# Patient Record
Sex: FEMALE | Race: WHITE | NOT HISPANIC OR LATINO | Employment: UNEMPLOYED | URBAN - METROPOLITAN AREA
[De-identification: names, ages, dates, MRNs, and addresses within clinical notes are randomized per-mention and may not be internally consistent; named-entity substitution may affect disease eponyms.]

---

## 2017-02-04 LAB
LEFT EYE DIABETIC RETINOPATHY: NORMAL
RIGHT EYE DIABETIC RETINOPATHY: NORMAL
SEVERITY (EYE EXAM): NORMAL

## 2017-03-15 ENCOUNTER — ALLSCRIPTS OFFICE VISIT (OUTPATIENT)
Dept: OTHER | Facility: OTHER | Age: 57
End: 2017-03-15

## 2017-03-15 DIAGNOSIS — R00.2 PALPITATIONS: ICD-10-CM

## 2017-03-20 ENCOUNTER — HOSPITAL ENCOUNTER (OUTPATIENT)
Dept: NON INVASIVE DIAGNOSTICS | Facility: HOSPITAL | Age: 57
Discharge: HOME | End: 2017-03-20
Payer: COMMERCIAL

## 2017-03-20 ENCOUNTER — LAB (OUTPATIENT)
Dept: LAB | Facility: HOSPITAL | Age: 57
End: 2017-03-20
Payer: COMMERCIAL

## 2017-03-20 ENCOUNTER — TRANSCRIBE ORDERS (OUTPATIENT)
Dept: ADMINISTRATIVE | Facility: HOSPITAL | Age: 57
End: 2017-03-20

## 2017-03-20 DIAGNOSIS — N18.9 UNCONTROLLED TYPE 2 DIABETES MELLITUS WITH CHRONIC KIDNEY DISEASE, UNSPECIFIED CKD STAGE, UNSPECIFIED LONG TERM INSULIN USE STATUS: ICD-10-CM

## 2017-03-20 DIAGNOSIS — E11.22 UNCONTROLLED TYPE 2 DIABETES MELLITUS WITH CHRONIC KIDNEY DISEASE, UNSPECIFIED CKD STAGE, UNSPECIFIED LONG TERM INSULIN USE STATUS: ICD-10-CM

## 2017-03-20 DIAGNOSIS — R00.2 PALPITATIONS: ICD-10-CM

## 2017-03-20 DIAGNOSIS — I10 ESSENTIAL HYPERTENSION, MALIGNANT: Primary | ICD-10-CM

## 2017-03-20 DIAGNOSIS — E83.52 HYPERCALCEMIA: ICD-10-CM

## 2017-03-20 DIAGNOSIS — E11.65 UNCONTROLLED TYPE 2 DIABETES MELLITUS WITH CHRONIC KIDNEY DISEASE, UNSPECIFIED CKD STAGE, UNSPECIFIED LONG TERM INSULIN USE STATUS: ICD-10-CM

## 2017-03-20 DIAGNOSIS — E66.9 OBESITY, UNSPECIFIED: ICD-10-CM

## 2017-03-20 DIAGNOSIS — I10 ESSENTIAL HYPERTENSION, MALIGNANT: ICD-10-CM

## 2017-03-20 LAB
25(OH)D3 SERPL-MCNC: 9.2 NG/ML (ref 30–100)
ALBUMIN SERPL BCP-MCNC: 3.7 G/DL (ref 3.5–5)
ALP SERPL-CCNC: 94 U/L (ref 46–116)
ALT SERPL W P-5'-P-CCNC: 44 U/L (ref 12–78)
ANION GAP SERPL CALCULATED.3IONS-SCNC: 10 MMOL/L (ref 4–13)
AST SERPL W P-5'-P-CCNC: 26 U/L (ref 5–45)
BILIRUB SERPL-MCNC: 1.02 MG/DL (ref 0.2–1)
BUN SERPL-MCNC: 19 MG/DL (ref 5–25)
CALCIUM ALBUM COR SERPL-MCNC: 11.2 MG/DL (ref 8.3–10.1)
CALCIUM SERPL-MCNC: 11 MG/DL (ref 8.3–10.1)
CHLORIDE SERPL-SCNC: 99 MMOL/L (ref 100–108)
CHOLEST SERPL-MCNC: 197 MG/DL (ref 50–200)
CO2 SERPL-SCNC: 27 MMOL/L (ref 21–32)
CREAT SERPL-MCNC: 0.86 MG/DL (ref 0.6–1.3)
EST. AVERAGE GLUCOSE BLD GHB EST-MCNC: 214 MG/DL
GFR SERPL CREATININE-BSD FRML MDRD: >60 ML/MIN/1.73SQ M
GLUCOSE P FAST SERPL-MCNC: 196 MG/DL (ref 65–99)
HBA1C MFR BLD: 9.1 % (ref 4.2–6.3)
HDLC SERPL-MCNC: 45 MG/DL (ref 40–60)
LDLC SERPL CALC-MCNC: 107 MG/DL (ref 0–100)
POTASSIUM SERPL-SCNC: 4.2 MMOL/L (ref 3.5–5.3)
PROT SERPL-MCNC: 8.1 G/DL (ref 6.4–8.2)
SODIUM SERPL-SCNC: 136 MMOL/L (ref 136–145)
TRIGL SERPL-MCNC: 227 MG/DL
TSH SERPL DL<=0.05 MIU/L-ACNC: 1.64 UIU/ML (ref 0.36–3.74)

## 2017-03-20 PROCEDURE — 80061 LIPID PANEL: CPT

## 2017-03-20 PROCEDURE — 82306 VITAMIN D 25 HYDROXY: CPT

## 2017-03-20 PROCEDURE — 83036 HEMOGLOBIN GLYCOSYLATED A1C: CPT

## 2017-03-20 PROCEDURE — 36415 COLL VENOUS BLD VENIPUNCTURE: CPT

## 2017-03-20 PROCEDURE — 84443 ASSAY THYROID STIM HORMONE: CPT

## 2017-03-20 PROCEDURE — 93225 XTRNL ECG REC<48 HRS REC: CPT

## 2017-03-20 PROCEDURE — 93226 XTRNL ECG REC<48 HR SCAN A/R: CPT

## 2017-03-20 PROCEDURE — 80053 COMPREHEN METABOLIC PANEL: CPT

## 2017-03-21 ENCOUNTER — GENERIC CONVERSION - ENCOUNTER (OUTPATIENT)
Dept: OTHER | Facility: OTHER | Age: 57
End: 2017-03-21

## 2017-03-22 ENCOUNTER — ALLSCRIPTS OFFICE VISIT (OUTPATIENT)
Dept: OTHER | Facility: OTHER | Age: 57
End: 2017-03-22

## 2017-03-24 ENCOUNTER — GENERIC CONVERSION - ENCOUNTER (OUTPATIENT)
Dept: OTHER | Facility: OTHER | Age: 57
End: 2017-03-24

## 2017-04-08 ENCOUNTER — ALLSCRIPTS OFFICE VISIT (OUTPATIENT)
Dept: OTHER | Facility: OTHER | Age: 57
End: 2017-04-08

## 2017-04-08 ENCOUNTER — LAB REQUISITION (OUTPATIENT)
Dept: LAB | Facility: HOSPITAL | Age: 57
End: 2017-04-08
Payer: COMMERCIAL

## 2017-04-08 DIAGNOSIS — R41.0 DISORIENTATION: ICD-10-CM

## 2017-04-08 LAB
ALBUMIN SERPL BCP-MCNC: 3.8 G/DL (ref 3.5–5)
ALP SERPL-CCNC: 96 U/L (ref 46–116)
ALT SERPL W P-5'-P-CCNC: 50 U/L (ref 12–78)
ANION GAP SERPL CALCULATED.3IONS-SCNC: 10 MMOL/L (ref 4–13)
AST SERPL W P-5'-P-CCNC: 35 U/L (ref 5–45)
BILIRUB SERPL-MCNC: 0.9 MG/DL (ref 0.2–1)
BILIRUB UR QL STRIP: NEGATIVE
BUN SERPL-MCNC: 17 MG/DL (ref 5–25)
CALCIUM ALBUM COR SERPL-MCNC: 11.2 MG/DL (ref 8.3–10.1)
CALCIUM SERPL-MCNC: 11 MG/DL (ref 8.3–10.1)
CHLORIDE SERPL-SCNC: 104 MMOL/L (ref 100–108)
CLARITY UR: NORMAL
CO2 SERPL-SCNC: 23 MMOL/L (ref 21–32)
COLOR UR: YELLOW
CREAT SERPL-MCNC: 0.77 MG/DL (ref 0.6–1.3)
GFR SERPL CREATININE-BSD FRML MDRD: >60 ML/MIN/1.73SQ M
GLUCOSE (HISTORICAL): NORMAL
GLUCOSE P FAST SERPL-MCNC: 124 MG/DL (ref 65–99)
HGB UR QL STRIP.AUTO: NEGATIVE
KETONES UR STRIP-MCNC: NEGATIVE MG/DL
LEUKOCYTE ESTERASE UR QL STRIP: NEGATIVE
NITRITE UR QL STRIP: NEGATIVE
PH UR STRIP.AUTO: 5 [PH]
POTASSIUM SERPL-SCNC: 4.6 MMOL/L (ref 3.5–5.3)
PROT SERPL-MCNC: 7.8 G/DL (ref 6.4–8.2)
PROT UR STRIP-MCNC: NEGATIVE MG/DL
SODIUM SERPL-SCNC: 137 MMOL/L (ref 136–145)
SP GR UR STRIP.AUTO: 1.01
UROBILINOGEN UR QL STRIP.AUTO: 0.2

## 2017-04-08 PROCEDURE — 87086 URINE CULTURE/COLONY COUNT: CPT | Performed by: FAMILY MEDICINE

## 2017-04-08 PROCEDURE — 80053 COMPREHEN METABOLIC PANEL: CPT | Performed by: FAMILY MEDICINE

## 2017-04-09 LAB — BACTERIA UR CULT: NORMAL

## 2017-04-10 ENCOUNTER — TRANSCRIBE ORDERS (OUTPATIENT)
Dept: ADMINISTRATIVE | Facility: HOSPITAL | Age: 57
End: 2017-04-10

## 2017-04-10 DIAGNOSIS — F01.50 VASCULAR DEMENTIA WITH DELIRIUM (HCC): ICD-10-CM

## 2017-04-10 DIAGNOSIS — R29.6 FALLS FREQUENTLY: Primary | ICD-10-CM

## 2017-04-10 DIAGNOSIS — F05 VASCULAR DEMENTIA WITH DELIRIUM (HCC): ICD-10-CM

## 2017-04-11 ENCOUNTER — HOSPITAL ENCOUNTER (OUTPATIENT)
Dept: CT IMAGING | Facility: HOSPITAL | Age: 57
Discharge: HOME | End: 2017-04-11
Payer: COMMERCIAL

## 2017-04-11 DIAGNOSIS — R29.6 REPEATED FALLS: ICD-10-CM

## 2017-04-11 DIAGNOSIS — R41.0 DISORIENTATION: ICD-10-CM

## 2017-04-11 PROCEDURE — 70470 CT HEAD/BRAIN W/O & W/DYE: CPT

## 2017-04-11 RX ADMIN — IOHEXOL 100 ML: 350 INJECTION, SOLUTION INTRAVENOUS at 19:43

## 2017-04-12 ENCOUNTER — GENERIC CONVERSION - ENCOUNTER (OUTPATIENT)
Dept: OTHER | Facility: OTHER | Age: 57
End: 2017-04-12

## 2017-04-20 ENCOUNTER — GENERIC CONVERSION - ENCOUNTER (OUTPATIENT)
Dept: OTHER | Facility: OTHER | Age: 57
End: 2017-04-20

## 2017-04-27 ENCOUNTER — ANESTHESIA EVENT (INPATIENT)
Dept: GASTROENTEROLOGY | Facility: HOSPITAL | Age: 57
DRG: 394 | End: 2017-04-27
Payer: COMMERCIAL

## 2017-04-27 ENCOUNTER — APPOINTMENT (EMERGENCY)
Dept: CT IMAGING | Facility: HOSPITAL | Age: 57
DRG: 394 | End: 2017-04-27
Payer: COMMERCIAL

## 2017-04-27 ENCOUNTER — HOSPITAL ENCOUNTER (INPATIENT)
Facility: HOSPITAL | Age: 57
LOS: 6 days | Discharge: HOME | DRG: 394 | End: 2017-05-03
Attending: EMERGENCY MEDICINE | Admitting: INTERNAL MEDICINE
Payer: COMMERCIAL

## 2017-04-27 ENCOUNTER — ANESTHESIA EVENT (EMERGENCY)
Dept: PERIOP | Facility: HOSPITAL | Age: 57
DRG: 394 | End: 2017-04-27
Payer: COMMERCIAL

## 2017-04-27 ENCOUNTER — ANESTHESIA (EMERGENCY)
Dept: PERIOP | Facility: HOSPITAL | Age: 57
DRG: 394 | End: 2017-04-27
Payer: COMMERCIAL

## 2017-04-27 DIAGNOSIS — K51.00 PANCOLITIS (HCC): Primary | ICD-10-CM

## 2017-04-27 DIAGNOSIS — K92.2 GI BLEEDING: ICD-10-CM

## 2017-04-27 DIAGNOSIS — E66.01 MORBID OBESITY DUE TO EXCESS CALORIES (HCC): ICD-10-CM

## 2017-04-27 DIAGNOSIS — R10.9 ABDOMINAL PAIN: ICD-10-CM

## 2017-04-27 DIAGNOSIS — K43.9 VENTRAL HERNIA: ICD-10-CM

## 2017-04-27 DIAGNOSIS — R11.2 NAUSEA & VOMITING: ICD-10-CM

## 2017-04-27 DIAGNOSIS — I10 ESSENTIAL HYPERTENSION: ICD-10-CM

## 2017-04-27 DIAGNOSIS — R74.8 ELEVATED LIPASE: ICD-10-CM

## 2017-04-27 DIAGNOSIS — D72.829 LEUKOCYTOSIS: ICD-10-CM

## 2017-04-27 DIAGNOSIS — E87.2 LACTIC ACIDOSIS: ICD-10-CM

## 2017-04-27 DIAGNOSIS — E11.9 TYPE 2 DIABETES MELLITUS WITHOUT COMPLICATION, WITHOUT LONG-TERM CURRENT USE OF INSULIN (HCC): ICD-10-CM

## 2017-04-27 LAB
ABO GROUP BLD: NORMAL
ALBUMIN SERPL BCP-MCNC: 3.8 G/DL (ref 3.5–5)
ALBUMIN SERPL BCP-MCNC: 3.9 G/DL (ref 3.5–5)
ALP SERPL-CCNC: 103 U/L (ref 46–116)
ALP SERPL-CCNC: 97 U/L (ref 46–116)
ALT SERPL W P-5'-P-CCNC: 42 U/L (ref 12–78)
ALT SERPL W P-5'-P-CCNC: 44 U/L (ref 12–78)
ANION GAP SERPL CALCULATED.3IONS-SCNC: 10 MMOL/L (ref 4–13)
ANION GAP SERPL CALCULATED.3IONS-SCNC: 8 MMOL/L (ref 4–13)
APTT PPP: 29 SECONDS (ref 24–36)
AST SERPL W P-5'-P-CCNC: 27 U/L (ref 5–45)
AST SERPL W P-5'-P-CCNC: 28 U/L (ref 5–45)
BACTERIA UR QL AUTO: ABNORMAL /HPF
BASOPHILS # BLD AUTO: 0.04 THOUSANDS/ΜL (ref 0–0.1)
BASOPHILS # BLD MANUAL: 0 THOUSAND/UL (ref 0–0.1)
BASOPHILS NFR BLD AUTO: 0 % (ref 0–1)
BASOPHILS NFR MAR MANUAL: 0 % (ref 0–1)
BILIRUB SERPL-MCNC: 0.83 MG/DL (ref 0.2–1)
BILIRUB SERPL-MCNC: 0.93 MG/DL (ref 0.2–1)
BILIRUB UR QL STRIP: NEGATIVE
BLD GP AB SCN SERPL QL: NEGATIVE
BUN SERPL-MCNC: 22 MG/DL (ref 5–25)
BUN SERPL-MCNC: 23 MG/DL (ref 5–25)
CALCIUM SERPL-MCNC: 10.8 MG/DL (ref 8.3–10.1)
CALCIUM SERPL-MCNC: 11.2 MG/DL (ref 8.3–10.1)
CHLORIDE SERPL-SCNC: 101 MMOL/L (ref 100–108)
CHLORIDE SERPL-SCNC: 103 MMOL/L (ref 100–108)
CLARITY UR: CLEAR
CLARITY, POC: CLEAR
CO2 SERPL-SCNC: 26 MMOL/L (ref 21–32)
CO2 SERPL-SCNC: 29 MMOL/L (ref 21–32)
COLOR UR: ABNORMAL
COLOR, POC: NORMAL
CREAT SERPL-MCNC: 1.03 MG/DL (ref 0.6–1.3)
CREAT SERPL-MCNC: 1.06 MG/DL (ref 0.6–1.3)
EOSINOPHIL # BLD AUTO: 0.23 THOUSAND/ΜL (ref 0–0.61)
EOSINOPHIL # BLD MANUAL: 0 THOUSAND/UL (ref 0–0.4)
EOSINOPHIL NFR BLD AUTO: 2 % (ref 0–6)
EOSINOPHIL NFR BLD MANUAL: 0 % (ref 0–6)
ERYTHROCYTE [DISTWIDTH] IN BLOOD BY AUTOMATED COUNT: 13.9 % (ref 11.6–15.1)
ERYTHROCYTE [DISTWIDTH] IN BLOOD BY AUTOMATED COUNT: 13.9 % (ref 11.6–15.1)
GFR SERPL CREATININE-BSD FRML MDRD: 53.8 ML/MIN/1.73SQ M
GFR SERPL CREATININE-BSD FRML MDRD: 55.6 ML/MIN/1.73SQ M
GLUCOSE SERPL-MCNC: 227 MG/DL (ref 65–140)
GLUCOSE SERPL-MCNC: 240 MG/DL (ref 65–140)
GLUCOSE UR STRIP-MCNC: ABNORMAL MG/DL
HCT VFR BLD AUTO: 42.6 % (ref 34.8–46.1)
HCT VFR BLD AUTO: 43.7 % (ref 34.8–46.1)
HCT VFR BLD AUTO: 44.9 % (ref 34.8–46.1)
HGB BLD-MCNC: 14.3 G/DL (ref 11.5–15.4)
HGB BLD-MCNC: 14.6 G/DL (ref 11.5–15.4)
HGB BLD-MCNC: 15.4 G/DL (ref 11.5–15.4)
HGB UR QL STRIP.AUTO: NEGATIVE
INR PPP: 1.01 (ref 0.86–1.16)
INR PPP: 1.01 (ref 0.86–1.16)
KETONES UR STRIP-MCNC: ABNORMAL MG/DL
LACTATE SERPL-SCNC: 1.9 MMOL/L (ref 0.5–2)
LACTATE SERPL-SCNC: 1.9 MMOL/L (ref 0.5–2)
LACTATE SERPL-SCNC: 2.2 MMOL/L (ref 0.5–2)
LEUKOCYTE ESTERASE UR QL STRIP: ABNORMAL
LIPASE SERPL-CCNC: 670 U/L (ref 73–393)
LYMPHOCYTES # BLD AUTO: 0.13 THOUSAND/UL (ref 0.6–4.47)
LYMPHOCYTES # BLD AUTO: 1 % (ref 14–44)
LYMPHOCYTES # BLD AUTO: 1.81 THOUSANDS/ΜL (ref 0.6–4.47)
LYMPHOCYTES NFR BLD AUTO: 12 % (ref 14–44)
MCH RBC QN AUTO: 29.4 PG (ref 26.8–34.3)
MCH RBC QN AUTO: 30.3 PG (ref 26.8–34.3)
MCHC RBC AUTO-ENTMCNC: 33.4 G/DL (ref 31.4–37.4)
MCHC RBC AUTO-ENTMCNC: 34.3 G/DL (ref 31.4–37.4)
MCV RBC AUTO: 88 FL (ref 82–98)
MCV RBC AUTO: 88 FL (ref 82–98)
MONOCYTES # BLD AUTO: 0.38 THOUSAND/UL (ref 0–1.22)
MONOCYTES # BLD AUTO: 1.01 THOUSAND/ΜL (ref 0.17–1.22)
MONOCYTES NFR BLD AUTO: 7 % (ref 4–12)
MONOCYTES NFR BLD: 3 % (ref 4–12)
MUCOUS THREADS UR QL AUTO: ABNORMAL
NEUTROPHILS # BLD AUTO: 11.52 THOUSANDS/ΜL (ref 1.85–7.62)
NEUTROPHILS # BLD MANUAL: 12.19 THOUSAND/UL (ref 1.85–7.62)
NEUTS BAND NFR BLD MANUAL: 12 % (ref 0–8)
NEUTS SEG NFR BLD AUTO: 79 % (ref 43–75)
NEUTS SEG NFR BLD AUTO: 84 % (ref 43–75)
NITRITE UR QL STRIP: NEGATIVE
NON-SQ EPI CELLS URNS QL MICRO: ABNORMAL /HPF
NRBC BLD AUTO-RTO: 0 /100 WBCS
NRBC BLD AUTO-RTO: 0 /100 WBCS
PH UR STRIP.AUTO: 5 [PH] (ref 4.5–8)
PLATELET # BLD AUTO: 243 THOUSANDS/UL (ref 149–390)
PLATELET # BLD AUTO: 311 THOUSANDS/UL (ref 149–390)
PLATELET BLD QL SMEAR: ADEQUATE
PMV BLD AUTO: 10.1 FL (ref 8.9–12.7)
PMV BLD AUTO: 9.8 FL (ref 8.9–12.7)
POTASSIUM SERPL-SCNC: 3.8 MMOL/L (ref 3.5–5.3)
POTASSIUM SERPL-SCNC: 4.2 MMOL/L (ref 3.5–5.3)
PROT SERPL-MCNC: 7.8 G/DL (ref 6.4–8.2)
PROT SERPL-MCNC: 8 G/DL (ref 6.4–8.2)
PROT UR STRIP-MCNC: ABNORMAL MG/DL
PROTHROMBIN TIME: 13.3 SECONDS (ref 12–14.3)
PROTHROMBIN TIME: 13.3 SECONDS (ref 12–14.3)
RBC # BLD AUTO: 4.96 MILLION/UL (ref 3.81–5.12)
RBC # BLD AUTO: 5.09 MILLION/UL (ref 3.81–5.12)
RBC #/AREA URNS AUTO: ABNORMAL /HPF
RBC MORPH BLD: NORMAL
RH BLD: POSITIVE
SODIUM SERPL-SCNC: 138 MMOL/L (ref 136–145)
SODIUM SERPL-SCNC: 139 MMOL/L (ref 136–145)
SP GR UR STRIP.AUTO: <=1.005 (ref 1–1.03)
SPECIMEN EXPIRATION DATE: NORMAL
TOTAL CELLS COUNTED SPEC: 100
UROBILINOGEN UR QL STRIP.AUTO: 0.2 E.U./DL
WBC # BLD AUTO: 12.7 THOUSAND/UL (ref 4.31–10.16)
WBC # BLD AUTO: 14.61 THOUSAND/UL (ref 4.31–10.16)
WBC #/AREA URNS AUTO: ABNORMAL /HPF

## 2017-04-27 PROCEDURE — C9113 INJ PANTOPRAZOLE SODIUM, VIA: HCPCS | Performed by: PHYSICIAN ASSISTANT

## 2017-04-27 PROCEDURE — 80053 COMPREHEN METABOLIC PANEL: CPT | Performed by: EMERGENCY MEDICINE

## 2017-04-27 PROCEDURE — 96376 TX/PRO/DX INJ SAME DRUG ADON: CPT

## 2017-04-27 PROCEDURE — 81003 URINALYSIS AUTO W/O SCOPE: CPT

## 2017-04-27 PROCEDURE — 96361 HYDRATE IV INFUSION ADD-ON: CPT

## 2017-04-27 PROCEDURE — 85610 PROTHROMBIN TIME: CPT | Performed by: SURGERY

## 2017-04-27 PROCEDURE — 96374 THER/PROPH/DIAG INJ IV PUSH: CPT

## 2017-04-27 PROCEDURE — 85027 COMPLETE CBC AUTOMATED: CPT | Performed by: SURGERY

## 2017-04-27 PROCEDURE — 83605 ASSAY OF LACTIC ACID: CPT | Performed by: EMERGENCY MEDICINE

## 2017-04-27 PROCEDURE — 99285 EMERGENCY DEPT VISIT HI MDM: CPT

## 2017-04-27 PROCEDURE — 80053 COMPREHEN METABOLIC PANEL: CPT | Performed by: SURGERY

## 2017-04-27 PROCEDURE — 96375 TX/PRO/DX INJ NEW DRUG ADDON: CPT

## 2017-04-27 PROCEDURE — 86900 BLOOD TYPING SEROLOGIC ABO: CPT | Performed by: EMERGENCY MEDICINE

## 2017-04-27 PROCEDURE — 85018 HEMOGLOBIN: CPT | Performed by: PHYSICIAN ASSISTANT

## 2017-04-27 PROCEDURE — 82272 OCCULT BLD FECES 1-3 TESTS: CPT

## 2017-04-27 PROCEDURE — 81002 URINALYSIS NONAUTO W/O SCOPE: CPT | Performed by: EMERGENCY MEDICINE

## 2017-04-27 PROCEDURE — 85014 HEMATOCRIT: CPT | Performed by: PHYSICIAN ASSISTANT

## 2017-04-27 PROCEDURE — 85007 BL SMEAR W/DIFF WBC COUNT: CPT | Performed by: SURGERY

## 2017-04-27 PROCEDURE — 81001 URINALYSIS AUTO W/SCOPE: CPT

## 2017-04-27 PROCEDURE — 83605 ASSAY OF LACTIC ACID: CPT | Performed by: SURGERY

## 2017-04-27 PROCEDURE — 87086 URINE CULTURE/COLONY COUNT: CPT | Performed by: EMERGENCY MEDICINE

## 2017-04-27 PROCEDURE — 86901 BLOOD TYPING SEROLOGIC RH(D): CPT | Performed by: EMERGENCY MEDICINE

## 2017-04-27 PROCEDURE — 86850 RBC ANTIBODY SCREEN: CPT | Performed by: EMERGENCY MEDICINE

## 2017-04-27 PROCEDURE — 85025 COMPLETE CBC W/AUTO DIFF WBC: CPT | Performed by: EMERGENCY MEDICINE

## 2017-04-27 PROCEDURE — 85730 THROMBOPLASTIN TIME PARTIAL: CPT | Performed by: SURGERY

## 2017-04-27 PROCEDURE — 83690 ASSAY OF LIPASE: CPT | Performed by: EMERGENCY MEDICINE

## 2017-04-27 PROCEDURE — 74177 CT ABD & PELVIS W/CONTRAST: CPT

## 2017-04-27 PROCEDURE — 36415 COLL VENOUS BLD VENIPUNCTURE: CPT | Performed by: EMERGENCY MEDICINE

## 2017-04-27 PROCEDURE — 87040 BLOOD CULTURE FOR BACTERIA: CPT | Performed by: SURGERY

## 2017-04-27 RX ORDER — HYDRALAZINE HYDROCHLORIDE 20 MG/ML
10 INJECTION INTRAMUSCULAR; INTRAVENOUS EVERY 6 HOURS PRN
Status: DISCONTINUED | OUTPATIENT
Start: 2017-04-27 | End: 2017-05-03 | Stop reason: HOSPADM

## 2017-04-27 RX ORDER — MORPHINE SULFATE 4 MG/ML
4 INJECTION, SOLUTION INTRAMUSCULAR; INTRAVENOUS ONCE
Status: COMPLETED | OUTPATIENT
Start: 2017-04-27 | End: 2017-04-27

## 2017-04-27 RX ORDER — PANTOPRAZOLE SODIUM 40 MG/1
40 INJECTION, POWDER, FOR SOLUTION INTRAVENOUS
Status: DISCONTINUED | OUTPATIENT
Start: 2017-04-27 | End: 2017-04-27

## 2017-04-27 RX ORDER — ONDANSETRON 2 MG/ML
4 INJECTION INTRAMUSCULAR; INTRAVENOUS ONCE
Status: COMPLETED | OUTPATIENT
Start: 2017-04-27 | End: 2017-04-27

## 2017-04-27 RX ORDER — ONDANSETRON 2 MG/ML
4 INJECTION INTRAMUSCULAR; INTRAVENOUS EVERY 6 HOURS PRN
Status: DISCONTINUED | OUTPATIENT
Start: 2017-04-27 | End: 2017-05-03 | Stop reason: HOSPADM

## 2017-04-27 RX ORDER — PANTOPRAZOLE SODIUM 40 MG/1
40 INJECTION, POWDER, FOR SOLUTION INTRAVENOUS EVERY 12 HOURS SCHEDULED
Status: DISCONTINUED | OUTPATIENT
Start: 2017-04-27 | End: 2017-05-01

## 2017-04-27 RX ADMIN — HYDROMORPHONE HYDROCHLORIDE 1 MG: 1 INJECTION, SOLUTION INTRAMUSCULAR; INTRAVENOUS; SUBCUTANEOUS at 10:44

## 2017-04-27 RX ADMIN — ONDANSETRON 4 MG: 2 INJECTION INTRAMUSCULAR; INTRAVENOUS at 03:44

## 2017-04-27 RX ADMIN — PIPERACILLIN SODIUM AND TAZOBACTAM SODIUM 3.38 G: 36; 4.5 INJECTION, POWDER, FOR SOLUTION INTRAVENOUS at 09:16

## 2017-04-27 RX ADMIN — IOHEXOL 100 ML: 350 INJECTION, SOLUTION INTRAVENOUS at 04:53

## 2017-04-27 RX ADMIN — METRONIDAZOLE 500 MG: 500 INJECTION, SOLUTION INTRAVENOUS at 09:52

## 2017-04-27 RX ADMIN — SODIUM CHLORIDE 1000 ML: 0.9 INJECTION, SOLUTION INTRAVENOUS at 05:18

## 2017-04-27 RX ADMIN — PIPERACILLIN SODIUM AND TAZOBACTAM SODIUM 3.38 G: 36; 4.5 INJECTION, POWDER, FOR SOLUTION INTRAVENOUS at 17:34

## 2017-04-27 RX ADMIN — ONDANSETRON 4 MG: 2 INJECTION INTRAMUSCULAR; INTRAVENOUS at 19:10

## 2017-04-27 RX ADMIN — MORPHINE SULFATE 4 MG: 4 INJECTION, SOLUTION INTRAMUSCULAR; INTRAVENOUS at 03:47

## 2017-04-27 RX ADMIN — SODIUM CHLORIDE 1000 ML: 0.9 INJECTION, SOLUTION INTRAVENOUS at 03:43

## 2017-04-27 RX ADMIN — BISACODYL 10 MG: 5 TABLET, COATED ORAL at 17:25

## 2017-04-27 RX ADMIN — PANTOPRAZOLE SODIUM 40 MG: 40 INJECTION, POWDER, FOR SOLUTION INTRAVENOUS at 09:11

## 2017-04-27 RX ADMIN — METRONIDAZOLE 500 MG: 500 INJECTION, SOLUTION INTRAVENOUS at 19:02

## 2017-04-27 RX ADMIN — MORPHINE SULFATE 4 MG: 4 INJECTION, SOLUTION INTRAMUSCULAR; INTRAVENOUS at 04:17

## 2017-04-27 RX ADMIN — HYDROMORPHONE HYDROCHLORIDE 1 MG: 1 INJECTION, SOLUTION INTRAMUSCULAR; INTRAVENOUS; SUBCUTANEOUS at 19:10

## 2017-04-27 RX ADMIN — POLYETHYLENE GLYCOL 3350, SODIUM SULFATE ANHYDROUS, SODIUM BICARBONATE, SODIUM CHLORIDE, POTASSIUM CHLORIDE 4000 ML: 236; 22.74; 6.74; 5.86; 2.97 POWDER, FOR SOLUTION ORAL at 17:37

## 2017-04-27 RX ADMIN — PANTOPRAZOLE SODIUM 40 MG: 40 INJECTION, POWDER, FOR SOLUTION INTRAVENOUS at 21:27

## 2017-04-28 ENCOUNTER — GENERIC CONVERSION - ENCOUNTER (OUTPATIENT)
Dept: OTHER | Facility: OTHER | Age: 57
End: 2017-04-28

## 2017-04-28 ENCOUNTER — ANESTHESIA (INPATIENT)
Dept: GASTROENTEROLOGY | Facility: HOSPITAL | Age: 57
DRG: 394 | End: 2017-04-28
Payer: COMMERCIAL

## 2017-04-28 LAB
ALBUMIN SERPL BCP-MCNC: 3.2 G/DL (ref 3.5–5)
ALP SERPL-CCNC: 77 U/L (ref 46–116)
ALT SERPL W P-5'-P-CCNC: 33 U/L (ref 12–78)
ANION GAP SERPL CALCULATED.3IONS-SCNC: 8 MMOL/L (ref 4–13)
AST SERPL W P-5'-P-CCNC: 18 U/L (ref 5–45)
BACTERIA UR CULT: NORMAL
BASOPHILS # BLD AUTO: 0.01 THOUSANDS/ΜL (ref 0–0.1)
BASOPHILS NFR BLD AUTO: 0 % (ref 0–1)
BILIRUB SERPL-MCNC: 1.12 MG/DL (ref 0.2–1)
BUN SERPL-MCNC: 13 MG/DL (ref 5–25)
CALCIUM SERPL-MCNC: 10.1 MG/DL (ref 8.3–10.1)
CHLORIDE SERPL-SCNC: 105 MMOL/L (ref 100–108)
CO2 SERPL-SCNC: 28 MMOL/L (ref 21–32)
CREAT SERPL-MCNC: 0.76 MG/DL (ref 0.6–1.3)
EOSINOPHIL # BLD AUTO: 0.07 THOUSAND/ΜL (ref 0–0.61)
EOSINOPHIL NFR BLD AUTO: 1 % (ref 0–6)
ERYTHROCYTE [DISTWIDTH] IN BLOOD BY AUTOMATED COUNT: 14.2 % (ref 11.6–15.1)
GFR SERPL CREATININE-BSD FRML MDRD: >60 ML/MIN/1.73SQ M
GLUCOSE SERPL-MCNC: 166 MG/DL (ref 65–140)
GLUCOSE SERPL-MCNC: 200 MG/DL (ref 65–140)
GLUCOSE SERPL-MCNC: 205 MG/DL (ref 65–140)
GLUCOSE SERPL-MCNC: 207 MG/DL (ref 65–140)
GLUCOSE SERPL-MCNC: 210 MG/DL (ref 65–140)
HCT VFR BLD AUTO: 39.5 % (ref 34.8–46.1)
HCT VFR BLD AUTO: 40.9 % (ref 34.8–46.1)
HCT VFR BLD AUTO: 41 % (ref 34.8–46.1)
HGB BLD-MCNC: 12.8 G/DL (ref 11.5–15.4)
HGB BLD-MCNC: 13.1 G/DL (ref 11.5–15.4)
HGB BLD-MCNC: 13.2 G/DL (ref 11.5–15.4)
LIPASE SERPL-CCNC: 82 U/L (ref 73–393)
LYMPHOCYTES # BLD AUTO: 1.05 THOUSANDS/ΜL (ref 0.6–4.47)
LYMPHOCYTES NFR BLD AUTO: 8 % (ref 14–44)
MAGNESIUM SERPL-MCNC: 1.9 MG/DL (ref 1.6–2.6)
MCH RBC QN AUTO: 28.2 PG (ref 26.8–34.3)
MCHC RBC AUTO-ENTMCNC: 32 G/DL (ref 31.4–37.4)
MCV RBC AUTO: 88 FL (ref 82–98)
MONOCYTES # BLD AUTO: 1 THOUSAND/ΜL (ref 0.17–1.22)
MONOCYTES NFR BLD AUTO: 8 % (ref 4–12)
NEUTROPHILS # BLD AUTO: 11.16 THOUSANDS/ΜL (ref 1.85–7.62)
NEUTS SEG NFR BLD AUTO: 83 % (ref 43–75)
PLATELET # BLD AUTO: 239 THOUSANDS/UL (ref 149–390)
PMV BLD AUTO: 9.8 FL (ref 8.9–12.7)
POTASSIUM SERPL-SCNC: 3.9 MMOL/L (ref 3.5–5.3)
PROT SERPL-MCNC: 6.9 G/DL (ref 6.4–8.2)
RBC # BLD AUTO: 4.64 MILLION/UL (ref 3.81–5.12)
SODIUM SERPL-SCNC: 141 MMOL/L (ref 136–145)
WBC # BLD AUTO: 13.29 THOUSAND/UL (ref 4.31–10.16)

## 2017-04-28 PROCEDURE — 88305 TISSUE EXAM BY PATHOLOGIST: CPT | Performed by: INTERNAL MEDICINE

## 2017-04-28 PROCEDURE — 0DBN8ZX EXCISION OF SIGMOID COLON, VIA NATURAL OR ARTIFICIAL OPENING ENDOSCOPIC, DIAGNOSTIC: ICD-10-PCS | Performed by: INTERNAL MEDICINE

## 2017-04-28 PROCEDURE — 85018 HEMOGLOBIN: CPT | Performed by: INTERNAL MEDICINE

## 2017-04-28 PROCEDURE — C9113 INJ PANTOPRAZOLE SODIUM, VIA: HCPCS | Performed by: PHYSICIAN ASSISTANT

## 2017-04-28 PROCEDURE — 83690 ASSAY OF LIPASE: CPT | Performed by: PHYSICIAN ASSISTANT

## 2017-04-28 PROCEDURE — 0DBM8ZX EXCISION OF DESCENDING COLON, VIA NATURAL OR ARTIFICIAL OPENING ENDOSCOPIC, DIAGNOSTIC: ICD-10-PCS | Performed by: INTERNAL MEDICINE

## 2017-04-28 PROCEDURE — 80053 COMPREHEN METABOLIC PANEL: CPT | Performed by: PHYSICIAN ASSISTANT

## 2017-04-28 PROCEDURE — 83735 ASSAY OF MAGNESIUM: CPT | Performed by: PHYSICIAN ASSISTANT

## 2017-04-28 PROCEDURE — 82948 REAGENT STRIP/BLOOD GLUCOSE: CPT

## 2017-04-28 PROCEDURE — 85014 HEMATOCRIT: CPT | Performed by: INTERNAL MEDICINE

## 2017-04-28 PROCEDURE — 85018 HEMOGLOBIN: CPT | Performed by: PHYSICIAN ASSISTANT

## 2017-04-28 PROCEDURE — 85014 HEMATOCRIT: CPT | Performed by: PHYSICIAN ASSISTANT

## 2017-04-28 PROCEDURE — 85025 COMPLETE CBC W/AUTO DIFF WBC: CPT | Performed by: PHYSICIAN ASSISTANT

## 2017-04-28 RX ORDER — LISINOPRIL 10 MG/1
10 TABLET ORAL DAILY
Status: DISCONTINUED | OUTPATIENT
Start: 2017-04-28 | End: 2017-05-03 | Stop reason: HOSPADM

## 2017-04-28 RX ORDER — SODIUM CHLORIDE 9 MG/ML
INJECTION, SOLUTION INTRAVENOUS CONTINUOUS PRN
Status: DISCONTINUED | OUTPATIENT
Start: 2017-04-28 | End: 2017-04-28 | Stop reason: SURG

## 2017-04-28 RX ORDER — ONDANSETRON 2 MG/ML
4 INJECTION INTRAMUSCULAR; INTRAVENOUS ONCE
Status: COMPLETED | OUTPATIENT
Start: 2017-04-28 | End: 2017-04-28

## 2017-04-28 RX ORDER — PROPOFOL 10 MG/ML
INJECTION, EMULSION INTRAVENOUS AS NEEDED
Status: DISCONTINUED | OUTPATIENT
Start: 2017-04-28 | End: 2017-04-28 | Stop reason: SURG

## 2017-04-28 RX ORDER — LIDOCAINE HYDROCHLORIDE 10 MG/ML
INJECTION, SOLUTION INFILTRATION; PERINEURAL AS NEEDED
Status: DISCONTINUED | OUTPATIENT
Start: 2017-04-28 | End: 2017-04-28 | Stop reason: SURG

## 2017-04-28 RX ADMIN — PROPOFOL 30 MG: 10 INJECTION, EMULSION INTRAVENOUS at 16:09

## 2017-04-28 RX ADMIN — PROPOFOL 30 MG: 10 INJECTION, EMULSION INTRAVENOUS at 15:59

## 2017-04-28 RX ADMIN — ONDANSETRON 4 MG: 2 INJECTION INTRAMUSCULAR; INTRAVENOUS at 16:34

## 2017-04-28 RX ADMIN — PANTOPRAZOLE SODIUM 40 MG: 40 INJECTION, POWDER, FOR SOLUTION INTRAVENOUS at 09:59

## 2017-04-28 RX ADMIN — METRONIDAZOLE 500 MG: 500 INJECTION, SOLUTION INTRAVENOUS at 00:40

## 2017-04-28 RX ADMIN — HYDROMORPHONE HYDROCHLORIDE 1 MG: 1 INJECTION, SOLUTION INTRAMUSCULAR; INTRAVENOUS; SUBCUTANEOUS at 00:58

## 2017-04-28 RX ADMIN — PROPOFOL 110 MG: 10 INJECTION, EMULSION INTRAVENOUS at 15:56

## 2017-04-28 RX ADMIN — PROPOFOL 30 MG: 10 INJECTION, EMULSION INTRAVENOUS at 16:03

## 2017-04-28 RX ADMIN — PROPOFOL 30 MG: 10 INJECTION, EMULSION INTRAVENOUS at 16:12

## 2017-04-28 RX ADMIN — PROPOFOL 30 MG: 10 INJECTION, EMULSION INTRAVENOUS at 16:15

## 2017-04-28 RX ADMIN — PROPOFOL 30 MG: 10 INJECTION, EMULSION INTRAVENOUS at 16:06

## 2017-04-28 RX ADMIN — LIDOCAINE HYDROCHLORIDE 100 MG: 10 INJECTION, SOLUTION INFILTRATION; PERINEURAL at 15:56

## 2017-04-28 RX ADMIN — PIPERACILLIN SODIUM AND TAZOBACTAM SODIUM 3.38 G: 36; 4.5 INJECTION, POWDER, FOR SOLUTION INTRAVENOUS at 00:00

## 2017-04-28 RX ADMIN — LISINOPRIL 10 MG: 10 TABLET ORAL at 12:00

## 2017-04-28 RX ADMIN — PROPOFOL 30 MG: 10 INJECTION, EMULSION INTRAVENOUS at 16:01

## 2017-04-28 RX ADMIN — ONDANSETRON 4 MG: 2 INJECTION INTRAMUSCULAR; INTRAVENOUS at 00:58

## 2017-04-28 RX ADMIN — METRONIDAZOLE 500 MG: 500 INJECTION, SOLUTION INTRAVENOUS at 09:59

## 2017-04-28 RX ADMIN — HYDROMORPHONE HYDROCHLORIDE 1 MG: 1 INJECTION, SOLUTION INTRAMUSCULAR; INTRAVENOUS; SUBCUTANEOUS at 19:47

## 2017-04-28 RX ADMIN — PIPERACILLIN SODIUM AND TAZOBACTAM SODIUM 3.38 G: 36; 4.5 INJECTION, POWDER, FOR SOLUTION INTRAVENOUS at 18:41

## 2017-04-28 RX ADMIN — PIPERACILLIN SODIUM AND TAZOBACTAM SODIUM 3.38 G: 36; 4.5 INJECTION, POWDER, FOR SOLUTION INTRAVENOUS at 12:00

## 2017-04-28 RX ADMIN — HYDROMORPHONE HYDROCHLORIDE 1 MG: 1 INJECTION, SOLUTION INTRAMUSCULAR; INTRAVENOUS; SUBCUTANEOUS at 13:09

## 2017-04-28 RX ADMIN — PANTOPRAZOLE SODIUM 40 MG: 40 INJECTION, POWDER, FOR SOLUTION INTRAVENOUS at 21:04

## 2017-04-28 RX ADMIN — PIPERACILLIN SODIUM AND TAZOBACTAM SODIUM 3.38 G: 36; 4.5 INJECTION, POWDER, FOR SOLUTION INTRAVENOUS at 06:30

## 2017-04-28 RX ADMIN — SODIUM CHLORIDE: 0.9 INJECTION, SOLUTION INTRAVENOUS at 16:18

## 2017-04-28 RX ADMIN — SODIUM CHLORIDE: 0.9 INJECTION, SOLUTION INTRAVENOUS at 15:56

## 2017-04-28 RX ADMIN — METRONIDAZOLE 500 MG: 500 INJECTION, SOLUTION INTRAVENOUS at 17:59

## 2017-04-29 LAB
GLUCOSE SERPL-MCNC: 189 MG/DL (ref 65–140)
GLUCOSE SERPL-MCNC: 210 MG/DL (ref 65–140)
GLUCOSE SERPL-MCNC: 229 MG/DL (ref 65–140)

## 2017-04-29 PROCEDURE — 82948 REAGENT STRIP/BLOOD GLUCOSE: CPT

## 2017-04-29 PROCEDURE — C9113 INJ PANTOPRAZOLE SODIUM, VIA: HCPCS | Performed by: PHYSICIAN ASSISTANT

## 2017-04-29 RX ORDER — INSULIN GLARGINE 100 [IU]/ML
10 INJECTION, SOLUTION SUBCUTANEOUS
Status: DISCONTINUED | OUTPATIENT
Start: 2017-04-29 | End: 2017-05-03 | Stop reason: HOSPADM

## 2017-04-29 RX ADMIN — PANTOPRAZOLE SODIUM 40 MG: 40 INJECTION, POWDER, FOR SOLUTION INTRAVENOUS at 08:46

## 2017-04-29 RX ADMIN — ONDANSETRON 4 MG: 2 INJECTION INTRAMUSCULAR; INTRAVENOUS at 16:51

## 2017-04-29 RX ADMIN — LISINOPRIL 10 MG: 10 TABLET ORAL at 08:45

## 2017-04-29 RX ADMIN — METRONIDAZOLE 500 MG: 500 INJECTION, SOLUTION INTRAVENOUS at 08:46

## 2017-04-29 RX ADMIN — PIPERACILLIN SODIUM AND TAZOBACTAM SODIUM 3.38 G: 36; 4.5 INJECTION, POWDER, FOR SOLUTION INTRAVENOUS at 05:21

## 2017-04-29 RX ADMIN — PANTOPRAZOLE SODIUM 40 MG: 40 INJECTION, POWDER, FOR SOLUTION INTRAVENOUS at 21:25

## 2017-04-29 RX ADMIN — METRONIDAZOLE 500 MG: 500 INJECTION, SOLUTION INTRAVENOUS at 16:49

## 2017-04-29 RX ADMIN — PIPERACILLIN SODIUM AND TAZOBACTAM SODIUM 3.38 G: 36; 4.5 INJECTION, POWDER, FOR SOLUTION INTRAVENOUS at 23:55

## 2017-04-29 RX ADMIN — METRONIDAZOLE 500 MG: 500 INJECTION, SOLUTION INTRAVENOUS at 01:16

## 2017-04-29 RX ADMIN — HYDROMORPHONE HYDROCHLORIDE 1 MG: 1 INJECTION, SOLUTION INTRAMUSCULAR; INTRAVENOUS; SUBCUTANEOUS at 12:43

## 2017-04-29 RX ADMIN — PIPERACILLIN SODIUM AND TAZOBACTAM SODIUM 3.38 G: 36; 4.5 INJECTION, POWDER, FOR SOLUTION INTRAVENOUS at 10:59

## 2017-04-29 RX ADMIN — PIPERACILLIN SODIUM AND TAZOBACTAM SODIUM 3.38 G: 36; 4.5 INJECTION, POWDER, FOR SOLUTION INTRAVENOUS at 00:26

## 2017-04-29 RX ADMIN — PIPERACILLIN SODIUM AND TAZOBACTAM SODIUM 3.38 G: 36; 4.5 INJECTION, POWDER, FOR SOLUTION INTRAVENOUS at 17:34

## 2017-04-30 LAB
ALBUMIN SERPL BCP-MCNC: 2.6 G/DL (ref 3.5–5)
ALP SERPL-CCNC: 70 U/L (ref 46–116)
ALT SERPL W P-5'-P-CCNC: 21 U/L (ref 12–78)
ANION GAP SERPL CALCULATED.3IONS-SCNC: 7 MMOL/L (ref 4–13)
AST SERPL W P-5'-P-CCNC: 10 U/L (ref 5–45)
BILIRUB SERPL-MCNC: 0.69 MG/DL (ref 0.2–1)
BUN SERPL-MCNC: 7 MG/DL (ref 5–25)
CALCIUM SERPL-MCNC: 9.5 MG/DL (ref 8.3–10.1)
CHLORIDE SERPL-SCNC: 107 MMOL/L (ref 100–108)
CO2 SERPL-SCNC: 26 MMOL/L (ref 21–32)
CREAT SERPL-MCNC: 0.73 MG/DL (ref 0.6–1.3)
GFR SERPL CREATININE-BSD FRML MDRD: >60 ML/MIN/1.73SQ M
GLUCOSE SERPL-MCNC: 151 MG/DL (ref 65–140)
GLUCOSE SERPL-MCNC: 160 MG/DL (ref 65–140)
GLUCOSE SERPL-MCNC: 166 MG/DL (ref 65–140)
GLUCOSE SERPL-MCNC: 189 MG/DL (ref 65–140)
POTASSIUM SERPL-SCNC: 3.6 MMOL/L (ref 3.5–5.3)
PROT SERPL-MCNC: 6 G/DL (ref 6.4–8.2)
SODIUM SERPL-SCNC: 140 MMOL/L (ref 136–145)

## 2017-04-30 PROCEDURE — 82948 REAGENT STRIP/BLOOD GLUCOSE: CPT

## 2017-04-30 PROCEDURE — C9113 INJ PANTOPRAZOLE SODIUM, VIA: HCPCS | Performed by: PHYSICIAN ASSISTANT

## 2017-04-30 PROCEDURE — 80053 COMPREHEN METABOLIC PANEL: CPT | Performed by: INTERNAL MEDICINE

## 2017-04-30 RX ADMIN — PIPERACILLIN SODIUM AND TAZOBACTAM SODIUM 3.38 G: 36; 4.5 INJECTION, POWDER, FOR SOLUTION INTRAVENOUS at 05:22

## 2017-04-30 RX ADMIN — PIPERACILLIN SODIUM AND TAZOBACTAM SODIUM 3.38 G: 36; 4.5 INJECTION, POWDER, FOR SOLUTION INTRAVENOUS at 11:10

## 2017-04-30 RX ADMIN — PIPERACILLIN SODIUM AND TAZOBACTAM SODIUM 3.38 G: 36; 4.5 INJECTION, POWDER, FOR SOLUTION INTRAVENOUS at 23:18

## 2017-04-30 RX ADMIN — METRONIDAZOLE 500 MG: 500 INJECTION, SOLUTION INTRAVENOUS at 16:50

## 2017-04-30 RX ADMIN — METRONIDAZOLE 500 MG: 500 INJECTION, SOLUTION INTRAVENOUS at 08:26

## 2017-04-30 RX ADMIN — PIPERACILLIN SODIUM AND TAZOBACTAM SODIUM 3.38 G: 36; 4.5 INJECTION, POWDER, FOR SOLUTION INTRAVENOUS at 17:28

## 2017-04-30 RX ADMIN — METRONIDAZOLE 500 MG: 500 INJECTION, SOLUTION INTRAVENOUS at 01:46

## 2017-04-30 RX ADMIN — LISINOPRIL 10 MG: 10 TABLET ORAL at 08:25

## 2017-04-30 RX ADMIN — PANTOPRAZOLE SODIUM 40 MG: 40 INJECTION, POWDER, FOR SOLUTION INTRAVENOUS at 08:25

## 2017-04-30 RX ADMIN — PANTOPRAZOLE SODIUM 40 MG: 40 INJECTION, POWDER, FOR SOLUTION INTRAVENOUS at 21:30

## 2017-05-01 PROCEDURE — A9270 NON-COVERED ITEM OR SERVICE: HCPCS | Performed by: INTERNAL MEDICINE

## 2017-05-01 PROCEDURE — C9113 INJ PANTOPRAZOLE SODIUM, VIA: HCPCS | Performed by: PHYSICIAN ASSISTANT

## 2017-05-01 RX ORDER — PANTOPRAZOLE SODIUM 40 MG/1
40 TABLET, DELAYED RELEASE ORAL
Status: DISCONTINUED | OUTPATIENT
Start: 2017-05-01 | End: 2017-05-03 | Stop reason: HOSPADM

## 2017-05-01 RX ADMIN — METRONIDAZOLE 500 MG: 500 INJECTION, SOLUTION INTRAVENOUS at 00:16

## 2017-05-01 RX ADMIN — PIPERACILLIN SODIUM AND TAZOBACTAM SODIUM 3.38 G: 36; 4.5 INJECTION, POWDER, FOR SOLUTION INTRAVENOUS at 17:14

## 2017-05-01 RX ADMIN — PANTOPRAZOLE SODIUM 40 MG: 40 TABLET, DELAYED RELEASE ORAL at 16:20

## 2017-05-01 RX ADMIN — PIPERACILLIN SODIUM AND TAZOBACTAM SODIUM 3.38 G: 36; 4.5 INJECTION, POWDER, FOR SOLUTION INTRAVENOUS at 23:10

## 2017-05-01 RX ADMIN — PANTOPRAZOLE SODIUM 40 MG: 40 INJECTION, POWDER, FOR SOLUTION INTRAVENOUS at 08:46

## 2017-05-01 RX ADMIN — PIPERACILLIN SODIUM AND TAZOBACTAM SODIUM 3.38 G: 36; 4.5 INJECTION, POWDER, FOR SOLUTION INTRAVENOUS at 05:26

## 2017-05-01 RX ADMIN — METRONIDAZOLE 500 MG: 500 INJECTION, SOLUTION INTRAVENOUS at 08:46

## 2017-05-01 RX ADMIN — METRONIDAZOLE 500 MG: 500 INJECTION, SOLUTION INTRAVENOUS at 16:21

## 2017-05-01 RX ADMIN — PIPERACILLIN SODIUM AND TAZOBACTAM SODIUM 3.38 G: 36; 4.5 INJECTION, POWDER, FOR SOLUTION INTRAVENOUS at 11:56

## 2017-05-01 RX ADMIN — LISINOPRIL 10 MG: 10 TABLET ORAL at 08:46

## 2017-05-02 LAB
ANION GAP SERPL CALCULATED.3IONS-SCNC: 8 MMOL/L (ref 4–13)
BACTERIA BLD CULT: NORMAL
BACTERIA BLD CULT: NORMAL
BUN SERPL-MCNC: 7 MG/DL (ref 5–25)
CALCIUM SERPL-MCNC: 9.8 MG/DL (ref 8.3–10.1)
CHLORIDE SERPL-SCNC: 106 MMOL/L (ref 100–108)
CO2 SERPL-SCNC: 26 MMOL/L (ref 21–32)
CREAT SERPL-MCNC: 0.81 MG/DL (ref 0.6–1.3)
ERYTHROCYTE [DISTWIDTH] IN BLOOD BY AUTOMATED COUNT: 13.7 % (ref 11.6–15.1)
GFR SERPL CREATININE-BSD FRML MDRD: >60 ML/MIN/1.73SQ M
GLUCOSE SERPL-MCNC: 143 MG/DL (ref 65–140)
GLUCOSE SERPL-MCNC: 143 MG/DL (ref 65–140)
HCT VFR BLD AUTO: 36.2 % (ref 34.8–46.1)
HGB BLD-MCNC: 12.1 G/DL (ref 11.5–15.4)
MCH RBC QN AUTO: 29.4 PG (ref 26.8–34.3)
MCHC RBC AUTO-ENTMCNC: 33.4 G/DL (ref 31.4–37.4)
MCV RBC AUTO: 88 FL (ref 82–98)
PLATELET # BLD AUTO: 233 THOUSANDS/UL (ref 149–390)
PMV BLD AUTO: 9.6 FL (ref 8.9–12.7)
POTASSIUM SERPL-SCNC: 3.6 MMOL/L (ref 3.5–5.3)
RBC # BLD AUTO: 4.11 MILLION/UL (ref 3.81–5.12)
SODIUM SERPL-SCNC: 140 MMOL/L (ref 136–145)
WBC # BLD AUTO: 9.13 THOUSAND/UL (ref 4.31–10.16)

## 2017-05-02 PROCEDURE — A9270 NON-COVERED ITEM OR SERVICE: HCPCS | Performed by: INTERNAL MEDICINE

## 2017-05-02 PROCEDURE — 80048 BASIC METABOLIC PNL TOTAL CA: CPT | Performed by: INTERNAL MEDICINE

## 2017-05-02 PROCEDURE — 85027 COMPLETE CBC AUTOMATED: CPT | Performed by: INTERNAL MEDICINE

## 2017-05-02 PROCEDURE — 82948 REAGENT STRIP/BLOOD GLUCOSE: CPT

## 2017-05-02 RX ADMIN — PIPERACILLIN SODIUM AND TAZOBACTAM SODIUM 3.38 G: 36; 4.5 INJECTION, POWDER, FOR SOLUTION INTRAVENOUS at 22:30

## 2017-05-02 RX ADMIN — LISINOPRIL 10 MG: 10 TABLET ORAL at 09:05

## 2017-05-02 RX ADMIN — METRONIDAZOLE 500 MG: 500 INJECTION, SOLUTION INTRAVENOUS at 09:06

## 2017-05-02 RX ADMIN — PANTOPRAZOLE SODIUM 40 MG: 40 TABLET, DELAYED RELEASE ORAL at 09:05

## 2017-05-02 RX ADMIN — PANTOPRAZOLE SODIUM 40 MG: 40 TABLET, DELAYED RELEASE ORAL at 15:59

## 2017-05-02 RX ADMIN — ONDANSETRON 4 MG: 2 INJECTION INTRAMUSCULAR; INTRAVENOUS at 17:50

## 2017-05-02 RX ADMIN — ENOXAPARIN SODIUM 40 MG: 40 INJECTION SUBCUTANEOUS at 15:59

## 2017-05-02 RX ADMIN — PIPERACILLIN SODIUM AND TAZOBACTAM SODIUM 3.38 G: 36; 4.5 INJECTION, POWDER, FOR SOLUTION INTRAVENOUS at 11:42

## 2017-05-02 RX ADMIN — PIPERACILLIN SODIUM AND TAZOBACTAM SODIUM 3.38 G: 36; 4.5 INJECTION, POWDER, FOR SOLUTION INTRAVENOUS at 06:08

## 2017-05-02 RX ADMIN — INSULIN GLARGINE 10 UNITS: 100 INJECTION, SOLUTION SUBCUTANEOUS at 22:28

## 2017-05-02 RX ADMIN — PIPERACILLIN SODIUM AND TAZOBACTAM SODIUM 3.38 G: 36; 4.5 INJECTION, POWDER, FOR SOLUTION INTRAVENOUS at 17:44

## 2017-05-02 RX ADMIN — METRONIDAZOLE 500 MG: 500 INJECTION, SOLUTION INTRAVENOUS at 00:22

## 2017-05-02 RX ADMIN — METRONIDAZOLE 500 MG: 500 INJECTION, SOLUTION INTRAVENOUS at 16:00

## 2017-05-03 ENCOUNTER — GENERIC CONVERSION - ENCOUNTER (OUTPATIENT)
Dept: OTHER | Facility: OTHER | Age: 57
End: 2017-05-03

## 2017-05-03 VITALS
HEIGHT: 67 IN | WEIGHT: 293 LBS | TEMPERATURE: 98.7 F | OXYGEN SATURATION: 97 % | RESPIRATION RATE: 16 BRPM | DIASTOLIC BLOOD PRESSURE: 82 MMHG | HEART RATE: 70 BPM | BODY MASS INDEX: 45.99 KG/M2 | SYSTOLIC BLOOD PRESSURE: 141 MMHG

## 2017-05-03 PROBLEM — R63.4 UNINTENTIONAL WEIGHT LOSS: Status: ACTIVE | Noted: 2017-05-03

## 2017-05-03 PROBLEM — E87.20 LACTIC ACIDOSIS: Status: ACTIVE | Noted: 2017-05-03

## 2017-05-03 PROBLEM — E87.2 LACTIC ACIDOSIS: Status: RESOLVED | Noted: 2017-05-03 | Resolved: 2017-05-03

## 2017-05-03 PROBLEM — E87.2 LACTIC ACIDOSIS: Status: ACTIVE | Noted: 2017-05-03

## 2017-05-03 PROBLEM — D72.829 LEUKOCYTOSIS: Status: RESOLVED | Noted: 2017-05-03 | Resolved: 2017-05-03

## 2017-05-03 PROBLEM — E66.01 MORBID OBESITY DUE TO EXCESS CALORIES (HCC): Status: ACTIVE | Noted: 2017-05-03

## 2017-05-03 PROBLEM — D72.829 LEUKOCYTOSIS: Status: ACTIVE | Noted: 2017-05-03

## 2017-05-03 PROBLEM — E87.20 LACTIC ACIDOSIS: Status: RESOLVED | Noted: 2017-05-03 | Resolved: 2017-05-03

## 2017-05-03 PROBLEM — E83.52 HYPERCALCEMIA: Status: ACTIVE | Noted: 2017-05-03

## 2017-05-03 PROBLEM — K55.9 ISCHEMIC COLITIS (HCC): Status: ACTIVE | Noted: 2017-05-03

## 2017-05-03 PROBLEM — E21.3 HYPERPARATHYROIDISM (HCC): Status: ACTIVE | Noted: 2017-05-03

## 2017-05-03 PROCEDURE — A9270 NON-COVERED ITEM OR SERVICE: HCPCS | Performed by: INTERNAL MEDICINE

## 2017-05-03 RX ORDER — PANTOPRAZOLE SODIUM 40 MG/1
40 TABLET, DELAYED RELEASE ORAL
Qty: 60 TABLET | Refills: 0 | Status: SHIPPED | OUTPATIENT
Start: 2017-05-03 | End: 2018-03-15 | Stop reason: ALTCHOICE

## 2017-05-03 RX ORDER — ONDANSETRON 4 MG/1
4 TABLET, ORALLY DISINTEGRATING ORAL EVERY 8 HOURS PRN
Qty: 20 TABLET | Refills: 0 | Status: SHIPPED | OUTPATIENT
Start: 2017-05-03 | End: 2018-03-15 | Stop reason: ALTCHOICE

## 2017-05-03 RX ORDER — LISINOPRIL 10 MG/1
10 TABLET ORAL DAILY
Qty: 30 TABLET | Refills: 0 | Status: SHIPPED | OUTPATIENT
Start: 2017-05-03 | End: 2018-07-18 | Stop reason: SDUPTHER

## 2017-05-03 RX ORDER — METRONIDAZOLE 500 MG/1
500 TABLET ORAL 3 TIMES DAILY
Qty: 2 TABLET | Refills: 0 | Status: SHIPPED | OUTPATIENT
Start: 2017-05-03 | End: 2017-05-04

## 2017-05-03 RX ORDER — LEVOFLOXACIN 500 MG/1
500 TABLET, FILM COATED ORAL DAILY
Qty: 1 TABLET | Refills: 0 | Status: SHIPPED | OUTPATIENT
Start: 2017-05-03 | End: 2017-05-04

## 2017-05-03 RX ADMIN — METRONIDAZOLE 500 MG: 500 INJECTION, SOLUTION INTRAVENOUS at 01:08

## 2017-05-03 RX ADMIN — PIPERACILLIN SODIUM AND TAZOBACTAM SODIUM 3.38 G: 36; 4.5 INJECTION, POWDER, FOR SOLUTION INTRAVENOUS at 05:12

## 2017-05-03 RX ADMIN — LISINOPRIL 10 MG: 10 TABLET ORAL at 07:55

## 2017-05-03 RX ADMIN — PANTOPRAZOLE SODIUM 40 MG: 40 TABLET, DELAYED RELEASE ORAL at 06:13

## 2017-05-03 RX ADMIN — METRONIDAZOLE 500 MG: 500 INJECTION, SOLUTION INTRAVENOUS at 07:57

## 2017-05-08 ENCOUNTER — GENERIC CONVERSION - ENCOUNTER (OUTPATIENT)
Dept: OTHER | Facility: OTHER | Age: 57
End: 2017-05-08

## 2017-05-17 ENCOUNTER — ALLSCRIPTS OFFICE VISIT (OUTPATIENT)
Dept: OTHER | Facility: OTHER | Age: 57
End: 2017-05-17

## 2017-05-22 ENCOUNTER — ALLSCRIPTS OFFICE VISIT (OUTPATIENT)
Dept: OTHER | Facility: OTHER | Age: 57
End: 2017-05-22

## 2017-05-31 ENCOUNTER — GENERIC CONVERSION - ENCOUNTER (OUTPATIENT)
Dept: OTHER | Facility: OTHER | Age: 57
End: 2017-05-31

## 2017-06-26 ENCOUNTER — GENERIC CONVERSION - ENCOUNTER (OUTPATIENT)
Dept: OTHER | Facility: OTHER | Age: 57
End: 2017-06-26

## 2017-07-22 DIAGNOSIS — E11.29 TYPE 2 DIABETES MELLITUS WITH OTHER DIABETIC KIDNEY COMPLICATION (HCC): ICD-10-CM

## 2017-07-22 DIAGNOSIS — I10 ESSENTIAL (PRIMARY) HYPERTENSION: ICD-10-CM

## 2017-07-22 DIAGNOSIS — E83.52 HYPERCALCEMIA: ICD-10-CM

## 2017-07-22 DIAGNOSIS — E11.65 TYPE 2 DIABETES MELLITUS WITH HYPERGLYCEMIA (HCC): ICD-10-CM

## 2017-07-22 DIAGNOSIS — E55.9 VITAMIN D DEFICIENCY: ICD-10-CM

## 2017-07-22 DIAGNOSIS — E21.3 HYPERPARATHYROIDISM (HCC): ICD-10-CM

## 2017-08-24 ENCOUNTER — TRANSCRIBE ORDERS (OUTPATIENT)
Dept: ADMINISTRATIVE | Facility: HOSPITAL | Age: 57
End: 2017-08-24

## 2017-08-24 ENCOUNTER — APPOINTMENT (OUTPATIENT)
Dept: LAB | Facility: MEDICAL CENTER | Age: 57
End: 2017-08-24
Payer: COMMERCIAL

## 2017-08-24 DIAGNOSIS — E55.9 VITAMIN D DEFICIENCY: ICD-10-CM

## 2017-08-24 DIAGNOSIS — I10 ESSENTIAL (PRIMARY) HYPERTENSION: ICD-10-CM

## 2017-08-24 DIAGNOSIS — E83.52 HYPERCALCEMIA: ICD-10-CM

## 2017-08-24 DIAGNOSIS — E11.65 TYPE 2 DIABETES MELLITUS WITH HYPERGLYCEMIA (HCC): ICD-10-CM

## 2017-08-24 DIAGNOSIS — E11.29 TYPE 2 DIABETES MELLITUS WITH OTHER DIABETIC KIDNEY COMPLICATION (HCC): ICD-10-CM

## 2017-08-24 DIAGNOSIS — E21.3 HYPERPARATHYROIDISM (HCC): ICD-10-CM

## 2017-08-24 LAB
25(OH)D3 SERPL-MCNC: 29.1 NG/ML (ref 30–100)
ALBUMIN SERPL BCP-MCNC: 3.4 G/DL (ref 3.5–5)
ALP SERPL-CCNC: 101 U/L (ref 46–116)
ALT SERPL W P-5'-P-CCNC: 17 U/L (ref 12–78)
ANION GAP SERPL CALCULATED.3IONS-SCNC: 8 MMOL/L (ref 4–13)
AST SERPL W P-5'-P-CCNC: 10 U/L (ref 5–45)
BILIRUB SERPL-MCNC: 1.04 MG/DL (ref 0.2–1)
BUN SERPL-MCNC: 16 MG/DL (ref 5–25)
CALCIUM ALBUM COR SERPL-MCNC: 11.3 MG/DL (ref 8.3–10.1)
CALCIUM SERPL-MCNC: 10.8 MG/DL (ref 8.3–10.1)
CHLORIDE SERPL-SCNC: 107 MMOL/L (ref 100–108)
CHOLEST SERPL-MCNC: 168 MG/DL (ref 50–200)
CO2 SERPL-SCNC: 24 MMOL/L (ref 21–32)
CREAT SERPL-MCNC: 0.66 MG/DL (ref 0.6–1.3)
EST. AVERAGE GLUCOSE BLD GHB EST-MCNC: 189 MG/DL
GFR SERPL CREATININE-BSD FRML MDRD: 100 ML/MIN/1.73SQ M
GLUCOSE P FAST SERPL-MCNC: 149 MG/DL (ref 65–99)
HBA1C MFR BLD: 8.2 % (ref 4.2–6.3)
HDLC SERPL-MCNC: 38 MG/DL (ref 40–60)
LDLC SERPL CALC-MCNC: 110 MG/DL (ref 0–100)
POTASSIUM SERPL-SCNC: 4.1 MMOL/L (ref 3.5–5.3)
PROT SERPL-MCNC: 7.6 G/DL (ref 6.4–8.2)
SODIUM SERPL-SCNC: 139 MMOL/L (ref 136–145)
TRIGL SERPL-MCNC: 101 MG/DL
TSH SERPL DL<=0.05 MIU/L-ACNC: 2.03 UIU/ML (ref 0.36–3.74)

## 2017-08-24 PROCEDURE — 36415 COLL VENOUS BLD VENIPUNCTURE: CPT

## 2017-08-24 PROCEDURE — 80061 LIPID PANEL: CPT

## 2017-08-24 PROCEDURE — 83036 HEMOGLOBIN GLYCOSYLATED A1C: CPT

## 2017-08-24 PROCEDURE — 80053 COMPREHEN METABOLIC PANEL: CPT

## 2017-08-24 PROCEDURE — 84443 ASSAY THYROID STIM HORMONE: CPT

## 2017-08-24 PROCEDURE — 82306 VITAMIN D 25 HYDROXY: CPT

## 2017-08-28 ENCOUNTER — ALLSCRIPTS OFFICE VISIT (OUTPATIENT)
Dept: OTHER | Facility: OTHER | Age: 57
End: 2017-08-28

## 2017-09-12 ENCOUNTER — GENERIC CONVERSION - ENCOUNTER (OUTPATIENT)
Dept: OTHER | Facility: OTHER | Age: 57
End: 2017-09-12

## 2017-10-04 ENCOUNTER — GENERIC CONVERSION - ENCOUNTER (OUTPATIENT)
Dept: OTHER | Facility: OTHER | Age: 57
End: 2017-10-04

## 2017-10-04 DIAGNOSIS — Z01.84 ENCOUNTER FOR ANTIBODY RESPONSE EXAMINATION: ICD-10-CM

## 2017-10-04 DIAGNOSIS — Z12.31 ENCOUNTER FOR SCREENING MAMMOGRAM FOR MALIGNANT NEOPLASM OF BREAST: ICD-10-CM

## 2017-10-09 ENCOUNTER — GENERIC CONVERSION - ENCOUNTER (OUTPATIENT)
Dept: OTHER | Facility: OTHER | Age: 57
End: 2017-10-09

## 2017-10-31 ENCOUNTER — HOSPITAL ENCOUNTER (OUTPATIENT)
Dept: MAMMOGRAPHY | Facility: MEDICAL CENTER | Age: 57
Discharge: HOME | End: 2017-10-31
Payer: COMMERCIAL

## 2017-10-31 DIAGNOSIS — Z12.31 ENCOUNTER FOR SCREENING MAMMOGRAM FOR MALIGNANT NEOPLASM OF BREAST: ICD-10-CM

## 2017-10-31 PROCEDURE — 77063 BREAST TOMOSYNTHESIS BI: CPT

## 2017-10-31 PROCEDURE — G0202 SCR MAMMO BI INCL CAD: HCPCS

## 2017-11-01 ENCOUNTER — GENERIC CONVERSION - ENCOUNTER (OUTPATIENT)
Dept: OTHER | Facility: OTHER | Age: 57
End: 2017-11-01

## 2017-12-26 DIAGNOSIS — E11.65 TYPE 2 DIABETES MELLITUS WITH HYPERGLYCEMIA (HCC): ICD-10-CM

## 2017-12-26 DIAGNOSIS — E11.29 TYPE 2 DIABETES MELLITUS WITH OTHER DIABETIC KIDNEY COMPLICATION (HCC): ICD-10-CM

## 2017-12-26 DIAGNOSIS — I10 ESSENTIAL (PRIMARY) HYPERTENSION: ICD-10-CM

## 2018-01-03 ENCOUNTER — TRANSCRIBE ORDERS (OUTPATIENT)
Dept: ADMINISTRATIVE | Facility: HOSPITAL | Age: 58
End: 2018-01-03

## 2018-01-03 ENCOUNTER — ALLSCRIPTS OFFICE VISIT (OUTPATIENT)
Dept: OTHER | Facility: OTHER | Age: 58
End: 2018-01-03

## 2018-01-03 ENCOUNTER — APPOINTMENT (OUTPATIENT)
Dept: LAB | Facility: MEDICAL CENTER | Age: 58
End: 2018-01-03
Payer: COMMERCIAL

## 2018-01-03 ENCOUNTER — GENERIC CONVERSION - ENCOUNTER (OUTPATIENT)
Dept: OTHER | Facility: OTHER | Age: 58
End: 2018-01-03

## 2018-01-03 DIAGNOSIS — I10 ESSENTIAL (PRIMARY) HYPERTENSION: ICD-10-CM

## 2018-01-03 DIAGNOSIS — E11.29 TYPE 2 DIABETES MELLITUS WITH OTHER DIABETIC KIDNEY COMPLICATION (HCC): ICD-10-CM

## 2018-01-03 DIAGNOSIS — E11.65 TYPE 2 DIABETES MELLITUS WITH HYPERGLYCEMIA (HCC): ICD-10-CM

## 2018-01-03 LAB
ALBUMIN SERPL BCP-MCNC: 3.5 G/DL (ref 3.5–5)
ALP SERPL-CCNC: 126 U/L (ref 46–116)
ALT SERPL W P-5'-P-CCNC: 21 U/L (ref 12–78)
ANION GAP SERPL CALCULATED.3IONS-SCNC: 7 MMOL/L (ref 4–13)
AST SERPL W P-5'-P-CCNC: 11 U/L (ref 5–45)
BILIRUB SERPL-MCNC: 0.53 MG/DL (ref 0.2–1)
BUN SERPL-MCNC: 14 MG/DL (ref 5–25)
CALCIUM SERPL-MCNC: 10 MG/DL (ref 8.3–10.1)
CHLORIDE SERPL-SCNC: 105 MMOL/L (ref 100–108)
CHOLEST SERPL-MCNC: 174 MG/DL (ref 50–200)
CO2 SERPL-SCNC: 26 MMOL/L (ref 21–32)
CREAT SERPL-MCNC: 0.67 MG/DL (ref 0.6–1.3)
EST. AVERAGE GLUCOSE BLD GHB EST-MCNC: 194 MG/DL
GFR SERPL CREATININE-BSD FRML MDRD: 99 ML/MIN/1.73SQ M
GLUCOSE P FAST SERPL-MCNC: 215 MG/DL (ref 65–99)
HBA1C MFR BLD: 8.4 % (ref 4.2–6.3)
HDLC SERPL-MCNC: 39 MG/DL (ref 40–60)
LDLC SERPL CALC-MCNC: 103 MG/DL (ref 0–100)
POTASSIUM SERPL-SCNC: 4.3 MMOL/L (ref 3.5–5.3)
PROT SERPL-MCNC: 7.6 G/DL (ref 6.4–8.2)
SODIUM SERPL-SCNC: 138 MMOL/L (ref 136–145)
TRIGL SERPL-MCNC: 160 MG/DL
TSH SERPL DL<=0.05 MIU/L-ACNC: 2.17 UIU/ML (ref 0.36–3.74)

## 2018-01-03 PROCEDURE — 80053 COMPREHEN METABOLIC PANEL: CPT

## 2018-01-03 PROCEDURE — 84443 ASSAY THYROID STIM HORMONE: CPT

## 2018-01-03 PROCEDURE — 36415 COLL VENOUS BLD VENIPUNCTURE: CPT

## 2018-01-03 PROCEDURE — 83036 HEMOGLOBIN GLYCOSYLATED A1C: CPT

## 2018-01-03 PROCEDURE — 80061 LIPID PANEL: CPT

## 2018-01-05 NOTE — PROGRESS NOTES
Assessment   1  Benign essential hypertension (401 1) (I10)   2  Uncontrolled type 2 diabetes with renal manifestation (250 42) (E11 29,E11 65)    Plan   Benign essential hypertension    · Lisinopril 10 MG Oral Tablet; Take 1 tablet daily  Benign essential hypertension, Hypercalcemia, Hyperparathyroidism, Uncontrolled type    2 diabetes with renal manifestation    · (1) COMPREHENSIVE METABOLIC PANEL; Status:Active; Requested for:04May2018;    · (1) HEMOGLOBIN A1C; Status:Active; Requested for:04May2018;    · (1) LIPID PANEL FASTING W DIRECT LDL REFLEX; Status:Active; Requested    for:04May2018;    · (1) PTH N-TERMINAL (INTACT); Status:Active; Requested for:04May2018;    · (1) TSH; Status:Active; Requested for:04May2018; Uncontrolled type 2 diabetes with renal manifestation    · Glimepiride 2 MG Oral Tablet; TAKE 1 TABLET DAILY AS DIRECTED    Discussion/Summary      Type 2 diabetes not well controlled  Since starting on the glimepiride 4 months ago her hemoglobin A1c has actually gone up from 8 2 8 4  We will continue her on current dosage of glimepiride and restart her on metformin 500 mg twice daily  pressure is well controlled on lisinopril  Continue current dosage   stable  Total cholesterol 174 with an LDL of 103  Continue with current treatment  GI symptoms  Patient has follow-up with GI and a colonoscopy scheduled within the next month  here in 4 months with labs prior to next upon        Chief Complaint   Pt presents for 4 month check up and to discuss blood work from 1/3/2018  Pt needs refill on Lisinopril  Pt is UTD with Mammogram, Colonoscopy and foot exam  Pt has eye exam appointment scheduled for 3/2/2018    Patient is here today for follow up of chronic conditions described in HPI  History of Present Illness   Of chronic medical problems which include hypertension, type 2 diabetes  Patient takes lisinopril for her blood pressure  She has been taking glimepiride for blood sugar   Last year during hospitalization she was taken off all of her diabetic medications including insulin metformin Invokana  She has been compliant with her glimepiride  She still has some gastric issues since her hospitalization  She cannot eat many dairy products red meat and raw vegetables  Her diet primarily consists of turkey chicken fish and cooked vegetables  Review of Systems        Constitutional: No fever, no chills, feels well, no tiredness, no recent weight gain or weight loss  Eyes: No complaints of eye pain, no red eyes, no eyesight problems, no discharge, no dry eyes, no itching of eyes  ENT: no complaints of earache, no loss of hearing, no nose bleeds, no nasal discharge, no sore throat, no hoarseness  Cardiovascular: No complaints of slow heart rate, no fast heart rate, no chest pain, no palpitations, no leg claudication, no lower extremity edema  Respiratory: No complaints of shortness of breath, no wheezing, no cough, no SOB on exertion, no orthopnea, no PND  Gastrointestinal: abdominal pain-- and-- nausea  Genitourinary: No complaints of dysuria, no incontinence, no pelvic pain, no dysmenorrhea, no vaginal discharge or bleeding  Musculoskeletal: No complaints of arthralgias, no myalgias, no joint swelling or stiffness, no limb pain or swelling  Integumentary: No complaints of skin rash or lesions, no itching, no skin wounds, no breast pain or lump  Neurological: No complaints of headache, no confusion, no convulsions, no numbness, no dizziness or fainting, no tingling, no limb weakness, no difficulty walking  Psychiatric: Not suicidal, no sleep disturbance, no anxiety or depression, no change in personality, no emotional problems  Endocrine: No complaints of proptosis, no hot flashes, no muscle weakness, no deepening of the voice, no feelings of weakness        Hematologic/Lymphatic: No complaints of swollen glands, no swollen glands in the neck, does not bleed easily, does not bruise easily  Active Problems   1  Benign essential hypertension (401 1) (I10)   2  Depression with anxiety (300 4) (F41 8)   3  Exogenous obesity (278 00) (E66 9)   4  Frequent falls (V15 88) (R29 6)   5  Hypercalcemia (275 42) (E83 52)   6  Hyperparathyroidism (252 00) (E21 3)   7  Immunity status testing (V72 61) (Z01 84)   8  Ischemic colitis (557 9) (K55 9)   9  Need for MMR vaccine (V06 4) (Z23)   10  Screening for depression (V79 0) (Z13 89)   11  Uncontrolled type 2 diabetes with renal manifestation (250 42) (E11 29,E11 65)   12  Vitamin D deficiency (268 9) (E55 9)    Past Medical History   1  History of Abscess of abdominal wall (682 2) (L02 211)   2  History of Acute conjunctivitis (372 00) (H10 30)   3  History of Acute sinusitis (461 9) (J01 90)   4  History of Encounter for gynecological examination (V72 31) (Z01 419)   5  History of Encounter for PPD test (V74 1) (Z11 1)   6  History of Encounter for routine gynecological examination (V72 31) (Z01 419)   7  History of Encounter for screening mammogram for malignant neoplasm of breast     (V76 12) (Z12 31)   8  History of Encounter for screening mammogram for malignant neoplasm of breast     (V76 12) (Z12 31)   9  History of External hemorrhoids (455 3) (K64 4)   10  History of Fatigue, unspecified type (780 79) (R53 83)   11  History of Generalized abdominal pain (789 07) (R10 84)   12  History of abnormal weight loss (V13 89) (Z87 898)   13  History of acute bronchitis (V12 69) (Z87 09)   14  History of confusion (V11 8) (Z86 59)   15  History of diabetes mellitus (V12 29) (Z86 39)   16  History of influenza vaccination (V49 89) (Z92 29)   17  History of insomnia (V13 89) (Z87 898)   18  History of palpitations (V12 59) (Z87 898)   19  History of screening mammography (V15 89) (Z92 89)   20  Denied: History of substance abuse   21  History of Incarcerated incisional hernia (552 21) (K43 0)   22   History of Lipid screening (V77 91) (Z13 220)   23  History of Need for immunization against influenza (V04 81) (Z23)   24  History of Need for influenza vaccination (V04 81) (Z23)   25  History of Need for Tdap vaccination (V06 1) (Z23)   26  History of Need for vaccination to prevent tuberculosis (V03 2) (Z23)   27  History of Other fatigue (780 79) (R53 83)   28  History of Screening examination for pulmonary tuberculosis (V74 1) (Z11 1)   29  History of Tuberculosis screening (V74 1) (Z11 1)   30  History of Ventral hernia (553 20) (K43 9)   31  History of Visit for Mantoux test (V74 1) (Z11 1)   32  History of Visit for screening mammogram (V76 12) (Z12 31)   33  History of Visit For:   29  History of Visit For:     The active problems and past medical history were reviewed and updated today  Surgical History   1  History of  Section Classical   2  History of Complete Colonoscopy   3  History of Hysterectomy   4  History of Incision And Drainage Of Skin Abscess    Family History   Mother    1  Family history of glaucoma (V19 11) (Z83 511)   2  Denied: Family history of substance abuse   3  Denied: Family history of Mental health problem  Father    4  Family history of diabetes mellitus (V18 0) (Z83 3)   5  Family history of hypertension (V17 49) (Z82 49)   6  Family history of pancreatic cancer (V16 0) (Z80 0)   7  Denied: Family history of substance abuse   8  Denied: Family history of Mental health problem  Maternal Grandmother    9  Family history of malignant neoplasm of breast (V16 3) (Z80 3)  Maternal Aunt    10  Family history of malignant neoplasm of breast (V16 3) (Z80 3)  Family History    11  Family history of Coronary Artery Disease (V17 49)   12  Family history of Diabetes Mellitus (V18 0)   13  Family history of Hypertension (V17 49)    Social History    · Denied: History of Alcohol Use (History)   · Never A Smoker    Current Meds    1  Lisinopril 10 MG Oral Tablet; Take 1 tablet daily;      Therapy: 63WDE6949 to (Evaluate:30Jan2018)  Requested for: 00Jku9859; Last     Rx:26Ocu6282 Ordered   2  MetFORMIN HCl - 500 MG Oral Tablet; TAKE 1 TABLET TWICE DAILY WITH FOOD; Therapy: 32NHQ2943 to (Evaluate:81Qvg3170) Recorded    Allergies   1  Ambien TABS   2  Demerol SOLN   3  Percocet TABS   4  Basaglar KwikPen SOPN   5  Latex Exam Gloves MISC    Vitals   Vital Signs    Recorded: 52MUY9053 06:41PM   Temperature 98 F, Tympanic   Heart Rate 81   Pulse Quality Normal   Respiration Quality Normal   Respiration 18   Systolic 931, LUE, Sitting   Diastolic 90, LUE, Sitting   BP CUFF SIZE Large   Height 5 ft 6 in   Weight 318 lb 5 oz   BMI Calculated 51 38   BSA Calculated 2 44   O2 Saturation 99, RA   LMP postmenopause     Physical Exam        Constitutional      General appearance: No acute distress, well appearing and well nourished  Pulmonary      Respiratory effort: No increased work of breathing or signs of respiratory distress  Auscultation of lungs: Clear to auscultation  Cardiovascular      Auscultation of heart: Normal rate and rhythm, normal S1 and S2, without murmurs  Examination of extremities for edema and/or varicosities: Normal        Abdomen      Abdomen: Non-tender, no masses  Psychiatric      Orientation to person, place, and time: Normal        Mood and affect: Normal           Health Management   History of Encounter for screening mammogram for malignant neoplasm of breast   Digital Bilateral Screening Mammogram With CAD; every 1 year; Last 28Apr2015; Next Due:    90YXC7512; Overdue    Future Appointments      Date/Time Provider Specialty Site   10/10/2018 06:15 PM AUGUSTUS Tavares   Obstetrics/Gynecology OB GYN CARE [facility]   07/11/2018 06:30 PM Bryan Garrido DO Family Medicine [address]    Electronically signed by : Ramón Mason DO; Jan 4 2018 12:20PM EST                       (Author)

## 2018-01-10 NOTE — MISCELLANEOUS
Assessment    1  Ischemic colitis (557 9) (K55 9)   2  Hyperparathyroidism (252 00) (E21 3)   3  Uncontrolled type 2 diabetes with renal manifestation (250 42) (E11 29,E11 65)   4  Abnormal weight loss (783 21) (R63 4)   5  Benign essential hypertension (401 1) (I10)    Discussion/Summary  Discussion Summary:   Long discussion an extensive review of recent hospitalization  Primary diagnosis at this time is ischemic colitis  Patient will continue on a modified bland diet and will follow-up with gastroenterology for repeat colonoscopy and EGD in approximately 6 weeks  Unexplained weight loss  Unclear as to the etiology of this  CAT scan and endoscopy were normal as was chest x-ray in terms of any sign of occult malignancy or other explanation for weight loss  Blood work also revealed unrevealing in that regard  We'll continue to monitor and work As an outpatient  Type 2 diabetes previously uncontrolled with hemoglobin A1c in March greater than 9 however since her hospitalization she has been off of medication completely and her home blood sugars have generally been less than 120  We'll continue to monitor this and her next routine blood work will be due in July  She will alert me if her sugars start to rise and a decision can be made at that time as to what medication would be most beneficial to her  Hypertension well-controlled on current regimen we'll continue  Hyperparathyroidism with hypercalcemia  This is to be followed up with endocrinology  Patient has a follow-up appointment scheduled within the next month  Chief Complaint  Chief Complaint Free Text Note Form: Patient presents for hospital f/u; seen at [facility] from 4/27/17 - 5/3/17 for Pancolitis  History of Present Illness  TCM Communication [facility]: The patient is being contacted for follow-up after hospitalization and SOPHIE visit scheduled for 05/17/2017 with Dr Sravanthi Velez  Hospital records were reviewed   She was hospitalized at [place] [address] [address]  The date of admission: 04/27/2017, date of discharge: 05/03/2017  Diagnosis: 1  Pancolitis  She was discharged to home  Medications were not reviewed today  She scheduled a follow up appointment  Follow-up appointments with other specialists: 1  Dr Kandace Cordova 2  Dr Mariano Briones  The patient is currently asymptomatic  Referrals Needed:  None  Communication performed and completed by Francisca Chavarria   HPI: Patient presents to the office in follow-up from a recent hospitalization  She had a complicated hospital course which involved diagnoses including ischemic colitis unintentional weight loss lower GI bleed cholelithiasis diabetes lactic acidosis hyperparathyroidism  She was admitted primarily because of her symptoms of abdominal pain nausea vomiting and diarrhea  During her hospitalization she had colonoscopy CAT scans of the abdomen chest x-ray  She was treated for lactic acidosis which resolved and electrolytes were normalized  She was taken off of her diabetic medications and her blood sugars remain normal with no diabetic medication whatsoever  She was seen by gastroenterology  At this time since her discharge she has been doing fairly well  She is eating though a very limited bland diet with only minimal symptoms  Her blood sugars continue to be excellent without any diabetic medications  Review of Systems  Complete-Female:   Constitutional: feeling poorly, feeling tired and recent 100 pounds over one year lb weight loss  Eyes: No complaints of eye pain, no red eyes, no eyesight problems, no discharge, no dry eyes, no itching of eyes  ENT: no complaints of earache, no loss of hearing, no nose bleeds, no nasal discharge, no sore throat, no hoarseness  Cardiovascular: No complaints of slow heart rate, no fast heart rate, no chest pain, no palpitations, no leg claudication, no lower extremity edema     Respiratory: No complaints of shortness of breath, no wheezing, no cough, no SOB on exertion, no orthopnea, no PND  Gastrointestinal: abdominal pain and diarrhea, but as noted in HPI  Genitourinary: No complaints of dysuria, no incontinence, no pelvic pain, no dysmenorrhea, no vaginal discharge or bleeding  Musculoskeletal: No complaints of arthralgias, no myalgias, no joint swelling or stiffness, no limb pain or swelling  Integumentary: No complaints of skin rash or lesions, no itching, no skin wounds, no breast pain or lump  Neurological: No complaints of headache, no confusion, no convulsions, no numbness, no dizziness or fainting, no tingling, no limb weakness, no difficulty walking  Psychiatric: Not suicidal, no sleep disturbance, no anxiety or depression, no change in personality, no emotional problems  Active Problems    1  Benign essential hypertension (401 1) (I10)   2  Confusion (298 9) (R41 0)   3  Depression with anxiety (300 4) (F41 8)   4  Exogenous obesity (278 00) (E66 9)   5  Frequent falls (V15 88) (R29 6)   6  Hypercalcemia (275 42) (E83 52)   7  Hyperparathyroidism (252 00) (E21 3)   8  Ischemic colitis (557 9) (K55 9)   9  Palpitations (785 1) (R00 2)   10  Sleeplessness (780 52) (G47 00)   11  Uncontrolled type 2 diabetes with renal manifestation (250 42) (E11 29,E11 65)   12  Vitamin D deficiency (268 9) (E55 9)    Past Medical History    1  History of Abscess of abdominal wall (682 2) (L02 211)   2  History of Acute conjunctivitis (372 00) (H10 30)   3  History of Acute sinusitis (461 9) (J01 90)   4  History of Encounter for PPD test (V74 1) (Z11 1)   5  History of Encounter for routine gynecological examination (V72 31) (Z01 419)   6  History of Encounter for screening mammogram for malignant neoplasm of breast   (V76 12) (Z12 31)   7  History of Encounter for screening mammogram for malignant neoplasm of breast   (V76 12) (Z12 31)   8  History of External hemorrhoids (455 3) (K64 4)   9  History of Fatigue, unspecified type (780 79) (R53 83)   10   History of Generalized abdominal pain (789 07) (R10 84)   11  History of acute bronchitis (V12 69) (Z87 09)   12  History of diabetes mellitus (V12 29) (Z86 39)   13  History of Incarcerated incisional hernia (552 21) (K43 0)   14  History of Lipid screening (V77 91) (Z13 220)   15  History of Need for immunization against influenza (V04 81) (Z23)   16  History of Need for influenza vaccination (V04 81) (Z23)   17  History of Need for Tdap vaccination (V06 1) (Z23)   18  History of Need for vaccination to prevent tuberculosis (V03 2) (Z23)   19  History of Other fatigue (780 79) (R53 83)   20  History of Screening examination for pulmonary tuberculosis (V74 1) (Z11 1)   21  History of Ventral hernia (553 20) (K43 9)   22  History of Visit for Mantoux test (V74 1) (Z11 1)   23  History of Visit for screening mammogram (V76 12) (Z12 31)   24  History of Visit For:   25  History of Visit For:    Surgical History    1  History of  Section Classical   2  History of Complete Colonoscopy   3  History of Hysterectomy   4  History of Incision And Drainage Of Skin Abscess  Surgical History Reviewed: The surgical history was reviewed and updated today  Family History  Mother    1  Family history of glaucoma (V19 11) (Z83 511)  Father    2  Family history of diabetes mellitus (V18 0) (Z83 3)   3  Family history of hypertension (V17 49) (Z82 49)   4  Family history of pancreatic cancer (V16 0) (Z80 0)  Maternal Grandmother    5  Family history of malignant neoplasm of breast (V16 3) (Z80 3)  Maternal Aunt    6  Family history of malignant neoplasm of breast (V16 3) (Z80 3)  Family History    7  Family history of Coronary Artery Disease (V17 49)   8  Family history of Diabetes Mellitus (V18 0)   9  Family history of Hypertension (V17 49)  Family History Reviewed: The family history was reviewed and updated today         Social History    · Denied: History of Alcohol Use (History)   · Never A Smoker  Social History Reviewed: The social history was reviewed and updated today  Current Meds   1  Basaglar KwikPen 100 UNIT/ML Subcutaneous Solution Pen-injector; Inject 10 units SC   QHS and increase by 1 unit daily until -120; Therapy: 07CVF8046 to (Last Rx:23Mar2017) Ordered   2  David Contour Next Monitor w/Device Kit; To test blood sugar once daily; Therapy: 04ZHW1678 to (Last Rx:24Mar2017)  Requested for: 24Mar2017 Ordered   3  David Contour Next Test In [employer]; TEST ONCE DAILY; Therapy: 68OBO5801 to (Evaluate:18Jan2018)  Requested for: 17WPT0676; Last   Rx:24Mar2017 Ordered   4  BD Pen Needle Izzy U/F 32G X 4 MM Miscellaneous; To use once daily as directed; Therapy: 96CDS0869 to (Evaluate:00Kmn6943)  Requested for: 48IGK6366; Last   Rx:22Mar2017 Ordered   5  Jardiance 10 MG Oral Tablet; Take 1 tablet daily; Therapy: 58RJR8097 to (Evaluate:16May2017); Last Rx:17Nov2016 Ordered   6  Lisinopril-Hydrochlorothiazide 20-25 MG Oral Tablet; TAKE ONE TABLET BY MOUTH   ONCE DAILY; Therapy: 24MMY6339 to (Evaluate:67Jyr9427)  Requested for: 36MZG3226; Last   Rx:19Nqk2561 Ordered   7  MetFORMIN HCl - 1000 MG Oral Tablet; TAKE 1 TABLET TWICE DAILY; Therapy: 95DNZ7400 to (Evaluate:16May2017)  Requested for: 04QYF1134; Last   Rx:17Nov2016 Ordered   8  Vitamin D (Ergocalciferol) 48365 UNIT Oral Capsule; TAKE 1 CAPSULE Weekly; Therapy: 19HIR4445 to (Last Blinda Chiahua)  Requested for: 22Mar2017 Ordered  Medication List Reviewed: The medication list was reviewed and updated today  Allergies    1  Ambien TABS   2  Demerol SOLN   3  Percocet TABS   4  Latex Exam Gloves MISC    Vitals  Signs   Recorded: 88ZVI0657 06:37PM   Temperature: 99 F  Heart Rate: 68  Systolic: 663  Diastolic: 90  BP Cuff Size: Large  Height: 5 ft 6 5 in  Weight: 310 lb   BMI Calculated: 49 29  BSA Calculated: 2 42  O2 Saturation: 96, RA    Physical Exam    Constitutional   General appearance: No acute distress, well appearing and well nourished  Pulmonary   Respiratory effort: No increased work of breathing or signs of respiratory distress  Auscultation of lungs: Clear to auscultation  Cardiovascular   Auscultation of heart: Normal rate and rhythm, normal S1 and S2, without murmurs  Examination of extremities for edema and/or varicosities: Normal     Abdomen   Abdomen: Non-tender, no masses  Lymphatic   Palpation of lymph nodes in neck: No lymphadenopathy  Musculoskeletal   Gait and station: Normal     Neurologic   Cranial nerves: Cranial nerves 2-12 intact  Psychiatric   Orientation to person, place, and time: Normal     Mood and affect: Normal          Health Management  History of Encounter for screening mammogram for malignant neoplasm of breast   Digital Bilateral Screening Mammogram With CAD; every 1 year; Last 28Apr2015; Next Due:  42MJH1845;  Overdue    Future Appointments    Date/Time Provider Specialty Site   05/22/2017 04:00 PM Deacon Estrada [facility] Gastroenterology [facility]   08/23/2017 06:30 PM Lissy Baltazar DO Family Medicine [address]   Electronically signed by : Ally Chacon DO; May 18 2017  9:59AM EST                       (Author)

## 2018-01-10 NOTE — PROGRESS NOTES
History of Present Illness  Care Coordination Encounter Information:   Type of Encounter: Telephonic    Spoke to Patient   I spoke with pt  in regards to her SOPHIE appt  ,GI follow-up post s/p ischemic colitis  Patient feels well, continues to follow modified bland diet, lost 4 more lbs since she has been home  Blood sugars ranging from 109-119, feels her balance is still off since weight loss  Suggested for her to see PT reported she works form 7am-6pm so she could not "fit it in" has increased her exercise by enjoying yard work  Experienced abdominal pain after drinking 8oz  coffee first time since hospitalization lasting a few hours, no other pain since episode, experiencing frequent loud peristalsis during the day  Is aware of needing to make appointments for endoscopy/ colonoscopy in July  Will follow up as necessary  Care Coordination [facility] Nurse [facility]:   The reason for call is to discuss outreach for follow up/needed services  Active Problems    1  Abnormal weight loss (783 21) (R63 4)   2  Benign essential hypertension (401 1) (I10)   3  Confusion (298 9) (R41 0)   4  Depression with anxiety (300 4) (F41 8)   5  Exogenous obesity (278 00) (E66 9)   6  Frequent falls (V15 88) (R29 6)   7  Hypercalcemia (275 42) (E83 52)   8  Hyperparathyroidism (252 00) (E21 3)   9  Ischemic colitis (557 9) (K55 9)   10  Palpitations (785 1) (R00 2)   11  Sleeplessness (780 52) (G47 00)   12  Uncontrolled type 2 diabetes with renal manifestation (250 42) (E11 29,E11 65)   13  Vitamin D deficiency (268 9) (E55 9)    Past Medical History    1  History of Abscess of abdominal wall (682 2) (L02 211)   2  History of Acute conjunctivitis (372 00) (H10 30)   3  History of Acute sinusitis (461 9) (J01 90)   4  History of Encounter for PPD test (V74 1) (Z11 1)   5  History of Encounter for routine gynecological examination (V72 31) (Z01 419)   6   History of Encounter for screening mammogram for malignant neoplasm of breast (V76 12) (Z12 31)   7  History of Encounter for screening mammogram for malignant neoplasm of breast   (V76 12) (Z12 31)   8  History of External hemorrhoids (455 3) (K64 4)   9  History of Fatigue, unspecified type (780 79) (R53 83)   10  History of Generalized abdominal pain (789 07) (R10 84)   11  History of acute bronchitis (V12 69) (Z87 09)   12  History of diabetes mellitus (V12 29) (Z86 39)   13  History of Incarcerated incisional hernia (552 21) (K43 0)   14  History of Lipid screening (V77 91) (Z13 220)   15  History of Need for immunization against influenza (V04 81) (Z23)   16  History of Need for influenza vaccination (V04 81) (Z23)   17  History of Need for Tdap vaccination (V06 1) (Z23)   18  History of Need for vaccination to prevent tuberculosis (V03 2) (Z23)   19  History of Other fatigue (780 79) (R53 83)   20  History of Screening examination for pulmonary tuberculosis (V74 1) (Z11 1)   21  History of Ventral hernia (553 20) (K43 9)   22  History of Visit for Mantoux test (V74 1) (Z11 1)   23  History of Visit for screening mammogram (V76 12) (Z12 31)   24  History of Visit For:   25  History of Visit For:    Surgical History    1  History of  Section Classical   2  History of Complete Colonoscopy   3  History of Hysterectomy   4  History of Incision And Drainage Of Skin Abscess    Family History  Mother    1  Family history of glaucoma (V19 11) (Z83 511)  Father    2  Family history of diabetes mellitus (V18 0) (Z83 3)   3  Family history of hypertension (V17 49) (Z82 49)   4  Family history of pancreatic cancer (V16 0) (Z80 0)  Maternal Grandmother    5  Family history of malignant neoplasm of breast (V16 3) (Z80 3)  Maternal Aunt    6  Family history of malignant neoplasm of breast (V16 3) (Z80 3)  Family History    7  Family history of Coronary Artery Disease (V17 49)   8  Family history of Diabetes Mellitus (V18 0)   9   Family history of Hypertension (V17 49)    Social History    · Denied: History of Alcohol Use (History)   · Never A Smoker    Current Meds    1  Lisinopril-Hydrochlorothiazide 20-25 MG Oral Tablet; TAKE ONE TABLET BY MOUTH   ONCE DAILY; Therapy: 61WJT0994 to (Evaluate:46Fft3935)  Requested for: 13OMX1140; Last   Rx:13Feb2017 Ordered    2  David Contour Next Monitor w/Device Kit; To test blood sugar once daily; Therapy: 86EOA9522 to (Last Rx:24Mar2017)  Requested for: 24Mar2017 Ordered   3  Advid Contour Next Test In [employer]; TEST TWICE DAILY; Therapy: 43ZSG2677 to (Evaluate:04Maw7521)  Requested for: 81FPM7508; Last   Rx:21May2017 Ordered   4  BD Pen Needle Izzy U/F 32G X 4 MM Miscellaneous; To use once daily as directed; Therapy: 47NPT6982 to (Evaluate:15Hsm2317)  Requested for: 08SSV5001; Last   Rx:22Mar2017 Ordered    5  Vitamin D (Ergocalciferol) 72065 UNIT Oral Capsule; TAKE 1 CAPSULE Weekly; Therapy: 09SAV2981 to (Last Rx:22Mar2017)  Requested for: 40ZXA6827 Ordered    Allergies    1  Ambien TABS   2  Demerol SOLN   3  Percocet TABS   4  Latex Exam Gloves [facility]    Health Management   Digital Bilateral Screening Mammogram With CAD; every 1 year; Last 28Apr2015; Next Due:  54TEX7880; Overdue    End of Encounter Meds    1  Lisinopril-Hydrochlorothiazide 20-25 MG Oral Tablet; TAKE ONE TABLET BY MOUTH   ONCE DAILY; Therapy: 48JRW5823 to (Evaluate:38Ylm6577)  Requested for: 36CSD1878; Last   Rx:86Klj9229 Ordered    2  David Contour Next Monitor w/Device Kit; To test blood sugar once daily; Therapy: 38GOV2527 to (Last Rx:24Mar2017)  Requested for: 24Mar2017 Ordered   3  David Contour Next Test In [employer]; TEST TWICE DAILY; Therapy: 28MOK6660 to (Evaluate:83Aif5249)  Requested for: 69CXC2115; Last   Rx:21May2017 Ordered   4  BD Pen Needle Izzy U/F 32G X 4 MM Miscellaneous; To use once daily as directed; Therapy: 08YOE9602 to (Evaluate:40Xtc2947)  Requested for: 16DDE6863; Last   Rx:22Mar2017 Ordered    5   Vitamin D (Ergocalciferol) 40187 UNIT Oral Capsule; TAKE 1 CAPSULE Weekly;    Therapy: 85QGR0209 to (Last Jodell Face)  Requested for: 22Mar2017 Ordered    Future Appointments    Date/Time Provider Specialty Site   08/28/2017 07:00 PM Alli Craig [address]   Electronically signed by : Kelly Rodriguez RN; May 31 2017  1:07PM EST                       (Author)

## 2018-01-10 NOTE — PROGRESS NOTES
Chief Complaint  Patient was seen in the office today to evaluate wound site of an abscess on the left lower abdominal area  Patient was seen last night in the ER [facility]) for an I&D of the abscess  The patient's old dressing was removed  Dressing contained serosanguineous drainage  Area around the site was slightly erythematous and warm to the touch, but it unchanged from the previous night according to patient  New gauze were applied to the area and covered with non adherent pads for any extra drainage  Patient was advised to change this dressing at home  No signs of infection  Patient was instructed to continue taking Bactrim and Keflex as prescribed to her in the ER and f/u for a packing change on Thursday evening  She was also told to call if she was having any problems until that time  Active Problems    1  Abscess of abdominal wall (682 2) (L02 211)   2  Benign essential hypertension (401 1) (I10)   3  DMII (diabetes mellitus, type 2) (250 00) (E11 9)   4  Exogenous obesity (278 00) (E66 9)   5  Fatigue, unspecified type (780 79) (R53 83)   6  Vitamin D deficiency (268 9) (E55 9)    Current Meds   1  Lisinopril-Hydrochlorothiazide 20-25 MG Oral Tablet; TAKE 1 TABLET ONCE DAILY; Therapy: 20JVE7084 to (Evaluate:17Jan2017)  Requested for: 82Hty7431; Last   Rx:31Yga3855 Ordered   2  MetFORMIN HCl - 500 MG Oral Tablet; TAKE 3 TABLETS DAILY; Therapy: 22ZEB8939 to (Evaluate:17Jan2017)  Requested for: 23Eth9369; Last   Rx:34Okl0343 Ordered   3  TraMADol HCl - 50 MG Oral Tablet; TAKE 1 TABLET EVERY 6 TO 8 HOURS AS NEEDED   FOR PAIN;   Therapy: 62AHE1757 to (Last Rx:31Cme3086) Ordered    Allergies    1  Ambien TABS   2   Demerol SOLN   3  Latex Exam Gloves Miscellaneous    Future Appointments    Date/Time Provider Specialty Site   09/29/2016 06:30 PM Sylwia Kang   11/28/2016 07:15 PM Nguyen Segal C/Aly Wheeler Electronically signed by : Rosetta Cancino, ; Sep 27 2016  7:23PM EST                       (Author)    Electronically signed by :  Dali Carter DO; Sep 29 2016 10:46AM EST                       (Author)

## 2018-01-11 NOTE — RESULT NOTES
Verified Results  [facility] CAD 22AKX6061 05:28PM Herrera Arauz Order Number: [id number]    - Patient Instructions: To schedule this appointment, please contact Central Scheduling at [phone]  Do not wear any perfume, powder, lotion or deodorant on breast or underarm area  Please bring your doctors order, referral (if needed) and insurance information with you on the day of the test  Failure to bring this information may result in this test being rescheduled  Arrive 15 minutes prior to your appointment time to register  On the day of your test, please bring any prior mammogram or breast studies with you that were not performed at a [facility]  Failure to bring prior exams may result in your test needing to be rescheduled  Test Name Result Flag Reference   MAMMO SCREENING BILATERAL W 3D & CAD (Report)     Patient History:   Family history of breast cancer at age 50 in maternal    grandmother, breast cancer at age 67 in maternal aunt, breast    cancer at age 71 in maternal aunt, breast cancer at age 61 in    maternal aunt, breast cancer at age 79 in maternal aunt, breast    cancer at age 46 in maternal cousin, breast cancer at age 64 in    sister, breast cancer at age 44 in sister  Reason for exam: screening, asymptomatic  Mammo Screening Bilateral W DBT and CAD: October 31, 2017 - Check   In #: [de-identified]   2D/3D Procedure   3D views: Bilateral MLO view(s) were taken  2D views: Bilateral CC view(s) were taken  Technologist: GENIA Murillo (GENIA)(M)   Prior study comparison: September 7, 2016, mammo screening    bilateral W CAD performed at [place] [address]  April 28, 2015, mammo screening bilateral W CAD,    performed at [facility]  March 3, 2009, mammo    screening bilateral W DBT and CAD, performed at [facility]  The breast tissue is almost entirely fat       No dominant soft tissue mass, architectural distortion or    suspicious calcifications are noted in either breast  The skin    and nipple structures are within normal limits  Scattered benign   appearing calcifications are noted  No significant changes when compared with prior studies  ACR BI-RADSï¾® Assessments: BiRad:1 - Negative     Recommendation:   Routine screening mammogram of both breasts in 1 year  A    reminder letter will be scheduled  The patient is scheduled in a reminder system for screening    mammography  8-10% of cancers will be missed on mammography  Management of a    palpable abnormality must be based on clinical grounds  Patients    will be notified of their results via letter from our facility  Accredited by [employer] of [employer] and FDA  Transcription Location:  M[address] 98: [id number]     Risk Value(s):   Tyrer-Cuzick 10 Year: 5 600%, Tyrer-Cuzick Lifetime: 17 100%,    Myriad Table: 5 6%, JEANINE 5 Year: 4 6%, NCI Lifetime: 26 7%, MRS    : Based on personal and/or family history,    consideration of hereditary risk assessment may be warranted

## 2018-01-12 VITALS
DIASTOLIC BLOOD PRESSURE: 76 MMHG | SYSTOLIC BLOOD PRESSURE: 110 MMHG | OXYGEN SATURATION: 98 % | HEART RATE: 91 BPM | TEMPERATURE: 98 F

## 2018-01-12 VITALS
HEART RATE: 93 BPM | SYSTOLIC BLOOD PRESSURE: 112 MMHG | TEMPERATURE: 97.3 F | BODY MASS INDEX: 45.99 KG/M2 | WEIGHT: 293 LBS | OXYGEN SATURATION: 98 % | DIASTOLIC BLOOD PRESSURE: 74 MMHG | RESPIRATION RATE: 19 BRPM | HEIGHT: 67 IN

## 2018-01-12 NOTE — MISCELLANEOUS
Message   Recorded as Task   Date: 05/03/2017 11:52 AM, Created By: System   Task Name: Verify External Doc   Assigned To: Kenia Lomeli   Regarding Patient: Nayana Whitaker, Status: Active   Comment:    System - 03 May 2017 11:52 AM     To: Wiliam Saba    Recipient DirectID: [email or web address]    From:     Sender DirectID: [email or web address]  Donna Velasquez - 60 May 2017 5:27 PM     TASK EDITED  Discharge instructions state to follow with Dr Jonah Estrada - as needed  No surgical indications for hernia repairs at this time  Will await patient contact  Active Problems    1  Benign essential hypertension (401 1) (I10)   2  Confusion (298 9) (R41 0)   3  Depression with anxiety (300 4) (F41 8)   4  Exogenous obesity (278 00) (E66 9)   5  Frequent falls (V15 88) (R29 6)   6  Hypercalcemia (275 42) (E83 52)   7  Hyperparathyroidism (252 00) (E21 3)   8  Palpitations (785 1) (R00 2)   9  Sleeplessness (780 52) (G47 00)   10  Uncontrolled type 2 diabetes with renal manifestation (250 42) (E11 29,E11 65)   11  Vitamin D deficiency (268 9) (E55 9)    Current Meds   1  Basaglar KwikPen 100 UNIT/ML Subcutaneous Solution Pen-injector; Inject 10 units SC   QHS and increase by 1 unit daily until -120; Therapy: 37VEQ5241 to (Last Rx:23Mar2017) Ordered   2  David Contour Next Monitor w/Device Kit; To test blood sugar once daily; Therapy: 11UZH7332 to (Last Rx:24Mar2017)  Requested for: 24Mar2017 Ordered   3  David Contour Next Test In [employer]; TEST ONCE DAILY; Therapy: 20EKC3806 to (Evaluate:18Jan2018)  Requested for: 82UYT4107; Last   Rx:24Mar2017 Ordered   4  BD Pen Needle Izzy U/F 32G X 4 MM Miscellaneous; To use once daily as directed; Therapy: 65CGS3591 to (Evaluate:48Xaq4161)  Requested for: 28HHR8915; Last   Rx:22Mar2017 Ordered   5  Jardiance 10 MG Oral Tablet; Take 1 tablet daily; Therapy: 17UWF5877 to (Evaluate:96Wao7050); Last Rx:13Mpq0024 Ordered   6  Lisinopril-Hydrochlorothiazide 20-25 MG Oral Tablet; TAKE ONE TABLET BY MOUTH   ONCE DAILY; Therapy: 92FHL3938 to (Evaluate:35Koy9588)  Requested for: 22VBS1958; Last   Rx:40Fef8845 Ordered   7  MetFORMIN HCl - 1000 MG Oral Tablet; TAKE 1 TABLET TWICE DAILY; Therapy: 62RKN6987 to (Evaluate:84Jre1329)  Requested for: 67FMG6478; Last   Rx:17Nov2016 Ordered   8  Vitamin D (Ergocalciferol) 76890 UNIT Oral Capsule; TAKE 1 CAPSULE Weekly; Therapy: 59OMJ3174 to (Last Rx:22Mar2017)  Requested for: 91STG0508 Ordered    Allergies    1  Ambien TABS   2  Demerol SOLN   3  Percocet TABS   4   Latex Exam Gloves MISC    Signatures   Electronically signed by : Neville Gaspar, ; May  3 2017  5:27PM EST                       (Author)

## 2018-01-13 VITALS
SYSTOLIC BLOOD PRESSURE: 128 MMHG | HEART RATE: 68 BPM | DIASTOLIC BLOOD PRESSURE: 90 MMHG | WEIGHT: 293 LBS | BODY MASS INDEX: 45.99 KG/M2 | HEIGHT: 67 IN | TEMPERATURE: 99 F | OXYGEN SATURATION: 96 %

## 2018-01-13 VITALS
WEIGHT: 293 LBS | DIASTOLIC BLOOD PRESSURE: 68 MMHG | RESPIRATION RATE: 20 BRPM | OXYGEN SATURATION: 97 % | TEMPERATURE: 97.2 F | HEIGHT: 67 IN | BODY MASS INDEX: 45.99 KG/M2 | SYSTOLIC BLOOD PRESSURE: 108 MMHG | HEART RATE: 95 BPM

## 2018-01-13 VITALS
OXYGEN SATURATION: 98 % | TEMPERATURE: 98.2 F | BODY MASS INDEX: 45.99 KG/M2 | WEIGHT: 293 LBS | HEART RATE: 77 BPM | HEIGHT: 67 IN | DIASTOLIC BLOOD PRESSURE: 70 MMHG | SYSTOLIC BLOOD PRESSURE: 130 MMHG

## 2018-01-13 NOTE — RESULT NOTES
Verified Results  (1) PTH N-TERMINAL (INTACT) 83YVY8410 04:54PM Kaylah Timmons Order Number: [id number]     Test Name Result Flag Reference   PARATHYROID HORMONE INTACT 93 4 pg/mL H 14 0-72 0       Plan  Hypercalcemia, Hyperparathyroidism    · *1 - [facility] Physician Referral  Consult  Status: Active  Requested for:  72HUV6716  Care Summary provided  : Yes

## 2018-01-14 NOTE — RESULT NOTES
Verified Results  * MAMMO SCREENING BILATERAL W CAD 34Ixm6907 05:30PM Christiano Robert Order Number: [id number]    - Patient Instructions: To schedule this appointment, please contact Central Scheduling at [phone]  Do not wear any perfume, powder, lotion or deodorant on breast or underarm area  Please bring your doctors order, referral (if needed) and insurance information with you on the day of the test  Failure to bring this information may result in this test being rescheduled  Arrive 15 minutes prior to your appointment time to register  On the day of your test, please bring any prior mammogram or breast studies with you that were not performed at a [facility]  Failure to bring prior exams may result in your test needing to be rescheduled   Order Number: [id number]    - Patient Instructions: To schedule this appointment, please contact Central Scheduling at [phone]  Do not wear any perfume, powder, lotion or deodorant on breast or underarm area  Please bring your doctors order, referral (if needed) and insurance information with you on the day of the test  Failure to bring this information may result in this test being rescheduled  Arrive 15 minutes prior to your appointment time to register  On the day of your test, please bring any prior mammogram or breast studies with you that were not performed at a [facility]  Failure to bring prior exams may result in your test needing to be rescheduled       Test Name Result Flag Reference   MAMMO SCREENING BILATERAL W CAD (Report)     Patient History:   Family history of breast cancer in maternal grandmother at age    50, breast cancer in maternal aunt at age 67, breast cancer in    maternal aunt at age 71, breast cancer in maternal aunt at age    61, breast cancer in maternal aunt at age 79, breast cancer in    maternal cousin at age 46, breast cancer in sister at age 44, and   breast cancer in sister at age 56      Reason for exam: screening (asymptomatic)  Mammo Screening Bilateral W CAD: September 7, 2016 - Check In #:    [de-identified]   Bilateral XCCL view(s) were taken  CC and MLO view(s) were taken   of the right breast      Technologist: GENIA Craig (R)(M)   Prior study comparison: April 28, 2015, mammo screening bilateral   W CAD, performed at [facility]  December 4, 2012,    mammo screening bilateral W CAD, performed at [facility]  November 12, 2010, mammo screening bilateral W CAD,    performed at [facility]  March 3, 2009, mammo    screening bilateral W DBT and CAD, performed at [facility]  There are scattered fibroglandular densities  The parenchymal pattern appears stable  No dominant soft tissue    mass or suspicious calcifications are noted  Scattered benign    appearing calcifications are seen  The skin and nipple contours    are within normal limits  No mammographic evidence of malignancy  No    significant changes when compared with prior studies  ASSESSMENT: BiRad:2 - Benign     Recommendation:   Routine screening mammogram in 1 year  A reminder letter will be   scheduled  Analyzed by CAD     8-10% of cancers will be missed on mammography  Management of a    palpable abnormality must be based on clinical grounds  Patients   will be notified of their results via letter from our facility  Accredited by [employer] and FDA  Transcription Location: GENIA [address]au[address] 98: [id number]     Risk Value(s):   Tyrer-Cuzick 10 Year: 5 429%, Tyrer-Cuzick Lifetime: 17 449%,    Myriad Table: 5 6%, JEANINE 5 Year: 4 4%, NCI Lifetime: 27 2%, MRS    : Based on personal and/or family history,    consideration of hereditary risk assessment may be warranted     Signed by:   Gavino Montano MD   9/14/16

## 2018-01-14 NOTE — RESULT NOTES
Verified Results  HOLTER MONITOR - 24 HOUR 23KEP0026 [address] Order Number: [id number]    - Patient Instructions: To schedule this appointment, please contact Central Scheduling at [phone]  Test Name Result Flag Reference   HOLTER MONITOR - 24 HOUR (Report)     PT NAME: Jia Patrick   : 1960 AGE: 55 y o  GENDER: female   MRN: [id number]  PROCEDURE: Holter monitor - 24 hour         INDICATIONS: Palpitations       FINDINGS:      Holter monitoring revealed predominant sinus rhythm with an average heart rate of 90 BPM, a minimum heart rate of 66 BPM, and a maximum heart rate of 124 BPM      There were no ventricular ectopic beats, or any evidence of ventricular tachycardia  There were about 47 supraventricular ectopic beats, all occurring as single PAC's with no evidence of supraventricular tachycardia, atrial fibrillation, or atrial flutter  There was no evidence of significant bradyarrhythmia or advanced heart block  The longest R-R interval was 1 1 seconds       The patient's symptom diary reported 3 incidences of shirt L side jumping which correlated with sinus rhythm between 78 to 105 BPM

## 2018-01-15 VITALS
SYSTOLIC BLOOD PRESSURE: 142 MMHG | HEIGHT: 67 IN | HEART RATE: 80 BPM | DIASTOLIC BLOOD PRESSURE: 86 MMHG | WEIGHT: 293 LBS | OXYGEN SATURATION: 98 % | RESPIRATION RATE: 18 BRPM | TEMPERATURE: 97.6 F | BODY MASS INDEX: 45.99 KG/M2

## 2018-01-15 NOTE — RESULT NOTES
Verified Results  (1) COMPREHENSIVE METABOLIC PANEL 77GNW4633 90:88OI [facility]     Test Name Result Flag Reference   SODIUM 136 mmol/L  136-145   POTASSIUM 4 2 mmol/L  3 5-5 3   CHLORIDE 99 mmol/L L 100-108   CARBON DIOXIDE 27 mmol/L  21-32   ANION GAP (CALC) 10 mmol/L  4-13   BLOOD UREA NITROGEN 19 mg/dL  5-25   CREATININE 0 86 mg/dL  0 60-1 30   Standardized to IDMS reference method   CALCIUM 11 0 mg/dL H 8 3-10 1   BILI, TOTAL 1 02 mg/dL H 0 20-1 00   ALK PHOSPHATAS 94 U/L     ALT (SGPT) 44 U/L  12-78   AST(SGOT) 26 U/L  5-45   ALBUMIN 3 7 g/dL  3 5-5 0   TOTAL PROTEIN 8 1 g/dL  6 4-8 2   eGFR Non-African American      >60 0 ml/min/1 73sq m   [facility] Energy Disease Education Program recommendations are as follows:  GFR calculation is accurate only with a steady state creatinine  Chronic Kidney disease less than 60 ml/min/1 73 sq  meters  Kidney failure less than 15 ml/min/1 73 sq  meters  CORRECTED CALCIUM 11 2 mg/dL H 8 3-10 1   GLUCOSE FASTING 196 mg/dL H 65-99     (1) LIPID PANEL FASTING W DIRECT LDL REFLEX 25BGM2557 09:29AM [facility]     Test Name Result Flag Reference   CHOLESTEROL 197 mg/dL     LDL CHOLESTEROL CALCULATED 107 mg/dL H 0-100   Triglyceride:         Normal              <150 mg/dl       Borderline High    150-199 mg/dl       High               200-499 mg/dl       Very High          >499 mg/dl  Cholesterol:         Desirable        <200 mg/dl      Borderline High  200-239 mg/dl      High             >239 mg/dl  HDL Cholesterol:        High    >59 mg/dL      Low     <41 mg/dL  LDL Cholesterol:        Optimal          <100 mg/dl        Near Optimal     100-129 mg/dl        Above Optimal          Borderline High   130-159 mg/dl          High              160-189 mg/dl          Very High        >189 mg/dl  LDL CALCULATED:    This screening LDL is a calculated result    It does not have the accuracy of the Direct Measured LDL in the monitoring of patients with hyperlipidemia and/or statin therapy  Direct Measure LDL (EHB151) must be ordered separately in these patients  TRIGLYCERIDES 227 mg/dL H <=150   Specimen collection should occur prior to N-Acetylcysteine or Metamizole administration due to the potential for falsely depressed results  HDL,DIRECT 45 mg/dL  40-60   Specimen collection should occur prior to Metamizole administration due to the potential for falsely depressed results  (1) TSH WITH FT4 REFLEX 92RRL4985 09:29AM [facility]     Test Name Result Flag Reference   TSH 1 641 uIU/mL  0 358-3 740   Patients undergoing fluorescein dye angiography may retain small amounts of fluorescein in the body for 48-72 hours post procedure  Samples containing fluorescein can produce falsely depressed TSH values  If the patient had this procedure,a specimen should be resubmitted post fluorescein clearance  The recommended reference ranges for TSH during pregnancy are as follows:  First trimester 0 1 to 2 5 uIU/mL  Second trimester  0 2 to 3 0 uIU/mL  Third trimester 0 3 to 3 0 uIU/m     (1) VITAMIN D 25-HYDROXY 50LCC7488 09:29AM [facility]     Test Name Result Flag Reference   VIT D 25-HYDROX 9 2 ng/mL L 30 0-100 0   This assay is a certified procedure of the CDC Vitamin D Standardization Certification Program (VDSCP)     Deficiency <20ng/ml   Insufficiency 20-30ng/ml   Sufficient  ng/ml     *Patients undergoing fluorescein dye angiography may retain small amounts of fluorescein in the body for 48-72 hours post procedure  Samples containing fluorescein can produce falsely elevated Vitamin D values  If the patient had this procedure, a specimen should be resubmitted post fluorescein clearance  (1) HEMOGLOBIN A1C 59YCC3581 09:29AM [facility]     Test Name Result Flag Reference   HEMOGLOBIN A1C 9 1 % H 4 2-6 3   EST  AVG   GLUCOSE 214 mg/dl

## 2018-01-17 NOTE — PROGRESS NOTES
History of Present Illness  Care Coordination Encounter Information:   Type of Encounter: Telephonic   Contact: Initial Contact    Spoke to Patient   Spoke with patient in regards to recent discharge (5/3/17)from [facility] in [place] with Dx: Pancolitis  Patient expressed she is feeling better, no longer experiencing abdominal pain, watching diet carefully, understands and is following discharge diet instructions as ordered  Patient feels her previous symptoms of confusion, falls and uncontrolled blood sugars was caused from insulin Basaglar which was stopped in hospital along with Jardiance, Metformin, and vitamin D Patient taking blood sugars twice a day ranging from 109-118  Patient no longer experiencing symptoms  Patient voiced concern she is still experiencing some falls in her home,CC recommended to notify PCP for possible Physical Therapy home evaluation to prevent continued falls  Patient agreed, next appointment with PCP 5/17/17  GI   5/22/17 Will reach out again for follow-up  Active Problems    1  Benign essential hypertension (401 1) (I10)   2  Confusion (298 9) (R41 0)   3  Depression with anxiety (300 4) (F41 8)   4  Exogenous obesity (278 00) (E66 9)   5  Frequent falls (V15 88) (R29 6)   6  Hypercalcemia (275 42) (E83 52)   7  Hyperparathyroidism (252 00) (E21 3)   8  Palpitations (785 1) (R00 2)   9  Sleeplessness (780 52) (G47 00)   10  Uncontrolled type 2 diabetes with renal manifestation (250 42) (E11 29,E11 65)   11  Vitamin D deficiency (268 9) (E55 9)    Past Medical History    1  History of Abscess of abdominal wall (682 2) (L02 211)   2  History of Acute conjunctivitis (372 00) (H10 30)   3  History of Acute sinusitis (461 9) (J01 90)   4  History of Encounter for PPD test (V74 1) (Z11 1)   5  History of Encounter for routine gynecological examination (V72 31) (Z01 419)   6  History of Encounter for screening mammogram for malignant neoplasm of breast   (V76 12) (Z12 31)   7   History of Encounter for screening mammogram for malignant neoplasm of breast   (V76 12) (Z12 31)   8  History of External hemorrhoids (455 3) (K64 4)   9  History of Fatigue, unspecified type (780 79) (R53 83)   10  History of Generalized abdominal pain (789 07) (R10 84)   11  History of acute bronchitis (V12 69) (Z87 09)   12  History of diabetes mellitus (V12 29) (Z86 39)   13  History of Incarcerated incisional hernia (552 21) (K43 0)   14  History of Lipid screening (V77 91) (Z13 220)   15  History of Need for immunization against influenza (V04 81) (Z23)   16  History of Need for influenza vaccination (V04 81) (Z23)   17  History of Need for Tdap vaccination (V06 1) (Z23)   18  History of Need for vaccination to prevent tuberculosis (V03 2) (Z23)   19  History of Other fatigue (780 79) (R53 83)   20  History of Screening examination for pulmonary tuberculosis (V74 1) (Z11 1)   21  History of Ventral hernia (553 20) (K43 9)   22  History of Visit for Mantoux test (V74 1) (Z11 1)   23  History of Visit for screening mammogram (V76 12) (Z12 31)   24  History of Visit For:   25  History of Visit For:    Surgical History    1  History of  Section Classical   2  History of Complete Colonoscopy   3  History of Hysterectomy   4  History of Incision And Drainage Of Skin Abscess    Family History  Mother    1  Family history of glaucoma (V19 11) (Z83 511)  Father    2  Family history of diabetes mellitus (V18 0) (Z83 3)   3  Family history of hypertension (V17 49) (Z82 49)   4  Family history of pancreatic cancer (V16 0) (Z80 0)  Maternal Grandmother    5  Family history of malignant neoplasm of breast (V16 3) (Z80 3)  Maternal Aunt    6  Family history of malignant neoplasm of breast (V16 3) (Z80 3)  Family History    7  Family history of Coronary Artery Disease (V17 49)   8  Family history of Diabetes Mellitus (V18 0)   9   Family history of Hypertension (V17 49)    Social History    · Denied: History of Alcohol Use (History)   · Never A Smoker    Current Meds    1  Lisinopril-Hydrochlorothiazide 20-25 MG Oral Tablet; TAKE ONE TABLET BY MOUTH   ONCE DAILY; Therapy: 35JGH9035 to (Evaluate:12Aug2017)  Requested for: 11ADC7519; Last   Rx:05Zxh1528 Ordered    2  Jardiance 10 MG Oral Tablet; Take 1 tablet daily; Therapy: 69VEV4586 to (Evaluate:16May2017); Last Rx:17Nov2016 Ordered   3  MetFORMIN HCl - 1000 MG Oral Tablet; TAKE 1 TABLET TWICE DAILY; Therapy: 68LKR4345 to (Evaluate:16May2017)  Requested for: 97QCR2385; Last   Rx:17Nov2016 Ordered    4  Basaglar KwikPen 100 UNIT/ML Subcutaneous Solution Pen-injector; Inject 10 units SC   QHS and increase by 1 unit daily until -120; Therapy: 23RFA4472 to (Last Rx:23Mar2017) Ordered   5  David Contour Next Monitor w/Device Kit; To test blood sugar once daily; Therapy: 79HXD9065 to (Last Rx:24Mar2017)  Requested for: 24Mar2017 Ordered   6  David Contour Next Test In [employer]; TEST ONCE DAILY; Therapy: 42YVD5245 to (Evaluate:18Jan2018)  Requested for: 63KNZ0570; Last   Rx:24Mar2017 Ordered   7  BD Pen Needle Izzy U/F 32G X 4 MM Miscellaneous; To use once daily as directed; Therapy: 83BQK2776 to (Evaluate:20Nsm9435)  Requested for: 22Mar2017; Last   Rx:22Mar2017 Ordered    8  Vitamin D (Ergocalciferol) 20781 UNIT Oral Capsule; TAKE 1 CAPSULE Weekly; Therapy: 77WTF1923 to (Last Rx:22Mar2017)  Requested for: 51FOZ2213 Ordered    Allergies    1  Ambien TABS   2  Demerol SOLN   3  Percocet TABS   4  Latex Exam Gloves [facility] [place]   Digital Bilateral Screening Mammogram With CAD; every 1 year; Last 28Apr2015; Next Due:  80IDQ8900; Overdue    End of Encounter Meds    1  Lisinopril-Hydrochlorothiazide 20-25 MG Oral Tablet; TAKE ONE TABLET BY MOUTH   ONCE DAILY; Therapy: 94KMI5652 to (Evaluate:12Aug2017)  Requested for: 52LWY4051; Last   Rx:85Wlx1944 Ordered    2  Jardiance 10 MG Oral Tablet; Take 1 tablet daily;    Therapy: 25UXB7240 to (Evaluate:22Jze5225); Last Rx:17Nov2016 Ordered   3  MetFORMIN HCl - 1000 MG Oral Tablet; TAKE 1 TABLET TWICE DAILY; Therapy: 37QSV7871 to (Evaluate:25Wwy6194)  Requested for: 69DIU9726; Last   Rx:17Nov2016 Ordered    4  Basaglar KwikPen 100 UNIT/ML Subcutaneous Solution Pen-injector; Inject 10 units SC   QHS and increase by 1 unit daily until -120; Therapy: 62EIR1349 to (Last Rx:23Mar2017) Ordered   5  David Contour Next Monitor w/Device Kit; To test blood sugar once daily; Therapy: 25ZVG4430 to (Last Rx:24Mar2017)  Requested for: 24Mar2017 Ordered   6  David Contour Next Test In [employer]; TEST ONCE DAILY; Therapy: 81JGK4845 to (Evaluate:18Jan2018)  Requested for: 83TEU9832; Last   Rx:24Mar2017 Ordered   7  BD Pen Needle Izzy U/F 32G X 4 MM Miscellaneous; To use once daily as directed; Therapy: 82CBG6539 to (Evaluate:69Lvy7368)  Requested for: 22Mar2017; Last   Rx:22Mar2017 Ordered    8  Vitamin D (Ergocalciferol) 45521 UNIT Oral Capsule; TAKE 1 CAPSULE Weekly;    Therapy: 45MDY9476 to (Last Devoria Brunner)  Requested for: 22Mar2017 Ordered    Future Appointments    Date/Time Provider Specialty Site   05/22/2017 04:00 PM Anderson Jeans, [facility]  [address]   05/17/2017 06:45 PM Mishel Holliday [address]   Electronically signed by : Faby Avalos RN; May  8 2017  4:26PM EST                       (Author)

## 2018-01-18 NOTE — MISCELLANEOUS
Message  GI Reminder Recall Brittany Carranza:   Date: 06/26/2017   Dear James Ridley:     Review of our records shows you are due for the following: Colonoscopy  Our records indicate that you are due at this time to have a follow-up examination for a colonoscopy  As you now, these tests are done to prevent colon cancer, a very common disease in the [place] and responsible for the thousands of patient deaths each year  We at [facility] are concerned for your health, and would very much appreciate you getting in touch with us at your earliest convenience, Again, this examination is vital to your proper health maintenance and for the prevention of cancer  We have attempted to reach you and have been unsuccessful  Please contact our office to schedule your procedure  Thank You       Please call the following office to schedule your appointment:   [address], [address], [place], [address] [facility] [phone]  We look forward to hearing from you!      Sincerely,         Signatures   Electronically signed by : Elan Red, ; Jun 26 2017 10:49AM EST                       (Author)

## 2018-01-18 NOTE — RESULT NOTES
Verified Results  CT HEAD W WO CONTRAST [id number] 07:01PM Lillie Bench Order Number: [id number]    - Patient Instructions: To schedule this appointment, please contact Central Scheduling at [phone]  Test Name Result Flag Reference   CT HEAD W WO CONTRAST (Report)     CT BRAIN - WITH AND WITHOUT CONTRAST     INDICATION: confused frequent falls pt very clautrophobic didn't want a mri     COMPARISON: None  TECHNIQUE: CT examination of the brain was performed both prior to and after the administration of intravenous contrast  In addition to axial images, coronal reformatted images were created and submitted for interpretation  Radiation dose length product (DLP) for this visit: 2358 mGy-cm   This examination, like all CT scans performed in the [facility], was performed utilizing techniques to minimize radiation dose exposure, including the use of iterative    reconstruction and automated exposure control  IV Contrast: 100 mL of iohexol (OMNIPAQUE)        IMAGE QUALITY: Diagnostic  FINDINGS:      PARENCHYMA: No mass, mass effect or midline shift  No parenchymal abnormality  No hemorrhage  No signs of acute infarction  No abnormal enhancement   Unremarkable vasculature  VENTRICLES AND EXTRA-AXIAL SPACES: Normal for patient's age  VISUALIZED ORBITS AND PARANASAL SINUSES: Unremarkable  CALVARIUM: Normal        IMPRESSION:     Normal computed tomography of the brain with contrast        Workstation performed: [id number]     Signed by:    Iglesia Yuen MD   4/12/17

## 2018-01-22 VITALS
OXYGEN SATURATION: 98 % | TEMPERATURE: 97.9 F | RESPIRATION RATE: 16 BRPM | HEIGHT: 67 IN | SYSTOLIC BLOOD PRESSURE: 124 MMHG | WEIGHT: 293 LBS | BODY MASS INDEX: 45.99 KG/M2 | DIASTOLIC BLOOD PRESSURE: 84 MMHG | HEART RATE: 78 BPM

## 2018-01-22 VITALS
SYSTOLIC BLOOD PRESSURE: 112 MMHG | WEIGHT: 293 LBS | HEIGHT: 67 IN | DIASTOLIC BLOOD PRESSURE: 70 MMHG | BODY MASS INDEX: 45.99 KG/M2

## 2018-01-23 VITALS
WEIGHT: 293 LBS | OXYGEN SATURATION: 99 % | RESPIRATION RATE: 18 BRPM | HEART RATE: 81 BPM | DIASTOLIC BLOOD PRESSURE: 90 MMHG | BODY MASS INDEX: 47.09 KG/M2 | TEMPERATURE: 98 F | HEIGHT: 66 IN | SYSTOLIC BLOOD PRESSURE: 130 MMHG

## 2018-03-15 ENCOUNTER — OFFICE VISIT (OUTPATIENT)
Dept: FAMILY MEDICINE CLINIC | Facility: CLINIC | Age: 58
End: 2018-03-15
Payer: COMMERCIAL

## 2018-03-15 VITALS
DIASTOLIC BLOOD PRESSURE: 104 MMHG | WEIGHT: 293 LBS | SYSTOLIC BLOOD PRESSURE: 170 MMHG | HEART RATE: 103 BPM | HEIGHT: 62 IN | BODY MASS INDEX: 53.92 KG/M2 | RESPIRATION RATE: 17 BRPM | OXYGEN SATURATION: 97 % | TEMPERATURE: 100.2 F

## 2018-03-15 DIAGNOSIS — F40.240 CLAUSTROPHOBIA: ICD-10-CM

## 2018-03-15 DIAGNOSIS — IMO0002 UNCONTROLLED TYPE 2 DIABETES MELLITUS WITH MICROALBUMINURIA, WITHOUT LONG-TERM CURRENT USE OF INSULIN: ICD-10-CM

## 2018-03-15 DIAGNOSIS — I10 BENIGN ESSENTIAL HYPERTENSION: ICD-10-CM

## 2018-03-15 DIAGNOSIS — R51.9 CHRONIC NONINTRACTABLE HEADACHE, UNSPECIFIED HEADACHE TYPE: ICD-10-CM

## 2018-03-15 DIAGNOSIS — R68.89 DIFFICULTY WRITING: ICD-10-CM

## 2018-03-15 DIAGNOSIS — R29.6 FREQUENT FALLS: ICD-10-CM

## 2018-03-15 DIAGNOSIS — J11.1 INFLUENZA: Primary | ICD-10-CM

## 2018-03-15 DIAGNOSIS — G89.29 CHRONIC NONINTRACTABLE HEADACHE, UNSPECIFIED HEADACHE TYPE: ICD-10-CM

## 2018-03-15 PROCEDURE — 99214 OFFICE O/P EST MOD 30 MIN: CPT | Performed by: PHYSICIAN ASSISTANT

## 2018-03-15 PROCEDURE — 96372 THER/PROPH/DIAG INJ SC/IM: CPT | Performed by: PHYSICIAN ASSISTANT

## 2018-03-15 RX ORDER — SUMATRIPTAN 100 MG/1
TABLET, FILM COATED ORAL
Qty: 9 TABLET | Refills: 0 | Status: SHIPPED | OUTPATIENT
Start: 2018-03-15 | End: 2018-07-26 | Stop reason: ALTCHOICE

## 2018-03-15 RX ORDER — ALPRAZOLAM 0.5 MG/1
TABLET ORAL
Qty: 2 TABLET | Refills: 0 | Status: SHIPPED | OUTPATIENT
Start: 2018-03-15 | End: 2018-07-26 | Stop reason: ALTCHOICE

## 2018-03-15 RX ORDER — OSELTAMIVIR PHOSPHATE 75 MG/1
75 CAPSULE ORAL 2 TIMES DAILY
Qty: 10 CAPSULE | Refills: 0 | Status: SHIPPED | OUTPATIENT
Start: 2018-03-15 | End: 2018-03-20

## 2018-03-15 RX ORDER — GLIMEPIRIDE 2 MG/1
1 TABLET ORAL DAILY
COMMUNITY
Start: 2018-01-03 | End: 2018-07-26 | Stop reason: SDDI

## 2018-03-15 RX ORDER — KETOROLAC TROMETHAMINE 30 MG/ML
60 INJECTION, SOLUTION INTRAMUSCULAR; INTRAVENOUS ONCE
Status: COMPLETED | OUTPATIENT
Start: 2018-03-15 | End: 2018-03-15

## 2018-03-15 RX ADMIN — KETOROLAC TROMETHAMINE 60 MG: 30 INJECTION, SOLUTION INTRAMUSCULAR; INTRAVENOUS at 19:02

## 2018-03-15 NOTE — PROGRESS NOTES
Assessment/Plan:         Diagnoses and all orders for this visit:    Influenza  -     oseltamivir (TAMIFLU) 75 mg capsule; Take 1 capsule (75 mg total) by mouth 2 (two) times a day for 5 days    Chronic nonintractable headache, unspecified headache type  -     ketorolac (TORADOL) 60 mg/2 mL IM injection 60 mg; Inject 2 mL (60 mg total) into the shoulder, thigh, or buttocks once   -     SUMAtriptan (IMITREX) 100 mg tablet; Take 1 tablet at onset of headache, repeat 2 hours later if needed  Do not exceed 2 tablets in 24 hrs  -     MRI brain w wo contrast; Future    Frequent falls  -     MRI brain w wo contrast; Future    Difficulty writing  -     MRI brain w wo contrast; Future    Uncontrolled type 2 diabetes mellitus with microalbuminuria, without long-term current use of insulin (HCC)  -     metFORMIN (GLUCOPHAGE) 500 mg tablet; Take 1 tablet (500 mg total) by mouth 2 (two) times a day with meals    Benign essential hypertension    Claustrophobia  -     ALPRAZolam (XANAX) 0 5 mg tablet; Take 1 tablet one hour before procedure and can take 1 tablet immediately before procedure if needed  Discussed pt's condition with her in detail  Her fever and upper respiratory symptoms suggest Influenza so I will start her on Tamiflu 75 mg BID x 5 days  Needs to hydrate, rest, quarantine for up to 5 days, wear mask if must go out, and discussed OTC cold meds as well as fever care  She is having headaches that resemble migraines so I will give her a Toradol 60 mg IM injection in the office today and also prescribe her a trial of Sumatriptan to take PRN  Her neuro exam is benign but she is having concerning issues involving frequent falls, difficulty writing at times, along with the headaches  I will send her for MRI of the brain and gave her Rx for Xanax to take before the procedure as she has claustrophobia  She also needs to restart Metformin as her last A1C was 8 4  She is to F/U should symptoms persist/worsen     Chief Complaint   Patient presents with    Headache     past 3 months    Cough       Subjective:      Patient ID: Jorge Lara is a 65 y o  female  Pt presents with a 3 day history of fever, body aches, fatigue, cough with intermittent productivity, nasal congestion, runny nose  Denies ST , N/V/D  She is also having a HA which she has been getting frequently over the past 3 months and also reports she has been falling for no reason at all  She reports she is afraid to go up and down steps  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, she has a hard time keeping her words straight and writing within the lines of a page  The headaches will last for a few days and then will resolve and she may go days without one but they are very intense when she gets them  She has no previous diagnosis of migraine and did not have headaches prior to the past 3 months  She has trouble keeping her balance  She denies any changes in her vision  She is due to go to the eye doctor next week  She also reports that she has not been taking Metformin 500 mg BID as was previously prescribed for her Type 2 DM   The following portions of the patient's history were reviewed and updated as appropriate: allergies, current medications, past family history, past medical history, past social history, past surgical history and problem list     Review of Systems   Constitutional: Positive for fatigue and fever  HENT: Positive for congestion, postnasal drip and rhinorrhea  Negative for sore throat  Eyes: Negative for visual disturbance  Respiratory: Positive for cough  Cardiovascular: Negative  Gastrointestinal: Negative  Genitourinary: Negative  Musculoskeletal: Positive for myalgias  Neurological: Positive for dizziness and headaches          Frequent falls         Objective:      BP (!) 170/104   Pulse 103   Temp 100 2 °F (37 9 °C) (Tympanic)   Resp 17   Ht 5' 2" (1 575 m)   Wt (!) 145 kg (320 lb 1 6 oz)   SpO2 97%   BMI 58 55 kg/m²          Physical Exam   Constitutional: She is oriented to person, place, and time  She appears well-developed and well-nourished  No distress  Pt appears ill   HENT:   Right Ear: Hearing, tympanic membrane, external ear and ear canal normal    Left Ear: Hearing, tympanic membrane, external ear and ear canal normal    Nose: Mucosal edema (B/L boggy erythematous turbinates) present  Mouth/Throat: Mucous membranes are normal  Posterior oropharyngeal erythema (PND) present  No oropharyngeal exudate  Eyes: EOM are normal  Pupils are equal, round, and reactive to light  Mild photophobia   Neck: Neck supple  Cardiovascular: Normal rate, regular rhythm and normal heart sounds  Pulmonary/Chest: Effort normal and breath sounds normal    Lymphadenopathy:     She has no cervical adenopathy  Neurological: She is alert and oriented to person, place, and time  No cranial nerve deficit  Psychiatric: She has a normal mood and affect  Vitals reviewed

## 2018-03-15 NOTE — LETTER
March 15, 2018     Patient: Abdi Martinez   YOB: 1960   Date of Visit: 3/15/2018       To Whom it May Concern:    Abdi Martinez is under my professional care  She was seen in my office on 3/15/2018  She may return to work on 3/19/2018  If you have any questions or concerns, please don't hesitate to call           Sincerely,          Rex Najjar, PA-C        CC: No Recipients

## 2018-03-19 ENCOUNTER — TELEPHONE (OUTPATIENT)
Dept: FAMILY MEDICINE CLINIC | Facility: CLINIC | Age: 58
End: 2018-03-19

## 2018-03-20 NOTE — TELEPHONE ENCOUNTER
Okay that is up to the patient  If her issues persist, then I would at the very least recommend a neurology consult

## 2018-03-23 LAB
LEFT EYE DIABETIC RETINOPATHY: NORMAL
RIGHT EYE DIABETIC RETINOPATHY: NORMAL

## 2018-03-23 NOTE — TELEPHONE ENCOUNTER
Okay thanks for letting me know  At this point it is up to the patient as far as how she wants to proceed going forward regarding evaluation of her symptoms should they persist as she has declined my recommendations

## 2018-05-04 DIAGNOSIS — E11.29 TYPE 2 DIABETES MELLITUS WITH OTHER DIABETIC KIDNEY COMPLICATION (CODE): ICD-10-CM

## 2018-05-04 DIAGNOSIS — I10 ESSENTIAL (PRIMARY) HYPERTENSION: ICD-10-CM

## 2018-05-04 DIAGNOSIS — E11.65 TYPE 2 DIABETES MELLITUS WITH HYPERGLYCEMIA (HCC): ICD-10-CM

## 2018-05-04 DIAGNOSIS — E83.52 HYPERCALCEMIA: ICD-10-CM

## 2018-05-04 DIAGNOSIS — E21.3 HYPERPARATHYROIDISM (HCC): ICD-10-CM

## 2018-07-18 ENCOUNTER — OFFICE VISIT (OUTPATIENT)
Dept: FAMILY MEDICINE CLINIC | Facility: CLINIC | Age: 58
End: 2018-07-18
Payer: COMMERCIAL

## 2018-07-18 VITALS
HEIGHT: 66 IN | TEMPERATURE: 97.5 F | SYSTOLIC BLOOD PRESSURE: 160 MMHG | OXYGEN SATURATION: 98 % | BODY MASS INDEX: 47.09 KG/M2 | HEART RATE: 89 BPM | WEIGHT: 293 LBS | DIASTOLIC BLOOD PRESSURE: 90 MMHG | RESPIRATION RATE: 17 BRPM

## 2018-07-18 DIAGNOSIS — I10 BENIGN ESSENTIAL HYPERTENSION: Primary | ICD-10-CM

## 2018-07-18 DIAGNOSIS — IMO0002 UNCONTROLLED TYPE 2 DIABETES MELLITUS WITH MICROALBUMINURIA, WITHOUT LONG-TERM CURRENT USE OF INSULIN: ICD-10-CM

## 2018-07-18 DIAGNOSIS — E66.01 MORBID OBESITY (HCC): ICD-10-CM

## 2018-07-18 DIAGNOSIS — E21.3 HYPERPARATHYROIDISM (HCC): ICD-10-CM

## 2018-07-18 PROCEDURE — 1036F TOBACCO NON-USER: CPT | Performed by: PHYSICIAN ASSISTANT

## 2018-07-18 PROCEDURE — 99214 OFFICE O/P EST MOD 30 MIN: CPT | Performed by: PHYSICIAN ASSISTANT

## 2018-07-18 RX ORDER — LISINOPRIL 10 MG/1
10 TABLET ORAL DAILY
Qty: 90 TABLET | Refills: 1 | Status: SHIPPED | OUTPATIENT
Start: 2018-07-18 | End: 2018-08-06 | Stop reason: SDUPTHER

## 2018-07-26 NOTE — PROGRESS NOTES
Assessment/Plan:         Diagnoses and all orders for this visit:    Benign essential hypertension  -     lisinopril (ZESTRIL) 10 mg tablet; Take 1 tablet (10 mg total) by mouth daily for 30 days  -     Comprehensive metabolic panel; Future  -     Lipid panel; Future  -     Hemoglobin A1C; Future    Uncontrolled type 2 diabetes mellitus with microalbuminuria, without long-term current use of insulin (HCC)  -     Comprehensive metabolic panel; Future  -     Lipid panel; Future  -     Hemoglobin A1C; Future    Morbid obesity (HCC)  -     Comprehensive metabolic panel; Future  -     Lipid panel; Future  -     Hemoglobin A1C; Future    Hyperparathyroidism ([address] )    Other orders  -     Diabetic foot exam      Discussed pt's chronic conditions and assessed her present condition today  There are significant compliance issues in that she abruptly just stopped taking all of her medications with the exception of her Lisinopril and she is reluctant to go for FBW due to cost but I explained to her that the only way we can properly assess her diabetes is for her to go for this  I gave her Rx to obtain the FBW and strongly recommended she do so  Otherwise, it is going to be difficult to properly care for and manage the pt  She has been trying to improve her diet and lose weight but I told her that there is a good chance that her BS and A1C are going to be uncontrolled as she was on two medications previously, both Metformin and Glimepiride, to control her sugars  Her BP is uncontrolled today at 160/90 on the Lisinopril  She needs to start monitoring her BP regularly so we can determine if her elevated BP is consistent enough to adjust her Lisinopril dose  She is also unwilling to go for any screening tests at this time as well due to cost  She refuses IRIS retinopathy screening today   She needs to RTO at least every 6 months but in the interim, I stressed compliance and to again go for the FBW and if her sugars are uncontrolled, giving strong consideration to restarting the medication  Chief Complaint   Patient presents with    Follow-up     6m check up with no recent labs(states insurance charges to much money)  Needs refill on Lisinopril       Subjective:      Patient ID: Gil Hilario is a 65 y o  female  Pt presents for F/U of her chronic medical conditions  She is feeling well overall and without complaints today  She reports since her last visit, that she has stopped taking all of her medications with the exception of Lisinopril because of cost and also the fact that she has improved her diet and is trying to lose weight  She also does not want to go for FBW because she cannot afford it and that "she feels fine " She also refuses any preventive screenings at this time  She declines IRIS exam today  She needs refill of the Lisinopril today  The following portions of the patient's history were reviewed and updated as appropriate:   She  has a past medical history of Diabetes mellitus ([facility] ); External hemorrhoids; Hernia, ventral; Hypertension; Incarcerated incisional hernia; and Insomnia  She   Patient Active Problem List    Diagnosis Date Noted    Morbid obesity ([facility] ) 2017    Ischemic colitis ([facility] ) 2017    Unintentional weight loss 2017    Hypercalcemia 2017    Hyperparathyroidism ([facility] ) 2017    Pancolitis ([facility] ) 2017    GI bleed 2017    Hernia, ventral     Diabetes mellitus ([facility] )     Depression with anxiety 2016    Uncontrolled type 2 diabetes with renal manifestation ([place] [address] ) 2016    Vitamin D deficiency 2015    Benign essential hypertension 2012     She  has a past surgical history that includes  section; Hysterectomy; Abscess drainage; and Colonoscopy (N/A, 2017)    Her family history includes Breast cancer in her maternal aunt and maternal grandmother; Coronary artery disease in her family; Diabetes in her family and father; Glaucoma in her mother; Hypertension in her family and father; Pancreatic cancer in her father  She  reports that she has never smoked  She has never used smokeless tobacco  She reports that she does not drink alcohol or use drugs  Current Outpatient Prescriptions   Medication Sig Dispense Refill    lisinopril (ZESTRIL) 10 mg tablet Take 1 tablet (10 mg total) by mouth daily for 30 days 90 tablet 1    Cinnamon 500 MG TABS Take 1,000 mg by mouth 2 (two) times a day  No current facility-administered medications for this visit  Current Outpatient Prescriptions on File Prior to Visit   Medication Sig    [DISCONTINUED] ALPRAZolam (XANAX) 0 5 mg tablet Take 1 tablet one hour before procedure and can take 1 tablet immediately before procedure if needed   [DISCONTINUED] glimepiride (AMARYL) 2 mg tablet Take 1 tablet by mouth daily    Cinnamon 500 MG TABS Take 1,000 mg by mouth 2 (two) times a day   [DISCONTINUED] metFORMIN (GLUCOPHAGE) 500 mg tablet Take 1 tablet (500 mg total) by mouth 2 (two) times a day with meals    [DISCONTINUED] SUMAtriptan (IMITREX) 100 mg tablet Take 1 tablet at onset of headache, repeat 2 hours later if needed  Do not exceed 2 tablets in 24 hrs  No current facility-administered medications on file prior to visit  She is allergic to Jan Schein tartrate]; ativan [lorazepam]; demerol [meperidine]; insulin; insulin glargine; latex; oxycodone-acetaminophen; and zolpidem       Review of Systems   Constitutional: Negative  Respiratory: Negative  Cardiovascular: Negative  Gastrointestinal: Negative  Genitourinary: Negative  Objective:      /90   Pulse 89   Temp 97 5 °F (36 4 °C) (Tympanic)   Resp 17   Ht 5' 6" (1 676 m)   Wt (!) 142 kg (314 lb 1 oz)   SpO2 98%   Breastfeeding? No   BMI 50 69 kg/m²          Physical Exam   Constitutional: She is oriented to person, place, and time  She appears well-developed and well-nourished  No distress  HENT:   Mouth/Throat: Oropharynx is clear and moist    Neck: Neck supple  No thyromegaly present  Cardiovascular: Normal rate, regular rhythm and normal heart sounds  Pulses are no weak pulses  Pulses:       Dorsalis pedis pulses are 2+ on the right side, and 2+ on the left side  Posterior tibial pulses are 2+ on the right side, and 2+ on the left side  No carotid bruits    Pulmonary/Chest: Effort normal and breath sounds normal    Musculoskeletal: She exhibits no edema  Feet:   Right Foot:   Skin Integrity: Negative for ulcer, skin breakdown, erythema, warmth, callus or dry skin  Left Foot:   Skin Integrity: Negative for ulcer, skin breakdown, erythema, warmth, callus or dry skin  Lymphadenopathy:     She has no cervical adenopathy  Neurological: She is alert and oriented to person, place, and time  Psychiatric: She has a normal mood and affect  Vitals reviewed  Patient's shoes and socks removed  Right Foot/Ankle   Right Foot Inspection  Skin Exam: skin normal and skin intact no dry skin, no warmth, no callus, no erythema, no maceration, no abnormal color, no pre-ulcer, no ulcer and no callus                          Toe Exam: ROM and strength within normal limits  Sensory   Vibration: intact  Proprioception: intact   Monofilament testing: intact  Vascular  Capillary refills: < 3 seconds  The right DP pulse is 2+  The right PT pulse is 2+  Left Foot/Ankle  Left Foot Inspection  Skin Exam: skin normal and skin intactno dry skin, no warmth, no erythema, no maceration, normal color, no pre-ulcer, no ulcer and no callus                         Toe Exam: ROM and strength within normal limits                   Sensory   Vibration: intact  Proprioception: intact  Monofilament: intact  Vascular  Capillary refills: < 3 seconds  The left DP pulse is 2+  The left PT pulse is 2+  Assign Risk Category:  No deformity present; No loss of protective sensation;  No weak pulses       Risk: 0

## 2018-08-06 DIAGNOSIS — I10 BENIGN ESSENTIAL HYPERTENSION: ICD-10-CM

## 2018-08-07 PROCEDURE — 4010F ACE/ARB THERAPY RXD/TAKEN: CPT | Performed by: PHYSICIAN ASSISTANT

## 2018-08-07 RX ORDER — LISINOPRIL 10 MG/1
10 TABLET ORAL DAILY
Qty: 90 TABLET | Refills: 1 | Status: SHIPPED | OUTPATIENT
Start: 2018-08-07 | End: 2019-01-09 | Stop reason: SDUPTHER

## 2018-12-05 ENCOUNTER — IMMUNIZATION (OUTPATIENT)
Dept: FAMILY MEDICINE CLINIC | Facility: CLINIC | Age: 58
End: 2018-12-05
Payer: COMMERCIAL

## 2018-12-05 DIAGNOSIS — Z23 NEED FOR PROPHYLACTIC VACCINATION AND INOCULATION AGAINST INFLUENZA: Primary | ICD-10-CM

## 2018-12-05 PROCEDURE — 90471 IMMUNIZATION ADMIN: CPT | Performed by: FAMILY MEDICINE

## 2018-12-05 PROCEDURE — 90686 IIV4 VACC NO PRSV 0.5 ML IM: CPT | Performed by: FAMILY MEDICINE

## 2019-01-09 ENCOUNTER — OFFICE VISIT (OUTPATIENT)
Dept: FAMILY MEDICINE CLINIC | Facility: CLINIC | Age: 59
End: 2019-01-09
Payer: COMMERCIAL

## 2019-01-09 VITALS
HEIGHT: 66 IN | OXYGEN SATURATION: 96 % | WEIGHT: 293 LBS | TEMPERATURE: 97.5 F | HEART RATE: 78 BPM | RESPIRATION RATE: 16 BRPM | DIASTOLIC BLOOD PRESSURE: 98 MMHG | BODY MASS INDEX: 47.09 KG/M2 | SYSTOLIC BLOOD PRESSURE: 154 MMHG

## 2019-01-09 DIAGNOSIS — E66.01 MORBID OBESITY (HCC): ICD-10-CM

## 2019-01-09 DIAGNOSIS — I10 BENIGN ESSENTIAL HYPERTENSION: ICD-10-CM

## 2019-01-09 DIAGNOSIS — IMO0002 UNCONTROLLED TYPE 2 DIABETES WITH RENAL MANIFESTATION: Primary | ICD-10-CM

## 2019-01-09 DIAGNOSIS — E11.9 TYPE 2 DIABETES MELLITUS WITHOUT COMPLICATION, WITHOUT LONG-TERM CURRENT USE OF INSULIN (HCC): Chronic | ICD-10-CM

## 2019-01-09 LAB — SL AMB POCT HEMOGLOBIN AIC: 11.9 (ref ?–6.5)

## 2019-01-09 PROCEDURE — 3008F BODY MASS INDEX DOCD: CPT | Performed by: FAMILY MEDICINE

## 2019-01-09 PROCEDURE — 99214 OFFICE O/P EST MOD 30 MIN: CPT | Performed by: FAMILY MEDICINE

## 2019-01-09 PROCEDURE — 4010F ACE/ARB THERAPY RXD/TAKEN: CPT | Performed by: FAMILY MEDICINE

## 2019-01-09 PROCEDURE — 83036 HEMOGLOBIN GLYCOSYLATED A1C: CPT | Performed by: FAMILY MEDICINE

## 2019-01-09 PROCEDURE — 3046F HEMOGLOBIN A1C LEVEL >9.0%: CPT | Performed by: FAMILY MEDICINE

## 2019-01-09 PROCEDURE — 1036F TOBACCO NON-USER: CPT | Performed by: FAMILY MEDICINE

## 2019-01-09 RX ORDER — LISINOPRIL 10 MG/1
10 TABLET ORAL DAILY
Qty: 90 TABLET | Refills: 1 | Status: SHIPPED | OUTPATIENT
Start: 2019-01-09 | End: 2019-04-10 | Stop reason: SDUPTHER

## 2019-01-10 NOTE — PROGRESS NOTES
[facility]      NAME: Radha Lindsey  AGE: 63 y o  SEX: female  : 1960   MRN: [id number]    DATE: 2019  TIME: 8:13 PM    Assessment and Plan     Problem List Items Addressed This Visit        Endocrine    Diabetes mellitus ([facility] ) (Chronic)     Lab Results   Component Value Date    HGBA1C 11 9 (A) 2019       No results for input(s): POCGLU in the last 72 hours  Blood Sugar Average: Last 72 hrs:   diabetic control now extremely poor at 11 9  I explained to the patient that at this level the normal course of action would be to treat with insulin  She adamantly refuses insulin  I explained the importance of treating her diabetes and at the very least we will start her on metformin and titrate her to 2000 mg per day  I also explained that I did not believe that this would be sufficient to get her to her goal that it would also require strict attention to diet and regular activity / exercise  I also feel is likely that she will need more medication  Relevant Medications    metFORMIN (GLUCOPHAGE) 500 mg tablet       Cardiovascular and Mediastinum    Benign essential hypertension       Blood pressure elevated tonight though patient did not take her medication today or for the last several days  I stressed the importance of controlling her blood pressure  She agrees to work on her compliance and restart her lisinopril on a regular basis  Relevant Medications    lisinopril (ZESTRIL) 10 mg tablet       Other    Morbid obesity ([facility] )      Other Visit Diagnoses     Uncontrolled type 2 diabetes with renal manifestation ([place] [address] )    -  Primary    Relevant Medications    metFORMIN (GLUCOPHAGE) 500 mg tablet    Other Relevant Orders    POCT hemoglobin A1c (Completed)        [facility]: Body mass index is 49 87 kg/m²  Discussed the patient's BMI with her  The BMI is above average  BMI counseling and education was provided to the patient  Nutrition recommendations include reducing portion sizes, decreasing overall calorie intake, moderation in carbohydrate intake and increasing intake of lean protein  Exercise recommendations include exercising 3-5 times per week  Patient has numerous medical concerns but the most pressing 1 is her seemingly indifference to complying with her medical regimen  I explained in some detail the risks she is taking by not properly treating her diabetes and hypertension  I explained the risk of stroke heart attack and end-organ damage  Return to office in: Three months    Chief Complaint     Chief Complaint   Patient presents with    Follow-up     6 month  No BW done        History of Present Illness      Patient presents in follow-up from her chronic medical problems which include hypertension and type 2 diabetes  Unfortunately she has been non compliant with all of her treatment  She takes her lisinopril only intermittently  She was prescribed metformin and glimepiride approximately 1 year ago for a hemoglobin A1c of 8 4  She took it only for a brief period of time before stopping both medications  She was then again prescribed metformin in September and she never took it  She states that she is too busy working to remember to take her medications and she has 2 involved with her 's ongoing chronic medical problems to worry about herself  She has not gotten her blood work done as requested due to the fact that she has a deductible that she cannot afford  The following portions of the patient's history were reviewed and updated as appropriate: allergies, current medications, past family history, past medical history, past social history, past surgical history and problem list     Review of Systems   Review of Systems   Constitutional: Negative  Respiratory: Negative  Cardiovascular: Negative  Gastrointestinal: Negative  Genitourinary: Negative  Musculoskeletal: Negative  Psychiatric/Behavioral: Negative  Active Problem List     Patient Active Problem List   Diagnosis    Hernia, ventral    Diabetes mellitus ([facility] )    Pancolitis ([facility] )    GI bleed    Morbid obesity ([facility] )    Ischemic colitis ([facility] )    Unintentional weight loss    Hypercalcemia    Hyperparathyroidism ([place] [address] )    Vitamin D deficiency    Depression with anxiety    Benign essential hypertension       Objective   /98 (BP Location: Left arm, Patient Position: Sitting, Cuff Size: Large)   Pulse 78   Temp 97 5 °F (36 4 °C) (Tympanic)   Resp 16   Ht 5' 6" (1 676 m)   Wt (!) 140 kg (309 lb)   SpO2 96%   BMI 49 87 kg/m²     Physical Exam   Constitutional: She is oriented to person, place, and time  She appears well-developed and well-nourished  No distress  HENT:   Head: Normocephalic and atraumatic  Eyes: Pupils are equal, round, and reactive to light  Conjunctivae are normal  Right eye exhibits no discharge  Neck: Normal range of motion  No thyromegaly present  Cardiovascular: Normal rate and regular rhythm  Pulmonary/Chest: Effort normal and breath sounds normal  No respiratory distress  Lymphadenopathy:     She has no cervical adenopathy  Neurological: She is alert and oriented to person, place, and time  Skin: Skin is warm and dry  She is not diaphoretic  Psychiatric: She has a normal mood and affect  Her behavior is normal  Judgment and thought content normal    Nursing note and vitals reviewed          Current Medications     Current Outpatient Prescriptions:     lisinopril (ZESTRIL) 10 mg tablet, Take 1 tablet (10 mg total) by mouth daily for 30 days, Disp: 90 tablet, Rfl: 1    Cinnamon 500 MG TABS, Take 1,000 mg by mouth 2 (two) times a day , Disp: , Rfl:     metFORMIN (GLUCOPHAGE) 500 mg tablet, Take 2 tablets (1,000 mg total) by mouth 2 (two) times a day with meals, Disp: 360 tablet, Rfl: 1    Health Maintenance     Health Maintenance   Topic Date Due    DM Eye Exam 09/01/1971    Pneumococcal PPSV23 Medium Risk Adult (1 of 1 - PPSV23) 09/01/1980    URINE MICROALBUMIN  11/03/2017    HEMOGLOBIN A1C  07/03/2018    MAMMOGRAM  10/31/2018    Hepatitis C Screening  01/18/2019 (Originally 1960)    Depression Screening PHQ  03/15/2019    Diabetic Foot Exam  07/26/2019    DTaP,Tdap,and Td Vaccines (2 - Td) 12/14/2025    CRC Screening: Colonoscopy  04/28/2027    INFLUENZA VACCINE  Completed     Immunization History   Administered Date(s) Administered    Influenza Quadrivalent, 6-35 Months IM 10/01/2015, 11/17/2016, 08/28/2017    Influenza, injectable, quadrivalent, preservative free 0 5 mL 12/05/2018    MMR 10/09/2017    Tdap 12/14/2015    Tuberculin Skin Test-PPD Intradermal 12/07/2012, 12/05/2014, 12/14/2015, 03/22/2017, 08/28/2017       Celestino Sultana, DO

## 2019-01-10 NOTE — ASSESSMENT & PLAN NOTE
Blood pressure elevated tonight though patient did not take her medication today or for the last several days  I stressed the importance of controlling her blood pressure  She agrees to work on her compliance and restart her lisinopril on a regular basis

## 2019-01-10 NOTE — PATIENT INSTRUCTIONS
Obesity   AMBULATORY CARE:   Obesity  is when your body mass index (BMI) is greater than 30  Your healthcare provider will use your height and weight to measure your BMI  The risks of obesity include  many health problems, such as injuries or physical disability  You may need tests to check for the following:  · Diabetes     · High blood pressure or high cholesterol     · Heart disease     · Gallbladder or liver disease     · Cancer of the colon, breast, prostate, liver, or kidney     · Sleep apnea     · Arthritis or gout  Seek care immediately if:   · You have a severe headache, confusion, or difficulty speaking  · You have weakness on one side of your body  · You have chest pain, sweating, or shortness of breath  Contact your healthcare provider if:   · You have symptoms of gallbladder or liver disease, such as pain in your upper abdomen  · You have knee or hip pain and discomfort while walking  · You have symptoms of diabetes, such as intense hunger and thirst, and frequent urination  · You have symptoms of sleep apnea, such as snoring or daytime sleepiness  · You have questions or concerns about your condition or care  Treatment for obesity  focuses on helping you lose weight to improve your health  Even a small decrease in BMI can reduce the risk for many health problems  Your healthcare provider will help you set a weight-loss goal   · Lifestyle changes  are the first step in treating obesity  These include making healthy food choices and getting regular physical activity  Your healthcare provider may suggest a weight-loss program that involves coaching, education, and therapy  · Medicine  may help you lose weight when it is used with a healthy diet and physical activity  · Surgery  can help you lose weight if you are very obese and have other health problems  There are several types of weight-loss surgery  Ask your healthcare provider for more information    Be successful losing weight:   · Set small, realistic goals  An example of a small goal is to walk for 20 minutes 5 days a week  Anther goal is to lose 5% of your body weight  · Tell friends, family members, and coworkers about your goals  and ask for their support  Ask a friend to lose weight with you, or join a weight-loss support group  · Identify foods or triggers that may cause you to overeat , and find ways to avoid them  Remove tempting high-calorie foods from your home and workplace  Place a bowl of fresh fruit on your kitchen counter  If stress causes you to eat, then find other ways to cope with stress  · Keep a diary to track what you eat and drink  Also write down how many minutes of physical activity you do each day  Weigh yourself once a week and record it in your diary  Eating changes: You will need to eat 500 to 1,000 fewer calories each day than you currently eat to lose 1 to 2 pounds a week  The following changes will help you cut calories:  · Eat smaller portions  Use small plates, no larger than 9 inches in diameter  Fill your plate half full of fruits and vegetables  Measure your food using measuring cups until you know what a serving size looks like  · Eat 3 meals and 1 or 2 snacks each day  Plan your meals in advance  [place] and eat at home most of the time  Eat slowly  · Eat fruits and vegetables at every meal   They are low in calories and high in fiber, which makes you feel full  Do not add butter, margarine, or cream sauce to vegetables  Use herbs to season steamed vegetables  · Eat less fat and fewer fried foods  Eat more baked or grilled chicken and fish  These protein sources are lower in calories and fat than red meat  Limit fast food  Dress your salads with olive oil and vinegar instead of bottled dressing  · Limit the amount of sugar you eat  Do not drink sugary beverages  Limit alcohol  Activity changes:  Physical activity is good for your body in many ways   It helps you burn calories and build strong muscles  It decreases stress and depression, and improves your mood  It can also help you sleep better  Talk to your healthcare provider before you begin an exercise program   · Exercise for at least 30 minutes 5 days a week  Start slowly  Set aside time each day for physical activity that you enjoy and that is convenient for you  It is best to do both weight training and an activity that increases your heart rate, such as walking, bicycling, or swimming  · Find ways to be more active  Do yard work and housecleaning  Walk up the stairs instead of using elevators  Spend your leisure time going to events that require walking, such as outdoor festivals or fairs  This extra physical activity can help you lose weight and keep it off  Follow up with your healthcare provider as directed: You may need to meet with a dietitian  Write down your questions so you remember to ask them during your visits  © 2017 [employer] Information is for End User's use only and may not be sold, redistributed or otherwise used for commercial purposes  All illustrations and images included in CareNotes® are the copyrighted property of A D A M [employer] Inc  or [employer]  The above information is an  only  It is not intended as medical advice for individual conditions or treatments  Talk to your doctor, nurse or pharmacist before following any medical regimen to see if it is safe and effective for you

## 2019-01-10 NOTE — ASSESSMENT & PLAN NOTE
Lab Results   Component Value Date    HGBA1C 11 9 (A) 01/09/2019       No results for input(s): POCGLU in the last 72 hours  Blood Sugar Average: Last 72 hrs:   diabetic control now extremely poor at 11 9  I explained to the patient that at this level the normal course of action would be to treat with insulin  She adamantly refuses insulin  I explained the importance of treating her diabetes and at the very least we will start her on metformin and titrate her to 2000 mg per day  I also explained that I did not believe that this would be sufficient to get her to her goal that it would also require strict attention to diet and regular activity / exercise  I also feel is likely that she will need more medication

## 2019-04-10 ENCOUNTER — OFFICE VISIT (OUTPATIENT)
Dept: FAMILY MEDICINE CLINIC | Facility: CLINIC | Age: 59
End: 2019-04-10
Payer: COMMERCIAL

## 2019-04-10 VITALS
HEART RATE: 73 BPM | WEIGHT: 293 LBS | OXYGEN SATURATION: 98 % | RESPIRATION RATE: 17 BRPM | BODY MASS INDEX: 47.09 KG/M2 | TEMPERATURE: 97.8 F | HEIGHT: 66 IN | DIASTOLIC BLOOD PRESSURE: 98 MMHG | SYSTOLIC BLOOD PRESSURE: 144 MMHG

## 2019-04-10 DIAGNOSIS — I10 BENIGN ESSENTIAL HYPERTENSION: ICD-10-CM

## 2019-04-10 DIAGNOSIS — E78.2 MIXED HYPERLIPIDEMIA: ICD-10-CM

## 2019-04-10 DIAGNOSIS — Z12.39 SCREENING FOR BREAST CANCER: Primary | ICD-10-CM

## 2019-04-10 DIAGNOSIS — E11.9 TYPE 2 DIABETES MELLITUS WITHOUT COMPLICATION, WITHOUT LONG-TERM CURRENT USE OF INSULIN (HCC): ICD-10-CM

## 2019-04-10 DIAGNOSIS — E66.01 MORBID OBESITY (HCC): ICD-10-CM

## 2019-04-10 PROCEDURE — 3008F BODY MASS INDEX DOCD: CPT | Performed by: FAMILY MEDICINE

## 2019-04-10 PROCEDURE — 99214 OFFICE O/P EST MOD 30 MIN: CPT | Performed by: FAMILY MEDICINE

## 2019-04-10 PROCEDURE — 4010F ACE/ARB THERAPY RXD/TAKEN: CPT | Performed by: FAMILY MEDICINE

## 2019-04-10 PROCEDURE — 1036F TOBACCO NON-USER: CPT | Performed by: FAMILY MEDICINE

## 2019-04-10 RX ORDER — LISINOPRIL 10 MG/1
10 TABLET ORAL DAILY
Qty: 90 TABLET | Refills: 1 | Status: SHIPPED | OUTPATIENT
Start: 2019-04-10 | End: 2019-09-06 | Stop reason: SDUPTHER

## 2019-07-17 ENCOUNTER — TELEPHONE (OUTPATIENT)
Dept: FAMILY MEDICINE CLINIC | Facility: CLINIC | Age: 59
End: 2019-07-17

## 2019-07-17 DIAGNOSIS — Z11.59 ENCOUNTER FOR HEPATITIS C SCREENING TEST FOR LOW RISK PATIENT: Primary | ICD-10-CM

## 2019-09-06 ENCOUNTER — TELEPHONE (OUTPATIENT)
Dept: FAMILY MEDICINE CLINIC | Facility: CLINIC | Age: 59
End: 2019-09-06

## 2019-09-06 ENCOUNTER — APPOINTMENT (OUTPATIENT)
Dept: LAB | Facility: CLINIC | Age: 59
End: 2019-09-06
Payer: COMMERCIAL

## 2019-09-06 ENCOUNTER — OFFICE VISIT (OUTPATIENT)
Dept: FAMILY MEDICINE CLINIC | Facility: CLINIC | Age: 59
End: 2019-09-06
Payer: COMMERCIAL

## 2019-09-06 VITALS
BODY MASS INDEX: 47.02 KG/M2 | DIASTOLIC BLOOD PRESSURE: 84 MMHG | SYSTOLIC BLOOD PRESSURE: 126 MMHG | HEART RATE: 90 BPM | OXYGEN SATURATION: 98 % | WEIGHT: 292.6 LBS | HEIGHT: 66 IN | TEMPERATURE: 97.8 F

## 2019-09-06 DIAGNOSIS — Z11.59 ENCOUNTER FOR HEPATITIS C SCREENING TEST FOR LOW RISK PATIENT: ICD-10-CM

## 2019-09-06 DIAGNOSIS — I10 BENIGN ESSENTIAL HYPERTENSION: ICD-10-CM

## 2019-09-06 DIAGNOSIS — E78.2 MIXED HYPERLIPIDEMIA: ICD-10-CM

## 2019-09-06 DIAGNOSIS — R59.9 ENLARGED LYMPH NODE: ICD-10-CM

## 2019-09-06 DIAGNOSIS — E11.9 TYPE 2 DIABETES MELLITUS WITHOUT COMPLICATION, WITHOUT LONG-TERM CURRENT USE OF INSULIN (HCC): ICD-10-CM

## 2019-09-06 DIAGNOSIS — E11.65 UNCONTROLLED TYPE 2 DIABETES MELLITUS WITH HYPERGLYCEMIA (HCC): Primary | ICD-10-CM

## 2019-09-06 DIAGNOSIS — E11.65 TYPE 2 DIABETES MELLITUS WITH HYPERGLYCEMIA, UNSPECIFIED WHETHER LONG TERM INSULIN USE (HCC): ICD-10-CM

## 2019-09-06 LAB
ALBUMIN SERPL BCP-MCNC: 4 G/DL (ref 3.5–5)
ALP SERPL-CCNC: 117 U/L (ref 46–116)
ALT SERPL W P-5'-P-CCNC: 23 U/L (ref 12–78)
ANION GAP SERPL CALCULATED.3IONS-SCNC: 8 MMOL/L (ref 4–13)
AST SERPL W P-5'-P-CCNC: 13 U/L (ref 5–45)
BILIRUB SERPL-MCNC: 1.33 MG/DL (ref 0.2–1)
BUN SERPL-MCNC: 12 MG/DL (ref 5–25)
CALCIUM ALBUM COR SERPL-MCNC: 11.3 MG/DL (ref 8.3–10.1)
CALCIUM SERPL-MCNC: 11.3 MG/DL (ref 8.3–10.1)
CHLORIDE SERPL-SCNC: 103 MMOL/L (ref 100–108)
CHOLEST SERPL-MCNC: 228 MG/DL (ref 50–200)
CO2 SERPL-SCNC: 25 MMOL/L (ref 21–32)
CREAT SERPL-MCNC: 0.73 MG/DL (ref 0.6–1.3)
CREAT UR-MCNC: 105 MG/DL
EST. AVERAGE GLUCOSE BLD GHB EST-MCNC: 295 MG/DL
GFR SERPL CREATININE-BSD FRML MDRD: 91 ML/MIN/1.73SQ M
GLUCOSE P FAST SERPL-MCNC: 310 MG/DL (ref 65–99)
HBA1C MFR BLD: 11.9 % (ref 4.2–6.3)
HCV AB SER QL: NORMAL
HDLC SERPL-MCNC: 48 MG/DL (ref 40–60)
LDLC SERPL CALC-MCNC: 141 MG/DL (ref 0–100)
MICROALBUMIN UR-MCNC: 732 MG/L (ref 0–20)
MICROALBUMIN/CREAT 24H UR: 697 MG/G CREATININE (ref 0–30)
POTASSIUM SERPL-SCNC: 4.3 MMOL/L (ref 3.5–5.3)
PROT SERPL-MCNC: 7.9 G/DL (ref 6.4–8.2)
SL AMB POCT HEMOGLOBIN AIC: 13.1 (ref ?–6.5)
SODIUM SERPL-SCNC: 136 MMOL/L (ref 136–145)
TRIGL SERPL-MCNC: 194 MG/DL
TSH SERPL DL<=0.05 MIU/L-ACNC: 1.19 UIU/ML (ref 0.36–3.74)

## 2019-09-06 PROCEDURE — 36415 COLL VENOUS BLD VENIPUNCTURE: CPT

## 2019-09-06 PROCEDURE — 80053 COMPREHEN METABOLIC PANEL: CPT

## 2019-09-06 PROCEDURE — 3074F SYST BP LT 130 MM HG: CPT | Performed by: NURSE PRACTITIONER

## 2019-09-06 PROCEDURE — 84443 ASSAY THYROID STIM HORMONE: CPT

## 2019-09-06 PROCEDURE — 80061 LIPID PANEL: CPT

## 2019-09-06 PROCEDURE — 86803 HEPATITIS C AB TEST: CPT

## 2019-09-06 PROCEDURE — 83036 HEMOGLOBIN GLYCOSYLATED A1C: CPT | Performed by: NURSE PRACTITIONER

## 2019-09-06 PROCEDURE — 99214 OFFICE O/P EST MOD 30 MIN: CPT | Performed by: NURSE PRACTITIONER

## 2019-09-06 PROCEDURE — 82043 UR ALBUMIN QUANTITATIVE: CPT | Performed by: NURSE PRACTITIONER

## 2019-09-06 PROCEDURE — 83036 HEMOGLOBIN GLYCOSYLATED A1C: CPT

## 2019-09-06 PROCEDURE — 3079F DIAST BP 80-89 MM HG: CPT | Performed by: NURSE PRACTITIONER

## 2019-09-06 PROCEDURE — 82570 ASSAY OF URINE CREATININE: CPT | Performed by: NURSE PRACTITIONER

## 2019-09-06 RX ORDER — LISINOPRIL 10 MG/1
10 TABLET ORAL DAILY
Qty: 90 TABLET | Refills: 1 | Status: ON HOLD | OUTPATIENT
Start: 2019-09-06 | End: 2020-03-15

## 2019-09-06 NOTE — PROGRESS NOTES
[facility]    ASSESSMENT AND PLAN     1  Uncontrolled type 2 diabetes mellitus with hyperglycemia [facility])  Patient presents today for follow-up  Last visit 04/10/19  Hemoglobin A1c noted 11 9 at that time  Patient admits to not taking any diabetic medications for several months  Adamantly refusing to take any, and or even discuss medication options  States she felt like crap while on them previously  In office hemoglobin A1c obtained today: 13 1  Had a long conversation with patient today regarding the significant complications she is at risk for secondary to her uncontrolled diabetes, especially at the extent of hers at this point  Reviewed in detail the pathology of her disease  Patient is an LPN, and continually kept stating I know about diabetes"  She was extremely resistant to any Education that was attempted to be relayed to her today  She does state that she will get the remainder of her lab work done today, immediately after the visit  But also stated that she "don't want any bad news"  Stated she knows her calcium will be off the charts"  Offered, and encouraged, referral to both endocrinology and diabetic nutritional Education  Patient adamantly refused both  Unsure of how to help this patient at this point, with her refusal of any treatment options/suggestions attempted today  Will contact patient once lab work is resulted and attempt to discuss treatment again      - Microalbumin / creatinine urine ratio    NOTE:  Up-to-date with vision exam - last exam 03/23/2018 at [facility]   They stated negative for retinopathy when MA called for records  Awaiting the records at this time to verify  2  Type 2 diabetes mellitus with hyperglycemia, unspecified whether long term insulin use (HCC)    - POCT hemoglobin A1c    3  Benign essential hypertension  BP appears fairly well controlled on lisinopril 10 mg daily  Will continue same for both BP/kidney protection      - lisinopril (ZESTRIL) 10 mg tablet; Take 1 tablet (10 mg total) by mouth daily for 90 days  Dispense: 90 tablet; Refill: 1    4  Enlarged lymph node  Mildly enlarged cervical node palpated  Patient believes that it has been there for several months  Recommend ultrasound at this time to further investigate  Patient states she is agreeable, but unsure if she will follow through, as she appears to be very noncompliant with healthcare treatments  - US head neck soft tissue; Future    5  BMI 45 0-49 9, adult [facility])  Reviewed her BMI today  Attempted to review diet and exercise  Recommend patient follow up with weight management  Patient declines at this time            SUBJECTIVE       Patient ID: Raúl Soto is a 63 y o  female  Chief Complaint   Patient presents with    Follow-up      4 month follow up chronic conditions, diabetes       HISTORY OF PRESENT ILLNESS    Patient presents today for a follow-up to her diabetes and high blood pressure  States she has not been taking any of her diabetic medications  States that they [facility] me feel like crap  She has not taken them for several months  She is taking her lisinopril daily  Denies any chest pain/pressure, headaches  She denies any healthcare concerns today  States she feels okay  She is very difficult to obtain a history from  The following portions of the patient's history were reviewed and updated as appropriate: allergies, current medications, past family history, past medical history, past social history, past surgical history and problem list     REVIEW OF SYSTEMS  Review of Systems   Constitutional: Negative  HENT: Negative  Respiratory: Negative  Cardiovascular: Negative  Neurological: Negative  Psychiatric/Behavioral: Negative          OBJECTIVE      VITAL SIGNS  /84 (BP Location: Left arm, Patient Position: Sitting, Cuff Size: Adult)   Pulse 90   Temp 97 8 °F (36 6 °C)   Ht 5' 6" (1 676 m)   Wt 133 kg (292 lb 9 6 oz)   SpO2 98%   BMI 47 23 kg/m²     CURRENT MEDICATIONS    Current Outpatient Medications:     lisinopril (ZESTRIL) 10 mg tablet, Take 1 tablet (10 mg total) by mouth daily for 90 days, Disp: 90 tablet, Rfl: 1    Cinnamon 500 MG TABS, Take 1,000 mg by mouth 2 (two) times a day , Disp: , Rfl:       PHYSICAL EXAMINATION   Physical Exam   Constitutional: She is oriented to person, place, and time  Vital signs are normal  She appears well-developed and well-nourished  HENT:   Head: Normocephalic  Right Ear: Hearing, tympanic membrane, external ear and ear canal normal    Left Ear: Hearing, tympanic membrane, external ear and ear canal normal    Nose: Nose normal    Neck: Carotid bruit is not present  Cardiovascular: Normal rate and regular rhythm  Pulses are no weak pulses  Negative for lower extremity edema   Pulmonary/Chest: Effort normal and breath sounds normal  No respiratory distress  Musculoskeletal: Normal range of motion  Feet:    Feet:   Right Foot:   Protective Sensation: 10 sites tested  9 sites sensed  Skin Integrity: Negative for ulcer, skin breakdown, erythema, warmth, callus or dry skin  Left Foot:   Protective Sensation: 10 sites tested  8 sites sensed  Skin Integrity: Negative for ulcer, skin breakdown, erythema, warmth, callus or dry skin  Lymphadenopathy:        Head (right side): No submental and no submandibular adenopathy present  Head (left side): No submental and no submandibular adenopathy present  She has cervical adenopathy  Right cervical: Posterior cervical adenopathy present  Neurological: She is alert and oriented to person, place, and time  Skin: Skin is warm, dry and intact  Psychiatric: She has a normal mood and affect  Judgment and thought content normal    Argumentative when attempting to discuss pathology of her disease process   Nursing note and vitals reviewed  Patient's shoes and socks removed  Right Foot/Ankle Right Foot Inspection  Skin Exam: skin normal and skin intact no dry skin, no warmth, no callus, no erythema, no maceration, no abnormal color, no pre-ulcer, no ulcer and no callus                                  Left Foot/Ankle  Left Foot Inspection  Skin Exam: skin normal and skin intactno dry skin, no warmth, no erythema, no maceration, normal color, no pre-ulcer, no ulcer and no callus                                             Assign Risk Category:  No deformity present; No loss of protective sensation;  No weak pulses       Risk: 0

## 2019-09-06 NOTE — TELEPHONE ENCOUNTER
Spoke to Zbigniew Duran at [facility], last eye exam was 3/23/18, and did not show diabetic retinopathy  Requested copy of note to be sent to Dr Omari Rodriguez

## 2019-09-09 ENCOUNTER — TELEPHONE (OUTPATIENT)
Dept: FAMILY MEDICINE CLINIC | Facility: CLINIC | Age: 59
End: 2019-09-09

## 2019-09-09 DIAGNOSIS — E11.65 TYPE 2 DIABETES MELLITUS WITH HYPERGLYCEMIA, WITHOUT LONG-TERM CURRENT USE OF INSULIN (HCC): Primary | Chronic | ICD-10-CM

## 2019-09-09 DIAGNOSIS — E78.2 MIXED HYPERLIPIDEMIA: ICD-10-CM

## 2019-09-09 RX ORDER — METFORMIN HYDROCHLORIDE 500 MG/1
1000 TABLET, EXTENDED RELEASE ORAL 2 TIMES DAILY WITH MEALS
Qty: 360 TABLET | Refills: 0 | Status: SHIPPED | OUTPATIENT
Start: 2019-09-09 | End: 2020-05-12 | Stop reason: SDUPTHER

## 2019-09-09 RX ORDER — ATORVASTATIN CALCIUM 40 MG/1
40 TABLET, FILM COATED ORAL
Qty: 90 TABLET | Refills: 0 | Status: SHIPPED | OUTPATIENT
Start: 2019-09-09 | End: 2019-09-27 | Stop reason: ALTCHOICE

## 2019-09-09 NOTE — TELEPHONE ENCOUNTER
Spoke with patient via phone regarding her recent lab work results  Patient tearful and now verbalizing that she wants to fix this  Will start Glucophage XR 1000 BID and atorvastatin 40 in the evening  She was advised to check a fasting blood sugar and then again with lunch and dinner  She is to keep a log these readings and her dietary intake  Additionally recommend referral to diabetic nutritionist   Patient states she will check with her insurance and see if it is covered  If it is covered she will follow through  She states however that she will not go if it is not covered by her insurance  I would like to see her back in 1 month for recheck  Patient states she is agreeable to this plan as stated above

## 2019-09-27 ENCOUNTER — OFFICE VISIT (OUTPATIENT)
Dept: FAMILY MEDICINE CLINIC | Facility: CLINIC | Age: 59
End: 2019-09-27
Payer: COMMERCIAL

## 2019-09-27 VITALS
HEART RATE: 104 BPM | SYSTOLIC BLOOD PRESSURE: 138 MMHG | WEIGHT: 290.2 LBS | DIASTOLIC BLOOD PRESSURE: 96 MMHG | OXYGEN SATURATION: 95 % | BODY MASS INDEX: 46.64 KG/M2 | HEIGHT: 66 IN | TEMPERATURE: 97.9 F

## 2019-09-27 DIAGNOSIS — E78.2 MIXED HYPERLIPIDEMIA: ICD-10-CM

## 2019-09-27 DIAGNOSIS — E11.65 TYPE 2 DIABETES MELLITUS WITH HYPERGLYCEMIA, WITHOUT LONG-TERM CURRENT USE OF INSULIN (HCC): Primary | Chronic | ICD-10-CM

## 2019-09-27 DIAGNOSIS — Z23 NEED FOR INFLUENZA VACCINATION: ICD-10-CM

## 2019-09-27 DIAGNOSIS — E66.01 MORBID OBESITY (HCC): ICD-10-CM

## 2019-09-27 PROCEDURE — 90471 IMMUNIZATION ADMIN: CPT | Performed by: NURSE PRACTITIONER

## 2019-09-27 PROCEDURE — 99214 OFFICE O/P EST MOD 30 MIN: CPT | Performed by: NURSE PRACTITIONER

## 2019-09-27 PROCEDURE — 90682 RIV4 VACC RECOMBINANT DNA IM: CPT | Performed by: NURSE PRACTITIONER

## 2019-09-27 PROCEDURE — 3008F BODY MASS INDEX DOCD: CPT | Performed by: NURSE PRACTITIONER

## 2019-09-27 RX ORDER — ROSUVASTATIN CALCIUM 10 MG/1
10 TABLET, COATED ORAL DAILY
Qty: 90 TABLET | Refills: 0 | Status: SHIPPED | OUTPATIENT
Start: 2019-09-27 | End: 2019-11-24 | Stop reason: ALTCHOICE

## 2019-09-27 NOTE — PROGRESS NOTES
[facility]    ASSESSMENT AND PLAN     1  Type 2 diabetes mellitus with hyperglycemia, without long-term current use of insulin (HCC)  Presents for 4 week recheck  States she has been compliant with all of her medication over the last month  But  Has been inconsistent with checking her blood sugars  She did check it 4 days ago and her fasting was 138  Notes she is urinating less frequently, but her volumes have increased  Next Hemoglobin A1c and urine on/around 12/6  Likely need to add second agent for tighter control  Diet and exercise reviewed today  Will discuss diabetic nutrition/education consult with dietician at next visit  Also due for vision and foot exam  Pt very resistant to treatment and I need to approach plan in small steps  2  Morbid obesity ([address] )  Diet and exercise reviewed  Information on diabetic dietary choices given today    3  Need for influenza vaccination  Administered today    - influenza vaccine, [id number], quadrivalent, recombinant, PF, 0 5 mL, for patients 18 yr+ (FLUBLOK)    4  Mixed hyperlipidemia  States she feels  Intermittently dizzy and is attributing it to atorvastatin  Discussed checking her blood sugars more routinely to ensure that the dizziness is not related to either hyper or hypoglycemia     Patient resistant to checking her blood sugar more frequently than every few days  Will DC atorvastatin and start on Crestor 10 mg  Last lipid panel 09/06/2019  Total cholesterol:  228, triglycerides 194  Recheck in 6 months  Instructed to return to office for evaluation if her dizzy symptoms persist despite medication changes  ER precautions reviewed      - rosuvastatin (CRESTOR) 10 MG tablet; Take 1 tablet (10 mg total) by mouth daily  Dispense: 90 tablet; Refill: 0            SUBJECTIVE       Patient ID: Eve Richardson is a 63 y o  female      Chief Complaint   Patient presents with    Follow-up     diabetes       HISTORY OF PRESENT ILLNESS    Presents today for one month check after starting Metformin and Atorvastatin  Has not been checking blood sugars regularly  Last checked 4 days ago  FBS was 138  States she feels dizzy at times  Random with no triggers  She feels it is the Atrovastatin  The following portions of the patient's history were reviewed and updated as appropriate: allergies, current medications, past family history, past medical history, past social history, past surgical history and problem list     REVIEW OF SYSTEMS  Review of Systems   Constitutional: Negative  Respiratory: Negative  Cardiovascular: Negative  Gastrointestinal: Negative  Genitourinary: Positive for frequency  Negative for difficulty urinating and urgency  Neurological: Positive for dizziness  Negative for weakness and headaches  Psychiatric/Behavioral: Negative  OBJECTIVE      VITAL SIGNS  /96 (BP Location: Right arm, Patient Position: Sitting, Cuff Size: Large)   Pulse 104   Temp 97 9 °F (36 6 °C)   Ht 5' 6" (1 676 m)   Wt 132 kg (290 lb 3 2 oz)   SpO2 95%   BMI 46 84 kg/m²     CURRENT MEDICATIONS    Current Outpatient Medications:     lisinopril (ZESTRIL) 10 mg tablet, Take 1 tablet (10 mg total) by mouth daily for 90 days, Disp: 90 tablet, Rfl: 1    metFORMIN (GLUCOPHAGE-XR) 500 mg 24 hr tablet, Take 2 tablets (1,000 mg total) by mouth 2 (two) times a day with meals, Disp: 360 tablet, Rfl: 0    Cinnamon 500 MG TABS, Take 1,000 mg by mouth 2 (two) times a day , Disp: , Rfl:     rosuvastatin (CRESTOR) 10 MG tablet, Take 1 tablet (10 mg total) by mouth daily, Disp: 90 tablet, Rfl: 0      PHYSICAL EXAMINATION   Physical Exam   Constitutional: She is oriented to person, place, and time  Vital signs are normal  She appears well-developed and well-nourished  HENT:   Head: Normocephalic  Eyes: Pupils are equal, round, and reactive to light  EOM are normal    Cardiovascular: Normal rate and regular rhythm     Pulmonary/Chest: Effort normal and breath sounds normal  No respiratory distress  Abdominal: Soft  Musculoskeletal: Normal range of motion  Neurological: She is alert and oriented to person, place, and time  She has normal strength  No cranial nerve deficit or sensory deficit  Skin: Skin is warm, dry and intact  Psychiatric: She has a normal mood and affect  Nursing note and vitals reviewed

## 2019-09-28 ENCOUNTER — OFFICE VISIT (OUTPATIENT)
Dept: FAMILY MEDICINE CLINIC | Facility: CLINIC | Age: 59
End: 2019-09-28
Payer: COMMERCIAL

## 2019-09-28 DIAGNOSIS — Z11.1 SCREENING FOR TUBERCULOSIS: Primary | ICD-10-CM

## 2019-09-28 PROCEDURE — 86580 TB INTRADERMAL TEST: CPT | Performed by: FAMILY MEDICINE

## 2019-10-01 ENCOUNTER — CLINICAL SUPPORT (OUTPATIENT)
Dept: FAMILY MEDICINE CLINIC | Facility: CLINIC | Age: 59
End: 2019-10-01

## 2019-10-01 DIAGNOSIS — Z11.1 ENCOUNTER FOR PPD SKIN TEST READING: Primary | ICD-10-CM

## 2019-10-01 LAB
INDURATION: 0 MM
TB SKIN TEST: NEGATIVE

## 2019-10-08 ENCOUNTER — TRANSCRIBE ORDERS (OUTPATIENT)
Dept: ADMINISTRATIVE | Facility: HOSPITAL | Age: 59
End: 2019-10-08

## 2019-10-08 DIAGNOSIS — Z12.31 VISIT FOR SCREENING MAMMOGRAM: Primary | ICD-10-CM

## 2019-11-07 ENCOUNTER — TELEPHONE (OUTPATIENT)
Dept: FAMILY MEDICINE CLINIC | Facility: CLINIC | Age: 59
End: 2019-11-07

## 2019-11-19 ENCOUNTER — OFFICE VISIT (OUTPATIENT)
Dept: FAMILY MEDICINE CLINIC | Facility: CLINIC | Age: 59
End: 2019-11-19

## 2019-11-19 VITALS
BODY MASS INDEX: 46.38 KG/M2 | HEART RATE: 85 BPM | WEIGHT: 288.6 LBS | SYSTOLIC BLOOD PRESSURE: 170 MMHG | HEIGHT: 66 IN | OXYGEN SATURATION: 98 % | TEMPERATURE: 97.4 F | DIASTOLIC BLOOD PRESSURE: 90 MMHG

## 2019-11-19 DIAGNOSIS — I10 BENIGN ESSENTIAL HYPERTENSION: ICD-10-CM

## 2019-11-19 DIAGNOSIS — E11.65 UNCONTROLLED TYPE 2 DIABETES MELLITUS WITH HYPERGLYCEMIA (HCC): Primary | ICD-10-CM

## 2019-11-19 DIAGNOSIS — E78.5 HYPERLIPIDEMIA, UNSPECIFIED HYPERLIPIDEMIA TYPE: ICD-10-CM

## 2019-11-19 PROCEDURE — 99213 OFFICE O/P EST LOW 20 MIN: CPT | Performed by: NURSE PRACTITIONER

## 2019-11-24 RX ORDER — SIMVASTATIN 20 MG
20 TABLET ORAL
Qty: 30 TABLET | Refills: 0 | Status: SHIPPED | OUTPATIENT
Start: 2019-11-24 | End: 2020-03-09 | Stop reason: HOSPADM

## 2019-11-24 RX ORDER — HYDROCHLOROTHIAZIDE 25 MG/1
25 TABLET ORAL DAILY
Qty: 30 TABLET | Refills: 0 | Status: SHIPPED | OUTPATIENT
Start: 2019-11-24 | End: 2020-03-09 | Stop reason: ALTCHOICE

## 2019-11-25 NOTE — PROGRESS NOTES
[facility]    ASSESSMENT AND PLAN     1  Uncontrolled type 2 diabetes mellitus with hyperglycemia ([address] )  Presents today for a follow-up  States she has been compliant on her metformin 1000 mg twice daily  Last hemoglobin A1c 9/6: 11 9  Recheck hemoglobin A1c in the office in 2 weeks  Note patient has lost her insurance and is now a self pay  She is to schedule a nurse visit for this testing  Will likely need to add another medication      2  Benign essential hypertension  BP not well controlled today on lisinopril 10 mg daily  170/90 on rooming  162/86 repeat  Recommend adding hydrochlorothiazide to current regime  Patient agreeable to take as long as it is cheap  Encourage home blood pressure monitoring  Bring those readings with her and her machine to her visit in 2 weeks when she will be getting her hemoglobin A1c retested  Will likely need to increase lisinopril at that time as well  - hydrochlorothiazide (HYDRODIURIL) 25 mg tablet; Take 1 tablet (25 mg total) by mouth daily  Dispense: 30 tablet; Refill: 0    3  Hyperlipidemia, unspecified hyperlipidemia type  Patient states she cannot afford the Crestor 10 mg tablets  States it was over 100 dollars  States was intolerant to atorvastatin as she said it made her feel dizzy  Will trial simvastatin low-dose 20 mg  Last lipid panel in September:  Total cholesterol 228, trigs 194  HDL 48,   Reassess in 4 months  - simvastatin (ZOCOR) 20 mg tablet; Take 1 tablet (20 mg total) by mouth daily at bedtime  Dispense: 30 tablet; Refill: 0            SUBJECTIVE       Patient ID: Tone Castro is a 63 y o  female  Chief Complaint   Patient presents with    Follow-up     Pt is here for a 2 month follow up to diabetes  Pt has no new complains at this moment  HISTORY OF PRESENT ILLNESS    Patient presents today for a follow-up to discuss her chronic conditions    States she has been compliant with her metformin 1000 mg twice daily   She says she has been checking her blood sugar at home periodically  She checks it may be every other day  Notes that it runs anywhere from   Sometimes it is up to 160 if she checks it in the evening  Has not been checking her blood pressure at home  States she is not taking her Crestor secondary to cost   She went to renew it and states it was over 100 dollars  She has lost her insurance and is now a self pay        The following portions of the patient's history were reviewed and updated as appropriate: allergies, current medications, past family history, past medical history, past social history, past surgical history and problem list     REVIEW OF SYSTEMS  Review of Systems   Constitutional: Negative  HENT: Negative  Respiratory: Negative  Cardiovascular: Negative  Gastrointestinal: Negative  Genitourinary: Negative  Neurological: Negative  Psychiatric/Behavioral: Negative  OBJECTIVE      VITAL SIGNS  /90 (BP Location: Left arm, Patient Position: Sitting, Cuff Size: Adult)   Pulse 85   Temp (!) 97 4 °F (36 3 °C) (Tympanic)   Ht 5' 6" (1 676 m)   Wt 131 kg (288 lb 9 6 oz)   LMP  (LMP Unknown)   SpO2 98%   Breastfeeding?  No   BMI 46 58 kg/m²     CURRENT MEDICATIONS    Current Outpatient Medications:     lisinopril (ZESTRIL) 10 mg tablet, Take 1 tablet (10 mg total) by mouth daily for 90 days, Disp: 90 tablet, Rfl: 1    metFORMIN (GLUCOPHAGE-XR) 500 mg 24 hr tablet, Take 2 tablets (1,000 mg total) by mouth 2 (two) times a day with meals, Disp: 360 tablet, Rfl: 0    Cinnamon 500 MG TABS, Take 1,000 mg by mouth 2 (two) times a day , Disp: , Rfl:     hydrochlorothiazide (HYDRODIURIL) 25 mg tablet, Take 1 tablet (25 mg total) by mouth daily, Disp: 30 tablet, Rfl: 0    simvastatin (ZOCOR) 20 mg tablet, Take 1 tablet (20 mg total) by mouth daily at bedtime, Disp: 30 tablet, Rfl: 0      PHYSICAL EXAMINATION   Physical Exam   Constitutional: She is oriented to person, place, and time  Vital signs are normal  She appears well-developed and well-nourished  HENT:   Right Ear: Tympanic membrane and ear canal normal    Left Ear: Tympanic membrane and ear canal normal    Neck: Carotid bruit is not present  Cardiovascular: Normal rate and regular rhythm  Pulmonary/Chest: Effort normal and breath sounds normal  No respiratory distress  Lymphadenopathy:        Head (right side): No submandibular and no tonsillar adenopathy present  Head (left side): No submandibular and no tonsillar adenopathy present  She has no cervical adenopathy  Neurological: She is alert and oriented to person, place, and time  Skin: Skin is warm, dry and intact  Psychiatric: She has a normal mood and affect  Thought content normal    Nursing note and vitals reviewed

## 2019-12-17 ENCOUNTER — OFFICE VISIT (OUTPATIENT)
Dept: FAMILY MEDICINE CLINIC | Facility: CLINIC | Age: 59
End: 2019-12-17

## 2019-12-17 ENCOUNTER — TELEPHONE (OUTPATIENT)
Dept: FAMILY MEDICINE CLINIC | Facility: CLINIC | Age: 59
End: 2019-12-17

## 2019-12-17 VITALS
HEIGHT: 66 IN | OXYGEN SATURATION: 99 % | RESPIRATION RATE: 17 BRPM | WEIGHT: 288.4 LBS | DIASTOLIC BLOOD PRESSURE: 100 MMHG | HEART RATE: 80 BPM | TEMPERATURE: 97.2 F | SYSTOLIC BLOOD PRESSURE: 160 MMHG | BODY MASS INDEX: 46.35 KG/M2

## 2019-12-17 DIAGNOSIS — Z59.89 DOES NOT HAVE HEALTH INSURANCE: ICD-10-CM
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
## 2019-12-17 DIAGNOSIS — I10 BENIGN ESSENTIAL HYPERTENSION: ICD-10-CM

## 2019-12-17 PROCEDURE — 99214 OFFICE O/P EST MOD 30 MIN: CPT | Performed by: PHYSICIAN ASSISTANT

## 2019-12-17 SDOH — ECONOMIC STABILITY - INCOME SECURITY: OTHER PROBLEMS RELATED TO HOUSING AND ECONOMIC CIRCUMSTANCES: Z59.89

## 2019-12-17 NOTE — LETTER
December 19, 2019     Patient: Hector Montague   YOB: 1960   Date of Visit: 12/17/2019       To Whom it May Concern:    Hector Montague is under my professional care  She was seen in my office on 12/17/2019  She may return to work on 12/17  If you have any questions or concerns, please don't hesitate to call           Sincerely,          Tye Epps PA-C        CC: No Recipients

## 2019-12-19 NOTE — PROGRESS NOTES
Assessment/Plan:    1  Fall, initial encounter  Normal exam of knee  Patient appears to be doing well  She is cleared to go back to work  Return to care if symptoms return  Follow-up as needed  2  Benign essential hypertension  Poorly controlled but patient is not compliant with medications  Continue lisinopril and hydrochlorothiazide  Limit sodium, alcohol, and caffeine  Encouraged healthy well-balanced diet  Follow for recheck in 1 month  Sooner if needed  3  Does not have health insurance  Discussed with patient that to evaluate her syncopal episodes, we need to do further testing and labs  Patient declines at this time as she has no health insurance  States she cannot afford it  I will refer her to try to help set her up with insurance  I gave her strict ER precautions to return to the ER  Call office once we are able to order labs and imaging  Will refer patient to complex Care Management  Patient Active Problem List   Diagnosis    Hernia, ventral    Diabetes mellitus [facility] )    Pancolitis ([facility] )    GI bleed    Morbid obesity ([facility] )    Ischemic colitis ([facility] )    Unintentional weight loss    Hypercalcemia    Hyperparathyroidism ([facility] )    Vitamin D deficiency    Depression with anxiety    Benign essential hypertension     Subjective:      Patient ID: Kirsten Rothman is a 63 y o  female  Patient is here complaining of a fall at work on 12/11/19  States she was walking and slipped on ice stuck to her shoe  She landed on her right knee  She states it was painful the first few days, but now denies pain  Denies radiation  Denies prior knee injuries or issues  States it was bruised, but has faded  Denies redness  States it was swollen for a couple days, now resolved  Denies numbness, tingling, or weakness  She is able to continue her normal activities without any issues  She has been working everyday since  She also mentions that she sometimes falls for no particular reason   States she will be walking and drop to the ground  She believes she loses consciousness for seconds or minutes at a time  She is always alone when this occurs  The last time this occurred was 1-2 months ago  States it has happened at least 5 times this past year  States it occurs once a month approximately  No preceding symptoms  States she wakes up and doesn't know what happened  She denies presyncope or feeling lightheaded  Denies dizziness  States she feels off balance when she closes her eyes  States she will fall over if she doesn't brace herself  Denies visual changes  Denies chest pain, palpitations, and shortness of breath  Denies nausea, vomiting, and diarrhea  Denies abdominal pain  Denies tremors, jerking, or shaking  Denies LE edema  Denies rashes  The following portions of the patient's history were reviewed and updated as appropriate: allergies, current medications, past family history, past medical history, past social history, past surgical history and problem list     Review of Systems   Constitutional: Negative for chills, fatigue and fever  HENT: Negative for congestion, ear pain, postnasal drip, rhinorrhea, sinus pressure, sinus pain, sore throat and tinnitus  Eyes: Negative for visual disturbance  Respiratory: Negative for cough, chest tightness, shortness of breath and wheezing  Cardiovascular: Negative for chest pain, palpitations and leg swelling  Gastrointestinal: Negative for abdominal pain, constipation, diarrhea, nausea and vomiting  Musculoskeletal: Positive for arthralgias and myalgias  Skin: Negative for rash  Neurological: Positive for syncope  Negative for dizziness, tremors, seizures, facial asymmetry, speech difficulty, weakness, light-headedness, numbness and headaches  Hematological: Negative for adenopathy  Does not bruise/bleed easily           Objective:      /100 (BP Location: Left arm, Patient Position: Sitting, Cuff Size: Large)   Pulse 80   Temp (!) 97 2 °F (36 2 °C) (Tympanic)   Resp 17   Ht 5' 6" (1 676 m)   Wt 131 kg (288 lb 6 4 oz)   LMP  (LMP Unknown)   SpO2 99%   BMI 46 55 kg/m²          Physical Exam   Constitutional: She is oriented to person, place, and time  She appears well-developed and well-nourished  HENT:   Head: Normocephalic and atraumatic  Right Ear: Hearing, tympanic membrane, external ear and ear canal normal    Left Ear: Hearing, tympanic membrane, external ear and ear canal normal    Nose: Nose normal    Mouth/Throat: Uvula is midline, oropharynx is clear and moist and mucous membranes are normal    Eyes: Pupils are equal, round, and reactive to light  Conjunctivae and EOM are normal    Neck: Normal range of motion and full passive range of motion without pain  Neck supple  Cardiovascular: Normal rate, regular rhythm, S1 normal, S2 normal, normal heart sounds and intact distal pulses  Pulmonary/Chest: Effort normal and breath sounds normal    Abdominal: Soft  Normal appearance and bowel sounds are normal  She exhibits no mass  There is no hepatosplenomegaly  There is no tenderness  Musculoskeletal: Normal range of motion  Right knee: She exhibits normal range of motion, no swelling, no effusion, no ecchymosis, no deformity, no laceration, no erythema, normal alignment, no LCL laxity, normal patellar mobility, no bony tenderness, normal meniscus and no MCL laxity  No tenderness found  No medial joint line, no lateral joint line, no MCL, no LCL and no patellar tendon tenderness noted  Left knee: Normal    Crepitus of bilateral knees  Lymphadenopathy:     She has no cervical adenopathy  Neurological: She is alert and oriented to person, place, and time  She has normal strength  Skin: Skin is warm, dry and intact  No rash noted  Psychiatric: She has a normal mood and affect  Her behavior is normal  Judgment and thought content normal    Nursing note and vitals reviewed        Current Outpatient Medications on File Prior to Visit   Medication Sig Dispense Refill    Cinnamon 500 MG TABS Take 1,000 mg by mouth 2 (two) times a day   metFORMIN (GLUCOPHAGE-XR) 500 mg 24 hr tablet Take 2 tablets (1,000 mg total) by mouth 2 (two) times a day with meals 360 tablet 0    hydrochlorothiazide (HYDRODIURIL) 25 mg tablet Take 1 tablet (25 mg total) by mouth daily (Patient not taking: Reported on 12/17/2019) 30 tablet 0    lisinopril (ZESTRIL) 10 mg tablet Take 1 tablet (10 mg total) by mouth daily for 90 days 90 tablet 1    simvastatin (ZOCOR) 20 mg tablet Take 1 tablet (20 mg total) by mouth daily at bedtime (Patient not taking: Reported on 12/17/2019) 30 tablet 0     No current facility-administered medications on file prior to visit

## 2019-12-27 ENCOUNTER — PATIENT OUTREACH (OUTPATIENT)
Dept: FAMILY MEDICINE CLINIC | Facility: CLINIC | Age: 59
End: 2019-12-27

## 2019-12-27 NOTE — PROGRESS NOTES
Outpatient Care Management Note:    Voice mail message left, with my contact information, introducing myself and requesting a call back  Voice mail message left, with my contact information, introducing myself and requesting a call back  Care manager attempted to call Joanne's cell phone  She did not answer and no voice mail was available  Incoming call received from Ronda  She states that she is having ongoing balance issues and sweating at night  I noted that Abel had recommended a work up related to syncope, but Ronda declined due to no insurance  She works full time but can not afford the health insurance  CM started asking questions related to her insurance, and she said she would need to call me back, because she received another phone call  Ronda called me back  She states her  lost his job in October, and they have no insurance  Ronda, who likes to be called Shivani Hercules, works for [employer]  She checked in to health insurance through them and it was over $200/week  She states she can not afford this  CM will contact the financial counselors at [employer] and see if they can [address] [place] [address],[address] with insurance  Shivani Hercules works full time and would only be available to meet with a counselor on a Friday afternoon  We discussed her night sweats  Shivaniaudie Hercules states that Abel told her it could be related to her bed  Shivani Hercules has already been through menopause  I asked her to check her blood sugar when this occurs to be sure it is not related to her diabetes  Makenzie's A1C=11 9  I explained that this is an average daily sugar of 295  Shivani Hercules would like to attend diabetic education but is unsure she can afford the classes  I will contact them regarding cost   I gave Shivani Hercules my contact information  She will call with any questions or concerns  CM called [employer] financial counselors-    I left a voice mail message for Eliseo, with my contact information, requesting a call back  CM called [facility] diabetic education center and left a voice mail message, with my contact information requesting a call back regarding cost for classes if uninsured  Apolonia Monaco, diabetic education, called back  A one hour  One on one class is $178  The 10 hour class is broken up into 4 two hour classes at $192/2 hour class and then there is a cooking demonstration    For self pay clients-there is a 50% discount less the copay deposit of $30

## 2020-01-03 ENCOUNTER — PATIENT OUTREACH (OUTPATIENT)
Dept: FAMILY MEDICINE CLINIC | Facility: CLINIC | Age: 60
End: 2020-01-03

## 2020-01-03 NOTE — PROGRESS NOTES
Outpatient Care Management Note:    Care manager called and spoke with Amena Velazquez financial counselor  She states that open enrollment is closed for the marketplace  It runs November through December  Freddy Baeza did not know the exact dates  She states that the marketplace is for patients who do not have insurance through an employer  If she would go through the marketplace, she would be charge a penalty  Maria recommends she take her employer insurance  If she needs assistance with hospital bills she can apply for hardship through the hospital billing department  She can also be seen by [facility] which is [employer]   This would help with provider costs but not with lab or hospital bills  YESENIA called Makenzie and updated her on above  She states she can not afford her company's insurance  It is $279 per week  She is interested in finding out more information about the [address] at [facility]  Today, her  is having surgery  She can not complete the initial assessment  She agreed to [facility] calling back in one week to complete it  Bruno Palencia took my contact information and will call with any questions  YESENIA spoke with Celestia Schwab and Dr Charley Vazquez regarding possible transfer to [facility]  Dr Gelacio Ordaz noted that if transferring to [facility] gets Bruno Palencia the care she needs, that is what is important

## 2020-01-09 ENCOUNTER — HOSPITAL ENCOUNTER (EMERGENCY)
Facility: HOSPITAL | Age: 60
Discharge: HOME | End: 2020-01-10
Attending: EMERGENCY MEDICINE | Admitting: EMERGENCY MEDICINE
Payer: COMMERCIAL

## 2020-01-09 ENCOUNTER — TELEPHONE (OUTPATIENT)
Dept: FAMILY MEDICINE CLINIC | Facility: CLINIC | Age: 60
End: 2020-01-09

## 2020-01-09 ENCOUNTER — APPOINTMENT (EMERGENCY)
Dept: CT IMAGING | Facility: HOSPITAL | Age: 60
End: 2020-01-09

## 2020-01-09 ENCOUNTER — OFFICE VISIT (OUTPATIENT)
Dept: FAMILY MEDICINE CLINIC | Facility: CLINIC | Age: 60
End: 2020-01-09

## 2020-01-09 VITALS
BODY MASS INDEX: 46.61 KG/M2 | SYSTOLIC BLOOD PRESSURE: 131 MMHG | HEART RATE: 72 BPM | DIASTOLIC BLOOD PRESSURE: 60 MMHG | TEMPERATURE: 98.3 F | WEIGHT: 290 LBS | RESPIRATION RATE: 20 BRPM | OXYGEN SATURATION: 98 % | HEIGHT: 66 IN

## 2020-01-09 VITALS
WEIGHT: 289.8 LBS | DIASTOLIC BLOOD PRESSURE: 96 MMHG | OXYGEN SATURATION: 98 % | SYSTOLIC BLOOD PRESSURE: 170 MMHG | TEMPERATURE: 98.2 F | BODY MASS INDEX: 46.57 KG/M2 | HEART RATE: 80 BPM | HEIGHT: 66 IN

## 2020-01-09 DIAGNOSIS — L03.311 ABDOMINAL WALL CELLULITIS: ICD-10-CM

## 2020-01-09 DIAGNOSIS — T14.8XXA WOUND INFECTION: Primary | ICD-10-CM

## 2020-01-09 DIAGNOSIS — L08.9 WOUND INFECTION: Primary | ICD-10-CM

## 2020-01-09 DIAGNOSIS — S31.109A OPEN ABDOMINAL WALL WOUND: Primary | ICD-10-CM

## 2020-01-09 DIAGNOSIS — E11.65 UNCONTROLLED TYPE 2 DIABETES MELLITUS WITH HYPERGLYCEMIA (HCC): ICD-10-CM

## 2020-01-09 DIAGNOSIS — I10 BENIGN ESSENTIAL HYPERTENSION: ICD-10-CM

## 2020-01-09 LAB
ALBUMIN SERPL BCP-MCNC: 3.3 G/DL (ref 3.5–5)
ALP SERPL-CCNC: 120 U/L (ref 46–116)
ALT SERPL W P-5'-P-CCNC: 22 U/L (ref 12–78)
ANION GAP SERPL CALCULATED.3IONS-SCNC: 6 MMOL/L (ref 4–13)
APTT PPP: 30 SECONDS (ref 23–37)
AST SERPL W P-5'-P-CCNC: 8 U/L (ref 5–45)
BASOPHILS # BLD AUTO: 0.03 THOUSANDS/ΜL (ref 0–0.1)
BASOPHILS NFR BLD AUTO: 0 % (ref 0–1)
BILIRUB SERPL-MCNC: 0.59 MG/DL (ref 0.2–1)
BUN SERPL-MCNC: 12 MG/DL (ref 5–25)
CALCIUM SERPL-MCNC: 10.5 MG/DL (ref 8.3–10.1)
CHLORIDE SERPL-SCNC: 102 MMOL/L (ref 100–108)
CO2 SERPL-SCNC: 29 MMOL/L (ref 21–32)
CREAT SERPL-MCNC: 0.72 MG/DL (ref 0.6–1.3)
EOSINOPHIL # BLD AUTO: 0.25 THOUSAND/ΜL (ref 0–0.61)
EOSINOPHIL NFR BLD AUTO: 3 % (ref 0–6)
ERYTHROCYTE [DISTWIDTH] IN BLOOD BY AUTOMATED COUNT: 12.4 % (ref 11.6–15.1)
GFR SERPL CREATININE-BSD FRML MDRD: 93 ML/MIN/1.73SQ M
GLUCOSE SERPL-MCNC: 267 MG/DL (ref 65–140)
HCT VFR BLD AUTO: 40.3 % (ref 34.8–46.1)
HGB BLD-MCNC: 13.2 G/DL (ref 11.5–15.4)
IMM GRANULOCYTES # BLD AUTO: 0.03 THOUSAND/UL (ref 0–0.2)
IMM GRANULOCYTES NFR BLD AUTO: 0 % (ref 0–2)
INR PPP: 0.96 (ref 0.84–1.19)
LACTATE SERPL-SCNC: 1.5 MMOL/L (ref 0.5–2)
LYMPHOCYTES # BLD AUTO: 2.79 THOUSANDS/ΜL (ref 0.6–4.47)
LYMPHOCYTES NFR BLD AUTO: 28 % (ref 14–44)
MCH RBC QN AUTO: 29.1 PG (ref 26.8–34.3)
MCHC RBC AUTO-ENTMCNC: 32.8 G/DL (ref 31.4–37.4)
MCV RBC AUTO: 89 FL (ref 82–98)
MONOCYTES # BLD AUTO: 0.66 THOUSAND/ΜL (ref 0.17–1.22)
MONOCYTES NFR BLD AUTO: 7 % (ref 4–12)
NEUTROPHILS # BLD AUTO: 6.16 THOUSANDS/ΜL (ref 1.85–7.62)
NEUTS SEG NFR BLD AUTO: 62 % (ref 43–75)
NRBC BLD AUTO-RTO: 0 /100 WBCS
PLATELET # BLD AUTO: 285 THOUSANDS/UL (ref 149–390)
PMV BLD AUTO: 9.3 FL (ref 8.9–12.7)
POTASSIUM SERPL-SCNC: 4.1 MMOL/L (ref 3.5–5.3)
PROT SERPL-MCNC: 7.6 G/DL (ref 6.4–8.2)
PROTHROMBIN TIME: 12.9 SECONDS (ref 11.6–14.5)
RBC # BLD AUTO: 4.53 MILLION/UL (ref 3.81–5.12)
SODIUM SERPL-SCNC: 137 MMOL/L (ref 136–145)
WBC # BLD AUTO: 9.92 THOUSAND/UL (ref 4.31–10.16)

## 2020-01-09 PROCEDURE — 80053 COMPREHEN METABOLIC PANEL: CPT | Performed by: EMERGENCY MEDICINE

## 2020-01-09 PROCEDURE — 36415 COLL VENOUS BLD VENIPUNCTURE: CPT | Performed by: EMERGENCY MEDICINE

## 2020-01-09 PROCEDURE — 99284 EMERGENCY DEPT VISIT MOD MDM: CPT | Performed by: EMERGENCY MEDICINE

## 2020-01-09 PROCEDURE — 99283 EMERGENCY DEPT VISIT LOW MDM: CPT

## 2020-01-09 PROCEDURE — 99213 OFFICE O/P EST LOW 20 MIN: CPT | Performed by: NURSE PRACTITIONER

## 2020-01-09 PROCEDURE — 85610 PROTHROMBIN TIME: CPT | Performed by: EMERGENCY MEDICINE

## 2020-01-09 PROCEDURE — 85025 COMPLETE CBC W/AUTO DIFF WBC: CPT | Performed by: EMERGENCY MEDICINE

## 2020-01-09 PROCEDURE — 96365 THER/PROPH/DIAG IV INF INIT: CPT

## 2020-01-09 PROCEDURE — 87040 BLOOD CULTURE FOR BACTERIA: CPT | Performed by: EMERGENCY MEDICINE

## 2020-01-09 PROCEDURE — 96361 HYDRATE IV INFUSION ADD-ON: CPT

## 2020-01-09 PROCEDURE — 74177 CT ABD & PELVIS W/CONTRAST: CPT

## 2020-01-09 PROCEDURE — 85730 THROMBOPLASTIN TIME PARTIAL: CPT | Performed by: EMERGENCY MEDICINE

## 2020-01-09 PROCEDURE — 83605 ASSAY OF LACTIC ACID: CPT | Performed by: EMERGENCY MEDICINE

## 2020-01-09 RX ORDER — CEPHALEXIN 500 MG/1
500 CAPSULE ORAL 3 TIMES DAILY
Qty: 30 CAPSULE | Refills: 0 | Status: SHIPPED | OUTPATIENT
Start: 2020-01-09 | End: 2020-01-19

## 2020-01-09 RX ORDER — SULFAMETHOXAZOLE AND TRIMETHOPRIM 800; 160 MG/1; MG/1
1 TABLET ORAL 2 TIMES DAILY
Qty: 20 TABLET | Refills: 0 | Status: SHIPPED | OUTPATIENT
Start: 2020-01-09 | End: 2020-01-19

## 2020-01-09 RX ADMIN — IOHEXOL 100 ML: 350 INJECTION, SOLUTION INTRAVENOUS at 22:38

## 2020-01-09 RX ADMIN — SODIUM CHLORIDE 1000 ML: 0.9 INJECTION, SOLUTION INTRAVENOUS at 21:48

## 2020-01-09 RX ADMIN — PIPERACILLIN SODIUM AND TAZOBACTAM SODIUM 3.38 G: 36; 4.5 INJECTION, POWDER, FOR SOLUTION INTRAVENOUS at 22:23

## 2020-01-09 NOTE — TELEPHONE ENCOUNTER
Pt had called inquiring about a medical issue right lower quadrant of her abdomen  Stated medical issue not healing properly  Asked pt to return call to give us more detail on medical concern GABY young

## 2020-01-10 ENCOUNTER — PATIENT OUTREACH (OUTPATIENT)
Dept: FAMILY MEDICINE CLINIC | Facility: CLINIC | Age: 60
End: 2020-01-10

## 2020-01-10 NOTE — PROGRESS NOTES
Outpatient Care Management Note:    Voice mail message left for Maggie Pierce, with my contact information, requesting a call back  CM attempted to call Joanne's cell phone  She did not answer and no voice mail is available  CM called Makenzie on [phone]  She states that she did not  her antibiotics yet, because she started work at 6 am  I reinforced that she needs to  her antibiotics and start them as soon as possible  She is finished work at [employer] and will get her medicines  We discussed that the emergency room had recommended staying in the hospital, and Maggie Pierce denied knowledge of this  CM explained that she has a cellulitis infection that needs close monitoring  derian Pierce verbalized understanding  We discussed future care needs  Maggie Pierce is concerned about the financial costs of ongoing treatment  She agreed to going to [facility]  CM explained that they will have her meet with a financial counselor and help her with her insurance needs  CM called [facility]  They are booking new patients out 3 weeks  They recommended Joanne call [phone] to schedule  She can also call the financial counselors at [phone] to start this process  CM called Maggie Pierce back and updated her on above  She agreed to call [id number] and the financial counselor  She will call [facility] and schedule an appointment for Monday, so her wound can be closely monitored until she can see [facility]   Alyas was emotional on the phone  She states that she can not afford her medical costs and she can not afford insurance  I encouraged her to contact above as they will assist with her needs  I reinforced that right now she has an ongoing medical issue that needs to be addressed  I understand that she is the sole provider for her family, but she must address her medical needs or she won't be able to help everyone else going forward    I reinforced that if her wound increases in size, develops increased redness, drainage, swelling, or she develops a fever, she should return immediately to emergency room  Rinda Dakin has my contact information  She will call if she has any issues scheduling her appointments  She agreed to [facility] contacting her next week to follow up  Voice mail message received from Kathleen Andrade  She states that she scheduled [facility] for 2/7 at 1pm, she is waiting to hear back from financial aid, she is scheduled with Abel on 1/13 at 7:15pm, and she scheduled her mammogram for 2/4

## 2020-01-10 NOTE — ED PROVIDER NOTES
History  Chief Complaint   Patient presents with    Wound Check     pt states she has a decubitus ulcer on her abd  Area is actively draining serosanguinous, purulent fluid  Pt sent to ER by her PCP  62 yo female morbidly obese with poorly controlled DM (last Hgb a1c 11 9) who presents today with large/infected wound right lower abdomen/pannus  Pt states she called wound care to schedule an appointment today but was told she had to go through financial aid prior to them scheduling secondary to her lack of insurance  She then went to [facility] who sent her here for eval and IV abx  Wound  present x 2 weeks, pt feels it is looking better at this point despite no abx  Pt has 3cm x 3cm circumferential area of thick layer necrotic tissue with approximately 1/2 inch hard, indurated tissue periwound  Red/inflamed surrounding wound  Copious purulence drainage noted  Pt is adamantly refusing inpatient stay  Agreeable to IV abx here, labs and CT a/p  History provided by:  Patient   used: No    Wound Check    Treated in ED: began over 2 weeks ago - not seen until today at [facility] who sent her to ER  There has been no treatment since the wound repair  Her temperature was unmeasured prior to arrival  There has been colored discharge from the wound  The redness has improved  The swelling has not changed  The pain has not changed  Prior to Admission Medications   Prescriptions Last Dose Informant Patient Reported? Taking? Cinnamon 500 MG TABS Not Taking at Unknown time Self Yes No   Sig: Take 1,000 mg by mouth 2 (two) times a day     hydrochlorothiazide (HYDRODIURIL) 25 mg tablet Not Taking at Unknown time  No No   Sig: Take 1 tablet (25 mg total) by mouth daily   Patient not taking: Reported on 12/17/2019   lisinopril (ZESTRIL) 10 mg tablet   No Yes   Sig: Take 1 tablet (10 mg total) by mouth daily for 90 days   metFORMIN (GLUCOPHAGE-XR) 500 mg 24 hr tablet   No Yes   Sig: Take 2 tablets (1,000 mg total) by mouth 2 (two) times a day with meals   simvastatin (ZOCOR) 20 mg tablet Not Taking at Unknown time  No No   Sig: Take 1 tablet (20 mg total) by mouth daily at bedtime   Patient not taking: Reported on 2019      Facility-Administered Medications: None       Past Medical History:   Diagnosis Date    Diabetes mellitus ([facility] [address] )     External hemorrhoids     last assessed 16, resolved 16    Hernia, ventral     last assessed 12/14/15, resolved 16    Hypertension     Incarcerated incisional hernia     last assessed 12/21/15, resolved 16    Insomnia     last assessed 16, resolved 10/9/17       Past Surgical History:   Procedure Laterality Date    ABCESS DRAINAGE       SECTION      x3    COLONOSCOPY N/A 2017    Procedure: COLONOSCOPY with biopsy;  Surgeon: Catina Gannon DO;  Location: [facility]; Service: Complete    HYSTERECTOMY         Family History   Problem Relation Age of Onset   Helena Seller Glaucoma Mother     Diabetes Father     Hypertension Father     Pancreatic cancer Father     Breast cancer Maternal Grandmother     Breast cancer Maternal Aunt     Coronary artery disease Family     Diabetes Family     Hypertension Family      I have reviewed and agree with the history as documented  Social History     Tobacco Use    Smoking status: Never Smoker    Smokeless tobacco: Never Used   Substance Use Topics    Alcohol use: No     Comment: Social    Drug use: No        Review of Systems   Constitutional: Negative for appetite change, chills, fatigue and fever  HENT: Negative for sore throat  Eyes: Negative for visual disturbance  Respiratory: Negative for shortness of breath  Cardiovascular: Negative for chest pain  Gastrointestinal: Negative for abdominal pain, diarrhea, nausea and vomiting  Genitourinary: Negative for dysuria, frequency, vaginal bleeding and vaginal discharge     Musculoskeletal: Negative for back pain, neck pain and neck stiffness  Skin: Positive for wound (right lower abd/pannus )  Negative for pallor and rash  Allergic/Immunologic: Negative for immunocompromised state  Neurological: Negative for light-headedness and headaches  Psychiatric/Behavioral: Negative for confusion  All other systems reviewed and are negative  Physical Exam  Physical Exam   Constitutional: She is oriented to person, place, and time  She appears well-developed and well-nourished  No distress  Morbidly obese   HENT:   Head: Normocephalic and atraumatic  Mouth/Throat: Oropharynx is clear and moist    Eyes: EOM are normal    Neck: Neck supple  Cardiovascular: Normal rate and regular rhythm  No murmur heard  Pulmonary/Chest: Effort normal and breath sounds normal  No respiratory distress  Abdominal: Soft  Bowel sounds are normal    RLQ abd/pannus - 3cm x 3cm circumferential area of thick layer necrotic tissue with approximately 1/2 inch hard, indurated tissue periwound  Red/inflamed surrounding wound  Copious purulence drainage noted  Musculoskeletal: Normal range of motion  Neurological: She is alert and oriented to person, place, and time  Skin: Skin is warm  No rash noted  No pallor  Psychiatric: She has a normal mood and affect  Her behavior is normal    Nursing note and vitals reviewed        Vital Signs  ED Triage Vitals [01/09/20 2043]   Temperature Pulse Respirations Blood Pressure SpO2   98 3 °F (36 8 °C) 77 20 (!) 234/106 97 %      Temp Source Heart Rate Source Patient Position - Orthostatic VS BP Location FiO2 (%)   Temporal Monitor Sitting Right arm --      Pain Score       No Pain           Vitals:    01/09/20 2043 01/09/20 2226   BP: (!) 234/106 131/60   Pulse: 77 72   Patient Position - Orthostatic VS: Sitting Lying         Visual Acuity      ED Medications  Medications   sodium chloride 0 9 % bolus 1,000 mL (0 mL Intravenous Stopped 1/9/20 2300)   piperacillin-tazobactam (ZOSYN) 3 375 g in sodium chloride 0 9 % 50 mL IVPB (0 g Intravenous Stopped 1/9/20 2323)   iohexol (OMNIPAQUE) 350 MG/ML injection (MULTI-DOSE) 100 mL (100 mL Intravenous Given 1/9/20 2238)       Diagnostic Studies  Results Reviewed     Procedure Component Value Units Date/Time    Comprehensive metabolic panel [[id number]]  (Abnormal) Collected:  01/09/20 2145    Lab Status:  Final result Specimen:  Blood from Arm, Right Updated:  01/09/20 2222     Sodium 137 mmol/L      Potassium 4 1 mmol/L      Chloride 102 mmol/L      CO2 29 mmol/L      ANION GAP 6 mmol/L      BUN 12 mg/dL      Creatinine 0 72 mg/dL      Glucose 267 mg/dL      Calcium 10 5 mg/dL      AST 8 U/L      ALT 22 U/L      Alkaline Phosphatase 120 U/L      Total Protein 7 6 g/dL      Albumin 3 3 g/dL      Total Bilirubin 0 59 mg/dL      eGFR 93 ml/min/1 73sq m     Narrative:       [place] guidelines for Chronic Kidney Disease (CKD):     Stage 1 with normal or high GFR (GFR > 90 mL/min/1 73 square meters)    Stage 2 Mild CKD (GFR = 60-89 mL/min/1 73 square meters)    Stage 3A Moderate CKD (GFR = 45-59 mL/min/1 73 square meters)    Stage 3B Moderate CKD (GFR = 30-44 mL/min/1 73 square meters)    Stage 4 Severe CKD (GFR = 15-29 mL/min/1 73 square meters)    Stage 5 End Stage CKD (GFR <15 mL/min/1 73 square meters)  Note: GFR calculation is accurate only with a steady state creatinine    Blood culture #1 [[id number]] Collected:  01/09/20 2211    Lab Status: In process Specimen:  Blood from Arm, Left Updated:  01/09/20 2219    Lactic acid, plasma [[id number]]  (Normal) Collected:  01/09/20 2145    Lab Status:  Final result Specimen:  Blood from Arm, Right Updated:  01/09/20 2215     LACTIC ACID 1 5 mmol/L     Narrative:       Result may be elevated if tourniquet was used during collection      Erika Sifuentes [[id number]]  (Normal) Collected:  01/09/20 2145    Lab Status:  Final result Specimen:  Blood from Arm, Right Updated:  01/09/20 2208 Protime 12 9 seconds      INR 0 96    APTT [[id number]]  (Normal) Collected:  01/09/20 2145    Lab Status:  Final result Specimen:  Blood from Arm, Right Updated:  01/09/20 2208     PTT 30 seconds     CBC and differential [[id number]] Collected:  01/09/20 2145    Lab Status:  Final result Specimen:  Blood from Arm, Right Updated:  01/09/20 2157     WBC 9 92 Thousand/uL      RBC 4 53 Million/uL      Hemoglobin 13 2 g/dL      Hematocrit 40 3 %      MCV 89 fL      MCH 29 1 pg      MCHC 32 8 g/dL      RDW 12 4 %      MPV 9 3 fL      Platelets 359 Thousands/uL      nRBC 0 /100 WBCs      Neutrophils Relative 62 %      Immat GRANS % 0 %      Lymphocytes Relative 28 %      Monocytes Relative 7 %      Eosinophils Relative 3 %      Basophils Relative 0 %      Neutrophils Absolute 6 16 Thousands/µL      Immature Grans Absolute 0 03 Thousand/uL      Lymphocytes Absolute 2 79 Thousands/µL      Monocytes Absolute 0 66 Thousand/µL      Eosinophils Absolute 0 25 Thousand/µL      Basophils Absolute 0 03 Thousands/µL     Blood culture #2 [[id number]] Collected:  01/09/20 2145    Lab Status: In process Specimen:  Blood from Arm, Right Updated:  01/09/20 2155                 CT abdomen pelvis with contrast   Final Result by Jatin Chavarria MD (01/09 2333)      Right lower quadrant anterior abdominal wall skin thickening compatible with cellulitis  No abscess visualized  Major Mata 2 bowel containing hernias without evidence for incarceration or obstruction  Other nonacute findings as above  Workstation performed: [id number]                    Procedures  Procedures         ED Course  ED Course as of Danis 10 0130   Sky Marcus Jan 09, 2020 2132 Pt seen and examined  64 yo female morbidly obese with poorly controlled DM (last Hgb a1c 11 9) who presents today with large/infected wound right lower abdomen/pannus    Pt states she called wound care to schedule an appointment today but was told she had to go through financial aid prior to them scheduling secondary to her lack of insurance  She then went to [facility] who sent her here for eval and IV abx  Wound  present x 2 weeks, pt feels it is looking better at this point despite no abx  Pt has 3cm x 3cm circumferential area of thick layer necrotic tissue with approximately 1/2 inch hard, indurated tissue periwound  Red/inflamed surrounding wound  Copious purulence drainage noted  Pt is adamantly refusing inpatient stay  Agreeable to IV abx here, labs and CT a/p        2223 Labs resulted,   Lactate 1 5, WBC 9 9       2239 Pt taken for CT a/p       2351 CT a/p -  Right lower quadrant anterior abdominal wall skin thickening compatible with cellulitis  No abscess visualized  2 bowel containing hernias without evidence for incarceration or obstruction  Although I'd love to keep pt for IV abx she refuses  Will start on bactrim and keflex and have her f/u with PCP as outpt  Pt aware of ability to return at any time if symptoms worsen or cellulitis goes beyond marked area  MDM      Disposition  Final diagnoses:   Open abdominal wall wound   Abdominal wall cellulitis     Time reflects when diagnosis was documented in both MDM as applicable and the Disposition within this note     Time User Action Codes Description Comment    1/9/2020 11:55 PM Sonia Cao 227 Open abdominal wall wound     1/9/2020 11:55 PM Bree Segura, [address] [place], [place] [address] [[id number]] Abdominal wall cellulitis       ED Disposition     ED Disposition Condition Date/Time Comment    Discharge Stable Thu Jan 9, 2020 11:55 PM Jamie Ruelas discharge to home/self care              Follow-up Information     Follow up With Specialties Details Why Contact Slade Brown DO Family Medicine Schedule an appointment as soon as possible for a visit   [address] [facility] [address] [facility]  [phone]            Discharge Medication List as of 1/9/2020 11:56 PM      START taking these medications    Details   cephalexin (KEFLEX) 500 mg capsule Take 1 capsule (500 mg total) by mouth 3 (three) times a day for 10 days, Starting Thu 1/9/2020, Until Sun 1/19/2020, Print      sulfamethoxazole-trimethoprim (BACTRIM DS) 800-160 mg per tablet Take 1 tablet by mouth 2 (two) times a day for 10 days smx-tmp DS (BACTRIM) 800-160 mg tabs (1tab q12 D10), Starting Thu 1/9/2020, Until Sun 1/19/2020, Print         CONTINUE these medications which have NOT CHANGED    Details   lisinopril (ZESTRIL) 10 mg tablet Take 1 tablet (10 mg total) by mouth daily for 90 days, Starting Fri 9/6/2019, Until Thu 1/9/2020, Normal      metFORMIN (GLUCOPHAGE-XR) 500 mg 24 hr tablet Take 2 tablets (1,000 mg total) by mouth 2 (two) times a day with meals, Starting Mon 9/9/2019, Normal      Cinnamon 500 MG TABS Take 1,000 mg by mouth 2 (two) times a day , Historical Med      hydrochlorothiazide (HYDRODIURIL) 25 mg tablet Take 1 tablet (25 mg total) by mouth daily, Starting Sun 11/24/2019, Normal      simvastatin (ZOCOR) 20 mg tablet Take 1 tablet (20 mg total) by mouth daily at bedtime, Starting Sun 11/24/2019, Normal           No discharge procedures on file      ED Provider  Electronically Signed by           Rafael Gilliland DO  01/10/20 0138

## 2020-01-10 NOTE — PROGRESS NOTES
[facility]    ASSESSMENT AND PLAN     1  Wound infection  Presents today with large/infected wound right lower abdomen/pannus  States she called wound care to schedule an appointment today, but was told she had to go through financial aid prior to them scheduling secondary to her lack of insurance  Wound apparently present x2 weeks, perhaps longer  Assessment today reveals thick layer necrotic tissue  Roughly half dollar in size  Approximately 1/2 inch hard, indurated tissue periwound  Red/inflamed  Copious purulence drainage with some ari red blood 3-5 o'clock  Significant risks reviewed in depth  In light of her comorbidities (last hemoglobin A1c 11 9) strongly encourage she be evaluated at the ER  Will require debridement and possible IV antibiotic treatment  States she will go upon leaving this visit  - Ambulatory Referral to Emergency Medicine; Future    2  Uncontrolled type 2 diabetes mellitus with hyperglycemia (HCC)  Risk of poor healing  09/2019:  Hemoglobin A1c 11 9  Noncompliant with medications    3  Benign essential hypertension  170/96  Noncompliant with medication  States stopped taking simvastatin and hydrochlorothiazide in December  States still taking lisinopril 10  SUBJECTIVE       Patient ID: Giovanni Mayes is a 63 y o  female  Chief Complaint   Patient presents with    Recurrent Skin Infections     Right lower abdomen       HISTORY OF PRESENT ILLNESS    Presents today with large/infected wound right lower abdomen/pannus  Patient states she called wound care to schedule an appointment today, but was told she had to go through financial aid prior to them scheduling secondary to her lack of insurance  Believes the wound started roughly 2 weeks ago  She does not know what started it  Denies any injury or trauma to her abdominal area  States she has been trying to care for at home  She has been applying  wet to dry dressings    Applying a Tegaderm over top  Changing them daily  States it drains large amounts of pus when she removes the bandage  States it has foul odor  Denies fever  The following portions of the patient's history were reviewed and updated as appropriate: allergies, current medications, past family history, past medical history, past social history, past surgical history and problem list     REVIEW OF SYSTEMS  Review of Systems   Skin: Positive for wound (As described in HPI)  OBJECTIVE      VITAL SIGNS  /96 (BP Location: Left arm, Patient Position: Sitting, Cuff Size: Large)   Pulse 80   Temp 98 2 °F (36 8 °C)   Ht 5' 6" (1 676 m)   Wt 131 kg (289 lb 12 8 oz)   LMP  (LMP Unknown)   SpO2 98%   BMI 46 77 kg/m²     CURRENT MEDICATIONS    Current Outpatient Medications:     lisinopril (ZESTRIL) 10 mg tablet, Take 1 tablet (10 mg total) by mouth daily for 90 days, Disp: 90 tablet, Rfl: 1    metFORMIN (GLUCOPHAGE-XR) 500 mg 24 hr tablet, Take 2 tablets (1,000 mg total) by mouth 2 (two) times a day with meals, Disp: 360 tablet, Rfl: 0    Cinnamon 500 MG TABS, Take 1,000 mg by mouth 2 (two) times a day , Disp: , Rfl:     hydrochlorothiazide (HYDRODIURIL) 25 mg tablet, Take 1 tablet (25 mg total) by mouth daily (Patient not taking: Reported on 12/17/2019), Disp: 30 tablet, Rfl: 0    simvastatin (ZOCOR) 20 mg tablet, Take 1 tablet (20 mg total) by mouth daily at bedtime (Patient not taking: Reported on 12/17/2019), Disp: 30 tablet, Rfl: 0      PHYSICAL EXAMINATION   Physical Exam   Constitutional: She is oriented to person, place, and time  Vital signs are normal    Abdominal:       Neurological: She is alert and oriented to person, place, and time  Psychiatric: She has a normal mood and affect  Thought content normal    Nursing note and vitals reviewed

## 2020-01-13 ENCOUNTER — OFFICE VISIT (OUTPATIENT)
Dept: FAMILY MEDICINE CLINIC | Facility: CLINIC | Age: 60
End: 2020-01-13

## 2020-01-13 VITALS
DIASTOLIC BLOOD PRESSURE: 88 MMHG | HEART RATE: 93 BPM | BODY MASS INDEX: 47.42 KG/M2 | SYSTOLIC BLOOD PRESSURE: 128 MMHG | OXYGEN SATURATION: 98 % | TEMPERATURE: 98.8 F | HEIGHT: 65 IN | WEIGHT: 284.6 LBS | RESPIRATION RATE: 20 BRPM

## 2020-01-13 DIAGNOSIS — L03.311 ABDOMINAL WALL CELLULITIS: Primary | ICD-10-CM

## 2020-01-13 PROCEDURE — 99213 OFFICE O/P EST LOW 20 MIN: CPT | Performed by: PHYSICIAN ASSISTANT

## 2020-01-13 NOTE — PROGRESS NOTES
Assessment/Plan:    1  Abdominal wall cellulitis  Appears to be improving compared to ED note  Continue changing bandage regularly throughout the day  Finish course of Keflex and Bactrim  Strongly encourage probiotic  Take with food  Tylenol or ibuprofen as needed with food  Warm compresses throughout the day  Follow-up in 1 week  Sooner if needed  Patient Active Problem List   Diagnosis    Hernia, ventral    [facility] mellitus ([facility] [address]    Pancolitis ([facility] [address] [facility]    GI bleed    Morbid obesity ([facility] [address] [facility]    Ischemic colitis ([facility] [address] [facility]    Unintentional weight loss    Hypercalcemia    Hyperparathyroidism ([facility] [address] [facility]    Vitamin D deficiency    Depression with anxiety    Benign essential hypertension     Subjective:      Patient ID: Negro Stein is a 63 y o  female  Patient is here to follow-up on ER visit for cellulitis  She states she was seen on 01/09  She has an infected wound on her right lower abdomen  She is unsure when it 1st started  She states it is sore and tender to the touch  She has no pain unless it is being touched  She states she has severe pain when the bandage is being changed  Admits to drainage  Admits to bleeding  She states it is improving  She has been changing the bandage twice a day  She states there is dead tissue, but she removed it  States she is still getting fevers  States it was 101 2F this morning  On Friday it was 103 5F  Denies nausea or vomiting  Denies spreading  She states this has happened before in the past   The ER prescribed Keflex and Bactrim  States she has been compliant with both  States they are upsetting her stomach  She has been taking them with food  The following portions of the patient's history were reviewed and updated as appropriate: allergies, current medications, past family history, past medical history, past social history, past surgical history and problem list     Review of Systems   Constitutional: Positive for fatigue and fever  Negative for chills  HENT: Negative for congestion, ear pain, postnasal drip, rhinorrhea and sore throat  Respiratory: Negative for cough and shortness of breath  Cardiovascular: Negative for chest pain, palpitations and leg swelling  Gastrointestinal: Negative for abdominal pain, diarrhea, nausea and vomiting  Musculoskeletal: Negative for myalgias  Skin: Positive for rash and wound  Negative for color change and pallor  Neurological: Negative for dizziness, light-headedness and headaches  Hematological: Negative for adenopathy  Objective:      /88 (BP Location: Left arm, Patient Position: Sitting)   Pulse 93   Temp 98 8 °F (37 1 °C) (Tympanic)   Resp 20   Ht 5' 5" (1 651 m)   Wt 129 kg (284 lb 9 6 oz)   LMP  (LMP Unknown)   SpO2 98%   BMI 47 36 kg/m²          Physical Exam   Constitutional: She is oriented to person, place, and time  She appears well-developed and well-nourished  HENT:   Head: Normocephalic and atraumatic  Eyes: Pupils are equal, round, and reactive to light  Conjunctivae are normal    Neck: Normal range of motion  Neck supple  Cardiovascular: Normal rate, regular rhythm, S1 normal, S2 normal, normal heart sounds and intact distal pulses  Pulmonary/Chest: Effort normal and breath sounds normal    Abdominal: Soft  Normal appearance and bowel sounds are normal  She exhibits no mass  There is no hepatosplenomegaly  There is no tenderness  Morbidly obese abdomen   Musculoskeletal: Normal range of motion  Lymphadenopathy:     She has no cervical adenopathy  Neurological: She is alert and oriented to person, place, and time  Skin: Skin is warm, dry and intact  No rash noted  3 x 3 cm area of erythema and induration right lower abdomen open - with visible subcutaneous tissue  Surrounding erythema  Tender to palpation  Minimal bleeding  No discharge  Psychiatric: She has a normal mood and affect   Her behavior is normal  Judgment and thought content normal    Nursing note and vitals reviewed  Current Outpatient Medications on File Prior to Visit   Medication Sig Dispense Refill    cephalexin (KEFLEX) 500 mg capsule Take 1 capsule (500 mg total) by mouth 3 (three) times a day for 10 days 30 capsule 0    lisinopril (ZESTRIL) 10 mg tablet Take 1 tablet (10 mg total) by mouth daily for 90 days 90 tablet 1    metFORMIN (GLUCOPHAGE-XR) 500 mg 24 hr tablet Take 2 tablets (1,000 mg total) by mouth 2 (two) times a day with meals 360 tablet 0    sulfamethoxazole-trimethoprim (BACTRIM DS) 800-160 mg per tablet Take 1 tablet by mouth 2 (two) times a day for 10 days smx-tmp DS (BACTRIM) 800-160 mg tabs (1tab q12 D10) 20 tablet 0    hydrochlorothiazide (HYDRODIURIL) 25 mg tablet Take 1 tablet (25 mg total) by mouth daily (Patient not taking: Reported on 12/17/2019) 30 tablet 0    simvastatin (ZOCOR) 20 mg tablet Take 1 tablet (20 mg total) by mouth daily at bedtime (Patient not taking: Reported on 12/17/2019) 30 tablet 0     No current facility-administered medications on file prior to visit

## 2020-01-15 ENCOUNTER — TELEPHONE (OUTPATIENT)
Dept: FAMILY MEDICINE CLINIC | Facility: CLINIC | Age: 60
End: 2020-01-15

## 2020-01-15 LAB
BACTERIA BLD CULT: NORMAL
BACTERIA BLD CULT: NORMAL

## 2020-01-22 ENCOUNTER — TELEPHONE (OUTPATIENT)
Dept: FAMILY MEDICINE CLINIC | Facility: CLINIC | Age: 60
End: 2020-01-22

## 2020-01-22 ENCOUNTER — PATIENT OUTREACH (OUTPATIENT)
Dept: FAMILY MEDICINE CLINIC | Facility: CLINIC | Age: 60
End: 2020-01-22

## 2020-01-22 NOTE — PROGRESS NOTES
Outpatient Care Management Note:    Care manager attempted to call Kathleen Acosta  She did not answer and no voice mail was available  CM will call back

## 2020-01-22 NOTE — TELEPHONE ENCOUNTER
Left detailed message on machine for pt to return call and update us on cellulitis asked pt please call and ask for Abdi Treadwell  Pt arrived in office for f/u her cellulitis resolved pt opted to cancel her appt w/ provider and understands to f/u if needed

## 2020-01-24 ENCOUNTER — PATIENT OUTREACH (OUTPATIENT)
Dept: FAMILY MEDICINE CLINIC | Facility: CLINIC | Age: 60
End: 2020-01-24

## 2020-01-24 NOTE — PROGRESS NOTES
Outpatient Care Management Note:    Care manager called Freda Barraza and completed initial assessment  She states that her abdominal cellulitis completely healed  She knows to monitor the area for signs of redness, swelling, drainage, or fever symptoms  Freda Barraza is scheduled to see [facility] on 2/7  She has no insurance and her medical bills are increasing  She states she did receive paperwork informing her that it would cost her $118/per week for health insurance through her company  She states she can not afford this cost   She will be meeting with a financial counselor at [facility]  She had to gather financial and personal documents prior to scheduling with financial counselor  Freda Barraza is waiting to hear when she will be meeting with the counselor  Freda Barraza continues to complain of night sweats  She checked her blood sugars during 2 recent episodes and her sugars were 103 and 117  I encouraged her to discuss her symptoms with the [facility] physician  She also admits to many frequent falls  She has not injured herself during these falls  I recommended she talk with the provider about her balance concerns also  Freda Barraza has my contact information  She will call with any questions or concerns  Late note: YESENIA gave an updated report to René Srivastava, care manager at [facility]  She will be following patient since Freda Barraza is transferring care

## 2020-01-26 DIAGNOSIS — E11.9 TYPE 2 DIABETES MELLITUS WITHOUT COMPLICATION, WITHOUT LONG-TERM CURRENT USE OF INSULIN (HCC): Chronic | ICD-10-CM

## 2020-01-27 DIAGNOSIS — E11.9 TYPE 2 DIABETES MELLITUS WITHOUT COMPLICATION, WITHOUT LONG-TERM CURRENT USE OF INSULIN (HCC): Chronic | ICD-10-CM

## 2020-02-07 ENCOUNTER — OFFICE VISIT (OUTPATIENT)
Dept: FAMILY MEDICINE CLINIC | Facility: CLINIC | Age: 60
End: 2020-02-07

## 2020-02-07 DIAGNOSIS — I10 BENIGN ESSENTIAL HYPERTENSION: ICD-10-CM

## 2020-02-07 DIAGNOSIS — R29.6 FALLS FREQUENTLY: ICD-10-CM

## 2020-02-07 DIAGNOSIS — E11.65 TYPE 2 DIABETES MELLITUS WITH HYPERGLYCEMIA, WITHOUT LONG-TERM CURRENT USE OF INSULIN (HCC): Primary | Chronic | ICD-10-CM

## 2020-02-07 DIAGNOSIS — Z76.89 ENCOUNTER TO ESTABLISH CARE: ICD-10-CM

## 2020-02-07 LAB — SL AMB POCT HEMOGLOBIN AIC: 9.7 (ref ?–6.5)

## 2020-02-07 PROCEDURE — 83036 HEMOGLOBIN GLYCOSYLATED A1C: CPT | Performed by: NURSE PRACTITIONER

## 2020-02-07 PROCEDURE — 93000 ELECTROCARDIOGRAM COMPLETE: CPT | Performed by: NURSE PRACTITIONER

## 2020-02-07 PROCEDURE — 1036F TOBACCO NON-USER: CPT | Performed by: NURSE PRACTITIONER

## 2020-02-07 PROCEDURE — 3078F DIAST BP <80 MM HG: CPT | Performed by: NURSE PRACTITIONER

## 2020-02-07 PROCEDURE — 3077F SYST BP >= 140 MM HG: CPT | Performed by: NURSE PRACTITIONER

## 2020-02-07 PROCEDURE — 3046F HEMOGLOBIN A1C LEVEL >9.0%: CPT | Performed by: NURSE PRACTITIONER

## 2020-02-07 PROCEDURE — 99204 OFFICE O/P NEW MOD 45 MIN: CPT | Performed by: NURSE PRACTITIONER

## 2020-02-07 NOTE — PROGRESS NOTES
Assessment/Plan:    Diabetes mellitus ([facility] )    Lab Results   Component Value Date    HGBA1C 11 9 (H) 09/06/2019   A1C in office today 9 1    Patient refuses additional medications at this time  Reinforced diabetic diet  Discussed the adverse effects of uncontrolled blood glucose levels  Patient has her own meter and checks her BG weekly, advised to check daily   Refused foot exam today   Refused blood work and labs (microalbumin) 2/2 to no insurance     Benign essential hypertension  -BP in office 160/100---recheck 140/78   -currently taking 10mg of lisinopril daily  -continue to monitor--encouraged decreased sodium intake, heart healthy diet, and weight loss      Morbid obesity ([facility] )  -patient reports that she has lost over 100 pounds with diet changes  -Encouraged diet and lifestyle changes: decrease processed foods (cakes, cookies, chips, soda), decrease total carbohydrate intake, decrease fried/fatty foods, increase fruits and vegetables, increase lean proteins (chicken, turkey), increase healthy fats (avocado, fish, nuts), drink plenty of water (at least four 16 oz bottles per day)  -encouraged to exercise 3-5 times per week      Falls frequently  -patient reports that she falls at least 2 x/month   -she describes a "blacking out" and then waking up on the floor  -neuro exam benign   -ECG normal sinus rhythm in office  -recommend echocardiogram to assess for valve insufficiency   -will continue to monitor with close follow up        Return in about 4 weeks (around 3/6/2020) for FU htn, diabetes, falls  Patient Instructions     Fall Prevention   WHAT YOU NEED TO KNOW:   What is fall prevention? Fall prevention includes ways to make your home and other areas safer  It also includes ways you can move more carefully to prevent a fall  What increases my risk for falls?    · Lack of vitamin D    · Not getting enough sleep each night    · Trouble walking or keeping your balance, or foot problems    · Health conditions that cause changes in your blood pressure, vision, or muscle strength and coordination    · Medicines that make you dizzy, weak, or sleepy    · Problems seeing clearly    · Shoes that have high heels or are not supportive    · Tripping hazards, such as items left on the floor, no handrails on the stairs, or broken steps  How can I help protect myself from falls? · Stand or sit up slowly  This may help you keep your balance and prevent falls  If you need to get up during the night, sit up first  Be sure you are fully awake before you stand  Turn on the light before you start walking  Go slowly in case you are still sleepy  Make sure you will not trip over any pets sleeping in the bedroom  · Use assistive devices as directed  Your healthcare provider may suggest that you use a cane or walker to help you keep your balance  You may need to have grab bars put in your bathroom near the toilet or in the shower  · Wear shoes that fit well and have soles that   Wear shoes both inside and outside  Use slippers with good   Do not wear shoes with high heels  · Wear a personal alarm  This is a device that allows you to call 911 if you fall and need help  Ask your healthcare provider for more information  · Stay active  Exercise can help strengthen your muscles and improve your balance  Your healthcare provider may recommend water aerobics or walking  He or she may also recommend physical therapy to improve your coordination  Never start an exercise program without talking to your healthcare provider first      · Manage medical conditions  Keep all appointments with your healthcare providers  Visit your eye doctor as directed  How can I make my home safer? · Add items to prevent falls in the bathroom  Put nonslip strips on your bath or shower floor to prevent you from slipping  Use a bath mat if you do not have carpet in the bathroom   This will prevent you from falling when you step out of the bath or shower  Use a shower seat so you do not need to stand while you shower  Sit on the toilet or a chair in your bathroom to dry yourself and put on clothing  This will prevent you from losing your balance from drying or dressing yourself while you are standing  · Keep paths clear  Remove books, shoes, and other objects from walkways and stairs  Place cords for telephones and lamps out of the way so that you do not need to walk over them  Tape them down if you cannot move them  Remove small rugs  If you cannot remove a rug, secure it with double-sided tape  This will prevent you from tripping  · Install bright lights in your home  Use night lights to help light paths to the bathroom or kitchen  Always turn on the light before you start walking  · Keep items you use often on shelves within reach  Do not use a step stool to help you reach an item  · Paint or place reflective tape on the edges of your stairs  This will help you see the stairs better  Call 911 or have someone else call if:   · You have fallen and are unconscious  · You have fallen and cannot move part of your body  Contact your healthcare provider if:   · You have fallen and have pain or a headache  · You have questions or concerns about your condition or care  CARE AGREEMENT:   You have the right to help plan your care  Learn about your health condition and how it may be treated  Discuss treatment options with your caregivers to decide what care you want to receive  You always have the right to refuse treatment  The above information is an  only  It is not intended as medical advice for individual conditions or treatments  Talk to your doctor, nurse or pharmacist before following any medical regimen to see if it is safe and effective for you  © 2017 [employer] Information is for End User's use only and may not be sold, redistributed or otherwise used for commercial purposes   All illustrations and images included in CareNotes® are the copyrighted property of [employer]  or [employer]  Diabetes and Your Skin   WHAT YOU NEED TO KNOW:   Diabetes can affect every part of your body, including your skin  Diabetes that is not well controlled can damage blood vessels and nerves  Damage to blood vessels can make it hard for blood to flow to tissues and organs  A lack of blood flow to your skin can cause ulcers that are difficult to heal  Skin ulcers are also called sores  People with diabetes can also have more bacterial skin infections than other people  Most skin conditions can be prevented with good blood sugar control  Skin sores can be hard to heal, or become life or limb-threatening, if not treated early  DISCHARGE INSTRUCTIONS:   Contact your healthcare provider if:   · You get a severe burn or cut  · You have a sore that is painful, warm to the touch, or has redness around it  · Your sore does not get better or seems to get worse  · You have a fever  · Your blood sugar levels continue to be higher than they should be  Prevent skin sores:   · Keep your blood sugars within target range  Your healthcare provider will tell you what your blood sugar levels should be  High blood sugar levels increase your risk for skin infections and poor wound healing  · Keep your skin clean  Do not take hot baths or showers  They can cause your skin to get dry  Do not take a bubble bath if you have dry skin  Use moisturizing soaps and lotion after baths or showers  Do not put lotion between your toes  · Keep your skin from becoming too dry,  especially in cold, dry weather  When you scratch dry, itchy skin, you can cause your skin to be open to infection  Bathe less during cold weather and use lotion to moisturize  Use a humidifier to keep air in your home from being dry  · Keep areas where skin touches skin dry    Use talcum powder in areas such as armpits and groin  You may also need it under your breasts, and between your toes  Moisture in these areas can cause a fungal infection  · Treat cuts immediately  Clean minor cuts with soap and water  Cover them with sterile gauze  © 2017 [employer] Information is for End User's use only and may not be sold, redistributed or otherwise used for commercial purposes  All illustrations and images included in CareNotes® are the copyrighted property of [employer]  or [employer]  The above information is an  only  It is not intended as medical advice for individual conditions or treatments  Talk to your doctor, nurse or pharmacist before following any medical regimen to see if it is safe and effective for you  Heart Healthy Diet   WHAT YOU NEED TO KNOW:   A heart healthy diet is an eating plan low in total fat, unhealthy fats, and sodium (salt)  A heart healthy diet helps decrease your risk for heart disease and stroke  Limit the amount of fat you eat to 25% to 35% of your total daily calories  Limit sodium to less than 2,300 mg each day  DISCHARGE INSTRUCTIONS:   Healthy fats:  Healthy fats can help improve cholesterol levels  The risk for heart disease is decreased when cholesterol levels are normal  Choose healthy fats, such as the following:  · Unsaturated fat  is found in foods such as soybean, canola, olive, corn, and safflower oils  It is also found in soft tub margarine that is made with liquid vegetable oil  · Omega-3 fat  is found in certain fish, such as salmon, tuna, and trout, and in walnuts and flaxseed  Unhealthy fats:  Unhealthy fats can cause unhealthy cholesterol levels in your blood and increase your risk of heart disease  Limit unhealthy fats, such as the following:  · Cholesterol  is found in animal foods, such as eggs and lobster, and in dairy products made from whole milk  Limit cholesterol to less than 300 milligrams (mg) each day   You may need to limit cholesterol to 200 mg each day if you have heart disease  · Saturated fat  is found in meats, such as payne and hamburger  It is also found in chicken or turkey skin, whole milk, and butter  Limit saturated fat to less than 7% of your total daily calories  Limit saturated fat to less than 6% if you have heart disease or are at increased risk for it  · Trans fat  is found in packaged foods, such as potato chips and cookies  It is also in hard margarine, some fried foods, and shortening  Avoid trans fats as much as possible    Heart healthy foods and drinks to include:  Ask your dietitian or healthcare provider how many servings to have from each of the following food groups:  · Grains:      ¨ Whole-wheat breads, cereals, and pastas, and brown rice    ¨ Low-fat, low-sodium crackers and chips    · Vegetables:      ¨ Broccoli, green beans, green peas, and spinach    ¨ Collards, kale, and lima beans    ¨ Carrots, sweet potatoes, tomatoes, and peppers    ¨ Canned vegetables with no salt added    · Fruits:      ¨ Bananas, peaches, pears, and pineapple    ¨ Grapes, raisins, and dates    ¨ Oranges, tangerines, grapefruit, orange juice, and grapefruit juice    ¨ Apricots, mangoes, melons, and papaya    ¨ Raspberries and strawberries    ¨ Canned fruit with no added sugar    · Low-fat dairy products:      ¨ Nonfat (skim) milk, 1% milk, and low-fat almond, cashew, or soy milks fortified with calcium    ¨ Low-fat cheese, regular or frozen yogurt, and cottage cheese    · Meats and proteins , such as lean cuts of beef and pork (loin, leg, round), skinless chicken and turkey, legumes, soy products, egg whites, and nuts  Foods and drinks to limit or avoid:  Ask your dietitian or healthcare provider about these and other foods that are high in unhealthy fat, sodium, and sugar:  · Snack or packaged foods , such as frozen dinners, cookies, macaroni and cheese, and cereals with more than 300 mg of sodium per serving    · Canned or dry mixes  for cakes, soups, sauces, or gravies    · Vegetables with added sodium , such as instant potatoes, vegetables with added sauces, or regular canned vegetables    · Other foods high in sodium , such as ketchup, barbecue sauce, salad dressing, pickles, olives, soy sauce, and miso    · High-fat dairy foods  such as whole or 2% milk, cream cheese, or sour cream, and cheeses     · High-fat protein foods  such as high-fat cuts of beef (T-bone steaks, ribs), chicken or turkey with skin, and organ meats, such as liver    · Cured or smoked meats , such as hot dogs, payne, and sausage    · Unhealthy fats and oils , such as butter, stick margarine, shortening, and cooking oils such as coconut or palm oil    · Food and drinks high in sugar , such as soft drinks (soda), sports drinks, sweetened tea, candy, cake, cookies, pies, and doughnuts  Other diet guidelines to follow:   · Eat more foods containing omega-3 fats  Eat fish high in omega-3 fats at least 2 times a week  · Limit alcohol  Too much alcohol can damage your heart and raise your blood pressure  Women should limit alcohol to 1 drink a day  Men should limit alcohol to 2 drinks a day  A drink of alcohol is 12 ounces of beer, 5 ounces of wine, or 1½ ounces of liquor  · Choose low-sodium foods  High-sodium foods can lead to high blood pressure  Add little or no salt to food you prepare  Use herbs and spices in place of salt  · Eat more fiber  to help lower cholesterol levels  Eat at least 5 servings of fruits and vegetables each day  Eat 3 ounces of whole-grain foods each day  Legumes (beans) are also a good source of fiber  Lifestyle guidelines:   · Do not smoke  Nicotine and other chemicals in cigarettes and cigars can cause lung and heart damage  Ask your healthcare provider for information if you currently smoke and need help to quit  E-cigarettes or smokeless tobacco still contain nicotine   Talk to your healthcare provider before you use these products  · Exercise regularly  to help you maintain a healthy weight and improve your blood pressure and cholesterol levels  Ask your healthcare provider about the best exercise plan for you  Do not start an exercise program without asking your healthcare provider  Follow up with your healthcare provider as directed:  Write down your questions so you remember to ask them during your visits  © 2017 [employer] Information is for End User's use only and may not be sold, redistributed or otherwise used for commercial purposes  All illustrations and images included in CareNotes® are the copyrighted property of [employer]  or [employer]  The above information is an  only  It is not intended as medical advice for individual conditions or treatments  Talk to your doctor, nurse or pharmacist before following any medical regimen to see if it is safe and effective for you  Diagnoses and all orders for this visit:    Type 2 diabetes mellitus with hyperglycemia, without long-term current use of insulin (HCC)  -     POCT hemoglobin A1c    Benign essential hypertension    Encounter to establish care    Falls frequently  -     POCT ECG          Subjective:     Caesar Rosales is a 63 y o  female who  has a past medical history of Diabetes mellitus ([facility] ), External hemorrhoids, Hernia, ventral, Hypertension, Incarcerated incisional hernia, and Insomnia  She also has no past medical history of Substance abuse ([facility] )  who presented to the office today to establish care  Patient reports that she does not have insurance which limits her ability to maintain care and medications  Her A1C in the office today is 9 1  She refuses to change her regimen of medication  She is currently taking metformin XR  She refuses to take insulin   She reports that she has lost over 100 pounds with diet changes and she is confident that with her continued diet changes that her A1C will continue to go down  She notes that she has many food intolerances and cannot eat any raw foods  She also refuses to take any cholesterol medication  She previously had abdominal wall cellulitis to the right  lower quadrant which is now healing  She does report that she falls often, about 2 x per month  She notes that she can be walking and then she suddenly is on the ground wondering how she got there  She denies any dizziness, headaches, shortness of breath, nausea, or diaphoresis before or after the episodes  She denies any incontinence or post-ictal feeling  She denies any previous head trauma  She denies feeling her legs give out or having leg or knee pains  She does not check her blood glucose levels when this happens  The following portions of the patient's history were reviewed and updated as appropriate: allergies, current medications, past family history, past medical history, past social history, past surgical history and problem list     Current Outpatient Medications on File Prior to Visit   Medication Sig Dispense Refill    lisinopril (ZESTRIL) 10 mg tablet Take 1 tablet (10 mg total) by mouth daily for 90 days 90 tablet 1    metFORMIN (GLUCOPHAGE-XR) 500 mg 24 hr tablet Take 2 tablets (1,000 mg total) by mouth 2 (two) times a day with meals 360 tablet 0    hydrochlorothiazide (HYDRODIURIL) 25 mg tablet Take 1 tablet (25 mg total) by mouth daily (Patient not taking: Reported on 12/17/2019) 30 tablet 0    metFORMIN (GLUCOPHAGE) 500 mg tablet TAKE 2 TABLETS BY MOUTH TWICE DAILY WITH MEALS 120 tablet 0    simvastatin (ZOCOR) 20 mg tablet Take 1 tablet (20 mg total) by mouth daily at bedtime (Patient not taking: Reported on 12/17/2019) 30 tablet 0     No current facility-administered medications on file prior to visit  Review of Systems   Constitutional: Negative for activity change, appetite change, chills, fatigue, fever and unexpected weight change     HENT: Negative for hearing loss, nosebleeds, sinus pain, sneezing, sore throat and trouble swallowing  Eyes: Negative for photophobia and visual disturbance  Respiratory: Negative for cough, chest tightness, shortness of breath and wheezing  Cardiovascular: Negative for chest pain, palpitations and leg swelling  Gastrointestinal: Negative for abdominal distention, abdominal pain, blood in stool, constipation, diarrhea, nausea and vomiting  Endocrine: Negative for polydipsia, polyphagia and polyuria  Genitourinary: Negative for decreased urine volume, difficulty urinating, dysuria, flank pain, genital sores, hematuria, urgency and vaginal bleeding  Musculoskeletal: Negative for back pain and gait problem  Skin: Positive for wound  Negative for pallor and rash  Neurological: Negative for dizziness, seizures, syncope, weakness, numbness and headaches  Hematological: Negative for adenopathy  Does not bruise/bleed easily  Psychiatric/Behavioral: Negative for confusion, hallucinations, self-injury, sleep disturbance and suicidal ideas  The patient is not nervous/anxious  BMI Counseling: Body mass index is 46 16 kg/m²  The BMI is above normal  Nutrition recommendations include decreasing portion sizes, decreasing fast food intake and consuming healthier snacks  Objective:    /78 (BP Location: Right arm, Patient Position: Standing, Cuff Size: Extra-Large)   Pulse 82   Temp (!) 97 °F (36 1 °C) (Temporal)   Resp 18   Ht 5' 6" (1 676 m)   Wt 130 kg (286 lb)   LMP  (LMP Unknown)   SpO2 97%   BMI 46 16 kg/m²     Physical Exam   Constitutional: She is oriented to person, place, and time  She appears well-developed and well-nourished  No distress  HENT:   Head: Normocephalic and atraumatic  Right Ear: External ear normal    Left Ear: External ear normal    Nose: Nose normal    Mouth/Throat: Oropharynx is clear and moist  No oropharyngeal exudate     Eyes: Pupils are equal, round, and reactive to light  EOM are normal  Right eye exhibits no discharge  Left eye exhibits no discharge  Neck: Normal range of motion  Neck supple  Cardiovascular: Normal rate, regular rhythm, normal heart sounds and intact distal pulses  No murmur heard  Pulmonary/Chest: Effort normal and breath sounds normal  No respiratory distress  She has no wheezes  Abdominal: Soft  Bowel sounds are normal  She exhibits no distension  There is no tenderness  There is no rebound and no guarding  Musculoskeletal: Normal range of motion  She exhibits no edema or deformity  Lymphadenopathy:     She has no cervical adenopathy  Neurological: She is alert and oriented to person, place, and time  She displays normal reflexes  No cranial nerve deficit or sensory deficit  She exhibits normal muscle tone  Coordination normal    Skin: Skin is warm and dry  Capillary refill takes less than 2 seconds  No rash noted  She is not diaphoretic  Psychiatric: She has a normal mood and affect  Her behavior is normal    Nursing note and vitals reviewed        DEANA Rene  02/09/20  7:55 AM

## 2020-02-07 NOTE — PATIENT INSTRUCTIONS
Fall Prevention   WHAT YOU NEED TO KNOW:   What is fall prevention? Fall prevention includes ways to make your home and other areas safer  It also includes ways you can move more carefully to prevent a fall  What increases my risk for falls? · Lack of vitamin D    · Not getting enough sleep each night    · Trouble walking or keeping your balance, or foot problems    · Health conditions that cause changes in your blood pressure, vision, or muscle strength and coordination    · Medicines that make you dizzy, weak, or sleepy    · Problems seeing clearly    · Shoes that have high heels or are not supportive    · Tripping hazards, such as items left on the floor, no handrails on the stairs, or broken steps  How can I help protect myself from falls? · Stand or sit up slowly  This may help you keep your balance and prevent falls  If you need to get up during the night, sit up first  Be sure you are fully awake before you stand  Turn on the light before you start walking  Go slowly in case you are still sleepy  Make sure you will not trip over any pets sleeping in the bedroom  · Use assistive devices as directed  Your healthcare provider may suggest that you use a cane or walker to help you keep your balance  You may need to have grab bars put in your bathroom near the toilet or in the shower  · Wear shoes that fit well and have soles that   Wear shoes both inside and outside  Use slippers with good   Do not wear shoes with high heels  · Wear a personal alarm  This is a device that allows you to call 911 if you fall and need help  Ask your healthcare provider for more information  · Stay active  Exercise can help strengthen your muscles and improve your balance  Your healthcare provider may recommend water aerobics or walking  He or she may also recommend physical therapy to improve your coordination   Never start an exercise program without talking to your healthcare provider first  · Manage medical conditions  Keep all appointments with your healthcare providers  Visit your eye doctor as directed  How can I make my home safer? · Add items to prevent falls in the bathroom  Put nonslip strips on your bath or shower floor to prevent you from slipping  Use a bath mat if you do not have carpet in the bathroom  This will prevent you from falling when you step out of the bath or shower  Use a shower seat so you do not need to stand while you shower  Sit on the toilet or a chair in your bathroom to dry yourself and put on clothing  This will prevent you from losing your balance from drying or dressing yourself while you are standing  · Keep paths clear  Remove books, shoes, and other objects from walkways and stairs  Place cords for telephones and lamps out of the way so that you do not need to walk over them  Tape them down if you cannot move them  Remove small rugs  If you cannot remove a rug, secure it with double-sided tape  This will prevent you from tripping  · Install bright lights in your home  Use night lights to help light paths to the bathroom or kitchen  Always turn on the light before you start walking  · Keep items you use often on shelves within reach  Do not use a step stool to help you reach an item  · Paint or place reflective tape on the edges of your stairs  This will help you see the stairs better  Call 911 or have someone else call if:   · You have fallen and are unconscious  · You have fallen and cannot move part of your body  Contact your healthcare provider if:   · You have fallen and have pain or a headache  · You have questions or concerns about your condition or care  CARE AGREEMENT:   You have the right to help plan your care  Learn about your health condition and how it may be treated  Discuss treatment options with your caregivers to decide what care you want to receive  You always have the right to refuse treatment   The above information is an  only  It is not intended as medical advice for individual conditions or treatments  Talk to your doctor, nurse or pharmacist before following any medical regimen to see if it is safe and effective for you  © 2017 [employer] Information is for End User's use only and may not be sold, redistributed or otherwise used for commercial purposes  All illustrations and images included in CareNotes® are the copyrighted property of [employer]  or [employer]  Diabetes and Your Skin   WHAT YOU NEED TO KNOW:   Diabetes can affect every part of your body, including your skin  Diabetes that is not well controlled can damage blood vessels and nerves  Damage to blood vessels can make it hard for blood to flow to tissues and organs  A lack of blood flow to your skin can cause ulcers that are difficult to heal  Skin ulcers are also called sores  People with diabetes can also have more bacterial skin infections than other people  Most skin conditions can be prevented with good blood sugar control  Skin sores can be hard to heal, or become life or limb-threatening, if not treated early  DISCHARGE INSTRUCTIONS:   Contact your healthcare provider if:   · You get a severe burn or cut  · You have a sore that is painful, warm to the touch, or has redness around it  · Your sore does not get better or seems to get worse  · You have a fever  · Your blood sugar levels continue to be higher than they should be  Prevent skin sores:   · Keep your blood sugars within target range  Your healthcare provider will tell you what your blood sugar levels should be  High blood sugar levels increase your risk for skin infections and poor wound healing  · Keep your skin clean  Do not take hot baths or showers  They can cause your skin to get dry  Do not take a bubble bath if you have dry skin  Use moisturizing soaps and lotion after baths or showers   Do not put lotion between your toes  · Keep your skin from becoming too dry,  especially in cold, dry weather  When you scratch dry, itchy skin, you can cause your skin to be open to infection  Bathe less during cold weather and use lotion to moisturize  Use a humidifier to keep air in your home from being dry  · Keep areas where skin touches skin dry  Use talcum powder in areas such as armpits and groin  You may also need it under your breasts, and between your toes  Moisture in these areas can cause a fungal infection  · Treat cuts immediately  Clean minor cuts with soap and water  Cover them with sterile gauze  © 2017 [employer]  Information is for End User's use only and may not be sold, redistributed or otherwise used for commercial purposes  All illustrations and images included in CareNotes® are the copyrighted property of [employer]  or [employer] Crystal  The above information is an  only  It is not intended as medical advice for individual conditions or treatments  Talk to your doctor, nurse or pharmacist before following any medical regimen to see if it is safe and effective for you  Heart Healthy Diet   WHAT YOU NEED TO KNOW:   A heart healthy diet is an eating plan low in total fat, unhealthy fats, and sodium (salt)  A heart healthy diet helps decrease your risk for heart disease and stroke  Limit the amount of fat you eat to 25% to 35% of your total daily calories  Limit sodium to less than 2,300 mg each day  DISCHARGE INSTRUCTIONS:   Healthy fats:  Healthy fats can help improve cholesterol levels  The risk for heart disease is decreased when cholesterol levels are normal  Choose healthy fats, such as the following:  · Unsaturated fat  is found in foods such as soybean, canola, olive, corn, and safflower oils  It is also found in soft tub margarine that is made with liquid vegetable oil       · Omega-3 fat  is found in certain fish, such as salmon, tuna, and trout, and in walnuts and flaxseed  Unhealthy fats:  Unhealthy fats can cause unhealthy cholesterol levels in your blood and increase your risk of heart disease  Limit unhealthy fats, such as the following:  · Cholesterol  is found in animal foods, such as eggs and lobster, and in dairy products made from whole milk  Limit cholesterol to less than 300 milligrams (mg) each day  You may need to limit cholesterol to 200 mg each day if you have heart disease  · Saturated fat  is found in meats, such as payne and hamburger  It is also found in chicken or turkey skin, whole milk, and butter  Limit saturated fat to less than 7% of your total daily calories  Limit saturated fat to less than 6% if you have heart disease or are at increased risk for it  · Trans fat  is found in packaged foods, such as potato chips and cookies  It is also in hard margarine, some fried foods, and shortening  Avoid trans fats as much as possible    Heart healthy foods and drinks to include:  Ask your dietitian or healthcare provider how many servings to have from each of the following food groups:  · Grains:      ¨ Whole-wheat breads, cereals, and pastas, and brown rice    ¨ Low-fat, low-sodium crackers and chips    · Vegetables:      ¨ Broccoli, green beans, green peas, and spinach    ¨ Collards, kale, and lima beans    ¨ Carrots, sweet potatoes, tomatoes, and peppers    ¨ Canned vegetables with no salt added    · Fruits:      ¨ Bananas, peaches, pears, and pineapple    ¨ Grapes, raisins, and dates    ¨ Oranges, tangerines, grapefruit, orange juice, and grapefruit juice    ¨ Apricots, mangoes, melons, and papaya    ¨ Raspberries and strawberries    ¨ Canned fruit with no added sugar    · Low-fat dairy products:      ¨ Nonfat (skim) milk, 1% milk, and low-fat almond, cashew, or soy milks fortified with calcium    ¨ Low-fat cheese, regular or frozen yogurt, and cottage cheese    · Meats and proteins , such as lean cuts of beef and pork (loin, leg, round), skinless chicken and turkey, legumes, soy products, egg whites, and nuts  Foods and drinks to limit or avoid:  Ask your dietitian or healthcare provider about these and other foods that are high in unhealthy fat, sodium, and sugar:  · Snack or packaged foods , such as frozen dinners, cookies, macaroni and cheese, and cereals with more than 300 mg of sodium per serving    · Canned or dry mixes  for cakes, soups, sauces, or gravies    · Vegetables with added sodium , such as instant potatoes, vegetables with added sauces, or regular canned vegetables    · Other foods high in sodium , such as ketchup, barbecue sauce, salad dressing, pickles, olives, soy sauce, and miso    · High-fat dairy foods  such as whole or 2% milk, cream cheese, or sour cream, and cheeses     · High-fat protein foods  such as high-fat cuts of beef (T-bone steaks, ribs), chicken or turkey with skin, and organ meats, such as liver    · Cured or smoked meats , such as hot dogs, payne, and sausage    · Unhealthy fats and oils , such as butter, stick margarine, shortening, and cooking oils such as coconut or palm oil    · Food and drinks high in sugar , such as soft drinks (soda), sports drinks, sweetened tea, candy, cake, cookies, pies, and doughnuts  Other diet guidelines to follow:   · Eat more foods containing omega-3 fats  Eat fish high in omega-3 fats at least 2 times a week  · Limit alcohol  Too much alcohol can damage your heart and raise your blood pressure  Women should limit alcohol to 1 drink a day  Men should limit alcohol to 2 drinks a day  A drink of alcohol is 12 ounces of beer, 5 ounces of wine, or 1½ ounces of liquor  · Choose low-sodium foods  High-sodium foods can lead to high blood pressure  Add little or no salt to food you prepare  Use herbs and spices in place of salt  · Eat more fiber  to help lower cholesterol levels  Eat at least 5 servings of fruits and vegetables each day   Eat 3 ounces of whole-grain foods each day  Legumes (beans) are also a good source of fiber  Lifestyle guidelines:   · Do not smoke  Nicotine and other chemicals in cigarettes and cigars can cause lung and heart damage  Ask your healthcare provider for information if you currently smoke and need help to quit  E-cigarettes or smokeless tobacco still contain nicotine  Talk to your healthcare provider before you use these products  · Exercise regularly  to help you maintain a healthy weight and improve your blood pressure and cholesterol levels  Ask your healthcare provider about the best exercise plan for you  Do not start an exercise program without asking your healthcare provider  Follow up with your healthcare provider as directed:  Write down your questions so you remember to ask them during your visits  © 2017 [facility] [facility] Information is for End User's use only and may not be sold, redistributed or otherwise used for commercial purposes  All illustrations and images included in CareNotes® are the copyrighted property of [employer]  or [employer]  The above information is an  only  It is not intended as medical advice for individual conditions or treatments  Talk to your doctor, nurse or pharmacist before following any medical regimen to see if it is safe and effective for you

## 2020-02-09 VITALS
HEIGHT: 66 IN | RESPIRATION RATE: 18 BRPM | SYSTOLIC BLOOD PRESSURE: 140 MMHG | OXYGEN SATURATION: 97 % | DIASTOLIC BLOOD PRESSURE: 78 MMHG | HEART RATE: 82 BPM | BODY MASS INDEX: 45.96 KG/M2 | TEMPERATURE: 97 F | WEIGHT: 286 LBS

## 2020-02-09 PROBLEM — R29.6 FALLS FREQUENTLY: Status: ACTIVE | Noted: 2020-02-09

## 2020-02-10 ENCOUNTER — PATIENT OUTREACH (OUTPATIENT)
Dept: FAMILY MEDICINE CLINIC | Facility: CLINIC | Age: 60
End: 2020-02-10

## 2020-02-10 NOTE — PROGRESS NOTES
Called patient for the first time but received voicemail  I left a message to return my call  Will continue further outreach  Chart reviewed

## 2020-02-13 ENCOUNTER — PATIENT OUTREACH (OUTPATIENT)
Dept: FAMILY MEDICINE CLINIC | Facility: CLINIC | Age: 60
End: 2020-02-13

## 2020-02-13 NOTE — PROGRESS NOTES
I returned a call from patient leaving a message on my voicemail last night  Spoke to her for the first time as OP RN CM  Patient reports she has been doing well and blood sugars are stable; last night's reading was 121 and this morning was 105  Patient states she does not stick her fingers to check her blood sugars but instead sticks her arms  She notes that she uses her hands a lot at work and does not want them to get infected  She states her goal is to reduce her A1C even more than it has; she notes it was over 11 and wants it at 6 or 7  She states she has changed her diet but does not watch her portion size and she is not concerned about that  She notes she lost a lot of weight over the past from 427 to 280  I congratulated her on this and encouraged her to continue as she will see an improvement in both her sugar readings/A1C as well as her blood pressure readings  Patient admits to not exercising as she does not like it  She notes she "gets enough" when she is with her grandchildren  Patient reports a history of profuse sweating during the night  She notes she had a hyster before age 37  She also gives a history of frequent falls and she believes it is not related to low blood sugars (patient knows the symptoms of hypoglycemia and will take glucose tablets when she feels symptomatic--she states she becomes "disoriented and shakes")  She was ordered an echo but cannot schedule until she obtains insurance (she is currently working with the financial counselors)  Patient went into great detail about her father's mental history and his extensive cardiac history and she believes her syncope may be related to her heart  Patient was aware of her mammogram appointment for tomorrow as well as her PCP follow up next month  Patient agreed for further outreach  She has my contact information if she needs assistance

## 2020-02-14 ENCOUNTER — HOSPITAL ENCOUNTER (OUTPATIENT)
Dept: MAMMOGRAPHY | Facility: MEDICAL CENTER | Age: 60
Discharge: HOME | End: 2020-02-14

## 2020-02-14 VITALS — BODY MASS INDEX: 44.73 KG/M2 | WEIGHT: 285 LBS | HEIGHT: 67 IN

## 2020-02-14 DIAGNOSIS — Z12.31 VISIT FOR SCREENING MAMMOGRAM: ICD-10-CM

## 2020-02-14 PROCEDURE — 77067 SCR MAMMO BI INCL CAD: CPT

## 2020-02-14 PROCEDURE — 77063 BREAST TOMOSYNTHESIS BI: CPT

## 2020-03-02 ENCOUNTER — PATIENT OUTREACH (OUTPATIENT)
Dept: FAMILY MEDICINE CLINIC | Facility: CLINIC | Age: 60
End: 2020-03-02

## 2020-03-02 DIAGNOSIS — Z78.9 NEED FOR FOLLOW-UP BY SOCIAL WORKER: Primary | ICD-10-CM

## 2020-03-02 NOTE — PROGRESS NOTES
Called patient to follow up  She states she received a bill from [employer] for $400 which she cannot afford  She notes she will be taking papers to the financial counselors Friday  She states she wants to cancel her next appointment because she cannot afford to continue receiving bills  I explained that the financial counselors should be able to assist her and we need to continue following up on her complex medical issues  I will contact the financial counselors regarding this  Patient states her blood sugars have been doing good; the morning readings are 120-125 but at night they tend to be higher  She notes she is watching her diet  Patient reports she fell again last Monday and hit her head and knee  She did not seek medical attention  She states the fall did not cause any bleeding and she denied any symptoms afterwards  She notes she has been using a walker which I instructed her to continue using to avoid additional falls  She notes she feels weak and her handwriting has changed  She states she has not seen neurology and cannot because of the cost   Will continue to follow

## 2020-03-05 ENCOUNTER — PATIENT OUTREACH (OUTPATIENT)
Dept: FAMILY MEDICINE CLINIC | Facility: CLINIC | Age: 60
End: 2020-03-05

## 2020-03-06 ENCOUNTER — PATIENT OUTREACH (OUTPATIENT)
Dept: FAMILY MEDICINE CLINIC | Facility: CLINIC | Age: 60
End: 2020-03-06

## 2020-03-06 NOTE — PROGRESS NOTES
OP CM called to pt to follow up on consult for financial difficulties  Pt states her and her hsb are overincome for medicaid  Pt has an appt today at 1pm to apply for rony care  Pt states her medication is affordable  Pt cannot afford insurance through the marketplace  Pt denies any other psychosocial issues at this time  OP CM will close case at this time but will remain available for any future psychosocial concerns

## 2020-03-09 ENCOUNTER — APPOINTMENT (EMERGENCY)
Dept: CT IMAGING | Facility: HOSPITAL | Age: 60
DRG: 059 | End: 2020-03-09

## 2020-03-09 ENCOUNTER — HOSPITAL ENCOUNTER (INPATIENT)
Facility: HOSPITAL | Age: 60
LOS: 4 days | Discharge: HOME HEALTH | DRG: 059 | End: 2020-03-15
Attending: EMERGENCY MEDICINE | Admitting: INTERNAL MEDICINE
Payer: COMMERCIAL

## 2020-03-09 DIAGNOSIS — R53.1 ACUTE RIGHT-SIDED WEAKNESS: ICD-10-CM

## 2020-03-09 DIAGNOSIS — E11.65 TYPE 2 DIABETES MELLITUS WITH HYPERGLYCEMIA, WITHOUT LONG-TERM CURRENT USE OF INSULIN (HCC): Chronic | ICD-10-CM

## 2020-03-09 DIAGNOSIS — I10 HTN (HYPERTENSION): ICD-10-CM

## 2020-03-09 DIAGNOSIS — R20.0 RIGHT SIDED NUMBNESS: Primary | ICD-10-CM
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
## 2020-03-09 DIAGNOSIS — I10 BENIGN ESSENTIAL HYPERTENSION: ICD-10-CM

## 2020-03-09 PROBLEM — R20.2 NUMBNESS AND TINGLING OF RIGHT ARM AND LEG: Status: ACTIVE | Noted: 2020-03-09

## 2020-03-09 PROBLEM — R26.2 AMBULATORY DYSFUNCTION: Chronic | Status: ACTIVE | Noted: 2020-03-09

## 2020-03-09 PROBLEM — R55 RECURRENT SYNCOPE: Chronic | Status: ACTIVE | Noted: 2020-03-09

## 2020-03-09 PROBLEM — E66.01 MORBID OBESITY (HCC): Chronic | Status: ACTIVE | Noted: 2017-05-03

## 2020-03-09 LAB
ANION GAP SERPL CALCULATED.3IONS-SCNC: 10 MMOL/L (ref 4–13)
APTT PPP: 31 SECONDS (ref 23–37)
ATRIAL RATE: 84 BPM
BASOPHILS # BLD AUTO: 0.04 THOUSANDS/ΜL (ref 0–0.1)
BASOPHILS NFR BLD AUTO: 0 % (ref 0–1)
BUN SERPL-MCNC: 10 MG/DL (ref 5–25)
CALCIUM SERPL-MCNC: 10.9 MG/DL (ref 8.3–10.1)
CHLORIDE SERPL-SCNC: 103 MMOL/L (ref 100–108)
CO2 SERPL-SCNC: 26 MMOL/L (ref 21–32)
CREAT SERPL-MCNC: 0.78 MG/DL (ref 0.6–1.3)
EOSINOPHIL # BLD AUTO: 0.18 THOUSAND/ΜL (ref 0–0.61)
EOSINOPHIL NFR BLD AUTO: 2 % (ref 0–6)
ERYTHROCYTE [DISTWIDTH] IN BLOOD BY AUTOMATED COUNT: 13 % (ref 11.6–15.1)
GFR SERPL CREATININE-BSD FRML MDRD: 84 ML/MIN/1.73SQ M
GLUCOSE SERPL-MCNC: 145 MG/DL (ref 65–140)
GLUCOSE SERPL-MCNC: 157 MG/DL (ref 65–140)
GLUCOSE SERPL-MCNC: 213 MG/DL (ref 65–140)
HCT VFR BLD AUTO: 40.9 % (ref 34.8–46.1)
HGB BLD-MCNC: 13.5 G/DL (ref 11.5–15.4)
IMM GRANULOCYTES # BLD AUTO: 0.03 THOUSAND/UL (ref 0–0.2)
IMM GRANULOCYTES NFR BLD AUTO: 0 % (ref 0–2)
INR PPP: 0.98 (ref 0.84–1.19)
LYMPHOCYTES # BLD AUTO: 2.11 THOUSANDS/ΜL (ref 0.6–4.47)
LYMPHOCYTES NFR BLD AUTO: 22 % (ref 14–44)
MCH RBC QN AUTO: 29.2 PG (ref 26.8–34.3)
MCHC RBC AUTO-ENTMCNC: 33 G/DL (ref 31.4–37.4)
MCV RBC AUTO: 88 FL (ref 82–98)
MONOCYTES # BLD AUTO: 0.64 THOUSAND/ΜL (ref 0.17–1.22)
MONOCYTES NFR BLD AUTO: 7 % (ref 4–12)
NEUTROPHILS # BLD AUTO: 6.58 THOUSANDS/ΜL (ref 1.85–7.62)
NEUTS SEG NFR BLD AUTO: 69 % (ref 43–75)
NRBC BLD AUTO-RTO: 0 /100 WBCS
P AXIS: 58 DEGREES
PLATELET # BLD AUTO: 272 THOUSANDS/UL (ref 149–390)
PMV BLD AUTO: 9.1 FL (ref 8.9–12.7)
POTASSIUM SERPL-SCNC: 4 MMOL/L (ref 3.5–5.3)
PR INTERVAL: 152 MS
PROTHROMBIN TIME: 13.1 SECONDS (ref 11.6–14.5)
QRS AXIS: 25 DEGREES
QRSD INTERVAL: 74 MS
QT INTERVAL: 358 MS
QTC INTERVAL: 423 MS
RBC # BLD AUTO: 4.63 MILLION/UL (ref 3.81–5.12)
SODIUM SERPL-SCNC: 139 MMOL/L (ref 136–145)
T WAVE AXIS: 29 DEGREES
VENTRICULAR RATE: 84 BPM
WBC # BLD AUTO: 9.58 THOUSAND/UL (ref 4.31–10.16)

## 2020-03-09 PROCEDURE — 99285 EMERGENCY DEPT VISIT HI MDM: CPT | Performed by: EMERGENCY MEDICINE

## 2020-03-09 PROCEDURE — 85610 PROTHROMBIN TIME: CPT | Performed by: EMERGENCY MEDICINE

## 2020-03-09 PROCEDURE — 36415 COLL VENOUS BLD VENIPUNCTURE: CPT | Performed by: EMERGENCY MEDICINE

## 2020-03-09 PROCEDURE — 80048 BASIC METABOLIC PNL TOTAL CA: CPT | Performed by: EMERGENCY MEDICINE

## 2020-03-09 PROCEDURE — 99285 EMERGENCY DEPT VISIT HI MDM: CPT

## 2020-03-09 PROCEDURE — 93005 ELECTROCARDIOGRAM TRACING: CPT

## 2020-03-09 PROCEDURE — 70450 CT HEAD/BRAIN W/O DYE: CPT

## 2020-03-09 PROCEDURE — 99219 PR INITIAL OBSERVATION CARE/DAY 50 MINUTES: CPT | Performed by: FAMILY MEDICINE

## 2020-03-09 PROCEDURE — 85025 COMPLETE CBC W/AUTO DIFF WBC: CPT | Performed by: EMERGENCY MEDICINE

## 2020-03-09 PROCEDURE — 85730 THROMBOPLASTIN TIME PARTIAL: CPT | Performed by: EMERGENCY MEDICINE

## 2020-03-09 PROCEDURE — 82948 REAGENT STRIP/BLOOD GLUCOSE: CPT

## 2020-03-09 PROCEDURE — 93010 ELECTROCARDIOGRAM REPORT: CPT | Performed by: INTERNAL MEDICINE

## 2020-03-09 RX ORDER — ACETAMINOPHEN 325 MG/1
650 TABLET ORAL EVERY 6 HOURS PRN
Status: DISCONTINUED | OUTPATIENT
Start: 2020-03-09 | End: 2020-03-15 | Stop reason: HOSPADM

## 2020-03-09 RX ORDER — ATORVASTATIN CALCIUM 40 MG/1
40 TABLET, FILM COATED ORAL EVERY EVENING
Status: DISCONTINUED | OUTPATIENT
Start: 2020-03-09 | End: 2020-03-11

## 2020-03-09 RX ORDER — MAGNESIUM HYDROXIDE/ALUMINUM HYDROXICE/SIMETHICONE 120; 1200; 1200 MG/30ML; MG/30ML; MG/30ML
30 SUSPENSION ORAL EVERY 4 HOURS PRN
Status: DISCONTINUED | OUTPATIENT
Start: 2020-03-09 | End: 2020-03-15 | Stop reason: HOSPADM

## 2020-03-09 RX ORDER — METFORMIN HYDROCHLORIDE 500 MG/1
1000 TABLET, EXTENDED RELEASE ORAL 2 TIMES DAILY WITH MEALS
Status: DISCONTINUED | OUTPATIENT
Start: 2020-03-09 | End: 2020-03-10

## 2020-03-09 RX ORDER — ALPRAZOLAM 0.5 MG/1
0.5 TABLET ORAL ONCE
Status: DISCONTINUED | OUTPATIENT
Start: 2020-03-09 | End: 2020-03-10

## 2020-03-09 RX ORDER — ASPIRIN 81 MG/1
324 TABLET, CHEWABLE ORAL ONCE
Status: COMPLETED | OUTPATIENT
Start: 2020-03-09 | End: 2020-03-09

## 2020-03-09 RX ORDER — LISINOPRIL 5 MG/1
10 TABLET ORAL DAILY
Status: DISCONTINUED | OUTPATIENT
Start: 2020-03-09 | End: 2020-03-13

## 2020-03-09 RX ORDER — ASPIRIN 81 MG/1
81 TABLET, CHEWABLE ORAL DAILY
Status: DISCONTINUED | OUTPATIENT
Start: 2020-03-09 | End: 2020-03-11

## 2020-03-09 RX ORDER — HYDRALAZINE HYDROCHLORIDE 20 MG/ML
10 INJECTION INTRAMUSCULAR; INTRAVENOUS EVERY 6 HOURS PRN
Status: DISCONTINUED | OUTPATIENT
Start: 2020-03-09 | End: 2020-03-15 | Stop reason: HOSPADM

## 2020-03-09 RX ORDER — ONDANSETRON 2 MG/ML
4 INJECTION INTRAMUSCULAR; INTRAVENOUS EVERY 4 HOURS PRN
Status: DISCONTINUED | OUTPATIENT
Start: 2020-03-09 | End: 2020-03-15 | Stop reason: HOSPADM

## 2020-03-09 RX ORDER — DOCUSATE SODIUM 100 MG/1
100 CAPSULE, LIQUID FILLED ORAL 2 TIMES DAILY PRN
Status: DISCONTINUED | OUTPATIENT
Start: 2020-03-09 | End: 2020-03-15 | Stop reason: HOSPADM

## 2020-03-09 RX ADMIN — LISINOPRIL 10 MG: 5 TABLET ORAL at 15:23

## 2020-03-09 RX ADMIN — ACETAMINOPHEN 650 MG: 325 TABLET ORAL at 23:40

## 2020-03-09 RX ADMIN — ENOXAPARIN SODIUM 40 MG: 40 INJECTION SUBCUTANEOUS at 15:22

## 2020-03-09 RX ADMIN — ASPIRIN 81 MG 81 MG: 81 TABLET ORAL at 15:23

## 2020-03-09 RX ADMIN — ASPIRIN 81 MG 324 MG: 81 TABLET ORAL at 12:01

## 2020-03-09 RX ADMIN — METFORMIN HYDROCHLORIDE 1000 MG: 500 TABLET, EXTENDED RELEASE ORAL at 17:45

## 2020-03-09 RX ADMIN — ACETAMINOPHEN 650 MG: 325 TABLET ORAL at 15:22

## 2020-03-09 RX ADMIN — ATORVASTATIN CALCIUM 40 MG: 40 TABLET, FILM COATED ORAL at 17:45

## 2020-03-09 NOTE — H&P
H&P Exam - Kimberly Cerda 63 y o  female MRN: [id number]    Unit/Bed#: ED 22 Encounter: [id number]      Assessment/Plan       * Numbness and tingling of right arm and leg  Assessment & Plan  Patient presents to ER for right-sided numbness; she also reports a history of recurrent syncopal episodes  Syncopal episodes have reportedly occurred for years, and over past few months have increased in frequency to every few weeks  Patient denies any presyncopal symptoms or provoking factors  She reports RUE numbness ("pins and needles") and difficulty coordinating RUE movement for approximately 2 weeks  She attributed this to pulling herself up from the ground after a syncopal episode  She reports RLE and right trunk numbness started 3 days ago when she was rising from a seated to standing position  She has had difficulty walking since due to decreased sensation, and reports recurrent spasms in right thigh, flexing hip involuntarily  Patient denies any facial numbness, facial droop, change in speech/vision, nausea/vomiting, vertigo, or headaches  Basic neurologic exam shows sensory deficit to light touch throughout right upper/lower extremities  Exam of strength and DTRs equivocal   Unclear how much weakness is true vs lack of effort  Patient also holding right foot in plantar flexion during attempts to check lower extremity reflexes (not flexed prior to exam)  Initial imaging reassuring  ER provider did not administer tPA due to non-disabling symptoms  Will admit for stroke pathway, including clinical monitoring and telemetry  Obtain MRI brain and TTE  Consult Neurology, PT, and OT  Recurrent syncope  Assessment & Plan  Patient reports recurrent syncopal episodes occurring for years  Episodes reportedly increasing in frequency to every few weeks  Patient denies any prior evaluation or treatment  Will evaluate in conjunction with patient's report of right-sided numbness        Ambulatory dysfunction  Assessment & Plan  Patient reports difficulty walking over past few months due to left knee injury sustained during syncopal episode  No reported prior treatment  Patient wears a knee brace as needed for support  Will consult PT/OT for evaluation, treatment, and recommendations regarding post-acute care  Morbid obesity with BMI of 45 0-49 9, adult [facility])  Assessment & Plan  BMI 45 2  Consult dietitian  Encourage lifestyle modifications  Type 2 diabetes mellitus with hyperglycemia, without long-term current use of insulin (HCC)  Assessment & Plan  Lab Results   Component Value Date    HGBA1C 9 7 (A) 02/07/2020       No results for input(s): POCGLU in the last 72 hours  Blood Sugar Average: Last 72 hrs:     Diabetes uncontrolled as evidenced by elevated A1c  Continue outpatient dose of metformin  Start insulin protocol with blood glucose AC and HS  History of Present Illness   HPI:  Claire Soto is a 63 y o  female who presents  for right-sided numbness; she also reports a history of recurrent syncopal episodes  Syncopal episodes have reportedly occurred for years, and over past few months have increased in frequency to every few weeks  Patient denies any presyncopal symptoms or provoking factors  She reports RUE numbness ("pins and needles") and difficulty coordinating RUE movement for approximately 2 weeks  She attributed this to pulling herself up from the ground after a syncopal episode  She reports RLE and right trunk numbness started 3 days ago when she was rising from a seated to standing position  She has had difficulty walking since due to decreased sensation, and reports recurrent spasms in right thigh, flexing hip involuntarily  Patient denies any facial numbness, facial droop, change in speech/vision, nausea/vomiting, vertigo, or headaches  PCP: DEANA Russ    Review of Systems   Constitutional: Negative for chills, diaphoresis and fever  Respiratory: Negative for cough and shortness of breath  Cardiovascular: Negative for chest pain, palpitations and leg swelling  Gastrointestinal: Negative for nausea and vomiting  Neurological: Positive for syncope, weakness and numbness  Negative for dizziness, tremors, seizures, facial asymmetry, speech difficulty, light-headedness and headaches  All other systems reviewed and are negative  Historical Information   Past Medical History:   Diagnosis Date    Diabetes mellitus ([place] [address] )     External hemorrhoids     last assessed 16, resolved 16    Hernia, ventral     last assessed 12/14/15, resolved 16    Hypertension     Incarcerated incisional hernia     last assessed 12/21/15, resolved 16    Insomnia     last assessed 16, resolved 10/9/17    Morbid obesity with BMI of 45 0-49 9, adult ([place] [address] ) 5/3/2017    Type 2 diabetes mellitus with hyperglycemia, without long-term current use of insulin ([place] [address] )      Past Surgical History:   Procedure Laterality Date    ABCESS DRAINAGE       SECTION      x3    COLONOSCOPY N/A 2017    Procedure: COLONOSCOPY with biopsy;  Surgeon: Nayla Garner DO;  Location: [facility];   Service: Complete    HYSTERECTOMY       Social History   Social History     Substance and Sexual Activity   Alcohol Use No    Comment: Social     Social History     Substance and Sexual Activity   Drug Use No     Social History     Tobacco Use   Smoking Status Never Smoker   Smokeless Tobacco Never Used     E-Cigarette/Vaping      E-Cigarette/Vaping Substances     Family History:   Family History   Problem Relation Age of Onset    Glaucoma Mother     Diabetes Father     Hypertension Father     Pancreatic cancer Father     Breast cancer Maternal Grandmother     Breast cancer Maternal Aunt     Coronary artery disease Family     Diabetes Family     Hypertension Family     Cervical cancer Sister 61    Breast cancer Sister 50    Breast cancer Sister 45       Meds/Allergies   all medications and allergies reviewed  Allergies   Allergen Reactions    Ambien [Zolpidem Tartrate]     Ativan [Lorazepam] Other (See Comments)     Hallucination    Demerol [Meperidine]     Insulin     Insulin Glargine      Annotation - [id number]: memory loss    Latex     Oxycodone-Acetaminophen Vomiting    Zolpidem Hallucinations and Irritability       Objective   Vitals: Blood pressure 162/98, pulse 89, temperature 97 8 °F (36 6 °C), temperature source Oral, resp  rate 18, weight 130 kg (285 lb 11 5 oz), SpO2 96 %, not currently breastfeeding  No intake or output data in the 24 hours ending 03/09/20 1234    Invasive Devices     Peripheral Intravenous Line            Peripheral IV 03/09/20 Left Antecubital less than 1 day                Physical Exam   Constitutional: She is oriented to person, place, and time  She appears well-developed  No distress  Morbidly obese female lying in hospital bed  Appears older than chronological age  No acute distress  HENT:   Head: Normocephalic and atraumatic  Right Ear: External ear normal    Left Ear: External ear normal    Nose: Nose normal    Eyes: Pupils are equal, round, and reactive to light  Conjunctivae and EOM are normal  Right eye exhibits no discharge  Left eye exhibits no discharge  No scleral icterus  Neck: No JVD present  No tracheal deviation present  No thyromegaly present  Cardiovascular: Normal rate, regular rhythm, normal heart sounds and intact distal pulses  Exam reveals no gallop and no friction rub  No murmur heard  Pulmonary/Chest: Effort normal and breath sounds normal  No stridor  No respiratory distress  She has no wheezes  She has no rales  She exhibits no tenderness  Abdominal: Soft  Bowel sounds are normal  She exhibits no distension and no mass  There is no tenderness  There is no rebound and no guarding  Musculoskeletal: She exhibits no edema, tenderness or deformity  Lymphadenopathy:     She has no cervical adenopathy  Neurological: She is alert and oriented to person, place, and time  She displays no atrophy and no tremor  A sensory deficit is present  No cranial nerve deficit  She exhibits normal muscle tone  She displays no seizure activity  GCS eye subscore is 4  GCS verbal subscore is 5  GCS motor subscore is 6  She displays no Babinski's sign on the right side  She displays no Babinski's sign on the left side  Reflex Scores:       Tricep reflexes are 1+ on the right side and 1+ on the left side  Bicep reflexes are 1+ on the right side  Brachioradialis reflexes are 1+ on the right side and 2+ on the left side  Patellar reflexes are 1+ on the right side and 1+ on the left side  Decreased sensation to light touch throughout RUE/RLE  Patient displays lack of effort on all muscle strength testing in RUE/RLE; questionable subtle deficits  Patient frequently flexing right hip and moaning in pain at same time, though no palpable/visible spasm appreciated  Unable to check left biceps DTR due to IV placement  Unable to check for ankle clonus due to patient plantar flexing right foot during exam    Skin: Skin is warm and dry  Capillary refill takes less than 2 seconds  No rash noted  She is not diaphoretic  No erythema  No pallor  Psychiatric: She has a normal mood and affect  Her behavior is normal  Judgment and thought content normal    Nursing note and vitals reviewed  Lab Results: I have personally reviewed pertinent reports  Imaging: I have personally reviewed pertinent reports  and I have personally reviewed pertinent films in PACS  EKG, Pathology, and Other Studies: I have personally reviewed pertinent reports  Code Status: Level 3 - DNAR and DNI  Advance Directive and Living Will:      Power of :    POLST:      Counseling / Coordination of Care  Total floor / unit time spent today 45 minutes    Greater than 50% of total time was spent with the patient and / or family counseling and / or coordination of care  A description of the counseling / coordination of care:  Greater than 25 minutes spent with this patient discussing diagnosis, prognosis, and plan of care

## 2020-03-09 NOTE — ED PROVIDER NOTES
History  Chief Complaint   Patient presents with    Fall     Pt brought via EMS  Pt fell around 5 am  Denies LOC  Denies hitting head  Pt reports falling 2 weeks ago, increased right sided numbness / weakness since previous fall (2 weeks ago)      61-year-old female presents for evaluation of right-sided numbness and weakness  Patient states she suffered a fall 2 weeks ago and has had the symptoms getting progressively worse since  It extends from the right arm and the right side of the body into the right foot  These, symptoms are getting progressively worse without modifying factors  She states it caused her to fall again today  She denies striking her head, loss of consciousness, traumatic injuries  Patient denies other focal neuro sore weakness, neck pain or neck stiffness, cough, UR symptoms, traumatic injuries      History provided by:  Patient  Fall   Associated symptoms: no abdominal pain, no chest pain, no headaches, no nausea, no neck pain, no seizures and no vomiting        Prior to Admission Medications   Prescriptions Last Dose Informant Patient Reported?  Taking?   lisinopril (ZESTRIL) 10 mg tablet   No Yes   Sig: Take 1 tablet (10 mg total) by mouth daily for 90 days   metFORMIN (GLUCOPHAGE-XR) 500 mg 24 hr tablet 3/9/2020 at Unknown time  No Yes   Sig: Take 2 tablets (1,000 mg total) by mouth 2 (two) times a day with meals      Facility-Administered Medications: None       Past Medical History:   Diagnosis Date    Diabetes mellitus ([facility] [address] )     External hemorrhoids     last assessed 16, resolved 16    Hernia, ventral     last assessed 12/14/15, resolved 16    Hypertension     Incarcerated incisional hernia     last assessed 12/21/15, resolved 16    Insomnia     last assessed 16, resolved 10/9/17       Past Surgical History:   Procedure Laterality Date    ABCESS DRAINAGE       SECTION      x3    COLONOSCOPY N/A 2017    Procedure: COLONOSCOPY with biopsy;  Surgeon: Min Bill DO;  Location: [facility]; Service: Complete    HYSTERECTOMY         Family History   Problem Relation Age of Onset    Glaucoma Mother     Diabetes Father     Hypertension Father     Pancreatic cancer Father     Breast cancer Maternal Grandmother     Breast cancer Maternal Aunt     Coronary artery disease Family     Diabetes Family     Hypertension Family     Cervical cancer Sister 61    Breast cancer Sister 50    Breast cancer Sister 45     I have reviewed and agree with the history as documented  E-Cigarette/Vaping     E-Cigarette/Vaping Substances     Social History     Tobacco Use    Smoking status: Never Smoker    Smokeless tobacco: Never Used   Substance Use Topics    Alcohol use: No     Comment: Social    Drug use: No       Review of Systems   Constitutional: Negative for activity change, appetite change, fatigue and fever  HENT: Negative for congestion, dental problem, ear pain, rhinorrhea and sore throat  Eyes: Negative for pain and redness  Respiratory: Negative for chest tightness, shortness of breath and wheezing  Cardiovascular: Negative for chest pain and palpitations  Gastrointestinal: Negative for abdominal pain, blood in stool, constipation, diarrhea, nausea and vomiting  Endocrine: Negative for cold intolerance and heat intolerance  Genitourinary: Positive for flank pain  Negative for dysuria, frequency and hematuria  Musculoskeletal: Negative for arthralgias, myalgias, neck pain and neck stiffness  Skin: Negative for color change, pallor and rash  Neurological: Positive for weakness and numbness (Tingling)  Negative for dizziness, tremors, seizures, syncope, facial asymmetry, speech difficulty, light-headedness and headaches  Hematological: Does not bruise/bleed easily  Psychiatric/Behavioral: Negative for agitation, hallucinations and suicidal ideas         Physical Exam  Physical Exam   Constitutional: She appears well-developed and well-nourished  HENT:   Normocephalic/atraumatic  There is no facial bone tenderness palpation  No facial bone tenderness  No armenta sign, no raccoon eyes, no hemotympanum  Nose is atraumatic  No evidence of epistaxis  Eyes:   Patient has painless full range of motion in both her eyes  Normal visual fields  No hyphema noted  Neck: No tracheal deviation present  Patient is nontender palpation over her cervical, thoracic, lumbar spines  There is no step-offs, no deformities  Cardiovascular:   No JVD noted  Heart sounds are normal  Regular rate rate and rhythm  Symmetric strong distal pulses  Pulmonary/Chest:   Chest wall is stable and nontender palpation  There is no crepitus noted  Patient has symmetric chest wall expansion  No flail segment noted clear and equal breath sounds bilateral    Abdominal:   No external signs of trauma noted on the abdomen/pelvis  Patient is nontender palpation of the abdomen  There is no rebound, guarding, CVA tenderness  Abdomen is nondistended  Pelvis stable, nttp  Musculoskeletal:   Right upper extremity has full range of motion without pain  There is no tenderness palpation noted  Patient has no external signs of trauma  Patient is neurovascularly intact in this extremity  Left upper extremity has full range of motion without pain  There is no tenderness palpation noted  Patient has no external signs of trauma  Patient is neurovascularly intact in this extremity  Right lower extremity has full range of motion without pain  There is no tenderness palpation noted  Patient has no external signs of trauma  Patient is neurovascularly intact in this extremity  Left Lower  extremity has full range of motion without pain  There is no tenderness palpation noted  Patient has no external signs of trauma  Patient is neurovascularly intact in this extremity  Neurological:   Alert and oriented ×3  Normal mental status exam  Normal cranial nerves II through XII  Normal sensation and strength throughout  Normal cerebellar exam  GCS 15  Skin:   No external signs of trauma  Psychiatric: She has a normal mood and affect  Her behavior is normal  Judgment normal    Nursing note and vitals reviewed        Vital Signs  ED Triage Vitals [03/09/20 0821]   Temperature Pulse Respirations Blood Pressure SpO2   97 8 °F (36 6 °C) 91 18 (!) 180/86 94 %      Temp Source Heart Rate Source Patient Position - Orthostatic VS BP Location FiO2 (%)   Oral Monitor Lying Left arm --      Pain Score       No Pain           Vitals:    03/09/20 0821 03/09/20 1203   BP: (!) 180/86 162/98   Pulse: 91 89   Patient Position - Orthostatic VS: Lying Lying         Visual Acuity  Visual Acuity      Most Recent Value   L Pupil Size (mm)  2   R Pupil Size (mm)  2          ED Medications  Medications   aspirin chewable tablet 324 mg (324 mg Oral Given 3/9/20 1201)       Diagnostic Studies  Results Reviewed     Procedure Component Value Units Date/Time    Protime-INR [[id number]]  (Normal) Collected:  03/09/20 0907    Lab Status:  Final result Specimen:  Blood from Arm, Left Updated:  03/09/20 1002     Protime 13 1 seconds      INR 0 98    APTT [[id number]]  (Normal) Collected:  03/09/20 0907    Lab Status:  Final result Specimen:  Blood from Arm, Left Updated:  03/09/20 1002     PTT 31 seconds     Basic metabolic panel [[id number]]  (Abnormal) Collected:  03/09/20 0907    Lab Status:  Final result Specimen:  Blood from Arm, Left Updated:  03/09/20 1001     Sodium 139 mmol/L      Potassium 4 0 mmol/L      Chloride 103 mmol/L      CO2 26 mmol/L      ANION GAP 10 mmol/L      BUN 10 mg/dL      Creatinine 0 78 mg/dL      Glucose 213 mg/dL      Calcium 10 9 mg/dL      eGFR 84 ml/min/1 73sq m     Narrative:       [place] guidelines for Chronic Kidney Disease (CKD):     Stage 1 with normal or high GFR (GFR > 90 mL/min/1 73 square meters)    Stage 2 Mild CKD (GFR = 60-89 mL/min/1 73 square meters)    Stage 3A Moderate CKD (GFR = 45-59 mL/min/1 73 square meters)    Stage 3B Moderate CKD (GFR = 30-44 mL/min/1 73 square meters)    Stage 4 Severe CKD (GFR = 15-29 mL/min/1 73 square meters)    Stage 5 End Stage CKD (GFR <15 mL/min/1 73 square meters)  Note: GFR calculation is accurate only with a steady state creatinine    CBC and differential [[id number]] Collected:  03/09/20 0907    Lab Status:  Final result Specimen:  Blood from Arm, Left Updated:  03/09/20 0914     WBC 9 58 Thousand/uL      RBC 4 63 Million/uL      Hemoglobin 13 5 g/dL      Hematocrit 40 9 %      MCV 88 fL      MCH 29 2 pg      MCHC 33 0 g/dL      RDW 13 0 %      MPV 9 1 fL      Platelets 081 Thousands/uL      nRBC 0 /100 WBCs      Neutrophils Relative 69 %      Immat GRANS % 0 %      Lymphocytes Relative 22 %      Monocytes Relative 7 %      Eosinophils Relative 2 %      Basophils Relative 0 %      Neutrophils Absolute 6 58 Thousands/µL      Immature Grans Absolute 0 03 Thousand/uL      Lymphocytes Absolute 2 11 Thousands/µL      Monocytes Absolute 0 64 Thousand/µL      Eosinophils Absolute 0 18 Thousand/µL      Basophils Absolute 0 04 Thousands/µL     POCT urinalysis dipstick [[id number]]     Lab Status:  No result Specimen:  Urine                  CT head without contrast   Final Result by Juanita Mabry DO (03/09 5243)      No acute intracranial abnormality  Microangiopathic changes  Workstation performed: [id number]                    Procedures  Procedures         ED Course                               MDM  Number of Diagnoses or Management Options  Diagnosis management comments: Fall with reported right-sided weakness and numbness with a benign neuro exam NIH stroke scale of 0-will do cardiac workup, CT head  If workup is negative will do ambulatory challenge  Patient is able ambulate will discharged home with outpatient follow-up          Disposition  Final diagnoses:   Right sided numbness   Acute right-sided weakness   Fall, initial encounter   HTN (hypertension)     Time reflects when diagnosis was documented in both MDM as applicable and the Disposition within this note     Time User Action Codes Description Comment    3/9/2020 12:27 PM Gearldean Link Add [R20 0] Right sided numbness     3/9/2020 12:27 PM Kwaku Marshall Add [R53 1] Acute right-sided weakness     3/9/2020 12:27 PM Gearldean Link Add [S38  XXXA] Fall, initial encounter     3/9/2020 12:28 PM Kwaku Marshall Add [I10] HTN (hypertension)       ED Disposition     ED Disposition Condition Date/Time Comment    Admit Stable Mon Mar 9, 2020 12:27 PM Case was discussed with Dr Robyn Sheridan and the patient's admission status was agreed to be Admission Status: observation status to the service of Dr Robyn Sheridan   Follow-up Information    None         Patient's Medications   Discharge Prescriptions    No medications on file     No discharge procedures on file      PDMP Review     None          ED Provider  Electronically Signed by           Madelin Westbrook MD  03/09/20 9959

## 2020-03-09 NOTE — ASSESSMENT & PLAN NOTE
Patient presents to ER for right-sided numbness; she also reports a history of recurrent syncopal episodes  Syncopal episodes have reportedly occurred for years, and over past few months have increased in frequency to every few weeks  Patient denies any presyncopal symptoms or provoking factors  She reports RUE numbness ("pins and needles") and difficulty coordinating RUE movement for approximately 2 weeks  She attributed this to pulling herself up from the ground after a syncopal episode  She reports RLE and right trunk numbness started 3 days ago when she was rising from a seated to standing position  She has had difficulty walking since due to decreased sensation, and reports recurrent spasms in right thigh, flexing hip involuntarily  Patient denies any facial numbness, facial droop, change in speech/vision, nausea/vomiting, vertigo, or headaches      -CT head negative  -neurology consultation appreciated  -MRI brain and C-spine  -echocardiogram  -frequent neuro checks  -continue aspirin, statin

## 2020-03-09 NOTE — ASSESSMENT & PLAN NOTE
Lab Results   Component Value Date    HGBA1C 9 7 (A) 02/07/2020        -hold metformin  -monitor Accu-Cheks, sliding scale for coverage

## 2020-03-09 NOTE — ASSESSMENT & PLAN NOTE
Patient reports recurrent syncopal episodes occurring for years  Episodes reportedly increasing in frequency to every few weeks  Patient denies any prior evaluation or treatment  Will evaluate in conjunction with patient's report of right-sided numbness

## 2020-03-09 NOTE — PLAN OF CARE
Problem: Potential for Falls  Goal: Patient will remain free of falls  Description  INTERVENTIONS:  - Assess patient frequently for physical needs  -  Identify cognitive and physical deficits and behaviors that affect risk of falls    -  [place] fall precautions as indicated by assessment   - Educate patient/family on patient safety including physical limitations  - Instruct patient to call for assistance with activity based on assessment  - Modify environment to reduce risk of injury  - Consider OT/PT consult to assist with strengthening/mobility  3/9/2020 1348 by Teresa Fields RN  Outcome: Progressing  3/9/2020 1337 by Teresa Fields RN  Outcome: Progressing     Problem: Prexisting or High Potential for Compromised Skin Integrity  Goal: Skin integrity is maintained or improved  Description  INTERVENTIONS:  - Identify patients at risk for skin breakdown  - Assess and monitor skin integrity  - Assess and monitor nutrition and hydration status  - Monitor labs   - Assess for incontinence   - Turn and reposition patient  - Assist with mobility/ambulation  - Relieve pressure over bony prominences  - Avoid friction and shearing  - Provide appropriate hygiene as needed including keeping skin clean and dry  - Evaluate need for skin moisturizer/barrier cream  - Collaborate with interdisciplinary team   - Patient/family teaching  - Consider wound care consult   3/9/2020 1348 by Teresa Fields RN  Outcome: Progressing  3/9/2020 1337 by Teresa Fields RN  Outcome: Progressing     Problem: DISCHARGE PLANNING - CARE MANAGEMENT  Goal: Discharge to post-acute care or home with appropriate resources  Description  INTERVENTIONS:  - Conduct assessment to determine patient/family and health care team treatment goals, and need for post-acute services based on payer coverage, community resources, and patient preferences, and barriers to discharge  - Address psychosocial, clinical, and financial barriers to discharge as identified in assessment in conjunction with the patient/family and health care team  - Arrange appropriate level of post-acute services according to patients   needs and preference and payer coverage in collaboration with the physician and health care team  - Communicate with and update the patient/family, physician, and health care team regarding progress on the discharge plan  - Arrange appropriate transportation to post-acute venues  3/9/2020 1348 by Karl Enciso RN  Outcome: Progressing  3/9/2020 1337 by Karl Enciso RN  Outcome: Progressing     Problem: PAIN - ADULT  Goal: Verbalizes/displays adequate comfort level or baseline comfort level  Description  Interventions:  - Encourage patient to monitor pain and request assistance  - Assess pain using appropriate pain scale  - Administer analgesics based on type and severity of pain and evaluate response  - Implement non-pharmacological measures as appropriate and evaluate response  - Consider cultural and social influences on pain and pain management  - Notify physician/advanced practitioner if interventions unsuccessful or patient reports new pain  3/9/2020 1348 by Karl Enciso RN  Outcome: Progressing  3/9/2020 1337 by Karl Enciso RN  Outcome: Progressing     Problem: SAFETY ADULT  Goal: Patient will remain free of falls  Description  INTERVENTIONS:  - Assess patient frequently for physical needs  -  Identify cognitive and physical deficits and behaviors that affect risk of falls    -  [place] fall precautions as indicated by assessment   - Educate patient/family on patient safety including physical limitations  - Instruct patient to call for assistance with activity based on assessment  - Modify environment to reduce risk of injury  - Consider OT/PT consult to assist with strengthening/mobility  3/9/2020 1348 by Karl Enciso RN  Outcome: Progressing  3/9/2020 1337 by Karl Enciso RN  Outcome: Progressing  Goal: Maintain or return to baseline ADL function  Description  INTERVENTIONS:  -  Assess patient's ability to carry out ADLs; assess patient's baseline for ADL function and identify physical deficits which impact ability to perform ADLs (bathing, care of mouth/teeth, toileting, grooming, dressing, etc )  - Assess/evaluate cause of self-care deficits   - Assess range of motion  - Assess patient's mobility; develop plan if impaired  - Assess patient's need for assistive devices and provide as appropriate  - Encourage maximum independence but intervene and supervise when necessary  - Involve family in performance of ADLs  - Assess for home care needs following discharge   - Consider OT consult to assist with ADL evaluation and planning for discharge  - Provide patient education as appropriate  3/9/2020 1348 by Amirah Oliveira RN  Outcome: Progressing  3/9/2020 1337 by Amirah Oliveira RN  Outcome: Progressing  Goal: Maintain or return mobility status to optimal level  Description  INTERVENTIONS:  - Assess patient's baseline mobility status (ambulation, transfers, stairs, etc )    - Identify cognitive and physical deficits and behaviors that affect mobility  - Identify mobility aids required to assist with transfers and/or ambulation (gait belt, sit-to-stand, lift, walker, cane, etc )  - [place] fall precautions as indicated by assessment  - Record patient progress and toleration of activity level on Mobility SBAR; progress patient to next Phase/Stage  - Instruct patient to call for assistance with activity based on assessment  - Consider rehabilitation consult to assist with strengthening/weightbearing, etc   3/9/2020 1348 by Amirah Oliveira RN  Outcome: Progressing  3/9/2020 1337 by Amirah Oliveira RN  Outcome: Progressing     Problem: DISCHARGE PLANNING  Goal: Discharge to home or other facility with appropriate resources  Description  INTERVENTIONS:  - Identify barriers to discharge w/patient and caregiver  - Arrange for needed discharge resources and transportation as appropriate  - Identify discharge learning needs (meds, wound care, etc )  - Arrange for interpretive services to assist at discharge as needed  - Refer to Case Management Department for coordinating discharge planning if the patient needs post-hospital services based on physician/advanced practitioner order or complex needs related to functional status, cognitive ability, or social support system  3/9/2020 1348 by Lorena Petit RN  Outcome: Progressing  3/9/2020 1337 by Lorena Petit RN  Outcome: Progressing     Problem: Knowledge Deficit  Goal: Patient/family/caregiver demonstrates understanding of disease process, treatment plan, medications, and discharge instructions  Description  Complete learning assessment and assess knowledge base    Interventions:  - Provide teaching at level of understanding  - Provide teaching via preferred learning methods  3/9/2020 1348 by Lorena Petit RN  Outcome: Progressing  3/9/2020 1337 by Lorena Petit RN  Outcome: Progressing     Problem: NEUROSENSORY - ADULT  Goal: Achieves stable or improved neurological status  Description  INTERVENTIONS  - Monitor and report changes in neurological status  - Monitor vital signs such as temperature, blood pressure, glucose, and any other labs ordered   - Initiate measures to prevent increased intracranial pressure  - Monitor for seizure activity and implement precautions if appropriate      3/9/2020 1348 by Lorena Petit RN  Outcome: Progressing  3/9/2020 1337 by Lorena Petit RN  Outcome: Progressing  Goal: Remains free of injury related to seizures activity  Description  INTERVENTIONS  - Maintain airway, patient safety  and administer oxygen as ordered  - Monitor patient for seizure activity, document and report duration and description of seizure to physician/advanced practitioner  - If seizure occurs,  ensure patient safety during seizure  - Reorient patient post seizure  - Seizure pads on all 4 side rails  - Instruct patient/family to notify RN of any seizure activity including if an aura is experienced  - Instruct patient/family to call for assistance with activity based on nursing assessment  - Administer anti-seizure medications if ordered    3/9/2020 1348 by Tan Alejo RN  Outcome: Progressing  3/9/2020 1337 by Tan Alejo RN  Outcome: Progressing  Goal: Achieves maximal functionality and self care  Description  INTERVENTIONS  - Monitor swallowing and airway patency with patient fatigue and changes in neurological status  - Encourage and assist patient to increase activity and self care     - Encourage visually impaired, hearing impaired and aphasic patients to use assistive/communication devices  3/9/2020 1348 by Tan Alejo RN  Outcome: Progressing  3/9/2020 1337 by Tan Alejo RN  Outcome: Progressing     Problem: CARDIOVASCULAR - ADULT  Goal: Maintains optimal cardiac output and hemodynamic stability  Description  INTERVENTIONS:  - Monitor I/O, vital signs and rhythm  - Monitor for S/S and trends of decreased cardiac output  - Administer and titrate ordered vasoactive medications to optimize hemodynamic stability  - Assess quality of pulses, skin color and temperature  - Assess for signs of decreased coronary artery perfusion  - Instruct patient to report change in severity of symptoms  3/9/2020 1348 by Tan Alejo RN  Outcome: Progressing  3/9/2020 1337 by Tan Alejo RN  Outcome: Progressing     Problem: RESPIRATORY - ADULT  Goal: Achieves optimal ventilation and oxygenation  Description  INTERVENTIONS:  - Assess for changes in respiratory status  - Assess for changes in mentation and behavior  - Position to facilitate oxygenation and minimize respiratory effort  - Oxygen administered by appropriate delivery if ordered  - Initiate smoking cessation education as indicated  - Encourage broncho-pulmonary hygiene including cough, deep breathe, Incentive Spirometry  - Assess the need for suctioning and aspirate as needed  - Assess and instruct to report SOB or any respiratory difficulty  - Respiratory Therapy support as indicated  3/9/2020 1348 by Sadie Eduardo RN  Outcome: Progressing  3/9/2020 1337 by Sadie Eduardo RN  Outcome: Progressing     Problem: GASTROINTESTINAL - ADULT  Goal: Minimal or absence of nausea and/or vomiting  Description  INTERVENTIONS:  - Administer IV fluids if ordered to ensure adequate hydration  - Maintain NPO status until nausea and vomiting are resolved  - Nasogastric tube if ordered  - Administer ordered antiemetic medications as needed  - Provide nonpharmacologic comfort measures as appropriate  - Advance diet as tolerated, if ordered  - Consider nutrition services referral to assist patient with adequate nutrition and appropriate food choices  3/9/2020 1348 by Sadie Eduardo RN  Outcome: Progressing  3/9/2020 1337 by Sadie Eduardo RN  Outcome: Progressing     Problem: GENITOURINARY - ADULT  Goal: Maintains or returns to baseline urinary function  Description  INTERVENTIONS:  - Assess urinary function  - Encourage oral fluids to ensure adequate hydration if ordered  - Administer IV fluids as ordered to ensure adequate hydration  - Administer ordered medications as needed  - Offer frequent toileting  - Follow urinary retention protocol if ordered  3/9/2020 1348 by Sadie Eduardo RN  Outcome: Progressing  3/9/2020 1337 by Sadie Eduardo RN  Outcome: Progressing  Goal: Absence of urinary retention  Description  INTERVENTIONS:  - Assess patients ability to void and empty bladder  - Monitor I/O  - Bladder scan as needed  - Discuss with physician/AP medications to alleviate retention as needed  - Discuss catheterization for long term situations as appropriate  3/9/2020 1348 by Sadie Eduardo RN  Outcome: Progressing  3/9/2020 1337 by Sadie Eduardo RN  Outcome: Progressing     Problem: METABOLIC, FLUID AND ELECTROLYTES - ADULT  Goal: Electrolytes maintained within normal limits  Description  INTERVENTIONS:  - Monitor labs and assess patient for signs and symptoms of electrolyte imbalances  - Administer electrolyte replacement as ordered  - Monitor response to electrolyte replacements, including repeat lab results as appropriate  - Instruct patient on fluid and nutrition as appropriate  3/9/2020 1348 by Asmita Munguia RN  Outcome: Progressing  3/9/2020 1337 by Asmita Munguia RN  Outcome: Progressing  Goal: Fluid balance maintained  Description  INTERVENTIONS:  - Monitor labs   - Monitor I/O and WT  - Instruct patient on fluid and nutrition as appropriate  - Assess for signs & symptoms of volume excess or deficit  3/9/2020 1348 by Asmita Munguia RN  Outcome: Progressing  3/9/2020 1337 by Asmita Munguia RN  Outcome: Progressing  Goal: Glucose maintained within target range  Description  INTERVENTIONS:  - Monitor Blood Glucose as ordered  - Assess for signs and symptoms of hyperglycemia and hypoglycemia  - Administer ordered medications to maintain glucose within target range  - Assess nutritional intake and initiate nutrition service referral as needed  3/9/2020 1348 by Asmita Munguia RN  Outcome: Progressing  3/9/2020 1337 by Asmita Munguia RN  Outcome: Progressing     Problem: SKIN/TISSUE INTEGRITY - ADULT  Goal: Skin integrity remains intact  Description  INTERVENTIONS  - Identify patients at risk for skin breakdown  - Assess and monitor skin integrity  - Assess and monitor nutrition and hydration status  - Monitor labs (i e  albumin)  - Assess for incontinence   - Turn and reposition patient  - Assist with mobility/ambulation  - Relieve pressure over bony prominences  - Avoid friction and shearing  - Provide appropriate hygiene as needed including keeping skin clean and dry  - Evaluate need for skin moisturizer/barrier cream  - Collaborate with interdisciplinary team (i e  Nutrition, Rehabilitation, etc )   - Patient/family teaching  3/9/2020 1348 by Sadie Eduardo RN  Outcome: Progressing  3/9/2020 1337 by Sadie Eduardo RN  Outcome: Progressing     Problem: MUSCULOSKELETAL - ADULT  Goal: Maintain or return mobility to safest level of function  Description  INTERVENTIONS:  - Assess patient's ability to carry out ADLs; assess patient's baseline for ADL function and identify physical deficits which impact ability to perform ADLs (bathing, care of mouth/teeth, toileting, grooming, dressing, etc )  - Assess/evaluate cause of self-care deficits   - Assess range of motion  - Assess patient's mobility  - Assess patient's need for assistive devices and provide as appropriate  - Encourage maximum independence but intervene and supervise when necessary  - Involve family in performance of ADLs  - Assess for home care needs following discharge   - Consider OT consult to assist with ADL evaluation and planning for discharge  - Provide patient education as appropriate  3/9/2020 1348 by Sadie Eduardo RN  Outcome: Progressing  3/9/2020 1337 by Sadie Eduardo RN  Outcome: Progressing     Problem: Neurological Deficit  Goal: Neurological status is stable or improving  Description  Interventions:  - Monitor and assess patient's level of consciousness, motor function, sensory function, and level of assistance needed for ADLs  - Monitor and report changes from baseline  Collaborate with interdisciplinary team to initiate plan and implement interventions as ordered  - Provide and maintain a safe environment  - Consider seizure precautions  - Consider fall precautions  - Consider aspiration precautions  - Consider bleeding precautions  3/9/2020 1348 by Sadie Eduardo RN  Outcome: Progressing  3/9/2020 1337 by Sadie Eduardo RN  Outcome: Progressing     Problem:  Activity Intolerance/Impaired Mobility  Goal: Mobility/activity is maintained at optimum level for patient  Description  Interventions:  - Assess and monitor patient  barriers to mobility and need for assistive/adaptive devices  - Assess patient's emotional response to limitations  - Collaborate with interdisciplinary team and initiate plans and interventions as ordered  - Encourage independent activity per ability   - Maintain proper body alignment  - Perform active/passive rom as tolerated/ordered  - Plan activities to conserve energy   - Turn patient as appropriate  3/9/2020 1348 by Yesika Slade RN  Outcome: Progressing  3/9/2020 1337 by Yesika Slade RN  Outcome: Progressing     Problem: Nutrition  Goal: Nutrition/Hydration status is improving  Description  Monitor and assess patient's nutrition/hydration status for malnutrition (ex- brittle hair, bruises, dry skin, pale skin and conjunctiva, muscle wasting, smooth red tongue, and disorientation)  Collaborate with interdisciplinary team and initiate plan and interventions as ordered  Monitor patient's weight and dietary intake as ordered or per policy  Utilize nutrition screening tool and intervene per policy  Determine patient's food preferences and provide high-protein, high-caloric foods as appropriate  - Assist patient with eating   - Allow adequate time for meals   - Encourage patient to take dietary supplement as ordered  - Collaborate with clinical nutritionist   - Include patient/family/caregiver in decisions related to nutrition  3/9/2020 1348 by Yesika Slade RN  Outcome: Progressing  3/9/2020 1337 by Yesika Slade RN  Outcome: Progressing     Problem: Nutrition/Hydration-ADULT  Goal: Nutrient/Hydration intake appropriate for improving, restoring or maintaining nutritional needs  Description  Monitor and assess patient's nutrition/hydration status for malnutrition  Collaborate with interdisciplinary team and initiate plan and interventions as ordered  Monitor patient's weight and dietary intake as ordered or per policy  Utilize nutrition screening tool and intervene as necessary  Determine patient's food preferences and provide high-protein, high-caloric foods as appropriate       INTERVENTIONS:  - Monitor oral intake, urinary output, labs, and treatment plans  - Assess nutrition and hydration status and recommend course of action  - Evaluate amount of meals eaten  - Assist patient with eating if necessary   - Allow adequate time for meals  - Recommend/ encourage appropriate diets, oral nutritional supplements, and vitamin/mineral supplements  - Order, calculate, and assess calorie counts as needed  - Recommend, monitor, and adjust tube feedings and TPN/PPN based on assessed needs  - Assess need for intravenous fluids  - Provide specific nutrition/hydration education as appropriate  - Include patient/family/caregiver in decisions related to nutrition  3/9/2020 1348 by Mayte Velazco RN  Outcome: Progressing  3/9/2020 1337 by Mayte Velazco RN  Outcome: Progressing     Problem: COPING  Goal: Pt/Family able to verbalize concerns and demonstrate effective coping strategies  Description  INTERVENTIONS:  - Assist patient/family to identify coping skills, available support systems and cultural and spiritual values  - Provide emotional support, including active listening and acknowledgement of concerns of patient and caregivers  - Reduce environmental stimuli, as able  - Provide patient education  - Assess for spiritual pain/suffering and initiate spiritual care, including notification of Pastoral Care or capri based community as needed  - Assess effectiveness of coping strategies  3/9/2020 1348 by Mayte Velazco RN  Outcome: Progressing  3/9/2020 1337 by Mayte Velazco RN  Outcome: Progressing  Goal: Will report anxiety at manageable levels  Description  INTERVENTIONS:  - Administer medication as ordered  - Teach and encourage coping skills  - Provide emotional support  - Assess patient/family for anxiety and ability to cope  3/9/2020 1348 by Mayte Velazco RN  Outcome: Progressing  3/9/2020 1337 by Jovany Miller RN  Outcome: Progressing     Problem: DECISION MAKING  Goal: Pt/Family able to effectively weigh alternatives and participate in decision making related to treatment and care  Description  INTERVENTIONS:  - Identify decision maker  - Determine when there are differences among patient's view, family's view, and healthcare provider's view of patient condition and care goals  - Facilitate patient/family articulation of goals for care  - Help patient/family identify pros/cons of alternative solutions  - Provide information as requested by patient/family  - Respect patient/family rights related to privacy and sharing information   - Serve as a liaison between patient, family and health care team  - Initiate consults as appropriate (Ethics Team, Palliative Care, [facility], etc )  3/9/2020 1348 by Jovany Miller RN  Outcome: Progressing  3/9/2020 1337 by Jovany Miller RN  Outcome: Progressing     Problem: CONFUSION/THOUGHT DISTURBANCE  Goal: Thought disturbances (confusion, delirium, depression, dementia or psychosis) are managed to maintain or return to baseline mental status and functional level  Description  INTERVENTIONS:  - Assess for possible contributors to  thought disturbance, including but not limited to medications, infection, impaired vision or hearing, underlying metabolic abnormalities, dehydration, respiratory compromise,  psychiatric diagnoses and notify attending PHYSICAN/AP  - Monitor and intervene to maintain adequate nutrition, hydration, elimination, sleep and activity  - Decrease environmental stimuli, including noise as appropriate  - Provide frequent contacts to provide refocusing, direction and reassurance as needed  Approach patient calmly with eye contact and at their level    - [place] high risk fall precautions, aspiration precautions and other safety measures, as indicated  - If delirium suspected, notify physician/AP of change in condition and request immediate in-person evaluation  - Pursue consults as appropriate including Geriatric (campus dependent), OT for cognitive evaluation/activity planning, psychiatric, pastoral care, etc   3/9/2020 1348 by Asmita Munguia RN  Outcome: Progressing  3/9/2020 1337 by Asmita Munguia, RN  Outcome: Progressing

## 2020-03-09 NOTE — PLAN OF CARE
Problem: Potential for Falls  Goal: Patient will remain free of falls  Description  INTERVENTIONS:  - Assess patient frequently for physical needs  -  Identify cognitive and physical deficits and behaviors that affect risk of falls    -  [place] fall precautions as indicated by assessment   - Educate patient/family on patient safety including physical limitations  - Instruct patient to call for assistance with activity based on assessment  - Modify environment to reduce risk of injury  - Consider OT/PT consult to assist with strengthening/mobility  Outcome: Progressing     Problem: Prexisting or High Potential for Compromised Skin Integrity  Goal: Skin integrity is maintained or improved  Description  INTERVENTIONS:  - Identify patients at risk for skin breakdown  - Assess and monitor skin integrity  - Assess and monitor nutrition and hydration status  - Monitor labs   - Assess for incontinence   - Turn and reposition patient  - Assist with mobility/ambulation  - Relieve pressure over bony prominences  - Avoid friction and shearing  - Provide appropriate hygiene as needed including keeping skin clean and dry  - Evaluate need for skin moisturizer/barrier cream  - Collaborate with interdisciplinary team   - Patient/family teaching  - Consider wound care consult   Outcome: Progressing     Problem: DISCHARGE PLANNING - CARE MANAGEMENT  Goal: Discharge to post-acute care or home with appropriate resources  Description  INTERVENTIONS:  - Conduct assessment to determine patient/family and health care team treatment goals, and need for post-acute services based on payer coverage, community resources, and patient preferences, and barriers to discharge  - Address psychosocial, clinical, and financial barriers to discharge as identified in assessment in conjunction with the patient/family and health care team  - Arrange appropriate level of post-acute services according to patients   needs and preference and payer coverage in collaboration with the physician and health care team  - Communicate with and update the patient/family, physician, and health care team regarding progress on the discharge plan  - Arrange appropriate transportation to post-acute venues  Outcome: Progressing     Problem: PAIN - ADULT  Goal: Verbalizes/displays adequate comfort level or baseline comfort level  Description  Interventions:  - Encourage patient to monitor pain and request assistance  - Assess pain using appropriate pain scale  - Administer analgesics based on type and severity of pain and evaluate response  - Implement non-pharmacological measures as appropriate and evaluate response  - Consider cultural and social influences on pain and pain management  - Notify physician/advanced practitioner if interventions unsuccessful or patient reports new pain  Outcome: Progressing     Problem: SAFETY ADULT  Goal: Patient will remain free of falls  Description  INTERVENTIONS:  - Assess patient frequently for physical needs  -  Identify cognitive and physical deficits and behaviors that affect risk of falls    -  [place] fall precautions as indicated by assessment   - Educate patient/family on patient safety including physical limitations  - Instruct patient to call for assistance with activity based on assessment  - Modify environment to reduce risk of injury  - Consider OT/PT consult to assist with strengthening/mobility  Outcome: Progressing  Goal: Maintain or return to baseline ADL function  Description  INTERVENTIONS:  -  Assess patient's ability to carry out ADLs; assess patient's baseline for ADL function and identify physical deficits which impact ability to perform ADLs (bathing, care of mouth/teeth, toileting, grooming, dressing, etc )  - Assess/evaluate cause of self-care deficits   - Assess range of motion  - Assess patient's mobility; develop plan if impaired  - Assess patient's need for assistive devices and provide as appropriate  - Encourage maximum independence but intervene and supervise when necessary  - Involve family in performance of ADLs  - Assess for home care needs following discharge   - Consider OT consult to assist with ADL evaluation and planning for discharge  - Provide patient education as appropriate  Outcome: Progressing  Goal: Maintain or return mobility status to optimal level  Description  INTERVENTIONS:  - Assess patient's baseline mobility status (ambulation, transfers, stairs, etc )    - Identify cognitive and physical deficits and behaviors that affect mobility  - Identify mobility aids required to assist with transfers and/or ambulation (gait belt, sit-to-stand, lift, walker, cane, etc )  - [place] fall precautions as indicated by assessment  - Record patient progress and toleration of activity level on Mobility SBAR; progress patient to next Phase/Stage  - Instruct patient to call for assistance with activity based on assessment  - Consider rehabilitation consult to assist with strengthening/weightbearing, etc   Outcome: Progressing     Problem: DISCHARGE PLANNING  Goal: Discharge to home or other facility with appropriate resources  Description  INTERVENTIONS:  - Identify barriers to discharge w/patient and caregiver  - Arrange for needed discharge resources and transportation as appropriate  - Identify discharge learning needs (meds, wound care, etc )  - Arrange for interpretive services to assist at discharge as needed  - Refer to Case Management Department for coordinating discharge planning if the patient needs post-hospital services based on physician/advanced practitioner order or complex needs related to functional status, cognitive ability, or social support system  Outcome: Progressing     Problem: Knowledge Deficit  Goal: Patient/family/caregiver demonstrates understanding of disease process, treatment plan, medications, and discharge instructions  Description  Complete learning assessment and assess knowledge base   Interventions:  - Provide teaching at level of understanding  - Provide teaching via preferred learning methods  Outcome: Progressing     Problem: NEUROSENSORY - ADULT  Goal: Achieves stable or improved neurological status  Description  INTERVENTIONS  - Monitor and report changes in neurological status  - Monitor vital signs such as temperature, blood pressure, glucose, and any other labs ordered   - Initiate measures to prevent increased intracranial pressure  - Monitor for seizure activity and implement precautions if appropriate      Outcome: Progressing  Goal: Remains free of injury related to seizures activity  Description  INTERVENTIONS  - Maintain airway, patient safety  and administer oxygen as ordered  - Monitor patient for seizure activity, document and report duration and description of seizure to physician/advanced practitioner  - If seizure occurs,  ensure patient safety during seizure  - Reorient patient post seizure  - Seizure pads on all 4 side rails  - Instruct patient/family to notify RN of any seizure activity including if an aura is experienced  - Instruct patient/family to call for assistance with activity based on nursing assessment  - Administer anti-seizure medications if ordered    Outcome: Progressing  Goal: Achieves maximal functionality and self care  Description  INTERVENTIONS  - Monitor swallowing and airway patency with patient fatigue and changes in neurological status  - Encourage and assist patient to increase activity and self care     - Encourage visually impaired, hearing impaired and aphasic patients to use assistive/communication devices  Outcome: Progressing     Problem: CARDIOVASCULAR - ADULT  Goal: Maintains optimal cardiac output and hemodynamic stability  Description  INTERVENTIONS:  - Monitor I/O, vital signs and rhythm  - Monitor for S/S and trends of decreased cardiac output  - Administer and titrate ordered vasoactive medications to optimize hemodynamic stability  - Assess quality of pulses, skin color and temperature  - Assess for signs of decreased coronary artery perfusion  - Instruct patient to report change in severity of symptoms  Outcome: Progressing     Problem: RESPIRATORY - ADULT  Goal: Achieves optimal ventilation and oxygenation  Description  INTERVENTIONS:  - Assess for changes in respiratory status  - Assess for changes in mentation and behavior  - Position to facilitate oxygenation and minimize respiratory effort  - Oxygen administered by appropriate delivery if ordered  - Initiate smoking cessation education as indicated  - Encourage broncho-pulmonary hygiene including cough, deep breathe, Incentive Spirometry  - Assess the need for suctioning and aspirate as needed  - Assess and instruct to report SOB or any respiratory difficulty  - Respiratory Therapy support as indicated  Outcome: Progressing     Problem: GASTROINTESTINAL - ADULT  Goal: Minimal or absence of nausea and/or vomiting  Description  INTERVENTIONS:  - Administer IV fluids if ordered to ensure adequate hydration  - Maintain NPO status until nausea and vomiting are resolved  - Nasogastric tube if ordered  - Administer ordered antiemetic medications as needed  - Provide nonpharmacologic comfort measures as appropriate  - Advance diet as tolerated, if ordered  - Consider nutrition services referral to assist patient with adequate nutrition and appropriate food choices  Outcome: Progressing     Problem: GENITOURINARY - ADULT  Goal: Maintains or returns to baseline urinary function  Description  INTERVENTIONS:  - Assess urinary function  - Encourage oral fluids to ensure adequate hydration if ordered  - Administer IV fluids as ordered to ensure adequate hydration  - Administer ordered medications as needed  - Offer frequent toileting  - Follow urinary retention protocol if ordered  Outcome: Progressing  Goal: Absence of urinary retention  Description  INTERVENTIONS:  - Assess patients ability to void and empty bladder  - Monitor I/O  - Bladder scan as needed  - Discuss with physician/AP medications to alleviate retention as needed  - Discuss catheterization for long term situations as appropriate  Outcome: Progressing     Problem: METABOLIC, FLUID AND ELECTROLYTES - ADULT  Goal: Electrolytes maintained within normal limits  Description  INTERVENTIONS:  - Monitor labs and assess patient for signs and symptoms of electrolyte imbalances  - Administer electrolyte replacement as ordered  - Monitor response to electrolyte replacements, including repeat lab results as appropriate  - Instruct patient on fluid and nutrition as appropriate  Outcome: Progressing  Goal: Fluid balance maintained  Description  INTERVENTIONS:  - Monitor labs   - Monitor I/O and WT  - Instruct patient on fluid and nutrition as appropriate  - Assess for signs & symptoms of volume excess or deficit  Outcome: Progressing  Goal: Glucose maintained within target range  Description  INTERVENTIONS:  - Monitor Blood Glucose as ordered  - Assess for signs and symptoms of hyperglycemia and hypoglycemia  - Administer ordered medications to maintain glucose within target range  - Assess nutritional intake and initiate nutrition service referral as needed  Outcome: Progressing     Problem: SKIN/TISSUE INTEGRITY - ADULT  Goal: Skin integrity remains intact  Description  INTERVENTIONS  - Identify patients at risk for skin breakdown  - Assess and monitor skin integrity  - Assess and monitor nutrition and hydration status  - Monitor labs (i e  albumin)  - Assess for incontinence   - Turn and reposition patient  - Assist with mobility/ambulation  - Relieve pressure over bony prominences  - Avoid friction and shearing  - Provide appropriate hygiene as needed including keeping skin clean and dry  - Evaluate need for skin moisturizer/barrier cream  - Collaborate with interdisciplinary team (i e  Nutrition, Rehabilitation, etc )   - Patient/family teaching  Outcome: Progressing     Problem: MUSCULOSKELETAL - ADULT  Goal: Maintain or return mobility to safest level of function  Description  INTERVENTIONS:  - Assess patient's ability to carry out ADLs; assess patient's baseline for ADL function and identify physical deficits which impact ability to perform ADLs (bathing, care of mouth/teeth, toileting, grooming, dressing, etc )  - Assess/evaluate cause of self-care deficits   - Assess range of motion  - Assess patient's mobility  - Assess patient's need for assistive devices and provide as appropriate  - Encourage maximum independence but intervene and supervise when necessary  - Involve family in performance of ADLs  - Assess for home care needs following discharge   - Consider OT consult to assist with ADL evaluation and planning for discharge  - Provide patient education as appropriate  Outcome: Progressing     Problem: Neurological Deficit  Goal: Neurological status is stable or improving  Description  Interventions:  - Monitor and assess patient's level of consciousness, motor function, sensory function, and level of assistance needed for ADLs  - Monitor and report changes from baseline  Collaborate with interdisciplinary team to initiate plan and implement interventions as ordered  - Provide and maintain a safe environment  - Consider seizure precautions  - Consider fall precautions  - Consider aspiration precautions  - Consider bleeding precautions  Outcome: Progressing     Problem: Activity Intolerance/Impaired Mobility  Goal: Mobility/activity is maintained at optimum level for patient  Description  Interventions:  - Assess and monitor patient  barriers to mobility and need for assistive/adaptive devices  - Assess patient's emotional response to limitations  - Collaborate with interdisciplinary team and initiate plans and interventions as ordered  - Encourage independent activity per ability   - Maintain proper body alignment    - Perform active/passive rom as tolerated/ordered  - Plan activities to conserve energy   - Turn patient as appropriate  Outcome: Progressing     Problem: Nutrition  Goal: Nutrition/Hydration status is improving  Description  Monitor and assess patient's nutrition/hydration status for malnutrition (ex- brittle hair, bruises, dry skin, pale skin and conjunctiva, muscle wasting, smooth red tongue, and disorientation)  Collaborate with interdisciplinary team and initiate plan and interventions as ordered  Monitor patient's weight and dietary intake as ordered or per policy  Utilize nutrition screening tool and intervene per policy  Determine patient's food preferences and provide high-protein, high-caloric foods as appropriate  - Assist patient with eating   - Allow adequate time for meals   - Encourage patient to take dietary supplement as ordered  - Collaborate with clinical nutritionist   - Include patient/family/caregiver in decisions related to nutrition  Outcome: Progressing     Problem: Nutrition/Hydration-ADULT  Goal: Nutrient/Hydration intake appropriate for improving, restoring or maintaining nutritional needs  Description  Monitor and assess patient's nutrition/hydration status for malnutrition  Collaborate with interdisciplinary team and initiate plan and interventions as ordered  Monitor patient's weight and dietary intake as ordered or per policy  Utilize nutrition screening tool and intervene as necessary  Determine patient's food preferences and provide high-protein, high-caloric foods as appropriate       INTERVENTIONS:  - Monitor oral intake, urinary output, labs, and treatment plans  - Assess nutrition and hydration status and recommend course of action  - Evaluate amount of meals eaten  - Assist patient with eating if necessary   - Allow adequate time for meals  - Recommend/ encourage appropriate diets, oral nutritional supplements, and vitamin/mineral supplements  - Order, calculate, and assess calorie counts as needed  - Recommend, monitor, and adjust tube feedings and TPN/PPN based on assessed needs  - Assess need for intravenous fluids  - Provide specific nutrition/hydration education as appropriate  - Include patient/family/caregiver in decisions related to nutrition  Outcome: Progressing     Problem: COPING  Goal: Pt/Family able to verbalize concerns and demonstrate effective coping strategies  Description  INTERVENTIONS:  - Assist patient/family to identify coping skills, available support systems and cultural and spiritual values  - Provide emotional support, including active listening and acknowledgement of concerns of patient and caregivers  - Reduce environmental stimuli, as able  - Provide patient education  - Assess for spiritual pain/suffering and initiate spiritual care, including notification of Pastoral Care or capri based community as needed  - Assess effectiveness of coping strategies  Outcome: Progressing  Goal: Will report anxiety at manageable levels  Description  INTERVENTIONS:  - Administer medication as ordered  - Teach and encourage coping skills  - Provide emotional support  - Assess patient/family for anxiety and ability to cope  Outcome: Progressing     Problem: DECISION MAKING  Goal: Pt/Family able to effectively weigh alternatives and participate in decision making related to treatment and care  Description  INTERVENTIONS:  - Identify decision maker  - Determine when there are differences among patient's view, family's view, and healthcare provider's view of patient condition and care goals  - Facilitate patient/family articulation of goals for care  - Help patient/family identify pros/cons of alternative solutions  - Provide information as requested by patient/family  - Respect patient/family rights related to privacy and sharing information   - Serve as a liaison between patient, family and health care team  - Initiate consults as appropriate (Ethics Team, Palliative Care, Family Care Conference, etc )  Outcome: Progressing     Problem: CONFUSION/THOUGHT DISTURBANCE  Goal: Thought disturbances (confusion, delirium, depression, dementia or psychosis) are managed to maintain or return to baseline mental status and functional level  Description  INTERVENTIONS:  - Assess for possible contributors to  thought disturbance, including but not limited to medications, infection, impaired vision or hearing, underlying metabolic abnormalities, dehydration, respiratory compromise,  psychiatric diagnoses and notify attending PHYSICAN/AP  - Monitor and intervene to maintain adequate nutrition, hydration, elimination, sleep and activity  - Decrease environmental stimuli, including noise as appropriate  - Provide frequent contacts to provide refocusing, direction and reassurance as needed  Approach patient calmly with eye contact and at their level    - [place] high risk fall precautions, aspiration precautions and other safety measures, as indicated  - If delirium suspected, notify physician/AP of change in condition and request immediate in-person evaluation  - Pursue consults as appropriate including Geriatric (campus dependent), OT for cognitive evaluation/activity planning, psychiatric, pastoral care, etc   Outcome: Progressing

## 2020-03-09 NOTE — ED NOTES
Pt oob with staff  Assist x1 with walker  Pt states her whole right side still feels numb and feels she is walking on air  Pt walked total 10ft   Dr Senthil Vasquez made aware     Pratima Pham, TONI  03/09/20 [id number]

## 2020-03-10 ENCOUNTER — APPOINTMENT (OUTPATIENT)
Dept: MRI IMAGING | Facility: HOSPITAL | Age: 60
DRG: 059 | End: 2020-03-10

## 2020-03-10 LAB
ALBUMIN SERPL BCP-MCNC: 3.1 G/DL (ref 3.5–5)
ALP SERPL-CCNC: 91 U/L (ref 46–116)
ALT SERPL W P-5'-P-CCNC: 24 U/L (ref 12–78)
ANION GAP SERPL CALCULATED.3IONS-SCNC: 8 MMOL/L (ref 4–13)
AST SERPL W P-5'-P-CCNC: 16 U/L (ref 5–45)
BASOPHILS # BLD AUTO: 0.03 THOUSANDS/ΜL (ref 0–0.1)
BASOPHILS NFR BLD AUTO: 0 % (ref 0–1)
BILIRUB SERPL-MCNC: 1.31 MG/DL (ref 0.2–1)
BUN SERPL-MCNC: 12 MG/DL (ref 5–25)
CALCIUM SERPL-MCNC: 10.6 MG/DL (ref 8.3–10.1)
CHLORIDE SERPL-SCNC: 103 MMOL/L (ref 100–108)
CHOLEST SERPL-MCNC: 156 MG/DL (ref 50–200)
CO2 SERPL-SCNC: 26 MMOL/L (ref 21–32)
CREAT SERPL-MCNC: 0.73 MG/DL (ref 0.6–1.3)
EOSINOPHIL # BLD AUTO: 0.3 THOUSAND/ΜL (ref 0–0.61)
EOSINOPHIL NFR BLD AUTO: 3 % (ref 0–6)
ERYTHROCYTE [DISTWIDTH] IN BLOOD BY AUTOMATED COUNT: 13 % (ref 11.6–15.1)
GFR SERPL CREATININE-BSD FRML MDRD: 91 ML/MIN/1.73SQ M
GLUCOSE SERPL-MCNC: 141 MG/DL (ref 65–140)
GLUCOSE SERPL-MCNC: 159 MG/DL (ref 65–140)
GLUCOSE SERPL-MCNC: 160 MG/DL (ref 65–140)
GLUCOSE SERPL-MCNC: 166 MG/DL (ref 65–140)
GLUCOSE SERPL-MCNC: 212 MG/DL (ref 65–140)
HCT VFR BLD AUTO: 41.1 % (ref 34.8–46.1)
HDLC SERPL-MCNC: 33 MG/DL
HGB BLD-MCNC: 13 G/DL (ref 11.5–15.4)
IMM GRANULOCYTES # BLD AUTO: 0.02 THOUSAND/UL (ref 0–0.2)
IMM GRANULOCYTES NFR BLD AUTO: 0 % (ref 0–2)
INR PPP: 0.96 (ref 0.84–1.19)
LDLC SERPL CALC-MCNC: 92 MG/DL (ref 0–100)
LYMPHOCYTES # BLD AUTO: 2.37 THOUSANDS/ΜL (ref 0.6–4.47)
LYMPHOCYTES NFR BLD AUTO: 24 % (ref 14–44)
MAGNESIUM SERPL-MCNC: 1.7 MG/DL (ref 1.6–2.6)
MCH RBC QN AUTO: 28.1 PG (ref 26.8–34.3)
MCHC RBC AUTO-ENTMCNC: 31.6 G/DL (ref 31.4–37.4)
MCV RBC AUTO: 89 FL (ref 82–98)
MONOCYTES # BLD AUTO: 0.69 THOUSAND/ΜL (ref 0.17–1.22)
MONOCYTES NFR BLD AUTO: 7 % (ref 4–12)
NEUTROPHILS # BLD AUTO: 6.31 THOUSANDS/ΜL (ref 1.85–7.62)
NEUTS SEG NFR BLD AUTO: 66 % (ref 43–75)
NRBC BLD AUTO-RTO: 0 /100 WBCS
PHOSPHATE SERPL-MCNC: 3.4 MG/DL (ref 2.7–4.5)
PLATELET # BLD AUTO: 290 THOUSANDS/UL (ref 149–390)
PMV BLD AUTO: 9.4 FL (ref 8.9–12.7)
POTASSIUM SERPL-SCNC: 4 MMOL/L (ref 3.5–5.3)
PROT SERPL-MCNC: 7.2 G/DL (ref 6.4–8.2)
PROTHROMBIN TIME: 12.9 SECONDS (ref 11.6–14.5)
RBC # BLD AUTO: 4.63 MILLION/UL (ref 3.81–5.12)
SODIUM SERPL-SCNC: 137 MMOL/L (ref 136–145)
TRIGL SERPL-MCNC: 157 MG/DL
WBC # BLD AUTO: 9.72 THOUSAND/UL (ref 4.31–10.16)

## 2020-03-10 PROCEDURE — 80053 COMPREHEN METABOLIC PANEL: CPT | Performed by: FAMILY MEDICINE

## 2020-03-10 PROCEDURE — 84100 ASSAY OF PHOSPHORUS: CPT | Performed by: FAMILY MEDICINE

## 2020-03-10 PROCEDURE — 80061 LIPID PANEL: CPT | Performed by: FAMILY MEDICINE

## 2020-03-10 PROCEDURE — 83735 ASSAY OF MAGNESIUM: CPT | Performed by: FAMILY MEDICINE

## 2020-03-10 PROCEDURE — 82948 REAGENT STRIP/BLOOD GLUCOSE: CPT

## 2020-03-10 PROCEDURE — 85025 COMPLETE CBC W/AUTO DIFF WBC: CPT | Performed by: FAMILY MEDICINE

## 2020-03-10 PROCEDURE — 97167 OT EVAL HIGH COMPLEX 60 MIN: CPT

## 2020-03-10 PROCEDURE — 97535 SELF CARE MNGMENT TRAINING: CPT

## 2020-03-10 PROCEDURE — 99205 OFFICE O/P NEW HI 60 MIN: CPT | Performed by: PSYCHIATRY & NEUROLOGY

## 2020-03-10 PROCEDURE — 85610 PROTHROMBIN TIME: CPT | Performed by: FAMILY MEDICINE

## 2020-03-10 PROCEDURE — 97163 PT EVAL HIGH COMPLEX 45 MIN: CPT

## 2020-03-10 PROCEDURE — 70551 MRI BRAIN STEM W/O DYE: CPT

## 2020-03-10 PROCEDURE — 99225 PR SBSQ OBSERVATION CARE/DAY 25 MINUTES: CPT | Performed by: INTERNAL MEDICINE

## 2020-03-10 PROCEDURE — 72141 MRI NECK SPINE W/O DYE: CPT

## 2020-03-10 RX ORDER — LORAZEPAM 2 MG/ML
1 INJECTION INTRAMUSCULAR ONCE
Status: DISCONTINUED | OUTPATIENT
Start: 2020-03-10 | End: 2020-03-10

## 2020-03-10 RX ORDER — DIPHENHYDRAMINE HYDROCHLORIDE 50 MG/ML
50 INJECTION INTRAMUSCULAR; INTRAVENOUS ONCE AS NEEDED
Status: COMPLETED | OUTPATIENT
Start: 2020-03-10 | End: 2020-03-10

## 2020-03-10 RX ADMIN — ATORVASTATIN CALCIUM 40 MG: 40 TABLET, FILM COATED ORAL at 18:09

## 2020-03-10 RX ADMIN — METFORMIN HYDROCHLORIDE 1000 MG: 500 TABLET, EXTENDED RELEASE ORAL at 08:22

## 2020-03-10 RX ADMIN — ENOXAPARIN SODIUM 40 MG: 40 INJECTION SUBCUTANEOUS at 18:09

## 2020-03-10 RX ADMIN — ASPIRIN 81 MG 81 MG: 81 TABLET ORAL at 08:22

## 2020-03-10 RX ADMIN — DOCUSATE SODIUM 100 MG: 100 CAPSULE, LIQUID FILLED ORAL at 18:10

## 2020-03-10 RX ADMIN — LISINOPRIL 10 MG: 5 TABLET ORAL at 08:22

## 2020-03-10 RX ADMIN — DIPHENHYDRAMINE HYDROCHLORIDE 50 MG: 50 INJECTION, SOLUTION INTRAMUSCULAR; INTRAVENOUS at 21:03

## 2020-03-10 RX ADMIN — ENOXAPARIN SODIUM 40 MG: 40 INJECTION SUBCUTANEOUS at 08:22

## 2020-03-10 NOTE — SOCIAL WORK
Pt is currently an observation pt  PT is recommending home PT  Met with pt and she is agreeable to [facility] for RN and PT  Referral made today  A post acute care recommendation was made by your care team for [facility]  Discussed [place] of Choice with patient  List of agencies given to patient via in person  patient aware the list is custom filtered for them by preference  and that [facility]'s post acute providers are designated

## 2020-03-10 NOTE — CONSULTS
Consultation - Neurology   Tone Castro 63 y o  female MRN: [id number]  Unit/Bed#: E4 -01 Encounter: [id number]    Assessment/Plan   Assessment:  Tone Castro is a 63 y o  female with hypertension, diabetes mellitus type 2, and obesity, who presents with right sided paresthesias and weakness  MRI brain and C-spine pending  Cannot entirely rule out small acute CVA, so patient is on the stroke pathway  Differential also includes radiculopathy, but this would not explain the subtle facial asymmetry  Plan:  - Stroke pathway   MRI brain and C spine pending   Echocardiogram pending    Aspirin 81 mg daily    Atorvastatin 40 mg daily    Continue telemetry   PT/OT/ST   Frequent neuro checks  Continue to monitor and notify neurology with any changes  - Medical management and supportive care per primary team  Correction of any metabolic or infectious disturbances  History of Present Illness     Reason for Consult / Principal Problem: RUE/RLE numbness, weakness, recurrent syncope   HPI: Tone Castro is a 63 y o  female with diabetes mellitus type 2, hypertension, recurrent syncopal episodes, and insomnia who presented to the ED for evaluation of right sided numbness/weakness  The patient reports that approximately 2 weeks ago she had fallen in her home  She has since been experiencing a pins and needle sensation of the right upper extremity, that has been progressively getting worse  She has been having difficulty coordinating movements with her right upper extremity  Approximately 1-2 days ago, the patient also began to experience numbness/tingling in her right lower extremity associated with some weakness and difficulty ambulating  She had fallen prior to admission and again due to those symptoms  She also notes spasms in her thigh on the right  She denies any headache, cervicalgia, visual disturbances, difficulty swallowing, or significant injuries acquired from the fall    She does note constipation for the past 2 days  She also notes that she does not feel the urge to urinate, but when she does urinate, she notes a full bladder  She does not have any difficulty urinating  On arrival to the ED, the patient's blood pressure was 180/86  Her other vital signs were stable  CTH was negative  Her symptoms are persistent at time of neurologic evaluation  Inpatient consult to Neurology  Consult performed by: Saad Menon PA-C  Consult ordered by: Kayla Douglass MD          Review of Systems   A 12 point ROS was completed  Other than the above mentioned complaints in the HPI, all remaining systems were negative  Historical Information   Past Medical History:   Diagnosis Date    Diabetes mellitus ([facility] )     External hemorrhoids     last assessed 16, resolved 16    Hernia, ventral     last assessed 12/14/15, resolved 16    Hypertension     Incarcerated incisional hernia     last assessed 12/21/15, resolved 16    Insomnia     last assessed 16, resolved 10/9/17    Morbid obesity with BMI of 45 0-49 9, adult ([facility] ) 5/3/2017    Type 2 diabetes mellitus with hyperglycemia, without long-term current use of insulin ([facility] )      Past Surgical History:   Procedure Laterality Date    ABCESS DRAINAGE       SECTION      x3    COLONOSCOPY N/A 2017    Procedure: COLONOSCOPY with biopsy;  Surgeon: Kelsi Pascual DO;  Location: [facility];   Service: Complete    HYSTERECTOMY       Social History   Social History     Substance and Sexual Activity   Alcohol Use Never    Frequency: Never    Comment: Social     Social History     Substance and Sexual Activity   Drug Use Never     E-Cigarette/Vaping     E-Cigarette/Vaping Substances     Social History     Tobacco Use   Smoking Status Never Smoker   Smokeless Tobacco Never Used     Family History:   Family History   Problem Relation Age of Onset    Glaucoma Mother     Diabetes Father    Nikhil Yefri Hypertension Father     Pancreatic cancer Father     Breast cancer Maternal Grandmother     Breast cancer Maternal Aunt     Coronary artery disease Family     Diabetes Family     Hypertension Family     Cervical cancer Sister 61    Breast cancer Sister 50    Breast cancer Sister 45       Review of previous medical records was completed  Meds/Allergies   all current active meds have been reviewed, current meds:   Current Facility-Administered Medications   Medication Dose Route Frequency    acetaminophen (TYLENOL) tablet 650 mg  650 mg Oral Q6H PRN    ALPRAZolam (XANAX) tablet 0 5 mg  0 5 mg Oral Once    aluminum-magnesium hydroxide-simethicone (MYLANTA) 200-200-20 mg/5 mL oral suspension 30 mL  30 mL Oral Q4H PRN    aspirin chewable tablet 81 mg  81 mg Oral Daily    atorvastatin (LIPITOR) tablet 40 mg  40 mg Oral QPM    docusate sodium (COLACE) capsule 100 mg  100 mg Oral BID PRN    enoxaparin (LOVENOX) subcutaneous injection 40 mg  40 mg Subcutaneous BID    hydrALAZINE (APRESOLINE) injection 10 mg  10 mg Intravenous Q6H PRN    insulin lispro (HumaLOG) 100 units/mL subcutaneous injection 1-6 Units  1-6 Units Subcutaneous TID AC    insulin lispro (HumaLOG) 100 units/mL subcutaneous injection 1-6 Units  1-6 Units Subcutaneous HS    lisinopril (ZESTRIL) tablet 10 mg  10 mg Oral Daily    metFORMIN (GLUCOPHAGE-XR) 24 hr tablet 1,000 mg  1,000 mg Oral BID With Meals    ondansetron (ZOFRAN) injection 4 mg  4 mg Intravenous Q4H PRN    and PTA meds:   Prior to Admission Medications   Prescriptions Last Dose Informant Patient Reported?  Taking?   lisinopril (ZESTRIL) 10 mg tablet 3/9/2020 at Unknown time  No Yes   Sig: Take 1 tablet (10 mg total) by mouth daily for 90 days   metFORMIN (GLUCOPHAGE-XR) 500 mg 24 hr tablet   No No   Sig: Take 2 tablets (1,000 mg total) by mouth 2 (two) times a day with meals      Facility-Administered Medications: None       Allergies   Allergen Reactions    Ambien [Zolpidem Tartrate]     Ativan [Lorazepam] Other (See Comments)     Hallucination    Demerol [Meperidine]     Insulin     Insulin Glargine      Annotation - [id number]: memory loss    Latex     Oxycodone-Acetaminophen Vomiting    Zolpidem Hallucinations and Irritability       Objective   Vitals:Blood pressure 154/81, pulse 85, temperature (!) 97 °F (36 1 °C), temperature source Tympanic, resp  rate 18, height 5' 7" (1 702 m), weight 127 kg (280 lb), SpO2 98 %, not currently breastfeeding  ,Body mass index is 43 85 kg/m²  No intake or output data in the 24 hours ending 03/10/20 0932    Invasive Devices: Invasive Devices     Peripheral Intravenous Line            Peripheral IV 03/09/20 Left Antecubital 1 day                Physical Exam   Constitutional: She is oriented to person, place, and time  She appears well-developed and well-nourished  No distress  Overweight female, resting comfortably in bed   HENT:   Head: Normocephalic and atraumatic  Right Ear: External ear normal    Left Ear: External ear normal    Nose: Nose normal    Mouth/Throat: Oropharynx is clear and moist    Eyes: Pupils are equal, round, and reactive to light  Conjunctivae and EOM are normal  Right eye exhibits no discharge  Left eye exhibits no discharge  No scleral icterus  No nystagmus  Neck: Normal range of motion  Neck supple  Cardiovascular: Normal rate  Pulmonary/Chest: Effort normal  No respiratory distress  Abdominal: Soft  Musculoskeletal: Normal range of motion  She exhibits tenderness  She exhibits no edema or deformity  Neurological: She is alert and oriented to person, place, and time  A cranial nerve deficit and sensory deficit is present  Finger-nose-finger test: Clumsiness noted with RUE  Normal on left  Coordination abnormal    Skin: Skin is warm and dry  No rash noted  She is not diaphoretic  No erythema  No pallor  Psychiatric: She has a normal mood and affect   Her behavior is normal  Judgment and thought content normal    Nursing note and vitals reviewed  Neurologic Exam     Mental Status   Oriented to person, place, and time  Level of consciousness: alert  Follows all commands without difficulty  No dysarthria or aphasia  Cranial Nerves     CN II   Visual fields full to confrontation  Visual acuity: normal    CN III, IV, VI   Pupils are equal, round, and reactive to light  Extraocular motions are normal    Right pupil: Shape: regular  Left pupil: Shape: regular  Nystagmus: none   Conjugate gaze: present    CN V   Facial sensation intact  CN VII   Left facial weakness: Left palpebral fissure appears wider than the right  CN VIII   Hearing impaired: Grossly intact  CN XI   Right sternocleidomastoid strength: normal  Left sternocleidomastoid strength: normal    CN XII   Tongue: not atrophic  Fasciculations: absent  Tongue deviation: Subtle tongue deviation to the right  Motor Exam   Drift without pronation noted on the right  Right upper extremity approximately 4/5 throughout  Give-way weakness of the right lower extremity noted, especially distally  Able to maintain right upper and lower extremities antigravity  Full strength of the left upper and lower extremity  Sensory Exam   Able to sense light touch, vibration, and temperature throughout both extremities  Allodynia noted with RUE and RLE to light touch, vibration, and temperature  Gait, Coordination, and Reflexes     Gait  Gait: (Deferred)    Coordination   Finger-nose-finger test: Clumsiness noted with RUE  Normal on left  Tremor   Resting tremor: absent  Intention tremor: absent  Action tremor: absent      Lab Results: I have personally reviewed pertinent reports  Imaging Studies: I have personally reviewed pertinent reports  and I have personally reviewed pertinent films in PACS [facility])  EKG, Pathology, and Other Studies: I have personally reviewed pertinent reports      VTE Prophylaxis: Enoxaparin (Lovenox)    Code Status: Level 3 - DNAR and DNI

## 2020-03-10 NOTE — PHYSICAL THERAPY NOTE
PHYSICAL THERAPY EVALUATION      Patient Name: Richie Bae  UCJRF'A Date: 3/10/2020   PT EVALUATION    63 y o     [id number]    HTN (hypertension) [I10]    Past Medical History:   Diagnosis Date    Diabetes mellitus ([facility] )     External hemorrhoids     last assessed 16, resolved 16    Hernia, ventral     last assessed 12/14/15, resolved 16    Hypertension     Incarcerated incisional hernia     last assessed 12/21/15, resolved 16    Insomnia     last assessed 16, resolved 10/9/17    Morbid obesity with BMI of 45 0-49 9, adult ([place] [address] ) 5/3/2017    Type 2 diabetes mellitus with hyperglycemia, without long-term current use of insulin ([place] [address] )      Past Surgical History:   Procedure Laterality Date    ABCESS DRAINAGE       SECTION      x3    COLONOSCOPY N/A 2017    Procedure: COLONOSCOPY with biopsy;  Surgeon: Min Bill DO;  Location: [facility]; Service: Complete    HYSTERECTOMY          03/10/20 8101   Note Type   Note type Eval only   Pain Assessment   Pain Assessment No/denies pain   Pain Score No Pain   Home Living   Type of [address] [place] [address] One level;Ramped entrance   Bathroom Shower/Tub Walk-in shower   Bathroom Toilet Raised   Bathroom Equipment Grab bars in [facility]; Other (Comment)  (standard walker, bed rails)   Additional Comments pt resides in [place] style house, per pt is "handicap accessible"   Prior Function   Level of [place] Independent with ADLs and functional mobility   Lives With Spouse; Son   ADL Assistance Independent   IADLs Independent   Falls in the last 6 months 5 to 10  (pt unable to describe stating "I just end up on the floor")   Vocational Full time employment   Comments pta pt reports being indep, amb w standard walker  pt resides w  and son,  is retired and home  however unable to provide physical A as pt reports he is physically unwell  +   Use of L knee compression brace for comfort   Restrictions/Precautions   Other Precautions Fall Risk   General   Additional Pertinent History pt admitted 3/9/20 for numbness and tingling of R arm/leg  pt reports she occ experiences "shaking" of the R extremeties- unwitnessed on IE  pt reports continuation of n/t in RUE/RLE   Family/Caregiver Present No   Cognition   Overall Cognitive Status WFL   Arousal/Participation Cooperative   Orientation Level Oriented X4   Memory Within functional limits   Following Commands Follows one step commands without difficulty   Comments pt pleasant and engages in appropriate conversation, however occ makes hopeless remarks regarding her current function and health of her    RUE Assessment   RUE Assessment   (refer to OT)   LUE Assessment   LUE Assessment   (refer to OT)   RLE Assessment   RLE Assessment X   Strength RLE   RLE Overall Strength 3/5  (?effort vs true strength deficits)   LLE Assessment   LLE Assessment WFL  (grossly 4/5)   Coordination   Movements are Fluid and Coordinated 1   Sensation X  (pt reports n/t of RLE)   Light Touch   RLE Light Touch Grossly intact  (absent L5)   LLE Light Touch Grossly intact   Bed Mobility   Supine to Sit 5  Supervision   Additional items HOB elevated; Bedrails; Increased time required;Verbal cues   Transfers   Sit to Stand 4  Minimal assistance   Additional items Assist x 1; Increased time required;Verbal cues   Stand to Sit 4  Minimal assistance   Additional items Assist x 1; Armrests; Increased time required;Verbal cues   Stand pivot 4  Minimal assistance   Additional items Assist x 1; Increased time required;Verbal cues; Other  (RW)   Additional Comments increased time required, occ cues for technique and safety   Ambulation/Elevation   Gait pattern Wide MARY; Excessively slow; Short stride   Gait Assistance 4  Minimal assist   Additional items Assist x 1   Assistive Device Rolling walker   Distance 3'   Balance   Static Sitting Good   Dynamic Sitting Fair +   Static Standing Fair   Dynamic Standing Fair -   Ambulatory Fair -   Endurance Deficit   Endurance Deficit Yes   Endurance Deficit Description limited amb tolerance   Activity Tolerance   Activity Tolerance Patient limited by fatigue   Medical Staff Made Aware Marie/Evgeny OT   Nurse Made Aware Karmen Bean RN   Assessment   Prognosis Good   Problem List Decreased strength;Decreased endurance; Impaired balance;Decreased mobility   Assessment Dusty Rogel is a 63 y o  female admitted to [facility] on 3/9/2020 for Numbness and tingling of right arm and leg  Pt  has a past medical history of Hypertension, Morbid obesity with BMI of 45 0-49 9, adult ([facility] ) (5/3/2017), and Type 2 diabetes mellitus with hyperglycemia, without long-term current use of insulin ([facility] )  PT was consulted and pt was seen on 3/10/2020 for mobility assessment and d/c planning  Pt presents high fall risk precautions, monitoring abn vitals, PIV  Pt lives with her  and son in a ranch stle home with ramped entrance and handicap accessible bed and bath  Prior to admission pt reports being independent for transfers, elevations and ambulation using a Standard Walker and indep for IADLs and ADLs  Pt is currently functioning at a supervision assistance x1 level for bed mobility, supervision assistance x1 and minimum assistance x1 level for transfers, minimum assistance x1 level for ambulation with Rolling Walker  Pt demonstrated strength deficits in RLE compared to L, ?effort vs true strength deficits as noted discrepancies between findings on IE and nursing reports  Pt with increased time/difficulty performing mobility tasks however demonstrates good use of environment (bedrails, armrests)  Pt will benefit from continued skilled IP PT to address the above mentioned impairments  in order to maximize recovery and increase functional independence when completing mobility and ADLs  Currently PT recommendations for DME include cont use of walker   At this time PT recommendations for d/c are home w family support and HHPT pending progress  End of session pt seated in bedside chair, OT and nsg present  Nsg updated end of session  Barriers to Discharge Inaccessible home environment;Decreased caregiver support   Barriers to Discharge Comments decreased ability to negotiate household distances,  unable to provide A   Goals   Patient Goals to get better   STG Expiration Date 03/20/20   Short Term Goal #1 1)  Pt will perform bed mobility with [place] demonstrating appropriate technique 100% of the time in order to improve function  2)  Perform all transfers with [place] demonstrating safe and appropriate technique 100% of the time in order to improve ability to negotiate safely in home environment  3) Amb with least restrictive AD > 150'x1 with mod I in order to demonstrate ability to negotiate in home environment  4)  Improve overall strength and balance 1/2 grade in order to optimize ability to perform functional tasks and reduce fall risk  5) Increase activity tolerance to 45 minutes in order to improve endurance to functional tasks  6) PT for ongoing patient and family/caregiver education, DME needs and d/c planning in order to promote highest level of function in least restrictive environment  Plan   Treatment/Interventions Functional transfer training;LE strengthening/ROM; Therapeutic exercise; Endurance training;Patient/family training;Equipment eval/education;Gait training;Continued evaluation;Spoke to nursing;OT   PT Frequency Other (Comment)  (3-5/wk)   Recommendation   Recommendation Home with family support;Home PT   Equipment Recommended   (cont use of walker)   PT - OK to Discharge No   Additional Comments pending progress and achievement of IP PT goals   Modified [place] Scale   Modified Cherise Scale 3   Barthel Index   Feeding 5   Bathing 0   Grooming Score 0   Dressing Score 5   Bladder Score 10   Bowels Score 10   Toilet Use Score 5   Transfers (Bed/Chair) Score 10   Mobility (Level Surface) Score 0   Stairs Score 0   Barthel Index Score 45   History: co - morbidities, age, coping styles, social background (handicap accessible home, decreased A available), past experience (hx falls, syncope), fall risk, use of assistive device, assist for adl's, cognition  Exam: impairments in systems including musculoskeletal (ROM, strength, posture), neuromuscular (balance, gait, transfers, motor function, sensation/LT), cognition, barthel index  Clinical: unstable/unpredictable  Complexity:high      Sola Davis, PT

## 2020-03-10 NOTE — PLAN OF CARE
Problem: OCCUPATIONAL THERAPY ADULT  Goal: Performs self-care activities at highest level of function for planned discharge setting  See evaluation for individualized goals  Description  Treatment Interventions: ADL retraining, Functional transfer training, UE strengthening/ROM, Endurance training, Patient/family training, Equipment evaluation/education, Compensatory technique education, Energy conservation          See flowsheet documentation for full assessment, interventions and recommendations  Note:   Limitation: Decreased ADL status, Decreased UE strength, Decreased Safe judgement during ADL, Decreased endurance, Decreased high-level ADLs, Decreased self-care trans, Decreased fine motor control, Decreased sensation  Prognosis: Fair  Assessment: Pt is a 64 y/o female admitted for numbness and tingling of the R arm and leg; pt reported having symptoms for 2 weeks PTA including numbness in her trunk starting 3 days PTA  Pt noted with a PMH including DM, HTN, recurrent syncope  Pt CT head negative for acute intracranial abnormality  PTA pt reports independence for ADLs, functional transfers, and functional mobility without AD; Pt reports using a SW for the past week 2* knee pain  Pt is +; +falls (5); -alone  Upon initial eval, pt demonstrated deficits in functional mobility, functional transfers, functional balance, functional activity tolerance (currently fair = 15-20 min), ADL status, and R UE weakness  Pt would benefit from continued OT tx to address above deficits        OT Discharge Recommendation: Short Term Rehab(Pt prefers home; if home recommend home PT/OT)     Jenniffer Franco

## 2020-03-10 NOTE — PLAN OF CARE
Problem: PHYSICAL THERAPY ADULT  Goal: Performs mobility at highest level of function for planned discharge setting  See evaluation for individualized goals  Description  Treatment/Interventions: Functional transfer training, LE strengthening/ROM, Therapeutic exercise, Endurance training, Patient/family training, Equipment eval/education, Gait training, Continued evaluation, Spoke to nursing, OT  Equipment Recommended: (cont use of walker)       See flowsheet documentation for full assessment, interventions and recommendations  Note:   Prognosis: Good  Problem List: Decreased strength, Decreased endurance, Impaired balance, Decreased mobility  Assessment: Maggi Sanchez is a 63 y o  female admitted to [facility] on 3/9/2020 for Numbness and tingling of right arm and leg  Pt  has a past medical history of Hypertension, Morbid obesity with BMI of 45 0-49 9, adult ([facility] ) (5/3/2017), and Type 2 diabetes mellitus with hyperglycemia, without long-term current use of insulin ([facility] )  PT was consulted and pt was seen on 3/10/2020 for mobility assessment and d/c planning  Pt presents high fall risk precautions, monitoring abn vitals, PIV  Pt lives with her  and son in a ranch stle home with ramped entrance and handicap accessible bed and bath  Prior to admission pt reports being independent for transfers, elevations and ambulation using a Standard Walker and indep for IADLs and ADLs  Pt is currently functioning at a supervision assistance x1 level for bed mobility, supervision assistance x1 and minimum assistance x1 level for transfers, minimum assistance x1 level for ambulation with Rolling Walker  Pt demonstrated strength deficits in RLE compared to L, ?effort vs true strength deficits as noted discrepancies between findings on IE and nursing reports  Pt with increased time/difficulty performing mobility tasks however demonstrates good use of environment (bedrails, armrests)   Pt will benefit from continued skilled IP PT to address the above mentioned impairments  in order to maximize recovery and increase functional independence when completing mobility and ADLs  Currently PT recommendations for DME include cont use of walker  At this time PT recommendations for d/c are home w family support and HHPT pending progress  End of session pt seated in bedside chair, OT and nsg present  Nsg updated end of session  Barriers to Discharge: Inaccessible home environment, Decreased caregiver support  Barriers to Discharge Comments: decreased ability to negotiate household distances,  unable to provide A  Recommendation: Home with family support, Home PT     PT - OK to Discharge: No    See flowsheet documentation for full assessment

## 2020-03-10 NOTE — UTILIZATION REVIEW
Initial Clinical Review    Admission: Date/Time/Statement: Admission Orders (From admission, onward)     Ordered        03/09/20 1223  Place in Observation (expected length of stay for this patient is less than two midnights)  Once                   Orders Placed This Encounter   Procedures    Place in Observation (expected length of stay for this patient is less than two midnights)     Standing Status:   Standing     Number of Occurrences:   1     Order Specific Question:   Admitting Physician     Answer:   Keturah Avalos [[id number]]     Order Specific Question:   Level of Care     Answer:   Med Surg [[id number]]     ED Arrival Information     Expected Arrival Acuity Means of Arrival Escorted By Service Admission Type    - 3/9/2020 08:16 [facility] Urgent    Arrival Complaint    fall        Chief Complaint   Patient presents with    Fall     Pt brought via EMS  Pt fell around 5 am  Denies LOC  Denies hitting head  Pt reports falling 2 weeks ago, increased right sided numbness / weakness since previous fall (2 weeks ago)      Assessment/Plan:   62 yo obese female,  To ER via EMS ,  Admitted OBS status for workup of reported Rt sided numbness/weakness  Pt reports h/o recurrent syncope "for years", increasing in frequency past few months +  Left knee injury (? When) sustained w/previous fall, wears knee brace for support  Pt reports symptoms worsening since fall 2 week ago:  Numbness extends from RUE down rt side of body in  Rt foot  Per pt this numbness caused her to fall again today  No head injury or LOC  No external signs of trauma  IN ER,  Pt unable to ambulate w/out RW and assist of 1  A/O x3 - normal neuro exam   GCS 15  NIH score 0  Basic neurologic exam shows sensory deficit to light touch throughout right upper/lower extremities  Exam of strength and DTRs equivocal   Unclear how much weakness is true vs lack of effort    Patient also holding right foot in plantar flexion during attempts to check lower extremity reflexes (not flexed prior to exam)  Initial imaging reassuring  ER provider did not administer tPA due to non-disabling symptoms  Will admit for stroke r/o:  Tele monitoring, serial neuro cks, MRI brain and TTE, neuro consult,  PT/OT evaluations  3/10  NEUROLOGY -  MRI brain and C-spine pending  Cannot entirely rule out small acute CVA, so patient is on the stroke pathway  Differential also includes radiculopathy, but this would not explain the subtle facial asymmetry  Continue current plan/diagnostics/ supportive care    ED Triage Vitals [03/09/20 0821]   Temperature Pulse Respirations Blood Pressure SpO2   97 8 °F (36 6 °C) 91 18 (!) 180/86 94 %      Temp Source Heart Rate Source Patient Position - Orthostatic VS BP Location FiO2 (%)   Oral Monitor Lying Left arm --      Pain Score       No Pain        Wt Readings from Last 1 Encounters:   03/09/20 127 kg (280 lb)     Additional Vital Signs:    03/09/20 1600  97 9 °F (36 6 °C)  82  20  164/107Abnormal    03/09/20 1500  97 6 °F (36 4 °C)  89  20  201/108Abnormal    03/09/20 1400  98 1 °F (36 7 °C)  85  18  179/92Abnormal    03/09/20 1300  97 4 °F  91  18  171/73Abnormal      03/10/20 0700  97 °F (36 1 °C)Abnormal   85  18  154/81       Pertinent Labs/Diagnostic Test Results:   cxr - none   3/9  CT head -  No acute intracranial abnormality  Microangiopathic changes    3/9  EKG -  NSR  3/9  ECHO -  (no report available at time of initial review)     Results from last 7 days   Lab Units 03/10/20  0452 03/09/20  0907   WBC Thousand/uL 9 72 9 58   HEMOGLOBIN g/dL 13 0 13 5   HEMATOCRIT % 41 1 40 9   PLATELETS Thousands/uL 290 272   NEUTROS ABS Thousands/µL 6 31 6 58         Results from last 7 days   Lab Units 03/10/20  0452 03/09/20  0907   SODIUM mmol/L 137 139   POTASSIUM mmol/L 4 0 4 0   CHLORIDE mmol/L 103 103   CO2 mmol/L 26 26   ANION GAP mmol/L 8 10   BUN mg/dL 12 10   CREATININE mg/dL 0 73 0 78   EGFR ml/min/1 73sq m 91 84   CALCIUM mg/dL 10 6* 10 9*   MAGNESIUM mg/dL 1 7  --    PHOSPHORUS mg/dL 3 4  --      Results from last 7 days   Lab Units 03/10/20  0452   AST U/L 16   ALT U/L 24   ALK PHOS U/L 91   TOTAL PROTEIN g/dL 7 2   ALBUMIN g/dL 3 1*   TOTAL BILIRUBIN mg/dL 1 31*     Results from last 7 days   Lab Units 03/10/20  0745 03/09/20  2034 03/09/20  1511   POC GLUCOSE mg/dl 160* 157* 145*     Results from last 7 days   Lab Units 03/10/20  0452 03/09/20  0907   GLUCOSE RANDOM mg/dL 166* 213*         Results from last 7 days   Lab Units 03/10/20  0452 03/09/20  0907   PROTIME seconds 12 9 13 1   INR  0 96 0 98   PTT seconds  --  31   ED Treatment:   Medication Administration from 03/09/2020 0816 to 03/09/2020 1349       Date/Time Order Dose Route Action Action by Comments     03/09/2020 1201 aspirin chewable tablet 324 mg 324 mg Oral Given Loyda Riley RN         Past Medical History:   Diagnosis Date    Diabetes mellitus ([place] [address] )     External hemorrhoids     last assessed 4/7/16, resolved 7/21/16    Hernia, ventral     last assessed 12/14/15, resolved 7/21/16    Hypertension     Incarcerated incisional hernia     last assessed 12/21/15, resolved 7/21/16    Insomnia     last assessed 12/8/16, resolved 10/9/17    Morbid obesity with BMI of 45 0-49 9, adult ([place] [address] ) 5/3/2017    Type 2 diabetes mellitus with hyperglycemia, without long-term current use of insulin ([place] [address] )      Present on Admission:   Type 2 diabetes mellitus with hyperglycemia, without long-term current use of insulin ([facility])   Numbness and tingling of right arm and leg   Ambulatory dysfunction   Recurrent syncope    Admitting Diagnosis: HTN (hypertension) [I10]  Age/Sex: 63 y o  female     Admission Orders:  [place] ,  Consult neurology,  PT/OT evals & treat;  VS/Neuro cks q 1 hr x4;  q 2 hr x4;  q 4 hrs   Dysphagia assessment prior to po;  Baseline NIH scale;  Echo;  MRI brain  Sequential compression device to b/l LE;  up w/assist;  accucks qid w/Sliding scale insulin     Scheduled Medications:    Medications:  ALPRAZolam 0 5 mg Oral Once   aspirin 81 mg Oral Daily   atorvastatin 40 mg Oral QPM   enoxaparin 40 mg Subcutaneous BID   insulin lispro 1-6 Units Subcutaneous TID AC   insulin lispro 1-6 Units Subcutaneous HS   lisinopril 10 mg Oral Daily   metFORMIN 1,000 mg Oral BID With Meals     Continuous IV Infusions:     PRN Meds:    acetaminophen 650 mg Oral Q6H PRN   aluminum-magnesium hydroxide-simethicone 30 mL Oral Q4H PRN   docusate sodium 100 mg Oral BID PRN   hydrALAZINE 10 mg Intravenous Q6H PRN   ondansetron 4 mg Intravenous Q4H PRN     Network Utilization Review Department  [email or web address]  ATTENTION: Please call with any questions or concerns to [phone] and carefully listen to the prompts so that you are directed to the right person  All voicemails are confidential   Aparna Perry all requests for admission clinical reviews, approved or denied determinations and any other requests to dedicated fax number below belonging to the campus where the patient is receiving treatment   List of dedicated fax numbers for the Facilities:  [address] [place] [address] DENIALS (Administrative/Medical Necessity) [phone]   [address] (Maternity/NICU/Pediatrics) [phone]   [facility] [phone]   Li Capone [phone]   Yazmin Villar [phone]   Carlos Daugherty [phone]   [address] [phone]   [facility]  [phone]   [address] [facility]  [address] [facility] [address] [facility] [phone]

## 2020-03-10 NOTE — PLAN OF CARE
Problem: Potential for Falls  Goal: Patient will remain free of falls  Description  INTERVENTIONS:  - Assess patient frequently for physical needs  -  Identify cognitive and physical deficits and behaviors that affect risk of falls    -  [place] fall precautions as indicated by assessment   - Educate patient/family on patient safety including physical limitations  - Instruct patient to call for assistance with activity based on assessment  - Modify environment to reduce risk of injury  - Consider OT/PT consult to assist with strengthening/mobility  Outcome: Progressing     Problem: Prexisting or High Potential for Compromised Skin Integrity  Goal: Skin integrity is maintained or improved  Description  INTERVENTIONS:  - Identify patients at risk for skin breakdown  - Assess and monitor skin integrity  - Assess and monitor nutrition and hydration status  - Monitor labs   - Assess for incontinence   - Turn and reposition patient  - Assist with mobility/ambulation  - Relieve pressure over bony prominences  - Avoid friction and shearing  - Provide appropriate hygiene as needed including keeping skin clean and dry  - Evaluate need for skin moisturizer/barrier cream  - Collaborate with interdisciplinary team   - Patient/family teaching  - Consider wound care consult   Outcome: Progressing     Problem: DISCHARGE PLANNING - CARE MANAGEMENT  Goal: Discharge to post-acute care or home with appropriate resources  Description  INTERVENTIONS:  - Conduct assessment to determine patient/family and health care team treatment goals, and need for post-acute services based on payer coverage, community resources, and patient preferences, and barriers to discharge  - Address psychosocial, clinical, and financial barriers to discharge as identified in assessment in conjunction with the patient/family and health care team  - Arrange appropriate level of post-acute services according to patients   needs and preference and payer coverage in collaboration with the physician and health care team  - Communicate with and update the patient/family, physician, and health care team regarding progress on the discharge plan  - Arrange appropriate transportation to post-acute venues  Outcome: Progressing     Problem: PAIN - ADULT  Goal: Verbalizes/displays adequate comfort level or baseline comfort level  Description  Interventions:  - Encourage patient to monitor pain and request assistance  - Assess pain using appropriate pain scale  - Administer analgesics based on type and severity of pain and evaluate response  - Implement non-pharmacological measures as appropriate and evaluate response  - Consider cultural and social influences on pain and pain management  - Notify physician/advanced practitioner if interventions unsuccessful or patient reports new pain  Outcome: Progressing     Problem: SAFETY ADULT  Goal: Patient will remain free of falls  Description  INTERVENTIONS:  - Assess patient frequently for physical needs  -  Identify cognitive and physical deficits and behaviors that affect risk of falls    -  [place] fall precautions as indicated by assessment   - Educate patient/family on patient safety including physical limitations  - Instruct patient to call for assistance with activity based on assessment  - Modify environment to reduce risk of injury  - Consider OT/PT consult to assist with strengthening/mobility  Outcome: Progressing  Goal: Maintain or return to baseline ADL function  Description  INTERVENTIONS:  -  Assess patient's ability to carry out ADLs; assess patient's baseline for ADL function and identify physical deficits which impact ability to perform ADLs (bathing, care of mouth/teeth, toileting, grooming, dressing, etc )  - Assess/evaluate cause of self-care deficits   - Assess range of motion  - Assess patient's mobility; develop plan if impaired  - Assess patient's need for assistive devices and provide as appropriate  - Encourage maximum independence but intervene and supervise when necessary  - Involve family in performance of ADLs  - Assess for home care needs following discharge   - Consider OT consult to assist with ADL evaluation and planning for discharge  - Provide patient education as appropriate  Outcome: Progressing  Goal: Maintain or return mobility status to optimal level  Description  INTERVENTIONS:  - Assess patient's baseline mobility status (ambulation, transfers, stairs, etc )    - Identify cognitive and physical deficits and behaviors that affect mobility  - Identify mobility aids required to assist with transfers and/or ambulation (gait belt, sit-to-stand, lift, walker, cane, etc )  - [place] fall precautions as indicated by assessment  - Record patient progress and toleration of activity level on Mobility SBAR; progress patient to next Phase/Stage  - Instruct patient to call for assistance with activity based on assessment  - Consider rehabilitation consult to assist with strengthening/weightbearing, etc   Outcome: Progressing     Problem: DISCHARGE PLANNING  Goal: Discharge to home or other facility with appropriate resources  Description  INTERVENTIONS:  - Identify barriers to discharge w/patient and caregiver  - Arrange for needed discharge resources and transportation as appropriate  - Identify discharge learning needs (meds, wound care, etc )  - Arrange for interpretive services to assist at discharge as needed  - Refer to Case Management Department for coordinating discharge planning if the patient needs post-hospital services based on physician/advanced practitioner order or complex needs related to functional status, cognitive ability, or social support system  Outcome: Progressing     Problem: Knowledge Deficit  Goal: Patient/family/caregiver demonstrates understanding of disease process, treatment plan, medications, and discharge instructions  Description  Complete learning assessment and assess knowledge base   Interventions:  - Provide teaching at level of understanding  - Provide teaching via preferred learning methods  Outcome: Progressing     Problem: NEUROSENSORY - ADULT  Goal: Achieves stable or improved neurological status  Description  INTERVENTIONS  - Monitor and report changes in neurological status  - Monitor vital signs such as temperature, blood pressure, glucose, and any other labs ordered   - Initiate measures to prevent increased intracranial pressure  - Monitor for seizure activity and implement precautions if appropriate      Outcome: Progressing  Goal: Remains free of injury related to seizures activity  Description  INTERVENTIONS  - Maintain airway, patient safety  and administer oxygen as ordered  - Monitor patient for seizure activity, document and report duration and description of seizure to physician/advanced practitioner  - If seizure occurs,  ensure patient safety during seizure  - Reorient patient post seizure  - Seizure pads on all 4 side rails  - Instruct patient/family to notify RN of any seizure activity including if an aura is experienced  - Instruct patient/family to call for assistance with activity based on nursing assessment  - Administer anti-seizure medications if ordered    Outcome: Progressing  Goal: Achieves maximal functionality and self care  Description  INTERVENTIONS  - Monitor swallowing and airway patency with patient fatigue and changes in neurological status  - Encourage and assist patient to increase activity and self care     - Encourage visually impaired, hearing impaired and aphasic patients to use assistive/communication devices  Outcome: Progressing     Problem: CARDIOVASCULAR - ADULT  Goal: Maintains optimal cardiac output and hemodynamic stability  Description  INTERVENTIONS:  - Monitor I/O, vital signs and rhythm  - Monitor for S/S and trends of decreased cardiac output  - Administer and titrate ordered vasoactive medications to optimize hemodynamic stability  - Assess quality of pulses, skin color and temperature  - Assess for signs of decreased coronary artery perfusion  - Instruct patient to report change in severity of symptoms  Outcome: Progressing     Problem: RESPIRATORY - ADULT  Goal: Achieves optimal ventilation and oxygenation  Description  INTERVENTIONS:  - Assess for changes in respiratory status  - Assess for changes in mentation and behavior  - Position to facilitate oxygenation and minimize respiratory effort  - Oxygen administered by appropriate delivery if ordered  - Initiate smoking cessation education as indicated  - Encourage broncho-pulmonary hygiene including cough, deep breathe, Incentive Spirometry  - Assess the need for suctioning and aspirate as needed  - Assess and instruct to report SOB or any respiratory difficulty  - Respiratory Therapy support as indicated  Outcome: Progressing     Problem: GASTROINTESTINAL - ADULT  Goal: Minimal or absence of nausea and/or vomiting  Description  INTERVENTIONS:  - Administer IV fluids if ordered to ensure adequate hydration  - Maintain NPO status until nausea and vomiting are resolved  - Nasogastric tube if ordered  - Administer ordered antiemetic medications as needed  - Provide nonpharmacologic comfort measures as appropriate  - Advance diet as tolerated, if ordered  - Consider nutrition services referral to assist patient with adequate nutrition and appropriate food choices  Outcome: Progressing     Problem: GENITOURINARY - ADULT  Goal: Maintains or returns to baseline urinary function  Description  INTERVENTIONS:  - Assess urinary function  - Encourage oral fluids to ensure adequate hydration if ordered  - Administer IV fluids as ordered to ensure adequate hydration  - Administer ordered medications as needed  - Offer frequent toileting  - Follow urinary retention protocol if ordered  Outcome: Progressing  Goal: Absence of urinary retention  Description  INTERVENTIONS:  - Assess patients ability to void and empty bladder  - Monitor I/O  - Bladder scan as needed  - Discuss with physician/AP medications to alleviate retention as needed  - Discuss catheterization for long term situations as appropriate  Outcome: Progressing     Problem: METABOLIC, FLUID AND ELECTROLYTES - ADULT  Goal: Electrolytes maintained within normal limits  Description  INTERVENTIONS:  - Monitor labs and assess patient for signs and symptoms of electrolyte imbalances  - Administer electrolyte replacement as ordered  - Monitor response to electrolyte replacements, including repeat lab results as appropriate  - Instruct patient on fluid and nutrition as appropriate  Outcome: Progressing  Goal: Fluid balance maintained  Description  INTERVENTIONS:  - Monitor labs   - Monitor I/O and WT  - Instruct patient on fluid and nutrition as appropriate  - Assess for signs & symptoms of volume excess or deficit  Outcome: Progressing  Goal: Glucose maintained within target range  Description  INTERVENTIONS:  - Monitor Blood Glucose as ordered  - Assess for signs and symptoms of hyperglycemia and hypoglycemia  - Administer ordered medications to maintain glucose within target range  - Assess nutritional intake and initiate nutrition service referral as needed  Outcome: Progressing     Problem: SKIN/TISSUE INTEGRITY - ADULT  Goal: Skin integrity remains intact  Description  INTERVENTIONS  - Identify patients at risk for skin breakdown  - Assess and monitor skin integrity  - Assess and monitor nutrition and hydration status  - Monitor labs (i e  albumin)  - Assess for incontinence   - Turn and reposition patient  - Assist with mobility/ambulation  - Relieve pressure over bony prominences  - Avoid friction and shearing  - Provide appropriate hygiene as needed including keeping skin clean and dry  - Evaluate need for skin moisturizer/barrier cream  - Collaborate with interdisciplinary team (i e  Nutrition, Rehabilitation, etc )   - Patient/family teaching  Outcome: Progressing     Problem: MUSCULOSKELETAL - ADULT  Goal: Maintain or return mobility to safest level of function  Description  INTERVENTIONS:  - Assess patient's ability to carry out ADLs; assess patient's baseline for ADL function and identify physical deficits which impact ability to perform ADLs (bathing, care of mouth/teeth, toileting, grooming, dressing, etc )  - Assess/evaluate cause of self-care deficits   - Assess range of motion  - Assess patient's mobility  - Assess patient's need for assistive devices and provide as appropriate  - Encourage maximum independence but intervene and supervise when necessary  - Involve family in performance of ADLs  - Assess for home care needs following discharge   - Consider OT consult to assist with ADL evaluation and planning for discharge  - Provide patient education as appropriate  Outcome: Progressing     Problem: Neurological Deficit  Goal: Neurological status is stable or improving  Description  Interventions:  - Monitor and assess patient's level of consciousness, motor function, sensory function, and level of assistance needed for ADLs  - Monitor and report changes from baseline  Collaborate with interdisciplinary team to initiate plan and implement interventions as ordered  - Provide and maintain a safe environment  - Consider seizure precautions  - Consider fall precautions  - Consider aspiration precautions  - Consider bleeding precautions  Outcome: Progressing     Problem: Activity Intolerance/Impaired Mobility  Goal: Mobility/activity is maintained at optimum level for patient  Description  Interventions:  - Assess and monitor patient  barriers to mobility and need for assistive/adaptive devices  - Assess patient's emotional response to limitations  - Collaborate with interdisciplinary team and initiate plans and interventions as ordered  - Encourage independent activity per ability   - Maintain proper body alignment    - Perform active/passive rom as tolerated/ordered  - Plan activities to conserve energy   - Turn patient as appropriate  Outcome: Progressing     Problem: Nutrition  Goal: Nutrition/Hydration status is improving  Description  Monitor and assess patient's nutrition/hydration status for malnutrition (ex- brittle hair, bruises, dry skin, pale skin and conjunctiva, muscle wasting, smooth red tongue, and disorientation)  Collaborate with interdisciplinary team and initiate plan and interventions as ordered  Monitor patient's weight and dietary intake as ordered or per policy  Utilize nutrition screening tool and intervene per policy  Determine patient's food preferences and provide high-protein, high-caloric foods as appropriate  - Assist patient with eating   - Allow adequate time for meals   - Encourage patient to take dietary supplement as ordered  - Collaborate with clinical nutritionist   - Include patient/family/caregiver in decisions related to nutrition  Outcome: Progressing     Problem: Nutrition/Hydration-ADULT  Goal: Nutrient/Hydration intake appropriate for improving, restoring or maintaining nutritional needs  Description  Monitor and assess patient's nutrition/hydration status for malnutrition  Collaborate with interdisciplinary team and initiate plan and interventions as ordered  Monitor patient's weight and dietary intake as ordered or per policy  Utilize nutrition screening tool and intervene as necessary  Determine patient's food preferences and provide high-protein, high-caloric foods as appropriate       INTERVENTIONS:  - Monitor oral intake, urinary output, labs, and treatment plans  - Assess nutrition and hydration status and recommend course of action  - Evaluate amount of meals eaten  - Assist patient with eating if necessary   - Allow adequate time for meals  - Recommend/ encourage appropriate diets, oral nutritional supplements, and vitamin/mineral supplements  - Order, calculate, and assess calorie counts as needed  - Recommend, monitor, and adjust tube feedings and TPN/PPN based on assessed needs  - Assess need for intravenous fluids  - Provide specific nutrition/hydration education as appropriate  - Include patient/family/caregiver in decisions related to nutrition  Outcome: Progressing     Problem: COPING  Goal: Pt/Family able to verbalize concerns and demonstrate effective coping strategies  Description  INTERVENTIONS:  - Assist patient/family to identify coping skills, available support systems and cultural and spiritual values  - Provide emotional support, including active listening and acknowledgement of concerns of patient and caregivers  - Reduce environmental stimuli, as able  - Provide patient education  - Assess for spiritual pain/suffering and initiate spiritual care, including notification of Pastoral Care or capri based community as needed  - Assess effectiveness of coping strategies  Outcome: Progressing  Goal: Will report anxiety at manageable levels  Description  INTERVENTIONS:  - Administer medication as ordered  - Teach and encourage coping skills  - Provide emotional support  - Assess patient/family for anxiety and ability to cope  Outcome: Progressing     Problem: DECISION MAKING  Goal: Pt/Family able to effectively weigh alternatives and participate in decision making related to treatment and care  Description  INTERVENTIONS:  - Identify decision maker  - Determine when there are differences among patient's view, family's view, and healthcare provider's view of patient condition and care goals  - Facilitate patient/family articulation of goals for care  - Help patient/family identify pros/cons of alternative solutions  - Provide information as requested by patient/family  - Respect patient/family rights related to privacy and sharing information   - Serve as a liaison between patient, family and health care team  - Initiate consults as appropriate (Ethics Team, Palliative Care, Family Care Conference, etc )  Outcome: Progressing     Problem: CONFUSION/THOUGHT DISTURBANCE  Goal: Thought disturbances (confusion, delirium, depression, dementia or psychosis) are managed to maintain or return to baseline mental status and functional level  Description  INTERVENTIONS:  - Assess for possible contributors to  thought disturbance, including but not limited to medications, infection, impaired vision or hearing, underlying metabolic abnormalities, dehydration, respiratory compromise,  psychiatric diagnoses and notify attending PHYSICAN/AP  - Monitor and intervene to maintain adequate nutrition, hydration, elimination, sleep and activity  - Decrease environmental stimuli, including noise as appropriate  - Provide frequent contacts to provide refocusing, direction and reassurance as needed  Approach patient calmly with eye contact and at their level    - [place] high risk fall precautions, aspiration precautions and other safety measures, as indicated  - If delirium suspected, notify physician/AP of change in condition and request immediate in-person evaluation  - Pursue consults as appropriate including Geriatric (campus dependent), OT for cognitive evaluation/activity planning, psychiatric, pastoral care, etc   Outcome: Progressing

## 2020-03-10 NOTE — OCCUPATIONAL THERAPY NOTE
Occupational Therapy Evaluation (Evaluation = [id number], Treatment = [id number])     Patient Name: Duane Ras  RRAQL'Q Date: 3/10/2020  Problem List  Principal Problem:    Numbness and tingling of right arm and leg  Active Problems:    Type 2 diabetes mellitus with hyperglycemia, without long-term current use of insulin ([facility] )    Morbid obesity with BMI of 45 0-49 9, adult [facility])    Ambulatory dysfunction    Recurrent syncope    Past Medical History  Past Medical History:   Diagnosis Date    Diabetes mellitus ([facility] )     External hemorrhoids     last assessed 16, resolved 16    Hernia, ventral     last assessed 12/14/15, resolved 16    Hypertension     Incarcerated incisional hernia     last assessed 12/21/15, resolved 16    Insomnia     last assessed 16, resolved 10/9/17    Morbid obesity with BMI of 45 0-49 9, adult ([place] [address] ) 5/3/2017    Type 2 diabetes mellitus with hyperglycemia, without long-term current use of insulin ([facility])      Past Surgical History  Past Surgical History:   Procedure Laterality Date    ABCESS DRAINAGE       SECTION      x3    COLONOSCOPY N/A 2017    Procedure: COLONOSCOPY with biopsy;  Surgeon: Sada Otto DO;  Location: [facility]; Service: Complete    HYSTERECTOMY               03/10/20 0840   Note Type   Note type Eval/Treat   Restrictions/Precautions   Weight Bearing Precautions Per Order No   Other Precautions Fall Risk   Pain Assessment   Pain Assessment No/denies pain   Pain Score No Pain   Home Living   Type of [address] One level;Ramped entrance   Bathroom Shower/Tub Walk-in shower   Bathroom Toilet Raised   Bathroom Equipment Grab bars in shower   [address] Walker;Grab bars; Other (Comment)  (standard walker and bed rails)   Prior Function   Level of [place] Independent with ADLs and functional mobility   Lives With Spouse; Son   [place] Help From Family  (Pt reports son can help occationally,  unable to help)   ADL Assistance Independent   IADLs Independent   Falls in the last 6 months 5 to 10  (5)   Vocational Full time employment   Lifestyle   Autonomy PTA pt reports independence for ADLs, functional transfers, and functional mobility without AD; Pt reports using a SW for the past week 2* knee pain  Pt is +; +falls (5); -alone  Reciprocal Relationships , son   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   Intrinsic Gratification crafts; knitting, crocheting   Psychosocial   Psychosocial (WDL) X   Patient Behaviors/Mood Depressed;Flat affect   Subjective   Subjective "I normally take care of other people and now everyone has to take care of me"   ADL   Where Assessed Edge of bed   Eating Assistance 5  Supervision/Setup   Grooming Assistance 5  [address] [facility] 4  [address] 4  [address] 4  Minimal Assistance   Bed Mobility   Supine to Sit 5  Supervision   Additional items HOB elevated; Bedrails; Increased time required;Verbal cues   Transfers   Sit to Stand 4  Minimal assistance   Additional items Assist x 1; Increased time required;Verbal cues   Stand to Sit 4  Minimal assistance   Additional items Assist x 1; Increased time required;Verbal cues;Armrests   Stand pivot 4  Minimal assistance   Additional items Assist x 1; Increased time required;Verbal cues;Armrests   Functional Mobility   Functional Mobility 4  Minimal assistance   Additional Comments x1   Additional items Rolling walker   Balance   Static Sitting Good   Dynamic Sitting Fair +   Static Standing Fair   Dynamic Standing Fair -   Activity Tolerance   Activity Tolerance Patient limited by fatigue   Medical Staff Made Aware PT, Nsg   RUE Assessment   RUE Assessment WFL   RUE Strength   RUE Overall Strength Within Functional Limits - able to perform ADL tasks with strength  (4-/5 throughout)   LUE Assessment   LUE Assessment WFL   LUE Strength   LUE Overall Strength Within Functional Limits - able to perform ADL tasks with strength  (4+/5 throughout)   Hand Function   Gross Motor Coordination Functional   Fine Motor Coordination   (L hand WFL, R hand slight impairment)   Sensation   Light Touch No apparent deficits   Additional Comments Pt states slight deficits in RUE light touch however she was able to identify 4/4 when tested   Proprioception   Proprioception No apparent deficits   Vision-Basic Assessment   Current Vision Wears glasses only for reading   Vision - Complex Assessment   Acuity Able to read clock/calendar on wall without difficulty   Perception   Inattention/Neglect Appears intact   Cognition   Overall Cognitive Status WFL   Arousal/Participation Arousable; Cooperative   Attention Attends with cues to redirect   Orientation Level Oriented X4   Memory Within functional limits   Following Commands Follows one step commands without difficulty   Assessment   Limitation Decreased ADL status; Decreased UE strength;Decreased Safe judgement during ADL;Decreased endurance;Decreased high-level ADLs; Decreased self-care trans;Decreased fine motor control;Decreased sensation   Prognosis Fair   Assessment Pt is a 62 y/o female admitted for numbness and tingling of the R arm and leg; pt reported having symptoms for 2 weeks PTA including numbness in her trunk starting 3 days PTA  Pt noted with a PMH including DM, HTN, recurrent syncope  Pt CT head negative for acute intracranial abnormality  PTA pt reports independence for ADLs, functional transfers, and functional mobility without AD; Pt reports using a SW for the past week 2* knee pain  Pt is +; +falls (5); -alone  Upon initial eval, pt demonstrated deficits in functional mobility, functional transfers, functional balance, functional activity tolerance (currently fair = 15-20 min), ADL status, and R UE weakness   Pt would benefit from continued OT tx to address above deficits  Goals   Patient Goals To get better   STG Time Frame   (1-7)   Short Term Goal #1 Pt will demonstrate proper walker/functional transfer safety 100% of the time  Short Term Goal #2 Pt will complete UB and LB dressing with mod I at EOB with AE prn  Short Term Goal  Pt will perform sit < > stand with Mod I to improve LB dressing and perineal hygiene  LTG Time Frame   (7-14)   Long Term Goal #1 Pt will increase R UE strength by 1 grade to improve ADL status  Long Term Goal #2 Pt will increase functional activity tolerance to Good (20-30 min) and standing tolerance to 5-10 minutes to improve ADL status  Long Term Goal Pt will improve functional balance by one grade to improve ADL status  Plan   Treatment Interventions ADL retraining;Functional transfer training;UE strengthening/ROM; Endurance training;Patient/family training;Equipment evaluation/education; Compensatory technique education; Energy conservation   Goal Expiration Date 03/24/20   OT Treatment Day 0   OT Frequency 3-5x/wk   Additional Treatment Session   Start Time 0830   End Time 0840   Treatment Assessment Pt seen for 10 min tx session with a focus on feeding and AE education  Education was provided to patient on use of foam tube to build up utensils, tooth brush, etc  Pt demonstrated good carryover of education  With built up spoon, pt was able to demonstrate self-feeding with Mod-I  Pt reports the foam to be help, stating before that the spoon would just fall out of her hand  Pt stated she was glad she would be able to eat with her right hand again (pt is right hand dominant)  Pt would benefit from continued OT to improve overall levels of independence  Will continue     Additional Treatment Day 1   Recommendation   OT Discharge Recommendation Short Term Rehab  (Pt prefers home; if home recommend home PT/OT)   Barthel Index   Feeding 5   Bathing 0   Grooming Score 0   Dressing Score 5   Bladder Score 10   Bowels Score 10   Toilet Use Score 5   Transfers (Bed/Chair) Score 10   Mobility (Level Surface) Score 0   Stairs Score 0   Barthel Index Score 45   Modified [place] Scale   Modified [place]ise Scale 4     Marie Og

## 2020-03-10 NOTE — PROGRESS NOTES
Progress Note - Teja Oneill 1960, 63 y o  female MRN: [id number]    Unit/Bed#: E4 -01 Encounter: [id number]    Primary Care Provider: DEANA Carlson   Date and time admitted to hospital: 3/9/2020  8:16 AM        * Numbness and tingling of right arm and leg  Assessment & Plan  Patient presents to ER for right-sided numbness; she also reports a history of recurrent syncopal episodes  Syncopal episodes have reportedly occurred for years, and over past few months have increased in frequency to every few weeks  Patient denies any presyncopal symptoms or provoking factors  She reports RUE numbness ("pins and needles") and difficulty coordinating RUE movement for approximately 2 weeks  She attributed this to pulling herself up from the ground after a syncopal episode  She reports RLE and right trunk numbness started 3 days ago when she was rising from a seated to standing position  She has had difficulty walking since due to decreased sensation, and reports recurrent spasms in right thigh, flexing hip involuntarily  Patient denies any facial numbness, facial droop, change in speech/vision, nausea/vomiting, vertigo, or headaches  -CT head negative  -neurology consultation appreciated  -MRI brain and C-spine  -echocardiogram  -frequent neuro checks  -continue aspirin, statin      Recurrent syncope  Assessment & Plan  Patient reports recurrent syncopal episodes occurring for years  Episodes reportedly increasing in frequency to every few weeks  Patient denies any prior evaluation or treatment  Will evaluate in conjunction with patient's report of right-sided numbness  Ambulatory dysfunction  Assessment & Plan  Patient reports difficulty walking over past few months due to left knee injury sustained during syncopal episode  No reported prior treatment  Patient wears a knee brace as needed for support     -PT recommending home PT     Benign essential hypertension  Assessment & Plan  Continue with lisinopril    Morbid obesity with BMI of 45 0-49 9, adult (HCC)  Assessment & Plan  BMI 45 2  Encourage lifestyle modifications  Type 2 diabetes mellitus with hyperglycemia, without long-term current use of insulin [facility])  Assessment & Plan  Lab Results   Component Value Date    HGBA1C 9 7 (A) 2020        -hold metformin  -monitor Accu-Cheks, sliding scale for coverage            VTE Pharmacologic Prophylaxis:   Pharmacologic:  Lovenox    Patient Centered Rounds: I have performed bedside rounds with nursing staff today  Time Spent for Care: 20 minutes  More than 50% of total time spent on counseling and coordination of care as described above  Current Length of Stay: 0 day(s)    Current Patient Status: Observation   Certification Statement: The patient will continue to require additional inpatient hospital stay due to MRI brain    Discharge Plan / Estimated Discharge Date:  24-48 hours    Code Status: Level 3 - DNAR and DNI      Subjective:   Patient seen and examined at bedside, complaining that she did not sleep well as a bed is uncomfortable, also complained of having numbness tingling in her right upper and right lower extremity    Objective:     Vitals:   Temp (24hrs), Av 6 °F (36 4 °C), Min:96 9 °F (36 1 °C), Max:98 7 °F (37 1 °C)    Temp:  [96 9 °F (36 1 °C)-98 7 °F (37 1 °C)] 96 9 °F (36 1 °C)  HR:  [78-86] 85  Resp:  [18-20] 18  BP: (153-177)/() 155/93  SpO2:  [97 %-99 %] 97 %  Body mass index is 43 85 kg/m²  Input and Output Summary (last 24 hours): Intake/Output Summary (Last 24 hours) at 3/10/2020 1644  Last data filed at 3/10/2020 0900  Gross per 24 hour   Intake --   Output 200 ml   Net -200 ml       Physical Exam:    Constitutional: Patient is oriented to person, place and time, no acute distress  HEENT:  Normocephalic, atraumatic  Cardiovascular: Normal S1S2, RRR, No murmurs/rubs/gallops appreciated    Pulmonary:  Bilateral air entry, No rhonchi/rales/wheezing appreciated  Abdominal: Soft, Bowel sounds present, Non-tender, Non-distended  Extremities:  No cyanosis, clubbing or edema  Neurological: Cranial nerves II-XII grossly intact, motor strength 5/5 in left upper and left lower extremity, motor strength 4/5 in right upper extremity, distal weakness in right lower extremity    Additional Data:     Labs:    Results from last 7 days   Lab Units 03/10/20  0452   WBC Thousand/uL 9 72   HEMOGLOBIN g/dL 13 0   HEMATOCRIT % 41 1   PLATELETS Thousands/uL 290   NEUTROS PCT % 66   LYMPHS PCT % 24   MONOS PCT % 7   EOS PCT % 3     Results from last 7 days   Lab Units 03/10/20  0452   POTASSIUM mmol/L 4 0   CHLORIDE mmol/L 103   CO2 mmol/L 26   BUN mg/dL 12   CREATININE mg/dL 0 73   CALCIUM mg/dL 10 6*   ALK PHOS U/L 91   ALT U/L 24   AST U/L 16     Results from last 7 days   Lab Units 03/10/20  0452   INR  0 96        I Have Reviewed All Lab Data Listed Above          Recent Cultures (last 7 days):           Last 24 Hours Medication List:     Current Facility-Administered Medications:  acetaminophen 650 mg Oral Q6H PRN Vicky Carver MD   aluminum-magnesium hydroxide-simethicone 30 mL Oral Q4H PRN Vicky Carver MD   aspirin 81 mg Oral Daily Vicky Carver MD   atorvastatin 40 mg Oral QPM Vicky Carver MD   diphenhydrAMINE 50 mg Intravenous Once PRN LEYDA Mello   docusate sodium 100 mg Oral BID PRN Vicky Carver MD   enoxaparin 40 mg Subcutaneous BID Vicky Carver MD   hydrALAZINE 10 mg Intravenous Q6H PRN DEANA Castillo   insulin lispro 1-6 Units Subcutaneous TID AC Vicky Carver MD   insulin lispro 1-6 Units Subcutaneous HS Vicky Carver MD   lisinopril 10 mg Oral Daily Vicky Carver MD   ondansetron 4 mg Intravenous Q4H PRN Vicky Carver MD        Today, Patient Was Seen By: Alvin Pierce MD

## 2020-03-11 ENCOUNTER — APPOINTMENT (OUTPATIENT)
Dept: NON INVASIVE DIAGNOSTICS | Facility: HOSPITAL | Age: 60
DRG: 059 | End: 2020-03-11

## 2020-03-11 ENCOUNTER — APPOINTMENT (OUTPATIENT)
Dept: MRI IMAGING | Facility: HOSPITAL | Age: 60
DRG: 059 | End: 2020-03-11

## 2020-03-11 LAB
ANION GAP SERPL CALCULATED.3IONS-SCNC: 11 MMOL/L (ref 4–13)
BASOPHILS # BLD AUTO: 0.03 THOUSANDS/ΜL (ref 0–0.1)
BASOPHILS NFR BLD AUTO: 0 % (ref 0–1)
BUN SERPL-MCNC: 13 MG/DL (ref 5–25)
CALCIUM SERPL-MCNC: 10.8 MG/DL (ref 8.3–10.1)
CHLORIDE SERPL-SCNC: 103 MMOL/L (ref 100–108)
CO2 SERPL-SCNC: 24 MMOL/L (ref 21–32)
CREAT SERPL-MCNC: 0.69 MG/DL (ref 0.6–1.3)
EOSINOPHIL # BLD AUTO: 0.31 THOUSAND/ΜL (ref 0–0.61)
EOSINOPHIL NFR BLD AUTO: 3 % (ref 0–6)
ERYTHROCYTE [DISTWIDTH] IN BLOOD BY AUTOMATED COUNT: 13 % (ref 11.6–15.1)
GFR SERPL CREATININE-BSD FRML MDRD: 96 ML/MIN/1.73SQ M
GLUCOSE P FAST SERPL-MCNC: 163 MG/DL (ref 65–99)
GLUCOSE SERPL-MCNC: 155 MG/DL (ref 65–140)
GLUCOSE SERPL-MCNC: 163 MG/DL (ref 65–140)
GLUCOSE SERPL-MCNC: 196 MG/DL (ref 65–140)
HCT VFR BLD AUTO: 39.3 % (ref 34.8–46.1)
HGB BLD-MCNC: 13 G/DL (ref 11.5–15.4)
IMM GRANULOCYTES # BLD AUTO: 0.02 THOUSAND/UL (ref 0–0.2)
IMM GRANULOCYTES NFR BLD AUTO: 0 % (ref 0–2)
LYMPHOCYTES # BLD AUTO: 2.49 THOUSANDS/ΜL (ref 0.6–4.47)
LYMPHOCYTES NFR BLD AUTO: 27 % (ref 14–44)
MCH RBC QN AUTO: 29.2 PG (ref 26.8–34.3)
MCHC RBC AUTO-ENTMCNC: 33.1 G/DL (ref 31.4–37.4)
MCV RBC AUTO: 88 FL (ref 82–98)
MONOCYTES # BLD AUTO: 0.71 THOUSAND/ΜL (ref 0.17–1.22)
MONOCYTES NFR BLD AUTO: 8 % (ref 4–12)
NEUTROPHILS # BLD AUTO: 5.65 THOUSANDS/ΜL (ref 1.85–7.62)
NEUTS SEG NFR BLD AUTO: 62 % (ref 43–75)
NRBC BLD AUTO-RTO: 0 /100 WBCS
PLATELET # BLD AUTO: 265 THOUSANDS/UL (ref 149–390)
PMV BLD AUTO: 9.3 FL (ref 8.9–12.7)
POTASSIUM SERPL-SCNC: 3.8 MMOL/L (ref 3.5–5.3)
RBC # BLD AUTO: 4.45 MILLION/UL (ref 3.81–5.12)
SODIUM SERPL-SCNC: 138 MMOL/L (ref 136–145)
WBC # BLD AUTO: 9.21 THOUSAND/UL (ref 4.31–10.16)

## 2020-03-11 PROCEDURE — 93306 TTE W/DOPPLER COMPLETE: CPT

## 2020-03-11 PROCEDURE — 80048 BASIC METABOLIC PNL TOTAL CA: CPT | Performed by: INTERNAL MEDICINE

## 2020-03-11 PROCEDURE — 82948 REAGENT STRIP/BLOOD GLUCOSE: CPT

## 2020-03-11 PROCEDURE — 93799 UNLISTED CV SVC/PROCEDURE: CPT

## 2020-03-11 PROCEDURE — 85025 COMPLETE CBC W/AUTO DIFF WBC: CPT | Performed by: INTERNAL MEDICINE

## 2020-03-11 PROCEDURE — 99232 SBSQ HOSP IP/OBS MODERATE 35: CPT | Performed by: INTERNAL MEDICINE

## 2020-03-11 PROCEDURE — A9585 GADOBUTROL INJECTION: HCPCS | Performed by: PSYCHIATRY & NEUROLOGY

## 2020-03-11 PROCEDURE — 72142 MRI NECK SPINE W/DYE: CPT

## 2020-03-11 PROCEDURE — 70553 MRI BRAIN STEM W/O & W/DYE: CPT

## 2020-03-11 PROCEDURE — 99214 OFFICE O/P EST MOD 30 MIN: CPT | Performed by: PHYSICIAN ASSISTANT

## 2020-03-11 RX ORDER — LORAZEPAM 2 MG/ML
0.5 INJECTION INTRAMUSCULAR
Status: DISCONTINUED | OUTPATIENT
Start: 2020-03-11 | End: 2020-03-15 | Stop reason: HOSPADM

## 2020-03-11 RX ORDER — DEXAMETHASONE SODIUM PHOSPHATE 4 MG/ML
6 INJECTION, SOLUTION INTRA-ARTICULAR; INTRALESIONAL; INTRAMUSCULAR; INTRAVENOUS; SOFT TISSUE EVERY 6 HOURS SCHEDULED
Status: DISCONTINUED | OUTPATIENT
Start: 2020-03-12 | End: 2020-03-12

## 2020-03-11 RX ORDER — METHOCARBAMOL 500 MG/1
500 TABLET, FILM COATED ORAL EVERY 6 HOURS PRN
Status: DISCONTINUED | OUTPATIENT
Start: 2020-03-11 | End: 2020-03-11

## 2020-03-11 RX ORDER — DIPHENHYDRAMINE HCL 25 MG
50 TABLET ORAL
Status: DISCONTINUED | OUTPATIENT
Start: 2020-03-11 | End: 2020-03-15 | Stop reason: HOSPADM

## 2020-03-11 RX ORDER — CLONAZEPAM 0.5 MG/1
0.5 TABLET ORAL
Status: DISCONTINUED | OUTPATIENT
Start: 2020-03-11 | End: 2020-03-15

## 2020-03-11 RX ORDER — KETOROLAC TROMETHAMINE 30 MG/ML
15 INJECTION, SOLUTION INTRAMUSCULAR; INTRAVENOUS ONCE
Status: COMPLETED | OUTPATIENT
Start: 2020-03-11 | End: 2020-03-11

## 2020-03-11 RX ORDER — HYDROMORPHONE HCL/PF 1 MG/ML
0.5 SYRINGE (ML) INJECTION ONCE AS NEEDED
Status: DISCONTINUED | OUTPATIENT
Start: 2020-03-11 | End: 2020-03-15 | Stop reason: HOSPADM

## 2020-03-11 RX ORDER — DEXAMETHASONE SODIUM PHOSPHATE 4 MG/ML
10 INJECTION, SOLUTION INTRA-ARTICULAR; INTRALESIONAL; INTRAMUSCULAR; INTRAVENOUS; SOFT TISSUE ONCE
Status: COMPLETED | OUTPATIENT
Start: 2020-03-11 | End: 2020-03-11

## 2020-03-11 RX ORDER — ONDANSETRON 2 MG/ML
4 INJECTION INTRAMUSCULAR; INTRAVENOUS ONCE AS NEEDED
Status: DISCONTINUED | OUTPATIENT
Start: 2020-03-11 | End: 2020-03-15 | Stop reason: HOSPADM

## 2020-03-11 RX ADMIN — LORAZEPAM 0.5 MG: 2 INJECTION INTRAMUSCULAR; INTRAVENOUS at 15:15

## 2020-03-11 RX ADMIN — DEXAMETHASONE SODIUM PHOSPHATE 10 MG: 4 INJECTION, SOLUTION INTRAMUSCULAR; INTRAVENOUS at 20:51

## 2020-03-11 RX ADMIN — LORAZEPAM 0.5 MG: 2 INJECTION INTRAMUSCULAR; INTRAVENOUS at 14:15

## 2020-03-11 RX ADMIN — DIPHENHYDRAMINE HCL 50 MG: 25 TABLET ORAL at 21:02

## 2020-03-11 RX ADMIN — KETOROLAC TROMETHAMINE 15 MG: 30 INJECTION, SOLUTION INTRAMUSCULAR at 02:00

## 2020-03-11 RX ADMIN — GADOBUTROL 12 ML: 604.72 INJECTION INTRAVENOUS at 16:24

## 2020-03-11 RX ADMIN — LISINOPRIL 10 MG: 5 TABLET ORAL at 08:04

## 2020-03-11 RX ADMIN — CLONAZEPAM 0.5 MG: 0.5 TABLET ORAL at 21:02

## 2020-03-11 RX ADMIN — ENOXAPARIN SODIUM 40 MG: 40 INJECTION SUBCUTANEOUS at 08:05

## 2020-03-11 RX ADMIN — ENOXAPARIN SODIUM 40 MG: 40 INJECTION SUBCUTANEOUS at 17:52

## 2020-03-11 RX ADMIN — ASPIRIN 81 MG 81 MG: 81 TABLET ORAL at 08:05

## 2020-03-11 RX ADMIN — LORAZEPAM 0.5 MG: 2 INJECTION INTRAMUSCULAR; INTRAVENOUS at 14:45

## 2020-03-11 NOTE — PLAN OF CARE
Problem: Potential for Falls  Goal: Patient will remain free of falls  Description  INTERVENTIONS:  - Assess patient frequently for physical needs  -  Identify cognitive and physical deficits and behaviors that affect risk of falls    -  [place] fall precautions as indicated by assessment   - Educate patient/family on patient safety including physical limitations  - Instruct patient to call for assistance with activity based on assessment  - Modify environment to reduce risk of injury  - Consider OT/PT consult to assist with strengthening/mobility  Outcome: Progressing     Problem: DISCHARGE PLANNING - CARE MANAGEMENT  Goal: Discharge to post-acute care or home with appropriate resources  Description  INTERVENTIONS:  - Conduct assessment to determine patient/family and health care team treatment goals, and need for post-acute services based on payer coverage, community resources, and patient preferences, and barriers to discharge  - Address psychosocial, clinical, and financial barriers to discharge as identified in assessment in conjunction with the patient/family and health care team  - Arrange appropriate level of post-acute services according to patients   needs and preference and payer coverage in collaboration with the physician and health care team  - Communicate with and update the patient/family, physician, and health care team regarding progress on the discharge plan  - Arrange appropriate transportation to post-acute venues  Outcome: Progressing     Problem: PAIN - ADULT  Goal: Verbalizes/displays adequate comfort level or baseline comfort level  Description  Interventions:  - Encourage patient to monitor pain and request assistance  - Assess pain using appropriate pain scale  - Administer analgesics based on type and severity of pain and evaluate response  - Implement non-pharmacological measures as appropriate and evaluate response  - Consider cultural and social influences on pain and pain management  - Notify physician/advanced practitioner if interventions unsuccessful or patient reports new pain  Outcome: Progressing     Problem: SAFETY ADULT  Goal: Patient will remain free of falls  Description  INTERVENTIONS:  - Assess patient frequently for physical needs  -  Identify cognitive and physical deficits and behaviors that affect risk of falls    -  [place] fall precautions as indicated by assessment   - Educate patient/family on patient safety including physical limitations  - Instruct patient to call for assistance with activity based on assessment  - Modify environment to reduce risk of injury  - Consider OT/PT consult to assist with strengthening/mobility  Outcome: Progressing  Goal: Maintain or return to baseline ADL function  Description  INTERVENTIONS:  -  Assess patient's ability to carry out ADLs; assess patient's baseline for ADL function and identify physical deficits which impact ability to perform ADLs (bathing, care of mouth/teeth, toileting, grooming, dressing, etc )  - Assess/evaluate cause of self-care deficits   - Assess range of motion  - Assess patient's mobility; develop plan if impaired  - Assess patient's need for assistive devices and provide as appropriate  - Encourage maximum independence but intervene and supervise when necessary  - Involve family in performance of ADLs  - Assess for home care needs following discharge   - Consider OT consult to assist with ADL evaluation and planning for discharge  - Provide patient education as appropriate  Outcome: Progressing  Goal: Maintain or return mobility status to optimal level  Description  INTERVENTIONS:  - Assess patient's baseline mobility status (ambulation, transfers, stairs, etc )    - Identify cognitive and physical deficits and behaviors that affect mobility  - Identify mobility aids required to assist with transfers and/or ambulation (gait belt, sit-to-stand, lift, walker, cane, etc )  - [place] fall precautions as indicated by assessment  - Record patient progress and toleration of activity level on Mobility SBAR; progress patient to next Phase/Stage  - Instruct patient to call for assistance with activity based on assessment  - Consider rehabilitation consult to assist with strengthening/weightbearing, etc   Outcome: Progressing     Problem: CONFUSION/THOUGHT DISTURBANCE  Goal: Thought disturbances (confusion, delirium, depression, dementia or psychosis) are managed to maintain or return to baseline mental status and functional level  Description  INTERVENTIONS:  - Assess for possible contributors to  thought disturbance, including but not limited to medications, infection, impaired vision or hearing, underlying metabolic abnormalities, dehydration, respiratory compromise,  psychiatric diagnoses and notify attending PHYSICAN/AP  - Monitor and intervene to maintain adequate nutrition, hydration, elimination, sleep and activity  - Decrease environmental stimuli, including noise as appropriate  - Provide frequent contacts to provide refocusing, direction and reassurance as needed  Approach patient calmly with eye contact and at their level    - [place] high risk fall precautions, aspiration precautions and other safety measures, as indicated  - If delirium suspected, notify physician/AP of change in condition and request immediate in-person evaluation  - Pursue consults as appropriate including Geriatric (campus dependent), OT for cognitive evaluation/activity planning, psychiatric, pastoral care, etc   Outcome: Progressing     Problem: COPING  Goal: Pt/Family able to verbalize concerns and demonstrate effective coping strategies  Description  INTERVENTIONS:  - Assist patient/family to identify coping skills, available support systems and cultural and spiritual values  - Provide emotional support, including active listening and acknowledgement of concerns of patient and caregivers  - Reduce environmental stimuli, as able  - Provide patient education  - Assess for spiritual pain/suffering and initiate spiritual care, including notification of Pastoral Care or capri based community as needed  - Assess effectiveness of coping strategies  Outcome: Progressing  Goal: Will report anxiety at manageable levels  Description  INTERVENTIONS:  - Administer medication as ordered  - Teach and encourage coping skills  - Provide emotional support  - Assess patient/family for anxiety and ability to cope  Outcome: Progressing     Problem: Activity Intolerance/Impaired Mobility  Goal: Mobility/activity is maintained at optimum level for patient  Description  Interventions:  - Assess and monitor patient  barriers to mobility and need for assistive/adaptive devices  - Assess patient's emotional response to limitations  - Collaborate with interdisciplinary team and initiate plans and interventions as ordered  - Encourage independent activity per ability   - Maintain proper body alignment  - Perform active/passive rom as tolerated/ordered    - Plan activities to conserve energy   - Turn patient as appropriate  Outcome: Progressing

## 2020-03-11 NOTE — ASSESSMENT & PLAN NOTE
Patient reports difficulty walking over past few months due to left knee injury sustained during syncopal episode  No reported prior treatment  Patient wears a knee brace as needed for support     -PT recommending home PT

## 2020-03-11 NOTE — PLAN OF CARE
Problem: Potential for Falls  Goal: Patient will remain free of falls  Description  INTERVENTIONS:  - Assess patient frequently for physical needs  -  Identify cognitive and physical deficits and behaviors that affect risk of falls    -  [place] fall precautions as indicated by assessment   - Educate patient/family on patient safety including physical limitations  - Instruct patient to call for assistance with activity based on assessment  - Modify environment to reduce risk of injury  - Consider OT/PT consult to assist with strengthening/mobility  Outcome: Progressing     Problem: DISCHARGE PLANNING - CARE MANAGEMENT  Goal: Discharge to post-acute care or home with appropriate resources  Description  INTERVENTIONS:  - Conduct assessment to determine patient/family and health care team treatment goals, and need for post-acute services based on payer coverage, community resources, and patient preferences, and barriers to discharge  - Address psychosocial, clinical, and financial barriers to discharge as identified in assessment in conjunction with the patient/family and health care team  - Arrange appropriate level of post-acute services according to patients   needs and preference and payer coverage in collaboration with the physician and health care team  - Communicate with and update the patient/family, physician, and health care team regarding progress on the discharge plan  - Arrange appropriate transportation to post-acute venues  Outcome: Progressing     Problem: PAIN - ADULT  Goal: Verbalizes/displays adequate comfort level or baseline comfort level  Description  Interventions:  - Encourage patient to monitor pain and request assistance  - Assess pain using appropriate pain scale  - Administer analgesics based on type and severity of pain and evaluate response  - Implement non-pharmacological measures as appropriate and evaluate response  - Consider cultural and social influences on pain and pain management  - Notify physician/advanced practitioner if interventions unsuccessful or patient reports new pain  Outcome: Progressing     Problem: SAFETY ADULT  Goal: Patient will remain free of falls  Description  INTERVENTIONS:  - Assess patient frequently for physical needs  -  Identify cognitive and physical deficits and behaviors that affect risk of falls    -  [place] fall precautions as indicated by assessment   - Educate patient/family on patient safety including physical limitations  - Instruct patient to call for assistance with activity based on assessment  - Modify environment to reduce risk of injury  - Consider OT/PT consult to assist with strengthening/mobility  Outcome: Progressing  Goal: Maintain or return to baseline ADL function  Description  INTERVENTIONS:  -  Assess patient's ability to carry out ADLs; assess patient's baseline for ADL function and identify physical deficits which impact ability to perform ADLs (bathing, care of mouth/teeth, toileting, grooming, dressing, etc )  - Assess/evaluate cause of self-care deficits   - Assess range of motion  - Assess patient's mobility; develop plan if impaired  - Assess patient's need for assistive devices and provide as appropriate  - Encourage maximum independence but intervene and supervise when necessary  - Involve family in performance of ADLs  - Assess for home care needs following discharge   - Consider OT consult to assist with ADL evaluation and planning for discharge  - Provide patient education as appropriate  Outcome: Progressing  Goal: Maintain or return mobility status to optimal level  Description  INTERVENTIONS:  - Assess patient's baseline mobility status (ambulation, transfers, stairs, etc )    - Identify cognitive and physical deficits and behaviors that affect mobility  - Identify mobility aids required to assist with transfers and/or ambulation (gait belt, sit-to-stand, lift, walker, cane, etc )  - [place] fall precautions as indicated by assessment  - Record patient progress and toleration of activity level on Mobility SBAR; progress patient to next Phase/Stage  - Instruct patient to call for assistance with activity based on assessment  - Consider rehabilitation consult to assist with strengthening/weightbearing, etc   Outcome: Progressing     Problem: Knowledge Deficit  Goal: Patient/family/caregiver demonstrates understanding of disease process, treatment plan, medications, and discharge instructions  Description  Complete learning assessment and assess knowledge base    Interventions:  - Provide teaching at level of understanding  - Provide teaching via preferred learning methods  Outcome: Progressing     Problem: NEUROSENSORY - ADULT  Goal: Achieves stable or improved neurological status  Description  INTERVENTIONS  - Monitor and report changes in neurological status  - Monitor vital signs such as temperature, blood pressure, glucose, and any other labs ordered   - Initiate measures to prevent increased intracranial pressure  - Monitor for seizure activity and implement precautions if appropriate      Outcome: Progressing  Goal: Remains free of injury related to seizures activity  Description  INTERVENTIONS  - Maintain airway, patient safety  and administer oxygen as ordered  - Monitor patient for seizure activity, document and report duration and description of seizure to physician/advanced practitioner  - If seizure occurs,  ensure patient safety during seizure  - Reorient patient post seizure  - Seizure pads on all 4 side rails  - Instruct patient/family to notify RN of any seizure activity including if an aura is experienced  - Instruct patient/family to call for assistance with activity based on nursing assessment  - Administer anti-seizure medications if ordered    Outcome: Progressing  Goal: Achieves maximal functionality and self care  Description  INTERVENTIONS  - Monitor swallowing and airway patency with patient fatigue and changes in neurological status  - Encourage and assist patient to increase activity and self care     - Encourage visually impaired, hearing impaired and aphasic patients to use assistive/communication devices  Outcome: Progressing     Problem: CARDIOVASCULAR - ADULT  Goal: Maintains optimal cardiac output and hemodynamic stability  Description  INTERVENTIONS:  - Monitor I/O, vital signs and rhythm  - Monitor for S/S and trends of decreased cardiac output  - Administer and titrate ordered vasoactive medications to optimize hemodynamic stability  - Assess quality of pulses, skin color and temperature  - Assess for signs of decreased coronary artery perfusion  - Instruct patient to report change in severity of symptoms  Outcome: Progressing     Problem: RESPIRATORY - ADULT  Goal: Achieves optimal ventilation and oxygenation  Description  INTERVENTIONS:  - Assess for changes in respiratory status  - Assess for changes in mentation and behavior  - Position to facilitate oxygenation and minimize respiratory effort  - Oxygen administered by appropriate delivery if ordered  - Initiate smoking cessation education as indicated  - Encourage broncho-pulmonary hygiene including cough, deep breathe, Incentive Spirometry  - Assess the need for suctioning and aspirate as needed  - Assess and instruct to report SOB or any respiratory difficulty  - Respiratory Therapy support as indicated  Outcome: Progressing     Problem: GASTROINTESTINAL - ADULT  Goal: Minimal or absence of nausea and/or vomiting  Description  INTERVENTIONS:  - Administer IV fluids if ordered to ensure adequate hydration  - Maintain NPO status until nausea and vomiting are resolved  - Nasogastric tube if ordered  - Administer ordered antiemetic medications as needed  - Provide nonpharmacologic comfort measures as appropriate  - Advance diet as tolerated, if ordered  - Consider nutrition services referral to assist patient with adequate nutrition and appropriate food choices  Outcome: Progressing     Problem: GENITOURINARY - ADULT  Goal: Maintains or returns to baseline urinary function  Description  INTERVENTIONS:  - Assess urinary function  - Encourage oral fluids to ensure adequate hydration if ordered  - Administer IV fluids as ordered to ensure adequate hydration  - Administer ordered medications as needed  - Offer frequent toileting  - Follow urinary retention protocol if ordered  Outcome: Progressing  Goal: Absence of urinary retention  Description  INTERVENTIONS:  - Assess patients ability to void and empty bladder  - Monitor I/O  - Bladder scan as needed  - Discuss with physician/AP medications to alleviate retention as needed  - Discuss catheterization for long term situations as appropriate  Outcome: Progressing     Problem: METABOLIC, FLUID AND ELECTROLYTES - ADULT  Goal: Electrolytes maintained within normal limits  Description  INTERVENTIONS:  - Monitor labs and assess patient for signs and symptoms of electrolyte imbalances  - Administer electrolyte replacement as ordered  - Monitor response to electrolyte replacements, including repeat lab results as appropriate  - Instruct patient on fluid and nutrition as appropriate  Outcome: Progressing  Goal: Fluid balance maintained  Description  INTERVENTIONS:  - Monitor labs   - Monitor I/O and WT  - Instruct patient on fluid and nutrition as appropriate  - Assess for signs & symptoms of volume excess or deficit  Outcome: Progressing  Goal: Glucose maintained within target range  Description  INTERVENTIONS:  - Monitor Blood Glucose as ordered  - Assess for signs and symptoms of hyperglycemia and hypoglycemia  - Administer ordered medications to maintain glucose within target range  - Assess nutritional intake and initiate nutrition service referral as needed  Outcome: Progressing     Problem: SKIN/TISSUE INTEGRITY - ADULT  Goal: Skin integrity remains intact  Description  INTERVENTIONS  - Identify patients at risk for skin breakdown  - Assess and monitor skin integrity  - Assess and monitor nutrition and hydration status  - Monitor labs (i e  albumin)  - Assess for incontinence   - Turn and reposition patient  - Assist with mobility/ambulation  - Relieve pressure over bony prominences  - Avoid friction and shearing  - Provide appropriate hygiene as needed including keeping skin clean and dry  - Evaluate need for skin moisturizer/barrier cream  - Collaborate with interdisciplinary team (i e  Nutrition, Rehabilitation, etc )   - Patient/family teaching  Outcome: Progressing     Problem: MUSCULOSKELETAL - ADULT  Goal: Maintain or return mobility to safest level of function  Description  INTERVENTIONS:  - Assess patient's ability to carry out ADLs; assess patient's baseline for ADL function and identify physical deficits which impact ability to perform ADLs (bathing, care of mouth/teeth, toileting, grooming, dressing, etc )  - Assess/evaluate cause of self-care deficits   - Assess range of motion  - Assess patient's mobility  - Assess patient's need for assistive devices and provide as appropriate  - Encourage maximum independence but intervene and supervise when necessary  - Involve family in performance of ADLs  - Assess for home care needs following discharge   - Consider OT consult to assist with ADL evaluation and planning for discharge  - Provide patient education as appropriate  Outcome: Progressing     Problem: Neurological Deficit  Goal: Neurological status is stable or improving  Description  Interventions:  - Monitor and assess patient's level of consciousness, motor function, sensory function, and level of assistance needed for ADLs  - Monitor and report changes from baseline  Collaborate with interdisciplinary team to initiate plan and implement interventions as ordered  - Provide and maintain a safe environment  - Consider seizure precautions  - Consider fall precautions  - Consider aspiration precautions    - Consider bleeding precautions  Outcome: Progressing     Problem: Activity Intolerance/Impaired Mobility  Goal: Mobility/activity is maintained at optimum level for patient  Description  Interventions:  - Assess and monitor patient  barriers to mobility and need for assistive/adaptive devices  - Assess patient's emotional response to limitations  - Collaborate with interdisciplinary team and initiate plans and interventions as ordered  - Encourage independent activity per ability   - Maintain proper body alignment  - Perform active/passive rom as tolerated/ordered  - Plan activities to conserve energy   - Turn patient as appropriate  Outcome: Progressing     Problem: Nutrition  Goal: Nutrition/Hydration status is improving  Description  Monitor and assess patient's nutrition/hydration status for malnutrition (ex- brittle hair, bruises, dry skin, pale skin and conjunctiva, muscle wasting, smooth red tongue, and disorientation)  Collaborate with interdisciplinary team and initiate plan and interventions as ordered  Monitor patient's weight and dietary intake as ordered or per policy  Utilize nutrition screening tool and intervene per policy  Determine patient's food preferences and provide high-protein, high-caloric foods as appropriate  - Assist patient with eating   - Allow adequate time for meals   - Encourage patient to take dietary supplement as ordered  - Collaborate with clinical nutritionist   - Include patient/family/caregiver in decisions related to nutrition    Outcome: Progressing     Problem: COPING  Goal: Pt/Family able to verbalize concerns and demonstrate effective coping strategies  Description  INTERVENTIONS:  - Assist patient/family to identify coping skills, available support systems and cultural and spiritual values  - Provide emotional support, including active listening and acknowledgement of concerns of patient and caregivers  - Reduce environmental stimuli, as able  - Provide patient education  - Assess for spiritual pain/suffering and initiate spiritual care, including notification of Pastoral Care or capri based community as needed  - Assess effectiveness of coping strategies  Outcome: Progressing  Goal: Will report anxiety at manageable levels  Description  INTERVENTIONS:  - Administer medication as ordered  - Teach and encourage coping skills  - Provide emotional support  - Assess patient/family for anxiety and ability to cope  Outcome: Progressing     Problem: DECISION MAKING  Goal: Pt/Family able to effectively weigh alternatives and participate in decision making related to treatment and care  Description  INTERVENTIONS:  - Identify decision maker  - Determine when there are differences among patient's view, family's view, and healthcare provider's view of patient condition and care goals  - Facilitate patient/family articulation of goals for care  - Help patient/family identify pros/cons of alternative solutions  - Provide information as requested by patient/family  - Respect patient/family rights related to privacy and sharing information   - Serve as a liaison between patient, family and health care team  - Initiate consults as appropriate (Ethics Team, Palliative Care, Family Care Conference, etc )  Outcome: Progressing     Problem: CONFUSION/THOUGHT DISTURBANCE  Goal: Thought disturbances (confusion, delirium, depression, dementia or psychosis) are managed to maintain or return to baseline mental status and functional level  Description  INTERVENTIONS:  - Assess for possible contributors to  thought disturbance, including but not limited to medications, infection, impaired vision or hearing, underlying metabolic abnormalities, dehydration, respiratory compromise,  psychiatric diagnoses and notify attending PHYSICAN/AP  - Monitor and intervene to maintain adequate nutrition, hydration, elimination, sleep and activity  - Decrease environmental stimuli, including noise as appropriate  - Provide frequent contacts to provide refocusing, direction and reassurance as needed  Approach patient calmly with eye contact and at their level    - [place] high risk fall precautions, aspiration precautions and other safety measures, as indicated  - If delirium suspected, notify physician/AP of change in condition and request immediate in-person evaluation  - Pursue consults as appropriate including Geriatric (campus dependent), OT for cognitive evaluation/activity planning, psychiatric, pastoral care, etc   Outcome: Progressing

## 2020-03-11 NOTE — PROGRESS NOTES
Progress Note - Sandy Sees 1960, 63 y o  female MRN: [id number]    Unit/Bed#: E4 -01 Encounter: [id number]    Primary Care Provider: DEANA Sanders   Date and time admitted to hospital: 3/9/2020  8:16 AM        * Numbness and tingling of right arm and leg  Assessment & Plan  Patient presents to ER for right-sided numbness; she also reports a history of recurrent syncopal episodes  She reports RUE numbness ("pins and needles") and difficulty coordinating RUE movement for approximately 2 weeks  She reports RLE and right trunk numbness started 3 days ago when she was rising from a seated to standing position  -CT head negative  -neurology consultation appreciated  -MRI brain revealed multiple T2 and FLAIR hyperintense foci involving supratentorial white matter and right cerebellum  -plan for further evaluation with MRI with contrast  -echocardiogram revealed ejection fraction 60%, no wall motion abnormalities, diastolic dysfunction type 1, no defect or patent foramina ovale  -discontinue aspirin statin  -neurochecks      Recurrent syncope  Assessment & Plan  Patient reports recurrent syncopal episodes occurring for years  Episodes reportedly increasing in frequency to every few weeks  Patient denies any prior evaluation or treatment  Will evaluate in conjunction with patient's report of right-sided numbness  Ambulatory dysfunction  Assessment & Plan  Patient reports difficulty walking over past few months due to left knee injury sustained during syncopal episode  No reported prior treatment  Patient wears a knee brace as needed for support  -PT recommending home PT     Benign essential hypertension  Assessment & Plan  Continue with lisinopril    Morbid obesity with BMI of 45 0-49 9, adult (HCC)  Assessment & Plan  BMI 45 2  Encourage lifestyle modifications        Type 2 diabetes mellitus with hyperglycemia, without long-term current use of insulin [facility])  Assessment & Plan  Lab Results   Component Value Date    HGBA1C 9 7 (A) 2020     (P) 165 625   -hold metformin  -monitor Accu-Cheks, sliding scale for coverage  -Patient refusing insulin coverage stating it causes "amnesia"        VTE Pharmacologic Prophylaxis:   Pharmacologic: lovenox    Patient Centered Rounds: I have performed bedside rounds with nursing staff today  Discussions with Specialists or Other Care Team Provider: neurology    Time Spent for Care: 20 minutes  More than 50% of total time spent on counseling and coordination of care as described above  Current Length of Stay: 0 day(s)    Current Patient Status: Inpatient   Certification Statement: The patient will continue to require additional inpatient hospital stay due to MRI brain without contrast    Discharge Plan / Estimated Discharge Date: 24-48H    Code Status: Level 3 - DNAR and DNI      Subjective:   Patient seen and examined at bedside, denies chest pain, palpitations, dyspnea    Objective:     Vitals:   Temp (24hrs), Av 6 °F (36 4 °C), Min:96 9 °F (36 1 °C), Max:98 °F (36 7 °C)    Temp:  [96 9 °F (36 1 °C)-98 °F (36 7 °C)] 97 9 °F (36 6 °C)  HR:  [73-89] 74  Resp:  [18-20] 18  BP: (142-169)/(86-99) 169/91  SpO2:  [96 %-99 %] 98 %  Body mass index is 43 85 kg/m²  Input and Output Summary (last 24 hours): Intake/Output Summary (Last 24 hours) at 3/11/2020 1607  Last data filed at 3/10/2020 1900  Gross per 24 hour   Intake --   Output 400 ml   Net -400 ml       Physical Exam:    Constitutional: Patient is oriented to person, place and time, no acute distress  HEENT:  Normocephalic, atraumatic  Cardiovascular: Normal S1S2, RRR, No murmurs/rubs/gallops appreciated  Pulmonary:  Bilateral air entry, No rhonchi/rales/wheezing appreciated  Abdominal: Soft, Bowel sounds present, Non-tender, Non-distended  Extremities:  No cyanosis, clubbing or edema     Neurological: Cranial nerves II-XII grossly intact, motor strength 5/5 in left upper and left lower extremity, right lower extremity motor strength 4/5, decreased sensation with numbness tingling of right upper and lower extremity    Additional Data:     Labs:    Results from last 7 days   Lab Units 03/11/20  0524   WBC Thousand/uL 9 21   HEMOGLOBIN g/dL 13 0   HEMATOCRIT % 39 3   PLATELETS Thousands/uL 265   NEUTROS PCT % 62   LYMPHS PCT % 27   MONOS PCT % 8   EOS PCT % 3     Results from last 7 days   Lab Units 03/11/20  0524 03/10/20  0452   POTASSIUM mmol/L 3 8 4 0   CHLORIDE mmol/L 103 103   CO2 mmol/L 24 26   BUN mg/dL 13 12   CREATININE mg/dL 0 69 0 73   CALCIUM mg/dL 10 8* 10 6*   ALK PHOS U/L  --  91   ALT U/L  --  24   AST U/L  --  16     Results from last 7 days   Lab Units 03/10/20  0452   INR  0 96        I Have Reviewed All Lab Data Listed Above          Recent Cultures (last 7 days):           Last 24 Hours Medication List:     Current Facility-Administered Medications:  acetaminophen 650 mg Oral Q6H PRN Jen Lyle MD   aluminum-magnesium hydroxide-simethicone 30 mL Oral Q4H PRN Jen Lyle MD   docusate sodium 100 mg Oral BID PRN Jen Lyle MD   enoxaparin 40 mg Subcutaneous BID Jen Lyle MD   hydrALAZINE 10 mg Intravenous Q6H PRN DEANA Negron   HYDROmorphone 0 5 mg Intravenous Once PRN Tima Carrillo PA-C   insulin lispro 1-6 Units Subcutaneous TID AC Jen Lyle MD   insulin lispro 1-6 Units Subcutaneous HS Jen Lyle MD   lisinopril 10 mg Oral Daily Jen Lyle MD   LORazepam 0 5 mg Intravenous Q30 Min PRN Miguel Lara PA-C   ondansetron 4 mg Intravenous Q4H PRN Jen Lyle MD   ondansetron 4 mg Intravenous Once PRN Tima Carrillo PA-C        Today, Patient Was Seen By: Rojas Turner MD

## 2020-03-11 NOTE — ASSESSMENT & PLAN NOTE
Lab Results   Component Value Date    HGBA1C 9 7 (A) 02/07/2020     (P) 165 625   -hold metformin  -monitor Accu-Cheks, sliding scale for coverage  -Patient refusing insulin coverage stating it causes "amnesia"

## 2020-03-11 NOTE — PROGRESS NOTES
Progress Note - Neurology   Feliciano Ochoa 63 y o  female [id number]  Unit/Bed#: [facility] /E4 MS 26-*    Assessment:  3 63year-old with HTN, DM, and obesity who presented with right sided paresthesias and weakness  MRI brain negative for acute ischemia, but showed T2 hyper-intensities that likely represent chronic small vessel disease  Concern for possible myelopathy  Will obtain MRI imaging with contrast to further evaluate  Plan:  - Discontinue stroke pathway  - Discontinue ASA 81mg and Lipitor 40mg  - MRI brain and cervical spine with contrast pending  - MRI cervical spine without contrast completed; official read pending  - Echocardiogram completed; official read pending  - PT/OT  - Neuro checks  - Notify Neurology with any changes in neuro examination  - Medical management and supportive care as per primary team    Results:  1  MRI brain: Multiple T2 and FLAIR hyperintense foci involving supratentorial white matter and right cerebellum/middle cerebellar peduncle, 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  MS or Lyme disease could appear similarly  Further evaluation via IV contrast-enhanced MRI recommended  Consistent with head CT  No acute ischemic disease identified by diffusion imaging   2  Lipid panel: LDL of 92  3  HgbA1c (2/7/2020): 9 7    Subjective:   Patient reports that she is doing okay  She reports right leg spasms during MRI last night  Patient states that she fell onto her knees two weeks ago in the evening  The next morning she developed R arm paresthesias that are progressing  She states that she needs to use grippers for utensils because she was dropping them  She said her right hand does not have control and she has difficulty writing  Patient reports that she has been able to ambulate to bathroom with walker      Past Medical History:   Diagnosis Date    Diabetes mellitus ([facility] )     External hemorrhoids     last assessed 4/7/16, resolved 7/21/16    Hernia, ventral     last assessed 12/14/15, resolved 16    Hypertension     Incarcerated incisional hernia     last assessed 12/21/15, resolved 16    Insomnia     last assessed 16, resolved 10/9/17    Morbid obesity with BMI of 45 0-49 9, [facility] 5/3/2017    Type 2 diabetes mellitus with hyperglycemia, without long-term current use of insulin ([facility] )      Past Surgical History:   Procedure Laterality Date    ABCESS DRAINAGE       SECTION      x3    COLONOSCOPY N/A 2017    Procedure: COLONOSCOPY with biopsy;  Surgeon: Shad Michaels DO;  Location: [facility];   Service: Complete    HYSTERECTOMY       Family History   Problem Relation Age of Onset    Glaucoma Mother     Diabetes Father     Hypertension Father     Pancreatic cancer Father     Breast cancer Maternal Grandmother     Breast cancer Maternal Aunt     Coronary artery disease Family     Diabetes Family     Hypertension Family     Cervical cancer Sister 61    Breast cancer Sister 50    Breast cancer Sister 45     Social History     Socioeconomic History    Marital status: /Civil Union     Spouse name: None    Number of children: None    Years of education: None    Highest education level: None   Occupational History     Employer: [employer]   Social Needs    Financial resource strain: None    Food insecurity:     Worry: Never true     Inability: None    Transportation needs:     Medical: No     Non-medical: No   Tobacco Use    Smoking status: Never Smoker    Smokeless tobacco: Never Used   Substance and Sexual Activity    Alcohol use: Never     Frequency: Never     Comment: Social    Drug use: Never    Sexual activity: Not Currently     Partners: Male     Birth control/protection: None   Lifestyle    Physical activity:     Days per week: 0 days     Minutes per session: 0 min    Stress: None   Relationships    Social connections:     Talks on phone: More than three times a week     Gets together: None     Attends Zoroastrian service: None     Active member of club or organization: None     Attends meetings of clubs or organizations: None     Relationship status:     Intimate partner violence:     Fear of current or ex partner: No     Emotionally abused: No     Physically abused: No     Forced sexual activity: No   Other Topics Concern    None   Social History Narrative    None     Medications: All current active meds have been reviewed and current meds:  Scheduled Meds:  Current Facility-Administered Medications:  acetaminophen 650 mg Oral Q6H PRN Joann Krishnan MD   aluminum-magnesium hydroxide-simethicone 30 mL Oral Q4H PRN Joann Krishnan MD   aspirin 81 mg Oral Daily Joann Krishnan MD   atorvastatin 40 mg Oral QPM Joann Krishnan MD   docusate sodium 100 mg Oral BID PRN Joann Krishnan MD   enoxaparin 40 mg Subcutaneous BID Joann Krishnan MD   hydrALAZINE 10 mg Intravenous Q6H PRN DEANA Waldrop   insulin lispro 1-6 Units Subcutaneous TID AC Joann Krishnan MD   insulin lispro 1-6 Units Subcutaneous HS Joann Krishnan MD   lisinopril 10 mg Oral Daily Joann Krishnan MD   ondansetron 4 mg Intravenous Q4H PRN Joann Krishnan MD     Continuous Infusions:   PRN Meds:   acetaminophen    aluminum-magnesium hydroxide-simethicone    docusate sodium    hydrALAZINE    ondansetron     ROS:   A 12 point ROS was completed  Other than the above mentioned complaints in the HPI, all remaining systems were negative  Vitals:   /86 (BP Location: Left arm)   Pulse 86   Temp (!) 96 9 °F (36 1 °C) (Tympanic)   Resp 18   Ht 5' 7" (1 702 m)   Wt 127 kg (280 lb)   LMP  (LMP Unknown)   SpO2 96%   BMI 43 85 kg/m²     Physical Exam:   Physical Exam   Constitutional: No distress     Obese female resting in bed   HENT:   Right Ear: External ear normal    Left Ear: External ear normal    Nose: Nose normal    Eyes: Right eye exhibits no discharge  Left eye exhibits no discharge  Pulmonary/Chest: No respiratory distress  Neurological: She is alert  Skin: Skin is warm and dry  She is not diaphoretic  Psychiatric: Judgment and thought content normal      Neurologic Exam     Mental Status   Patient is alert and follows all commands appropriately     Cranial Nerves     CN III, IV, VI   Conjugate gaze: present    CN VII   Facial expression full, symmetric  CN VIII   Hearing: intact  Patient tracks examiner     Motor Exam   Patient made purposeful movements X 4 extremities  Decreased R  strength  RLE strength 4/5     Sensory Exam   Acknowledges touch X 4 extremities     Gait, Coordination, and Reflexes     Tremor   Resting tremor: absent  Decreased coordination in left arm noted     Labs: I have personally reviewed pertinent reports     Recent Results (from the past 24 hour(s))   Fingerstick Glucose (POCT)    Collection Time: 03/10/20 11:43 AM   Result Value Ref Range    POC Glucose 212 (H) 65 - 140 mg/dl   Fingerstick Glucose (POCT)    Collection Time: 03/10/20  3:05 PM   Result Value Ref Range    POC Glucose 159 (H) 65 - 140 mg/dl   Fingerstick Glucose (POCT)    Collection Time: 03/10/20 10:40 PM   Result Value Ref Range    POC Glucose 141 (H) 65 - 140 mg/dl   CBC and differential    Collection Time: 03/11/20  5:24 AM   Result Value Ref Range    WBC 9 21 4 31 - 10 16 Thousand/uL    RBC 4 45 3 81 - 5 12 Million/uL    Hemoglobin 13 0 11 5 - 15 4 g/dL    Hematocrit 39 3 34 8 - 46 1 %    MCV 88 82 - 98 fL    MCH 29 2 26 8 - 34 3 pg    MCHC 33 1 31 4 - 37 4 g/dL    RDW 13 0 11 6 - 15 1 %    MPV 9 3 8 9 - 12 7 fL    Platelets 709 772 - 656 Thousands/uL    nRBC 0 /100 WBCs    Neutrophils Relative 62 43 - 75 %    Immat GRANS % 0 0 - 2 %    Lymphocytes Relative 27 14 - 44 %    Monocytes Relative 8 4 - 12 %    Eosinophils Relative 3 0 - 6 %    Basophils Relative 0 0 - 1 %    Neutrophils Absolute 5 65 1 85 - 7 62 Thousands/µL    Immature Grans Absolute 0 02 0 00 - 0 20 Thousand/uL    Lymphocytes Absolute 2 49 0 60 - 4 47 Thousands/µL    Monocytes Absolute 0 71 0 17 - 1 22 Thousand/µL    Eosinophils Absolute 0 31 0 00 - 0 61 Thousand/µL    Basophils Absolute 0 03 0 00 - 0 10 Thousands/µL   Basic metabolic panel    Collection Time: 03/11/20  5:24 AM   Result Value Ref Range    Sodium 138 136 - 145 mmol/L    Potassium 3 8 3 5 - 5 3 mmol/L    Chloride 103 100 - 108 mmol/L    CO2 24 21 - 32 mmol/L    ANION GAP 11 4 - 13 mmol/L    BUN 13 5 - 25 mg/dL    Creatinine 0 69 0 60 - 1 30 mg/dL    Glucose 163 (H) 65 - 140 mg/dL    Glucose, Fasting 163 (H) 65 - 99 mg/dL    Calcium 10 8 (H) 8 3 - 10 1 mg/dL    eGFR 96 ml/min/1 73sq m   Fingerstick Glucose (POCT)    Collection Time: 03/11/20  7:31 AM   Result Value Ref Range    POC Glucose 155 (H) 65 - 140 mg/dl     Imaging: I have personally reviewed pertinent imaging and I have personally reviewed PACS reports  EKG, Pathology, and Other Studies: I have personally reviewed pertinent reports  VTE Prophylaxis: Sequential compression device (Venodyne)  and Enoxaparin (Lovenox)    Total time spent today 25 minutes  Greater than 50% of total time was spent with the patient and / or family counseling and / or coordination of care  A description of the counseling / coordination of care: Discusssed with patient: MRI brain and cervical spine results, upcoming testing including MR imaging with contrast, and plans of care

## 2020-03-11 NOTE — UTILIZATION REVIEW
Continued Stay Review    Date:    3/11/20                          Current Patient Class:   Observation  Current Level of Care:   Med surg    HPI:59 y o  female initially admitted on   Observation   3/9  @   1223     IP order  Entered  3/11 @   66 [phone]    03/11/20 1547  Inpatient Admission Once     Transfer Service: General Medicine       Question Answer Comment   Admitting Physician SANDI PASTOR    Level of Care Med Surg    Estimated length of stay More than 2 Midnights    Certification I certify that inpatient services are medically necessary for this patient for a duration of greater than two midnights  See H&P and MD Progress Notes for additional information about the patient's course of treatment  03/11/20 1546         Assessment/Plan:   3/11   Mri  Results  Below  Stroke pathway and neuro checks  D/c  Pertinent Labs/Diagnostic Results:   MRI  Brain  ( 3/11)     Multiple T2 and FLAIR hyperintense foci involving supratentorial white matter and right cerebellum/middle cerebellar peduncle, 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   MS or Lyme disease could appear similarly   Further evaluation   via IV contrast-enhanced MRI recommended   Consistent with head CT      No acute ischemic disease identified by diffusion imaging  Results from last 7 days   Lab Units 03/11/20  0524 03/10/20  0452 03/09/20  0907   WBC Thousand/uL 9 21 9 72 9 58   HEMOGLOBIN g/dL 13 0 13 0 13 5   HEMATOCRIT % 39 3 41 1 40 9   PLATELETS Thousands/uL 265 290 272   NEUTROS ABS Thousands/µL 5 65 6 31 6 58         Results from last 7 days   Lab Units 03/11/20  0524 03/10/20  0452 03/09/20  0907   SODIUM mmol/L 138 137 139   POTASSIUM mmol/L 3 8 4 0 4 0   CHLORIDE mmol/L 103 103 103   CO2 mmol/L 24 26 26   ANION GAP mmol/L 11 8 10   BUN mg/dL 13 12 10   CREATININE mg/dL 0 69 0 73 0 78   EGFR ml/min/1 73sq m 96 91 84   CALCIUM mg/dL 10 8* 10 6* 10 9*   MAGNESIUM mg/dL  --  1 7  --    PHOSPHORUS mg/dL  --  3 4  -- Results from last 7 days   Lab Units 03/10/20  0452   AST U/L 16   ALT U/L 24   ALK PHOS U/L 91   TOTAL PROTEIN g/dL 7 2   ALBUMIN g/dL 3 1*   TOTAL BILIRUBIN mg/dL 1 31*     Results from last 7 days   Lab Units 03/11/20  1148 03/11/20  0731 03/10/20  2240 03/10/20  1505 03/10/20  1143 03/10/20  0745 03/09/20  2034 03/09/20  1511   POC GLUCOSE mg/dl 196* 155* 141* 159* 212* 160* 157* 145*     Results from last 7 days   Lab Units 03/11/20  0524 03/10/20  0452 03/09/20  0907   GLUCOSE RANDOM mg/dL 163* 166* 213*           Results from last 7 days   Lab Units 03/10/20  0452 03/09/20  0907   PROTIME seconds 12 9 13 1   INR  0 96 0 98   PTT seconds  --  31                     Vital Signs:   96 9 °F (36 1 °C)Abnormal   86  18  146/86  --  96 %  None (Room air)          Medications:   Scheduled Medications:    Medications:  enoxaparin 40 mg Subcutaneous BID   insulin lispro 1-6 Units Subcutaneous TID AC   insulin lispro 1-6 Units Subcutaneous HS   lisinopril 10 mg Oral Daily     Continuous IV Infusions:     PRN Meds:    acetaminophen 650 mg Oral Q6H PRN   aluminum-magnesium hydroxide-simethicone 30 mL Oral Q4H PRN   docusate sodium 100 mg Oral BID PRN   hydrALAZINE 10 mg Intravenous Q6H PRN   HYDROmorphone 0 5 mg Intravenous Once PRN   LORazepam 0 5 mg Intravenous Q30 Min PRN   ondansetron 4 mg Intravenous Q4H PRN   ondansetron 4 mg Intravenous Once PRN       Discharge Plan:    TBD    Network Utilization Review Department  [email or web address]  ATTENTION: Please call with any questions or concerns to [phone] and carefully listen to the prompts so that you are directed to the right person  All voicemails are confidential   Pilar Luna all requests for admission clinical reviews, approved or denied determinations and any other requests to dedicated fax number below belonging to the campus where the patient is receiving treatment   List of dedicated fax numbers for the Facilities:  [address] FAX NUMBER   ADMISSION DENIALS (Administrative/Medical Necessity) [address] [facility] (Maternity/NICU/Pediatrics) [phone]   [facility] [place] [phone]   [facility] [phone]   [facility] [phone]   [address] [phone]   Valentina Quant [phone] [address] [facility] [address]  [address] [facility] [address] [facility] [phone]

## 2020-03-11 NOTE — ASSESSMENT & PLAN NOTE
Patient presents to ER for right-sided numbness; she also reports a history of recurrent syncopal episodes  She reports RUE numbness ("pins and needles") and difficulty coordinating RUE movement for approximately 2 weeks  She reports RLE and right trunk numbness started 3 days ago when she was rising from a seated to standing position        -CT head negative  -neurology consultation appreciated  -MRI brain revealed multiple T2 and FLAIR hyperintense foci involving supratentorial white matter and right cerebellum  -plan for further evaluation with MRI with contrast  -echocardiogram revealed ejection fraction 60%, no wall motion abnormalities, diastolic dysfunction type 1, no defect or patent foramina ovale  -discontinue aspirin statin  -neurochecks

## 2020-03-12 ENCOUNTER — APPOINTMENT (INPATIENT)
Dept: CT IMAGING | Facility: HOSPITAL | Age: 60
DRG: 059 | End: 2020-03-12

## 2020-03-12 LAB
ANION GAP SERPL CALCULATED.3IONS-SCNC: 10 MMOL/L (ref 4–13)
BASOPHILS # BLD AUTO: 0.01 THOUSANDS/ΜL (ref 0–0.1)
BASOPHILS NFR BLD AUTO: 0 % (ref 0–1)
BUN SERPL-MCNC: 13 MG/DL (ref 5–25)
CALCIUM SERPL-MCNC: 10.9 MG/DL (ref 8.3–10.1)
CHLORIDE SERPL-SCNC: 102 MMOL/L (ref 100–108)
CO2 SERPL-SCNC: 23 MMOL/L (ref 21–32)
CREAT SERPL-MCNC: 0.73 MG/DL (ref 0.6–1.3)
EOSINOPHIL # BLD AUTO: 0 THOUSAND/ΜL (ref 0–0.61)
EOSINOPHIL NFR BLD AUTO: 0 % (ref 0–6)
ERYTHROCYTE [DISTWIDTH] IN BLOOD BY AUTOMATED COUNT: 12.9 % (ref 11.6–15.1)
GFR SERPL CREATININE-BSD FRML MDRD: 91 ML/MIN/1.73SQ M
GLUCOSE SERPL-MCNC: 219 MG/DL (ref 65–140)
GLUCOSE SERPL-MCNC: 258 MG/DL (ref 65–140)
GLUCOSE SERPL-MCNC: 267 MG/DL (ref 65–140)
GLUCOSE SERPL-MCNC: 309 MG/DL (ref 65–140)
GLUCOSE SERPL-MCNC: 328 MG/DL (ref 65–140)
GLUCOSE SERPL-MCNC: 359 MG/DL (ref 65–140)
GLUCOSE SERPL-MCNC: 367 MG/DL (ref 65–140)
HCT VFR BLD AUTO: 42.8 % (ref 34.8–46.1)
HGB BLD-MCNC: 14.3 G/DL (ref 11.5–15.4)
IMM GRANULOCYTES # BLD AUTO: 0.03 THOUSAND/UL (ref 0–0.2)
IMM GRANULOCYTES NFR BLD AUTO: 0 % (ref 0–2)
LYMPHOCYTES # BLD AUTO: 0.59 THOUSANDS/ΜL (ref 0.6–4.47)
LYMPHOCYTES NFR BLD AUTO: 8 % (ref 14–44)
MCH RBC QN AUTO: 29.2 PG (ref 26.8–34.3)
MCHC RBC AUTO-ENTMCNC: 33.4 G/DL (ref 31.4–37.4)
MCV RBC AUTO: 87 FL (ref 82–98)
MONOCYTES # BLD AUTO: 0.18 THOUSAND/ΜL (ref 0.17–1.22)
MONOCYTES NFR BLD AUTO: 2 % (ref 4–12)
NEUTROPHILS # BLD AUTO: 7.04 THOUSANDS/ΜL (ref 1.85–7.62)
NEUTS SEG NFR BLD AUTO: 90 % (ref 43–75)
NRBC BLD AUTO-RTO: 0 /100 WBCS
PLATELET # BLD AUTO: 288 THOUSANDS/UL (ref 149–390)
PMV BLD AUTO: 9.7 FL (ref 8.9–12.7)
POTASSIUM SERPL-SCNC: 4.3 MMOL/L (ref 3.5–5.3)
RBC # BLD AUTO: 4.9 MILLION/UL (ref 3.81–5.12)
SODIUM SERPL-SCNC: 135 MMOL/L (ref 136–145)
WBC # BLD AUTO: 7.85 THOUSAND/UL (ref 4.31–10.16)

## 2020-03-12 PROCEDURE — 99232 SBSQ HOSP IP/OBS MODERATE 35: CPT | Performed by: INTERNAL MEDICINE

## 2020-03-12 PROCEDURE — 97530 THERAPEUTIC ACTIVITIES: CPT

## 2020-03-12 PROCEDURE — 80048 BASIC METABOLIC PNL TOTAL CA: CPT | Performed by: INTERNAL MEDICINE

## 2020-03-12 PROCEDURE — 99215 OFFICE O/P EST HI 40 MIN: CPT | Performed by: PHYSICIAN ASSISTANT

## 2020-03-12 PROCEDURE — 85025 COMPLETE CBC W/AUTO DIFF WBC: CPT | Performed by: INTERNAL MEDICINE

## 2020-03-12 PROCEDURE — 74177 CT ABD & PELVIS W/CONTRAST: CPT

## 2020-03-12 PROCEDURE — 82948 REAGENT STRIP/BLOOD GLUCOSE: CPT

## 2020-03-12 PROCEDURE — 97110 THERAPEUTIC EXERCISES: CPT

## 2020-03-12 PROCEDURE — 71260 CT THORAX DX C+: CPT

## 2020-03-12 PROCEDURE — 97116 GAIT TRAINING THERAPY: CPT

## 2020-03-12 RX ADMIN — LISINOPRIL 10 MG: 5 TABLET ORAL at 08:30

## 2020-03-12 RX ADMIN — CLONAZEPAM 0.5 MG: 0.5 TABLET ORAL at 22:36

## 2020-03-12 RX ADMIN — DOCUSATE SODIUM 100 MG: 100 CAPSULE, LIQUID FILLED ORAL at 22:36

## 2020-03-12 RX ADMIN — DEXAMETHASONE SODIUM PHOSPHATE 6 MG: 4 INJECTION, SOLUTION INTRAMUSCULAR; INTRAVENOUS at 12:25

## 2020-03-12 RX ADMIN — DEXAMETHASONE SODIUM PHOSPHATE 6 MG: 4 INJECTION, SOLUTION INTRAMUSCULAR; INTRAVENOUS at 06:26

## 2020-03-12 RX ADMIN — SODIUM CHLORIDE 500 MG: 0.9 INJECTION, SOLUTION INTRAVENOUS at 22:36

## 2020-03-12 RX ADMIN — DIPHENHYDRAMINE HCL 50 MG: 25 TABLET ORAL at 22:36

## 2020-03-12 RX ADMIN — IOHEXOL 100 ML: 350 INJECTION, SOLUTION INTRAVENOUS at 12:12

## 2020-03-12 NOTE — PLAN OF CARE
Problem: PHYSICAL THERAPY ADULT  Goal: Performs mobility at highest level of function for planned discharge setting  See evaluation for individualized goals  Description  Treatment/Interventions: Functional transfer training, LE strengthening/ROM, Therapeutic exercise, Endurance training, Patient/family training, Equipment eval/education, Gait training, Continued evaluation, Spoke to nursing, OT  Equipment Recommended: (cont use of walker)       See flowsheet documentation for full assessment, interventions and recommendations  Outcome: Progressing  Note:   Prognosis: Good  Problem List: Decreased strength, Decreased endurance, Impaired balance, Decreased range of motion, Decreased mobility, Decreased coordination, Decreased safety awareness, Obesity, Impaired sensation, Pain  Assessment: Pt  Is requiring min assist x1 for transfers with verbal cues for safe technique and handplacement  Pt progressed with ambulation distances to 54' x2 with one prolonged standing rest break  PT demonstrates (+) buckling of R le x 3 and slow, inconsistent, step to, gait pattern with forward flexed posture requiring mod A x1 and verbal cues for improved posture, gait pattern and quality of gait  Pt  Performs seated b/l le arom exercises x 10 reps with cues to perform slowly and cues required to take appropriate rest breaks when feeling muscle fatigue  Pt remained seated at edge of bed at conclusion of PT session  Call bell in reach  Pt is functioning below baseline level of mobility with (+) buckling of R le increasing pt's risk for falls and decreased caregiver support at home, therefore at this time recommend STR at d/c unless pt progresses and achieves inpt PT goals then pt will be able to return home at d/c with family support and HHPT  IF home recommend RW for home     Barriers to Discharge: Inaccessible home environment, Decreased caregiver support  Barriers to Discharge Comments: decreased ability to negotiate household distances,  unable to provide A  Recommendation: Short-term skilled PT, Home PT, Home with family support(STR vs home with HHPT)     PT - OK to Discharge: No(to home, yes to rehab)    See flowsheet documentation for full assessment

## 2020-03-12 NOTE — PHYSICAL THERAPY NOTE
Physical Therapy Treatment Note     03/12/20 2135   Pain Assessment   Pain Assessment Tool 0-10   Pain Location/Orientation Orientation: Right;Location: Leg   Effect of Pain on Daily Activities increased pain with [facility]   [facility] Pain Intervention(s) Ambulation/increased activity   Pain Rating: FLACC (Rest) - Face 0   Pain Rating: FLACC (Rest) - Legs 0   Pain Rating: FLACC (Rest) - Activity 0   Pain Rating: FLACC (Rest) - Cry 0   Pain Rating: FLACC (Rest) - Consolability 0   Score: FLACC (Rest) 0   Pain Rating: FLACC (Activity) - Face 1   Pain Rating: FLACC (Activity) - Legs 1   Pain Rating: FLACC (Activity) - Activity 0   Pain Rating: FLACC (Activity) - Cry 1   Pain Rating: FLACC (Activity) - Consolability 1   Score: FLACC (Activity) 4   Restrictions/Precautions   Other Precautions Fall Risk;Pain   General   Chart Reviewed Yes   Family/Caregiver Present Yes   Cognition   Overall Cognitive Status WFL   Arousal/Participation Cooperative;Responsive   Attention Attends with cues to redirect   Orientation Level Oriented X4   Memory Within functional limits   Following Commands Follows one step commands without difficulty   Subjective   Subjective ' Do you have a foam piece with a smaller hole in it "    Transfers   Stand to Sit 4  Minimal assistance   Additional items Assist x 1; Increased time required;Verbal cues   Toilet transfer 4  Minimal assistance   Additional items Assist x 1;Standard toilet;Verbal cues  (grab bar use)   Ambulation/Elevation   Gait pattern Forward Flexion;R Knee Chau; Short stride; Step to;Excessively slow; Inconsistent yessica   Gait Assistance 3  Moderate assist   Additional items Assist x 1;Verbal cues   Assistive Device Rolling walker   Distance 55'x2 one standing rest break   Balance   Static Sitting Good   Dynamic Sitting Fair   Static Standing Fair   Dynamic Standing Poor +   Ambulatory Poor   Endurance Deficit   Endurance Deficit Yes   Endurance Deficit Description fatigue,pain, weakness   Activity Tolerance   Activity Tolerance Patient limited by pain; Patient limited by fatigue   Medical Staff Made Aware Shirley MUÑOZ   Hip Flexion Sitting;10 reps;AROM; Bilateral   Hip Adduction Sitting;10 reps;AROM; Bilateral   Knee AROM Long Arc Quad Sitting;10 reps;AROM; Bilateral   Ankle Pumps Sitting;AROM; Bilateral  (heel and toe raises x 12 reps  )   UE Exercise Sitting;10 reps;AAROM; Right  (shoulder flexion with use of L ue to assist in lifting R ue )   Marching Sitting;10 reps;AROM; Bilateral   Assessment   Prognosis Good   Problem List Decreased strength;Decreased endurance; Impaired balance;Decreased range of motion;Decreased mobility; Decreased coordination;Decreased safety awareness; Obesity; Impaired sensation;Pain   Assessment Pt  Is requiring min assist x1 for transfers with verbal cues for safe technique and handplacement  Pt progressed with ambulation distances to 54' x2 with one prolonged standing rest break  PT demonstrates (+) buckling of R le x 3 and slow, inconsistent, step to, gait pattern with forward flexed posture requiring mod A x1 and verbal cues for improved posture, gait pattern and quality of gait  Pt  Performs seated b/l le arom exercises x 10 reps with cues to perform slowly and cues required to take appropriate rest breaks when feeling muscle fatigue  Pt remained seated at edge of bed at conclusion of PT session  Call bell in reach  Pt is functioning below baseline level of mobility with (+) buckling of R le increasing pt's risk for falls and decreased caregiver support at home, therefore at this time recommend STR at d/c unless pt progresses and achieves inpt PT goals then pt will be able to return home at d/c with family support and HHPT  IF home recommend RW for home      Goals   Patient Goals " to get backt o gardening "     STG Expiration Date 03/20/20   PT Treatment Day 1   Plan   Treatment/Interventions Functional transfer training;LE strengthening/ROM; Therapeutic exercise; Endurance training;Patient/family training;Equipment eval/education;Gait training;Spoke to nursing;Family   Progress Slow progress, decreased activity tolerance   PT Frequency   (3-5x/week)   Recommendation   Recommendation Short-term skilled PT; Home PT; Home with family support  (STR vs home with HHPT)   PT - OK to Discharge No  (to home, yes to rehab)        03/12/20 1630   Pain Assessment   Pain Assessment Tool 0-10   Pain Location/Orientation Orientation: Right;Location: Leg   Effect of Pain on Daily Activities increased pain with spasms   [facility] Pain Intervention(s) Ambulation/increased activity   Pain Rating: FLACC (Rest) - Face 0   Pain Rating: FLACC (Rest) - Legs 0   Pain Rating: FLACC (Rest) - Activity 0   Pain Rating: FLACC (Rest) - Cry 0   Pain Rating: FLACC (Rest) - Consolability 0   Score: FLACC (Rest) 0   Pain Rating: FLACC (Activity) - Face 1   Pain Rating: FLACC (Activity) - Legs 1   Pain Rating: FLACC (Activity) - Activity 0   Pain Rating: FLACC (Activity) - Cry 1   Pain Rating: FLACC (Activity) - Consolability 1   Score: FLACC (Activity) 4   Restrictions/Precautions   Other Precautions Fall Risk;Pain   General   Chart Reviewed Yes   Family/Caregiver Present Yes   Cognition   Overall Cognitive Status WFL   Arousal/Participation Cooperative;Responsive   Attention Attends with cues to redirect   Orientation Level Oriented X4   Memory Within functional limits   Following Commands Follows one step commands without difficulty   Subjective   Subjective ' Do you have a foam piece with a smaller hole in it "    Transfers   Stand to Sit 4  Minimal assistance   Additional items Assist x 1; Increased time required;Verbal cues   Toilet transfer 4  Minimal assistance   Additional items Assist x 1;Standard toilet;Verbal cues  (grab bar use)   Ambulation/Elevation   Gait pattern Forward Flexion;R Knee Chau; Short stride; Step to;Excessively slow; Inconsistent yessica   Gait Assistance 3  Moderate assist   Additional items Assist x 1;Verbal cues   Assistive Device Rolling walker   Distance 55'x2 one standing rest break   Balance   Static Sitting Good   Dynamic Sitting Fair   Static Standing Fair   Dynamic Standing Poor +   Ambulatory Poor   Endurance Deficit   Endurance Deficit Yes   Endurance Deficit Description fatigue,pain, weakness   Activity Tolerance   Activity Tolerance Patient limited by pain; Patient limited by fatigue   Medical Staff Made Aware Shirley MUÑOZ   Hip Flexion Sitting;10 reps;AROM; Bilateral   Hip Adduction Sitting;10 reps;AROM; Bilateral   Knee AROM Long Arc Quad Sitting;10 reps;AROM; Bilateral   Ankle Pumps Sitting;AROM; Bilateral  (heel and toe raises x 12 reps  )   UE Exercise Sitting;10 reps;AAROM; Right  (shoulder flexion with use of L ue to assist in lifting R ue )   Marching Sitting;10 reps;AROM; Bilateral   Assessment   Prognosis Good   Problem List Decreased strength;Decreased endurance; Impaired balance;Decreased range of motion;Decreased mobility; Decreased coordination;Decreased safety awareness; Obesity; Impaired sensation;Pain   Assessment Pt  Is requiring min assist x1 for transfers with verbal cues for safe technique and handplacement  Pt progressed with ambulation distances to 54' x2 with one prolonged standing rest break  PT demonstrates (+) buckling of R le x 3 and slow, inconsistent, step to, gait pattern with forward flexed posture requiring mod A x1 and verbal cues for improved posture, gait pattern and quality of gait  Pt  Performs seated b/l le arom exercises x 10 reps with cues to perform slowly and cues required to take appropriate rest breaks when feeling muscle fatigue  Pt remained seated at edge of bed at conclusion of PT session  Call bell in reach    Pt is functioning below baseline level of mobility with (+) buckling of R le increasing pt's risk for falls and decreased caregiver support at home, therefore at this time recommend STR at d/c unless pt progresses and achieves inpt PT goals then pt will be able to return home at d/c with family support and HHPT  IF home recommend RW for home  Goals   Patient Goals " to get backt o gardening "     STG Expiration Date 03/20/20   PT Treatment Day 1   Plan   Treatment/Interventions Functional transfer training;LE strengthening/ROM; Therapeutic exercise; Endurance training;Patient/family training;Equipment eval/education;Gait training;Spoke to nursing;Family   Progress Slow progress, decreased activity tolerance   PT Frequency   (3-5x/week)   Recommendation   Recommendation Short-term skilled PT; Home PT; Home with family support  (STR vs home with HHPT)   PT - OK to Discharge No  (to home, yes to rehab)       Saintclair Crea, PTA

## 2020-03-12 NOTE — ASSESSMENT & PLAN NOTE
Lab Results   Component Value Date    HGBA1C 9 7 (A) 02/07/2020     (P) 291 [id number]   -hold metformin  -monitor Accu-Cheks, sliding scale for coverage  -Patient refusing insulin coverage stating it causes "amnesia"

## 2020-03-12 NOTE — PROGRESS NOTES
Progress Note - Neurology   Germaine Melgoza 63 y o  female [id number]  Unit/Bed#: [facility] /E4 MS 26-*    Assessment:  3 63year-old with HTN, DM, and obesity who presented with right sided paresthesias and weakness  MR imaging with contrast revealed multiple supratentorial and infratentorial white matter FLAIR hyperintensities and post contrast enhancement with multiple foci of T2 prolongation within the cervical and upper thoracic cord  Concern for metastatic disease vs demyelination  Plan:  - CT CAP pending  - Recommend LP  Pending CSF labs: ACE, culture and gram stain, WBC, glucose, LIZZETTE virus, cytometry, ME panel, RBC, MS panel, protein  - DVT prophylaxis on hold in preparation of lumbar puncture  - Patient was given Decadron 10mg X 1 dose  - Continue Decadron 4mg Q 6 hrs  - Continue Klonopin 0 5mg QHS  - Continue Benadryl 50mg QHS  - Neuro checks  - Notify Neurology with any changes in neuro examination    Results:  1  MRI cervical spine with contrast: Postcontrast imaging demonstrates enhancement associated with multiple previously described foci of T2 prolongation within the cervical and upper thoracic CORD  Imaging appearance as correlated with dedicated MRI of the brain is most suggestive of demyelinating disease/multiple sclerosis  2  MRI brain with contrast: Redemonstration of multiple supratentorial and infratentorial scattered areas of white matter FLAIR signal hyperintensity, as described above  Many of the lesions demonstrate a perpendicular configuration of the ventricles and lesions are seen involving the callosal septal interface  Imaging appearance is most suggestive of demyelinating disease/multiple sclerosis with areas of active demyelination as correlated with dedicated MRI of the cervical spine  3  Echocardiogram: EF 60%  The left atrium size was normal  No PFO was identified  Subjective:  Patient reports that she experienced severe right leg spasms during the MRI   She states that she is still experiencing right sided numbness  She reports three episodes of urinary incontinence overnight  She denies bowel incontinence  Patient reports that she was able to walk with the nurse this morning  Past Medical History:   Diagnosis Date    Diabetes mellitus ([facility] )     External hemorrhoids     last assessed 16, resolved 16    Hernia, ventral     last assessed 12/14/15, resolved 16    Hypertension     Incarcerated incisional hernia     last assessed 12/21/15, resolved 16    Insomnia     last assessed 16, resolved 10/9/17    Morbid obesity with BMI of 45 0-49 9, adult ([facility] ) 5/3/2017    Type 2 diabetes mellitus with hyperglycemia, without long-term current use of insulin ([facility] )      Past Surgical History:   Procedure Laterality Date    ABCESS DRAINAGE       SECTION      x3    COLONOSCOPY N/A 2017    Procedure: COLONOSCOPY with biopsy;  Surgeon: Frankey Spillers, DO;  Location: [facility];   Service: Complete    HYSTERECTOMY       Family History   Problem Relation Age of Onset    Glaucoma Mother     Diabetes Father     Hypertension Father     Pancreatic cancer Father     Breast cancer Maternal Grandmother     Breast cancer Maternal Aunt     Coronary artery disease Family     Diabetes Family     Hypertension Family     Cervical cancer Sister 61    Breast cancer Sister 50    Breast cancer Sister 45     Social History     Socioeconomic History    Marital status: /Civil Union     Spouse name: None    Number of children: None    Years of education: None    Highest education level: None   Occupational History     Employer: [employer]   Social Needs    Financial resource strain: None    Food insecurity:     Worry: Never true     Inability: None    Transportation needs:     Medical: No     Non-medical: No   Tobacco Use    Smoking status: Never Smoker    Smokeless tobacco: Never Used   Substance and Sexual Activity    Alcohol use: Never     Frequency: Never     Comment: Social    Drug use: Never    Sexual activity: Not Currently     Partners: Male     Birth control/protection: None   Lifestyle    Physical activity:     Days per week: 0 days     Minutes per session: 0 min    Stress: None   Relationships    Social connections:     Talks on phone: More than three times a week     Gets together: None     Attends Synagogue service: None     Active member of club or organization: None     Attends meetings of clubs or organizations: None     Relationship status:     Intimate partner violence:     Fear of current or ex partner: No     Emotionally abused: No     Physically abused: No     Forced sexual activity: No   Other Topics Concern    None   Social History Narrative    None     Medications:   All current active meds have been reviewed and current meds:  Scheduled Meds:  Current Facility-Administered Medications:  acetaminophen 650 mg Oral Q6H PRN Joann Krishnan MD   aluminum-magnesium hydroxide-simethicone 30 mL Oral Q4H PRN Joann Krishnan MD   clonazePAM 0 5 mg Oral HS Melo Vidales, DO   dexamethasone 6 mg Intravenous Q6H [address] Melo Vidales, DO   diphenhydrAMINE 50 mg Oral HS Melo Vidales, DO   docusate sodium 100 mg Oral BID PRN Joann Krishnan MD   hydrALAZINE 10 mg Intravenous Q6H PRN DEANA Waldrop   HYDROmorphone 0 5 mg Intravenous Once PRN Odell Cárdenas PA-C   insulin lispro 1-6 Units Subcutaneous TID AC Joann Krishnan MD   insulin lispro 1-6 Units Subcutaneous HS Joann Krishnan MD   lisinopril 10 mg Oral Daily Joann Krishnan MD   LORazepam 0 5 mg Intravenous Q30 Min PRN Earline Lara PA-C   ondansetron 4 mg Intravenous Q4H PRN Joann Krishnan MD   ondansetron 4 mg Intravenous Once PRN Odell Cárdenas PA-C     Continuous Infusions:   PRN Meds:   acetaminophen    aluminum-magnesium hydroxide-simethicone    docusate sodium    hydrALAZINE   HYDROmorphone    LORazepam    ondansetron    ondansetron     ROS:   A 12 point ROS was completed  Other than the above mentioned complaints in the HPI, all remaining systems were negative  Vitals:   /94 (BP Location: Left arm)   Pulse (!) 112   Temp (!) 96 7 °F (35 9 °C) (Tympanic)   Resp 18   Ht 5' 7" (1 702 m)   Wt 127 kg (280 lb)   LMP  (LMP Unknown)   SpO2 97%   BMI 43 85 kg/m²     Physical Exam:   Physical Exam   Constitutional:   Patient resting in bedside chair   HENT:   Head: Normocephalic and atraumatic  Right Ear: External ear normal    Left Ear: External ear normal    Nose: Nose normal    Eyes: EOM are normal    Pulmonary/Chest: No respiratory distress  Neurological: She is alert  Reflex Scores:       Bicep reflexes are 1+ on the right side and 2+ on the left side  Patellar reflexes are 0 on the right side and 1+ on the left side  Achilles reflexes are 0 on the right side and 0 on the left side  Skin: Skin is warm and dry  She is not diaphoretic  Psychiatric: Her speech is normal  Judgment and thought content normal      Neurologic Exam     Mental Status   Attention: normal  Concentration: normal    Speech: speech is normal   Patient was alert and followed all commands appropriately     Cranial Nerves     CN III, IV, VI   Extraocular motions are normal    Conjugate gaze: present    CN VII   Facial expression full, symmetric       CN VIII   Hearing: intact    Motor Exam   R deltoid/biceps/triceps: 5/5  R  strength: 4/5  R interossei: 4/5  R hip flexion: 4-/5  R knee flexion/extension: 4/5  R dorsiflexion: 4-/5  R plantarflexion: 5-/5    LUE and LLE strength: 5/5      Sensory Exam   Improved left hyperesthesias to light touch  LUE and LLE hyperesthesias to vibration and cold temperature     Gait, Coordination, and Reflexes     Tremor   Resting tremor: absent    Reflexes   Right biceps: 1+  Left biceps: 2+  Right patellar: 0  Left patellar: 1+  Right achilles: 0  Left achilles: 0    Labs: I have personally reviewed pertinent reports     Recent Results (from the past 24 hour(s))   Fingerstick Glucose (POCT)    Collection Time: 03/11/20 11:48 AM   Result Value Ref Range    POC Glucose 196 (H) 65 - 140 mg/dl   Fingerstick Glucose (POCT)    Collection Time: 03/11/20  9:30 PM   Result Value Ref Range    POC Glucose 219 (H) 65 - 140 mg/dl   Fingerstick Glucose (POCT)    Collection Time: 03/11/20 11:44 PM   Result Value Ref Range    POC Glucose 258 (H) 65 - 140 mg/dl   CBC and differential    Collection Time: 03/12/20  5:31 AM   Result Value Ref Range    WBC 7 85 4 31 - 10 16 Thousand/uL    RBC 4 90 3 81 - 5 12 Million/uL    Hemoglobin 14 3 11 5 - 15 4 g/dL    Hematocrit 42 8 34 8 - 46 1 %    MCV 87 82 - 98 fL    MCH 29 2 26 8 - 34 3 pg    MCHC 33 4 31 4 - 37 4 g/dL    RDW 12 9 11 6 - 15 1 %    MPV 9 7 8 9 - 12 7 fL    Platelets 918 668 - 654 Thousands/uL    nRBC 0 /100 WBCs    Neutrophils Relative 90 (H) 43 - 75 %    Immat GRANS % 0 0 - 2 %    Lymphocytes Relative 8 (L) 14 - 44 %    Monocytes Relative 2 (L) 4 - 12 %    Eosinophils Relative 0 0 - 6 %    Basophils Relative 0 0 - 1 %    Neutrophils Absolute 7 04 1 85 - 7 62 Thousands/µL    Immature Grans Absolute 0 03 0 00 - 0 20 Thousand/uL    Lymphocytes Absolute 0 59 (L) 0 60 - 4 47 Thousands/µL    Monocytes Absolute 0 18 0 17 - 1 22 Thousand/µL    Eosinophils Absolute 0 00 0 00 - 0 61 Thousand/µL    Basophils Absolute 0 01 0 00 - 0 10 Thousands/µL   Basic metabolic panel    Collection Time: 03/12/20  5:31 AM   Result Value Ref Range    Sodium 135 (L) 136 - 145 mmol/L    Potassium 4 3 3 5 - 5 3 mmol/L    Chloride 102 100 - 108 mmol/L    CO2 23 21 - 32 mmol/L    ANION GAP 10 4 - 13 mmol/L    BUN 13 5 - 25 mg/dL    Creatinine 0 73 0 60 - 1 30 mg/dL    Glucose 328 (H) 65 - 140 mg/dL    Calcium 10 9 (H) 8 3 - 10 1 mg/dL    eGFR 91 ml/min/1 73sq m   Fingerstick Glucose (POCT)    Collection Time: 03/12/20  7:36 AM   Result Value Ref Range    POC Glucose 267 (H) 65 - 140 mg/dl     Imaging: I have personally reviewed pertinent imaging and I have personally reviewed PACS reports  EKG, Pathology, and Other Studies: I have personally reviewed pertinent reports  VTE Prophylaxis: Sequential compression device (Venodyne)     Total time spent today 35 minutes  Greater than 50% of total time was spent with the patient and / or family counseling and / or coordination of care  A description of the counseling / coordination of care: Discussed with patient: MRI with contrast results, upcoming testing including LP and CT CAP, discussed decadron medication, and discussed plans of care

## 2020-03-12 NOTE — PLAN OF CARE
Problem: Potential for Falls  Goal: Patient will remain free of falls  Description  INTERVENTIONS:  - Assess patient frequently for physical needs  -  Identify cognitive and physical deficits and behaviors that affect risk of falls    -  [place] fall precautions as indicated by assessment   - Educate patient/family on patient safety including physical limitations  - Instruct patient to call for assistance with activity based on assessment  - Modify environment to reduce risk of injury  - Consider OT/PT consult to assist with strengthening/mobility  Outcome: Progressing     Problem: DISCHARGE PLANNING - CARE MANAGEMENT  Goal: Discharge to post-acute care or home with appropriate resources  Description  INTERVENTIONS:  - Conduct assessment to determine patient/family and health care team treatment goals, and need for post-acute services based on payer coverage, community resources, and patient preferences, and barriers to discharge  - Address psychosocial, clinical, and financial barriers to discharge as identified in assessment in conjunction with the patient/family and health care team  - Arrange appropriate level of post-acute services according to patients   needs and preference and payer coverage in collaboration with the physician and health care team  - Communicate with and update the patient/family, physician, and health care team regarding progress on the discharge plan  - Arrange appropriate transportation to post-acute venues  Outcome: Progressing     Problem: PAIN - ADULT  Goal: Verbalizes/displays adequate comfort level or baseline comfort level  Description  Interventions:  - Encourage patient to monitor pain and request assistance  - Assess pain using appropriate pain scale  - Administer analgesics based on type and severity of pain and evaluate response  - Implement non-pharmacological measures as appropriate and evaluate response  - Consider cultural and social influences on pain and pain management  - Notify physician/advanced practitioner if interventions unsuccessful or patient reports new pain  Outcome: Progressing     Problem: SAFETY ADULT  Goal: Patient will remain free of falls  Description  INTERVENTIONS:  - Assess patient frequently for physical needs  -  Identify cognitive and physical deficits and behaviors that affect risk of falls    -  [place] fall precautions as indicated by assessment   - Educate patient/family on patient safety including physical limitations  - Instruct patient to call for assistance with activity based on assessment  - Modify environment to reduce risk of injury  - Consider OT/PT consult to assist with strengthening/mobility  Outcome: Progressing  Goal: Maintain or return to baseline ADL function  Description  INTERVENTIONS:  -  Assess patient's ability to carry out ADLs; assess patient's baseline for ADL function and identify physical deficits which impact ability to perform ADLs (bathing, care of mouth/teeth, toileting, grooming, dressing, etc )  - Assess/evaluate cause of self-care deficits   - Assess range of motion  - Assess patient's mobility; develop plan if impaired  - Assess patient's need for assistive devices and provide as appropriate  - Encourage maximum independence but intervene and supervise when necessary  - Involve family in performance of ADLs  - Assess for home care needs following discharge   - Consider OT consult to assist with ADL evaluation and planning for discharge  - Provide patient education as appropriate  Outcome: Progressing  Goal: Maintain or return mobility status to optimal level  Description  INTERVENTIONS:  - Assess patient's baseline mobility status (ambulation, transfers, stairs, etc )    - Identify cognitive and physical deficits and behaviors that affect mobility  - Identify mobility aids required to assist with transfers and/or ambulation (gait belt, sit-to-stand, lift, walker, cane, etc )  - [place] fall precautions as indicated by assessment  - Record patient progress and toleration of activity level on Mobility SBAR; progress patient to next Phase/Stage  - Instruct patient to call for assistance with activity based on assessment  - Consider rehabilitation consult to assist with strengthening/weightbearing, etc   Outcome: Progressing     Problem: CONFUSION/THOUGHT DISTURBANCE  Goal: Thought disturbances (confusion, delirium, depression, dementia or psychosis) are managed to maintain or return to baseline mental status and functional level  Description  INTERVENTIONS:  - Assess for possible contributors to  thought disturbance, including but not limited to medications, infection, impaired vision or hearing, underlying metabolic abnormalities, dehydration, respiratory compromise,  psychiatric diagnoses and notify attending PHYSICAN/AP  - Monitor and intervene to maintain adequate nutrition, hydration, elimination, sleep and activity  - Decrease environmental stimuli, including noise as appropriate  - Provide frequent contacts to provide refocusing, direction and reassurance as needed  Approach patient calmly with eye contact and at their level    - [place] high risk fall precautions, aspiration precautions and other safety measures, as indicated  - If delirium suspected, notify physician/AP of change in condition and request immediate in-person evaluation  - Pursue consults as appropriate including Geriatric (campus dependent), OT for cognitive evaluation/activity planning, psychiatric, pastoral care, etc   Outcome: Progressing     Problem: DECISION MAKING  Goal: Pt/Family able to effectively weigh alternatives and participate in decision making related to treatment and care  Description  INTERVENTIONS:  - Identify decision maker  - Determine when there are differences among patient's view, family's view, and healthcare provider's view of patient condition and care goals  - Facilitate patient/family articulation of goals for care  - Help patient/family identify pros/cons of alternative solutions  - Provide information as requested by patient/family  - Respect patient/family rights related to privacy and sharing information   - Serve as a liaison between patient, family and health care team  - Initiate consults as appropriate (Ethics Team, Palliative Care, Family Care Conference, etc )  Outcome: Progressing     Problem: COPING  Goal: Pt/Family able to verbalize concerns and demonstrate effective coping strategies  Description  INTERVENTIONS:  - Assist patient/family to identify coping skills, available support systems and cultural and spiritual values  - Provide emotional support, including active listening and acknowledgement of concerns of patient and caregivers  - Reduce environmental stimuli, as able  - Provide patient education  - Assess for spiritual pain/suffering and initiate spiritual care, including notification of Pastoral Care or capri based community as needed  - Assess effectiveness of coping strategies  Outcome: Progressing  Goal: Will report anxiety at manageable levels  Description  INTERVENTIONS:  - Administer medication as ordered  - Teach and encourage coping skills  - Provide emotional support  - Assess patient/family for anxiety and ability to cope  Outcome: Progressing     Problem: Nutrition  Goal: Nutrition/Hydration status is improving  Description  Monitor and assess patient's nutrition/hydration status for malnutrition (ex- brittle hair, bruises, dry skin, pale skin and conjunctiva, muscle wasting, smooth red tongue, and disorientation)  Collaborate with interdisciplinary team and initiate plan and interventions as ordered  Monitor patient's weight and dietary intake as ordered or per policy  Utilize nutrition screening tool and intervene per policy  Determine patient's food preferences and provide high-protein, high-caloric foods as appropriate       - Assist patient with eating   - Allow adequate time for meals   - Encourage patient to take dietary supplement as ordered  - Collaborate with clinical nutritionist   - Include patient/family/caregiver in decisions related to nutrition    Outcome: Progressing

## 2020-03-12 NOTE — ASSESSMENT & PLAN NOTE
Patient presents to ER for right-sided numbness; she also reports a history of recurrent syncopal episodes  She reports RUE numbness ("pins and needles") and difficulty coordinating RUE movement for approximately 2 weeks  She reports RLE and right trunk numbness started 3 days ago when she was rising from a seated to standing position  -CT head negative  -neurology consultation appreciated  -MRI brain revealed multiple T2 and FLAIR hyperintense foci involving supratentorial white matter and right cerebellum  -MRI with contrast reveals findings consistent with demyelinating/MS  -CT chest abdomen pelvis revealed no evidence of primary malignancy or metastatic disease in chest abdomen or pelvis  -neurology follow-up appreciated, patient started on Solu-Medrol 5 mg b i d   As a pulse dose for findings of demyelinating disease  -plan for lumbar puncture, CSF studies  -echocardiogram revealed ejection fraction 60%, no wall motion abnormalities, diastolic dysfunction type 1, no defect or patent foramina ovale  -discontinued aspirin statin  -neurochecks

## 2020-03-12 NOTE — QUICK NOTE
MRI brain demonstrates multiple enhancing lesions concerning for metastatic disease versus demyelination     - hold Lovenox for anticipation of lumbar puncture tomorrow  - CSF orders were in  - Decadron 10 mg IV once followed by 4 mg Q 6  - CT chest abdomen and pelvis with contrast pending  - if solid  tumor present, recommend consultation for neurosurgery or Radiation Oncology specifically for cervical spine lesion    -will follow
no

## 2020-03-13 ENCOUNTER — APPOINTMENT (INPATIENT)
Dept: RADIOLOGY | Facility: HOSPITAL | Age: 60
DRG: 059 | End: 2020-03-13
Attending: PSYCHIATRY & NEUROLOGY

## 2020-03-13 LAB
ANION GAP SERPL CALCULATED.3IONS-SCNC: 11 MMOL/L (ref 4–13)
APPEARANCE CSF: COLORLESS
BASOPHILS # BLD AUTO: 0.01 THOUSANDS/ΜL (ref 0–0.1)
BASOPHILS NFR BLD AUTO: 0 % (ref 0–1)
BUN SERPL-MCNC: 19 MG/DL (ref 5–25)
C GATTII+NEOFOR DNA CSF QL NAA+NON-PROBE: NOT DETECTED
CALCIUM SERPL-MCNC: 10.9 MG/DL (ref 8.3–10.1)
CHLORIDE SERPL-SCNC: 101 MMOL/L (ref 100–108)
CMV DNA CSF QL NAA+NON-PROBE: NOT DETECTED
CO2 SERPL-SCNC: 23 MMOL/L (ref 21–32)
CREAT SERPL-MCNC: 0.76 MG/DL (ref 0.6–1.3)
CRP SERPL QL: 9.1 MG/L
E COLI K1 DNA CSF QL NAA+NON-PROBE: NOT DETECTED
EOSINOPHIL # BLD AUTO: 0 THOUSAND/ΜL (ref 0–0.61)
EOSINOPHIL NFR BLD AUTO: 0 % (ref 0–6)
ERYTHROCYTE [DISTWIDTH] IN BLOOD BY AUTOMATED COUNT: 12.9 % (ref 11.6–15.1)
ERYTHROCYTE [SEDIMENTATION RATE] IN BLOOD: 32 MM/HOUR (ref 0–20)
EV RNA CSF QL NAA+NON-PROBE: NOT DETECTED
GFR SERPL CREATININE-BSD FRML MDRD: 87 ML/MIN/1.73SQ M
GLUCOSE CSF-MCNC: 227 MG/DL (ref 50–80)
GLUCOSE SERPL-MCNC: 335 MG/DL (ref 65–140)
GLUCOSE SERPL-MCNC: 407 MG/DL (ref 65–140)
GLUCOSE SERPL-MCNC: 428 MG/DL (ref 65–140)
GLUCOSE SERPL-MCNC: 491 MG/DL (ref 65–140)
GP B STREP DNA CSF QL NAA+NON-PROBE: NOT DETECTED
GRAM STN SPEC: NORMAL
HAEM INFLU DNA CSF QL NAA+NON-PROBE: NOT DETECTED
HCT VFR BLD AUTO: 41.6 % (ref 34.8–46.1)
HGB BLD-MCNC: 13.7 G/DL (ref 11.5–15.4)
HHV6 DNA CSF QL NAA+NON-PROBE: NOT DETECTED
HSV1 DNA CSF QL NAA+NON-PROBE: NOT DETECTED
HSV2 DNA CSF QL NAA+NON-PROBE: NOT DETECTED
IMM GRANULOCYTES # BLD AUTO: 0.04 THOUSAND/UL (ref 0–0.2)
IMM GRANULOCYTES NFR BLD AUTO: 0 % (ref 0–2)
L MONOCYTOG DNA CSF QL NAA+NON-PROBE: NOT DETECTED
LYMPHOCYTES # BLD AUTO: 0.6 THOUSANDS/ΜL (ref 0.6–4.47)
LYMPHOCYTES NFR BLD AUTO: 6 % (ref 14–44)
LYMPHOCYTES NFR CSF MANUAL: 81 %
MCH RBC QN AUTO: 29 PG (ref 26.8–34.3)
MCHC RBC AUTO-ENTMCNC: 32.9 G/DL (ref 31.4–37.4)
MCV RBC AUTO: 88 FL (ref 82–98)
MONOCYTES # BLD AUTO: 0.09 THOUSAND/ΜL (ref 0.17–1.22)
MONOCYTES NFR BLD AUTO: 1 % (ref 4–12)
MONONUC CELLS NFR CSF MANUAL: 14 %
N MEN DNA CSF QL NAA+NON-PROBE: NOT DETECTED
NEUTROPHILS # BLD AUTO: 10.25 THOUSANDS/ΜL (ref 1.85–7.62)
NEUTS SEG NFR BLD AUTO: 93 % (ref 43–75)
NRBC BLD AUTO-RTO: 0 /100 WBCS
OTHER CELLS FLD MANUAL: 5 %
PARECHOVIRUS A RNA CSF QL NAA+NON-PROBE: NOT DETECTED
PLATELET # BLD AUTO: 318 THOUSANDS/UL (ref 149–390)
PMV BLD AUTO: 9.6 FL (ref 8.9–12.7)
POTASSIUM SERPL-SCNC: 4.5 MMOL/L (ref 3.5–5.3)
PROT CSF-MCNC: 80 MG/DL (ref 15–45)
RBC # BLD AUTO: 4.72 MILLION/UL (ref 3.81–5.12)
RBC # CSF MANUAL: 5 UL (ref 0–10)
S PNEUM DNA CSF QL NAA+NON-PROBE: NOT DETECTED
SODIUM SERPL-SCNC: 135 MMOL/L (ref 136–145)
TOTAL CELLS COUNTED BLD: NO
TOTAL CELLS COUNTED SPEC: 100
TUBE # CSF: 4
VZV DNA CSF QL NAA+NON-PROBE: NOT DETECTED
WBC # BLD AUTO: 10.99 THOUSAND/UL (ref 4.31–10.16)
WBC # CSF AUTO: 37 /UL (ref 0–5)

## 2020-03-13 PROCEDURE — 77003 FLUOROGUIDE FOR SPINE INJECT: CPT

## 2020-03-13 PROCEDURE — 88185 FLOWCYTOMETRY/TC ADD-ON: CPT

## 2020-03-13 PROCEDURE — 86038 ANTINUCLEAR ANTIBODIES: CPT | Performed by: PSYCHIATRY & NEUROLOGY

## 2020-03-13 PROCEDURE — 83873 ASSAY OF CSF PROTEIN: CPT | Performed by: PSYCHIATRY & NEUROLOGY

## 2020-03-13 PROCEDURE — 89051 BODY FLUID CELL COUNT: CPT | Performed by: PSYCHIATRY & NEUROLOGY

## 2020-03-13 PROCEDURE — 82945 GLUCOSE OTHER FLUID: CPT | Performed by: PSYCHIATRY & NEUROLOGY

## 2020-03-13 PROCEDURE — 83916 OLIGOCLONAL BANDS: CPT | Performed by: PSYCHIATRY & NEUROLOGY

## 2020-03-13 PROCEDURE — 99233 SBSQ HOSP IP/OBS HIGH 50: CPT | Performed by: INTERNAL MEDICINE

## 2020-03-13 PROCEDURE — 87798 DETECT AGENT NOS DNA AMP: CPT | Performed by: PSYCHIATRY & NEUROLOGY

## 2020-03-13 PROCEDURE — 82040 ASSAY OF SERUM ALBUMIN: CPT | Performed by: PSYCHIATRY & NEUROLOGY

## 2020-03-13 PROCEDURE — 86430 RHEUMATOID FACTOR TEST QUAL: CPT | Performed by: PSYCHIATRY & NEUROLOGY

## 2020-03-13 PROCEDURE — 87070 CULTURE OTHR SPECIMN AEROBIC: CPT | Performed by: PSYCHIATRY & NEUROLOGY

## 2020-03-13 PROCEDURE — 80048 BASIC METABOLIC PNL TOTAL CA: CPT | Performed by: INTERNAL MEDICINE

## 2020-03-13 PROCEDURE — 86235 NUCLEAR ANTIGEN ANTIBODY: CPT | Performed by: PSYCHIATRY & NEUROLOGY

## 2020-03-13 PROCEDURE — 86215 DEOXYRIBONUCLEASE ANTIBODY: CPT | Performed by: PSYCHIATRY & NEUROLOGY

## 2020-03-13 PROCEDURE — 83520 IMMUNOASSAY QUANT NOS NONAB: CPT | Performed by: PHYSICIAN ASSISTANT

## 2020-03-13 PROCEDURE — 62270 DX LMBR SPI PNXR: CPT

## 2020-03-13 PROCEDURE — 82948 REAGENT STRIP/BLOOD GLUCOSE: CPT

## 2020-03-13 PROCEDURE — 82164 ANGIOTENSIN I ENZYME TEST: CPT | Performed by: PSYCHIATRY & NEUROLOGY

## 2020-03-13 PROCEDURE — 009U3ZX DRAINAGE OF SPINAL CANAL, PERCUTANEOUS APPROACH, DIAGNOSTIC: ICD-10-PCS | Performed by: RADIOLOGY

## 2020-03-13 PROCEDURE — 86140 C-REACTIVE PROTEIN: CPT | Performed by: PSYCHIATRY & NEUROLOGY

## 2020-03-13 PROCEDURE — 82784 ASSAY IGA/IGD/IGG/IGM EACH: CPT | Performed by: PSYCHIATRY & NEUROLOGY

## 2020-03-13 PROCEDURE — 89050 BODY FLUID CELL COUNT: CPT | Performed by: PSYCHIATRY & NEUROLOGY

## 2020-03-13 PROCEDURE — 84157 ASSAY OF PROTEIN OTHER: CPT | Performed by: PSYCHIATRY & NEUROLOGY

## 2020-03-13 PROCEDURE — 87476 LYME DIS DNA AMP PROBE: CPT | Performed by: PSYCHIATRY & NEUROLOGY

## 2020-03-13 PROCEDURE — 62328 DX LMBR SPI PNXR W/FLUOR/CT: CPT | Performed by: RADIOLOGY

## 2020-03-13 PROCEDURE — 99214 OFFICE O/P EST MOD 30 MIN: CPT | Performed by: PSYCHIATRY & NEUROLOGY

## 2020-03-13 PROCEDURE — 87483 CNS DNA AMP PROBE TYPE 12-25: CPT | Performed by: PSYCHIATRY & NEUROLOGY

## 2020-03-13 PROCEDURE — 85025 COMPLETE CBC W/AUTO DIFF WBC: CPT | Performed by: INTERNAL MEDICINE

## 2020-03-13 PROCEDURE — 86255 FLUORESCENT ANTIBODY SCREEN: CPT | Performed by: PSYCHIATRY & NEUROLOGY

## 2020-03-13 PROCEDURE — 85652 RBC SED RATE AUTOMATED: CPT | Performed by: PSYCHIATRY & NEUROLOGY

## 2020-03-13 PROCEDURE — 88184 FLOWCYTOMETRY/ TC 1 MARKER: CPT | Performed by: PSYCHIATRY & NEUROLOGY

## 2020-03-13 PROCEDURE — 86592 SYPHILIS TEST NON-TREP QUAL: CPT | Performed by: PSYCHIATRY & NEUROLOGY

## 2020-03-13 PROCEDURE — 86225 DNA ANTIBODY NATIVE: CPT | Performed by: PSYCHIATRY & NEUROLOGY

## 2020-03-13 PROCEDURE — 82042 OTHER SOURCE ALBUMIN QUAN EA: CPT | Performed by: PSYCHIATRY & NEUROLOGY

## 2020-03-13 RX ORDER — ONDANSETRON 2 MG/ML
4 INJECTION INTRAMUSCULAR; INTRAVENOUS ONCE
Status: COMPLETED | OUTPATIENT
Start: 2020-03-13 | End: 2020-03-13

## 2020-03-13 RX ORDER — LORAZEPAM 2 MG/ML
0.5 INJECTION INTRAMUSCULAR
Status: COMPLETED | OUTPATIENT
Start: 2020-03-13 | End: 2020-03-13

## 2020-03-13 RX ORDER — HYDROMORPHONE HCL/PF 1 MG/ML
0.5 SYRINGE (ML) INJECTION
Status: COMPLETED | OUTPATIENT
Start: 2020-03-13 | End: 2020-03-13

## 2020-03-13 RX ORDER — POLYETHYLENE GLYCOL 3350 17 G/17G
17 POWDER, FOR SOLUTION ORAL DAILY
Status: DISCONTINUED | OUTPATIENT
Start: 2020-03-13 | End: 2020-03-15 | Stop reason: HOSPADM

## 2020-03-13 RX ORDER — AMOXICILLIN 250 MG
1 CAPSULE ORAL
Status: DISCONTINUED | OUTPATIENT
Start: 2020-03-13 | End: 2020-03-15 | Stop reason: HOSPADM

## 2020-03-13 RX ORDER — LISINOPRIL 20 MG/1
20 TABLET ORAL DAILY
Status: DISCONTINUED | OUTPATIENT
Start: 2020-03-14 | End: 2020-03-15 | Stop reason: HOSPADM

## 2020-03-13 RX ADMIN — INSULIN LISPRO 6 UNITS: 100 INJECTION, SOLUTION INTRAVENOUS; SUBCUTANEOUS at 12:12

## 2020-03-13 RX ADMIN — HYDRALAZINE HYDROCHLORIDE 10 MG: 20 INJECTION INTRAMUSCULAR; INTRAVENOUS at 23:49

## 2020-03-13 RX ADMIN — HYDROMORPHONE HYDROCHLORIDE 0.5 MG: 1 INJECTION, SOLUTION INTRAMUSCULAR; INTRAVENOUS; SUBCUTANEOUS at 11:52

## 2020-03-13 RX ADMIN — DOCUSATE SODIUM 100 MG: 100 CAPSULE, LIQUID FILLED ORAL at 21:00

## 2020-03-13 RX ADMIN — INSULIN LISPRO 5 UNITS: 100 INJECTION, SOLUTION INTRAVENOUS; SUBCUTANEOUS at 21:38

## 2020-03-13 RX ADMIN — POLYETHYLENE GLYCOL 3350 17 G: 17 POWDER, FOR SOLUTION ORAL at 21:11

## 2020-03-13 RX ADMIN — DIPHENHYDRAMINE HCL 50 MG: 25 TABLET ORAL at 21:37

## 2020-03-13 RX ADMIN — INSULIN LISPRO 6 UNITS: 100 INJECTION, SOLUTION INTRAVENOUS; SUBCUTANEOUS at 10:00

## 2020-03-13 RX ADMIN — SODIUM CHLORIDE 500 MG: 0.9 INJECTION, SOLUTION INTRAVENOUS at 21:00

## 2020-03-13 RX ADMIN — SODIUM CHLORIDE 500 MG: 0.9 INJECTION, SOLUTION INTRAVENOUS at 09:51

## 2020-03-13 RX ADMIN — LORAZEPAM 0.5 MG: 2 INJECTION INTRAMUSCULAR; INTRAVENOUS at 13:37

## 2020-03-13 RX ADMIN — INSULIN LISPRO 6 UNITS: 100 INJECTION, SOLUTION INTRAVENOUS; SUBCUTANEOUS at 17:13

## 2020-03-13 RX ADMIN — LORAZEPAM 0.5 MG: 2 INJECTION INTRAMUSCULAR; INTRAVENOUS at 12:15

## 2020-03-13 RX ADMIN — SENNOSIDES AND DOCUSATE SODIUM 1 TABLET: 8.6; 5 TABLET ORAL at 21:37

## 2020-03-13 RX ADMIN — INSULIN LISPRO 5 UNITS: 100 INJECTION, SOLUTION INTRAVENOUS; SUBCUTANEOUS at 17:12

## 2020-03-13 RX ADMIN — HYDROMORPHONE HYDROCHLORIDE 0.5 MG: 1 INJECTION, SOLUTION INTRAMUSCULAR; INTRAVENOUS; SUBCUTANEOUS at 12:45

## 2020-03-13 RX ADMIN — CLONAZEPAM 0.5 MG: 0.5 TABLET ORAL at 21:00

## 2020-03-13 RX ADMIN — LORAZEPAM 0.5 MG: 2 INJECTION INTRAMUSCULAR; INTRAVENOUS at 13:15

## 2020-03-13 RX ADMIN — DOCUSATE SODIUM 100 MG: 100 CAPSULE, LIQUID FILLED ORAL at 09:49

## 2020-03-13 RX ADMIN — LORAZEPAM 0.5 MG: 2 INJECTION INTRAMUSCULAR; INTRAVENOUS at 11:45

## 2020-03-13 RX ADMIN — LISINOPRIL 10 MG: 5 TABLET ORAL at 09:50

## 2020-03-13 RX ADMIN — LORAZEPAM 0.5 MG: 2 INJECTION INTRAMUSCULAR; INTRAVENOUS at 12:45

## 2020-03-13 RX ADMIN — INSULIN LISPRO 6 UNITS: 100 INJECTION, SOLUTION INTRAVENOUS; SUBCUTANEOUS at 12:00

## 2020-03-13 RX ADMIN — ONDANSETRON 4 MG: 2 INJECTION INTRAMUSCULAR; INTRAVENOUS at 11:59

## 2020-03-13 NOTE — ASSESSMENT & PLAN NOTE
Lab Results   Component Value Date    HGBA1C 9 7 (A) 02/07/2020     (P) 233 5   -hold metformin  -monitor Accu-Cheks, sliding scale for coverage  -patient refuses long-acting insulin as had amnesia from this in the past

## 2020-03-13 NOTE — PLAN OF CARE
Problem: Potential for Falls  Goal: Patient will remain free of falls  Description  INTERVENTIONS:  - Assess patient frequently for physical needs  -  Identify cognitive and physical deficits and behaviors that affect risk of falls    -  [place] fall precautions as indicated by assessment   - Educate patient/family on patient safety including physical limitations  - Instruct patient to call for assistance with activity based on assessment  - Modify environment to reduce risk of injury  - Consider OT/PT consult to assist with strengthening/mobility  Outcome: Progressing     Problem: DISCHARGE PLANNING - CARE MANAGEMENT  Goal: Discharge to post-acute care or home with appropriate resources  Description  INTERVENTIONS:  - Conduct assessment to determine patient/family and health care team treatment goals, and need for post-acute services based on payer coverage, community resources, and patient preferences, and barriers to discharge  - Address psychosocial, clinical, and financial barriers to discharge as identified in assessment in conjunction with the patient/family and health care team  - Arrange appropriate level of post-acute services according to patients   needs and preference and payer coverage in collaboration with the physician and health care team  - Communicate with and update the patient/family, physician, and health care team regarding progress on the discharge plan  - Arrange appropriate transportation to post-acute venues  Outcome: Progressing     Problem: PAIN - ADULT  Goal: Verbalizes/displays adequate comfort level or baseline comfort level  Description  Interventions:  - Encourage patient to monitor pain and request assistance  - Assess pain using appropriate pain scale  - Administer analgesics based on type and severity of pain and evaluate response  - Implement non-pharmacological measures as appropriate and evaluate response  - Consider cultural and social influences on pain and pain management  - Notify physician/advanced practitioner if interventions unsuccessful or patient reports new pain  Outcome: Progressing     Problem: SAFETY ADULT  Goal: Patient will remain free of falls  Description  INTERVENTIONS:  - Assess patient frequently for physical needs  -  Identify cognitive and physical deficits and behaviors that affect risk of falls    -  [place] fall precautions as indicated by assessment   - Educate patient/family on patient safety including physical limitations  - Instruct patient to call for assistance with activity based on assessment  - Modify environment to reduce risk of injury  - Consider OT/PT consult to assist with strengthening/mobility  Outcome: Progressing  Goal: Maintain or return to baseline ADL function  Description  INTERVENTIONS:  -  Assess patient's ability to carry out ADLs; assess patient's baseline for ADL function and identify physical deficits which impact ability to perform ADLs (bathing, care of mouth/teeth, toileting, grooming, dressing, etc )  - Assess/evaluate cause of self-care deficits   - Assess range of motion  - Assess patient's mobility; develop plan if impaired  - Assess patient's need for assistive devices and provide as appropriate  - Encourage maximum independence but intervene and supervise when necessary  - Involve family in performance of ADLs  - Assess for home care needs following discharge   - Consider OT consult to assist with ADL evaluation and planning for discharge  - Provide patient education as appropriate  Outcome: Progressing  Goal: Maintain or return mobility status to optimal level  Description  INTERVENTIONS:  - Assess patient's baseline mobility status (ambulation, transfers, stairs, etc )    - Identify cognitive and physical deficits and behaviors that affect mobility  - Identify mobility aids required to assist with transfers and/or ambulation (gait belt, sit-to-stand, lift, walker, cane, etc )  - [place] fall precautions as indicated by assessment  - Record patient progress and toleration of activity level on Mobility SBAR; progress patient to next Phase/Stage  - Instruct patient to call for assistance with activity based on assessment  - Consider rehabilitation consult to assist with strengthening/weightbearing, etc   Outcome: Progressing     Problem: CONFUSION/THOUGHT DISTURBANCE  Goal: Thought disturbances (confusion, delirium, depression, dementia or psychosis) are managed to maintain or return to baseline mental status and functional level  Description  INTERVENTIONS:  - Assess for possible contributors to  thought disturbance, including but not limited to medications, infection, impaired vision or hearing, underlying metabolic abnormalities, dehydration, respiratory compromise,  psychiatric diagnoses and notify attending PHYSICAN/AP  - Monitor and intervene to maintain adequate nutrition, hydration, elimination, sleep and activity  - Decrease environmental stimuli, including noise as appropriate  - Provide frequent contacts to provide refocusing, direction and reassurance as needed  Approach patient calmly with eye contact and at their level    - [place] high risk fall precautions, aspiration precautions and other safety measures, as indicated  - If delirium suspected, notify physician/AP of change in condition and request immediate in-person evaluation  - Pursue consults as appropriate including Geriatric (campus dependent), OT for cognitive evaluation/activity planning, psychiatric, pastoral care, etc   Outcome: Progressing     Problem: DECISION MAKING  Goal: Pt/Family able to effectively weigh alternatives and participate in decision making related to treatment and care  Description  INTERVENTIONS:  - Identify decision maker  - Determine when there are differences among patient's view, family's view, and healthcare provider's view of patient condition and care goals  - Facilitate patient/family articulation of goals for care  - Help patient/family identify pros/cons of alternative solutions  - Provide information as requested by patient/family  - Respect patient/family rights related to privacy and sharing information   - Serve as a liaison between patient, family and health care team  - Initiate consults as appropriate (Ethics Team, Palliative Care, Family Care Conference, etc )  Outcome: Progressing     Problem: COPING  Goal: Pt/Family able to verbalize concerns and demonstrate effective coping strategies  Description  INTERVENTIONS:  - Assist patient/family to identify coping skills, available support systems and cultural and spiritual values  - Provide emotional support, including active listening and acknowledgement of concerns of patient and caregivers  - Reduce environmental stimuli, as able  - Provide patient education  - Assess for spiritual pain/suffering and initiate spiritual care, including notification of Pastoral Care or capri based community as needed  - Assess effectiveness of coping strategies  Outcome: Progressing  Goal: Will report anxiety at manageable levels  Description  INTERVENTIONS:  - Administer medication as ordered  - Teach and encourage coping skills  - Provide emotional support  - Assess patient/family for anxiety and ability to cope  Outcome: Progressing     Problem: Nutrition  Goal: Nutrition/Hydration status is improving  Description  Monitor and assess patient's nutrition/hydration status for malnutrition (ex- brittle hair, bruises, dry skin, pale skin and conjunctiva, muscle wasting, smooth red tongue, and disorientation)  Collaborate with interdisciplinary team and initiate plan and interventions as ordered  Monitor patient's weight and dietary intake as ordered or per policy  Utilize nutrition screening tool and intervene per policy  Determine patient's food preferences and provide high-protein, high-caloric foods as appropriate       - Assist patient with eating   - Allow adequate time for meals   - Encourage patient to take dietary supplement as ordered  - Collaborate with clinical nutritionist   - Include patient/family/caregiver in decisions related to nutrition    Outcome: Progressing

## 2020-03-13 NOTE — PROGRESS NOTES
Progress Note - Maggi Sanchez 1960, 63 y o  female MRN: [id number]    Unit/Bed#: E4 -01 Encounter: [id number]    Primary Care Provider: DEANA Appiah   Date and time admitted to hospital: 3/9/2020  8:16 AM        * Numbness and tingling of right arm and leg  Assessment & Plan  Patient presents to ER for right-sided numbness; she also reports a history of recurrent syncopal episodes  She reports RUE numbness ("pins and needles") and difficulty coordinating RUE movement for approximately 2 weeks  She reports RLE and right trunk numbness started 3 days ago when she was rising from a seated to standing position  -MRI with contrast reveals findings consistent with demyelinating/MS  -CT chest abdomen pelvis revealed no evidence of primary malignancy or metastatic disease in chest abdomen or pelvis  -neurology follow-up appreciated, patient started on Solu-Medrol 5 mg b i d  As a pulse dose for findings of demyelinating disease  -plan for lumbar puncture, CSF studies  -also plan for MRI thoracic and lumbar spine with and without contrast  -echocardiogram revealed ejection fraction 60%, no wall motion abnormalities, diastolic dysfunction type 1, no defect or patent foramina ovale  -discontinued aspirin statin  -neurochecks      Recurrent syncope  Assessment & Plan  Patient reports recurrent syncopal episodes occurring for years  Episodes reportedly increasing in frequency to every few weeks  Patient denies any prior evaluation or treatment  Will evaluate in conjunction with patient's report of right-sided numbness  Ambulatory dysfunction  Assessment & Plan  Patient reports difficulty walking over past few months due to left knee injury sustained during syncopal episode  No reported prior treatment  Patient wears a knee brace as needed for support    Ambulates with walker  -PT recommending home PT     Benign essential hypertension  Assessment & Plan  Continue with lisinopril, will increase to 20 mg    Morbid obesity with BMI of 45 0-49 9, adult ([facility])  Assessment & Plan  BMI 45 2  Encourage lifestyle modifications  Type 2 diabetes mellitus with hyperglycemia, without long-term current use of insulin ([facility])  Assessment & Plan  Lab Results   Component Value Date    HGBA1C 9 7 (A) 02/07/2020     (P) 233 5   -hold metformin  -monitor Accu-Cheks, sliding scale for coverage  -patient refuses long-acting insulin as had amnesia from this in the past    Lengthy discussion at bedside with patient, provided emotional support, patient was tearful given her diagnosis  Also discussed significant importance of managing blood sugars while on high IV steroids  Patient states she does have some apprehension as she had amnesia in the past when she was sent home with insulin, explained this is a short-acting insulin and will closely monitor mental status  Patient understands and is in agreement with having blood sugar checked and insulin coverage while on steroids  VTE Pharmacologic Prophylaxis:   Pharmacologic: lovenox on hold for LP    Patient Centered Rounds: I have performed bedside rounds with nursing staff today  Discussions with Specialists or Other Care Team Provider:  Neurologist    Time Spent for Care: 36 minutes  More than 50% of total time spent on counseling and coordination of care as described above  Current Length of Stay: 2 day(s)    Current Patient Status: Inpatient   Certification Statement: The patient will continue to require additional inpatient hospital stay due to IV steroids, lumbar puncture    Discharge Plan / Estimated Discharge Date:  3-4 days    Code Status: Level 3 - DNAR and DNI      Subjective:   Patient seen and examined at bedside, patient very emotional regarding her diagnosis  Does state that her numbness tingling and strength in her right upper and lower extremities is improving    Denies any chest pain, palpitations or dyspnea    Objective:     Vitals:   Temp (24hrs), Av 5 °F (35 8 °C), Min:96 °F (35 6 °C), Max:97 °F (36 1 °C)    Temp:  [96 °F (35 6 °C)-97 °F (36 1 °C)] 97 °F (36 1 °C)  HR:  [] 63  Resp:  [18-20] 20  BP: (162-169)/() 162/105  SpO2:  [94 %-100 %] 94 %  Body mass index is 43 85 kg/m²  Input and Output Summary (last 24 hours): Intake/Output Summary (Last 24 hours) at 3/13/2020 1018  Last data filed at 3/12/2020 2336  Gross per 24 hour   Intake 250 ml   Output --   Net 250 ml       Physical Exam:    Constitutional: Patient is oriented to person, place and time, no acute distress  HEENT:  Normocephalic, atraumatic  Cardiovascular: Normal S1S2, RRR, No murmurs/rubs/gallops appreciated  Pulmonary:  Bilateral air entry, No rhonchi/rales/wheezing appreciated  Abdominal: Soft, Bowel sounds present, Non-tender, Non-distended  Extremities:  No cyanosis, clubbing or edema  Neurological:  Motor strength 5/5 in left upper and left lower extremity, motor strength 5/5 in right upper extremity and 4/5 in right lower extremity, sensation diminished but improving of right upper and lower extremity    Additional Data:     Labs:    Results from last 7 days   Lab Units 20  0540   WBC Thousand/uL 10 99*   HEMOGLOBIN g/dL 13 7   HEMATOCRIT % 41 6   PLATELETS Thousands/uL 318   NEUTROS PCT % 93*   LYMPHS PCT % 6*   MONOS PCT % 1*   EOS PCT % 0     Results from last 7 days   Lab Units 20  0540  03/10/20  0452   POTASSIUM mmol/L 4 5   < > 4 0   CHLORIDE mmol/L 101   < > 103   CO2 mmol/L 23   < > 26   BUN mg/dL 19   < > 12   CREATININE mg/dL 0 76   < > 0 73   CALCIUM mg/dL 10 9*   < > 10 6*   ALK PHOS U/L  --   --  91   ALT U/L  --   --  24   AST U/L  --   --  16    < > = values in this interval not displayed  Results from last 7 days   Lab Units 03/10/20  0452   INR  0 96        I Have Reviewed All Lab Data Listed Above          Recent Cultures (last 7 days):           Last 24 Hours Medication List:     Current Facility-Administered Medications:  acetaminophen 650 mg Oral Q6H PRN Mellisa Kilpatrick MD    aluminum-magnesium hydroxide-simethicone 30 mL Oral Q4H PRN Mellisa Kilpatrick MD    clonazePAM 0 5 mg Oral HS Melo Vidales DO    diphenhydrAMINE 50 mg Oral HS Melo Vidales, DO    docusate sodium 100 mg Oral BID PRN Mellisa Kilpatrick MD    hydrALAZINE 10 mg Intravenous Q6H PRN DEANA Sheehan    HYDROmorphone 0 5 mg Intravenous Once PRN David Buerger, PA-C    HYDROmorphone 0 5 mg Intravenous Q30 Min Melo Vidales DO    insulin lispro 1-6 Units Subcutaneous TID AC Mellisa Kilpatrick MD    insulin lispro 1-6 Units Subcutaneous HS Mellisa Kilpatrick MD    lisinopril 10 mg Oral Daily Mellisa Kilpatrick MD    LORazepam 0 5 mg Intravenous Q30 Min PRN Silvia Lara PA-C    LORazepam 0 5 mg Intravenous Q30 Min Melo Vidales DO    methylPREDNISolone sodium succinate 500 mg Intravenous Q12H [facility] Melo Vidales DO Last Rate: 500 mg (03/13/20 0951)   ondansetron 4 mg Intravenous Q4H PRN Mellisa Kilpatrick MD    ondansetron 4 mg Intravenous Once PRN David Buerger, PA-C    ondansetron 4 mg Intravenous Once Jose Raul Vidales DO         Today, Patient Was Seen By: Jamal Moreno MD

## 2020-03-13 NOTE — BRIEF OP NOTE (RAD/CATH)
IR LUMBAR PUNCTURE Procedure Note    PATIENT NAME: George Garcia  : 1960  MRN: [id number]    Pre-op Diagnosis:   1  Right sided numbness    2  Acute right-sided weakness    3  Fall, initial encounter    4  HTN (hypertension)      Post-op Diagnosis:   1  Right sided numbness    2  Acute right-sided weakness    3  Fall, initial encounter    4  HTN (hypertension)        Surgeon:   Jannie Arvizu MD  Assistants:     No qualified resident was available, Resident is only observing    Estimated Blood Loss:  Minimal    Findings:  Successfully much got a lumbar puncture    Specimens:  16 mL of clear cerebrospinal fluid sent for analysis      Complications:  None    Anesthesia:  Local anesthesia    Jannie Arvizu MD     Date: 3/13/2020  Time: 3:07 PM

## 2020-03-13 NOTE — SOCIAL WORK
Pt now a full admission  Met with pt to completed assessment and explain role  PCP is DEANA Mondragon  Pt lives in [facility] with her spouse in a 1 story house with ramp to enter  Pt was independent with ADLs, drives a car and amb with RW  DME:  RW, shower bench and BSC  Pt is open with [facility] prior to this admission  Pt stated if she needs help at home she can get help  Pt uses [address] on [address]  Pt has no hx of MH or D&A  Advance Care Planning   Pt does not have a POA/Living Will and information was given/explained to her   is spouse Wanda Guerrero and dtr will transport home  Pt's preference for discharge is home with [facility]  PT is recommending a short stay rehab vs home with PT  Pt has selected home PT  The plan is home with [facility]  A post acute care recommendation was made by your care team for [facility] 79  Discussed Freedom of Choice with patient  List of agencies given to patient via in person  patient aware the list is custom filtered for them by preference  and that [facility]'s post acute providers are designated

## 2020-03-13 NOTE — PROGRESS NOTES
Progress Note - Neurology   Claire Soto 63 y o  female MRN: [id number]  Unit/Bed#: E4 -01 Encounter: [id number]    Assessment/Plan:  Assessment:  3 63year-old with HTN, DM, and obesity who presented with right sided paresthesias and weakness  MR imaging with contrast revealed multiple supratentorial and infratentorial white matter FLAIR hyperintensities with post contrast enhancement including a lesion within the cervical cord  Concern for demyelination versus metastatic disease  CT chest abdomen pelvis was unremarkable for any solid tumor  LP pending this afternoon  MRI T and lumbar spine with contrast pending      Plan:  - CT CAP negative for solid tumor  - LP pending see below for labs  - DVT prophylaxis on hold in preparation of lumbar puncture  - MRI T and L spine with contrast pending   - would discontinue Decadron and start Solu-Medrol 500 mg b i d   - Continue Klonopin 0 5mg QHS  - Continue Benadryl 50mg QHS  - Neuro checks  - Notify Neurology with any changes in neuro examination     CSF labs pending: ACE, culture and gram stain, WBC, glucose, LIZZETTE virus, cytometry, ME panel, RBC, MS panel, protein    Serum labs pending:  MAXIMILIANO screen with reflex titer, Ace, anti DS DNA Ab, anti DNase B Ab, Anca, CRP, RF screen with reflex to titer, RPR, sed rate, Sjogren's antibodies  Results:  1  MRI cervical spine with contrast: Postcontrast imaging demonstrates enhancement associated with multiple previously described foci of T2 prolongation within the cervical and upper thoracic CORD   Imaging appearance as correlated with dedicated MRI of the brain is most suggestive of demyelinating disease/multiple sclerosis    2  MRI brain with contrast: Redemonstration of multiple supratentorial and infratentorial scattered areas of white matter FLAIR signal hyperintensity, as described above   Many of the lesions demonstrate a perpendicular configuration of the ventricles and lesions are seen involving the callosal septal interface   Imaging appearance is most suggestive of demyelinating disease/multiple sclerosis with areas of active demyelination as correlated with dedicated MRI of the cervical spine  3  Echocardiogram: EF 60%  The left atrium size was normal  No PFO was identified  Subjective:   "I was able to walk today, and the pain in my arm (allodynia RUE) is better"    ROS:  Review of Systems   Constitutional: Negative  HENT: Negative for hearing loss  Eyes: Negative for photophobia and visual disturbance  Respiratory: Negative for wheezing  Cardiovascular: Negative for chest pain and palpitations  Genitourinary: Negative for dysuria and urgency  Neurological: Negative for dizziness, light-headedness, headaches    Positive for weakness and numbness, which is improving    All other systems reviewed are negative  Vitals: Blood pressure (!) 162/105, pulse 63, temperature (!) 97 °F (36 1 °C), temperature source Temporal, resp  rate 20, height 5' 7" (1 702 m), weight 127 kg (280 lb), SpO2 94 %, not currently breastfeeding  ,Body mass index is 43 85 kg/m²  Physical Exam:   Physical Exam   Constitutional: oriented to person, place, and time  appears well-developed and well-nourished  HENT:  Unremarkable  Head: Normocephalic and atraumatic  Eyes: EOM are normal  Pupils are equal, round, and reactive to light  Neurologic Exam   Mental Status    Oriented to person, place, and time  Level of consciousness: alert  Normal comprehension  Cranial Nerves    Visual fields full to confrontation  Pupils are equal, round, and reactive to light  Extraocular motions are normal    Nystagmus: none   Face is symmetric with respect to motor and sensory  Tongue, palate, uvula midline  Motor Exam   4/5 strength in interosseous and  as well as  hip and knee flexion/extension, and dorsiflexion, all on the right    Right-sided shoulder abduction, biceps, triceps, dorsiflexion and all muscle groups on the left were 5/5  Muscle bulk: normal  Overall muscle tone: normal  Sensory Exam   Sensation intact to light touch and temp  Gait, Coordination, and Reflexes   DTR's 1+ right biceps, 2+ left biceps, 1+ left patella, absent in the bilateral ankles and right patella  No gross ataxia  Lab, Imaging and other studies: I have personally reviewed pertinent reports      VTE Prophylaxis: Sequential compression device (Venodyne)

## 2020-03-13 NOTE — ASSESSMENT & PLAN NOTE
Patient reports difficulty walking over past few months due to left knee injury sustained during syncopal episode  No reported prior treatment  Patient wears a knee brace as needed for support    Ambulates with walker  -PT recommending home PT

## 2020-03-13 NOTE — ASSESSMENT & PLAN NOTE
Patient presents to ER for right-sided numbness; she also reports a history of recurrent syncopal episodes  She reports RUE numbness ("pins and needles") and difficulty coordinating RUE movement for approximately 2 weeks  She reports RLE and right trunk numbness started 3 days ago when she was rising from a seated to standing position  -MRI with contrast reveals findings consistent with demyelinating/MS  -CT chest abdomen pelvis revealed no evidence of primary malignancy or metastatic disease in chest abdomen or pelvis  -neurology follow-up appreciated, patient started on Solu-Medrol 5 mg b i d   As a pulse dose for findings of demyelinating disease  -plan for lumbar puncture, CSF studies  -also plan for MRI thoracic and lumbar spine with and without contrast  -echocardiogram revealed ejection fraction 60%, no wall motion abnormalities, diastolic dysfunction type 1, no defect or patent foramina ovale  -discontinued aspirin statin  -neurochecks

## 2020-03-14 ENCOUNTER — APPOINTMENT (INPATIENT)
Dept: MRI IMAGING | Facility: HOSPITAL | Age: 60
DRG: 059 | End: 2020-03-14

## 2020-03-14 LAB
ANION GAP SERPL CALCULATED.3IONS-SCNC: 12 MMOL/L (ref 4–13)
BASOPHILS # BLD MANUAL: 0 THOUSAND/UL (ref 0–0.1)
BASOPHILS NFR MAR MANUAL: 0 % (ref 0–1)
BUN SERPL-MCNC: 23 MG/DL (ref 5–25)
CALCIUM SERPL-MCNC: 11.2 MG/DL (ref 8.3–10.1)
CHLORIDE SERPL-SCNC: 100 MMOL/L (ref 100–108)
CO2 SERPL-SCNC: 23 MMOL/L (ref 21–32)
CREAT SERPL-MCNC: 0.7 MG/DL (ref 0.6–1.3)
DSDNA AB SER-ACNC: <1 IU/ML (ref 0–9)
ENA SS-A AB SER-ACNC: <0.2 AI (ref 0–0.9)
ENA SS-B AB SER-ACNC: <0.2 AI (ref 0–0.9)
EOSINOPHIL # BLD MANUAL: 0 THOUSAND/UL (ref 0–0.4)
EOSINOPHIL NFR BLD MANUAL: 0 % (ref 0–6)
ERYTHROCYTE [DISTWIDTH] IN BLOOD BY AUTOMATED COUNT: 13.2 % (ref 11.6–15.1)
GFR SERPL CREATININE-BSD FRML MDRD: 96 ML/MIN/1.73SQ M
GLUCOSE SERPL-MCNC: 344 MG/DL (ref 65–140)
GLUCOSE SERPL-MCNC: 345 MG/DL (ref 65–140)
GLUCOSE SERPL-MCNC: 358 MG/DL (ref 65–140)
GLUCOSE SERPL-MCNC: 362 MG/DL (ref 65–140)
GLUCOSE SERPL-MCNC: 387 MG/DL (ref 65–140)
GLUCOSE SERPL-MCNC: 405 MG/DL (ref 65–140)
HCT VFR BLD AUTO: 42.7 % (ref 34.8–46.1)
HGB BLD-MCNC: 13.9 G/DL (ref 11.5–15.4)
LYMPHOCYTES # BLD AUTO: 0.71 THOUSAND/UL (ref 0.6–4.47)
LYMPHOCYTES # BLD AUTO: 5 % (ref 14–44)
MCH RBC QN AUTO: 29 PG (ref 26.8–34.3)
MCHC RBC AUTO-ENTMCNC: 32.6 G/DL (ref 31.4–37.4)
MCV RBC AUTO: 89 FL (ref 82–98)
MONOCYTES # BLD AUTO: 0.14 THOUSAND/UL (ref 0–1.22)
MONOCYTES NFR BLD: 1 % (ref 4–12)
NEUTROPHILS # BLD MANUAL: 13.37 THOUSAND/UL (ref 1.85–7.62)
NEUTS BAND NFR BLD MANUAL: 4 % (ref 0–8)
NEUTS SEG NFR BLD AUTO: 90 % (ref 43–75)
NRBC BLD AUTO-RTO: 0 /100 WBCS
PLATELET # BLD AUTO: 360 THOUSANDS/UL (ref 149–390)
PLATELET BLD QL SMEAR: ADEQUATE
PMV BLD AUTO: 10 FL (ref 8.9–12.7)
POTASSIUM SERPL-SCNC: 4 MMOL/L (ref 3.5–5.3)
RBC # BLD AUTO: 4.8 MILLION/UL (ref 3.81–5.12)
RBC MORPH BLD: NORMAL
SODIUM SERPL-SCNC: 135 MMOL/L (ref 136–145)
TOTAL CELLS COUNTED SPEC: 100
WBC # BLD AUTO: 14.22 THOUSAND/UL (ref 4.31–10.16)

## 2020-03-14 PROCEDURE — 82948 REAGENT STRIP/BLOOD GLUCOSE: CPT

## 2020-03-14 PROCEDURE — 85007 BL SMEAR W/DIFF WBC COUNT: CPT | Performed by: INTERNAL MEDICINE

## 2020-03-14 PROCEDURE — 97535 SELF CARE MNGMENT TRAINING: CPT

## 2020-03-14 PROCEDURE — 85027 COMPLETE CBC AUTOMATED: CPT | Performed by: INTERNAL MEDICINE

## 2020-03-14 PROCEDURE — 97110 THERAPEUTIC EXERCISES: CPT

## 2020-03-14 PROCEDURE — 80048 BASIC METABOLIC PNL TOTAL CA: CPT | Performed by: INTERNAL MEDICINE

## 2020-03-14 PROCEDURE — 97530 THERAPEUTIC ACTIVITIES: CPT

## 2020-03-14 PROCEDURE — 99214 OFFICE O/P EST MOD 30 MIN: CPT | Performed by: PSYCHIATRY & NEUROLOGY

## 2020-03-14 PROCEDURE — 86255 FLUORESCENT ANTIBODY SCREEN: CPT | Performed by: PHYSICIAN ASSISTANT

## 2020-03-14 PROCEDURE — 99232 SBSQ HOSP IP/OBS MODERATE 35: CPT | Performed by: INTERNAL MEDICINE

## 2020-03-14 PROCEDURE — 97116 GAIT TRAINING THERAPY: CPT

## 2020-03-14 RX ORDER — LACTULOSE 20 G/30ML
20 SOLUTION ORAL DAILY
Status: DISCONTINUED | OUTPATIENT
Start: 2020-03-15 | End: 2020-03-14

## 2020-03-14 RX ORDER — LORAZEPAM 2 MG/ML
1 INJECTION INTRAMUSCULAR ONCE
Status: DISCONTINUED | OUTPATIENT
Start: 2020-03-14 | End: 2020-03-15 | Stop reason: HOSPADM

## 2020-03-14 RX ORDER — LORAZEPAM 2 MG/ML
1 INJECTION INTRAMUSCULAR ONCE
Status: COMPLETED | OUTPATIENT
Start: 2020-03-14 | End: 2020-03-14

## 2020-03-14 RX ORDER — LACTULOSE 20 G/30ML
20 SOLUTION ORAL DAILY
Status: DISCONTINUED | OUTPATIENT
Start: 2020-03-14 | End: 2020-03-15 | Stop reason: HOSPADM

## 2020-03-14 RX ADMIN — INSULIN LISPRO 5 UNITS: 100 INJECTION, SOLUTION INTRAVENOUS; SUBCUTANEOUS at 09:49

## 2020-03-14 RX ADMIN — SODIUM CHLORIDE 500 MG: 0.9 INJECTION, SOLUTION INTRAVENOUS at 21:05

## 2020-03-14 RX ADMIN — SENNOSIDES AND DOCUSATE SODIUM 1 TABLET: 8.6; 5 TABLET ORAL at 21:04

## 2020-03-14 RX ADMIN — ENOXAPARIN SODIUM 40 MG: 40 INJECTION SUBCUTANEOUS at 09:49

## 2020-03-14 RX ADMIN — LORAZEPAM 1 MG: 2 INJECTION INTRAMUSCULAR; INTRAVENOUS at 11:05

## 2020-03-14 RX ADMIN — INSULIN LISPRO 6 UNITS: 100 INJECTION, SOLUTION INTRAVENOUS; SUBCUTANEOUS at 18:27

## 2020-03-14 RX ADMIN — DIPHENHYDRAMINE HCL 50 MG: 25 TABLET ORAL at 21:04

## 2020-03-14 RX ADMIN — INSULIN LISPRO 6 UNITS: 100 INJECTION, SOLUTION INTRAVENOUS; SUBCUTANEOUS at 21:12

## 2020-03-14 RX ADMIN — INSULIN LISPRO 6 UNITS: 100 INJECTION, SOLUTION INTRAVENOUS; SUBCUTANEOUS at 13:27

## 2020-03-14 RX ADMIN — CLONAZEPAM 0.5 MG: 0.5 TABLET ORAL at 21:04

## 2020-03-14 RX ADMIN — INSULIN LISPRO 6 UNITS: 100 INJECTION, SOLUTION INTRAVENOUS; SUBCUTANEOUS at 13:34

## 2020-03-14 RX ADMIN — DOCUSATE SODIUM 100 MG: 100 CAPSULE, LIQUID FILLED ORAL at 09:48

## 2020-03-14 RX ADMIN — SODIUM CHLORIDE 500 MG: 0.9 INJECTION, SOLUTION INTRAVENOUS at 09:48

## 2020-03-14 RX ADMIN — LISINOPRIL 20 MG: 20 TABLET ORAL at 09:48

## 2020-03-14 RX ADMIN — POLYETHYLENE GLYCOL 3350 17 G: 17 POWDER, FOR SOLUTION ORAL at 13:26

## 2020-03-14 RX ADMIN — LACTULOSE 20 G: 10 SOLUTION ORAL at 19:17

## 2020-03-14 RX ADMIN — INSULIN LISPRO 6 UNITS: 100 INJECTION, SOLUTION INTRAVENOUS; SUBCUTANEOUS at 18:28

## 2020-03-14 RX ADMIN — INSULIN LISPRO 6 UNITS: 100 INJECTION, SOLUTION INTRAVENOUS; SUBCUTANEOUS at 09:49

## 2020-03-14 NOTE — PLAN OF CARE
Problem: OCCUPATIONAL THERAPY ADULT  Goal: Performs self-care activities at highest level of function for planned discharge setting  See evaluation for individualized goals  Description  Treatment Interventions: ADL retraining, Functional transfer training, UE strengthening/ROM, Endurance training, Patient/family training, Equipment evaluation/education, Compensatory technique education, Energy conservation          See flowsheet documentation for full assessment, interventions and recommendations  Outcome: Progressing  Note:   Limitation: Decreased ADL status, Decreased UE strength, Decreased Safe judgement during ADL, Decreased endurance, Decreased high-level ADLs, Decreased self-care trans, Decreased fine motor control, Decreased sensation  Prognosis: Fair  Assessment: Pt was seen for skilled OT with focus on completion of self toileting, LE dressing activity, review of RW safety, fall prevention, fine motor coordination activity, review of energy conservation techs and review of current plan of care  Pt with initial c/o regarding needing a shower  Pt with poor insight noted to current deficits  Pt requested that staff not, "touch her" with functional transfers  Noted buckling of RLE with functional tasks instance for more than 3 mins  Pt with early release of RW noted with self toileting stating, "this is how I do it at home"  Educated Pt on the role of therapies and Pt's need to maintain safe positioning with in RW perimeter at all times due to noted balance deficits and fatigue levels with functional mobility  Pt requested use of, "squeezy ball", to facilitate strengthening of B/L hands  Pt with deficits noted with fine motor coordination in R hand with completion of card game activity at table top  Pt able to reach BLE do don socks with Min A overall  Increased A will be required for clothing management with application of underwear and plants due to inconsistent stand balance noted   Pt with poor insight to current deficits stating, "I'm not here because I'm sick, I'm here because I fell"  Questionable safety awareness noted with increased distractibility and poor problem solving skills with review of fall prevention/home safety  Pt able to complete the complementary worksheet of the [facility] Cognitive Assessment, [address] without noted deficits  No visual deficits currently noted with functional tasks or verbalized by Pt  Due to noted deficits, Pt may benefit from continued therapies  Pt reports having limited caregiver support at home as her  is also ill and sons both work  Pt's goal is to get a wheel chair and have her daughter get paid to take care of her with return to home environment  OT Discharge Recommendation: Short Term Rehab(Pt prefers home   limited caregive support )

## 2020-03-14 NOTE — PHYSICAL THERAPY NOTE
Bedside shift change report given to Rica Mtz (oncoming nurse) by Damir De La Cruz RN (offgoing nurse). Report included the following information SBAR and Kardex. PHYSICAL THERAPY NOTE          Patient Name: Elena Shelley  [id number] Date: 3/14/2020     03/14/20 1438   Pain Assessment   Pain Score No Pain   Restrictions/Precautions   Weight Bearing Precautions Per Order No   Other Precautions Fall Risk;Cognitive   General   Chart Reviewed Yes   Additional Pertinent History r/o MS   Response to Previous Treatment Patient with no complaints from previous session  Family/Caregiver Present No   Cognition   Overall Cognitive Status Impaired   Arousal/Participation Cooperative   Attention Attends with cues to redirect   Following Commands Follows one step commands with increased time or repetition   Comments Appears to have limited insight to impairment  Redirection to tasks to optimize safety with transitions/hand placement/appropriate use of RW support  Subjective   Subjective " I want 2 squeezy balls for my hands, and I want stretchy band for my exercises and I want to take a shower"   Bed Mobility   Additional Comments OOB in chair upon arrival    Transfers   Sit to Stand 4  Minimal assistance   Additional items Assist x 1;Verbal cues;Armrests   Stand to Sit 4  Minimal assistance   Additional items Assist x 1; Increased time required;Armrests   Toilet transfer 4  Minimal assistance   Additional items Assist x 1; Increased time required;Verbal cues;Standard toilet  (use of R grab bar)   Additional Comments Cues for safety with transitions   + LOB upon approach to chair with RLE buckling and need for modA to assist with safety to seated  Ambulation/Elevation   Gait pattern Improper Weight shift;Decreased foot clearance; Inconsistent yessica; Short stride; Excessively slow;R Knee Chau   Gait Assistance 4  Minimal assist   Additional items Assist x 1;Verbal cues; Tactile cues  (with modA of 1 on one occasion with R knee buckle )   Assistive Device Rolling walker   Distance 55'x1, 50'x1 with seated rest, then 15'x1, 25'x1  Balance   Static Sitting Good   Dynamic Sitting Fair   Static Standing Fair -   Dynamic Standing Poor +   Ambulatory Poor   Endurance Deficit   Endurance Deficit Yes   Endurance Deficit Description fatigue, weakness  Activity Tolerance   Activity Tolerance Patient limited by fatigue   Medical Staff Made Aware Nurse, Marga Mcnamara  Nurse Made Aware yes   Exercises   Hip Flexion Sitting;15 reps;AROM; Bilateral   Knee AROM Long Arc Quad Sitting;15 reps;AROM; Bilateral   Ankle Pumps Sitting;15 reps;AROM; Bilateral   Heel Cord Stretch Sitting;15 reps;AROM; Bilateral  (heel raises )   Neuro re-ed Education provided on safety with mobiltiy, use of RW, approach to chair with use of RW support  Assessment   Prognosis Good   Problem List Decreased strength;Decreased endurance; Impaired balance;Decreased mobility; Impaired judgement;Decreased safety awareness;Decreased coordination;Obesity   Assessment Pt seen for progression of mobility  Continued workup to r/o MS   OOB in chair upon arrival   Agreeable to therapy  Continues to require min A for transfers and ambulation  Cues needed for safety with transitions with use of RW and hand placement  Argumentative at times when redirection to safety in relation to mobility provided  Ambulation with RW support with one episode of R knee buckling with LOB and mod A to correct upon approach to chair  Observed fatigue during mobility with RLE decreased coordination and step through with ambulation distances  Tolerated session overall well  Given continued impairments in strength, balance, activity tolerance, coordination, decreased safety awareness and decreased overall mobility with need for Fransisca for all mobility as well as increased risk for falls, would benefit from STR prior to home, however pt declining with preference to have HHPT    If home with PT, requires 24* assistance due to need for assist for all mobility as well as increased risk for falls  Barriers to Discharge Decreased caregiver support   Barriers to Discharge Comments spouse with own health limitations for abiltiy to provide assist to pt  Goals   Patient Goals to go home  STG Expiration Date 03/24/20   PT Treatment Day 2   Plan   Treatment/Interventions Functional transfer training;LE strengthening/ROM; Therapeutic exercise; Endurance training;Patient/family training;Equipment eval/education; Bed mobility;Gait training; Compensatory technique education;Continued evaluation;Spoke to nursing;OT   Progress Slow progress, decreased activity tolerance   PT Frequency Other (Comment)  (4-6x/wk (increase in POC to optimize outcomes)  )   Recommendation   Recommendation Short-term skilled PT; Home PT;24 hour supervision/assist  (at this time would continue to rec STR-pt is refusing )   Equipment Recommended Walker  (continued use of RW)   PT - OK to Discharge No  (to home, yes if to rhb )   Additional Comments to acheive IPPT goals for safe d/c to home     Tino Woody, PT

## 2020-03-14 NOTE — PROGRESS NOTES
Progress Note - Kirsten Rothman 1960, 63 y o  female MRN: [id number]    Unit/Bed#: E4 -01 Encounter: [id number]    Primary Care Provider: DEANA Baig   Date and time admitted to hospital: 3/9/2020  8:16 AM        * Numbness and tingling of right arm and leg  Assessment & Plan  Patient presents to ER for right-sided numbness; she also reports a history of recurrent syncopal episodes  She reports RUE numbness ("pins and needles") and difficulty coordinating RUE movement for approximately 2 weeks  She reports RLE and right trunk numbness started 3 days ago when she was rising from a seated to standing position  -MRI with contrast reveals findings consistent with demyelinating/MS  -CT chest abdomen pelvis revealed no evidence of primary malignancy or metastatic disease in chest abdomen or pelvis  -neurology follow-up appreciated, patient started on Solu-Medrol 5 mg b i d  As a pulse dose for findings of demyelinating disease  -status post LP on 03/13 , CSF with elevated protein, further test pending  -pending lab test:  MAXIMILIANO, anti dsDNA, Anca, rheumatoid factor, RPR, Sjogren antibody, CNS demyelinating disease evaluation  -also plan for MRI thoracic and lumbar spine with and without contrast  -echocardiogram revealed ejection fraction 60%, no wall motion abnormalities, diastolic dysfunction type 1, no defect or patent foramina ovale  -discontinued aspirin statin  -neurochecks      Ambulatory dysfunction  Assessment & Plan  Patient reports difficulty walking over past few months due to left knee injury sustained during syncopal episode  No reported prior treatment  Patient wears a knee brace as needed for support    Ambulates with walker  -PT recommending short-term skilled PT which patient is refusing versus home PT    Benign essential hypertension  Assessment & Plan  Continue with lisinopril 20 mg    Morbid obesity with BMI of 45 0-49 9, adult (HCC)  Assessment & Plan  BMI 45 2  Encourage lifestyle modifications  Type 2 diabetes mellitus with hyperglycemia, without long-term current use of insulin [facility])  Assessment & Plan  Lab Results   Component Value Date    HGBA1C 9 7 (A) 2020     (P) 319 5   -hold metformin  -will continue with insulin lispro increased to 10 units t i d  With meals  -monitor Accu-Cheks, sliding scale for coverage  -patient refuses long-acting insulin as had amnesia from this in the past          VTE Pharmacologic Prophylaxis:   Pharmacologic:  Lovenox    Patient Centered Rounds: I have performed bedside rounds with nursing staff today  Time Spent for Care: 20 minutes  More than 50% of total time spent on counseling and coordination of care as described above  Current Length of Stay: 3 day(s)    Current Patient Status: Inpatient   Certification Statement: The patient will continue to require additional inpatient hospital stay due to IV steroid    Discharge Plan / Estimated Discharge Date:  2-3 days    Code Status: Level 3 - DNAR and DNI      Subjective:   Patient seen and examined at bedside, states numbness tingling of right upper and lower extremity appear better she feels she is stronger  States she has not had a bowel movement 60s, is passing flatus    Objective:     Vitals:   Temp (24hrs), Av 3 °F (36 3 °C), Min:96 5 °F (35 8 °C), Max:97 8 °F (36 6 °C)    Temp:  [96 5 °F (35 8 °C)-97 8 °F (36 6 °C)] 97 4 °F (36 3 °C)  HR:  [75-94] 85  Resp:  [18] 18  BP: (110-182)/() 160/96  SpO2:  [96 %-97 %] 97 %  Body mass index is 43 85 kg/m²  Input and Output Summary (last 24 hours):     No intake or output data in the 24 hours ending 20 1722    Physical Exam:    Constitutional: Patient is oriented to person, place and time, no acute distress  HEENT:  Normocephalic, atraumatic  Cardiovascular: Normal S1S2, RRR, No murmurs/rubs/gallops appreciated    Pulmonary:  Bilateral air entry, No rhonchi/rales/wheezing appreciated  Abdominal: Soft, Bowel sounds present, Non-tender, Non-distended  Extremities:  No cyanosis, clubbing or edema  Neurological: Cranial nerves II-XII grossly intact, motor strength 5/5 in right upper, left upper, left lower extremity  Motor strength 4/5 in right lower extremity    Additional Data:     Labs:    Results from last 7 days   Lab Units 03/14/20  0501 03/13/20  0540   WBC Thousand/uL 14 22* 10 99*   HEMOGLOBIN g/dL 13 9 13 7   HEMATOCRIT % 42 7 41 6   PLATELETS Thousands/uL 360 318   NEUTROS PCT %  --  93*   LYMPHS PCT %  --  6*   LYMPHO PCT % 5*  --    MONOS PCT %  --  1*   MONO PCT % 1*  --    EOS PCT % 0 0     Results from last 7 days   Lab Units 03/14/20  0501  03/10/20  0452   POTASSIUM mmol/L 4 0   < > 4 0   CHLORIDE mmol/L 100   < > 103   CO2 mmol/L 23   < > 26   BUN mg/dL 23   < > 12   CREATININE mg/dL 0 70   < > 0 73   CALCIUM mg/dL 11 2*   < > 10 6*   ALK PHOS U/L  --   --  91   ALT U/L  --   --  24   AST U/L  --   --  16    < > = values in this interval not displayed  Results from last 7 days   Lab Units 03/10/20  0452   INR  0 96        I Have Reviewed All Lab Data Listed Above          Recent Cultures (last 7 days):     Results from last 7 days   Lab Units 03/13/20  1449   GRAM STAIN RESULT  No bacteria seen  No polys seen  2+ Mononuclear Cells       Last 24 Hours Medication List:     Current Facility-Administered Medications:  acetaminophen 650 mg Oral Q6H PRN Shae Tabor MD    aluminum-magnesium hydroxide-simethicone 30 mL Oral Q4H PRN Shae Tabor MD    clonazePAM 0 5 mg Oral HS Melo Vidales, DO    diphenhydrAMINE 50 mg Oral HS Melo Vidales, DO    docusate sodium 100 mg Oral BID PRN Shae Tabor MD    enoxaparin 40 mg Subcutaneous Q24H [facility] Melo Vidales DO    hydrALAZINE 10 mg Intravenous Q6H PRN DEANA Gamino    HYDROmorphone 0 5 mg Intravenous Once PRN Archana Silveira PA-C    insulin lispro 1-6 Units Subcutaneous TID AC Shae Tabor MD    insulin lispro 1-6 Units Subcutaneous HS Christina Funez MD    [START ON 3/15/2020] insulin lispro 10 Units Subcutaneous TID With Meals Ramón Mi MD    lisinopril 20 mg Oral Daily Ramón Mi MD    LORazepam 0 5 mg Intravenous Q30 Min PRN Silvia Lara PA-C    LORazepam 1 mg Intravenous Once Melo Vidales DO    methylPREDNISolone sodium succinate 500 mg Intravenous Q12H [address] Melo Vidales DO Last Rate: 500 mg (03/14/20 0948)   ondansetron 4 mg Intravenous Q4H PRN Christina Funez MD    ondansetron 4 mg Intravenous Once PRN Aaliyah Lara PA-C    polyethylene glycol 17 g Oral Daily Leeta Odor, CRNP    senna-docusate sodium 1 tablet Oral HS Leeta Odor, CRNP         Today, Patient Was Seen By: Ramón Mi MD

## 2020-03-14 NOTE — ASSESSMENT & PLAN NOTE
Patient reports difficulty walking over past few months due to left knee injury sustained during syncopal episode  No reported prior treatment  Patient wears a knee brace as needed for support    Ambulates with walker  -PT recommending short-term skilled PT which patient is refusing versus home PT

## 2020-03-14 NOTE — ASSESSMENT & PLAN NOTE
Patient presents to ER for right-sided numbness; she also reports a history of recurrent syncopal episodes  She reports RUE numbness ("pins and needles") and difficulty coordinating RUE movement for approximately 2 weeks  She reports RLE and right trunk numbness started 3 days ago when she was rising from a seated to standing position  -MRI with contrast reveals findings consistent with demyelinating/MS  -CT chest abdomen pelvis revealed no evidence of primary malignancy or metastatic disease in chest abdomen or pelvis  -neurology follow-up appreciated, patient started on Solu-Medrol 5 mg b i d   As a pulse dose for findings of demyelinating disease  -status post LP on 03/13 , CSF with elevated protein, further test pending  -pending lab test:  MAXIMILIANO, anti dsDNA, Anca, rheumatoid factor, RPR, Sjogren antibody, CNS demyelinating disease evaluation  -also plan for MRI thoracic and lumbar spine with and without contrast  -echocardiogram revealed ejection fraction 60%, no wall motion abnormalities, diastolic dysfunction type 1, no defect or patent foramina ovale  -discontinued aspirin statin  -neurochecks

## 2020-03-14 NOTE — PROGRESS NOTES
Progress Note - Neurology   Marcellus Allen 63 y o  female MRN: [id number]  Unit/Bed#: E4 -01 Encounter: [id number]    Assessment/Plan:  Assessment:  3 63year-old with HTN, DM, and obesity who presented with right sided paresthesias and weakness  MR imaging with contrast revealed multiple supratentorial and infratentorial white matter FLAIR hyperintensities with post contrast enhancement including a lesion within the cervical cord  Concern for demyelination versus metastatic disease  CT chest abdomen pelvis was unremarkable for any solid tumor  LP complete, CSF labs detail below     MRI T and lumbar spine with contrast pending      Plan:  - CT CAP negative for solid tumor  - LP complete (see below for lab details)  - DVT prophylaxis - Lovenox 40 mg daily  - MRI T and L spine with contrast pending   - continue Solu-Medrol 500 mg b i d , Sunday would be 5th day of high-dose steroids   - Continue Klonopin 0 5mg QHS  - Continue Benadryl 50mg QHS  - PT/OT eval and treat  - Neuro checks  - Notify Neurology with any changes in neuro examination       CSF labs normal:  Mg panel, Gram stain, glucose  CSF labs abnormal:  CSF protein 80, WBC 37 with 81% lymphs,  CSF labs pending: ACE, culture, LIZZETTE virus, cytometry, MS panel, , lyme PCR, leukemia/lymphoma flow cytometry,    Serum labs normal:  Serum labs abnormal:  ESR 32, CRP 9 1,  Serum labs pending:  MAXIMILIANO screen with reflex titer, ACE, anti DS DNA Ab, anti DNase B Ab, Anca, CRP, RF screen with reflex to titer, RPR, sed rate, Sjogren's antibodies, CNS Demyelinating Disease Evaluation ( [facility]-Ab, NMO/AQP4-Ab_[facility] Test ID [id number])      Subjective:   "My arm feels better, I am still constipated"    ROS:  Review of Systems   Constitutional: Negative  HENT: Negative for hearing loss  Eyes: Negative for photophobia and visual disturbance  Respiratory: Negative for wheezing  Cardiovascular: Negative for chest pain and palpitations     Genitourinary: Negative for dysuria and urgency  GI:  Negative for cramping or bloating, positive for constipation  Neurological: Negative for dizziness, light-headedness, headaches  Positive for weakness and numbness, which is improving    All other systems reviewed are negative  Vitals: Blood pressure 129/74, pulse 82, temperature (!) 96 5 °F (35 8 °C), temperature source Temporal, resp  rate 18, height 5' 7" (1 702 m), weight 127 kg (280 lb), SpO2 97 %, not currently breastfeeding  ,Body mass index is 43 85 kg/m²  Physical Exam:   Physical Exam   Constitutional: oriented to person, place, and time  appears well-developed and well-nourished  HENT:  Unremarkable  Head: Normocephalic and atraumatic  Eyes: EOM are normal  Pupils are equal, round, and reactive to light  Respiratory:  Normal effort, no dyspnea     Neurologic Exam   Mental Status    Oriented to person, place, and time  Level of consciousness: alert  Normal comprehension  Attention appears intact and symmetric  Cranial Nerves    Visual fields full to confrontation  Pupils are equal, round, and reactive to light  Extraocular motions are normal    Nystagmus: none   Face is symmetric with respect to motor and sensory  Tongue, palate, uvula midline  Motor Exam   5-/5 strength in interosseous and  on the right, 5/5 proximally  5/5 strength in all muscle groups of the the left upper extremity  4/5 strength with hip flexion on the right worse than left  5/5 strength with bilateral knee and ankle, flexion extension  Muscle bulk: normal  Overall muscle tone: normal  Sensory Exam   Sensation intact to light touch, and temperature and in face/arm and legs  Vibration was intact and symmetric in the great toes and 1st DIP joints bilaterally  Vibration still produced uncomfortable paresthesias in the right great toe, improved from yesterday, no such dysesthesias present in the right DIP joint    Gait, Coordination, and Reflexes   DTR's 1+ right biceps, 2+ left biceps, 1+ left patella, absent in the bilateral ankles and right patella  No gross ataxia  Lab, Imaging and other studies: I have personally reviewed pertinent reports      VTE Prophylaxis: Sequential compression device (Venodyne)

## 2020-03-14 NOTE — PLAN OF CARE
Problem: Potential for Falls  Goal: Patient will remain free of falls  Description  INTERVENTIONS:  - Assess patient frequently for physical needs  -  Identify cognitive and physical deficits and behaviors that affect risk of falls    -  [place] fall precautions as indicated by assessment   - Educate patient/family on patient safety including physical limitations  - Instruct patient to call for assistance with activity based on assessment  - Modify environment to reduce risk of injury  - Consider OT/PT consult to assist with strengthening/mobility  Outcome: Progressing     Problem: DISCHARGE PLANNING - CARE MANAGEMENT  Goal: Discharge to post-acute care or home with appropriate resources  Description  INTERVENTIONS:  - Conduct assessment to determine patient/family and health care team treatment goals, and need for post-acute services based on payer coverage, community resources, and patient preferences, and barriers to discharge  - Address psychosocial, clinical, and financial barriers to discharge as identified in assessment in conjunction with the patient/family and health care team  - Arrange appropriate level of post-acute services according to patients   needs and preference and payer coverage in collaboration with the physician and health care team  - Communicate with and update the patient/family, physician, and health care team regarding progress on the discharge plan  - Arrange appropriate transportation to post-acute venues  Outcome: Progressing     Problem: PAIN - ADULT  Goal: Verbalizes/displays adequate comfort level or baseline comfort level  Description  Interventions:  - Encourage patient to monitor pain and request assistance  - Assess pain using appropriate pain scale  - Administer analgesics based on type and severity of pain and evaluate response  - Implement non-pharmacological measures as appropriate and evaluate response  - Consider cultural and social influences on pain and pain management  - Notify physician/advanced practitioner if interventions unsuccessful or patient reports new pain  Outcome: Progressing     Problem: SAFETY ADULT  Goal: Patient will remain free of falls  Description  INTERVENTIONS:  - Assess patient frequently for physical needs  -  Identify cognitive and physical deficits and behaviors that affect risk of falls    -  [place] fall precautions as indicated by assessment   - Educate patient/family on patient safety including physical limitations  - Instruct patient to call for assistance with activity based on assessment  - Modify environment to reduce risk of injury  - Consider OT/PT consult to assist with strengthening/mobility  Outcome: Progressing  Goal: Maintain or return to baseline ADL function  Description  INTERVENTIONS:  -  Assess patient's ability to carry out ADLs; assess patient's baseline for ADL function and identify physical deficits which impact ability to perform ADLs (bathing, care of mouth/teeth, toileting, grooming, dressing, etc )  - Assess/evaluate cause of self-care deficits   - Assess range of motion  - Assess patient's mobility; develop plan if impaired  - Assess patient's need for assistive devices and provide as appropriate  - Encourage maximum independence but intervene and supervise when necessary  - Involve family in performance of ADLs  - Assess for home care needs following discharge   - Consider OT consult to assist with ADL evaluation and planning for discharge  - Provide patient education as appropriate  Outcome: Progressing  Goal: Maintain or return mobility status to optimal level  Description  INTERVENTIONS:  - Assess patient's baseline mobility status (ambulation, transfers, stairs, etc )    - Identify cognitive and physical deficits and behaviors that affect mobility  - Identify mobility aids required to assist with transfers and/or ambulation (gait belt, sit-to-stand, lift, walker, cane, etc )  - [place] fall precautions as indicated by assessment  - Record patient progress and toleration of activity level on Mobility SBAR; progress patient to next Phase/Stage  - Instruct patient to call for assistance with activity based on assessment  - Consider rehabilitation consult to assist with strengthening/weightbearing, etc   Outcome: Progressing     Problem: Knowledge Deficit  Goal: Patient/family/caregiver demonstrates understanding of disease process, treatment plan, medications, and discharge instructions  Description  Complete learning assessment and assess knowledge base    Interventions:  - Provide teaching at level of understanding  - Provide teaching via preferred learning methods  Outcome: Progressing     Problem: NEUROSENSORY - ADULT  Goal: Achieves stable or improved neurological status  Description  INTERVENTIONS  - Monitor and report changes in neurological status  - Monitor vital signs such as temperature, blood pressure, glucose, and any other labs ordered   - Initiate measures to prevent increased intracranial pressure  - Monitor for seizure activity and implement precautions if appropriate      Outcome: Progressing  Goal: Remains free of injury related to seizures activity  Description  INTERVENTIONS  - Maintain airway, patient safety  and administer oxygen as ordered  - Monitor patient for seizure activity, document and report duration and description of seizure to physician/advanced practitioner  - If seizure occurs,  ensure patient safety during seizure  - Reorient patient post seizure  - Seizure pads on all 4 side rails  - Instruct patient/family to notify RN of any seizure activity including if an aura is experienced  - Instruct patient/family to call for assistance with activity based on nursing assessment  - Administer anti-seizure medications if ordered    Outcome: Progressing  Goal: Achieves maximal functionality and self care  Description  INTERVENTIONS  - Monitor swallowing and airway patency with patient fatigue and changes in neurological status  - Encourage and assist patient to increase activity and self care     - Encourage visually impaired, hearing impaired and aphasic patients to use assistive/communication devices  Outcome: Progressing     Problem: CARDIOVASCULAR - ADULT  Goal: Maintains optimal cardiac output and hemodynamic stability  Description  INTERVENTIONS:  - Monitor I/O, vital signs and rhythm  - Monitor for S/S and trends of decreased cardiac output  - Administer and titrate ordered vasoactive medications to optimize hemodynamic stability  - Assess quality of pulses, skin color and temperature  - Assess for signs of decreased coronary artery perfusion  - Instruct patient to report change in severity of symptoms  Outcome: Progressing     Problem: RESPIRATORY - ADULT  Goal: Achieves optimal ventilation and oxygenation  Description  INTERVENTIONS:  - Assess for changes in respiratory status  - Assess for changes in mentation and behavior  - Position to facilitate oxygenation and minimize respiratory effort  - Oxygen administered by appropriate delivery if ordered  - Initiate smoking cessation education as indicated  - Encourage broncho-pulmonary hygiene including cough, deep breathe, Incentive Spirometry  - Assess the need for suctioning and aspirate as needed  - Assess and instruct to report SOB or any respiratory difficulty  - Respiratory Therapy support as indicated  Outcome: Progressing     Problem: GASTROINTESTINAL - ADULT  Goal: Minimal or absence of nausea and/or vomiting  Description  INTERVENTIONS:  - Administer IV fluids if ordered to ensure adequate hydration  - Maintain NPO status until nausea and vomiting are resolved  - Nasogastric tube if ordered  - Administer ordered antiemetic medications as needed  - Provide nonpharmacologic comfort measures as appropriate  - Advance diet as tolerated, if ordered  - Consider nutrition services referral to assist patient with adequate nutrition and appropriate food choices  Outcome: Progressing     Problem: GENITOURINARY - ADULT  Goal: Maintains or returns to baseline urinary function  Description  INTERVENTIONS:  - Assess urinary function  - Encourage oral fluids to ensure adequate hydration if ordered  - Administer IV fluids as ordered to ensure adequate hydration  - Administer ordered medications as needed  - Offer frequent toileting  - Follow urinary retention protocol if ordered  Outcome: Progressing  Goal: Absence of urinary retention  Description  INTERVENTIONS:  - Assess patients ability to void and empty bladder  - Monitor I/O  - Bladder scan as needed  - Discuss with physician/AP medications to alleviate retention as needed  - Discuss catheterization for long term situations as appropriate  Outcome: Progressing     Problem: METABOLIC, FLUID AND ELECTROLYTES - ADULT  Goal: Electrolytes maintained within normal limits  Description  INTERVENTIONS:  - Monitor labs and assess patient for signs and symptoms of electrolyte imbalances  - Administer electrolyte replacement as ordered  - Monitor response to electrolyte replacements, including repeat lab results as appropriate  - Instruct patient on fluid and nutrition as appropriate  Outcome: Progressing  Goal: Fluid balance maintained  Description  INTERVENTIONS:  - Monitor labs   - Monitor I/O and WT  - Instruct patient on fluid and nutrition as appropriate  - Assess for signs & symptoms of volume excess or deficit  Outcome: Progressing  Goal: Glucose maintained within target range  Description  INTERVENTIONS:  - Monitor Blood Glucose as ordered  - Assess for signs and symptoms of hyperglycemia and hypoglycemia  - Administer ordered medications to maintain glucose within target range  - Assess nutritional intake and initiate nutrition service referral as needed  Outcome: Progressing     Problem: SKIN/TISSUE INTEGRITY - ADULT  Goal: Skin integrity remains intact  Description  INTERVENTIONS  - Identify patients at risk for skin breakdown  - Assess and monitor skin integrity  - Assess and monitor nutrition and hydration status  - Monitor labs (i e  albumin)  - Assess for incontinence   - Turn and reposition patient  - Assist with mobility/ambulation  - Relieve pressure over bony prominences  - Avoid friction and shearing  - Provide appropriate hygiene as needed including keeping skin clean and dry  - Evaluate need for skin moisturizer/barrier cream  - Collaborate with interdisciplinary team (i e  Nutrition, Rehabilitation, etc )   - Patient/family teaching  Outcome: Progressing     Problem: MUSCULOSKELETAL - ADULT  Goal: Maintain or return mobility to safest level of function  Description  INTERVENTIONS:  - Assess patient's ability to carry out ADLs; assess patient's baseline for ADL function and identify physical deficits which impact ability to perform ADLs (bathing, care of mouth/teeth, toileting, grooming, dressing, etc )  - Assess/evaluate cause of self-care deficits   - Assess range of motion  - Assess patient's mobility  - Assess patient's need for assistive devices and provide as appropriate  - Encourage maximum independence but intervene and supervise when necessary  - Involve family in performance of ADLs  - Assess for home care needs following discharge   - Consider OT consult to assist with ADL evaluation and planning for discharge  - Provide patient education as appropriate  Outcome: Progressing     Problem: Neurological Deficit  Goal: Neurological status is stable or improving  Description  Interventions:  - Monitor and assess patient's level of consciousness, motor function, sensory function, and level of assistance needed for ADLs  - Monitor and report changes from baseline  Collaborate with interdisciplinary team to initiate plan and implement interventions as ordered  - Provide and maintain a safe environment  - Consider seizure precautions  - Consider fall precautions  - Consider aspiration precautions    - Consider bleeding precautions  Outcome: Progressing     Problem: Activity Intolerance/Impaired Mobility  Goal: Mobility/activity is maintained at optimum level for patient  Description  Interventions:  - Assess and monitor patient  barriers to mobility and need for assistive/adaptive devices  - Assess patient's emotional response to limitations  - Collaborate with interdisciplinary team and initiate plans and interventions as ordered  - Encourage independent activity per ability   - Maintain proper body alignment  - Perform active/passive rom as tolerated/ordered  - Plan activities to conserve energy   - Turn patient as appropriate  Outcome: Progressing     Problem: Nutrition  Goal: Nutrition/Hydration status is improving  Description  Monitor and assess patient's nutrition/hydration status for malnutrition (ex- brittle hair, bruises, dry skin, pale skin and conjunctiva, muscle wasting, smooth red tongue, and disorientation)  Collaborate with interdisciplinary team and initiate plan and interventions as ordered  Monitor patient's weight and dietary intake as ordered or per policy  Utilize nutrition screening tool and intervene per policy  Determine patient's food preferences and provide high-protein, high-caloric foods as appropriate  - Assist patient with eating   - Allow adequate time for meals   - Encourage patient to take dietary supplement as ordered  - Collaborate with clinical nutritionist   - Include patient/family/caregiver in decisions related to nutrition    Outcome: Progressing     Problem: COPING  Goal: Pt/Family able to verbalize concerns and demonstrate effective coping strategies  Description  INTERVENTIONS:  - Assist patient/family to identify coping skills, available support systems and cultural and spiritual values  - Provide emotional support, including active listening and acknowledgement of concerns of patient and caregivers  - Reduce environmental stimuli, as able  - Provide patient education  - Assess for spiritual pain/suffering and initiate spiritual care, including notification of Pastoral Care or capri based community as needed  - Assess effectiveness of coping strategies  Outcome: Progressing  Goal: Will report anxiety at manageable levels  Description  INTERVENTIONS:  - Administer medication as ordered  - Teach and encourage coping skills  - Provide emotional support  - Assess patient/family for anxiety and ability to cope  Outcome: Progressing     Problem: DECISION MAKING  Goal: Pt/Family able to effectively weigh alternatives and participate in decision making related to treatment and care  Description  INTERVENTIONS:  - Identify decision maker  - Determine when there are differences among patient's view, family's view, and healthcare provider's view of patient condition and care goals  - Facilitate patient/family articulation of goals for care  - Help patient/family identify pros/cons of alternative solutions  - Provide information as requested by patient/family  - Respect patient/family rights related to privacy and sharing information   - Serve as a liaison between patient, family and health care team  - Initiate consults as appropriate (Ethics Team, Palliative Care, Family Care Conference, etc )  Outcome: Progressing     Problem: CONFUSION/THOUGHT DISTURBANCE  Goal: Thought disturbances (confusion, delirium, depression, dementia or psychosis) are managed to maintain or return to baseline mental status and functional level  Description  INTERVENTIONS:  - Assess for possible contributors to  thought disturbance, including but not limited to medications, infection, impaired vision or hearing, underlying metabolic abnormalities, dehydration, respiratory compromise,  psychiatric diagnoses and notify attending PHYSICAN/AP  - Monitor and intervene to maintain adequate nutrition, hydration, elimination, sleep and activity  - Decrease environmental stimuli, including noise as appropriate  - Provide frequent contacts to provide refocusing, direction and reassurance as needed  Approach patient calmly with eye contact and at their level    - [place] high risk fall precautions, aspiration precautions and other safety measures, as indicated  - If delirium suspected, notify physician/AP of change in condition and request immediate in-person evaluation  - Pursue consults as appropriate including Geriatric (campus dependent), OT for cognitive evaluation/activity planning, psychiatric, pastoral care, etc   Outcome: Progressing

## 2020-03-14 NOTE — ASSESSMENT & PLAN NOTE
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

## 2020-03-14 NOTE — PLAN OF CARE
Problem: PHYSICAL THERAPY ADULT  Goal: Performs mobility at highest level of function for planned discharge setting  See evaluation for individualized goals  Description  Treatment/Interventions: Functional transfer training, LE strengthening/ROM, Therapeutic exercise, Endurance training, Patient/family training, Equipment eval/education, Gait training, Continued evaluation, Spoke to nursing, OT  Equipment Recommended: (cont use of walker)       See flowsheet documentation for full assessment, interventions and recommendations  Outcome: Progressing  Note:   Prognosis: Good  Problem List: Decreased strength, Decreased endurance, Impaired balance, Decreased mobility, Impaired judgement, Decreased safety awareness, Decreased coordination, Obesity  Assessment: Pt seen for progression of mobility  Continued workup to r/o MS   OOB in chair upon arrival   Agreeable to therapy  Continues to require min A for transfers and ambulation  Cues needed for safety with transitions with use of RW and hand placement  Argumentative at times when redirection to safety in relation to mobility provided  Ambulation with RW support with one episode of R knee buckling with LOB and mod A to correct upon approach to chair  Observed fatigue during mobility with RLE decreased coordination and step through with ambulation distances  Tolerated session overall well  Given continued impairments in strength, balance, activity tolerance, coordination, decreased safety awareness and decreased overall mobility with need for [place] for all mobility as well as increased risk for falls, would benefit from STR prior to home, however pt declining with preference to have HHPT  If home with PT, requires 24* assistance due to need for assist for all mobility as well as increased risk for falls    Barriers to Discharge: Decreased caregiver support  Barriers to Discharge Comments: spouse with own health limitations for abiltiy to provide assist to pt   Recommendation: Short-term skilled PT, Home PT, 24 hour supervision/assist(at this time would continue to rec STR-pt is refusing )     PT - OK to Discharge: No(to home, yes if to [facility] )    See flowsheet documentation for full assessment

## 2020-03-14 NOTE — OCCUPATIONAL THERAPY NOTE
Occupational Therapy Treatment Note:         03/14/20 1075   Restrictions/Precautions   Weight Bearing Precautions Per Order No   Other Precautions Fall Risk;Cognitive   Pain Assessment   Pain Assessment Tool 0-10   Pain Score No Pain   Pain Location/Orientation Location: Generalized   ADL   Where Assessed Chair   Grooming Assistance 4  Minimal Assistance   Grooming Deficit Setup;Steadying;Supervision/safety;Verbal cueing; Increased time to complete;Standing with assistive device; Wash/dry hands   UB Bathing Assistance 5  Supervision/Setup   UB Bathing Deficit Setup   UB Bathing Comments with simulation  LB Bathing Assistance 4  Minimal Assistance   LB Bathing Deficit Setup;Steadying;Supervision/safety;Verbal cueing; Increased time to complete; Buttocks; Perineal area;Right lower leg including foot; Left lower leg including foot   LB Bathing Comments Pt with limited functional reach with simulation   UB Dressing Assistance 5  Supervision/Setup   UB Dressing Deficit Setup   LB Dressing Assistance 4  Minimal Assistance   LB Dressing Deficit Steadying;Setup; Requires assistive device for steadying;Verbal cueing;Supervision/safety; Increased time to complete; Don/doff R sock; Don/doff L sock   LB Dressing Comments Pt with poor safety awareness with simulation cloting managemetn  Toileting Assistance  4  Minimal Assistance   Toileting Deficit Setup;Steadying;Verbal cueing;Supervison/safety; Increased time to complete; Bedside commode   Functional Standing Tolerance   Time 4 mins   Activity dynamic stand balance activity  Comments Increased weakness in RLE noted with further distance  Transfers   Sit to Stand 4  Minimal assistance   Additional items Assist x 1; Increased time required;Verbal cues   Stand to Sit 4  Minimal assistance   Additional items Verbal cues; Assist x 1; Increased time required   Stand pivot 4  Minimal assistance   Additional items Assist x 1; Increased time required;Verbal cues;Armrests   Toilet transfer 4  Minimal assistance   Additional items Assist x 1; Armrests; Increased time required;Verbal cues   Additional Comments cues for safety required with inconsistent carry over of reviewed information  Functional Mobility   Functional Mobility 4  Minimal assistance   Additional Comments x1   Additional items Rolling walker   Toilet Transfers   Toilet Transfer From Rolling walker   Toilet Transfer Type To and from   Toilet Transfer to Standard toilet   Toilet Transfer Technique Stand pivot; Ambulating   Toilet Transfers Verbal cues; Minimal assistance   Toilet Transfers Comments cues for safe hand placement and footing required  Inconsistent use of RW noted  Cognition   Overall Cognitive Status Impaired   Arousal/Participation Responsive   Attention Difficulty attending to directions   Orientation Level Oriented X4   Memory Decreased recall of precautions   Following Commands Follows one step commands without difficulty   Additional Activities   Additional Activities Other (Comment)  (reviewed current plan of care  )   Additional Activities Comments Pt with limited insight to needed plan of care and current deficits  Activity Tolerance   Activity Tolerance Patient limited by fatigue   Medical Staff Made Aware reported all findings to nursing staff  Assessment   Assessment Pt was seen for skilled OT with focus on completion of self toileting, LE dressing activity, review of RW safety, fall prevention, fine motor coordination activity, review of energy conservation techs and review of current plan of care  Pt with initial c/o regarding needing a shower  Pt with poor insight noted to current deficits  Pt requested that staff not, "touch her" with functional transfers  Noted buckling of RLE with functional tasks instance for more than 3 mins  Pt with early release of RW noted with self toileting stating, "this is how I do it at home"   Educated Pt on the role of therapies and Pt's need to maintain safe positioning with in RW perimeter at all times due to noted balance deficits and fatigue levels with functional mobility  Pt requested use of, "squeezy ball", to facilitate strengthening of B/L hands  Pt with deficits noted with fine motor coordination in R hand with completion of card game activity at table top  Pt able to reach BLE do don socks with Min A overall  Increased A will be required for clothing management with application of underwear and plants due to inconsistent stand balance noted  Pt with poor insight to current deficits stating, "I'm not here because I'm sick, I'm here because I fell"  Questionable safety awareness noted with increased distractibility and poor problem solving skills with review of fall prevention/home safety  Pt able to complete the complementary worksheet of the [facility] Cognitive Assessment, [address] without noted deficits  No visual deficits currently noted with functional tasks or verbalized by Pt  Due to noted deficits, Pt may benefit from continued therapies  Pt reports having limited caregiver support at home as her  is also ill and sons both work  Pt's goal is to get a wheel chair and have her daughter get paid to take care of her with return to home environment  Plan   Treatment Interventions ADL retraining;Functional transfer training; Endurance training;Cognitive reorientation;Patient/family training;UE strengthening/ROM   Goal Expiration Date 03/24/20   OT Treatment Day 1   OT Frequency 3-5x/wk   Recommendation   OT Discharge Recommendation Short Term Rehab  (Pt prefers home   limited caregive support )   Barthel Index   Feeding 5   Bathing 0   Grooming Score 0   Dressing Score 5   Bladder Score 10   Bowels Score 10   Toilet Use Score 5   Transfers (Bed/Chair) Score 10   Mobility (Level Surface) Score 0   Stairs Score 0   Barthel Index Score 45   Modified [place] Scale   Modified [place] Scale 4   Reanna Quiles, [address]

## 2020-03-15 VITALS
DIASTOLIC BLOOD PRESSURE: 102 MMHG | RESPIRATION RATE: 20 BRPM | OXYGEN SATURATION: 98 % | BODY MASS INDEX: 43.95 KG/M2 | TEMPERATURE: 97.8 F | WEIGHT: 280 LBS | HEIGHT: 67 IN | HEART RATE: 98 BPM | SYSTOLIC BLOOD PRESSURE: 161 MMHG

## 2020-03-15 LAB
ANION GAP SERPL CALCULATED.3IONS-SCNC: 11 MMOL/L (ref 4–13)
BUN SERPL-MCNC: 21 MG/DL (ref 5–25)
CALCIUM SERPL-MCNC: 11.3 MG/DL (ref 8.3–10.1)
CHLORIDE SERPL-SCNC: 101 MMOL/L (ref 100–108)
CO2 SERPL-SCNC: 23 MMOL/L (ref 21–32)
CREAT SERPL-MCNC: 0.71 MG/DL (ref 0.6–1.3)
ERYTHROCYTE [DISTWIDTH] IN BLOOD BY AUTOMATED COUNT: 13.3 % (ref 11.6–15.1)
GFR SERPL CREATININE-BSD FRML MDRD: 94 ML/MIN/1.73SQ M
GLUCOSE SERPL-MCNC: 322 MG/DL (ref 65–140)
GLUCOSE SERPL-MCNC: 327 MG/DL (ref 65–140)
GLUCOSE SERPL-MCNC: 383 MG/DL (ref 65–140)
HCT VFR BLD AUTO: 46.8 % (ref 34.8–46.1)
HGB BLD-MCNC: 15.1 G/DL (ref 11.5–15.4)
MCH RBC QN AUTO: 28.9 PG (ref 26.8–34.3)
MCHC RBC AUTO-ENTMCNC: 32.3 G/DL (ref 31.4–37.4)
MCV RBC AUTO: 90 FL (ref 82–98)
NRBC BLD AUTO-RTO: 0 /100 WBCS
PLATELET # BLD AUTO: 372 THOUSANDS/UL (ref 149–390)
PMV BLD AUTO: 10.5 FL (ref 8.9–12.7)
POTASSIUM SERPL-SCNC: 4.4 MMOL/L (ref 3.5–5.3)
RBC # BLD AUTO: 5.23 MILLION/UL (ref 3.81–5.12)
SODIUM SERPL-SCNC: 135 MMOL/L (ref 136–145)
WBC # BLD AUTO: 13.12 THOUSAND/UL (ref 4.31–10.16)

## 2020-03-15 PROCEDURE — 82948 REAGENT STRIP/BLOOD GLUCOSE: CPT

## 2020-03-15 PROCEDURE — 99214 OFFICE O/P EST MOD 30 MIN: CPT | Performed by: PSYCHIATRY & NEUROLOGY

## 2020-03-15 PROCEDURE — 99239 HOSP IP/OBS DSCHRG MGMT >30: CPT | Performed by: INTERNAL MEDICINE

## 2020-03-15 PROCEDURE — 80048 BASIC METABOLIC PNL TOTAL CA: CPT | Performed by: INTERNAL MEDICINE

## 2020-03-15 PROCEDURE — 85027 COMPLETE CBC AUTOMATED: CPT | Performed by: INTERNAL MEDICINE

## 2020-03-15 RX ORDER — PREDNISONE 20 MG/1
TABLET ORAL
Qty: 80 TABLET | Refills: 0 | Status: SHIPPED | OUTPATIENT
Start: 2020-03-16 | End: 2020-03-25 | Stop reason: SDUPTHER

## 2020-03-15 RX ORDER — CLONAZEPAM 0.5 MG/1
0.5 TABLET ORAL
Status: DISCONTINUED | OUTPATIENT
Start: 2020-03-15 | End: 2020-03-15 | Stop reason: HOSPADM

## 2020-03-15 RX ORDER — PREDNISONE 20 MG/1
60 TABLET ORAL DAILY
Status: DISCONTINUED | OUTPATIENT
Start: 2020-03-16 | End: 2020-03-15 | Stop reason: HOSPADM

## 2020-03-15 RX ORDER — LISINOPRIL 20 MG/1
20 TABLET ORAL DAILY
Qty: 90 TABLET | Refills: 0 | Status: SHIPPED | OUTPATIENT
Start: 2020-03-15 | End: 2020-05-12 | Stop reason: SDUPTHER

## 2020-03-15 RX ADMIN — SODIUM CHLORIDE 500 MG: 0.9 INJECTION, SOLUTION INTRAVENOUS at 09:37

## 2020-03-15 RX ADMIN — POLYETHYLENE GLYCOL 3350 17 G: 17 POWDER, FOR SOLUTION ORAL at 09:37

## 2020-03-15 RX ADMIN — ENOXAPARIN SODIUM 40 MG: 40 INJECTION SUBCUTANEOUS at 09:37

## 2020-03-15 RX ADMIN — LISINOPRIL 20 MG: 20 TABLET ORAL at 09:38

## 2020-03-15 RX ADMIN — INSULIN LISPRO 6 UNITS: 100 INJECTION, SOLUTION INTRAVENOUS; SUBCUTANEOUS at 14:28

## 2020-03-15 RX ADMIN — DOCUSATE SODIUM 100 MG: 100 CAPSULE, LIQUID FILLED ORAL at 09:38

## 2020-03-15 RX ADMIN — INSULIN LISPRO 5 UNITS: 100 INJECTION, SOLUTION INTRAVENOUS; SUBCUTANEOUS at 09:38

## 2020-03-15 RX ADMIN — LACTULOSE 20 G: 10 SOLUTION ORAL at 09:39

## 2020-03-15 NOTE — ASSESSMENT & PLAN NOTE
Patient reports difficulty walking over past few months due to left knee injury sustained during syncopal episode  No reported prior treatment  Patient wears a knee brace as needed for support    Ambulates with walker  -PT recommending short-term skilled PT which patient is refusing   -home PT referral made

## 2020-03-15 NOTE — NURSING NOTE
Reviewed and discussed pt's discharge instructions  Using teachback RN discussed with pt her medications, times, doses and side effects  Reviewed pt's f/u appts  RN also educated pt on the importance of keeping her f/u appts and taking her insulin to control her sugars    Pt was referred to neurology and endocrine   Pt's son at bedside

## 2020-03-15 NOTE — ASSESSMENT & PLAN NOTE
Lab Results   Component Value Date    HGBA1C 9 7 (A) 02/07/2020     -continue metformin, patient agreeable in starting insulin, does not 1 a a long-acting insulin, stated I will start her on Novolin 70 30 as it contains intermediate and short-acting insulin, was started 20 units b i d , provided syringes  -patient to fill prescription at [facility]  -patient states she has glucometer, test strips and lancets at home, advised patient to be monitor blood sugars 4 times daily and to keep a log  To follow with PCP outpatient

## 2020-03-15 NOTE — ASSESSMENT & PLAN NOTE
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

## 2020-03-15 NOTE — PLAN OF CARE
Problem: Potential for Falls  Goal: Patient will remain free of falls  Description  INTERVENTIONS:  - Assess patient frequently for physical needs  -  Identify cognitive and physical deficits and behaviors that affect risk of falls    -  [place] fall precautions as indicated by assessment   - Educate patient/family on patient safety including physical limitations  - Instruct patient to call for assistance with activity based on assessment  - Modify environment to reduce risk of injury  - Consider OT/PT consult to assist with strengthening/mobility  Outcome: Adequate for Discharge     Problem: DISCHARGE PLANNING - CARE MANAGEMENT  Goal: Discharge to post-acute care or home with appropriate resources  Description  INTERVENTIONS:  - Conduct assessment to determine patient/family and health care team treatment goals, and need for post-acute services based on payer coverage, community resources, and patient preferences, and barriers to discharge  - Address psychosocial, clinical, and financial barriers to discharge as identified in assessment in conjunction with the patient/family and health care team  - Arrange appropriate level of post-acute services according to patients   needs and preference and payer coverage in collaboration with the physician and health care team  - Communicate with and update the patient/family, physician, and health care team regarding progress on the discharge plan  - Arrange appropriate transportation to post-acute venues  Outcome: Adequate for Discharge     Problem: PAIN - ADULT  Goal: Verbalizes/displays adequate comfort level or baseline comfort level  Description  Interventions:  - Encourage patient to monitor pain and request assistance  - Assess pain using appropriate pain scale  - Administer analgesics based on type and severity of pain and evaluate response  - Implement non-pharmacological measures as appropriate and evaluate response  - Consider cultural and social influences on pain and pain management  - Notify physician/advanced practitioner if interventions unsuccessful or patient reports new pain  Outcome: Adequate for Discharge     Problem: SAFETY ADULT  Goal: Patient will remain free of falls  Description  INTERVENTIONS:  - Assess patient frequently for physical needs  -  Identify cognitive and physical deficits and behaviors that affect risk of falls    -  [place] fall precautions as indicated by assessment   - Educate patient/family on patient safety including physical limitations  - Instruct patient to call for assistance with activity based on assessment  - Modify environment to reduce risk of injury  - Consider OT/PT consult to assist with strengthening/mobility  Outcome: Adequate for Discharge  Goal: Maintain or return to baseline ADL function  Description  INTERVENTIONS:  -  Assess patient's ability to carry out ADLs; assess patient's baseline for ADL function and identify physical deficits which impact ability to perform ADLs (bathing, care of mouth/teeth, toileting, grooming, dressing, etc )  - Assess/evaluate cause of self-care deficits   - Assess range of motion  - Assess patient's mobility; develop plan if impaired  - Assess patient's need for assistive devices and provide as appropriate  - Encourage maximum independence but intervene and supervise when necessary  - Involve family in performance of ADLs  - Assess for home care needs following discharge   - Consider OT consult to assist with ADL evaluation and planning for discharge  - Provide patient education as appropriate  Outcome: Adequate for Discharge  Goal: Maintain or return mobility status to optimal level  Description  INTERVENTIONS:  - Assess patient's baseline mobility status (ambulation, transfers, stairs, etc )    - Identify cognitive and physical deficits and behaviors that affect mobility  - Identify mobility aids required to assist with transfers and/or ambulation (gait belt, sit-to-stand, lift, walker, cane, etc )  - [place] fall precautions as indicated by assessment  - Record patient progress and toleration of activity level on Mobility SBAR; progress patient to next Phase/Stage  - Instruct patient to call for assistance with activity based on assessment  - Consider rehabilitation consult to assist with strengthening/weightbearing, etc   Outcome: Adequate for Discharge     Problem: Knowledge Deficit  Goal: Patient/family/caregiver demonstrates understanding of disease process, treatment plan, medications, and discharge instructions  Description  Complete learning assessment and assess knowledge base    Interventions:  - Provide teaching at level of understanding  - Provide teaching via preferred learning methods  Outcome: Adequate for Discharge     Problem: NEUROSENSORY - ADULT  Goal: Achieves stable or improved neurological status  Description  INTERVENTIONS  - Monitor and report changes in neurological status  - Monitor vital signs such as temperature, blood pressure, glucose, and any other labs ordered   - Initiate measures to prevent increased intracranial pressure  - Monitor for seizure activity and implement precautions if appropriate      Outcome: Adequate for Discharge  Goal: Remains free of injury related to seizures activity  Description  INTERVENTIONS  - Maintain airway, patient safety  and administer oxygen as ordered  - Monitor patient for seizure activity, document and report duration and description of seizure to physician/advanced practitioner  - If seizure occurs,  ensure patient safety during seizure  - Reorient patient post seizure  - Seizure pads on all 4 side rails  - Instruct patient/family to notify RN of any seizure activity including if an aura is experienced  - Instruct patient/family to call for assistance with activity based on nursing assessment  - Administer anti-seizure medications if ordered    Outcome: Adequate for Discharge  Goal: Achieves maximal functionality and self care  Description  INTERVENTIONS  - Monitor swallowing and airway patency with patient fatigue and changes in neurological status  - Encourage and assist patient to increase activity and self care     - Encourage visually impaired, hearing impaired and aphasic patients to use assistive/communication devices  Outcome: Adequate for Discharge     Problem: CARDIOVASCULAR - ADULT  Goal: Maintains optimal cardiac output and hemodynamic stability  Description  INTERVENTIONS:  - Monitor I/O, vital signs and rhythm  - Monitor for S/S and trends of decreased cardiac output  - Administer and titrate ordered vasoactive medications to optimize hemodynamic stability  - Assess quality of pulses, skin color and temperature  - Assess for signs of decreased coronary artery perfusion  - Instruct patient to report change in severity of symptoms  Outcome: Adequate for Discharge     Problem: RESPIRATORY - ADULT  Goal: Achieves optimal ventilation and oxygenation  Description  INTERVENTIONS:  - Assess for changes in respiratory status  - Assess for changes in mentation and behavior  - Position to facilitate oxygenation and minimize respiratory effort  - Oxygen administered by appropriate delivery if ordered  - Initiate smoking cessation education as indicated  - Encourage broncho-pulmonary hygiene including cough, deep breathe, Incentive Spirometry  - Assess the need for suctioning and aspirate as needed  - Assess and instruct to report SOB or any respiratory difficulty  - Respiratory Therapy support as indicated  Outcome: Adequate for Discharge     Problem: GASTROINTESTINAL - ADULT  Goal: Minimal or absence of nausea and/or vomiting  Description  INTERVENTIONS:  - Administer IV fluids if ordered to ensure adequate hydration  - Maintain NPO status until nausea and vomiting are resolved  - Nasogastric tube if ordered  - Administer ordered antiemetic medications as needed  - Provide nonpharmacologic comfort measures as appropriate  - Advance diet as tolerated, if ordered  - Consider nutrition services referral to assist patient with adequate nutrition and appropriate food choices  Outcome: Adequate for Discharge     Problem: GENITOURINARY - ADULT  Goal: Maintains or returns to baseline urinary function  Description  INTERVENTIONS:  - Assess urinary function  - Encourage oral fluids to ensure adequate hydration if ordered  - Administer IV fluids as ordered to ensure adequate hydration  - Administer ordered medications as needed  - Offer frequent toileting  - Follow urinary retention protocol if ordered  Outcome: Adequate for Discharge  Goal: Absence of urinary retention  Description  INTERVENTIONS:  - Assess patients ability to void and empty bladder  - Monitor I/O  - Bladder scan as needed  - Discuss with physician/AP medications to alleviate retention as needed  - Discuss catheterization for long term situations as appropriate  Outcome: Adequate for Discharge     Problem: METABOLIC, FLUID AND ELECTROLYTES - ADULT  Goal: Electrolytes maintained within normal limits  Description  INTERVENTIONS:  - Monitor labs and assess patient for signs and symptoms of electrolyte imbalances  - Administer electrolyte replacement as ordered  - Monitor response to electrolyte replacements, including repeat lab results as appropriate  - Instruct patient on fluid and nutrition as appropriate  Outcome: Adequate for Discharge  Goal: Fluid balance maintained  Description  INTERVENTIONS:  - Monitor labs   - Monitor I/O and WT  - Instruct patient on fluid and nutrition as appropriate  - Assess for signs & symptoms of volume excess or deficit  Outcome: Adequate for Discharge  Goal: Glucose maintained within target range  Description  INTERVENTIONS:  - Monitor Blood Glucose as ordered  - Assess for signs and symptoms of hyperglycemia and hypoglycemia  - Administer ordered medications to maintain glucose within target range  - Assess nutritional intake and initiate nutrition service referral as needed  Outcome: Adequate for Discharge     Problem: SKIN/TISSUE INTEGRITY - ADULT  Goal: Skin integrity remains intact  Description  INTERVENTIONS  - Identify patients at risk for skin breakdown  - Assess and monitor skin integrity  - Assess and monitor nutrition and hydration status  - Monitor labs (i e  albumin)  - Assess for incontinence   - Turn and reposition patient  - Assist with mobility/ambulation  - Relieve pressure over bony prominences  - Avoid friction and shearing  - Provide appropriate hygiene as needed including keeping skin clean and dry  - Evaluate need for skin moisturizer/barrier cream  - Collaborate with interdisciplinary team (i e  Nutrition, Rehabilitation, etc )   - Patient/family teaching  Outcome: Adequate for Discharge     Problem: MUSCULOSKELETAL - ADULT  Goal: Maintain or return mobility to safest level of function  Description  INTERVENTIONS:  - Assess patient's ability to carry out ADLs; assess patient's baseline for ADL function and identify physical deficits which impact ability to perform ADLs (bathing, care of mouth/teeth, toileting, grooming, dressing, etc )  - Assess/evaluate cause of self-care deficits   - Assess range of motion  - Assess patient's mobility  - Assess patient's need for assistive devices and provide as appropriate  - Encourage maximum independence but intervene and supervise when necessary  - Involve family in performance of ADLs  - Assess for home care needs following discharge   - Consider OT consult to assist with ADL evaluation and planning for discharge  - Provide patient education as appropriate  Outcome: Adequate for Discharge     Problem: Neurological Deficit  Goal: Neurological status is stable or improving  Description  Interventions:  - Monitor and assess patient's level of consciousness, motor function, sensory function, and level of assistance needed for ADLs  - Monitor and report changes from baseline   Collaborate with interdisciplinary team to initiate plan and implement interventions as ordered  - Provide and maintain a safe environment  - Consider seizure precautions  - Consider fall precautions  - Consider aspiration precautions  - Consider bleeding precautions  Outcome: Adequate for Discharge     Problem: Activity Intolerance/Impaired Mobility  Goal: Mobility/activity is maintained at optimum level for patient  Description  Interventions:  - Assess and monitor patient  barriers to mobility and need for assistive/adaptive devices  - Assess patient's emotional response to limitations  - Collaborate with interdisciplinary team and initiate plans and interventions as ordered  - Encourage independent activity per ability   - Maintain proper body alignment  - Perform active/passive rom as tolerated/ordered  - Plan activities to conserve energy   - Turn patient as appropriate  Outcome: Adequate for Discharge     Problem: Nutrition  Goal: Nutrition/Hydration status is improving  Description  Monitor and assess patient's nutrition/hydration status for malnutrition (ex- brittle hair, bruises, dry skin, pale skin and conjunctiva, muscle wasting, smooth red tongue, and disorientation)  Collaborate with interdisciplinary team and initiate plan and interventions as ordered  Monitor patient's weight and dietary intake as ordered or per policy  Utilize nutrition screening tool and intervene per policy  Determine patient's food preferences and provide high-protein, high-caloric foods as appropriate  - Assist patient with eating   - Allow adequate time for meals   - Encourage patient to take dietary supplement as ordered  - Collaborate with clinical nutritionist   - Include patient/family/caregiver in decisions related to nutrition    Outcome: Adequate for Discharge     Problem: COPING  Goal: Pt/Family able to verbalize concerns and demonstrate effective coping strategies  Description  INTERVENTIONS:  - Assist patient/family to identify coping skills, available support systems and cultural and spiritual values  - Provide emotional support, including active listening and acknowledgement of concerns of patient and caregivers  - Reduce environmental stimuli, as able  - Provide patient education  - Assess for spiritual pain/suffering and initiate spiritual care, including notification of Pastoral Care or capri based community as needed  - Assess effectiveness of coping strategies  Outcome: Adequate for Discharge  Goal: Will report anxiety at manageable levels  Description  INTERVENTIONS:  - Administer medication as ordered  - Teach and encourage coping skills  - Provide emotional support  - Assess patient/family for anxiety and ability to cope  Outcome: Adequate for Discharge     Problem: DECISION MAKING  Goal: Pt/Family able to effectively weigh alternatives and participate in decision making related to treatment and care  Description  INTERVENTIONS:  - Identify decision maker  - Determine when there are differences among patient's view, family's view, and healthcare provider's view of patient condition and care goals  - Facilitate patient/family articulation of goals for care  - Help patient/family identify pros/cons of alternative solutions  - Provide information as requested by patient/family  - Respect patient/family rights related to privacy and sharing information   - Serve as a liaison between patient, family and health care team  - Initiate consults as appropriate (Ethics Team, Palliative Care, Family Care Conference, etc )  Outcome: Adequate for Discharge     Problem: CONFUSION/THOUGHT DISTURBANCE  Goal: Thought disturbances (confusion, delirium, depression, dementia or psychosis) are managed to maintain or return to baseline mental status and functional level  Description  INTERVENTIONS:  - Assess for possible contributors to  thought disturbance, including but not limited to medications, infection, impaired vision or hearing, underlying metabolic abnormalities, dehydration, respiratory compromise,  psychiatric diagnoses and notify attending PHYSICAN/AP  - Monitor and intervene to maintain adequate nutrition, hydration, elimination, sleep and activity  - Decrease environmental stimuli, including noise as appropriate  - Provide frequent contacts to provide refocusing, direction and reassurance as needed  Approach patient calmly with eye contact and at their level    - [place] high risk fall precautions, aspiration precautions and other safety measures, as indicated  - If delirium suspected, notify physician/AP of change in condition and request immediate in-person evaluation  - Pursue consults as appropriate including Geriatric (campus dependent), OT for cognitive evaluation/activity planning, psychiatric, pastoral care, etc   Outcome: Adequate for Discharge

## 2020-03-15 NOTE — PROGRESS NOTES
Progress Note - Neurology   Feliciano Ochoa 63 y o  female MRN: [id number]  Unit/Bed#: E4 -01 Encounter: [id number]    Assessment/Plan:  Assessment:  3 63year-old with HTN, DM, and obesity who presented with right sided paresthesias and weakness  MR imaging with contrast revealed multiple supratentorial and infratentorial white matter FLAIR hyperintensities with post contrast enhancement including a lesion within the cervical cord  Concern for demyelination vs metastatic disease vs sarcoid  CT chest abdomen pelvis was unremarkable for any solid tumor, although did demonstrate some calcified lymph nodes and granulomata in the lung fields  LP complete, CSF labs detail below  MRI T and lumbar spine were unable to be completed due to body habitus       Plan:  - CT CAP negative for solid tumor, however was positive for a few calcified hilar and pre tracheal lymph nodes as well as a few calcified granuloma in the lung fields  - LP complete (see below for lab details)  - DVT prophylaxis - Lovenox 40 mg daily  - body habitus precludes, MRI T and L spine  - final dose of Solu-Medrol 500 mg b i d  Today   - given high cervical spine lesion and yet unclear diagnosis, recommend oral steroids on discharge until the remainder lab results can be reviewed with patient in the office   -  recommend prednisone 60 mg daily starting Monday 3/16, recommend decreasing by 5 mg increments every 2 weeks, until patient reaches 40 mg daily  Continue 40 mg daily until seen by outpatient neurology  - patient will likely need some medication to control blood sugars on discharge, will defer to Medicine Service  - changed Klonopin 0 5mg QHS to p r n , would not recommend continuation upon discharge    - Continue Benadryl 50mg QHS, may continue upon discharge if needed   - PT/OT eval and treat  - Neuro checks  - Notify Neurology with any changes in neuro examination       CSF labs normal:  Mg panel, Gram stain, glucose  CSF labs abnormal: CSF protein 80, WBC 37 with 81% lymphs,  CSF labs pending: ACE, culture, LIZZETTE virus, cytometry, MS panel, , lyme PCR, leukemia/lymphoma flow cytometry,    Serum labs normal:  Sjogren's antibodies, anti DS DNA Ab  Serum labs abnormal:  ESR 32, CRP 9 1,  Serum labs pending:  MAXIMILIANO screen with reflex titer, ACE,, anti DNase B Ab, Anca, CRP, RF screen with reflex to titer, RPR, sed rate,  CNS Demyelinating Disease Evaluation ( MOG-Ab, NMO/AQP4-Ab_[facility] Test ID [id number])      Subjective:   "My arm feels better, I am able to use the walker better, and I had a bowel movement this morning"    ROS:  Review of Systems   Constitutional: Negative  HENT: Negative for hearing loss  Eyes: Negative for photophobia and visual disturbance  Respiratory: Negative for wheezing  Cardiovascular: Negative for chest pain and palpitations  Genitourinary: Negative for dysuria and urgency  GI:  Negative for cramping or bloating, or constipation  Neurological: Negative for dizziness, light-headedness, headaches  Positive for weakness and numbness, which is improving    All other systems reviewed are negative  Vitals: Blood pressure (!) 161/102, pulse 98, temperature 97 8 °F (36 6 °C), temperature source Temporal, resp  rate 20, height 5' 7" (1 702 m), weight 127 kg (280 lb), SpO2 98 %, not currently breastfeeding  ,Body mass index is 43 85 kg/m²  Physical Exam:  No significant change from yesterday's exam  Physical Exam   Constitutional: oriented to person, place, and time  appears well-developed and well-nourished  HENT:  Unremarkable  Normal sclera and mucous membranes  Head: Normocephalic and atraumatic  Eyes: EOM are normal  Pupils are equal, round, and reactive to light  Respiratory:  Normal effort, no dyspnea  Cardiovascular:  Regular rate and rhythm  :  Skin:  No unexplained rashes or bruises  Neurologic Exam no significant change from yesterday's exam  Mental Status    Oriented to person, place, and time  Level of consciousness: alert  Normal comprehension  Attention appears intact and symmetric  Cranial Nerves    Visual fields full to confrontation  Pupils are equal, round, and reactive to light  Extraocular motions are normal    Nystagmus: none   Face is symmetric with respect to motor and sensory  Tongue, palate, uvula midline  Motor Exam   5-/5 strength in interosseous and  on the right, 5/5 proximally  5/5 strength in all muscle groups of the the left upper extremity  4/5 strength with hip flexion on the right worse than left  5/5 strength with bilateral knee and ankle, flexion extension  Muscle bulk: normal  Overall muscle tone: normal  Sensory Exam   Sensation intact to light touch, and temperature and in face/arm and legs  Vibration was intact and symmetric in the great toes and 1st DIP joints bilaterally  Vibration still produced uncomfortable paresthesias in the right great toe, improved from yesterday, no such dysesthesias present in the right DIP joint  Gait, Coordination, and Reflexes   DTR's 1+ right biceps, 2+ left biceps, 1+ left patella, absent in the bilateral ankles and right patella  No gross ataxia  Lab, Imaging and other studies: I have personally reviewed pertinent reports      VTE Prophylaxis: Sequential compression device (Venodyne)

## 2020-03-15 NOTE — INCIDENTAL FINDINGS
The following findings require follow up:  Radiographic finding   Finding: scattered calcified granulomata in thorax   Follow up required: yes   Follow up should be done within 4-6 weeks     Please notify the following clinician to assist with the follow up:   Dr Nat Carlson

## 2020-03-15 NOTE — DISCHARGE SUMMARY
Discharge- Germaine Melgoza 1960, 63 y o  female MRN: [id number]    Unit/Bed#: E4 -01 Encounter: [id number]    Primary Care Provider: DEANA Callaway   Date and time admitted to hospital: 3/9/2020  8:16 AM        * Numbness and tingling of right arm and leg  Assessment & Plan  Patient presents to ER for right-sided numbness; she also reports a history of recurrent syncopal episodes  She reports RUE numbness ("pins and needles") and difficulty coordinating RUE movement for approximately 2 weeks  She reports RLE and right trunk numbness started 3 days ago when she was rising from a seated to standing position  -MRI with contrast reveals findings consistent with demyelinating/MS  -CT chest abdomen pelvis revealed no evidence of primary malignancy or metastatic disease in chest abdomen or pelvis  -status post LP on 03/13 , CSF with elevated protein, further test pending  -echocardiogram revealed ejection fraction 60%, no wall motion abnormalities, diastolic dysfunction type 1, no defect or patent foramina ovale  -patient completed pulsed steroids, neurology follow-up appreciated, will discharge on prednisone, 60 mg for 14 days followed by 55 mg 14 days followed by 40 mg thereafter  -outpatient neurology follow-up      Ambulatory dysfunction  Assessment & Plan  Patient reports difficulty walking over past few months due to left knee injury sustained during syncopal episode  No reported prior treatment  Patient wears a knee brace as needed for support  Ambulates with walker  -PT recommending short-term skilled PT which patient is refusing   -home PT referral made    Benign essential hypertension  Assessment & Plan  Continue with lisinopril 20 mg    Morbid obesity with BMI of 45 0-49 9, adult (HCC)  Assessment & Plan  BMI 45 2  Encourage lifestyle modifications        Type 2 diabetes mellitus with hyperglycemia, without long-term current use of insulin [facility])  Assessment & Plan  Lab Results Component Value Date    HGBA1C 9 7 (A) 02/07/2020     -continue metformin, patient agreeable in starting insulin, does not 1 a a long-acting insulin, stated I will start her on Novolin 70 30 as it contains intermediate and short-acting insulin, was started 20 units b i d , provided syringes  -patient to fill prescription at [facility]  -patient states she has glucometer, test strips and lancets at home, advised patient to be monitor blood sugars 4 times daily and to keep a log  To follow with PCP outpatient        Transition of Care Discharge Summary - [facility]    Patient Information: Aleks Sena 63 y o  female MRN: [id number]  Unit/Bed#: E4 -01 Encounter: [id number]    Discharging Physician / Practitioner: Alvin Pierce MD  PCP: Bernard Cordno  Admission Date: 3/9/2020  Discharge Date: 03/15/20    Disposition:      Other: home    For Discharges to   [address] SNF:   · Not Applicable to this Patient - Not Applicable to this Patient   ·   Reason for Admission:  Right upper and lower extremity numbness and tingling    Discharge Diagnoses:     Principal Problem:    Numbness and tingling of right arm and leg  Active Problems:    Type 2 diabetes mellitus with hyperglycemia, without long-term current use of insulin ([facility] [address] )    Morbid obesity with BMI of 45 0-49 9, adult ([facility] [address] )    Benign essential hypertension    Ambulatory dysfunction  Resolved Problems:    * No resolved hospital problems  *      Consultations During Hospital Stay:  · Neurology    Procedures Performed:     · Lumbar puncture    Medication Adjustments and Discharge Medications:  · Medication Dosing Tapers - Please refer to Discharge Medication List for details on any medication dosing tapers (if applicable to patient)  · Discharge Medication List: See after visit summary for reconciled discharge medications  Wound Care Recommendations:  When applicable, please see wound care section of After Visit Summary      Diet Recommendations at Discharge:  Diet -        Diet Orders   (From admission, onward)             Start     Ordered    03/13/20 1210  Diet Ton[place]/CHO Controlled; Consistent Carbohydrate Diet Level 1 (4 carb servings/60 grams CHO/meal)  Diet effective now     Question Answer Comment   Diet Type Ton[place]/CHO Controlled    Tonny/CHO Controlled Consistent Carbohydrate Diet Level 1 (4 carb servings/60 grams CHO/meal)    RD to adjust diet per protocol? Yes        03/13/20 1211    03/09/20 1533  Room Service  Once     Question:  Type of Service  Answer:  Room Service - Appropriate with Assistance    03/09/20 1532              Fluid Restriction - No Fluid Restriction at Discharge  Significant Findings / Test Results:     MRI cervical spine:Cord signal abnormality epicentered at the C4 level with questionable additional signal abnormality in the right hemicord at C5 and C6  Findings are highly suspicious for demyelinating disease as there appears to be additional infratentorial and   · supratentorial brain parenchymal signal abnormality in a suggestive of multiple sclerosis  MRI brain:Multiple T2 and FLAIR hyperintense foci involving supratentorial white matter and right cerebellum/middle cerebellar peduncle, 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  MS or Lyme disease could appear similarly  Further evaluation   via IV contrast-enhanced MRI recommended  Consistent with head CT      No acute ischemic disease identified by diffusion imaging    MRI brain with and without contrast:Redemonstration of multiple supratentorial and infratentorial scattered areas of white matter FLAIR signal hyperintensity, as described above  Many of the lesions demonstrate a perpendicular configuration of the ventricles and lesions are seen involving   the callosal septal interface    Imaging appearance is most suggestive of demyelinating disease/multiple sclerosis with areas of active demyelination as correlated with dedicated MRI of the cervical spine  CT chest abdomen pelvis:1  No evidence of primary malignancy or metastatic disease in the chest, abdomen or pelvis  2   Scattered calcified granulomata in the thorax  3   Anterior pelvic wall hernia containing nondilated segment of transverse colon redemonstrated, similar to the prior CT from 2 months ago  No evidence of bowel obstruction  4   Steatosis  Hospital Course:     Cheryl Dominguez is a 63 y o  female patient who originally presented to the hospital on 3/9/2020 due to right-sided numbness and tingling  Patient has history of hypertension, diabetes mellitus noncompliant with medications, obesity  Neurology consulted, imaging consistent with areas of demyelination, patient started on pulse steroids  Patient also went lumbar function for which some CSF results are pending  Patient has significantly improved with steroids, neurology follow-up appreciated, recommending patient can be discharged home on prednisone with taper  Patient is otherwise medically stable for discharge home today with outpatient follow-up  PT recommended home versus inpatient rehab, patient refusing rehab  Referral made by cm for home PT    Please see above problem list further details      Condition at Discharge: good     Discharge Day Visit / Exam:     Subjective:  Patient seen examined at bedside, states her numbness and tingling has improved, denies any chest pain, palpitations, dyspnea    Vitals: Blood Pressure: (!) 161/102 (03/15/20 0756)  Pulse: 98 (03/15/20 0756)  Temperature: 97 8 °F (36 6 °C) (03/15/20 0756)  Temp Source: Temporal (03/15/20 0756)  Respirations: 20 (03/15/20 0756)  Height: 5' 7" (170 2 cm) (03/09/20 1300)  Weight - Scale: 127 kg (280 lb) (03/09/20 1300)  SpO2: 98 % (03/15/20 0756)    Physical Exam:    Constitutional: Patient is oriented to person, place and time, no acute distress  HEENT:  Normocephalic, atraumatic  Cardiovascular: Normal S1S2, RRR, No murmurs/rubs/gallops appreciated  Pulmonary:  Bilateral air entry, No rhonchi/rales/wheezing appreciated  Abdominal: Soft, Bowel sounds present, Non-tender, Non-distended  Extremities:  No cyanosis, clubbing or edema  Neurological: Cranial nerves II-XII grossly intact, sensation intact, otherwise no focal neurological symptoms  Discharge instructions/Information to patient and family:   See after visit summary section titled Discharge Instructions for information provided to patient and family  Planned Readmission: no     Discharge Statement:  I spent 35 minutes discharging the patient  This time was spent on the day of discharge  I had direct contact with the patient on the day of discharge  Greater than 50% of the total time was spent examining patient, answering all patient questions, arranging and discussing plan of care with patient as well as directly providing post-discharge instructions  Additional time then spent on discharge activities      ** Please Note: This note has been constructed using a voice recognition system **

## 2020-03-16 ENCOUNTER — TELEPHONE (OUTPATIENT)
Dept: NEUROLOGY | Facility: CLINIC | Age: 60
End: 2020-03-16

## 2020-03-16 ENCOUNTER — TELEPHONE (OUTPATIENT)
Dept: FAMILY MEDICINE CLINIC | Facility: CLINIC | Age: 60
End: 2020-03-16

## 2020-03-16 ENCOUNTER — PATIENT OUTREACH (OUTPATIENT)
Dept: FAMILY MEDICINE CLINIC | Facility: CLINIC | Age: 60
End: 2020-03-16

## 2020-03-16 DIAGNOSIS — E11.65 TYPE 2 DIABETES MELLITUS WITH HYPERGLYCEMIA, WITHOUT LONG-TERM CURRENT USE OF INSULIN (HCC): Primary | Chronic | ICD-10-CM

## 2020-03-16 LAB
ACE CSF-CCNC: <1.5 U/L
ACE SERPL-CCNC: 9 U/L (ref 14–82)
AQP4 H2O CHANNEL AB SERPL IA-ACNC: <1.5 U/ML (ref 0–3)
BACTERIA CSF CULT: NO GROWTH
RHEUMATOID FACT SER QL LA: NEGATIVE
RPR SER QL: NORMAL
RYE IGE QN: NEGATIVE

## 2020-03-16 NOTE — TELEPHONE ENCOUNTER
Visiting nurse calling to ask if he should send pt to ED, she is more forgetful than usual  I told him no, unless she is overtly confused and not AOx3, that does not warrant ED visit   Pt will be contacted for post d/c phone call and scheduling of follow up

## 2020-03-16 NOTE — PROGRESS NOTES
OP CM called to pt in regard to request for OP [address] therapist    Pt states she is busy and the visiting nurse is there  Pt states she does not have any insurance  Pt requested I call back  OP CM will call back and refer pt to [facility] [address] [phone] since they can assist pts with no insurance  Pt states she has friends and family that can drive her to the appts

## 2020-03-16 NOTE — PROGRESS NOTES
Called patient to follow up after her recent hospitalization  She states she is "pretty wobbly" since being discharge  She is utilizing a walker at home stating she can "barely walk"  She did obtain a bedside commode  Patient notes she did not receive her discharge medications because the pharmacy was closed at that time but will be getting her meds today  She notes she was placed back on insulin  She reports her blood sugars were normal prior to her hospitalization but being placed on high dose steroids caused her sugars to be elevated near 400  Patient is in need of test strips; will place order to PCP  Patient started crying on the phone as she is very overwhelmed  She is upset because it took so long to be diagnosed with MS after seeing physicians for seven years  She states she has to call specialists (neuro, endo, PT) for appointments but still does not have insurance  She states no one helped her with this while she was inpatient  She notes [facility] placed her on a fee sliding scale and will have to pay $40 per visit  She was very upset because she cannot afford this as she has been out of work and will be for some time  She did request to see a therapist (will ask Adebayo Mason CM to contact patient)  Will continue to follow

## 2020-03-17 ENCOUNTER — TELEPHONE (OUTPATIENT)
Dept: NEUROLOGY | Facility: CLINIC | Age: 60
End: 2020-03-17

## 2020-03-17 ENCOUNTER — PATIENT OUTREACH (OUTPATIENT)
Dept: FAMILY MEDICINE CLINIC | Facility: CLINIC | Age: 60
End: 2020-03-17

## 2020-03-17 ENCOUNTER — TELEPHONE (OUTPATIENT)
Dept: FAMILY MEDICINE CLINIC | Facility: CLINIC | Age: 60
End: 2020-03-17

## 2020-03-17 LAB
C-ANCA TITR SER IF: NORMAL TITER
MYELOPEROXIDASE AB SER IA-ACNC: <9 U/ML (ref 0–9)
P-ANCA ATYPICAL TITR SER IF: NORMAL TITER
P-ANCA TITR SER IF: NORMAL TITER
PROTEINASE3 AB SER IA-ACNC: <3.5 U/ML (ref 0–3.5)
STREP DNASE B SER-ACNC: <78 U/ML (ref 0–120)

## 2020-03-17 NOTE — PROGRESS NOTES
OP CM called to pt in regards to consult for OP MH  Pt states she does not have insurance and she cannot afford to pay privately  Pt given info for [facility] Intake  Pt states she resides with her hsb who has prostate cancer  Pt states her grandchildren are there helping her and her daughter is coming tomm  Pt also has Visiting Nurses coming and the home health aides  Pt states she has all equipment needed for home: shower chair, walker, commode, and wheelchair  Pt given OP CM contact info and advised to call with any other psychosocial needs

## 2020-03-17 NOTE — TELEPHONE ENCOUNTER
Visiting Nurse called in regards to how often pt should be using glucose blood test strip  Pt is using 22units of Insulin twice a day  Pt is requesting phone call with additional instructions         [phone]

## 2020-03-17 NOTE — TELEPHONE ENCOUNTER
----- Message from Tracey Huang DO sent at 3/15/2020 10:55 AM EDT -----  Regarding: HFU    Diagnosis/reason for follow-up:  Demyelinating disease  Subspecialty:  MS  AP/Attending:  Attending, Dr Nettie Cisneros  Recommended timing for HFU: 2-4 weeks  Existing neurologist:  None  Tests/Labs/Imaging ordered:  Numerous CSF and serum studies pending from admission    Additional notes:  None

## 2020-03-18 ENCOUNTER — TRANSITIONAL CARE MANAGEMENT (OUTPATIENT)
Dept: FAMILY MEDICINE CLINIC | Facility: CLINIC | Age: 60
End: 2020-03-18

## 2020-03-18 ENCOUNTER — TELEPHONE (OUTPATIENT)
Dept: FAMILY MEDICINE CLINIC | Facility: CLINIC | Age: 60
End: 2020-03-18

## 2020-03-18 LAB — AQP4 H2O CHANNEL IGG CSF QL: NORMAL

## 2020-03-18 NOTE — TELEPHONE ENCOUNTER
Patient is calling to follow up on message left prior about tcm appt  Said she isnt remember things well  But knows needs appt and left message on nurse line

## 2020-03-19 LAB — JCPYV DNA CSF QL NAA+PROBE: NEGATIVE

## 2020-03-21 LAB — B BURGDOR DNA SPEC QL NAA+PROBE: NEGATIVE

## 2020-03-23 ENCOUNTER — TELEPHONE (OUTPATIENT)
Dept: FAMILY MEDICINE CLINIC | Facility: CLINIC | Age: 60
End: 2020-03-23

## 2020-03-23 DIAGNOSIS — E11.65 TYPE 2 DIABETES MELLITUS WITH HYPERGLYCEMIA, WITHOUT LONG-TERM CURRENT USE OF INSULIN (HCC): Chronic | ICD-10-CM

## 2020-03-23 LAB
ALB CSF/SERPL: 12 {RATIO} (ref 0–8)
ALBUMIN CSF-MCNC: 54 MG/DL (ref 11–48)
ALBUMIN SERPL-MCNC: 4.6 G/DL (ref 3.8–4.9)
IGG CSF-MCNC: 10.5 MG/DL (ref 0–8.6)
IGG SERPL-MCNC: 1121 MG/DL (ref 700–1600)
IGG SYNTH RATE SER+CSF CALC-MRATE: 19.5 MG/DAY
IGG/ALB CLEAR SER+CSF-RTO: 0.8 (ref 0–0.7)
IGG/ALB CSF: 0.19 {RATIO} (ref 0–0.25)
MBP CSF-MCNC: 107.5 NG/ML (ref 0–3.7)
OLIGOCLONAL BANDS.IT SER+CSF QL: ABNORMAL

## 2020-03-23 NOTE — TELEPHONE ENCOUNTER
Called PH to give verbal instructions did not want to take my verbal as per Immanuel Pendleton Pharmacist they need it to be sent e-script again with instructions

## 2020-03-25 ENCOUNTER — PATIENT OUTREACH (OUTPATIENT)
Dept: FAMILY MEDICINE CLINIC | Facility: CLINIC | Age: 60
End: 2020-03-25

## 2020-03-25 DIAGNOSIS — R20.0 RIGHT SIDED NUMBNESS: ICD-10-CM

## 2020-03-25 LAB — MISCELLANEOUS LAB TEST RESULT: NORMAL

## 2020-03-25 RX ORDER — PREDNISONE 20 MG/1
TABLET ORAL
Qty: 80 TABLET | Refills: 0 | Status: SHIPPED | OUTPATIENT
Start: 2020-03-25 | End: 2020-03-31 | Stop reason: SDUPTHER

## 2020-03-25 NOTE — PROGRESS NOTES
Called patient to follow up  She answered the call but handed the phone over to the visiting nurse, [facility]  [facility] noted that the patient is a bit 'hyper' as she has been taking her prednisone incorrectly (9 pills per day instead of 3)  She stated she called a physician to change the orders to wean safely  She noted that with the high dose of steroids, patient's blood sugars have been running 400-500 but with the recent decrease, today's fasting sugar was 99  She stated visiting nurses are coming to the home twice a week and physical therapy also comes twice weekly  Will continue to follow

## 2020-03-26 ENCOUNTER — TELEPHONE (OUTPATIENT)
Dept: NEUROLOGY | Facility: CLINIC | Age: 60
End: 2020-03-26

## 2020-03-27 DIAGNOSIS — R20.0 RIGHT SIDED NUMBNESS: ICD-10-CM

## 2020-03-27 LAB — SCAN RESULT: NORMAL

## 2020-03-30 ENCOUNTER — TELEPHONE (OUTPATIENT)
Dept: FAMILY MEDICINE CLINIC | Facility: CLINIC | Age: 60
End: 2020-03-30

## 2020-03-30 NOTE — TELEPHONE ENCOUNTER
Please contact patient and clarify concerns  If she is having any sx such as: weakness on one side of the body, confusion, worst headache of her life, visual changes, syncope, facial droop, slurring of words, shortness of breath, chest pain, palpitations, falls, fever--she should present to the emergency room  Thank you

## 2020-03-30 NOTE — TELEPHONE ENCOUNTER
Pt left VM on nurseline, pt states she had a stroke on 3/9 and has only been sleeping 2-3 hours daily since  Pt states she has no pain or any other symptoms

## 2020-03-31 ENCOUNTER — TELEPHONE (OUTPATIENT)
Dept: FAMILY MEDICINE CLINIC | Facility: CLINIC | Age: 60
End: 2020-03-31

## 2020-03-31 DIAGNOSIS — R20.0 RIGHT SIDED NUMBNESS: ICD-10-CM

## 2020-03-31 DIAGNOSIS — E11.65 TYPE 2 DIABETES MELLITUS WITH HYPERGLYCEMIA, WITHOUT LONG-TERM CURRENT USE OF INSULIN (HCC): Primary | Chronic | ICD-10-CM

## 2020-03-31 DIAGNOSIS — R20.0 NUMBNESS AND TINGLING OF RIGHT ARM AND LEG: ICD-10-CM

## 2020-03-31 DIAGNOSIS — R20.2 NUMBNESS AND TINGLING OF RIGHT ARM AND LEG: ICD-10-CM

## 2020-03-31 RX ORDER — PREDNISONE 20 MG/1
TABLET ORAL
Qty: 80 TABLET | Refills: 0 | Status: SHIPPED | OUTPATIENT
Start: 2020-03-31 | End: 2020-05-16 | Stop reason: HOSPADM

## 2020-03-31 RX ORDER — PREDNISONE 20 MG/1
TABLET ORAL
Qty: 80 TABLET | Refills: 0 | Status: CANCELLED | OUTPATIENT
Start: 2020-03-31

## 2020-03-31 NOTE — TELEPHONE ENCOUNTER
Informed patient of info below, also faxed lab order to [facility]   fax: [phone]  Sent as complete delivery

## 2020-03-31 NOTE — TELEPHONE ENCOUNTER
Pt called stating pharmacy never received refill request for - predniSONE 20 mg tablet     Correct pharmacy on file

## 2020-03-31 NOTE — TELEPHONE ENCOUNTER
Orders placed for labs  On chart review it appears that she has had right sided weakness with paraesthesia  If this is worsening or new then she should go to ED  Thank you

## 2020-03-31 NOTE — TELEPHONE ENCOUNTER
The pharmacy needs to check again  Please see below--the pharmacy is getting the script and confirming  Thank you         E-Prescribing Status     Outpatient Medication Detail     predniSONE 20 mg tablet        Sig: Take 3 tablets daily for 14 days Take 2 5 tablets daily for 14 days Take 2 tablets daily thereafter        Sent to pharmacy as: predniSONE 20 mg tablet        Class: Normal        E-Prescribing Status: Receipt confirmed by pharmacy (3/31/2020  1:08 PM EDT)

## 2020-03-31 NOTE — TELEPHONE ENCOUNTER
Pt states she is only having a little RT side weakness she would like to have BW done to check her electrolytes

## 2020-04-02 ENCOUNTER — TELEPHONE (OUTPATIENT)
Dept: NEUROLOGY | Facility: CLINIC | Age: 60
End: 2020-04-02

## 2020-04-02 ENCOUNTER — LAB (OUTPATIENT)
Dept: LAB | Facility: CLINIC | Age: 60
End: 2020-04-02
Payer: COMMERCIAL

## 2020-04-02 ENCOUNTER — TRANSCRIBE ORDERS (OUTPATIENT)
Dept: ADMINISTRATIVE | Facility: HOSPITAL | Age: 60
End: 2020-04-02

## 2020-04-02 ENCOUNTER — TELEMEDICINE (OUTPATIENT)
Dept: NEUROLOGY | Facility: CLINIC | Age: 60
End: 2020-04-02
Payer: COMMERCIAL

## 2020-04-02 DIAGNOSIS — G81.91 ACUTE RIGHT HEMIPARESIS (HCC): Primary | ICD-10-CM

## 2020-04-02 DIAGNOSIS — R20.0 RIGHT SIDED NUMBNESS: ICD-10-CM

## 2020-04-02 DIAGNOSIS — E11.65 TYPE 2 DIABETES MELLITUS WITH HYPERGLYCEMIA, WITHOUT LONG-TERM CURRENT USE OF INSULIN (HCC): ICD-10-CM

## 2020-04-02 DIAGNOSIS — E11.649 UNCONTROLLED TYPE 2 DIABETES MELLITUS WITH HYPOGLYCEMIA, UNSPECIFIED HYPOGLYCEMIA COMA STATUS (HCC): Primary | ICD-10-CM

## 2020-04-02 DIAGNOSIS — R26.2 AMBULATORY DYSFUNCTION: Chronic | ICD-10-CM

## 2020-04-02 DIAGNOSIS — R20.2 NUMBNESS AND TINGLING OF RIGHT ARM AND LEG: ICD-10-CM

## 2020-04-02 DIAGNOSIS — E11.649 UNCONTROLLED TYPE 2 DIABETES MELLITUS WITH HYPOGLYCEMIA, UNSPECIFIED HYPOGLYCEMIA COMA STATUS (HCC): ICD-10-CM

## 2020-04-02 DIAGNOSIS — E83.52 HYPERCALCEMIA: ICD-10-CM

## 2020-04-02 DIAGNOSIS — G37.9 CNS DEMYELINATION (HCC): ICD-10-CM

## 2020-04-02 DIAGNOSIS — R20.0 NUMBNESS AND TINGLING OF RIGHT ARM AND LEG: ICD-10-CM

## 2020-04-02 LAB
ALBUMIN SERPL BCP-MCNC: 3.3 G/DL (ref 3.5–5)
ALP SERPL-CCNC: 71 U/L (ref 46–116)
ALT SERPL W P-5'-P-CCNC: 38 U/L (ref 12–78)
ANION GAP SERPL CALCULATED.3IONS-SCNC: 6 MMOL/L (ref 4–13)
AST SERPL W P-5'-P-CCNC: 12 U/L (ref 5–45)
BASOPHILS # BLD AUTO: 0.02 THOUSANDS/ΜL (ref 0–0.1)
BASOPHILS NFR BLD AUTO: 0 % (ref 0–1)
BILIRUB SERPL-MCNC: 1.18 MG/DL (ref 0.2–1)
BUN SERPL-MCNC: 12 MG/DL (ref 5–25)
CALCIUM ALBUM COR SERPL-MCNC: 11.6 MG/DL (ref 8.3–10.1)
CALCIUM SERPL-MCNC: 11 MG/DL (ref 8.3–10.1)
CHLORIDE SERPL-SCNC: 108 MMOL/L (ref 100–108)
CHOLEST SERPL-MCNC: 205 MG/DL (ref 50–200)
CO2 SERPL-SCNC: 27 MMOL/L (ref 21–32)
CREAT SERPL-MCNC: 0.57 MG/DL (ref 0.6–1.3)
EOSINOPHIL # BLD AUTO: 0.22 THOUSAND/ΜL (ref 0–0.61)
EOSINOPHIL NFR BLD AUTO: 2 % (ref 0–6)
ERYTHROCYTE [DISTWIDTH] IN BLOOD BY AUTOMATED COUNT: 13.8 % (ref 11.6–15.1)
EST. AVERAGE GLUCOSE BLD GHB EST-MCNC: 229 MG/DL
FERRITIN SERPL-MCNC: 348 NG/ML (ref 8–388)
FOLATE SERPL-MCNC: 6.7 NG/ML (ref 3.1–17.5)
GFR SERPL CREATININE-BSD FRML MDRD: 103 ML/MIN/1.73SQ M
GLUCOSE P FAST SERPL-MCNC: 77 MG/DL (ref 65–99)
HBA1C MFR BLD: 9.6 %
HCT VFR BLD AUTO: 42.5 % (ref 34.8–46.1)
HDLC SERPL-MCNC: 63 MG/DL
HGB BLD-MCNC: 13.8 G/DL (ref 11.5–15.4)
IMM GRANULOCYTES # BLD AUTO: 0.05 THOUSAND/UL (ref 0–0.2)
IMM GRANULOCYTES NFR BLD AUTO: 0 % (ref 0–2)
IRON SATN MFR SERPL: 39 %
IRON SERPL-MCNC: 106 UG/DL (ref 50–170)
LDLC SERPL CALC-MCNC: 110 MG/DL (ref 0–100)
LYMPHOCYTES # BLD AUTO: 5.39 THOUSANDS/ΜL (ref 0.6–4.47)
LYMPHOCYTES NFR BLD AUTO: 39 % (ref 14–44)
MAGNESIUM SERPL-MCNC: 2.2 MG/DL (ref 1.6–2.6)
MCH RBC QN AUTO: 29.2 PG (ref 26.8–34.3)
MCHC RBC AUTO-ENTMCNC: 32.5 G/DL (ref 31.4–37.4)
MCV RBC AUTO: 90 FL (ref 82–98)
MONOCYTES # BLD AUTO: 0.85 THOUSAND/ΜL (ref 0.17–1.22)
MONOCYTES NFR BLD AUTO: 6 % (ref 4–12)
NEUTROPHILS # BLD AUTO: 7.21 THOUSANDS/ΜL (ref 1.85–7.62)
NEUTS SEG NFR BLD AUTO: 53 % (ref 43–75)
NONHDLC SERPL-MCNC: 142 MG/DL
NRBC BLD AUTO-RTO: 0 /100 WBCS
PLATELET # BLD AUTO: 220 THOUSANDS/UL (ref 149–390)
PMV BLD AUTO: 10.6 FL (ref 8.9–12.7)
POTASSIUM SERPL-SCNC: 3.7 MMOL/L (ref 3.5–5.3)
PROT SERPL-MCNC: 6.7 G/DL (ref 6.4–8.2)
RBC # BLD AUTO: 4.72 MILLION/UL (ref 3.81–5.12)
SODIUM SERPL-SCNC: 141 MMOL/L (ref 136–145)
TIBC SERPL-MCNC: 273 UG/DL (ref 250–450)
TRIGL SERPL-MCNC: 160 MG/DL
TSH SERPL DL<=0.05 MIU/L-ACNC: 1.79 UIU/ML (ref 0.36–3.74)
VIT B12 SERPL-MCNC: 218 PG/ML (ref 100–900)
WBC # BLD AUTO: 13.74 THOUSAND/UL (ref 4.31–10.16)

## 2020-04-02 PROCEDURE — 82746 ASSAY OF FOLIC ACID SERUM: CPT

## 2020-04-02 PROCEDURE — 83540 ASSAY OF IRON: CPT

## 2020-04-02 PROCEDURE — 83036 HEMOGLOBIN GLYCOSYLATED A1C: CPT

## 2020-04-02 PROCEDURE — 36415 COLL VENOUS BLD VENIPUNCTURE: CPT

## 2020-04-02 PROCEDURE — 83735 ASSAY OF MAGNESIUM: CPT

## 2020-04-02 PROCEDURE — 83970 ASSAY OF PARATHORMONE: CPT

## 2020-04-02 PROCEDURE — 82607 VITAMIN B-12: CPT

## 2020-04-02 PROCEDURE — 99214 OFFICE O/P EST MOD 30 MIN: CPT | Performed by: PSYCHIATRY & NEUROLOGY

## 2020-04-02 PROCEDURE — 83550 IRON BINDING TEST: CPT

## 2020-04-02 PROCEDURE — 85025 COMPLETE CBC W/AUTO DIFF WBC: CPT

## 2020-04-02 PROCEDURE — 82728 ASSAY OF FERRITIN: CPT

## 2020-04-02 PROCEDURE — 80061 LIPID PANEL: CPT

## 2020-04-02 PROCEDURE — 84443 ASSAY THYROID STIM HORMONE: CPT

## 2020-04-02 PROCEDURE — 80053 COMPREHEN METABOLIC PANEL: CPT

## 2020-04-03 DIAGNOSIS — E83.52 HYPERCALCEMIA: Primary | ICD-10-CM

## 2020-04-03 LAB — PTH-INTACT SERPL-MCNC: 95.1 PG/ML (ref 18.4–80.1)

## 2020-04-06 ENCOUNTER — TELEPHONE (OUTPATIENT)
Dept: NEUROLOGY | Facility: CLINIC | Age: 60
End: 2020-04-06

## 2020-04-06 ENCOUNTER — TELEMEDICINE (OUTPATIENT)
Dept: ENDOCRINOLOGY | Facility: CLINIC | Age: 60
End: 2020-04-06
Payer: COMMERCIAL

## 2020-04-06 DIAGNOSIS — I10 BENIGN ESSENTIAL HYPERTENSION: ICD-10-CM

## 2020-04-06 DIAGNOSIS — E83.52 HYPERCALCEMIA: ICD-10-CM

## 2020-04-06 DIAGNOSIS — E55.9 VITAMIN D DEFICIENCY: ICD-10-CM

## 2020-04-06 DIAGNOSIS — E11.65 TYPE 2 DIABETES MELLITUS WITH HYPERGLYCEMIA, WITH LONG-TERM CURRENT USE OF INSULIN (HCC): Primary | ICD-10-CM

## 2020-04-06 DIAGNOSIS — R20.0 RIGHT SIDED NUMBNESS: ICD-10-CM

## 2020-04-06 DIAGNOSIS — G37.9 CNS DEMYELINATION (HCC): ICD-10-CM

## 2020-04-06 DIAGNOSIS — E21.3 HYPERPARATHYROIDISM (HCC): ICD-10-CM

## 2020-04-06 DIAGNOSIS — Z79.4 TYPE 2 DIABETES MELLITUS WITH HYPERGLYCEMIA, WITH LONG-TERM CURRENT USE OF INSULIN (HCC): Primary | ICD-10-CM

## 2020-04-06 PROBLEM — K51.00 PANCOLITIS (HCC): Status: RESOLVED | Noted: 2017-04-27 | Resolved: 2020-04-06

## 2020-04-06 PROBLEM — R55 RECURRENT SYNCOPE: Chronic | Status: RESOLVED | Noted: 2020-03-09 | Resolved: 2020-04-06

## 2020-04-06 PROCEDURE — 99215 OFFICE O/P EST HI 40 MIN: CPT | Performed by: INTERNAL MEDICINE

## 2020-04-06 RX ORDER — CINACALCET 30 MG/1
30 TABLET, FILM COATED ORAL DAILY
Qty: 30 TABLET | Refills: 3 | Status: SHIPPED | OUTPATIENT
Start: 2020-04-06 | End: 2020-05-14 | Stop reason: SDUPTHER

## 2020-04-07 ENCOUNTER — TELEMEDICINE (OUTPATIENT)
Dept: FAMILY MEDICINE CLINIC | Facility: CLINIC | Age: 60
End: 2020-04-07

## 2020-04-07 ENCOUNTER — PATIENT OUTREACH (OUTPATIENT)
Dept: FAMILY MEDICINE CLINIC | Facility: CLINIC | Age: 60
End: 2020-04-07

## 2020-04-07 DIAGNOSIS — E11.65 TYPE 2 DIABETES MELLITUS WITH HYPERGLYCEMIA, WITH LONG-TERM CURRENT USE OF INSULIN (HCC): ICD-10-CM

## 2020-04-07 DIAGNOSIS — I10 BENIGN ESSENTIAL HYPERTENSION: ICD-10-CM

## 2020-04-07 DIAGNOSIS — Z79.4 TYPE 2 DIABETES MELLITUS WITH HYPERGLYCEMIA, WITH LONG-TERM CURRENT USE OF INSULIN (HCC): ICD-10-CM

## 2020-04-07 DIAGNOSIS — E66.01 MORBID OBESITY WITH BMI OF 45.0-49.9, ADULT (HCC): Chronic | ICD-10-CM

## 2020-04-07 DIAGNOSIS — E21.3 HYPERPARATHYROIDISM (HCC): Primary | ICD-10-CM

## 2020-04-07 PROCEDURE — 99214 OFFICE O/P EST MOD 30 MIN: CPT | Performed by: NURSE PRACTITIONER

## 2020-04-09 ENCOUNTER — TELEPHONE (OUTPATIENT)
Dept: DIABETES SERVICES | Facility: CLINIC | Age: 60
End: 2020-04-09

## 2020-04-09 ENCOUNTER — TELEPHONE (OUTPATIENT)
Dept: NEUROLOGY | Facility: CLINIC | Age: 60
End: 2020-04-09

## 2020-04-14 ENCOUNTER — TELEPHONE (OUTPATIENT)
Dept: ENDOCRINOLOGY | Facility: CLINIC | Age: 60
End: 2020-04-14

## 2020-04-22 ENCOUNTER — TELEMEDICINE (OUTPATIENT)
Dept: FAMILY MEDICINE CLINIC | Facility: CLINIC | Age: 60
End: 2020-04-22

## 2020-04-22 DIAGNOSIS — R26.2 AMBULATORY DYSFUNCTION: Chronic | ICD-10-CM

## 2020-04-22 DIAGNOSIS — G81.91 ACUTE RIGHT HEMIPARESIS (HCC): Primary | ICD-10-CM

## 2020-04-22 DIAGNOSIS — G37.9 CNS DEMYELINATION (HCC): ICD-10-CM

## 2020-04-22 PROCEDURE — 99214 OFFICE O/P EST MOD 30 MIN: CPT | Performed by: NURSE PRACTITIONER

## 2020-04-22 PROCEDURE — T1015 CLINIC SERVICE: HCPCS | Performed by: NURSE PRACTITIONER

## 2020-04-23 ENCOUNTER — TELEPHONE (OUTPATIENT)
Dept: FAMILY MEDICINE CLINIC | Facility: CLINIC | Age: 60
End: 2020-04-23

## 2020-04-23 DIAGNOSIS — T38.0X1S: Primary | ICD-10-CM

## 2020-04-23 RX ORDER — PANTOPRAZOLE SODIUM 40 MG/1
40 TABLET, DELAYED RELEASE ORAL
Qty: 30 TABLET | Refills: 5 | Status: ON HOLD | OUTPATIENT
Start: 2020-04-23 | End: 2020-05-16 | Stop reason: SDUPTHER

## 2020-05-06 ENCOUNTER — PATIENT OUTREACH (OUTPATIENT)
Dept: FAMILY MEDICINE CLINIC | Facility: CLINIC | Age: 60
End: 2020-05-06

## 2020-05-11 ENCOUNTER — TELEPHONE (OUTPATIENT)
Dept: NEUROLOGY | Facility: CLINIC | Age: 60
End: 2020-05-11

## 2020-05-12 DIAGNOSIS — E11.65 TYPE 2 DIABETES MELLITUS WITH HYPERGLYCEMIA, WITHOUT LONG-TERM CURRENT USE OF INSULIN (HCC): Chronic | ICD-10-CM

## 2020-05-12 DIAGNOSIS — I10 BENIGN ESSENTIAL HYPERTENSION: ICD-10-CM

## 2020-05-12 RX ORDER — METFORMIN HYDROCHLORIDE 500 MG/1
1000 TABLET, EXTENDED RELEASE ORAL 2 TIMES DAILY WITH MEALS
Qty: 360 TABLET | Refills: 1 | Status: SHIPPED | OUTPATIENT
Start: 2020-05-12 | End: 2020-11-27

## 2020-05-12 RX ORDER — LISINOPRIL 20 MG/1
20 TABLET ORAL DAILY
Qty: 90 TABLET | Refills: 0 | Status: SHIPPED | OUTPATIENT
Start: 2020-05-12 | End: 2020-08-21 | Stop reason: SDUPTHER

## 2020-05-13 ENCOUNTER — HOSPITAL ENCOUNTER (OUTPATIENT)
Dept: MRI IMAGING | Facility: HOSPITAL | Age: 60
Discharge: HOME | End: 2020-05-13
Attending: PSYCHIATRY & NEUROLOGY
Payer: COMMERCIAL

## 2020-05-13 ENCOUNTER — LAB (OUTPATIENT)
Dept: LAB | Facility: CLINIC | Age: 60
End: 2020-05-13
Payer: COMMERCIAL

## 2020-05-13 DIAGNOSIS — I10 BENIGN ESSENTIAL HYPERTENSION: ICD-10-CM

## 2020-05-13 DIAGNOSIS — G81.91 ACUTE RIGHT HEMIPARESIS (HCC): ICD-10-CM

## 2020-05-13 DIAGNOSIS — G37.9 CNS DEMYELINATION (HCC): ICD-10-CM

## 2020-05-13 DIAGNOSIS — E21.3 HYPERPARATHYROIDISM (HCC): ICD-10-CM

## 2020-05-13 DIAGNOSIS — E55.9 VITAMIN D DEFICIENCY: ICD-10-CM

## 2020-05-13 DIAGNOSIS — Z79.4 TYPE 2 DIABETES MELLITUS WITH HYPERGLYCEMIA, WITH LONG-TERM CURRENT USE OF INSULIN (HCC): ICD-10-CM

## 2020-05-13 DIAGNOSIS — R26.2 AMBULATORY DYSFUNCTION: Chronic | ICD-10-CM

## 2020-05-13 DIAGNOSIS — E11.65 TYPE 2 DIABETES MELLITUS WITH HYPERGLYCEMIA, WITH LONG-TERM CURRENT USE OF INSULIN (HCC): ICD-10-CM

## 2020-05-13 DIAGNOSIS — E83.52 HYPERCALCEMIA: ICD-10-CM

## 2020-05-13 LAB
25(OH)D3 SERPL-MCNC: 16.1 NG/ML (ref 30–100)
ALBUMIN SERPL BCP-MCNC: 3.3 G/DL (ref 3.5–5)
ALP SERPL-CCNC: 61 U/L (ref 46–116)
ALT SERPL W P-5'-P-CCNC: 26 U/L (ref 12–78)
ANION GAP SERPL CALCULATED.3IONS-SCNC: 6 MMOL/L (ref 4–13)
AST SERPL W P-5'-P-CCNC: 9 U/L (ref 5–45)
BILIRUB SERPL-MCNC: 0.52 MG/DL (ref 0.2–1)
BUN SERPL-MCNC: 19 MG/DL (ref 5–25)
CA-I BLD-SCNC: 1.41 MMOL/L (ref 1.12–1.32)
CALCIUM ALBUM COR SERPL-MCNC: 11.1 MG/DL (ref 8.3–10.1)
CALCIUM SERPL-MCNC: 10.5 MG/DL (ref 8.3–10.1)
CHLORIDE SERPL-SCNC: 108 MMOL/L (ref 100–108)
CO2 SERPL-SCNC: 25 MMOL/L (ref 21–32)
CREAT SERPL-MCNC: 0.67 MG/DL (ref 0.6–1.3)
GFR SERPL CREATININE-BSD FRML MDRD: 97 ML/MIN/1.73SQ M
GLUCOSE P FAST SERPL-MCNC: 140 MG/DL (ref 65–99)
PHOSPHATE SERPL-MCNC: 3.1 MG/DL (ref 2.7–4.5)
POTASSIUM SERPL-SCNC: 4 MMOL/L (ref 3.5–5.3)
PROT SERPL-MCNC: 6.7 G/DL (ref 6.4–8.2)
PTH-INTACT SERPL-MCNC: 92.2 PG/ML (ref 18.4–80.1)
SODIUM SERPL-SCNC: 139 MMOL/L (ref 136–145)

## 2020-05-13 PROCEDURE — 82306 VITAMIN D 25 HYDROXY: CPT

## 2020-05-13 PROCEDURE — 84100 ASSAY OF PHOSPHORUS: CPT

## 2020-05-13 PROCEDURE — 36415 COLL VENOUS BLD VENIPUNCTURE: CPT

## 2020-05-13 PROCEDURE — 83970 ASSAY OF PARATHORMONE: CPT

## 2020-05-13 PROCEDURE — 82330 ASSAY OF CALCIUM: CPT

## 2020-05-13 PROCEDURE — 80053 COMPREHEN METABOLIC PANEL: CPT

## 2020-05-14 ENCOUNTER — TELEPHONE (OUTPATIENT)
Dept: ENDOCRINOLOGY | Facility: CLINIC | Age: 60
End: 2020-05-14

## 2020-05-14 ENCOUNTER — TELEPHONE (OUTPATIENT)
Dept: FAMILY MEDICINE CLINIC | Facility: CLINIC | Age: 60
End: 2020-05-14

## 2020-05-14 ENCOUNTER — TELEPHONE (OUTPATIENT)
Dept: NEUROLOGY | Facility: CLINIC | Age: 60
End: 2020-05-14

## 2020-05-14 DIAGNOSIS — R20.0 RIGHT SIDED NUMBNESS: ICD-10-CM

## 2020-05-14 DIAGNOSIS — E21.3 HYPERPARATHYROIDISM (HCC): ICD-10-CM

## 2020-05-14 DIAGNOSIS — G35 MS (MULTIPLE SCLEROSIS) (HCC): Primary | ICD-10-CM

## 2020-05-14 DIAGNOSIS — E83.52 HYPERCALCEMIA: ICD-10-CM

## 2020-05-14 DIAGNOSIS — E83.52 HYPERCALCEMIA: Primary | ICD-10-CM

## 2020-05-14 RX ORDER — CINACALCET 30 MG/1
30 TABLET, FILM COATED ORAL DAILY
Qty: 30 TABLET | Refills: 3 | Status: SHIPPED | OUTPATIENT
Start: 2020-05-14 | End: 2020-05-16 | Stop reason: HOSPADM

## 2020-05-14 RX ORDER — PREDNISONE 20 MG/1
TABLET ORAL
Qty: 80 TABLET | Refills: 0 | Status: CANCELLED | OUTPATIENT
Start: 2020-05-14

## 2020-05-14 NOTE — UTILIZATION REVIEW
URGENT/EMERGENT  INPATIENT/SPU AUTHORIZATION REQUEST    Date: 05/14/20            # Pages in this Request:     x New Request   Additional Information for PA#:     Office Contact Name:  Micah Sarabia Title: Utilization Review, [employer] Nurse     Phone: [phone]  Ext  Availability (Date/Time): Wednesday - Friday 8 am- 4 pm    x Inpatient Review  SPU Review       x Current        Late Pick-up   · How your facility was first notified of the Late Pick-up:   · When your facility was first notified of the Late Pick-up (date):          RECIPIENT INFORMATION    Recipient OA#:[phone]  Recipient Name: Concepcion Maravilla    YOB: 1960  63 y o  Recipient Alias:     Gender:   Male x Female Medicaid Eligibility ([address] [place] [address]): INSURANCE INFORMATION    (All other private or governmental health insurance benefits must be utilized prior to billing the [place] Program)    Was this admission the result of an MVA, other accident, assault, injury, fall, gunshot, bite etc ?   X Yes  No                   If yes, provide a brief description of the incident  Fall        Pt brought via EMS  Pt fell around 5 am  Denies LOC  Denies hitting head  Pt reports falling 2 weeks ago, increased right sided numbness / weakness since previous fall (2 weeks ago)          Does the recipient have other insurance coverage? Yes X No        Insurance Company Name/Policy #      Did that insurance pay on this claim? Yes  No        Did that insurance deny this claim? Yes  No    If yes, reason for denial:      Does the recipient have Medicare? Yes X No        Did Medicare exhaust prior to this admission? Yes  No            Did Medicare partially pay this claim? Yes  No        Did that insurance deny this claim? Yes  No    If yes, reason for denial:          Was the recipient a prisoner at the time of admission?    Yes X No            PROVIDER INFORMATION    [facility]: [facility] Provider ID#: [id number]    Admitting Physician Name: Vadim Beckman [facility] Provider ID#: [id number]        ADMISSION INFORMATION    Type of Admission: (please choose one)    X ED      Direct    If yes, from where? Transfer    If yes, transferring hospital (inpatient, rehab, or psych) Provider Name/Provider ID#: Admission Floor or Unit Type: MED-SURG    Dates/Times:        ED Date/Time: 3/9/2020  8:16 AM        Observation Date/Time: 3/9/2020  1223        Admission Date/Time: 3/11/20 1546        Discharge or Transfer Date/Time: 3/15/2020  1437        DIAGNOSIS/PROCEDURE CODES    Primary Diagnosis Code/Primary Diagnosis Code description:   HTN (hypertension) [I10]  MULTIPLE SCLEROSIS G35  (MAY RE-ORDER DX CODES)    Additional Diagnosis Code(s) and Description(s)-(up to three additional codes):     Procedure Code (one) and description: [id number] DRAINAGE OF SPINAL CANAL, PERC DEION        CLINICAL INFORMATION - PRIOR ADMISSION ONLY    Is there a prior admission with a discharge date within 30 days of the date of this admission? X No (Proceed to the next section - "Clinical Information - General Review Checklist:)      Yes (Provide the following information)     Prior admission dates:    MA Prior Authorization Number:        Review Outcome:     Diagnosis Code(s)/Description:    Procedure Code/Description:    Findings:    Treatment:    Condition on Discharge:   Vitals:    Labs:   Imaging:   Medications: Follow-up Instructions:    Disposition:        CLINICAL INFORMATION - GENERAL REVIEW CHECKLIST    EMERGENCY DEPARTMENT: (Proceed to "ADMISSION" if Direct Admission)    Presenting Signs/Symptoms:    Fall       Pt brought via EMS  Pt fell around 5 am  Denies LOC  Denies hitting head   Pt reports falling 2 weeks ago, increased right sided numbness / weakness since previous fall (2 weeks ago)       Assessment/Plan:   64 yo obese female,  To ER via EMS ,  Admitted OBS status for workup of reported Rt sided numbness/weakness  Pt reports h/o recurrent syncope "for years", increasing in frequency past few months +  Left knee injury (? When) sustained w/previous fall, wears knee brace for support  Pt reports symptoms worsening since fall 2 week ago:  Numbness extends from RUE down rt side of body in  Rt foot  Per pt this numbness caused her to fall again today  No head injury or LOC  No external signs of trauma  IN ER,  Pt unable to ambulate w/out RW and assist of 1  A/O x3 - normal neuro exam   GCS 15  NIH score 0  Basic neurologic exam shows sensory deficit to light touch throughout right upper/lower extremities   Exam of strength and DTRs equivocal   Unclear how much weakness is true vs lack of effort   Patient also holding right foot in plantar flexion during attempts to check lower extremity reflexes (not flexed prior to exam)   Initial imaging reassuring  ER provider did not administer tPA due to non-disabling symptoms  Will admit for stroke r/o:  Tele monitoring, serial neuro cks, MRI brain and TTE, neuro consult,  PT/OT evaluations         3/10  NEUROLOGY -  MRI brain and C-spine pending  Cannot entirely rule out small acute CVA, so patient is on the stroke pathway  Differential also includes radiculopathy, but this would not explain the subtle facial asymmetry    Continue current plan/diagnostics/ supportive care  Medication/treatment prior to arrival in the ED:     Past Medical History:    Active Ambulatory Problems     Diagnosis Date Noted    Hernia, ventral     Type 2 diabetes mellitus with hyperglycemia, with long-term current use of insulin ([facility] )     GI bleed 04/27/2017    Morbid obesity with BMI of 45 0-49 9, adult ([facility] ) 05/03/2017    Ischemic colitis ([facility] ) 05/03/2017    Unintentional weight loss 05/03/2017    Hypercalcemia 05/03/2017    Hyperparathyroidism ([facility] ) 05/03/2017    Vitamin D deficiency 12/14/2015    Depression with anxiety 12/08/2016    Benign essential hypertension 06/06/2012    Falls frequently 02/09/2020    CNS demyelination ([facility] ) 04/02/2020    Acute right hemiparesis ([facility] ) 04/02/2020       Past Medical History:   Diagnosis Date    Diabetes mellitus ([facility] )     External hemorrhoids     Incarcerated incisional hernia     Insomnia     Type 2 diabetes mellitus with hyperglycemia, without long-term current use of insulin (HCC)        Clinical Exam:     Initial Vital Signs: (Temp, Pulse, Resp, and BP)   ED Triage Vitals [03/09/20 0821]   Temperature Pulse Respirations Blood Pressure SpO2   97 8 °F (36 6 °C) 91 18 (!) 180/86 94 %      Temp Source Heart Rate Source Patient Position - Orthostatic VS BP Location FiO2 (%)   Oral Monitor Lying Left arm --      Pain Score       No Pain           Pertinent Repeat Vital Signs: (include times they were obtained)  03/09/20 1600   97 9 °F (36 6 °C)   82   20   164/107Abnormal    03/09/20 1500   97 6 °F (36 4 °C)   89   20   201/108Abnormal    03/09/20 1400   98 1 °F (36 7 °C)   85   18   179/92Abnormal    03/09/20 1300   97 4 °F   91   18   171/73Abnormal       03/10/20 0700   97 °F (36 1 °C)Abnormal    85   18   154/81        Pertinent Sustained Findings: (include times they were obtained)    Weight in Kilograms:  Wt Readings from Last 1 Encounters:   03/09/20 127 kg (280 lb)       Pertinent Labs (results):  Lab Units 03/10/20  0452 03/09/20  0907   WBC Thousand/uL 9 72 9 58   HEMOGLOBIN g/dL 13 0 13 5   HEMATOCRIT % 41 1 40 9   PLATELETS Thousands/uL 290 272   NEUTROS ABS Thousands/µL 6 31 6 58                Results from last 7 days   Lab Units 03/10/20  0452 03/09/20  0907   SODIUM mmol/L 137 139   POTASSIUM mmol/L 4 0 4 0   CHLORIDE mmol/L 103 103   CO2 mmol/L 26 26   ANION GAP mmol/L 8 10   BUN mg/dL 12 10   CREATININE mg/dL 0 73 0 78   EGFR ml/min/1 73sq m 91 84   CALCIUM mg/dL 10 6* 10 9*   MAGNESIUM mg/dL 1 7  --    PHOSPHORUS mg/dL 3 4  --            Results from last 7 days   Lab Units 03/10/20  0452   AST U/L 16   ALT U/L 24   ALK PHOS U/L 91   TOTAL PROTEIN g/dL 7 2   ALBUMIN g/dL 3 1*   TOTAL BILIRUBIN mg/dL 1 31*             Results from last 7 days   Lab Units 03/10/20  0745 03/09/20 2034 03/09/20  1511   POC GLUCOSE mg/dl 160* 157* 145*            Results from last 7 days   Lab Units 03/10/20  0452 03/09/20  0907   GLUCOSE RANDOM mg/dL 166* 213*                Results from last 7 days   Lab Units 03/10/20  0452 03/09/20  0907   PROTIME seconds 12 9 13 1   INR   0 96 0 98   PTT seconds  --  31       Radiology (results):  cxr - none   3/9  CT head -  No acute intracranial abnormality   Microangiopathic changes      EKG (results):   3/9  EKG Normal sinus rhythm  Poor anterior R wave progression is noted  No previous ECGs available  Confirmed by Maurilio Escobar ([id number]) on 3/9/2020 12:42:04 PM    Component Value Flag Ref Range Units Status   Ventricular Rate 84    BPM Final   Atrial Rate 84    BPM Final   NV Interval 152    ms Final   QRSD Interval 74    ms Final   QT Interval 358    ms Final   QTC Interval 423    ms Final   P Axis 58    degrees Final   QRS Axis 25    degrees Final   T Wave Axis 29    degrees Final   Narrative & Impression     Other tests (results):       Tests pending final results:    Treatment in the ED:   Medication Administration from 03/09/2020 0816 to 03/09/2020 1349       Date/Time Order Dose Route Action Action by Comments     03/09/2020 1201 aspirin chewable tablet 324 mg 324 mg Oral Given             Meds: Name, dose, route, time, number of doses given        Nebulizer treatments: Type, total number given      IVs: Type, rate, total amt  given          Other treatments:       Change in condition while in the ED:       Response to ED Treatment:           OBSERVATION: (Proceed to "ADMISSION" if Direct Admission)    Orders written during the observation period  Admit  Tele ,  Consult neurology,  PT/OT evals & treat;  VS/Neuro cks q 1 hr x4;  q 2 hr x4;  q 4 hrs   Dysphagia assessment prior to po; Baseline NIH scale;  Echo;  MRI brain  Sequential compression device to b/l LE;  up w/assist;  accucks qid w/Sliding scale insulin     Meds Name, dose, route, time, how may doses given:  ALPRAZolam 0 5 mg Oral Once   aspirin 81 mg Oral Daily   atorvastatin 40 mg Oral QPM   enoxaparin 40 mg Subcutaneous BID   insulin lispro 1-6 Units Subcutaneous TID AC   insulin lispro 1-6 Units Subcutaneous HS   lisinopril 10 mg Oral Daily   metFORMIN 1,000 mg Oral BID With Meals       PRN Meds Name, dose, route, time, how many doses given within the first 24 hrs :  acetaminophen 650 mg Oral Q6H PRN   aluminum-magnesium hydroxide-simethicone 30 mL Oral Q4H PRN   docusate sodium 100 mg Oral BID PRN   hydrALAZINE 10 mg Intravenous Q6H PRN   ondansetron 4 mg Intravenous Q4H PRN       IVs Type, rate, and total amt  ordered/given:  Labs, imaging, other:  3/10 MRI  CERVICAL SPINE:  Cord signal abnormality epicentered at the C4 level with questionable additional signal abnormality in the right hemicord at C5 and C6  Findings are highly suspicious for demyelinating disease as there appears to be additional infratentorial and   supratentorial brain parenchymal signal abnormality in a suggestive of multiple sclerosis      Consider repeat evaluation with contrast if the patient is able to tolerate additional imaging  If there is no contraindication, premedication for adequate pain control is recommended prior to reevaluation to reduce motion artifact and image   Degradation  3/10 MRI BRAIN:  Multiple T2 and FLAIR hyperintense foci involving supratentorial white matter and right cerebellum/middle cerebellar peduncle, 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  MS or Lyme disease could appear similarly  Further evaluation   via IV contrast-enhanced MRI recommended    Consistent with head CT      No acute ischemic disease identified by diffusion imaging      Consults and findings:  Numbness and tingling of right arm and leg  Assessment & Plan  Patient presents to ER for right-sided numbness; she also reports a history of recurrent syncopal episodes  Syncopal episodes have reportedly occurred for years, and over past few months have increased in frequency to every few weeks  Patient denies any presyncopal symptoms or provoking factors  She reports RUE numbness ("pins and needles") and difficulty coordinating RUE movement for approximately 2 weeks  She attributed this to pulling herself up from the ground after a syncopal episode  She reports RLE and right trunk numbness started 3 days ago when she was rising from a seated to standing position  She has had difficulty walking since due to decreased sensation, and reports recurrent spasms in right thigh, flexing hip involuntarily  Patient denies any facial numbness, facial droop, change in speech/vision, nausea/vomiting, vertigo, or headaches  Basic neurologic exam shows sensory deficit to light touch throughout right upper/lower extremities  Exam of strength and DTRs equivocal   Unclear how much weakness is true vs lack of effort  Patient also holding right foot in plantar flexion during attempts to check lower extremity reflexes (not flexed prior to exam)  Initial imaging reassuring  ER provider did not administer tPA due to non-disabling symptoms  Will admit for stroke pathway, including clinical monitoring and telemetry  Obtain MRI brain and TTE  Consult Neurology, PT, and OT         Recurrent syncope  Assessment & Plan  Patient reports recurrent syncopal episodes occurring for years  Episodes reportedly increasing in frequency to every few weeks  Patient denies any prior evaluation or treatment  Will evaluate in conjunction with patient's report of right-sided numbness         Ambulatory dysfunction  Assessment & Plan  Patient reports difficulty walking over past few months due to left knee injury sustained during syncopal episode  No reported prior treatment  Patient wears a knee brace as needed for support  Will consult PT/OT for evaluation, treatment, and recommendations regarding post-acute care         Morbid obesity with BMI of 45 0-49 9, adult ([facility] )  Assessment & Plan  BMI 45 2  Consult dietitian  Encourage lifestyle modifications         Type 2 diabetes mellitus with hyperglycemia, without long-term current use of insulin ([facility])  Assessment & Plan        Lab Results   Component Value Date     HGBA1C 9 7 (A) 02/07/2020         No results for input(s): POCGLU in the last 72 hours      Blood Sugar Average: Last 72 hrs:     Diabetes uncontrolled as evidenced by elevated A1c  Continue outpatient dose of metformin  Start insulin protocol with blood glucose AC and HS       Consultation - Neurology        Assessment:  Aleks Sena is a 63 y o  female with hypertension, diabetes mellitus type 2, and obesity, who presents with right sided paresthesias and weakness  MRI brain and C-spine pending  Cannot entirely rule out small acute CVA, so patient is on the stroke pathway  Differential also includes radiculopathy, but this would not explain the subtle facial asymmetry          Plan:  - Stroke pathway  · MRI brain and C spine pending  · Echocardiogram pending   · Aspirin 81 mg daily   · Atorvastatin 40 mg daily   · Continue telemetry  · PT/OT/ST  · Frequent neuro checks  Continue to monitor and notify neurology with any changes  - Medical management and supportive care per primary team  Correction of any metabolic or infectious disturbances          Test Results during the observation period  Pertinent Lab tests (dates/results):  Culture results (blood, urine, spinal, wound, respiratory, etc ):  Imaging tests (dates/results):  EKG (dates/results):   Other test (dates/results):  Tests pending (dates/results):    Surgical or Invasive Procedures during the observation period  Name of surgery/procedure:  Date & Time:  Patient Response:  Post-operative orders:  Operative Report/Findings:    Response to Treatment, Major Change in Condition, Major Charge in Treatment during the observation period          ADMISSION:    DIRECT Admissions Only:    · Presenting Signs/Symptoms:   ·   · Medication/treatment prior to arrival:  ·   · Past Medical History:  ·   · Clinical Exam on admission:  ·   · Vital Signs on admission: (Temp, Pulse, Resp, and BP)  ·   · Weight in kilograms:     ALL Admissions:    Admission Orders and Other Orders written within the first 24 hrs after admission   Tele ,  Consult neurology,  PT/OT evals & treat;  VS/Neuro cks q 1 hr x4;  q 2 hr x4;  q 4 hrs   Sequential compression device to b/l LE;  up w/assist;  accucks qid w/Sliding scale insulin     Meds Name, dose, route, time, how may doses given:  enoxaparin 40 mg Subcutaneous BID   insulin lispro 1-6 Units Subcutaneous TID AC   insulin lispro 1-6 Units Subcutaneous HS   lisinopril 10 mg Oral Daily         PRN Meds Name, dose, route, time, how many doses given within the first 24 hrs :    acetaminophen 650 mg Oral Q6H PRN   aluminum-magnesium hydroxide-simethicone 30 mL Oral Q4H PRN   docusate sodium 100 mg Oral BID PRN   hydrALAZINE 10 mg Intravenous Q6H PRN   HYDROmorphone 0 5 mg Intravenous Once PRN   LORazepam 0 5 mg Intravenous Q30 Min PRN   ondansetron 4 mg Intravenous Q4H PRN   ondansetron 4 mg Intravenous Once PRN         IVs Type, rate, and total amt  ordered/given:  Labs, imaging, other:  3/11 MRI BRAIN:  Redemonstration of multiple supratentorial and infratentorial scattered areas of white matter FLAIR signal hyperintensity, as described above  Many of the lesions demonstrate a perpendicular configuration of the ventricles and lesions are seen involving   the callosal septal interface  Imaging appearance is most suggestive of demyelinating disease/multiple sclerosis with areas of active demyelination as correlated with dedicated MRI of the cervical spine      3/11 MRI CERVICAL SPINE:Postcontrast imaging demonstrates enhancement associated with multiple previously described foci of T2 prolongation within the cervical and upper thoracic CORD  Imaging appearance as correlated with dedicated MRI of the brain is most suggestive of   demyelinating disease/multiple sclerosis     3/12 CT CHEST/ABD/PELVIS:   1   No evidence of primary malignancy or metastatic disease in the chest, abdomen or pelvis  2   Scattered calcified granulomata in the thorax  3   Anterior pelvic wall hernia containing nondilated segment of transverse colon redemonstrated, similar to the prior CT from 2 months ago  No evidence of bowel obstruction  4   Steatosis     3/13 IR LUMBAR PUNCTURE:FINDINGS:  CSF appear grossly clear  A total of 16 mL of clear cerebral spinal fluid was obtained and sent for analysis      IMPRESSION:  Successful lumbar puncture under fluoroscopic guidance  3/11 ECHO:LEFT VENTRICLE:  Systolic function was normal by visual assessment  Ejection fraction was estimated to be 60 %  There were no regional wall motion abnormalities  Wall thickness was mildly increased  There was very mild assymetrical hypertrophy  The septum measures 1 2cm  Doppler parameters were consistent with abnormal left ventricular relaxation (grade 1 diastolic dysfunction)      ATRIAL SEPTUM:  No defect or patent foramen ovale was identified      MITRAL VALVE:  There was mild regurgitation      AORTIC VALVE:  The valve was trileaflet  Leaflets exhibited normal thickness, moderate calcification, normal cuspal separation, and good mobility  The left coronary cusp demonstrated moderate calcification  There was no evidence for stenosis      PULMONIC VALVE:  There was mild regurgitation  Consults and findings:    Numbness and tingling of right arm and leg  Assessment & Plan  Patient presents to ER for right-sided numbness; she also reports a history of recurrent syncopal episodes    She reports RUE numbness ("pins and needles") and difficulty coordinating RUE movement for approximately 2 weeks  She reports RLE and right trunk numbness started 3 days ago when she was rising from a seated to standing position        -CT head negative  -neurology consultation appreciated  -MRI brain revealed multiple T2 and FLAIR hyperintense foci involving supratentorial white matter and right cerebellum  -plan for further evaluation with MRI with contrast  -echocardiogram revealed ejection fraction 60%, no wall motion abnormalities, diastolic dysfunction type 1, no defect or patent foramina ovale  -discontinue aspirin statin  -neurochecks        Recurrent syncope  Assessment & Plan  Patient reports recurrent syncopal episodes occurring for years  Episodes reportedly increasing in frequency to every few weeks  Patient denies any prior evaluation or treatment  Will evaluate in conjunction with patient's report of right-sided numbness         Ambulatory dysfunction  Assessment & Plan  Patient reports difficulty walking over past few months due to left knee injury sustained during syncopal episode  No reported prior treatment  Patient wears a knee brace as needed for support     -PT recommending home PT      Benign essential hypertension  Assessment & Plan  Continue with lisinopril     Morbid obesity with BMI of 45 0-49 9, adult ([facility])  Assessment & Plan  BMI 45 2  Encourage lifestyle modifications         Type 2 diabetes mellitus with hyperglycemia, without long-term current use of insulin ([facility])  Assessment & Plan        Lab Results   Component Value Date     HGBA1C 9 7 (A) 02/07/2020      (P) 165 625   -hold metformin  -monitor Accu-Cheks, sliding scale for coverage  -Patient refusing insulin coverage stating it causes "amnesia"     Current Patient Status: Inpatient   Certification Statement: The patient will continue to require additional inpatient hospital stay due to MRI brain without contrast     3/11 NEUROLOGY NOTE:    MRI brain demonstrates multiple enhancing lesions concerning for metastatic disease versus demyelination      - hold Lovenox for anticipation of lumbar puncture tomorrow  - CSF orders were in  - Decadron 10 mg IV once followed by 4 mg Q 6  - CT chest abdomen and pelvis with contrast pending  - if solid  tumor present, recommend consultation for neurosurgery or Radiation Oncology specifically for cervical spine lesion  -will follow              3/12:  Progress Note - Neurology   Shannan Senate 63 y o  female [phone]  Unit/Bed#: East 4 /E4 MS 26-*     Assessment:  3 63year-old with HTN, DM, and obesity who presented with right sided paresthesias and weakness  MR imaging with contrast revealed multiple supratentorial and infratentorial white matter FLAIR hyperintensities and post contrast enhancement with multiple foci of T2 prolongation within the cervical and upper thoracic cord  Concern for metastatic disease vs demyelination      Plan:  - CT CAP pending  - Recommend LP  Pending CSF labs: ACE, culture and gram stain, WBC, glucose, LIZZETTE virus, cytometry, ME panel, RBC, MS panel, protein  - DVT prophylaxis on hold in preparation of lumbar puncture  - Patient was given Decadron 10mg X 1 dose  - Continue Decadron 4mg Q 6 hrs  - Continue Klonopin 0 5mg QHS  - Continue Benadryl 50mg QHS  - Neuro checks  - Notify Neurology with any changes in neuro examination     Results:  1  MRI cervical spine with contrast: Postcontrast imaging demonstrates enhancement associated with multiple previously described foci of T2 prolongation within the cervical and upper thoracic CORD   Imaging appearance as correlated with dedicated MRI of the brain is most suggestive of demyelinating disease/multiple sclerosis    2  MRI brain with contrast: Redemonstration of multiple supratentorial and infratentorial scattered areas of white matter FLAIR signal hyperintensity, as described above   Many of the lesions demonstrate a perpendicular configuration of the ventricles and lesions are seen involving the callosal septal interface   Imaging appearance is most suggestive of demyelinating disease/multiple sclerosis with areas of active demyelination as correlated with dedicated MRI of the cervical spine  3  Echocardiogram: EF 60%  The left atrium size was normal  No PFO was identified         Test Results after admission  Pertinent Lab tests (dates/results):  Lab Units 03/11/20  0524 03/10/20  0452 03/09/20  0907   WBC Thousand/uL 9 21 9 72 9 58   HEMOGLOBIN g/dL 13 0 13 0 13 5   HEMATOCRIT % 39 3 41 1 40 9   PLATELETS Thousands/uL 265 290 272   NEUTROS ABS Thousands/µL 5 65 6 31 6 58                 Results from last 7 days   Lab Units 03/11/20  0524 03/10/20  0452 03/09/20  0907   SODIUM mmol/L 138 137 139   POTASSIUM mmol/L 3 8 4 0 4 0   CHLORIDE mmol/L 103 103 103   CO2 mmol/L 24 26 26   ANION GAP mmol/L 11 8 10   BUN mg/dL 13 12 10   CREATININE mg/dL 0 69 0 73 0 78   EGFR ml/min/1 73sq m 96 91 84   CALCIUM mg/dL 10 8* 10 6* 10 9*   MAGNESIUM mg/dL  --  1 7  --    PHOSPHORUS mg/dL  --  3 4  --            Results from last 7 days   Lab Units 03/10/20  0452   AST U/L 16   ALT U/L 24   ALK PHOS U/L 91   TOTAL PROTEIN g/dL 7 2   ALBUMIN g/dL 3 1*   TOTAL BILIRUBIN mg/dL 1 31*                  Results from last 7 days   Lab Units 03/11/20  1148 03/11/20  0731 03/10/20  2240 03/10/20  1505 03/10/20  1143 03/10/20  0745 03/09/20  2034 03/09/20  1511   POC GLUCOSE mg/dl 196* 155* 141* 159* 212* 160* 157* 145*             Results from last 7 days   Lab Units 03/11/20  0524 03/10/20  0452 03/09/20  0907   GLUCOSE RANDOM mg/dL 163* 166* 213*                  Results from last 7 days   Lab Units 03/10/20  0452 03/09/20  0907   PROTIME seconds 12 9 13 1   INR   0 96 0 98   PTT seconds  --  31            Culture results (blood, urine, spinal, wound, respiratory, etc ):  Imaging tests (dates/results):  EKG (dates/results):   Other test (dates/results):  Tests pending (dates/results):    Surgical or Invasive Procedures  Name of surgery/procedure:  IR LUMBAR PUNCTURE Procedure Note     PATIENT NAME: Adal Gomez  : 1960  MRN: [id number]     Pre-op Diagnosis:   1  Right sided numbness    2  Acute right-sided weakness    3  Fall, initial encounter    4  HTN (hypertension)       Post-op Diagnosis:   1  Right sided numbness    2  Acute right-sided weakness    3  Fall, initial encounter    4  HTN (hypertension)          Surgeon:   Nancy Camargo MD  Assistants:      No qualified resident was available, Resident is only observing     Estimated Blood Loss:  Minimal     Findings:  Successfully much got a lumbar puncture     Specimens:  16 mL of clear cerebrospinal fluid sent for analysis      Complications:  None     Anesthesia:  Local anesthesia     Nancy Camargo MD      Date: 3/13/2020  Time: 3:07 PM        Response to Treatment, Major Change in Condition, Major Charge in Treatment anytime during admission    Disposition on Discharge  Home, Rehab, SNF, LTC, Shelter, etc : Home with [address] [facility] to [facility]reathe ([facility])  If a patient expires during an admission, in addition to the above information, please include:    Summary/timeline of the patient's decline in condition:    Medications and treatment:    Patient response to treatment:    Date and time patient ceased to breathe:        Is there a Readmission that follows this admission? Yes X No    If yes, reason for denial:          InterQual Review    InterQual Criteria Met:  Yes X No  N/A        Please include the InterQual Review, InterQual year/version used, and the criteria selected:   Patient: KENNETH AGUAYO  Review Number: [id number]  Review Status:  In Primary  Criteria Status: Not Met     Condition Specific: Yes        OUTCOMES  Outcome Type: Primary           REVIEW DETAILS     Product: [employer] Adult  Subset: Stroke      (Symptom or finding within 24h)         (Excludes PO medications unless noted)          Select Day, One:              [ ] Episode Day 1, One:                  [ ] ACUTE, All:                      [X] Thrombolysis contraindicated                      [X] Neurological deficit, >= One:                          [X] Paresis                      [X] Intervention, All:                          [X] Aspiration risk assessment and, One:                              [X] No risk and diet as tolerated                          [X] Continuous cardiac monitoring (excludes Holter)                          [X] Deep vein thrombosis (DVT) prophylaxis or patient ambulatory                          [X] Neurological assessment at least 6x/24h     Version: InterQual® 2019 1  [facility]  © 2019 [facility] and/or one of its [place]  All Rights Reserved  CPT only © 2018 [employer]  All Rights Reserved  PLEASE SUBMIT THE COMPLETED FORM TO THE DEPARTMENT OF HUMAN SERVICES - DIVISION OF CLINICAL  REVIEW VIA FAX -[phone] or VIA E-MAIL TO [email or web address]    Signature: Oriana Freeman Date:  05/14/20    Confidentiality Notice: The documents accompanying this telecopy may contain confidential information belonging to the sender  The information is intended only for the use of the individual named above  If you are not the intended recipient, you are hereby notified  That any disclosure, copying, distribution or taking of any telecopy is strictly prohibited

## 2020-05-15 ENCOUNTER — HOSPITAL ENCOUNTER (OUTPATIENT)
Facility: HOSPITAL | Age: 60
Setting detail: OBSERVATION
Discharge: HOME | End: 2020-05-16
Attending: EMERGENCY MEDICINE | Admitting: INTERNAL MEDICINE
Payer: COMMERCIAL

## 2020-05-15 ENCOUNTER — TELEMEDICINE (OUTPATIENT)
Dept: FAMILY MEDICINE CLINIC | Facility: CLINIC | Age: 60
End: 2020-05-15

## 2020-05-15 VITALS — HEART RATE: 102 BPM

## 2020-05-15 DIAGNOSIS — D72.829 LEUKOCYTOSIS: ICD-10-CM

## 2020-05-15 DIAGNOSIS — E11.65 HYPERGLYCEMIA DUE TO DIABETES MELLITUS (HCC): ICD-10-CM

## 2020-05-15 DIAGNOSIS — R00.0 TACHYCARDIA: ICD-10-CM

## 2020-05-15 DIAGNOSIS — R00.0 TACHYCARDIA: Primary | ICD-10-CM

## 2020-05-15 DIAGNOSIS — R00.2 PALPITATIONS: Primary | ICD-10-CM

## 2020-05-15 DIAGNOSIS — T38.0X1S: ICD-10-CM

## 2020-05-15 DIAGNOSIS — I10 HYPERTENSION, UNSPECIFIED TYPE: ICD-10-CM

## 2020-05-15 DIAGNOSIS — R07.89 OTHER CHEST PAIN: ICD-10-CM

## 2020-05-15 DIAGNOSIS — G35 MS (MULTIPLE SCLEROSIS) (HCC): Primary | ICD-10-CM

## 2020-05-15 PROBLEM — S14.159S: Status: ACTIVE | Noted: 2020-05-15

## 2020-05-15 LAB
ANION GAP SERPL CALCULATED.3IONS-SCNC: 7 MMOL/L (ref 4–13)
BASOPHILS # BLD AUTO: 0.02 THOUSANDS/ΜL (ref 0–0.1)
BASOPHILS NFR BLD AUTO: 0 % (ref 0–1)
BUN SERPL-MCNC: 19 MG/DL (ref 5–25)
CALCIUM SERPL-MCNC: 10.7 MG/DL (ref 8.3–10.1)
CHLORIDE SERPL-SCNC: 103 MMOL/L (ref 100–108)
CO2 SERPL-SCNC: 27 MMOL/L (ref 21–32)
CREAT SERPL-MCNC: 0.78 MG/DL (ref 0.6–1.3)
EOSINOPHIL # BLD AUTO: 0.03 THOUSAND/ΜL (ref 0–0.61)
EOSINOPHIL NFR BLD AUTO: 0 % (ref 0–6)
ERYTHROCYTE [DISTWIDTH] IN BLOOD BY AUTOMATED COUNT: 14.3 % (ref 11.6–15.1)
GFR SERPL CREATININE-BSD FRML MDRD: 84 ML/MIN/1.73SQ M
GLUCOSE SERPL-MCNC: 174 MG/DL (ref 65–140)
GLUCOSE SERPL-MCNC: 272 MG/DL (ref 65–140)
HCT VFR BLD AUTO: 38.7 % (ref 34.8–46.1)
HGB BLD-MCNC: 12.5 G/DL (ref 11.5–15.4)
IMM GRANULOCYTES # BLD AUTO: 0.07 THOUSAND/UL (ref 0–0.2)
IMM GRANULOCYTES NFR BLD AUTO: 1 % (ref 0–2)
LYMPHOCYTES # BLD AUTO: 1.96 THOUSANDS/ΜL (ref 0.6–4.47)
LYMPHOCYTES NFR BLD AUTO: 17 % (ref 14–44)
MCH RBC QN AUTO: 29.7 PG (ref 26.8–34.3)
MCHC RBC AUTO-ENTMCNC: 32.3 G/DL (ref 31.4–37.4)
MCV RBC AUTO: 92 FL (ref 82–98)
MONOCYTES # BLD AUTO: 0.62 THOUSAND/ΜL (ref 0.17–1.22)
MONOCYTES NFR BLD AUTO: 5 % (ref 4–12)
NEUTROPHILS # BLD AUTO: 9.14 THOUSANDS/ΜL (ref 1.85–7.62)
NEUTS SEG NFR BLD AUTO: 77 % (ref 43–75)
NRBC BLD AUTO-RTO: 0 /100 WBCS
PLATELET # BLD AUTO: 243 THOUSANDS/UL (ref 149–390)
PMV BLD AUTO: 9.6 FL (ref 8.9–12.7)
POTASSIUM SERPL-SCNC: 4.4 MMOL/L (ref 3.5–5.3)
RBC # BLD AUTO: 4.21 MILLION/UL (ref 3.81–5.12)
SODIUM SERPL-SCNC: 137 MMOL/L (ref 136–145)
TROPONIN I SERPL-MCNC: <0.02 NG/ML
TROPONIN I SERPL-MCNC: <0.02 NG/ML
WBC # BLD AUTO: 11.84 THOUSAND/UL (ref 4.31–10.16)

## 2020-05-15 PROCEDURE — 80048 BASIC METABOLIC PNL TOTAL CA: CPT | Performed by: PHYSICIAN ASSISTANT

## 2020-05-15 PROCEDURE — G2012 BRIEF CHECK IN BY MD/QHP: HCPCS | Performed by: FAMILY MEDICINE

## 2020-05-15 PROCEDURE — 85025 COMPLETE CBC W/AUTO DIFF WBC: CPT | Performed by: PHYSICIAN ASSISTANT

## 2020-05-15 PROCEDURE — 82948 REAGENT STRIP/BLOOD GLUCOSE: CPT

## 2020-05-15 PROCEDURE — 84443 ASSAY THYROID STIM HORMONE: CPT | Performed by: NURSE PRACTITIONER

## 2020-05-15 PROCEDURE — 93005 ELECTROCARDIOGRAM TRACING: CPT

## 2020-05-15 PROCEDURE — 99284 EMERGENCY DEPT VISIT MOD MDM: CPT

## 2020-05-15 PROCEDURE — T1015 CLINIC SERVICE: HCPCS | Performed by: FAMILY MEDICINE

## 2020-05-15 PROCEDURE — 84484 ASSAY OF TROPONIN QUANT: CPT | Performed by: NURSE PRACTITIONER

## 2020-05-15 PROCEDURE — 36415 COLL VENOUS BLD VENIPUNCTURE: CPT | Performed by: PHYSICIAN ASSISTANT

## 2020-05-15 PROCEDURE — 84484 ASSAY OF TROPONIN QUANT: CPT | Performed by: PHYSICIAN ASSISTANT

## 2020-05-15 PROCEDURE — 83880 ASSAY OF NATRIURETIC PEPTIDE: CPT | Performed by: NURSE PRACTITIONER

## 2020-05-15 PROCEDURE — 99285 EMERGENCY DEPT VISIT HI MDM: CPT | Performed by: PHYSICIAN ASSISTANT

## 2020-05-15 PROCEDURE — 99219 PR INITIAL OBSERVATION CARE/DAY 50 MINUTES: CPT | Performed by: NURSE PRACTITIONER

## 2020-05-15 RX ORDER — NITROGLYCERIN 0.4 MG/1
0.4 TABLET SUBLINGUAL
Status: DISCONTINUED | OUTPATIENT
Start: 2020-05-15 | End: 2020-05-16 | Stop reason: HOSPADM

## 2020-05-15 RX ORDER — ATORVASTATIN CALCIUM 40 MG/1
40 TABLET, FILM COATED ORAL EVERY EVENING
Status: DISCONTINUED | OUTPATIENT
Start: 2020-05-15 | End: 2020-05-16 | Stop reason: HOSPADM

## 2020-05-15 RX ORDER — DIAZEPAM 5 MG/1
TABLET ORAL
Qty: 2 TABLET | Refills: 0 | Status: SHIPPED | OUTPATIENT
Start: 2020-05-15 | End: 2020-05-16 | Stop reason: HOSPADM

## 2020-05-15 RX ORDER — PREDNISONE 10 MG/1
TABLET ORAL
Qty: 30 TABLET | Refills: 0 | Status: SHIPPED | OUTPATIENT
Start: 2020-05-15 | End: 2020-08-31

## 2020-05-15 RX ORDER — ACETAMINOPHEN 325 MG/1
650 TABLET ORAL EVERY 4 HOURS PRN
Status: DISCONTINUED | OUTPATIENT
Start: 2020-05-15 | End: 2020-05-16 | Stop reason: HOSPADM

## 2020-05-15 RX ORDER — INSULIN ASPART 100 [IU]/ML
15 INJECTION, SUSPENSION SUBCUTANEOUS
Status: DISCONTINUED | OUTPATIENT
Start: 2020-05-15 | End: 2020-05-16 | Stop reason: HOSPADM

## 2020-05-15 RX ORDER — PANTOPRAZOLE SODIUM 40 MG/1
40 TABLET, DELAYED RELEASE ORAL
Status: DISCONTINUED | OUTPATIENT
Start: 2020-05-16 | End: 2020-05-16 | Stop reason: HOSPADM

## 2020-05-15 RX ORDER — ASPIRIN 81 MG/1
81 TABLET, CHEWABLE ORAL DAILY
Status: DISCONTINUED | OUTPATIENT
Start: 2020-05-16 | End: 2020-05-16 | Stop reason: HOSPADM

## 2020-05-15 RX ORDER — INSULIN ASPART 100 [IU]/ML
18 INJECTION, SUSPENSION SUBCUTANEOUS
Status: DISCONTINUED | OUTPATIENT
Start: 2020-05-16 | End: 2020-05-16 | Stop reason: HOSPADM

## 2020-05-15 RX ORDER — INSULIN ASPART 100 [IU]/ML
15 INJECTION, SUSPENSION SUBCUTANEOUS
COMMUNITY
End: 2020-09-09 | Stop reason: SDUPTHER

## 2020-05-15 RX ORDER — ASPIRIN 325 MG
325 TABLET ORAL ONCE
Status: COMPLETED | OUTPATIENT
Start: 2020-05-15 | End: 2020-05-15

## 2020-05-15 RX ORDER — LISINOPRIL 20 MG/1
20 TABLET ORAL DAILY
Status: DISCONTINUED | OUTPATIENT
Start: 2020-05-16 | End: 2020-05-16 | Stop reason: HOSPADM

## 2020-05-15 RX ADMIN — INSULIN ASPART 15 UNITS: 100 INJECTION, SUSPENSION SUBCUTANEOUS at 22:49

## 2020-05-15 RX ADMIN — ASPIRIN 325 MG ORAL TABLET 325 MG: 325 PILL ORAL at 22:49

## 2020-05-15 RX ADMIN — ATORVASTATIN CALCIUM 40 MG: 40 TABLET, FILM COATED ORAL at 22:49

## 2020-05-16 VITALS
OXYGEN SATURATION: 98 % | SYSTOLIC BLOOD PRESSURE: 156 MMHG | RESPIRATION RATE: 18 BRPM | WEIGHT: 282.41 LBS | TEMPERATURE: 97.4 F | HEIGHT: 67 IN | DIASTOLIC BLOOD PRESSURE: 88 MMHG | HEART RATE: 82 BPM | BODY MASS INDEX: 44.33 KG/M2

## 2020-05-16 PROBLEM — R00.2 PALPITATIONS: Status: RESOLVED | Noted: 2020-05-15 | Resolved: 2020-05-16

## 2020-05-16 PROBLEM — R07.89 OTHER CHEST PAIN: Status: RESOLVED | Noted: 2020-05-15 | Resolved: 2020-05-16

## 2020-05-16 LAB
ALBUMIN SERPL BCP-MCNC: 2.9 G/DL (ref 3.5–5)
ALP SERPL-CCNC: 55 U/L (ref 46–116)
ALT SERPL W P-5'-P-CCNC: 24 U/L (ref 12–78)
ANION GAP SERPL CALCULATED.3IONS-SCNC: 8 MMOL/L (ref 4–13)
AST SERPL W P-5'-P-CCNC: 11 U/L (ref 5–45)
ATRIAL RATE: 77 BPM
BASOPHILS # BLD AUTO: 0.02 THOUSANDS/ΜL (ref 0–0.1)
BASOPHILS NFR BLD AUTO: 0 % (ref 0–1)
BILIRUB SERPL-MCNC: 0.58 MG/DL (ref 0.2–1)
BUN SERPL-MCNC: 19 MG/DL (ref 5–25)
CALCIUM SERPL-MCNC: 9.9 MG/DL (ref 8.3–10.1)
CHLORIDE SERPL-SCNC: 107 MMOL/L (ref 100–108)
CHOLEST SERPL-MCNC: 207 MG/DL (ref 50–200)
CO2 SERPL-SCNC: 26 MMOL/L (ref 21–32)
CREAT SERPL-MCNC: 0.68 MG/DL (ref 0.6–1.3)
EOSINOPHIL # BLD AUTO: 0.12 THOUSAND/ΜL (ref 0–0.61)
EOSINOPHIL NFR BLD AUTO: 1 % (ref 0–6)
ERYTHROCYTE [DISTWIDTH] IN BLOOD BY AUTOMATED COUNT: 14.6 % (ref 11.6–15.1)
GFR SERPL CREATININE-BSD FRML MDRD: 97 ML/MIN/1.73SQ M
GLUCOSE SERPL-MCNC: 169 MG/DL (ref 65–140)
GLUCOSE SERPL-MCNC: 174 MG/DL (ref 65–140)
GLUCOSE SERPL-MCNC: 207 MG/DL (ref 65–140)
HCT VFR BLD AUTO: 35.5 % (ref 34.8–46.1)
HDLC SERPL-MCNC: 48 MG/DL
HGB BLD-MCNC: 11.4 G/DL (ref 11.5–15.4)
IMM GRANULOCYTES # BLD AUTO: 0.05 THOUSAND/UL (ref 0–0.2)
IMM GRANULOCYTES NFR BLD AUTO: 1 % (ref 0–2)
INR PPP: 0.92 (ref 0.84–1.19)
LDLC SERPL CALC-MCNC: 109 MG/DL (ref 0–100)
LYMPHOCYTES # BLD AUTO: 3.31 THOUSANDS/ΜL (ref 0.6–4.47)
LYMPHOCYTES NFR BLD AUTO: 32 % (ref 14–44)
MAGNESIUM SERPL-MCNC: 1.9 MG/DL (ref 1.6–2.6)
MCH RBC QN AUTO: 29.9 PG (ref 26.8–34.3)
MCHC RBC AUTO-ENTMCNC: 32.1 G/DL (ref 31.4–37.4)
MCV RBC AUTO: 93 FL (ref 82–98)
MONOCYTES # BLD AUTO: 0.71 THOUSAND/ΜL (ref 0.17–1.22)
MONOCYTES NFR BLD AUTO: 7 % (ref 4–12)
NEUTROPHILS # BLD AUTO: 6.03 THOUSANDS/ΜL (ref 1.85–7.62)
NEUTS SEG NFR BLD AUTO: 59 % (ref 43–75)
NRBC BLD AUTO-RTO: 0 /100 WBCS
NT-PROBNP SERPL-MCNC: 58 PG/ML
P AXIS: 72 DEGREES
PHOSPHATE SERPL-MCNC: 2.9 MG/DL (ref 2.7–4.5)
PLATELET # BLD AUTO: 202 THOUSANDS/UL (ref 149–390)
PMV BLD AUTO: 9.8 FL (ref 8.9–12.7)
POTASSIUM SERPL-SCNC: 3.6 MMOL/L (ref 3.5–5.3)
PR INTERVAL: 150 MS
PROT SERPL-MCNC: 6.1 G/DL (ref 6.4–8.2)
PROTHROMBIN TIME: 12.5 SECONDS (ref 11.6–14.5)
QRS AXIS: 31 DEGREES
QRSD INTERVAL: 72 MS
QT INTERVAL: 366 MS
QTC INTERVAL: 414 MS
RBC # BLD AUTO: 3.81 MILLION/UL (ref 3.81–5.12)
SODIUM SERPL-SCNC: 141 MMOL/L (ref 136–145)
T WAVE AXIS: 67 DEGREES
TRIGL SERPL-MCNC: 251 MG/DL
TROPONIN I SERPL-MCNC: <0.02 NG/ML
TSH SERPL DL<=0.05 MIU/L-ACNC: 0.74 UIU/ML (ref 0.36–3.74)
VENTRICULAR RATE: 77 BPM
WBC # BLD AUTO: 10.24 THOUSAND/UL (ref 4.31–10.16)

## 2020-05-16 PROCEDURE — 83735 ASSAY OF MAGNESIUM: CPT | Performed by: NURSE PRACTITIONER

## 2020-05-16 PROCEDURE — 80061 LIPID PANEL: CPT | Performed by: NURSE PRACTITIONER

## 2020-05-16 PROCEDURE — 93010 ELECTROCARDIOGRAM REPORT: CPT | Performed by: INTERNAL MEDICINE

## 2020-05-16 PROCEDURE — 82948 REAGENT STRIP/BLOOD GLUCOSE: CPT

## 2020-05-16 PROCEDURE — 84100 ASSAY OF PHOSPHORUS: CPT | Performed by: NURSE PRACTITIONER

## 2020-05-16 PROCEDURE — 85610 PROTHROMBIN TIME: CPT | Performed by: FAMILY MEDICINE

## 2020-05-16 PROCEDURE — 99244 OFF/OP CNSLTJ NEW/EST MOD 40: CPT

## 2020-05-16 PROCEDURE — 85025 COMPLETE CBC W/AUTO DIFF WBC: CPT | Performed by: NURSE PRACTITIONER

## 2020-05-16 PROCEDURE — NC001 PR NO CHARGE

## 2020-05-16 PROCEDURE — 99217 PR OBSERVATION CARE DISCHARGE MANAGEMENT: CPT | Performed by: INTERNAL MEDICINE

## 2020-05-16 PROCEDURE — 80053 COMPREHEN METABOLIC PANEL: CPT | Performed by: NURSE PRACTITIONER

## 2020-05-16 PROCEDURE — 84484 ASSAY OF TROPONIN QUANT: CPT | Performed by: NURSE PRACTITIONER

## 2020-05-16 RX ORDER — ASPIRIN 81 MG/1
81 TABLET, CHEWABLE ORAL DAILY
Refills: 0
Start: 2020-05-17 | End: 2021-06-30 | Stop reason: SDUPTHER

## 2020-05-16 RX ORDER — METOPROLOL SUCCINATE 25 MG/1
25 TABLET, EXTENDED RELEASE ORAL DAILY
Status: DISCONTINUED | OUTPATIENT
Start: 2020-05-16 | End: 2020-05-16 | Stop reason: HOSPADM

## 2020-05-16 RX ORDER — METOPROLOL SUCCINATE 25 MG/1
25 TABLET, EXTENDED RELEASE ORAL DAILY
Qty: 30 TABLET | Refills: 0 | Status: SHIPPED | OUTPATIENT
Start: 2020-05-17 | End: 2020-05-21 | Stop reason: SDUPTHER

## 2020-05-16 RX ORDER — ATORVASTATIN CALCIUM 40 MG/1
40 TABLET, FILM COATED ORAL EVERY EVENING
Qty: 30 TABLET | Refills: 0 | Status: SHIPPED | OUTPATIENT
Start: 2020-05-16 | End: 2020-06-24 | Stop reason: SDUPTHER

## 2020-05-16 RX ORDER — PANTOPRAZOLE SODIUM 40 MG/1
40 TABLET, DELAYED RELEASE ORAL
Qty: 30 TABLET | Refills: 0 | Status: SHIPPED | OUTPATIENT
Start: 2020-05-16 | End: 2021-01-19 | Stop reason: ALTCHOICE

## 2020-05-16 RX ADMIN — ASPIRIN 81 MG 81 MG: 81 TABLET ORAL at 09:00

## 2020-05-16 RX ADMIN — INSULIN ASPART 18 UNITS: 100 INJECTION, SUSPENSION SUBCUTANEOUS at 08:59

## 2020-05-16 RX ADMIN — METOPROLOL SUCCINATE 25 MG: 25 TABLET, EXTENDED RELEASE ORAL at 12:23

## 2020-05-16 RX ADMIN — PANTOPRAZOLE SODIUM 40 MG: 40 TABLET, DELAYED RELEASE ORAL at 06:18

## 2020-05-16 RX ADMIN — LISINOPRIL 20 MG: 20 TABLET ORAL at 09:00

## 2020-05-18 ENCOUNTER — TELEPHONE (OUTPATIENT)
Dept: CARDIOLOGY CLINIC | Facility: CLINIC | Age: 60
End: 2020-05-18

## 2020-05-18 ENCOUNTER — TRANSITIONAL CARE MANAGEMENT (OUTPATIENT)
Dept: FAMILY MEDICINE CLINIC | Facility: CLINIC | Age: 60
End: 2020-05-18

## 2020-05-19 ENCOUNTER — HOSPITAL ENCOUNTER (OUTPATIENT)
Dept: NON INVASIVE DIAGNOSTICS | Facility: CLINIC | Age: 60
Discharge: HOME | End: 2020-05-19
Payer: COMMERCIAL

## 2020-05-19 DIAGNOSIS — R07.89 OTHER CHEST PAIN: ICD-10-CM

## 2020-05-19 PROCEDURE — A9502 TC99M TETROFOSMIN: HCPCS

## 2020-05-19 PROCEDURE — 93016 CV STRESS TEST SUPVJ ONLY: CPT | Performed by: INTERNAL MEDICINE

## 2020-05-19 PROCEDURE — 78452 HT MUSCLE IMAGE SPECT MULT: CPT | Performed by: INTERNAL MEDICINE

## 2020-05-19 PROCEDURE — 93017 CV STRESS TEST TRACING ONLY: CPT

## 2020-05-19 PROCEDURE — 93018 CV STRESS TEST I&R ONLY: CPT | Performed by: INTERNAL MEDICINE

## 2020-05-19 PROCEDURE — 78452 HT MUSCLE IMAGE SPECT MULT: CPT

## 2020-05-19 RX ADMIN — REGADENOSON 0.4 MG: 0.08 INJECTION, SOLUTION INTRAVENOUS at 10:30

## 2020-05-20 ENCOUNTER — PATIENT OUTREACH (OUTPATIENT)
Dept: FAMILY MEDICINE CLINIC | Facility: CLINIC | Age: 60
End: 2020-05-20

## 2020-05-20 NOTE — UTILIZATION REVIEW
Initial Clinical Review    Admission: Date/Time/Statement:   Admission Orders (From admission, onward)     Ordered        03/11/20 1546  Inpatient Admission  Once         03/09/20 1223  Place in Observation (expected length of stay for this patient is less than two midnights)  Once                   Orders Placed This Encounter   Procedures    Place in Observation (expected length of stay for this patient is less than two midnights)     Standing Status:   Standing     Number of Occurrences:   1     Order Specific Question:   Admitting Physician     Answer:   Sultana Strickland [[id number]]     Order Specific Question:   Level of Care     Answer:   Med Surg [16]    Inpatient Admission     Standing Status:   Standing     Number of Occurrences:   1     Order Specific Question:   Admitting Physician     Answer:   Mary Jacob     Order Specific Question:   Level of Care     Answer:   Med Surg [16]     Order Specific Question:   Estimated length of stay     Answer:   More than 2 Midnights     Order Specific Question:   Certification     Answer:   I certify that inpatient services are medically necessary for this patient for a duration of greater than two midnights  See H&P and MD Progress Notes for additional information about the patient's course of treatment  ED Arrival Information     Expected Arrival Acuity Means of Arrival Escorted By Service Admission Type    - 3/9/2020 08:16 Urgent Ambulance [facility] Hospitalist Urgent    Arrival Complaint    fall        Chief Complaint   Patient presents with    Fall     Pt brought via EMS  Pt fell around 5 am  Denies LOC  Denies hitting head  Pt reports falling 2 weeks ago, increased right sided numbness / weakness since previous fall (2 weeks ago)      Assessment/Plan:   62 yo obese female,  To ER via EMS ,  Admitted OBS status for workup of reported Rt sided numbness/weakness     Pt reports h/o recurrent syncope "for years", increasing in frequency past few months +  Left knee injury (? When) sustained w/previous fall, wears knee brace for support  Pt reports symptoms worsening since fall 2 week ago:  Numbness extends from RUE down rt side of body in  Rt foot  Per pt this numbness caused her to fall again today  No head injury or LOC  No external signs of trauma  IN ER,  Pt unable to ambulate w/out RW and assist of 1  A/O x3 - normal neuro exam   GCS 15  NIH score 0  Basic neurologic exam shows sensory deficit to light touch throughout right upper/lower extremities  Exam of strength and DTRs equivocal   Unclear how much weakness is true vs lack of effort  Patient also holding right foot in plantar flexion during attempts to check lower extremity reflexes (not flexed prior to exam)  Initial imaging reassuring  ER provider did not administer tPA due to non-disabling symptoms  Will admit for stroke r/o:  Tele monitoring, serial neuro cks, MRI brain and TTE, neuro consult,  PT/OT evaluations  3/10  NEUROLOGY -  MRI brain and C-spine pending  Cannot entirely rule out small acute CVA, so patient is on the stroke pathway  Differential also includes radiculopathy, but this would not explain the subtle facial asymmetry  Continue current plan/diagnostics/ supportive care        ED Triage Vitals [03/09/20 0821]   Temperature Pulse Respirations Blood Pressure SpO2   97 8 °F (36 6 °C) 91 18 (!) 180/86 94 %      Temp Source Heart Rate Source Patient Position - Orthostatic VS BP Location FiO2 (%)   Oral Monitor Lying Left arm --      Pain Score       No Pain          Wt Readings from Last 1 Encounters:   05/15/20 128 kg (282 lb 6 6 oz)     Additional Vital Signs:    03/09/20 1600  97 9 °F (36 6 °C)  82  20  164/107Abnormal    03/09/20 1500  97 6 °F (36 4 °C)  89  20  201/108Abnormal    03/09/20 1400  98 1 °F (36 7 °C)  85  18  179/92Abnormal    03/09/20 1300  97 4 °F  91  18  171/73Abnormal      03/10/20 0700  97 °F (36 1 °C)Abnormal   85  18  154/81       Pertinent Labs/Diagnostic Test Results:   cxr - none   3/9  CT head -  No acute intracranial abnormality  Microangiopathic changes    3/9  EKG -  NSR  3/9  ECHO -  (no report available at time of initial review)     Results from last 7 days   Lab Units 05/16/20  0457 05/15/20  1850   WBC Thousand/uL 10 24* 11 84*   HEMOGLOBIN g/dL 11 4* 12 5   HEMATOCRIT % 35 5 38 7   PLATELETS Thousands/uL 202 243   NEUTROS ABS Thousands/µL 6 03 9 14*         Results from last 7 days   Lab Units 05/16/20  0457 05/15/20  1850   SODIUM mmol/L 141 137   POTASSIUM mmol/L 3 6 4 4   CHLORIDE mmol/L 107 103   CO2 mmol/L 26 27   ANION GAP mmol/L 8 7   BUN mg/dL 19 19   CREATININE mg/dL 0 68 0 78   EGFR ml/min/1 73sq m 97 84   CALCIUM mg/dL 9 9 10 7*   MAGNESIUM mg/dL 1 9  --    PHOSPHORUS mg/dL 2 9  --      Results from last 7 days   Lab Units 05/16/20  0457   AST U/L 11   ALT U/L 24   ALK PHOS U/L 55   TOTAL PROTEIN g/dL 6 1*   ALBUMIN g/dL 2 9*   TOTAL BILIRUBIN mg/dL 0 58     Results from last 7 days   Lab Units 05/16/20  1106 05/16/20  0720 05/15/20  2242   POC GLUCOSE mg/dl 174* 169* 174*     Results from last 7 days   Lab Units 05/16/20  0457 05/15/20  1850   GLUCOSE RANDOM mg/dL 207* 272*         Results from last 7 days   Lab Units 05/16/20  0457   PROTIME seconds 12 5   INR  0 92   ED Treatment:   Medication Administration from 03/09/2020 0816 to 03/09/2020 1349       Date/Time Order Dose Route Action Action by Comments     03/09/2020 1201 aspirin chewable tablet 324 mg 324 mg Oral Given Nichelle Brennan RN         Past Medical History:   Diagnosis Date    Diabetes mellitus ([address] )     External hemorrhoids     last assessed 4/7/16, resolved 7/21/16    Hernia, ventral     last assessed 12/14/15, resolved 7/21/16    Hypertension     Incarcerated incisional hernia     last assessed 12/21/15, resolved 7/21/16    Insomnia     last assessed 12/8/16, resolved 10/9/17    Morbid obesity with BMI of 45 0-49 9, adult ([facility] ) 5/3/2017    MS (multiple sclerosis) ([facility] )     Stroke (cerebrum) ([place] [address] ) 03/09/2020    Type 2 diabetes mellitus with hyperglycemia, without long-term current use of insulin (HCC)      Present on Admission:   Type 2 diabetes mellitus with hyperglycemia, with long-term current use of insulin (HCC)   Numbness and tingling of right arm and leg   Ambulatory dysfunction   Benign essential hypertension    Admitting Diagnosis: HTN (hypertension) [I10]  Age/Sex: 63 y o  female     Admission Orders:  Admit  Tele ,  Consult neurology,  PT/OT evals & treat;  VS/Neuro cks q 1 hr x4;  q 2 hr x4;  q 4 hrs   Dysphagia assessment prior to po;  Baseline NIH scale;  Echo;  MRI brain  Sequential compression device to b/l LE;  up w/assist;  accucks qid w/Sliding scale insulin     Scheduled Medications:    No current facility-administered medications for this encounter  Continuous IV Infusions:    No current facility-administered medications for this encounter  PRN Meds:    No current facility-administered medications for this encounter  Network Utilization Review Department  [email or web address]  ATTENTION: Please call with any questions or concerns to [phone] and carefully listen to the prompts so that you are directed to the right person  All voicemails are confidential   Aparna Perry all requests for admission clinical reviews, approved or denied determinations and any other requests to dedicated fax number below belonging to the campus where the patient is receiving treatment   List of dedicated fax numbers for the Facilities:  [address] [place] [address] DENIALS (Administrative/Medical Necessity) [phone]   [address] [place] [address] (Maternity/NICU/Pediatrics) [facility] [address] [phone] Glenis Bonilla [phone]   [place] [id number] [facility] [phone]   Arsen Mathis [phone]   [address] [facility]  [phone]   [address] [place] [address] [address] [facility] [phone]

## 2020-05-20 NOTE — UTILIZATION REVIEW
Continued Stay Review    Date:    3/11/20                          Current Patient Class:   Observation  Current Level of Care:   Med surg    HPI:59 y o  female initially admitted on   Observation   3/9  @   1223     IP order  Entered  3/11 @   66 [phone] 32    03/11/20 1547  Inpatient Admission Once     Transfer Service: General Medicine       Question Answer Comment   Admitting Physician SANDI PASTOR    Level of Care Med Surg    Estimated length of stay More than 2 Midnights    Certification I certify that inpatient services are medically necessary for this patient for a duration of greater than two midnights  See H&P and MD Progress Notes for additional information about the patient's course of treatment  03/11/20 1546         Assessment/Plan:   3/11   Mri  Results  Below  Stroke pathway and neuro checks  D/c  Pertinent Labs/Diagnostic Results:   MRI  Brain  ( 3/11)     Multiple T2 and FLAIR hyperintense foci involving supratentorial white matter and right cerebellum/middle cerebellar peduncle, 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   MS or Lyme disease could appear similarly   Further evaluation   via IV contrast-enhanced MRI recommended   Consistent with head CT      No acute ischemic disease identified by diffusion imaging  Results from last 7 days   Lab Units 05/16/20  0457 05/15/20  1850   WBC Thousand/uL 10 24* 11 84*   HEMOGLOBIN g/dL 11 4* 12 5   HEMATOCRIT % 35 5 38 7   PLATELETS Thousands/uL 202 243   NEUTROS ABS Thousands/µL 6 03 9 14*         Results from last 7 days   Lab Units 05/16/20  0457 05/15/20  1850   SODIUM mmol/L 141 137   POTASSIUM mmol/L 3 6 4 4   CHLORIDE mmol/L 107 103   CO2 mmol/L 26 27   ANION GAP mmol/L 8 7   BUN mg/dL 19 19   CREATININE mg/dL 0 68 0 78   EGFR ml/min/1 73sq m 97 84   CALCIUM mg/dL 9 9 10 7*   MAGNESIUM mg/dL 1 9  --    PHOSPHORUS mg/dL 2 9  --      Results from last 7 days   Lab Units 05/16/20  0457   AST U/L 11   ALT U/L 24   ALK PHOS U/L 55   TOTAL PROTEIN g/dL 6 1*   ALBUMIN g/dL 2 9*   TOTAL BILIRUBIN mg/dL 0 58     Results from last 7 days   Lab Units 05/16/20  1106 05/16/20  0720 05/15/20  2242   POC GLUCOSE mg/dl 174* 169* 174*     Results from last 7 days   Lab Units 05/16/20  0457 05/15/20  1850   GLUCOSE RANDOM mg/dL 207* 272*           Results from last 7 days   Lab Units 05/16/20  0457   PROTIME seconds 12 5   INR  0 92     Results from last 7 days   Lab Units 05/15/20  1850   TSH 3RD GENERATON uIU/mL 0 740                 Vital Signs:   96 9 °F (36 1 °C)Abnormal   86  18  146/86  --  96 %  None (Room air)          Medications:   Scheduled Medications:    Medications:  enoxaparin 40 mg Subcutaneous BID   insulin lispro 1-6 Units Subcutaneous TID AC   insulin lispro 1-6 Units Subcutaneous HS   lisinopril 10 mg Oral Daily     Continuous IV Infusions:    No current facility-administered medications for this encounter  PRN Meds:    acetaminophen 650 mg Oral Q6H PRN   aluminum-magnesium hydroxide-simethicone 30 mL Oral Q4H PRN   docusate sodium 100 mg Oral BID PRN   hydrALAZINE 10 mg Intravenous Q6H PRN   HYDROmorphone 0 5 mg Intravenous Once PRN   LORazepam 0 5 mg Intravenous Q30 Min PRN   ondansetron 4 mg Intravenous Q4H PRN   ondansetron 4 mg Intravenous Once PRN       Discharge Plan:    TBD    Network Utilization Review Department  [email or web address]  ATTENTION: Please call with any questions or concerns to [phone] and carefully listen to the prompts so that you are directed to the right person  All voicemails are confidential   Marvin Marte all requests for admission clinical reviews, approved or denied determinations and any other requests to dedicated fax number below belonging to the campus where the patient is receiving treatment   List of dedicated fax numbers for the Facilities:  [address] [place] [address] DENIALS (Administrative/Medical Necessity) [phone]   [address] [place] [address] (Maternity/NICU/Pediatrics) [phone]     [facility] [phone]   Ivette Hollis [phone]   Glenis Bonilla [phone]   Ozzy Fischer [facility] [address] [facility] [phone]   [facility]  [phone]   [address] [facility]  [address] [place] [address] And [address] [phone]

## 2020-05-21 ENCOUNTER — TELEPHONE (OUTPATIENT)
Dept: ENDOCRINOLOGY | Facility: CLINIC | Age: 60
End: 2020-05-21

## 2020-05-21 ENCOUNTER — OFFICE VISIT (OUTPATIENT)
Dept: FAMILY MEDICINE CLINIC | Facility: CLINIC | Age: 60
End: 2020-05-21

## 2020-05-21 VITALS
WEIGHT: 284 LBS | TEMPERATURE: 96.5 F | BODY MASS INDEX: 44.57 KG/M2 | OXYGEN SATURATION: 98 % | RESPIRATION RATE: 22 BRPM | DIASTOLIC BLOOD PRESSURE: 92 MMHG | HEART RATE: 80 BPM | SYSTOLIC BLOOD PRESSURE: 134 MMHG | HEIGHT: 67 IN

## 2020-05-21 DIAGNOSIS — Z12.31 SCREENING MAMMOGRAM FOR HIGH-RISK PATIENT: Primary | ICD-10-CM

## 2020-05-21 DIAGNOSIS — I10 ESSENTIAL HYPERTENSION: ICD-10-CM

## 2020-05-21 DIAGNOSIS — Z79.4 TYPE 2 DIABETES MELLITUS WITH HYPERGLYCEMIA, WITH LONG-TERM CURRENT USE OF INSULIN (HCC): ICD-10-CM

## 2020-05-21 DIAGNOSIS — R29.6 FALLS FREQUENTLY: ICD-10-CM

## 2020-05-21 DIAGNOSIS — Z59.9 FINANCIAL PROBLEMS: ICD-10-CM

## 2020-05-21 DIAGNOSIS — E11.65 TYPE 2 DIABETES MELLITUS WITH HYPERGLYCEMIA, WITH LONG-TERM CURRENT USE OF INSULIN (HCC): ICD-10-CM

## 2020-05-21 DIAGNOSIS — R00.2 PALPITATIONS: ICD-10-CM

## 2020-05-21 DIAGNOSIS — R26.2 AMBULATORY DYSFUNCTION: ICD-10-CM

## 2020-05-21 DIAGNOSIS — R07.89 OTHER CHEST PAIN: ICD-10-CM

## 2020-05-21 LAB
CHEST PAIN STATEMENT: NORMAL
MAX DIASTOLIC BP: 96 MMHG
MAX HEART RATE: 100 BPM
MAX PREDICTED HEART RATE: 162 BPM
MAX. SYSTOLIC BP: 154 MMHG
PROTOCOL NAME: NORMAL
REASON FOR TERMINATION: NORMAL
TARGET HR FORMULA: NORMAL
TEST INDICATION: NORMAL
TIME IN EXERCISE PHASE: NORMAL

## 2020-05-21 PROCEDURE — 99496 TRANSJ CARE MGMT HIGH F2F 7D: CPT | Performed by: NURSE PRACTITIONER

## 2020-05-21 PROCEDURE — 1111F DSCHRG MED/CURRENT MED MERGE: CPT | Performed by: NURSE PRACTITIONER

## 2020-05-21 PROCEDURE — T1015 CLINIC SERVICE: HCPCS | Performed by: NURSE PRACTITIONER

## 2020-05-21 RX ORDER — BLOOD PRESSURE TEST KIT
KIT MISCELLANEOUS 4 TIMES DAILY
Qty: 120 EACH | Refills: 3 | Status: SHIPPED | OUTPATIENT
Start: 2020-05-21

## 2020-05-21 RX ORDER — BLOOD PRESSURE TEST KIT
KIT MISCELLANEOUS DAILY
Qty: 1 EACH | Refills: 0 | Status: SHIPPED | OUTPATIENT
Start: 2020-05-21

## 2020-05-21 RX ORDER — METOPROLOL SUCCINATE 50 MG/1
50 TABLET, EXTENDED RELEASE ORAL DAILY
Qty: 30 TABLET | Refills: 0 | Status: SHIPPED | OUTPATIENT
Start: 2020-05-21 | End: 2020-06-29

## 2020-05-21 SDOH — ECONOMIC STABILITY - INCOME SECURITY: PROBLEM RELATED TO HOUSING AND ECONOMIC CIRCUMSTANCES, UNSPECIFIED: Z59.9

## 2020-06-01 ENCOUNTER — TELEPHONE (OUTPATIENT)
Dept: FAMILY MEDICINE CLINIC | Facility: CLINIC | Age: 60
End: 2020-06-01

## 2020-06-03 ENCOUNTER — TELEPHONE (OUTPATIENT)
Dept: NEUROLOGY | Facility: CLINIC | Age: 60
End: 2020-06-03

## 2020-06-05 ENCOUNTER — OFFICE VISIT (OUTPATIENT)
Dept: FAMILY MEDICINE CLINIC | Facility: CLINIC | Age: 60
End: 2020-06-05

## 2020-06-05 ENCOUNTER — TELEPHONE (OUTPATIENT)
Dept: NEUROLOGY | Facility: CLINIC | Age: 60
End: 2020-06-05

## 2020-06-05 VITALS
HEIGHT: 67 IN | DIASTOLIC BLOOD PRESSURE: 78 MMHG | OXYGEN SATURATION: 98 % | TEMPERATURE: 97.6 F | RESPIRATION RATE: 18 BRPM | BODY MASS INDEX: 44.57 KG/M2 | HEART RATE: 87 BPM | SYSTOLIC BLOOD PRESSURE: 132 MMHG | WEIGHT: 284 LBS

## 2020-06-05 DIAGNOSIS — G57.93 NEUROPATHIC PAIN, LEG, BILATERAL: Primary | ICD-10-CM

## 2020-06-05 PROCEDURE — 1036F TOBACCO NON-USER: CPT | Performed by: FAMILY MEDICINE

## 2020-06-05 PROCEDURE — 3008F BODY MASS INDEX DOCD: CPT | Performed by: FAMILY MEDICINE

## 2020-06-05 PROCEDURE — 99213 OFFICE O/P EST LOW 20 MIN: CPT | Performed by: FAMILY MEDICINE

## 2020-06-05 PROCEDURE — 3078F DIAST BP <80 MM HG: CPT | Performed by: FAMILY MEDICINE

## 2020-06-05 PROCEDURE — 3046F HEMOGLOBIN A1C LEVEL >9.0%: CPT | Performed by: FAMILY MEDICINE

## 2020-06-05 PROCEDURE — 3075F SYST BP GE 130 - 139MM HG: CPT | Performed by: FAMILY MEDICINE

## 2020-06-05 PROCEDURE — 1111F DSCHRG MED/CURRENT MED MERGE: CPT | Performed by: FAMILY MEDICINE

## 2020-06-05 PROCEDURE — 3008F BODY MASS INDEX DOCD: CPT | Performed by: NURSE PRACTITIONER

## 2020-06-05 PROCEDURE — 3008F BODY MASS INDEX DOCD: CPT | Performed by: PSYCHIATRY & NEUROLOGY

## 2020-06-05 RX ORDER — GABAPENTIN 300 MG/1
300 CAPSULE ORAL 3 TIMES DAILY
Qty: 90 CAPSULE | Refills: 0 | Status: SHIPPED | OUTPATIENT
Start: 2020-06-05 | End: 2020-06-26

## 2020-06-08 ENCOUNTER — TELEMEDICINE (OUTPATIENT)
Dept: ENDOCRINOLOGY | Facility: CLINIC | Age: 60
End: 2020-06-08
Payer: COMMERCIAL

## 2020-06-08 DIAGNOSIS — Z79.4 TYPE 2 DIABETES MELLITUS WITH HYPERGLYCEMIA, WITH LONG-TERM CURRENT USE OF INSULIN (HCC): Primary | ICD-10-CM

## 2020-06-08 DIAGNOSIS — I10 BENIGN ESSENTIAL HYPERTENSION: ICD-10-CM

## 2020-06-08 DIAGNOSIS — E21.3 HYPERPARATHYROIDISM (HCC): ICD-10-CM

## 2020-06-08 DIAGNOSIS — E11.65 TYPE 2 DIABETES MELLITUS WITH HYPERGLYCEMIA, WITH LONG-TERM CURRENT USE OF INSULIN (HCC): Primary | ICD-10-CM

## 2020-06-08 PROCEDURE — 99214 OFFICE O/P EST MOD 30 MIN: CPT | Performed by: INTERNAL MEDICINE

## 2020-06-10 ENCOUNTER — TELEPHONE (OUTPATIENT)
Dept: ENDOCRINOLOGY | Facility: CLINIC | Age: 60
End: 2020-06-10

## 2020-06-10 ENCOUNTER — TELEMEDICINE (OUTPATIENT)
Dept: FAMILY MEDICINE CLINIC | Facility: CLINIC | Age: 60
End: 2020-06-10

## 2020-06-10 VITALS — BODY MASS INDEX: 44.48 KG/M2 | HEIGHT: 67 IN

## 2020-06-10 DIAGNOSIS — R29.6 FALLS FREQUENTLY: ICD-10-CM

## 2020-06-10 DIAGNOSIS — G81.91 ACUTE RIGHT HEMIPARESIS (HCC): ICD-10-CM

## 2020-06-10 DIAGNOSIS — G37.9 CNS DEMYELINATION (HCC): ICD-10-CM

## 2020-06-10 DIAGNOSIS — R26.2 AMBULATORY DYSFUNCTION: Chronic | ICD-10-CM

## 2020-06-10 DIAGNOSIS — G57.93 NEUROPATHIC PAIN, LEG, BILATERAL: ICD-10-CM

## 2020-06-10 DIAGNOSIS — M79.605 LEFT LEG PAIN: Primary | ICD-10-CM

## 2020-06-10 PROCEDURE — G2012 BRIEF CHECK IN BY MD/QHP: HCPCS | Performed by: PHYSICIAN ASSISTANT

## 2020-06-10 RX ORDER — ACETAMINOPHEN 500 MG
1000 TABLET ORAL EVERY 6 HOURS PRN
Qty: 60 TABLET | Refills: 1 | Status: SHIPPED | OUTPATIENT
Start: 2020-06-10

## 2020-06-11 ENCOUNTER — TELEPHONE (OUTPATIENT)
Dept: FAMILY MEDICINE CLINIC | Facility: CLINIC | Age: 60
End: 2020-06-11

## 2020-06-12 ENCOUNTER — TELEPHONE (OUTPATIENT)
Dept: FAMILY MEDICINE CLINIC | Facility: CLINIC | Age: 60
End: 2020-06-12

## 2020-06-12 ENCOUNTER — HOSPITAL ENCOUNTER (OUTPATIENT)
Dept: RADIOLOGY | Facility: HOSPITAL | Age: 60
Discharge: HOME | End: 2020-06-12
Attending: PSYCHIATRY & NEUROLOGY
Payer: COMMERCIAL

## 2020-06-12 DIAGNOSIS — R20.0 RIGHT SIDED NUMBNESS: ICD-10-CM

## 2020-06-12 DIAGNOSIS — E11.65 TYPE 2 DIABETES MELLITUS WITH HYPERGLYCEMIA, WITH LONG-TERM CURRENT USE OF INSULIN (HCC): ICD-10-CM

## 2020-06-12 DIAGNOSIS — Z79.4 TYPE 2 DIABETES MELLITUS WITH HYPERGLYCEMIA, WITH LONG-TERM CURRENT USE OF INSULIN (HCC): ICD-10-CM

## 2020-06-12 PROCEDURE — 72156 MRI NECK SPINE W/O & W/DYE: CPT

## 2020-06-12 PROCEDURE — 70553 MRI BRAIN STEM W/O & W/DYE: CPT

## 2020-06-12 PROCEDURE — A9585 GADOBUTROL INJECTION: HCPCS | Performed by: PSYCHIATRY & NEUROLOGY

## 2020-06-12 RX ADMIN — GADOBUTROL 13 ML: 604.72 INJECTION INTRAVENOUS at 18:23

## 2020-06-15 ENCOUNTER — CLINICAL SUPPORT (OUTPATIENT)
Dept: CARDIOLOGY CLINIC | Facility: CLINIC | Age: 60
End: 2020-06-15
Payer: COMMERCIAL

## 2020-06-15 ENCOUNTER — TELEPHONE (OUTPATIENT)
Dept: NEUROLOGY | Facility: CLINIC | Age: 60
End: 2020-06-15

## 2020-06-15 DIAGNOSIS — E11.65 TYPE 2 DIABETES MELLITUS WITH HYPERGLYCEMIA, WITH LONG-TERM CURRENT USE OF INSULIN (HCC): ICD-10-CM

## 2020-06-15 DIAGNOSIS — Z79.4 TYPE 2 DIABETES MELLITUS WITH HYPERGLYCEMIA, WITH LONG-TERM CURRENT USE OF INSULIN (HCC): ICD-10-CM

## 2020-06-15 DIAGNOSIS — R00.2 PALPITATIONS: ICD-10-CM

## 2020-06-15 DIAGNOSIS — R20.0 RIGHT SIDED NUMBNESS: ICD-10-CM

## 2020-06-15 PROCEDURE — 0298T PR EXT ECG > 48HR TO 21 DAY REVIEW AND INTERPRETATN: CPT

## 2020-06-15 NOTE — TELEPHONE ENCOUNTER
Patient imaging brain and cervical region completed again - patient has suffered another relapse of CNS demyelination, despite very slow steroid taper provided, but DMT was not initiated  Patient has high inflammatory process noted on her CSF analysis, and now new and active demyelination in brain parenchyma  Patient has persistent inflammation in cervical region, still improving  Patient will be required 3 doses of Solumedrol 1000 mg IV every other day, and she is to work with primary care team to help controlling her DM during steroid treatment again   H2-blockers or PPI as per primary team     Infusion center only

## 2020-06-16 RX ORDER — SODIUM CHLORIDE 9 MG/ML
20 INJECTION, SOLUTION INTRAVENOUS ONCE
Status: CANCELLED | OUTPATIENT
Start: 2020-06-18

## 2020-06-16 NOTE — TELEPHONE ENCOUNTER
LMOM for pt to return call  Dr Schuyler Espino, are you ok with pt having interruption in every other day dosing? [facility] is closest infusion center and they are not open on Saturdays otherwise we can try [facility] or [facility]  Please advise  Order has been entered for [facility] in the interim

## 2020-06-16 NOTE — TELEPHONE ENCOUNTER
Orders are signed for [facility] for every other day dosing of Solumedrol 1000 mg, including Saturday please help scheduling

## 2020-06-17 ENCOUNTER — TELEPHONE (OUTPATIENT)
Dept: ENDOCRINOLOGY | Facility: CLINIC | Age: 60
End: 2020-06-17

## 2020-06-17 ENCOUNTER — TELEPHONE (OUTPATIENT)
Dept: FAMILY MEDICINE CLINIC | Facility: CLINIC | Age: 60
End: 2020-06-17

## 2020-06-17 DIAGNOSIS — E11.65 TYPE 2 DIABETES MELLITUS WITH HYPERGLYCEMIA, WITH LONG-TERM CURRENT USE OF INSULIN (HCC): Primary | ICD-10-CM

## 2020-06-17 DIAGNOSIS — Z79.4 TYPE 2 DIABETES MELLITUS WITH HYPERGLYCEMIA, WITH LONG-TERM CURRENT USE OF INSULIN (HCC): Primary | ICD-10-CM

## 2020-06-17 NOTE — TELEPHONE ENCOUNTER
Dr Celas Brown I spoke with pt and made her aware  She [facility]  I explained MRI results and need to contact PCP/Endo regarding DM and need for PPI vs H2-blockers  He verbalized understanding  He is agreeable to taking pt to either [facility] or [facility]  I spoke with Mylene Ralph at [facility]  Pt has been scheduled tomorrow at 10:30 am  Sat 11:30am  Lazaro Bradley made aware  He will call us if there additional concerns  Norbert [facility] [phone]    Pt has [email or web address] which does not require PA

## 2020-06-18 ENCOUNTER — HOSPITAL ENCOUNTER (OUTPATIENT)
Dept: INFUSION CENTER | Facility: HOSPITAL | Age: 60
Discharge: HOME | End: 2020-06-18
Attending: PSYCHIATRY & NEUROLOGY
Payer: COMMERCIAL

## 2020-06-18 VITALS
HEART RATE: 79 BPM | OXYGEN SATURATION: 99 % | RESPIRATION RATE: 16 BRPM | DIASTOLIC BLOOD PRESSURE: 84 MMHG | TEMPERATURE: 96.8 F | SYSTOLIC BLOOD PRESSURE: 164 MMHG

## 2020-06-18 DIAGNOSIS — G37.9 CNS DEMYELINATION (HCC): Primary | ICD-10-CM

## 2020-06-18 PROCEDURE — 96365 THER/PROPH/DIAG IV INF INIT: CPT

## 2020-06-18 RX ORDER — SODIUM CHLORIDE 9 MG/ML
20 INJECTION, SOLUTION INTRAVENOUS ONCE
Status: CANCELLED | OUTPATIENT
Start: 2020-06-20

## 2020-06-18 RX ORDER — SODIUM CHLORIDE 9 MG/ML
20 INJECTION, SOLUTION INTRAVENOUS ONCE
Status: COMPLETED | OUTPATIENT
Start: 2020-06-18 | End: 2020-06-18

## 2020-06-18 RX ADMIN — SODIUM CHLORIDE 1000 MG: 0.9 INJECTION, SOLUTION INTRAVENOUS at 11:21

## 2020-06-18 RX ADMIN — SODIUM CHLORIDE 20 ML/HR: 0.9 INJECTION, SOLUTION INTRAVENOUS at 11:21

## 2020-06-19 ENCOUNTER — TELEPHONE (OUTPATIENT)
Dept: ENDOCRINOLOGY | Facility: CLINIC | Age: 60
End: 2020-06-19

## 2020-06-19 ENCOUNTER — PATIENT OUTREACH (OUTPATIENT)
Dept: FAMILY MEDICINE CLINIC | Facility: CLINIC | Age: 60
End: 2020-06-19

## 2020-06-19 DIAGNOSIS — Z74.8 ASSISTANCE NEEDED WITH TRANSPORTATION: Primary | ICD-10-CM

## 2020-06-20 ENCOUNTER — HOSPITAL ENCOUNTER (OUTPATIENT)
Dept: INFUSION CENTER | Facility: HOSPITAL | Age: 60
Discharge: HOME | End: 2020-06-20
Attending: PSYCHIATRY & NEUROLOGY
Payer: COMMERCIAL

## 2020-06-20 VITALS
DIASTOLIC BLOOD PRESSURE: 80 MMHG | RESPIRATION RATE: 16 BRPM | HEART RATE: 83 BPM | OXYGEN SATURATION: 97 % | TEMPERATURE: 97.3 F | SYSTOLIC BLOOD PRESSURE: 133 MMHG

## 2020-06-20 DIAGNOSIS — G37.9 CNS DEMYELINATION (HCC): Primary | ICD-10-CM

## 2020-06-20 PROCEDURE — 96365 THER/PROPH/DIAG IV INF INIT: CPT

## 2020-06-20 RX ORDER — SODIUM CHLORIDE 9 MG/ML
20 INJECTION, SOLUTION INTRAVENOUS ONCE
Status: COMPLETED | OUTPATIENT
Start: 2020-06-20 | End: 2020-06-20

## 2020-06-20 RX ORDER — SODIUM CHLORIDE 9 MG/ML
20 INJECTION, SOLUTION INTRAVENOUS ONCE
Status: CANCELLED | OUTPATIENT
Start: 2020-06-22

## 2020-06-20 RX ADMIN — SODIUM CHLORIDE 20 ML/HR: 0.9 INJECTION, SOLUTION INTRAVENOUS at 11:46

## 2020-06-20 RX ADMIN — SODIUM CHLORIDE 1000 MG: 0.9 INJECTION, SOLUTION INTRAVENOUS at 11:46

## 2020-06-22 ENCOUNTER — TELEPHONE (OUTPATIENT)
Dept: ENDOCRINOLOGY | Facility: CLINIC | Age: 60
End: 2020-06-22

## 2020-06-22 ENCOUNTER — HOSPITAL ENCOUNTER (OUTPATIENT)
Dept: INFUSION CENTER | Facility: HOSPITAL | Age: 60
Discharge: HOME | End: 2020-06-22
Attending: PSYCHIATRY & NEUROLOGY
Payer: COMMERCIAL

## 2020-06-22 VITALS
TEMPERATURE: 97.7 F | DIASTOLIC BLOOD PRESSURE: 72 MMHG | RESPIRATION RATE: 18 BRPM | HEART RATE: 76 BPM | SYSTOLIC BLOOD PRESSURE: 134 MMHG

## 2020-06-22 DIAGNOSIS — G37.9 CNS DEMYELINATION (HCC): Primary | ICD-10-CM

## 2020-06-22 PROCEDURE — 96365 THER/PROPH/DIAG IV INF INIT: CPT

## 2020-06-22 RX ORDER — SODIUM CHLORIDE 9 MG/ML
20 INJECTION, SOLUTION INTRAVENOUS ONCE
Status: CANCELLED | OUTPATIENT
Start: 2020-06-22

## 2020-06-22 RX ORDER — SODIUM CHLORIDE 9 MG/ML
20 INJECTION, SOLUTION INTRAVENOUS ONCE
Status: COMPLETED | OUTPATIENT
Start: 2020-06-22 | End: 2020-06-22

## 2020-06-22 RX ADMIN — SODIUM CHLORIDE 20 ML/HR: 0.9 INJECTION, SOLUTION INTRAVENOUS at 12:14

## 2020-06-22 RX ADMIN — SODIUM CHLORIDE 1000 MG: 0.9 INJECTION, SOLUTION INTRAVENOUS at 12:14

## 2020-06-23 DIAGNOSIS — R07.89 OTHER CHEST PAIN: ICD-10-CM

## 2020-06-24 ENCOUNTER — TELEPHONE (OUTPATIENT)
Dept: FAMILY MEDICINE CLINIC | Facility: CLINIC | Age: 60
End: 2020-06-24

## 2020-06-24 ENCOUNTER — TELEPHONE (OUTPATIENT)
Dept: ENDOCRINOLOGY | Facility: CLINIC | Age: 60
End: 2020-06-24

## 2020-06-24 DIAGNOSIS — E11.65 TYPE 2 DIABETES MELLITUS WITH HYPERGLYCEMIA, WITH LONG-TERM CURRENT USE OF INSULIN (HCC): Primary | ICD-10-CM

## 2020-06-24 DIAGNOSIS — S14.159S: ICD-10-CM

## 2020-06-24 DIAGNOSIS — Z79.4 TYPE 2 DIABETES MELLITUS WITH HYPERGLYCEMIA, WITH LONG-TERM CURRENT USE OF INSULIN (HCC): Primary | ICD-10-CM

## 2020-06-24 DIAGNOSIS — G37.9 CNS DEMYELINATION (HCC): ICD-10-CM

## 2020-06-24 RX ORDER — ATORVASTATIN CALCIUM 40 MG/1
40 TABLET, FILM COATED ORAL EVERY EVENING
Qty: 90 TABLET | Refills: 2 | Status: SHIPPED | OUTPATIENT
Start: 2020-06-24 | End: 2021-01-07 | Stop reason: SDUPTHER

## 2020-06-26 ENCOUNTER — TELEMEDICINE (OUTPATIENT)
Dept: FAMILY MEDICINE CLINIC | Facility: CLINIC | Age: 60
End: 2020-06-26

## 2020-06-26 ENCOUNTER — TELEPHONE (OUTPATIENT)
Dept: NEUROLOGY | Facility: CLINIC | Age: 60
End: 2020-06-26

## 2020-06-26 ENCOUNTER — TELEPHONE (OUTPATIENT)
Dept: FAMILY MEDICINE CLINIC | Facility: CLINIC | Age: 60
End: 2020-06-26

## 2020-06-26 ENCOUNTER — PATIENT OUTREACH (OUTPATIENT)
Dept: FAMILY MEDICINE CLINIC | Facility: CLINIC | Age: 60
End: 2020-06-26

## 2020-06-26 VITALS — OXYGEN SATURATION: 98 % | TEMPERATURE: 97.1 F | HEART RATE: 85 BPM

## 2020-06-26 DIAGNOSIS — G57.93 NEUROPATHIC PAIN, LEG, BILATERAL: Primary | ICD-10-CM

## 2020-06-26 DIAGNOSIS — R60.0 BILATERAL LEG EDEMA: ICD-10-CM

## 2020-06-26 DIAGNOSIS — Z78.9 UNDER CARE OF SOCIAL WORKER: Primary | ICD-10-CM

## 2020-06-26 DIAGNOSIS — G37.9 CNS DEMYELINATION (HCC): Primary | ICD-10-CM

## 2020-06-26 PROCEDURE — G2012 BRIEF CHECK IN BY MD/QHP: HCPCS | Performed by: NURSE PRACTITIONER

## 2020-06-26 RX ORDER — FUROSEMIDE 40 MG/1
40 TABLET ORAL DAILY
Qty: 30 TABLET | Refills: 0 | Status: SHIPPED | OUTPATIENT
Start: 2020-06-26 | End: 2020-07-24

## 2020-06-26 RX ORDER — CAPSAICIN 0.07 G/100G
CREAM TOPICAL 3 TIMES DAILY
Qty: 28.3 G | Refills: 0 | Status: SHIPPED | OUTPATIENT
Start: 2020-06-26 | End: 2021-01-19 | Stop reason: ALTCHOICE

## 2020-06-26 RX ORDER — GABAPENTIN 400 MG/1
400 CAPSULE ORAL 3 TIMES DAILY
Qty: 90 CAPSULE | Refills: 0 | Status: SHIPPED | OUTPATIENT
Start: 2020-06-26 | End: 2020-06-26

## 2020-06-26 RX ORDER — GABAPENTIN 300 MG/1
300 CAPSULE ORAL 3 TIMES DAILY
Qty: 90 CAPSULE | Refills: 0 | Status: SHIPPED | OUTPATIENT
Start: 2020-06-26 | End: 2020-07-23 | Stop reason: SDUPTHER

## 2020-06-26 NOTE — TELEPHONE ENCOUNTER
Called and advised pt of all of the below  She verbalized clear understanding of all instructions  Denies SOB  Main concern is the swelling in her leg and pain  "I can't barely get her shoes on"  Pt states that she drinks 3 (32 ounces) of water right now  She takes asa 81 mg daily                 [phone] ok to leave detailed message

## 2020-06-26 NOTE — TELEPHONE ENCOUNTER
Please let patient know I heard back from the PCP  She said she suggested a diuretic, but she declined  She will be reaching out to her again  Please let patient know that I would take the diuretic offered by the PCP, as that will help get the fluid off and likely help her pain, which is due to the pressure from fluid build up    Thanks

## 2020-06-26 NOTE — TELEPHONE ENCOUNTER
Looks like she just had a virtual visit with the PCP today  It is possible the steroids caused edema  I am surprised they increased gabapentin, as that can cause MORE leg swelling  I will send a message to the PCP and see if they would prescribe a diuretic  I would actually have her INCREASE water intake  Will flush out the sodium which is causing the swelling  Hydration with steroid infusion is important  Once I hear back from PCP we can give her another call  Please just confirm she is not having any SOB    If so, needs to go to ER (r/o DVT, although more likely just edema from the infusion)

## 2020-06-26 NOTE — TELEPHONE ENCOUNTER
Pt called in reports that she just had 3 days of steroid infusion that completed on monday  Reports that her lower calves have +4 edema and she is in a lot of pain  She had general swelling this week, but nothing like it is today  States that her legs are shiny and hurt to the touch  [facility] her PCP called, they are increasing her gabapentin to 400 mg TID  They advised her to cut back on her fluid intake  They advised that she contact us because it may be due to the infusion  Pt stated she is in severe pain  Pt advised that if her pain is severe that she should go to the ER for treatment  Pt stated they won't do anything for her there       Pt [phone]

## 2020-06-27 ENCOUNTER — APPOINTMENT (OUTPATIENT)
Dept: LAB | Facility: HOSPITAL | Age: 60
End: 2020-06-27
Payer: COMMERCIAL

## 2020-06-27 DIAGNOSIS — E83.52 HYPERCALCEMIA: ICD-10-CM

## 2020-06-27 DIAGNOSIS — G35 MS (MULTIPLE SCLEROSIS) (HCC): ICD-10-CM

## 2020-06-27 DIAGNOSIS — Z79.4 TYPE 2 DIABETES MELLITUS WITH HYPERGLYCEMIA, WITH LONG-TERM CURRENT USE OF INSULIN (HCC): ICD-10-CM

## 2020-06-27 DIAGNOSIS — E21.3 HYPERPARATHYROIDISM (HCC): ICD-10-CM

## 2020-06-27 DIAGNOSIS — E11.65 TYPE 2 DIABETES MELLITUS WITH HYPERGLYCEMIA, WITH LONG-TERM CURRENT USE OF INSULIN (HCC): ICD-10-CM

## 2020-06-27 DIAGNOSIS — I10 BENIGN ESSENTIAL HYPERTENSION: ICD-10-CM

## 2020-06-27 LAB
ALBUMIN SERPL BCP-MCNC: 3.1 G/DL (ref 3.5–5)
ALP SERPL-CCNC: 81 U/L (ref 46–116)
ALT SERPL W P-5'-P-CCNC: 26 U/L (ref 12–78)
ANION GAP SERPL CALCULATED.3IONS-SCNC: 8 MMOL/L (ref 4–13)
AST SERPL W P-5'-P-CCNC: 6 U/L (ref 5–45)
BASOPHILS # BLD AUTO: 0.02 THOUSANDS/ΜL (ref 0–0.1)
BASOPHILS NFR BLD AUTO: 0 % (ref 0–1)
BILIRUB SERPL-MCNC: 1.12 MG/DL (ref 0.2–1)
BUN SERPL-MCNC: 10 MG/DL (ref 5–25)
CALCIUM ALBUM COR SERPL-MCNC: 10.9 MG/DL (ref 8.3–10.1)
CALCIUM SERPL-MCNC: 10.2 MG/DL (ref 8.3–10.1)
CHLORIDE SERPL-SCNC: 107 MMOL/L (ref 100–108)
CHOLEST SERPL-MCNC: 134 MG/DL (ref 50–200)
CO2 SERPL-SCNC: 27 MMOL/L (ref 21–32)
CREAT SERPL-MCNC: 0.56 MG/DL (ref 0.6–1.3)
EOSINOPHIL # BLD AUTO: 0.19 THOUSAND/ΜL (ref 0–0.61)
EOSINOPHIL NFR BLD AUTO: 2 % (ref 0–6)
ERYTHROCYTE [DISTWIDTH] IN BLOOD BY AUTOMATED COUNT: 13.5 % (ref 11.6–15.1)
EST. AVERAGE GLUCOSE BLD GHB EST-MCNC: 180 MG/DL
FERRITIN SERPL-MCNC: 212 NG/ML (ref 8–388)
FOLATE SERPL-MCNC: 10 NG/ML (ref 3.1–17.5)
GFR SERPL CREATININE-BSD FRML MDRD: 103 ML/MIN/1.73SQ M
GLUCOSE P FAST SERPL-MCNC: 138 MG/DL (ref 65–99)
HBA1C MFR BLD: 7.9 %
HCT VFR BLD AUTO: 37.5 % (ref 34.8–46.1)
HDLC SERPL-MCNC: 44 MG/DL
HGB BLD-MCNC: 12 G/DL (ref 11.5–15.4)
IMM GRANULOCYTES # BLD AUTO: 0.07 THOUSAND/UL (ref 0–0.2)
IMM GRANULOCYTES NFR BLD AUTO: 1 % (ref 0–2)
IRON SATN MFR SERPL: 30 %
IRON SERPL-MCNC: 76 UG/DL (ref 50–170)
LDLC SERPL CALC-MCNC: 56 MG/DL (ref 0–100)
LYMPHOCYTES # BLD AUTO: 2.81 THOUSANDS/ΜL (ref 0.6–4.47)
LYMPHOCYTES NFR BLD AUTO: 26 % (ref 14–44)
MAGNESIUM SERPL-MCNC: 2 MG/DL (ref 1.6–2.6)
MCH RBC QN AUTO: 29.5 PG (ref 26.8–34.3)
MCHC RBC AUTO-ENTMCNC: 32 G/DL (ref 31.4–37.4)
MCV RBC AUTO: 92 FL (ref 82–98)
MONOCYTES # BLD AUTO: 0.73 THOUSAND/ΜL (ref 0.17–1.22)
MONOCYTES NFR BLD AUTO: 7 % (ref 4–12)
NEUTROPHILS # BLD AUTO: 6.9 THOUSANDS/ΜL (ref 1.85–7.62)
NEUTS SEG NFR BLD AUTO: 64 % (ref 43–75)
NONHDLC SERPL-MCNC: 90 MG/DL
NRBC BLD AUTO-RTO: 0 /100 WBCS
PLATELET # BLD AUTO: 241 THOUSANDS/UL (ref 149–390)
PMV BLD AUTO: 9.9 FL (ref 8.9–12.7)
POTASSIUM SERPL-SCNC: 3.9 MMOL/L (ref 3.5–5.3)
PROT SERPL-MCNC: 6.2 G/DL (ref 6.4–8.2)
RBC # BLD AUTO: 4.07 MILLION/UL (ref 3.81–5.12)
SODIUM SERPL-SCNC: 142 MMOL/L (ref 136–145)
TIBC SERPL-MCNC: 256 UG/DL (ref 250–450)
TRIGL SERPL-MCNC: 169 MG/DL
TSH SERPL DL<=0.05 MIU/L-ACNC: 1.18 UIU/ML (ref 0.36–3.74)
VIT B12 SERPL-MCNC: 172 PG/ML (ref 100–900)
WBC # BLD AUTO: 10.72 THOUSAND/UL (ref 4.31–10.16)

## 2020-06-27 PROCEDURE — 82607 VITAMIN B-12: CPT

## 2020-06-27 PROCEDURE — 83735 ASSAY OF MAGNESIUM: CPT

## 2020-06-27 PROCEDURE — 80061 LIPID PANEL: CPT

## 2020-06-27 PROCEDURE — 85025 COMPLETE CBC W/AUTO DIFF WBC: CPT

## 2020-06-27 PROCEDURE — 83036 HEMOGLOBIN GLYCOSYLATED A1C: CPT

## 2020-06-27 PROCEDURE — 80053 COMPREHEN METABOLIC PANEL: CPT

## 2020-06-27 PROCEDURE — 3051F HG A1C>EQUAL 7.0%<8.0%: CPT | Performed by: NURSE PRACTITIONER

## 2020-06-27 PROCEDURE — 84443 ASSAY THYROID STIM HORMONE: CPT

## 2020-06-27 PROCEDURE — 83550 IRON BINDING TEST: CPT

## 2020-06-27 PROCEDURE — 82746 ASSAY OF FOLIC ACID SERUM: CPT

## 2020-06-27 PROCEDURE — 83540 ASSAY OF IRON: CPT

## 2020-06-27 PROCEDURE — 82728 ASSAY OF FERRITIN: CPT

## 2020-06-27 PROCEDURE — 36415 COLL VENOUS BLD VENIPUNCTURE: CPT

## 2020-06-29 ENCOUNTER — TELEPHONE (OUTPATIENT)
Dept: ENDOCRINOLOGY | Facility: CLINIC | Age: 60
End: 2020-06-29

## 2020-06-29 DIAGNOSIS — R07.89 OTHER CHEST PAIN: ICD-10-CM

## 2020-06-29 DIAGNOSIS — R00.2 PALPITATIONS: ICD-10-CM

## 2020-06-29 RX ORDER — METOPROLOL SUCCINATE 50 MG/1
TABLET, EXTENDED RELEASE ORAL
Qty: 30 TABLET | Refills: 0 | Status: SHIPPED | OUTPATIENT
Start: 2020-06-29 | End: 2020-08-05

## 2020-06-29 RX ORDER — METOPROLOL SUCCINATE 50 MG/1
50 TABLET, EXTENDED RELEASE ORAL DAILY
Qty: 30 TABLET | Refills: 0 | OUTPATIENT
Start: 2020-06-29

## 2020-06-30 ENCOUNTER — PATIENT OUTREACH (OUTPATIENT)
Dept: CASE MANAGEMENT | Facility: HOSPITAL | Age: 60
End: 2020-06-30

## 2020-06-30 NOTE — TELEPHONE ENCOUNTER
Called patient, read Patience's message  Patient right away states, "Okay, well, I think you're wrong " Patient reports taking Furosemide 40 mg daily since 6/26  Reports her "legs are like toothpicks" and can place shoes on with ease, but continues to have severe leg pain that interrupts her sleep  Notes she had indeed increased the gabapentin to 400 mg and has no relief  Taking up to 6 Tylenol 500 mg tablets/day due to discomfort  Took 2,000 mg already since 1 am   Notes the pain started 2 days after last infusion of solumedrol 6/22  Also reports she only voided 4 times yesterday, compared to 7-8 times/day  Patient states she had not planned to follow up with PCP "now that she spoke with us"  Advised patient to keep contact with PCP regarding Furosemide concerns  She verbalizes understanding  Patient again reports she is okay with detailed messages

## 2020-07-02 NOTE — PROGRESS NOTES
1st Attempt - CHW spoke w/pt to discuss referral submitted by EMMA William CM for assistance with [employer] and [employer] benefits, and conduct telephonic assessment  Pt stated she was working with someone from [address] [place] [address] by the name of Rito Sebastian on various items  CHW asked if pt had contact information to outreach Rito Sebastian so we're not doing double work  Pt provided information, and CHW will outreach Zaaudie Aquas for details, and then contact pt back to discuss referral in greater detail

## 2020-07-02 NOTE — PROGRESS NOTES
CHW send email to Vijay Anaya, Medical Social Worker with [facility] on what barriers she is assisting pt with so we're not doing double work

## 2020-07-02 NOTE — PROGRESS NOTES
CHW received email from NANETTE Rodriguez, [facility] indicated she assisted pt with finishing hardship paperwork for [employer] financial assistance program and mailed  Disability claim fcompleted minus prescriptions  Spencer Vance indicated pt has been working w/Espinoza Brady from [employer]  According to Elbert Almaguer pt is depressed and was tearful throughout the visit and at times hard to keep on topic  Pt was open to counseling and Elbert Almaguer provided her with a list of local therapists that accept her insurance  Pt  is involved with an MS support group  CHW will response to [facility],  JOHN and offer to take over an unfinished paperwork

## 2020-07-07 ENCOUNTER — OFFICE VISIT (OUTPATIENT)
Dept: NEUROLOGY | Facility: CLINIC | Age: 60
End: 2020-07-07
Payer: COMMERCIAL

## 2020-07-07 ENCOUNTER — TELEPHONE (OUTPATIENT)
Dept: ENDOCRINOLOGY | Facility: CLINIC | Age: 60
End: 2020-07-07

## 2020-07-07 ENCOUNTER — TELEPHONE (OUTPATIENT)
Dept: NEUROLOGY | Facility: CLINIC | Age: 60
End: 2020-07-07

## 2020-07-07 VITALS
DIASTOLIC BLOOD PRESSURE: 74 MMHG | BODY MASS INDEX: 44.17 KG/M2 | TEMPERATURE: 98.1 F | SYSTOLIC BLOOD PRESSURE: 128 MMHG | WEIGHT: 282 LBS

## 2020-07-07 DIAGNOSIS — R26.2 AMBULATORY DYSFUNCTION: Chronic | ICD-10-CM

## 2020-07-07 DIAGNOSIS — R20.0 NUMBNESS AND TINGLING OF RIGHT ARM AND LEG: ICD-10-CM

## 2020-07-07 DIAGNOSIS — G37.9 CNS DEMYELINATION (HCC): ICD-10-CM

## 2020-07-07 DIAGNOSIS — G35 MS (MULTIPLE SCLEROSIS) (HCC): Primary | ICD-10-CM

## 2020-07-07 DIAGNOSIS — I63.439 CEREBROVASCULAR ACCIDENT (CVA) DUE TO EMBOLISM OF POSTERIOR CEREBRAL ARTERY, UNSPECIFIED BLOOD VESSEL LATERALITY (HCC): ICD-10-CM

## 2020-07-07 DIAGNOSIS — F41.8 DEPRESSION WITH ANXIETY: ICD-10-CM

## 2020-07-07 DIAGNOSIS — E11.65 TYPE 2 DIABETES MELLITUS WITH HYPERGLYCEMIA, WITH LONG-TERM CURRENT USE OF INSULIN (HCC): ICD-10-CM

## 2020-07-07 DIAGNOSIS — R41.89 COGNITIVE DECLINE: ICD-10-CM

## 2020-07-07 DIAGNOSIS — S14.159S: ICD-10-CM

## 2020-07-07 DIAGNOSIS — Z79.4 TYPE 2 DIABETES MELLITUS WITH HYPERGLYCEMIA, WITH LONG-TERM CURRENT USE OF INSULIN (HCC): ICD-10-CM

## 2020-07-07 DIAGNOSIS — G81.91 ACUTE RIGHT HEMIPARESIS (HCC): ICD-10-CM

## 2020-07-07 DIAGNOSIS — G57.93 NEUROPATHIC PAIN, LEG, BILATERAL: ICD-10-CM

## 2020-07-07 DIAGNOSIS — R20.2 NUMBNESS AND TINGLING OF RIGHT ARM AND LEG: ICD-10-CM

## 2020-07-07 DIAGNOSIS — G81.91 RIGHT HEMIPARESIS (HCC): ICD-10-CM

## 2020-07-07 DIAGNOSIS — R20.0 LEFT SIDED NUMBNESS: ICD-10-CM

## 2020-07-07 PROBLEM — I63.9 STROKE (CEREBRUM) (HCC): Status: ACTIVE | Noted: 2020-03-09

## 2020-07-07 PROCEDURE — 99215 OFFICE O/P EST HI 40 MIN: CPT | Performed by: PSYCHIATRY & NEUROLOGY

## 2020-07-07 PROCEDURE — 3051F HG A1C>EQUAL 7.0%<8.0%: CPT | Performed by: PSYCHIATRY & NEUROLOGY

## 2020-07-07 PROCEDURE — 3074F SYST BP LT 130 MM HG: CPT | Performed by: PSYCHIATRY & NEUROLOGY

## 2020-07-07 PROCEDURE — 1036F TOBACCO NON-USER: CPT | Performed by: PSYCHIATRY & NEUROLOGY

## 2020-07-07 PROCEDURE — 3078F DIAST BP <80 MM HG: CPT | Performed by: PSYCHIATRY & NEUROLOGY

## 2020-07-07 NOTE — PROGRESS NOTES
[facility]  PATIENT:  Jorge Lara  MRN:  [id number]  :  1960  DATE OF SERVICE:  2020    Assessment/Plan:  With     Problem List Items Addressed This Visit        Endocrine    Type 2 diabetes mellitus with hyperglycemia, with long-term current use of insulin ([facility] )       Cardiovascular and Mediastinum    Stroke (cerebrum) ([facility] )    Relevant Orders    Ambulatory referral to Speech Therapy       Nervous and Auditory    CNS demyelination ([facility] )    Relevant Orders    Ambulatory referral to Speech Therapy    Acute right hemiparesis ([facility] )    Cervical spinal cord lesion    MS (multiple sclerosis) ([facility] ) - Primary    Relevant Orders    Ambulatory referral to Speech Therapy    Right hemiparesis ([facility] )       Other    Ambulatory dysfunction (Chronic)    Depression with anxiety    Numbness and tingling of right arm and leg    Neuropathic pain, leg, bilateral    Cognitive decline    Relevant Orders    Ambulatory referral to Speech Therapy    Left sided numbness           Mrs Omaira Adler has presented to [facility] multiple [address] for follow-up on MS and related issues  Since last virtual visit, patient described progressive worsening ambulatory function and she was diagnosed with MS relapse, despite taking course of tapering steroids  Patient will be starting Betaseron and she will be advised to avoid steroid therapy by all means  Hope blood glucose will be improving while off steroids  I appreciate primary care team was accommodating insulin does, while patient was offered 3 days of Solu-Medrol 1000 mg IV for her breakthrough disease  Patient describes sensory dysfunction and pain in her lower extremities- her symptoms are likely multifactorial due to elevated blood glucose and new active demyelination in her spine  Patient is to continue gabapentin 300 mg 3 times daily, she may gradually titrate up to a 1800 mg/day, if no benefits described at the will dose of gabapentin    Patient is to continue Home PT/OT as she has been working now  Cognitive therapy will be also advised, as patient reports progressive worsening if cognitive function  Patient described no vision loss,, no double vision, no dysarthria, no dysphagia, no new focal weakness  Patient is ambulating with a walker  Right-sided weakness has been persistent  Patient was previously recommended to learn about SE of Betaseron versus Rebif, and patient had signed Betaseron form, which will be submitted shortly  FU with Dayna Melendez in 2 months  Greater than 50% of the 30 minutes evaluation was a face-to-face discussion regarding  the pathophysiology of her current symptoms and further plan, as well as counseling, educating, and coordinating the patient's care  Subjective:    HPI    Mrs Pauline Lee is a 64 yo female, who presented to [address] [place] [address] for follow up on CNS demyelination  Mrs Pauline Lee described developing right-sided numbness and weakness acutely on March 9, 2020 or in the morning before she is planning to go to work  Patient has known history of hypertension, uncontrolled diabetes with hemoglobin A1c range from 9-11 9, while taking insulin  Patient has no ischemic colitis with GI bleed  Patient reports starting February 24, 2020 she was not feeling well, with concern for her health to place at that time, but no specific focal neurological dysfunction reported  Patient also had hospital evaluation in January 2020 for right lower quadrant cellulitis versus decubitus ulcer, and known left upper quadrant cellulitis back in 2017  MRI C-spine 3/10/2020: There is focal intramedullary T2 hyperintense signal epicentered at the C4 vertebral level  This is more pronounced in the right and central hemicord  The remaining mid to lower cervical cord is more difficult to   assess due to the image degradation    There may be additional right hemicord signal abnormality at C5 and C6  3/11/2020: Postcontrast imaging demonstrates enhancement associated with multiple previously described foci of T2 prolongation within the cervical and upper thoracic CORD  MRI brain 3/10/2020: There is no discrete mass, mass effect or midline shift  There is no intracranial hemorrhage  There is no evidence of acute infarction and diffusion imaging is unremarkable  Multiple foci of T2 and FLAIR hyperintensity are present within the supratentorial white matter, most pronounced in the anterolateral left frontal lobe, anterior right temporal lobe and right cerebellum/middle cerebellar peduncle  MRI brain March- June 2020: There are several new enhancing demyelinating lesions identified compatible with active demyelination in the setting of multiple sclerosis  On the prior study, there were several enhancing lesions which no longer enhance  MRI C-spine March-June 2020: Overall improved appearance of demyelinating disease with no new lesions identified  There are enhancing lesions again identified although overall, there are less enhancing lesions and the enhancing lesions have decreased in intensity from the prior study  Patient was on tapering dose of oral steroid, despite that, patient had a break through disease  Patient was advised starting interferons in early June 2020, with new active lesion noted in mid June , requiring additional Solumedrol 1000 mg IV therapy  The following portions of the patient's history were reviewed and updated as appropriate:   She  has a past medical history of Diabetes mellitus ([facility] ), External hemorrhoids, Hernia, ventral, Hypertension, Incarcerated incisional hernia, Insomnia, Lyme disease, Morbid obesity with BMI of 45 0-49 9, adult ([facility] ) (5/3/2017), MS (multiple sclerosis) ([facility] ), Stroke (cerebrum) ([facility] ) (03/09/2020), and Type 2 diabetes mellitus with hyperglycemia, without long-term current use of insulin ([facility] )    She   Patient Active Problem List    Diagnosis Date Noted    MS (multiple sclerosis) ([place] [address] ) 2020    Cognitive decline 2020    Left sided numbness 2020    Right hemiparesis ([place] [address] ) 2020    Neuropathic pain, leg, bilateral 2020    Tachycardia 05/15/2020    Cervical spinal cord lesion 05/15/2020    Hypertension     CNS demyelination ([place] [address] ) 2020    Acute right hemiparesis (HCC) 2020    Numbness and tingling of right arm and leg 2020    Ambulatory dysfunction 2020    Stroke (cerebrum) ([place] [address] ) 2020    Falls frequently 2020    Morbid obesity with BMI of 45 0-49 9, adult ([place] [address] ) 2017    Ischemic colitis ([place] [address] ) 2017    Unintentional weight loss 2017    Hypercalcemia 2017    Hyperparathyroidism ([place] [address] ) 2017    GI bleed 2017    Hernia, ventral     Type 2 diabetes mellitus with hyperglycemia, with long-term current use of insulin ([place] [address] )     Depression with anxiety 2016    Vitamin D deficiency 2015    Benign essential hypertension 2012     She  has a past surgical history that includes  section; Hysterectomy; Abscess drainage; Colonoscopy (N/A, 2017); and IR lumbar puncture (3/13/2020)  Her family history includes Breast cancer in her maternal aunt and maternal grandmother; Breast cancer (age of onset: 45) in her sister; Breast cancer (age of onset: 50) in her sister; Cervical cancer (age of onset: 61) in her sister; Colon cancer in her father; Coronary artery disease in her family; Diabetes in her family and father; Glaucoma in her mother; Hypertension in her family and father; Pancreatic cancer in her father  She  reports that she has never smoked  She has never used smokeless tobacco  She reports that she does not drink alcohol or use drugs    Current Outpatient Medications   Medication Sig Dispense Refill    acetaminophen (TYLENOL) 500 mg tablet Take 2 tablets (1,000 mg total) by mouth every 6 (six) hours as needed for moderate pain 60 tablet 1    Alcohol Swabs PADS by Does not apply route 4 (four) times a day *Latex Free* 120 each 3    aspirin 81 mg chewable tablet Chew 1 tablet (81 mg total) daily  0    atorvastatin (LIPITOR) 40 mg tablet Take 1 tablet (40 mg total) by mouth every evening 90 tablet 2    Blood Pressure KIT by Does not apply route daily 1 each 0    furosemide (LASIX) 40 mg tablet Take 1 tablet (40 mg total) by mouth daily 30 tablet 0    gabapentin (NEURONTIN) 300 mg capsule Take 1 capsule (300 mg total) by mouth 3 (three) times a day (Patient taking differently: Take 400 mg by mouth 3 (three) times a day ) 90 capsule 0    glucose blood (RELION GLUCOSE TEST STRIPS) test strip 1 each by Other route 3 (three) times a day 120 each 5    glucose blood test strip Check blood glucose levels three times per day before meals 300 each 2    insulin aspart protamine-insulin aspart (NovoLOG 70/30) 100 units/mL injection Inject 15 Units under the skin daily at bedtime      insulin isophane-insulin regular (HumuLIN 70/30 KwikPen) 100 units/mL injection pen 20 units before breakfast and 15 before dinner 5 pen 2    insulin NPH-insulin regular (NovoLIN 70/30) 100 units/mL subcutaneous injection 20 units before breakfast and 10 before dinner 10 mL 5    Insulin Syringe-Needle U-100 (BD Insulin Syringe Ultrafine) 31G X 15/64" 0 3 ML MISC by Does not apply route 2 (two) times a day 200 each 1    lisinopril (ZESTRIL) 20 mg tablet Take 1 tablet (20 mg total) by mouth daily 90 tablet 0    metFORMIN (GLUCOPHAGE-XR) 500 mg 24 hr tablet Take 2 tablets (1,000 mg total) by mouth 2 (two) times a day with meals 360 tablet 1    metoprolol succinate (TOPROL-XL) 50 mg 24 hr tablet Take 1 tablet by mouth once daily 30 tablet 0    Misc   Devices (DIGITAL GLASS SCALE) MISC by Does not apply route daily 1 each 0    capsicum (ZOSTRIX) 0 075 % topical cream Apply topically 3 (three) times a day (Patient not taking: Reported on 7/7/2020) 28 3 g 0    pantoprazole (PROTONIX) 40 mg tablet Take 1 tablet (40 mg total) by mouth daily before breakfast (Patient not taking: Reported on 6/18/2020) 30 tablet 0    predniSONE 10 mg tablet Take 3 tab x3 d, then 2 tab x3 d, 1 tab x10 d, 1/2 tab x10d, and then stop (Patient not taking: Reported on 6/18/2020) 30 tablet 0     No current facility-administered medications for this visit        Current Outpatient Medications on File Prior to Visit   Medication Sig    acetaminophen (TYLENOL) 500 mg tablet Take 2 tablets (1,000 mg total) by mouth every 6 (six) hours as needed for moderate pain    Alcohol Swabs PADS by Does not apply route 4 (four) times a day *Latex Free*    aspirin 81 mg chewable tablet Chew 1 tablet (81 mg total) daily    atorvastatin (LIPITOR) 40 mg tablet Take 1 tablet (40 mg total) by mouth every evening    Blood Pressure KIT by Does not apply route daily    furosemide (LASIX) 40 mg tablet Take 1 tablet (40 mg total) by mouth daily    gabapentin (NEURONTIN) 300 mg capsule Take 1 capsule (300 mg total) by mouth 3 (three) times a day (Patient taking differently: Take 400 mg by mouth 3 (three) times a day )    glucose blood (RELION GLUCOSE TEST STRIPS) test strip 1 each by Other route 3 (three) times a day    glucose blood test strip Check blood glucose levels three times per day before meals    insulin aspart protamine-insulin aspart (NovoLOG 70/30) 100 units/mL injection Inject 15 Units under the skin daily at bedtime    insulin isophane-insulin regular (HumuLIN 70/30 KwikPen) 100 units/mL injection pen 20 units before breakfast and 15 before dinner    insulin NPH-insulin regular (NovoLIN 70/30) 100 units/mL subcutaneous injection 20 units before breakfast and 10 before dinner    Insulin Syringe-Needle U-100 (BD Insulin Syringe Ultrafine) 31G X 15/64" 0 3 ML MISC by Does not apply route 2 (two) times a day    lisinopril (ZESTRIL) 20 mg tablet Take 1 tablet (20 mg total) by mouth daily    metFORMIN (GLUCOPHAGE-XR) 500 mg 24 hr tablet Take 2 tablets (1,000 mg total) by mouth 2 (two) times a day with meals    metoprolol succinate (TOPROL-XL) 50 mg 24 hr tablet Take 1 tablet by mouth once daily    Misc  Devices (DIGITAL GLASS SCALE) MISC by Does not apply route daily    capsicum (ZOSTRIX) 0 075 % topical cream Apply topically 3 (three) times a day (Patient not taking: Reported on 7/7/2020)    pantoprazole (PROTONIX) 40 mg tablet Take 1 tablet (40 mg total) by mouth daily before breakfast (Patient not taking: Reported on 6/18/2020)    predniSONE 10 mg tablet Take 3 tab x3 d, then 2 tab x3 d, 1 tab x10 d, 1/2 tab x10d, and then stop (Patient not taking: Reported on 6/18/2020)     No current facility-administered medications on file prior to visit  She is allergic to Jan Schein tartrate]; demerol [meperidine]; insulin; insulin glargine; latex; oxycodone-acetaminophen; and zolpidem            Objective:    Blood pressure 128/74, temperature 98 1 °F (36 7 °C), weight 128 kg (282 lb), not currently breastfeeding  Physical Exam    Neurological Exam  CONSTITUTIONAL: NAD, pleasant  NECK: supple, no lymphadenopathy, no thyromegaly, no JVD  CARDIOVASCULAR: RRR, normal S1S2, no murmurs, no rubs  RESP: clear to auscultation bilaterally, no wheezes/rhonchi/rales  ABDOMEN: soft, non tender, non distended  SKIN: no rash or skin lesions  EXTREMITIES: no edema, pulses 2+bilaterally  PSYCH: appropriate mood and affect  NEUROLOGIC COMPREHENSIVE EXAM: Patient is oriented to person, place and time, NAD; blunted affect, emotionally labile   CN II, III, IV, V, VI, VII,VIII,IX,X,XI-XII intact with EOMI, PERRLA, OKN intact, VF grossly intact, fundi poorly visualized secondary to pupillary constriction; symmetric face noted  Motor: 5/5 UE/LE bilateral symmetric, RLE 4/5 hip flexion and knee flexion; Sensory: decreased to light touch and pinprick left upper and lower extremities; normal vibration sensation feet bilaterally;  Coordination within normal limits on FTN and JIHAN testing; DTR: 2/4 through, no Babinski, no clonus  Tandem gait is abnormal  Romberg: negative  ROS:  12 points of review of system was reviewed with the patient and was unremarkable with exception: see HPI  Review of Systems   Constitutional: Negative  Negative for appetite change and fever  HENT: Negative  Negative for hearing loss, tinnitus, trouble swallowing and voice change  Eyes: Negative  Negative for photophobia and pain  Respiratory: Negative  Negative for shortness of breath  Cardiovascular: Negative  Negative for palpitations  Gastrointestinal: Negative  Negative for nausea and vomiting  Endocrine: Negative  Negative for cold intolerance  Genitourinary: Negative  Negative for dysuria, frequency and urgency  Musculoskeletal: Positive for gait problem  Negative for myalgias and neck pain  Chronic leg pain    Skin: Negative  Negative for rash  Neurological: Negative for dizziness, tremors, seizures, syncope, facial asymmetry, speech difficulty, weakness, light-headedness, numbness and headaches  Hematological: Negative  Does not bruise/bleed easily  Psychiatric/Behavioral: Negative  Negative for confusion, hallucinations and sleep disturbance          Memory issues

## 2020-07-07 NOTE — TELEPHONE ENCOUNTER
Pt start form for Betaseron has been complete and faxed together with insurance card to [phone]  Copy of form scanned into pt chart as well

## 2020-07-08 ENCOUNTER — PATIENT OUTREACH (OUTPATIENT)
Dept: CASE MANAGEMENT | Facility: HOSPITAL | Age: 60
End: 2020-07-08

## 2020-07-08 NOTE — PROGRESS NOTES
CHW spoke w/pt  Pt states she was at the Neurologist on 7/7/20  Pt went on to say that her visit didn't go well  Pt states she forgets a lot  Her son, Crys Rodriguez handles her paperwork  Pt hasn't received notification re: SNAP  CHW will outreach [place] assistance office for update on [employer] application  Pt states she received a bill from [employer], stating they were going to refer her bill over to collections since it wasn't paid  Pt believes she provided insurance information  CHW will f/u on ambulance bill  CHW to f/u w/pt on Friday when son Crys Rodriguez is available

## 2020-07-08 NOTE — PROGRESS NOTES
CHW spoke w/J  Mady Lopez from [facility] and was informed that pt called on 6/16, and requested SNAP application be mailed to her  As of 7/8/20, no pending SNAP application  CHW was informed pt's case is currently with [employer] and not [employer]  Pt needs to request case be transferred either phone or in writing to [address] [place] [address] Office phone # [phone], and fax [phone]  [employer] Case #[id number]  Pt needs to include in letter name, address, and case number and reason for transfer  Also mention applying for SNAP  Pt did receive LIHEAP through [employer] this past year  CHW will relay information to son Marcia Schulz during our conversation on Friday

## 2020-07-08 NOTE — PROGRESS NOTES
CHW spoke w/Kimberlyn from [employer] re: outstanding bill in the amount of 1,135 00 for services on 3/9/20  Kimberlyn indicated she spoke w/pt's son Sallie Red on 7/7/20, and pt's insurance they have on file is not correct  Kimberlyn stated they need to know pt's insurance at the time of service  CHW will f/u w/son on Friday to determine what insurance pt had back on 3/9/20, so CHW can contact [place] with correct coverage information

## 2020-07-09 ENCOUNTER — TELEPHONE (OUTPATIENT)
Dept: ENDOCRINOLOGY | Facility: CLINIC | Age: 60
End: 2020-07-09

## 2020-07-09 ENCOUNTER — TELEPHONE (OUTPATIENT)
Dept: NEUROLOGY | Facility: CLINIC | Age: 60
End: 2020-07-09

## 2020-07-09 NOTE — TELEPHONE ENCOUNTER
Please look at [facility] logs - homecare called stating her sugars were high

## 2020-07-09 NOTE — TELEPHONE ENCOUNTER
Received call from pt's home care nurse with [facility]  She states that she is cancelling outpt ST order as pt is unable to to have both home services and outpatient  She will order home ST for pt instead

## 2020-07-10 ENCOUNTER — PATIENT OUTREACH (OUTPATIENT)
Dept: FAMILY MEDICINE CLINIC | Facility: CLINIC | Age: 60
End: 2020-07-10

## 2020-07-10 ENCOUNTER — TELEPHONE (OUTPATIENT)
Dept: NEUROLOGY | Facility: CLINIC | Age: 60
End: 2020-07-10

## 2020-07-10 NOTE — TELEPHONE ENCOUNTER
Spoke with pt  To increase insulin to 20 U am and keep it at 15 before dinner  To send in her BS logs in 2 wks

## 2020-07-10 NOTE — TELEPHONE ENCOUNTER
Patient calling to check on status of betaseron  Informed her that the form has been sent to her insurance and we are waiting to her from them  States that she will call them to check the status

## 2020-07-10 NOTE — TELEPHONE ENCOUNTER
Sugars high recently before that were in 130s-160s range a lot of times - she needs to watch diet     For now increase to pre breakfast dose to 20 , continue 15 before dinner   Log in 2 weeks

## 2020-07-10 NOTE — TELEPHONE ENCOUNTER
Spoke with pt  She is using Humulin 70/30  18 U before breakfast and 15 U before dinner  And she is checking her sugars twice a day

## 2020-07-10 NOTE — PROGRESS NOTES
Called patient to follow up  She states she is "not good"  She notes the infusion therapy is complete and it did not help  She stated she is supposed to be started on a new medication but it needs a prior auth and she is awaiting for its approval       Patient reports her blood sugars remain elevated; this morning's reading was 231  She states she is watching her diet and rarely eats carbs and denies drinking soda  She notes she drinks a lot of water  Patient notes Endo will be soon switching her insulin to pen form  She stated she sent her blood sugar logs to Endo who will review and adjust insulin as needed  Patient reports she is remaining active by doing a lot of gardening  She continues to receive PT/OT and will soon be receiving speech/memory therapy as soon as it is approved  Patient notes her  will be having surgery in August and she is anxious about this  She states she does have children who are supportive and helpful  She states she does not have money and her son has been paying her bills, noting she has "a lot of bills"  She stated she still does not know whether she was approved for [facility]  Per 7/8 note, Molly JAMES will be calling patient today  Patient is aware of her Endo appointment in a few weeks  She noted she has two appointments at [facility] and needs the 9/9/20 cancelled as she has a neuro appointment the same day; will send clerical a note to cancel this  Patient was grateful for my call  Will continue to follow

## 2020-07-13 NOTE — PLAN OF CARE
Problem: Potential for Falls  Goal: Patient will remain free of falls  Description  INTERVENTIONS:  - Assess patient frequently for physical needs  -  Identify cognitive and physical deficits and behaviors that affect risk of falls    -  [place] fall precautions as indicated by assessment   - Educate patient/family on patient safety including physical limitations  - Instruct patient to call for assistance with activity based on assessment  - Modify environment to reduce risk of injury  - Consider OT/PT consult to assist with strengthening/mobility  Outcome: Progressing     Problem: DISCHARGE PLANNING - CARE MANAGEMENT  Goal: Discharge to post-acute care or home with appropriate resources  Description  INTERVENTIONS:  - Conduct assessment to determine patient/family and health care team treatment goals, and need for post-acute services based on payer coverage, community resources, and patient preferences, and barriers to discharge  - Address psychosocial, clinical, and financial barriers to discharge as identified in assessment in conjunction with the patient/family and health care team  - Arrange appropriate level of post-acute services according to patients   needs and preference and payer coverage in collaboration with the physician and health care team  - Communicate with and update the patient/family, physician, and health care team regarding progress on the discharge plan  - Arrange appropriate transportation to post-acute venues  Outcome: Progressing     Problem: PAIN - ADULT  Goal: Verbalizes/displays adequate comfort level or baseline comfort level  Description  Interventions:  - Encourage patient to monitor pain and request assistance  - Assess pain using appropriate pain scale  - Administer analgesics based on type and severity of pain and evaluate response  - Implement non-pharmacological measures as appropriate and evaluate response  - Consider cultural and social influences on pain and pain management  - Notify physician/advanced practitioner if interventions unsuccessful or patient reports new pain  Outcome: Progressing     Problem: SAFETY ADULT  Goal: Patient will remain free of falls  Description  INTERVENTIONS:  - Assess patient frequently for physical needs  -  Identify cognitive and physical deficits and behaviors that affect risk of falls    -  [place] fall precautions as indicated by assessment   - Educate patient/family on patient safety including physical limitations  - Instruct patient to call for assistance with activity based on assessment  - Modify environment to reduce risk of injury  - Consider OT/PT consult to assist with strengthening/mobility  Outcome: Progressing  Goal: Maintain or return to baseline ADL function  Description  INTERVENTIONS:  -  Assess patient's ability to carry out ADLs; assess patient's baseline for ADL function and identify physical deficits which impact ability to perform ADLs (bathing, care of mouth/teeth, toileting, grooming, dressing, etc )  - Assess/evaluate cause of self-care deficits   - Assess range of motion  - Assess patient's mobility; develop plan if impaired  - Assess patient's need for assistive devices and provide as appropriate  - Encourage maximum independence but intervene and supervise when necessary  - Involve family in performance of ADLs  - Assess for home care needs following discharge   - Consider OT consult to assist with ADL evaluation and planning for discharge  - Provide patient education as appropriate  Outcome: Progressing  Goal: Maintain or return mobility status to optimal level  Description  INTERVENTIONS:  - Assess patient's baseline mobility status (ambulation, transfers, stairs, etc )    - Identify cognitive and physical deficits and behaviors that affect mobility  - Identify mobility aids required to assist with transfers and/or ambulation (gait belt, sit-to-stand, lift, walker, cane, etc )  - [place] fall precautions as indicated by assessment  - Record patient progress and toleration of activity level on Mobility SBAR; progress patient to next Phase/Stage  - Instruct patient to call for assistance with activity based on assessment  - Consider rehabilitation consult to assist with strengthening/weightbearing, etc   Outcome: Progressing     Problem: Knowledge Deficit  Goal: Patient/family/caregiver demonstrates understanding of disease process, treatment plan, medications, and discharge instructions  Description  Complete learning assessment and assess knowledge base    Interventions:  - Provide teaching at level of understanding  - Provide teaching via preferred learning methods  Outcome: Progressing     Problem: NEUROSENSORY - ADULT  Goal: Achieves stable or improved neurological status  Description  INTERVENTIONS  - Monitor and report changes in neurological status  - Monitor vital signs such as temperature, blood pressure, glucose, and any other labs ordered   - Initiate measures to prevent increased intracranial pressure  - Monitor for seizure activity and implement precautions if appropriate      Outcome: Progressing  Goal: Remains free of injury related to seizures activity  Description  INTERVENTIONS  - Maintain airway, patient safety  and administer oxygen as ordered  - Monitor patient for seizure activity, document and report duration and description of seizure to physician/advanced practitioner  - If seizure occurs,  ensure patient safety during seizure  - Reorient patient post seizure  - Seizure pads on all 4 side rails  - Instruct patient/family to notify RN of any seizure activity including if an aura is experienced  - Instruct patient/family to call for assistance with activity based on nursing assessment  - Administer anti-seizure medications if ordered    Outcome: Progressing  Goal: Achieves maximal functionality and self care  Description  INTERVENTIONS  - Monitor swallowing and airway patency with patient fatigue and changes in neurological status  - Encourage and assist patient to increase activity and self care     - Encourage visually impaired, hearing impaired and aphasic patients to use assistive/communication devices  Outcome: Progressing     Problem: CARDIOVASCULAR - ADULT  Goal: Maintains optimal cardiac output and hemodynamic stability  Description  INTERVENTIONS:  - Monitor I/O, vital signs and rhythm  - Monitor for S/S and trends of decreased cardiac output  - Administer and titrate ordered vasoactive medications to optimize hemodynamic stability  - Assess quality of pulses, skin color and temperature  - Assess for signs of decreased coronary artery perfusion  - Instruct patient to report change in severity of symptoms  Outcome: Progressing     Problem: RESPIRATORY - ADULT  Goal: Achieves optimal ventilation and oxygenation  Description  INTERVENTIONS:  - Assess for changes in respiratory status  - Assess for changes in mentation and behavior  - Position to facilitate oxygenation and minimize respiratory effort  - Oxygen administered by appropriate delivery if ordered  - Initiate smoking cessation education as indicated  - Encourage broncho-pulmonary hygiene including cough, deep breathe, Incentive Spirometry  - Assess the need for suctioning and aspirate as needed  - Assess and instruct to report SOB or any respiratory difficulty  - Respiratory Therapy support as indicated  Outcome: Progressing     Problem: GASTROINTESTINAL - ADULT  Goal: Minimal or absence of nausea and/or vomiting  Description  INTERVENTIONS:  - Administer IV fluids if ordered to ensure adequate hydration  - Maintain NPO status until nausea and vomiting are resolved  - Nasogastric tube if ordered  - Administer ordered antiemetic medications as needed  - Provide nonpharmacologic comfort measures as appropriate  - Advance diet as tolerated, if ordered  - Consider nutrition services referral to assist patient with adequate nutrition and appropriate food choices  Outcome: Progressing     Problem: GENITOURINARY - ADULT  Goal: Maintains or returns to baseline urinary function  Description  INTERVENTIONS:  - Assess urinary function  - Encourage oral fluids to ensure adequate hydration if ordered  - Administer IV fluids as ordered to ensure adequate hydration  - Administer ordered medications as needed  - Offer frequent toileting  - Follow urinary retention protocol if ordered  Outcome: Progressing  Goal: Absence of urinary retention  Description  INTERVENTIONS:  - Assess patients ability to void and empty bladder  - Monitor I/O  - Bladder scan as needed  - Discuss with physician/AP medications to alleviate retention as needed  - Discuss catheterization for long term situations as appropriate  Outcome: Progressing     Problem: METABOLIC, FLUID AND ELECTROLYTES - ADULT  Goal: Electrolytes maintained within normal limits  Description  INTERVENTIONS:  - Monitor labs and assess patient for signs and symptoms of electrolyte imbalances  - Administer electrolyte replacement as ordered  - Monitor response to electrolyte replacements, including repeat lab results as appropriate  - Instruct patient on fluid and nutrition as appropriate  Outcome: Progressing  Goal: Fluid balance maintained  Description  INTERVENTIONS:  - Monitor labs   - Monitor I/O and WT  - Instruct patient on fluid and nutrition as appropriate  - Assess for signs & symptoms of volume excess or deficit  Outcome: Progressing  Goal: Glucose maintained within target range  Description  INTERVENTIONS:  - Monitor Blood Glucose as ordered  - Assess for signs and symptoms of hyperglycemia and hypoglycemia  - Administer ordered medications to maintain glucose within target range  - Assess nutritional intake and initiate nutrition service referral as needed  Outcome: Progressing     Problem: SKIN/TISSUE INTEGRITY - ADULT  Goal: Skin integrity remains intact  Description  INTERVENTIONS  - Identify patients at risk Pt with CKD 4, DM, HTN, AS s/p BAV, COPD on home O2, admitted with worsening SOB due to fluid overload.    CKD4 - creat at baseline   continue lasix  metolazone current   non oliguric   last ph noted  within target for CKD on renagel 1/1/1  mild hypokelmia,sp supplementation now off K   LE cellulitis - local care, silvadene, podiatry  Palliative care notes appreciated and Dc plan reviwed   will follow for skin breakdown  - Assess and monitor skin integrity  - Assess and monitor nutrition and hydration status  - Monitor labs (i e  albumin)  - Assess for incontinence   - Turn and reposition patient  - Assist with mobility/ambulation  - Relieve pressure over bony prominences  - Avoid friction and shearing  - Provide appropriate hygiene as needed including keeping skin clean and dry  - Evaluate need for skin moisturizer/barrier cream  - Collaborate with interdisciplinary team (i e  Nutrition, Rehabilitation, etc )   - Patient/family teaching  Outcome: Progressing     Problem: MUSCULOSKELETAL - ADULT  Goal: Maintain or return mobility to safest level of function  Description  INTERVENTIONS:  - Assess patient's ability to carry out ADLs; assess patient's baseline for ADL function and identify physical deficits which impact ability to perform ADLs (bathing, care of mouth/teeth, toileting, grooming, dressing, etc )  - Assess/evaluate cause of self-care deficits   - Assess range of motion  - Assess patient's mobility  - Assess patient's need for assistive devices and provide as appropriate  - Encourage maximum independence but intervene and supervise when necessary  - Involve family in performance of ADLs  - Assess for home care needs following discharge   - Consider OT consult to assist with ADL evaluation and planning for discharge  - Provide patient education as appropriate  Outcome: Progressing     Problem: Neurological Deficit  Goal: Neurological status is stable or improving  Description  Interventions:  - Monitor and assess patient's level of consciousness, motor function, sensory function, and level of assistance needed for ADLs  - Monitor and report changes from baseline  Collaborate with interdisciplinary team to initiate plan and implement interventions as ordered  - Provide and maintain a safe environment  - Consider seizure precautions  - Consider fall precautions  - Consider aspiration precautions    - Consider bleeding precautions  Outcome: Progressing     Problem: Activity Intolerance/Impaired Mobility  Goal: Mobility/activity is maintained at optimum level for patient  Description  Interventions:  - Assess and monitor patient  barriers to mobility and need for assistive/adaptive devices  - Assess patient's emotional response to limitations  - Collaborate with interdisciplinary team and initiate plans and interventions as ordered  - Encourage independent activity per ability   - Maintain proper body alignment  - Perform active/passive rom as tolerated/ordered  - Plan activities to conserve energy   - Turn patient as appropriate  Outcome: Progressing     Problem: Nutrition  Goal: Nutrition/Hydration status is improving  Description  Monitor and assess patient's nutrition/hydration status for malnutrition (ex- brittle hair, bruises, dry skin, pale skin and conjunctiva, muscle wasting, smooth red tongue, and disorientation)  Collaborate with interdisciplinary team and initiate plan and interventions as ordered  Monitor patient's weight and dietary intake as ordered or per policy  Utilize nutrition screening tool and intervene per policy  Determine patient's food preferences and provide high-protein, high-caloric foods as appropriate  - Assist patient with eating   - Allow adequate time for meals   - Encourage patient to take dietary supplement as ordered  - Collaborate with clinical nutritionist   - Include patient/family/caregiver in decisions related to nutrition    Outcome: Progressing     Problem: COPING  Goal: Pt/Family able to verbalize concerns and demonstrate effective coping strategies  Description  INTERVENTIONS:  - Assist patient/family to identify coping skills, available support systems and cultural and spiritual values  - Provide emotional support, including active listening and acknowledgement of concerns of patient and caregivers  - Reduce environmental stimuli, as able  - Provide patient education  - Assess for spiritual pain/suffering and initiate spiritual care, including notification of Pastoral Care or capri based community as needed  - Assess effectiveness of coping strategies  Outcome: Progressing  Goal: Will report anxiety at manageable levels  Description  INTERVENTIONS:  - Administer medication as ordered  - Teach and encourage coping skills  - Provide emotional support  - Assess patient/family for anxiety and ability to cope  Outcome: Progressing     Problem: DECISION MAKING  Goal: Pt/Family able to effectively weigh alternatives and participate in decision making related to treatment and care  Description  INTERVENTIONS:  - Identify decision maker  - Determine when there are differences among patient's view, family's view, and healthcare provider's view of patient condition and care goals  - Facilitate patient/family articulation of goals for care  - Help patient/family identify pros/cons of alternative solutions  - Provide information as requested by patient/family  - Respect patient/family rights related to privacy and sharing information   - Serve as a liaison between patient, family and health care team  - Initiate consults as appropriate (Ethics Team, Palliative Care, Family Care Conference, etc )  Outcome: Progressing     Problem: CONFUSION/THOUGHT DISTURBANCE  Goal: Thought disturbances (confusion, delirium, depression, dementia or psychosis) are managed to maintain or return to baseline mental status and functional level  Description  INTERVENTIONS:  - Assess for possible contributors to  thought disturbance, including but not limited to medications, infection, impaired vision or hearing, underlying metabolic abnormalities, dehydration, respiratory compromise,  psychiatric diagnoses and notify attending PHYSICAN/AP  - Monitor and intervene to maintain adequate nutrition, hydration, elimination, sleep and activity  - Decrease environmental stimuli, including noise as appropriate  - Provide frequent contacts to provide refocusing, direction and reassurance as needed  Approach patient calmly with eye contact and at their level    - [place] high risk fall precautions, aspiration precautions and other safety measures, as indicated  - If delirium suspected, notify physician/AP of change in condition and request immediate in-person evaluation  - Pursue consults as appropriate including Geriatric (campus dependent), OT for cognitive evaluation/activity planning, psychiatric, pastoral care, etc   Outcome: Progressing

## 2020-07-13 NOTE — TELEPHONE ENCOUNTER
Received Physician Request for Patient Self-Administered and Specialty Drugs form from [facility] regarding pt's Betaseron  Per automated system, it seems this is a PA form from [employer] [facility]  Will call to complete PA tomorrow      [phone]

## 2020-07-14 ENCOUNTER — PATIENT OUTREACH (OUTPATIENT)
Dept: FAMILY MEDICINE CLINIC | Facility: CLINIC | Age: 60
End: 2020-07-14

## 2020-07-14 NOTE — PROGRESS NOTES
CHW spoke w/pt, who indicated OT was at her home, and pt is unable to talk  CHW informed pt she and her son were suppose to contact me this past Friday, 7/10 to discuss some items  CHW scheduled to call back on Thur, 7/9 at 2:00 to speak with son  Pt states she would margo her calendar to make sure her son was home

## 2020-07-14 NOTE — TELEPHONE ENCOUNTER
Called [facility] at [phone] and spoke with Kaylen Rice  She reports Betaseron is a formulary medication, nothing further is needed  They received a paid claim on 7/10

## 2020-07-16 ENCOUNTER — TELEPHONE (OUTPATIENT)
Dept: FAMILY MEDICINE CLINIC | Facility: CLINIC | Age: 60
End: 2020-07-16

## 2020-07-16 ENCOUNTER — TELEPHONE (OUTPATIENT)
Dept: NEUROLOGY | Facility: CLINIC | Age: 60
End: 2020-07-16

## 2020-07-16 ENCOUNTER — PATIENT OUTREACH (OUTPATIENT)
Dept: FAMILY MEDICINE CLINIC | Facility: CLINIC | Age: 60
End: 2020-07-16

## 2020-07-16 NOTE — TELEPHONE ENCOUNTER
Patients home care RN called to state she does not have any experience with interferon injections  Asked if patients are brought in to office for injections  Informed her we do not inject in office; provided her the number for betaseron direct and informed her of the teaching and education option on their prompts  [phone]

## 2020-07-16 NOTE — PROGRESS NOTES
CHW spoke w/pt, and pt's son Christina Kramer  Pt stated she was feeling a little better  CHW informed pt I had spoken to Ambulance company re: outstanding bill from 3/9/20 service  CHW inquired as to the type of insurance pt had on 3/9/20, since pt' ambulance bill wasn't covered  Son, Christina Kramer indicated pt had no insurance during that time, and current insurance is [employer] which started [de-identified]  Son advised CHW he spoke w/Ambulance company about bill, and provided updated insurance, and thought it was taken care of  CHW will outreach [place] Asst office to determine necessary steps to address outstanding bill  CHW informed pt and pt's son that pt's case is with [facility] Office and not [employer]  Pt expressed confusing  CHW informed pt if she wants case w/[place] she would need to provide a letter requesting transfer from one Cty to another, but she needs to stay with that Cty, otherwise resources maybe effective  Son suggested keeping the case w/[place] [facility]  CHW provided case number to pt, and phone number for [phone] Asst Office  Son states they have been receiving documents from [employer]  CHW informed pt, and pt's son that currently pt has no pending activity w/[place] [facility]  Son stated they just received SNAP application in the mail from [place]  CHW asked if assistance was needed to complete application  Son stated he would complete application, but if any questions, they would contact CHW  CHW agreed  With pt on the phone, CHW outreached [phone] Asst Office  We were unable to reach anyone with local office, so CHW contacted [facility] customer service line  Pt gave permission for CHW to speak with rep from [facility] customer service line  CHW inquired about outstanding ambulance bill, and steps needed to have bill reviewed  Rep indicated witting a letter and submitting it to [employer], along with copy of ambulance bill, and income from March requesting review    Rep indicated [employer] should go back 90 days prior to initial coverage, and should cover the ambulance bill  CHW will write letter and email to pt's son  No other questions were asked of rep w/[facility] customer [facility] line, so call was ended  Pt's son indicated they submitted [employer] application a few weeks ago and still haven't heard anything  [employer] application was submitted prior to CHW starting to work w/pt  With pt on the phone, CHW contacted [employer] for an update on application, since pt hasn't signed a release of information giving CHW permission to discuss case with [employer] rep indicated application was received, but returned to pt's home address on 7/13 due to pt's birth certification having a different last name then other supporting documents  Last name is different since birth certification has maiden name  Pt was informed by rep that she would need to submit her marriage license  Pt stated she didn't have that  CHW recommended submitting either photo id or 's license once application is received by pt  Pt was frustrated about application being returned, and made several comments pertaining to "race" and how certain "races" get resources, and others don't  during call with CHW, and [employer] rep  After disconnecting from call w/[employer] rep, CHW reminded pt, and pt's son - pt needs to return entire application, along with supporting documents or application will be returned again  Both parties verbalized they understood  Pt's son currently resides with pt, and pt's spouse  Son asked what the income requirements were for [facility], and if his income would be taking into account  CHW provided income guideline information for 2/3 household members, and read some other SNAP guidelines  CHW indicated I didn't know whether they would qualify or not, but CHW suggested completing application and submitting it to [employer] Asst Office for review    Pt is unsure at this time what they will do        CHW will continue to f/u

## 2020-07-16 NOTE — TELEPHONE ENCOUNTER
Pt states that she has MS, able to ambulate short distance  Pt is looking to get a scooter  States that she spoke w/ Jamaal Vo ([phone] ext [phone]) w/MS association and states that in order for her to get this scooter  She will need a letter from her doctor stating that she has MS  Requesting letter be faxed to Ariadne Pineda  association at [phone] [facility]  Pls review  Ok to generate? Rajan Blackwood is under my professional care  She is under the care of [facility] for her Multiple Sclerosis  If you have any questions or concerns, please don't hesitate to call                       [phone] ok to leave detailed message

## 2020-07-16 NOTE — TELEPHONE ENCOUNTER
Nurse david called she states that pt is taking gabapenting 400mg, and pt would like to know if she can go without this medication since she is doing much better and has no pain  Pt would like to get a phone call at [phone]        Please advise

## 2020-07-16 NOTE — TELEPHONE ENCOUNTER
She should decrease the dosage and then stop so that she does not have side effects  If she is taking the medication three times per day she should cut down to twice a day for a week, then once a day for a week, then stop  Thank you

## 2020-07-22 ENCOUNTER — TELEPHONE (OUTPATIENT)
Dept: NEUROLOGY | Facility: CLINIC | Age: 60
End: 2020-07-22

## 2020-07-22 NOTE — TELEPHONE ENCOUNTER
As per below message, a Betaseron injection training session is scheduled with the patient over the phone on 7/24  Will follow up with patient on Friday

## 2020-07-23 ENCOUNTER — TELEPHONE (OUTPATIENT)
Dept: FAMILY MEDICINE CLINIC | Facility: CLINIC | Age: 60
End: 2020-07-23

## 2020-07-23 DIAGNOSIS — G57.93 NEUROPATHIC PAIN, LEG, BILATERAL: ICD-10-CM

## 2020-07-23 RX ORDER — GABAPENTIN 300 MG/1
300 CAPSULE ORAL 3 TIMES DAILY
Qty: 90 CAPSULE | Refills: 0 | Status: SHIPPED | OUTPATIENT
Start: 2020-07-23 | End: 2020-09-28

## 2020-07-23 NOTE — TELEPHONE ENCOUNTER
Pt states she is taking gabapentin 400mg BID and wants to go back to gabapentin 400mg TID, pt is having pain in her legs and reports she is doing her exercises   Please advise

## 2020-07-24 ENCOUNTER — PATIENT OUTREACH (OUTPATIENT)
Dept: FAMILY MEDICINE CLINIC | Facility: CLINIC | Age: 60
End: 2020-07-24

## 2020-07-24 DIAGNOSIS — R60.0 BILATERAL LEG EDEMA: ICD-10-CM

## 2020-07-24 RX ORDER — FUROSEMIDE 40 MG/1
TABLET ORAL
Qty: 30 TABLET | Refills: 0 | Status: SHIPPED | OUTPATIENT
Start: 2020-07-24 | End: 2020-08-24

## 2020-07-24 NOTE — PROGRESS NOTES
Called patient to follow up  She states she is "not good" because she is having a lot of leg pain  She notes she was on a lower dose of gabapentin but now is back on 300mg three times a day  Her leg pain awoke her this morning and she took two 500mg Tylenol then later took gabapentin  Patient notes her blood sugars have been "very good" reporting her fasting this morning of 170 (improved from previous, >200)  She admitted she forgot to take her insulin last night  Patient states she will be educated today on how to inject herself with Betaseron  She notes that she and her son will be watching a video later today about this and then a nurse will be calling afterwards to address any questions and further educate  She states the injections will be every other day and be given in her thigh  Patient reports her daughter is planning on visiting her this weekend and she became tearful  She notes that her son is now Power of  and will be making any major decisions on her behalf and he is also balancing her checkbook  She was happy to report she had a memory test recently and "passed with flying colors"  She notes her short term memory is "bad" but her long term is intact  Patient was grateful for the call  Will continue to follow

## 2020-07-27 NOTE — TELEPHONE ENCOUNTER
Patient confirms she did receive injection training  She gave herself her second injection yesterday

## 2020-07-30 ENCOUNTER — PATIENT OUTREACH (OUTPATIENT)
Dept: FAMILY MEDICINE CLINIC | Facility: CLINIC | Age: 60
End: 2020-07-30

## 2020-07-30 NOTE — PROGRESS NOTES
CHW lm for pt to return call, and check status of SNAP application, and if pt decided to move forward  During last conversation w/pt, pt's son was going to be assisting her with application, but wasn't sure if they were going to file  CHW wants to check status of [employer] application, and if pt's son mailed back application with proof of age document

## 2020-07-31 DIAGNOSIS — Z79.4 TYPE 2 DIABETES MELLITUS WITH HYPERGLYCEMIA, WITH LONG-TERM CURRENT USE OF INSULIN (HCC): ICD-10-CM

## 2020-07-31 DIAGNOSIS — E11.65 TYPE 2 DIABETES MELLITUS WITH HYPERGLYCEMIA, WITH LONG-TERM CURRENT USE OF INSULIN (HCC): ICD-10-CM

## 2020-07-31 NOTE — TELEPHONE ENCOUNTER
Pt is using Novolin 70/30 and is having a hard time paying for it  I think she would qualify for pt assistance  Do you want to keep her on this insulin? We checked and there is a plan for this insulin through [employer]  It is only in vials  Or do you want to switch her to something else?

## 2020-08-04 ENCOUNTER — TELEPHONE (OUTPATIENT)
Dept: ENDOCRINOLOGY | Facility: CLINIC | Age: 60
End: 2020-08-04

## 2020-08-04 DIAGNOSIS — R07.89 OTHER CHEST PAIN: ICD-10-CM

## 2020-08-04 DIAGNOSIS — R52 PAIN: Primary | ICD-10-CM

## 2020-08-04 DIAGNOSIS — R00.2 PALPITATIONS: ICD-10-CM

## 2020-08-04 RX ORDER — IBUPROFEN 400 MG/1
400 TABLET ORAL EVERY 6 HOURS PRN
Qty: 30 TABLET | Refills: 0 | Status: SHIPPED | OUTPATIENT
Start: 2020-08-04 | End: 2020-08-31

## 2020-08-04 NOTE — TELEPHONE ENCOUNTER
Janina blake stands for every other day - please be very clear with the patient it is 3 times a week, for example Monday, Wednesday Friday, and weekend is off

## 2020-08-04 NOTE — TELEPHONE ENCOUNTER
Called pt and left her know that I will be sending her forms for PA with [address]   She is to fill out her part, and mail to them    To call me if any questions

## 2020-08-04 NOTE — TELEPHONE ENCOUNTER
MSW reviewed the patient's chart  Patient has been working with care coordination team through her PCP office to get enrolled in [facility]  According to the note from Maite Rust, the patient decided not to move forward with enrolling in [facility]  It also appears that the patient's son will be assisting her with her finances  MSW will reach out to CHW to clarify and will then reach out to the patient

## 2020-08-04 NOTE — TELEPHONE ENCOUNTER
[place] from [facility] pharmacy called to clarify betaseron sig   As I was reviewing pt's chart, computer shut off        [phone]

## 2020-08-04 NOTE — TELEPHONE ENCOUNTER
Reviewed start form, instructions below reflect prescription on start form which was sent to the pharmacy  Spoke with the patient  She reports she is injecting Betaseron every other day  States she did discuss injecting on Monday, Wednesday, and Friday with both her pharmacist Kimberly Bradley and a nurse with [facility]  She reports both the nurse and pharmacist told her that this medication must be taken every other day, it is not prescribed as Monday, Wednesday, Friday  Pt states "it doesn't work like that " Asking to clarify dose  Please clarify as betaseron start form is written for every other day  Pt reports she experienced a lot of generalized pain last night after her injection  She does take acetaminophen 1,000mg at the time of her injection however this did not help  She tried repeating the dose overnight and using a heated blanket without relief  States this was worse than what she normally experiences  She spoke with a nurse with [facility] who recommended taking ibuprofen 400mg, acetaminophen 1,000mg, and a warm shower followed by rest  Patient did wake up feeling much better  Patient is asking for a prescription to be sent to her pharmacy for ibuprofen as this was helpful  She is aware that she will need to contact our office if she continues to experience this after her injections  Pt then reports she is having trouble getting her thoughts together, having trouble with her memory  States this is frustrating for her  Asking if betaseron will help this  I explained that this medication is meant to prevent further relapse and disability  She verbalizes understanding  Pt then expressed concern regarding benefits from the government and obtaining food stamps  She is worried that she will not be able to pay her mortgage  States she cannot work due to her symptoms  Asking for assistance  Judith Boo- are you able to assist the patient with finding assistance and resources? Thanks!

## 2020-08-04 NOTE — PROGRESS NOTES
CHW received voice mail from pt stating she wasn't moving forward with SNAP application, and son is taking over her finances

## 2020-08-04 NOTE — TELEPHONE ENCOUNTER
cristiano:  Lakeisha Webb and advised of the below  He verbalized understanding     Per betaseron start form  1 box (14 vials) 0 25 mg/1 ml w/ 12 refills  Dosing  Week 1-2 Inject 0 0625 mg sq qod  Week 3-4 inject 0 125 mg sq qod  Week 5-6 0 1875 mg sq qod  Week 7+ 0 25 mg sq qod   Maintenance dose  0 25 sq qod

## 2020-08-05 ENCOUNTER — PATIENT OUTREACH (OUTPATIENT)
Dept: FAMILY MEDICINE CLINIC | Facility: CLINIC | Age: 60
End: 2020-08-05

## 2020-08-05 RX ORDER — METOPROLOL SUCCINATE 50 MG/1
TABLET, EXTENDED RELEASE ORAL
Qty: 30 TABLET | Refills: 0 | Status: SHIPPED | OUTPATIENT
Start: 2020-08-05 | End: 2020-09-08

## 2020-08-05 NOTE — PROGRESS NOTES
CHW spoke w/pt  Pt states she is doing well, but her  is in the hospital and may require additional surgery, and upset she isn't able to see him  CHW asked pt status of [employer] application since on 7/36, pt and CHW were informed by Stella simeon that documents were send back due to supporting documents not being correct  Pt states she hasn't received any paperwork back from Stella Jean  With pt on the phone CHW contacted Stella Jean and was informed that pt was denied due to residing in [facility] and [employer]  Application was returned on 8/3  Stella Jean suggested calling [employer] for transportation  CHW contacted [employer] at [phone], and was informed pt needs to call and complete application for svcs over the phone  With pt on the phone CHW contacted [employer] back and spoke w/[facility] [employer]  and completed application  Pt was informed during call that [employer] only provides services to the [facility] area on [address] only, and appts can only be scheduled from 11 - 4  [employer] is a door to door service, but transports multiple riders at one time  Pt is aware that once appt is over to contact [employer] for , but pt will not be picked up until all other riders are ready  Pt may have to sit at appt for several hours  Pt acknowledged that she was fine with this  Pt needs to contact [place] at least 2 days prior to appt date, but no more then 2 weeks ahead  Pt will be eligible for free medical transportation services and will be registered by end of business on 8/5/20  Pt will receive documents in mail, that need to be signed and returned  Pt indicated she wasn't moving forward with SNAP due to son residing in residence, and with his income, pt would be over the income requirements  Pt indicated she has been paying [facility] for diagnostic test that were performed, but since has completed the necessary hardship application    CHW will f/u w/hospital billing to confirm hardship application was received  CHW was informed by  rep that [facility] is separate from [address] [place] [address], and pt would need to complete hardship application with them  [facility] hardship application has received and denied due to needing additional income information (proof of zero income form) was mailed to pt on 7/16  CHW will f/u w pt if form was received   indicated Collin Cam is handling case, and will ask Collin Cam if form can be emailed to CHW  If not,  will have Thomas contact CHW via telephone   indicated to CHW that note on pt's account made mention "when billing dept was speaking with pt previous, pt seemed confused"  Pt stated to CHW she spoke w Tonie Cleary Asst Office on 7/20, but nothing was solved  CHW will contact Tonie Cleary re: review of ambulance bill        CHW will continue to f/u

## 2020-08-05 NOTE — PROGRESS NOTES
CHW received IM from Cecy Paz [place] from [address] [place] [address] Neurology indicating she received a referral from RN at [address] [place] [address] Neurology asking to contact pt to asst with some services  SW indicated she saw my note indicating I was working with pt and wanted to collaborate w/CHW so we're not repeating services  CHW informed SW I just started working w/pt, and currently assisting pt with transportation svcs, SNAP, and outstanding medical bills  CHW informed SW I would continue to work w/pt  SW acknowledged and indicated she would f/u w/pt to advise she would be closing pt's case on her end she pt is being assisted  CHW agreed

## 2020-08-05 NOTE — TELEPHONE ENCOUNTER
Patient made aware of Dr Venus Rivas message  She verbalizes understanding  She reports she will reach maintenance dose on 9/4/20 and will begin M/W/F dosing at that time

## 2020-08-05 NOTE — TELEPHONE ENCOUNTER
MSW spoke with CHW Inez Humphrey this afternoon to discuss the patient's needs  Molly informed MSW that the patient has been working with multiple  and social workers both in and outside of the network to get her needs met and social service needs taken care of  Molly has most recently been working with the patient to get enrolled in the [facility] program  In order to qualify for the program the patient's household must meet the financial guidelines  The patient lives with her  and son  Her son works full time and they were worried that with his income, they would not qualify for SNAP  They decided that they were not going to move forward in the application process  The patient also disclosed that her son was going to take care of her finances  MSW then spoke with the patient about the items above  She shared that her son was told that she would not qualify for SNAP therefore they are not going to apply for it  The patient's son has decided that he is going to financially support the patient at this time as they wait for her to get approved for social security/disabilty benefits  The patient stated that her son will be paying her mortgage for her  She stated that she has been working with a  at the [employer]  This  is following her social security/disability case  There are no needs to be met by MSW at this time  The patient will continue to work with Inez Humphrey and the care coordination team at [facility]  MSW will be available to assist with any further needs in regards to this patient

## 2020-08-06 ENCOUNTER — TELEPHONE (OUTPATIENT)
Dept: NEUROLOGY | Facility: CLINIC | Age: 60
End: 2020-08-06

## 2020-08-06 ENCOUNTER — PATIENT OUTREACH (OUTPATIENT)
Dept: FAMILY MEDICINE CLINIC | Facility: CLINIC | Age: 60
End: 2020-08-06

## 2020-08-06 NOTE — TELEPHONE ENCOUNTER
Irma Iverson, RN with [facility] program    Reporting adverse events related to [facility]  Patient called them and said she is having pain again this morning  Arms and legs after dose on tuesday 8/4  She left a vm this morning with the program at 4am  She was awoken with pain  She was all sweaty at that time  She took her temp and it was 97 4  Took Motrin  She is currently on 0 25mL of betaseron  She started Beta-seron on 7/24 and is scheduled to increase to 0 5mL tomorrow  Spoke to patient  She states she feels "shocks" and pain throughout her body when she takes this medication  She has had this pain since starting this medication  Pain happens only on the night she takes the injection  Next day, the patient slowly goes away  Patient is concerned about increasing medication         [phone]   Ok to leave a detailed message

## 2020-08-06 NOTE — PROGRESS NOTES
JACOB ingram for [facility] w/[facility] to return call to discuss pt's case

## 2020-08-07 NOTE — TELEPHONE ENCOUNTER
Called patient's home # and spoke with her daughter Cesilia Brandt (on communication consent)  She reports the patient is currently sleeping  States patient was having some pain in her legs  Patient is due for another dose of Betaseron today  I recommended that she again utilize tylenol and ibuprofen as well as a warm shower if needed  I asked that the patient call our office early next week with an update on how she is tolerating the medication  Janna verbalizes understanding  I did ask that they call back with any further questions or concerns  Made her aware of my office hours today

## 2020-08-07 NOTE — TELEPHONE ENCOUNTER
Pt calling back  States pain is improved, still has some pain in her legs but she will take a warm shower now  She is using tylenol and ibuprofen at time of injection  She will be increasing to 0 5mL tonight  She is agreeable to injecting tonight and Sunday  She will call our office on Monday with an update on how she tolerated injections

## 2020-08-10 NOTE — TELEPHONE ENCOUNTER
FYI  Patient calling with an update  She reports she has been taking ibuprofen at the time of her injection and then the following morning as recommended by the nurse with [facility]  She reports she did not have any pain after Friday or Sunday's injections  She is feeling much better  She confirms she did increase the dose to 0 5mL  She will call our office with any further problems/ side effects

## 2020-08-12 NOTE — PROGRESS NOTES
CHW send message to Mr Renteria, requesting an update on '[facility] for outstanding hospital debt

## 2020-08-13 ENCOUNTER — PATIENT OUTREACH (OUTPATIENT)
Dept: FAMILY MEDICINE CLINIC | Facility: CLINIC | Age: 60
End: 2020-08-13

## 2020-08-13 NOTE — PROGRESS NOTES
Called patient to follow up  She states she is "very well"  She reports she is injecting 0 50 Betaseron and has "no pain whatseover"  She notes she will remain on this strength for an another week then it will increase to 0 75 for a few weeks then ultimately remain on 1ml  Patient also states she has been thinking more clearly, her sleep has improved and she is very relieved the medication is helping  Patient notes her blood sugar this morning was 144  She reports she has not been taking her insulin at night for a few days because her numbers were 101, 103  Patient is aware of her PCP appointment next Friday, 8/21  Will continue to follow

## 2020-08-17 ENCOUNTER — PATIENT OUTREACH (OUTPATIENT)
Dept: FAMILY MEDICINE CLINIC | Facility: CLINIC | Age: 60
End: 2020-08-17

## 2020-08-17 NOTE — PROGRESS NOTES
CHW spoke w/pt, who states her  is home from the hospital but continues to have health issues  Pt states Visiting Nurse comes daily  Pt informed CHW her outstanding hospital debt has been covered and she only owes around 160 which she will be making payments on  Pt states she has an upcoming appt w/PCP on Thur and her son will be bring her  Pt states she received the [place] [employer] paperwork, which she completed and returned  CHW informed pt that I would be transitioning out of [address] on [facility], 8/21, and will provide pt w/an update later in the week if her case will be handed off to a colleague or  be closed from a CHW perspective  CHW informed pt that RN CM would remain on her team   Pt verbalized she understood

## 2020-08-21 ENCOUNTER — TELEMEDICINE (OUTPATIENT)
Dept: FAMILY MEDICINE CLINIC | Facility: CLINIC | Age: 60
End: 2020-08-21

## 2020-08-21 ENCOUNTER — TELEPHONE (OUTPATIENT)
Dept: FAMILY MEDICINE CLINIC | Facility: CLINIC | Age: 60
End: 2020-08-21

## 2020-08-21 DIAGNOSIS — R60.0 BILATERAL LEG EDEMA: ICD-10-CM

## 2020-08-21 DIAGNOSIS — I10 BENIGN ESSENTIAL HYPERTENSION: ICD-10-CM

## 2020-08-21 DIAGNOSIS — I10 BENIGN ESSENTIAL HYPERTENSION: Primary | ICD-10-CM

## 2020-08-21 PROCEDURE — 99213 OFFICE O/P EST LOW 20 MIN: CPT | Performed by: NURSE PRACTITIONER

## 2020-08-21 PROCEDURE — 1036F TOBACCO NON-USER: CPT | Performed by: NURSE PRACTITIONER

## 2020-08-21 RX ORDER — LISINOPRIL 40 MG/1
40 TABLET ORAL DAILY
Qty: 90 TABLET | Refills: 0 | Status: SHIPPED | OUTPATIENT
Start: 2020-08-21 | End: 2020-11-29

## 2020-08-21 NOTE — PROGRESS NOTES
Virtual Brief Visit    Assessment/Plan:    Problem List Items Addressed This Visit        Cardiovascular and Mediastinum    Benign essential hypertension - Primary        Currently taking 20 mg of lisinopril and 50 mg Toprol XL daily  Patient does not check BP after taking meds, only before  I would like to increase her medications but would like to see how much the BP drops after taking scheduled medications before making adjustment  Instructed patient to take her BP about 2 hours after taking morning medications and to call the office with the reading  Patient wrote this down so that she does not forget  Reason for visit is   Chief Complaint   Patient presents with    Virtual Brief Visit        Encounter provider Ambreen Bernal, [facility]    Provider located at [address] [place] [id number] [phone]    Recent Visits  No visits were found meeting these conditions  Showing recent visits within past 7 days and meeting all other requirements     Today's Visits  Date Type Provider Dept   08/21/20 Telemedicine Jerson Rowe 48 today's visits and meeting all other requirements     Future Appointments  No visits were found meeting these conditions  Showing future appointments within next 150 days and meeting all other requirements        After connecting through telephone, the patient was identified by name and date of birth  Andreea Doe was informed that this is a telemedicine visit and that the visit is being conducted through telephone  My office door was closed  No one else was in the room  She acknowledged consent and understanding of privacy and security of the platform  The patient has agreed to participate and understands she can discontinue the visit at any time      It was my intention to perform this visit via video technology but the patient was not able to do a video connection so the visit was completed via telephone audio only  Patient is aware this is a billable service  Ramón Jaimes is a 63 y o  female who presents today for this virtual visit for follow up of hypertension  The patient has a hx of MS/ CNS demyelination and follows with neurology  Patient was recently started on Betaseron injections EOD and since is reporting improvement in neuropathic leg pain most days  She has had influenza like sx and low grade fever after injections once  No other known side effects  Many days she does not need to take any insulin due to blood glucose levels between   She is concerned about BP  Her BP this am was 172/100  She notes that this is the daily trend for the past few weeks  She takes her BP before taking her medications, but not after  She is concerned about her father  He was in a nursing home and is losing weight quickly  He is refusing home services and this makes her very worried  Past Medical History:   Diagnosis Date    Diabetes mellitus ([facility] )     External hemorrhoids     last assessed 16, resolved 16    Hernia, ventral     last assessed 12/14/15, resolved 16    Hypertension     Incarcerated incisional hernia     last assessed 12/21/15, resolved 16    Insomnia     last assessed 16, resolved 10/9/17    Lyme disease     Morbid obesity with BMI of 45 0-49 9, adult ([facility] ) 5/3/2017    MS (multiple sclerosis) ([facility] )     Stroke (cerebrum) ([facility] ) 2020    Type 2 diabetes mellitus with hyperglycemia, without long-term current use of insulin (HCC)        Past Surgical History:   Procedure Laterality Date    ABCESS DRAINAGE       SECTION      x3    COLONOSCOPY N/A 2017    Procedure: COLONOSCOPY with biopsy;  Surgeon: Candi Yepez DO;  Location: [facility];   Service: Complete    HYSTERECTOMY      IR LUMBAR PUNCTURE  3/13/2020       Current Outpatient Medications   Medication Sig Dispense Refill    acetaminophen (TYLENOL) 500 mg tablet Take 2 tablets (1,000 mg total) by mouth every 6 (six) hours as needed for moderate pain 60 tablet 1    Alcohol Swabs PADS by Does not apply route 4 (four) times a day *Latex Free* 120 each 3    aspirin 81 mg chewable tablet Chew 1 tablet (81 mg total) daily  0    atorvastatin (LIPITOR) 40 mg tablet Take 1 tablet (40 mg total) by mouth every evening 90 tablet 2    Blood Pressure KIT by Does not apply route daily 1 each 0    capsicum (ZOSTRIX) 0 075 % topical cream Apply topically 3 (three) times a day (Patient not taking: Reported on 7/7/2020) 28 3 g 0    furosemide (LASIX) 40 mg tablet Take 1 tablet by mouth once daily 30 tablet 0    gabapentin (NEURONTIN) 300 mg capsule Take 1 capsule (300 mg total) by mouth 3 (three) times a day 90 capsule 0    glucose blood (RELION GLUCOSE TEST STRIPS) test strip 1 each by Other route 3 (three) times a day 120 each 5    glucose blood test strip Check blood glucose levels three times per day before meals 300 each 2    ibuprofen (MOTRIN) 400 mg tablet Take 1 tablet (400 mg total) by mouth every 6 (six) hours as needed for mild pain 30 tablet 0    insulin aspart protamine-insulin aspart (NovoLOG 70/30) 100 units/mL injection Inject 15 Units under the skin daily at bedtime      insulin isophane-insulin regular (HumuLIN 70/30 KwikPen) 100 units/mL injection pen 20 units before breakfast and 15 before dinner 5 pen 2    insulin NPH-insulin regular (NovoLIN 70/30) 100 units/mL subcutaneous injection 20 units before breakfast and 10 before dinner 10 mL 5    Insulin Syringe-Needle U-100 (BD Insulin Syringe Ultrafine) 31G X 15/64" 0 3 ML MISC by Does not apply route 2 (two) times a day 200 each 1    lisinopril (ZESTRIL) 20 mg tablet Take 1 tablet (20 mg total) by mouth daily 90 tablet 0    metFORMIN (GLUCOPHAGE-XR) 500 mg 24 hr tablet Take 2 tablets (1,000 mg total) by mouth 2 (two) times a day with meals 360 tablet 1    metoprolol succinate (TOPROL-XL) 50 mg 24 hr tablet Take 1 tablet by mouth once daily 30 tablet 0    Misc  Devices (DIGITAL GLASS SCALE) MISC by Does not apply route daily 1 each 0    pantoprazole (PROTONIX) 40 mg tablet Take 1 tablet (40 mg total) by mouth daily before breakfast (Patient not taking: Reported on 6/18/2020) 30 tablet 0    predniSONE 10 mg tablet Take 3 tab x3 d, then 2 tab x3 d, 1 tab x10 d, 1/2 tab x10d, and then stop (Patient not taking: Reported on 6/18/2020) 30 tablet 0     No current facility-administered medications for this visit  Allergies   Allergen Reactions    Ambien [Zolpidem Tartrate]     Demerol [Meperidine]     Insulin     Insulin Glargine      Annotation - [id number]: memory loss    Latex     Oxycodone-Acetaminophen Vomiting    Zolpidem Hallucinations and Irritability       Review of Systems   Constitutional: Negative for chills and fever  HENT: Negative for congestion and sore throat  Respiratory: Negative for cough and shortness of breath  Cardiovascular: Negative for chest pain and palpitations  Gastrointestinal: Negative for abdominal pain, diarrhea, nausea and vomiting  Genitourinary: Negative for difficulty urinating, dysuria and flank pain  Musculoskeletal: Positive for arthralgias, gait problem and myalgias  Negative for neck pain and neck stiffness  Neurological: Negative for dizziness and headaches  Psychiatric/Behavioral: Negative for confusion, dysphoric mood, hallucinations, self-injury, sleep disturbance and suicidal ideas  The patient is nervous/anxious  The patient is not hyperactive  There were no vitals filed for this visit  I spent 15 minutes directly with the patient during this visit    VIRTUAL VISIT DISCLAIMER    Maite Antoine acknowledges that she has consented to an online visit or consultation   She understands that the online visit is based solely on information provided by her, and that, in the absence of a face-to-face physical evaluation by the physician, the diagnosis she receives is both limited and provisional in terms of accuracy and completeness  This is not intended to replace a full medical face-to-face evaluation by the physician  Jorge Lara understands and accepts these terms

## 2020-08-21 NOTE — TELEPHONE ENCOUNTER
Called patient  BP continues to be elevated after medications  Directed patient to increase lisinopril to 40 mg daily  She should continue to check BP after medications and early next week I would like her to update us on BP trends  Patient is aware that if any respiratory distress, chest pain, or palpitations to present to the ED

## 2020-08-24 ENCOUNTER — PATIENT OUTREACH (OUTPATIENT)
Dept: FAMILY MEDICINE CLINIC | Facility: CLINIC | Age: 60
End: 2020-08-24

## 2020-08-24 DIAGNOSIS — R60.0 BILATERAL LEG EDEMA: ICD-10-CM

## 2020-08-24 RX ORDER — FUROSEMIDE 40 MG/1
TABLET ORAL
Qty: 30 TABLET | Refills: 0 | Status: SHIPPED | OUTPATIENT
Start: 2020-08-24 | End: 2020-08-24 | Stop reason: SDUPTHER

## 2020-08-24 RX ORDER — FUROSEMIDE 40 MG/1
40 TABLET ORAL DAILY
Qty: 30 TABLET | Refills: 0 | Status: SHIPPED | OUTPATIENT
Start: 2020-08-24 | End: 2020-08-31

## 2020-08-24 NOTE — PROGRESS NOTES
CHW lm for pt reminding her I would be closing her case due to transitioning out of [facility] into a POD and RN CM would continue to f/u  Pt has no current barriers at this time from a CHW perspective  Case closed 8/24/20

## 2020-08-31 ENCOUNTER — NURSE TRIAGE (OUTPATIENT)
Dept: OTHER | Facility: OTHER | Age: 60
End: 2020-08-31

## 2020-08-31 ENCOUNTER — HOSPITAL ENCOUNTER (EMERGENCY)
Facility: HOSPITAL | Age: 60
Discharge: HOME | End: 2020-08-31
Attending: EMERGENCY MEDICINE | Admitting: EMERGENCY MEDICINE
Payer: COMMERCIAL

## 2020-08-31 ENCOUNTER — TELEMEDICINE (OUTPATIENT)
Dept: FAMILY MEDICINE CLINIC | Facility: CLINIC | Age: 60
End: 2020-08-31

## 2020-08-31 ENCOUNTER — HOSPITAL ENCOUNTER (OUTPATIENT)
Facility: HOSPITAL | Age: 60
Setting detail: OBSERVATION
Discharge: HOME | End: 2020-09-01
Attending: EMERGENCY MEDICINE | Admitting: EMERGENCY MEDICINE
Payer: COMMERCIAL

## 2020-08-31 ENCOUNTER — APPOINTMENT (EMERGENCY)
Dept: CT IMAGING | Facility: HOSPITAL | Age: 60
End: 2020-08-31
Payer: COMMERCIAL

## 2020-08-31 VITALS
BODY MASS INDEX: 45.23 KG/M2 | SYSTOLIC BLOOD PRESSURE: 182 MMHG | WEIGHT: 288.8 LBS | DIASTOLIC BLOOD PRESSURE: 86 MMHG | HEART RATE: 63 BPM | OXYGEN SATURATION: 97 % | RESPIRATION RATE: 18 BRPM | TEMPERATURE: 97.9 F

## 2020-08-31 DIAGNOSIS — I10 SEVERE HYPERTENSION: Primary | ICD-10-CM

## 2020-08-31 DIAGNOSIS — I10 ESSENTIAL HYPERTENSION: Chronic | ICD-10-CM

## 2020-08-31 DIAGNOSIS — I10 HYPERTENSION: ICD-10-CM

## 2020-08-31 DIAGNOSIS — I16.0 HYPERTENSIVE URGENCY: Primary | ICD-10-CM

## 2020-08-31 DIAGNOSIS — R51.9 HEADACHE: Primary | ICD-10-CM

## 2020-08-31 DIAGNOSIS — R51.9 ACUTE NONINTRACTABLE HEADACHE, UNSPECIFIED HEADACHE TYPE: ICD-10-CM

## 2020-08-31 PROBLEM — R06.83 SNORING: Status: ACTIVE | Noted: 2020-08-31

## 2020-08-31 PROBLEM — E66.01 MORBID OBESITY (HCC): Status: ACTIVE | Noted: 2020-08-31

## 2020-08-31 LAB
ALBUMIN SERPL BCP-MCNC: 3.2 G/DL (ref 3.5–5)
ALBUMIN SERPL BCP-MCNC: 3.2 G/DL (ref 3.5–5)
ALP SERPL-CCNC: 105 U/L (ref 46–116)
ALP SERPL-CCNC: 109 U/L (ref 46–116)
ALT SERPL W P-5'-P-CCNC: 22 U/L (ref 12–78)
ALT SERPL W P-5'-P-CCNC: 27 U/L (ref 12–78)
ANION GAP SERPL CALCULATED.3IONS-SCNC: 7 MMOL/L (ref 4–13)
ANION GAP SERPL CALCULATED.3IONS-SCNC: 8 MMOL/L (ref 4–13)
AST SERPL W P-5'-P-CCNC: 16 U/L (ref 5–45)
AST SERPL W P-5'-P-CCNC: 16 U/L (ref 5–45)
ATRIAL RATE: 65 BPM
BASOPHILS # BLD AUTO: 0.01 THOUSANDS/ΜL (ref 0–0.1)
BASOPHILS # BLD AUTO: 0.03 THOUSANDS/ΜL (ref 0–0.1)
BASOPHILS NFR BLD AUTO: 0 % (ref 0–1)
BASOPHILS NFR BLD AUTO: 0 % (ref 0–1)
BILIRUB SERPL-MCNC: 0.7 MG/DL (ref 0.2–1)
BILIRUB SERPL-MCNC: 0.72 MG/DL (ref 0.2–1)
BUN SERPL-MCNC: 13 MG/DL (ref 5–25)
BUN SERPL-MCNC: 14 MG/DL (ref 5–25)
CALCIUM SERPL-MCNC: 10.1 MG/DL (ref 8.3–10.1)
CALCIUM SERPL-MCNC: 10.3 MG/DL (ref 8.3–10.1)
CHLORIDE SERPL-SCNC: 106 MMOL/L (ref 100–108)
CHLORIDE SERPL-SCNC: 108 MMOL/L (ref 100–108)
CO2 SERPL-SCNC: 25 MMOL/L (ref 21–32)
CO2 SERPL-SCNC: 26 MMOL/L (ref 21–32)
CREAT SERPL-MCNC: 0.67 MG/DL (ref 0.6–1.3)
CREAT SERPL-MCNC: 0.87 MG/DL (ref 0.6–1.3)
EOSINOPHIL # BLD AUTO: 0.16 THOUSAND/ΜL (ref 0–0.61)
EOSINOPHIL # BLD AUTO: 0.26 THOUSAND/ΜL (ref 0–0.61)
EOSINOPHIL NFR BLD AUTO: 3 % (ref 0–6)
EOSINOPHIL NFR BLD AUTO: 3 % (ref 0–6)
ERYTHROCYTE [DISTWIDTH] IN BLOOD BY AUTOMATED COUNT: 12.2 % (ref 11.6–15.1)
ERYTHROCYTE [DISTWIDTH] IN BLOOD BY AUTOMATED COUNT: 12.4 % (ref 11.6–15.1)
GFR SERPL CREATININE-BSD FRML MDRD: 74 ML/MIN/1.73SQ M
GFR SERPL CREATININE-BSD FRML MDRD: 97 ML/MIN/1.73SQ M
GLUCOSE SERPL-MCNC: 114 MG/DL (ref 65–140)
GLUCOSE SERPL-MCNC: 158 MG/DL (ref 65–140)
GLUCOSE SERPL-MCNC: 172 MG/DL (ref 65–140)
HCT VFR BLD AUTO: 34 % (ref 34.8–46.1)
HCT VFR BLD AUTO: 34.6 % (ref 34.8–46.1)
HGB BLD-MCNC: 10.6 G/DL (ref 11.5–15.4)
HGB BLD-MCNC: 10.9 G/DL (ref 11.5–15.4)
IMM GRANULOCYTES # BLD AUTO: 0.02 THOUSAND/UL (ref 0–0.2)
IMM GRANULOCYTES # BLD AUTO: 0.04 THOUSAND/UL (ref 0–0.2)
IMM GRANULOCYTES NFR BLD AUTO: 0 % (ref 0–2)
IMM GRANULOCYTES NFR BLD AUTO: 0 % (ref 0–2)
LYMPHOCYTES # BLD AUTO: 1.11 THOUSANDS/ΜL (ref 0.6–4.47)
LYMPHOCYTES # BLD AUTO: 1.71 THOUSANDS/ΜL (ref 0.6–4.47)
LYMPHOCYTES NFR BLD AUTO: 11 % (ref 14–44)
LYMPHOCYTES NFR BLD AUTO: 26 % (ref 14–44)
MCH RBC QN AUTO: 28.5 PG (ref 26.8–34.3)
MCH RBC QN AUTO: 28.8 PG (ref 26.8–34.3)
MCHC RBC AUTO-ENTMCNC: 31.2 G/DL (ref 31.4–37.4)
MCHC RBC AUTO-ENTMCNC: 31.5 G/DL (ref 31.4–37.4)
MCV RBC AUTO: 91 FL (ref 82–98)
MCV RBC AUTO: 91 FL (ref 82–98)
MONOCYTES # BLD AUTO: 0.46 THOUSAND/ΜL (ref 0.17–1.22)
MONOCYTES # BLD AUTO: 0.52 THOUSAND/ΜL (ref 0.17–1.22)
MONOCYTES NFR BLD AUTO: 5 % (ref 4–12)
MONOCYTES NFR BLD AUTO: 8 % (ref 4–12)
NEUTROPHILS # BLD AUTO: 4.09 THOUSANDS/ΜL (ref 1.85–7.62)
NEUTROPHILS # BLD AUTO: 7.93 THOUSANDS/ΜL (ref 1.85–7.62)
NEUTS SEG NFR BLD AUTO: 63 % (ref 43–75)
NEUTS SEG NFR BLD AUTO: 81 % (ref 43–75)
NRBC BLD AUTO-RTO: 0 /100 WBCS
NRBC BLD AUTO-RTO: 0 /100 WBCS
P AXIS: 76 DEGREES
PLATELET # BLD AUTO: 245 THOUSANDS/UL (ref 149–390)
PLATELET # BLD AUTO: 247 THOUSANDS/UL (ref 149–390)
PMV BLD AUTO: 9.4 FL (ref 8.9–12.7)
PMV BLD AUTO: 9.5 FL (ref 8.9–12.7)
POTASSIUM SERPL-SCNC: 4 MMOL/L (ref 3.5–5.3)
POTASSIUM SERPL-SCNC: 4.1 MMOL/L (ref 3.5–5.3)
PR INTERVAL: 164 MS
PROT SERPL-MCNC: 6.9 G/DL (ref 6.4–8.2)
PROT SERPL-MCNC: 7 G/DL (ref 6.4–8.2)
QRS AXIS: 29 DEGREES
QRSD INTERVAL: 86 MS
QT INTERVAL: 406 MS
QTC INTERVAL: 422 MS
RBC # BLD AUTO: 3.72 MILLION/UL (ref 3.81–5.12)
RBC # BLD AUTO: 3.79 MILLION/UL (ref 3.81–5.12)
SODIUM SERPL-SCNC: 139 MMOL/L (ref 136–145)
SODIUM SERPL-SCNC: 141 MMOL/L (ref 136–145)
T WAVE AXIS: 68 DEGREES
VENTRICULAR RATE: 65 BPM
WBC # BLD AUTO: 6.51 THOUSAND/UL (ref 4.31–10.16)
WBC # BLD AUTO: 9.83 THOUSAND/UL (ref 4.31–10.16)

## 2020-08-31 PROCEDURE — 96361 HYDRATE IV INFUSION ADD-ON: CPT

## 2020-08-31 PROCEDURE — 93010 ELECTROCARDIOGRAM REPORT: CPT

## 2020-08-31 PROCEDURE — 99285 EMERGENCY DEPT VISIT HI MDM: CPT

## 2020-08-31 PROCEDURE — 96375 TX/PRO/DX INJ NEW DRUG ADDON: CPT

## 2020-08-31 PROCEDURE — 85025 COMPLETE CBC W/AUTO DIFF WBC: CPT | Performed by: EMERGENCY MEDICINE

## 2020-08-31 PROCEDURE — 70450 CT HEAD/BRAIN W/O DYE: CPT

## 2020-08-31 PROCEDURE — 99285 EMERGENCY DEPT VISIT HI MDM: CPT | Performed by: EMERGENCY MEDICINE

## 2020-08-31 PROCEDURE — 99220 PR INITIAL OBSERVATION CARE/DAY 70 MINUTES: CPT | Performed by: PHYSICIAN ASSISTANT

## 2020-08-31 PROCEDURE — 99284 EMERGENCY DEPT VISIT MOD MDM: CPT

## 2020-08-31 PROCEDURE — 96374 THER/PROPH/DIAG INJ IV PUSH: CPT

## 2020-08-31 PROCEDURE — 36415 COLL VENOUS BLD VENIPUNCTURE: CPT | Performed by: EMERGENCY MEDICINE

## 2020-08-31 PROCEDURE — 93005 ELECTROCARDIOGRAM TRACING: CPT

## 2020-08-31 PROCEDURE — 99213 OFFICE O/P EST LOW 20 MIN: CPT | Performed by: INTERNAL MEDICINE

## 2020-08-31 PROCEDURE — G1004 CDSM NDSC: HCPCS

## 2020-08-31 PROCEDURE — 82948 REAGENT STRIP/BLOOD GLUCOSE: CPT

## 2020-08-31 PROCEDURE — 99239 HOSP IP/OBS DSCHRG MGMT >30: CPT | Performed by: INTERNAL MEDICINE

## 2020-08-31 PROCEDURE — 80053 COMPREHEN METABOLIC PANEL: CPT | Performed by: EMERGENCY MEDICINE

## 2020-08-31 RX ORDER — METOCLOPRAMIDE HYDROCHLORIDE 5 MG/ML
10 INJECTION INTRAMUSCULAR; INTRAVENOUS ONCE
Status: COMPLETED | OUTPATIENT
Start: 2020-08-31 | End: 2020-08-31

## 2020-08-31 RX ORDER — HYDRALAZINE HYDROCHLORIDE 20 MG/ML
10 INJECTION INTRAMUSCULAR; INTRAVENOUS ONCE
Status: COMPLETED | OUTPATIENT
Start: 2020-08-31 | End: 2020-08-31

## 2020-08-31 RX ORDER — DEXAMETHASONE SODIUM PHOSPHATE 4 MG/ML
10 INJECTION, SOLUTION INTRA-ARTICULAR; INTRALESIONAL; INTRAMUSCULAR; INTRAVENOUS; SOFT TISSUE ONCE
Status: COMPLETED | OUTPATIENT
Start: 2020-08-31 | End: 2020-08-31

## 2020-08-31 RX ORDER — HYDROCHLOROTHIAZIDE 12.5 MG/1
12.5 TABLET ORAL DAILY
Status: DISCONTINUED | OUTPATIENT
Start: 2020-08-31 | End: 2020-09-01 | Stop reason: HOSPADM

## 2020-08-31 RX ORDER — ONDANSETRON 2 MG/ML
4 INJECTION INTRAMUSCULAR; INTRAVENOUS ONCE
Status: COMPLETED | OUTPATIENT
Start: 2020-08-31 | End: 2020-08-31

## 2020-08-31 RX ORDER — MORPHINE SULFATE 4 MG/ML
4 INJECTION, SOLUTION INTRAMUSCULAR; INTRAVENOUS ONCE
Status: COMPLETED | OUTPATIENT
Start: 2020-08-31 | End: 2020-08-31

## 2020-08-31 RX ORDER — HYDRALAZINE HYDROCHLORIDE 20 MG/ML
10 INJECTION INTRAMUSCULAR; INTRAVENOUS ONCE
Status: DISCONTINUED | OUTPATIENT
Start: 2020-08-31 | End: 2020-08-31 | Stop reason: HOSPADM

## 2020-08-31 RX ORDER — LISINOPRIL 5 MG/1
10 TABLET ORAL ONCE
Status: COMPLETED | OUTPATIENT
Start: 2020-08-31 | End: 2020-08-31

## 2020-08-31 RX ORDER — DIPHENHYDRAMINE HYDROCHLORIDE 50 MG/ML
25 INJECTION INTRAMUSCULAR; INTRAVENOUS ONCE
Status: COMPLETED | OUTPATIENT
Start: 2020-08-31 | End: 2020-08-31

## 2020-08-31 RX ORDER — BUTALBITAL, ACETAMINOPHEN AND CAFFEINE 50; 325; 40 MG/1; MG/1; MG/1
1 TABLET ORAL ONCE
Status: COMPLETED | OUTPATIENT
Start: 2020-08-31 | End: 2020-08-31

## 2020-08-31 RX ORDER — LABETALOL 20 MG/4 ML (5 MG/ML) INTRAVENOUS SYRINGE
10 ONCE
Status: COMPLETED | OUTPATIENT
Start: 2020-08-31 | End: 2020-08-31

## 2020-08-31 RX ADMIN — LABETALOL 20 MG/4 ML (5 MG/ML) INTRAVENOUS SYRINGE 10 MG: at 23:41

## 2020-08-31 RX ADMIN — DIPHENHYDRAMINE HYDROCHLORIDE 25 MG: 50 INJECTION, SOLUTION INTRAMUSCULAR; INTRAVENOUS at 21:08

## 2020-08-31 RX ADMIN — LISINOPRIL 10 MG: 5 TABLET ORAL at 21:07

## 2020-08-31 RX ADMIN — SODIUM CHLORIDE 1000 ML: 0.9 INJECTION, SOLUTION INTRAVENOUS at 21:06

## 2020-08-31 RX ADMIN — HYDRALAZINE HYDROCHLORIDE 10 MG: 20 INJECTION INTRAMUSCULAR; INTRAVENOUS at 22:27

## 2020-08-31 RX ADMIN — METOCLOPRAMIDE 10 MG: 5 INJECTION, SOLUTION INTRAMUSCULAR; INTRAVENOUS at 21:13

## 2020-08-31 RX ADMIN — BUTALBITAL, ACETAMINOPHEN AND CAFFEINE 1 TABLET: 50; 325; 40 TABLET ORAL at 21:07

## 2020-08-31 RX ADMIN — ONDANSETRON 4 MG: 2 INJECTION INTRAMUSCULAR; INTRAVENOUS at 12:57

## 2020-08-31 RX ADMIN — MORPHINE SULFATE 4 MG: 4 INJECTION INTRAVENOUS at 12:57

## 2020-08-31 RX ADMIN — DEXAMETHASONE SODIUM PHOSPHATE 10 MG: 4 INJECTION, SOLUTION INTRAMUSCULAR; INTRAVENOUS at 21:11

## 2020-08-31 RX ADMIN — HYDRALAZINE HYDROCHLORIDE 10 MG: 20 INJECTION INTRAMUSCULAR; INTRAVENOUS at 12:52

## 2020-08-31 NOTE — ED PROVIDER NOTES
History  Chief Complaint   Patient presents with    Headache     HA called PCP, told to come to ED "cause her blood pressure has been high" pt states took 3 ibuprofen  also rpeorts blurry vision x3days     C/o headache and blurry vision for 3 days  Her blood pressure has been elevated lately  Upon arrival to the ER her blood pressure is 226/98  She has been taking her blood pressure meds regularly  She is not on blood thinners except for aspirin 81 milligrams daily  No head injury  No fevers, no neck pain, no vomiting          Prior to Admission Medications   Prescriptions Last Dose Informant Patient Reported? Taking? Alcohol Swabs PADS   No No   Sig: by Does not apply route 4 (four) times a day *Latex Free*   Blood Pressure KIT   No No   Sig: by Does not apply route daily   Insulin Syringe-Needle U-100 (BD Insulin Syringe Ultrafine) 31G X 15/64" 0 3 ML MISC   No No   Sig: by Does not apply route 2 (two) times a day   Misc   Devices (DIGITAL GLASS SCALE) MISC   No No   Sig: by Does not apply route daily   acetaminophen (TYLENOL) 500 mg tablet   No No   Sig: Take 2 tablets (1,000 mg total) by mouth every 6 (six) hours as needed for moderate pain   aspirin 81 mg chewable tablet   No No   Sig: Chew 1 tablet (81 mg total) daily   atorvastatin (LIPITOR) 40 mg tablet   No No   Sig: Take 1 tablet (40 mg total) by mouth every evening   capsicum (ZOSTRIX) 0 075 % topical cream   No No   Sig: Apply topically 3 (three) times a day   Patient not taking: Reported on 2020   furosemide (LASIX) 40 mg tablet   No No   Sig: Take 1 tablet (40 mg total) by mouth daily   gabapentin (NEURONTIN) 300 mg capsule   No No   Sig: Take 1 capsule (300 mg total) by mouth 3 (three) times a day   glucose blood (RELION GLUCOSE TEST STRIPS) test strip   No No   Si each by Other route 3 (three) times a day   glucose blood test strip   No No   Sig: Check blood glucose levels three times per day before meals   ibuprofen (MOTRIN) 400 mg tablet   No No   Sig: Take 1 tablet (400 mg total) by mouth every 6 (six) hours as needed for mild pain   insulin NPH-insulin regular (NovoLIN 70/30) 100 units/mL subcutaneous injection   No No   Si units before breakfast and 10 before dinner   insulin aspart protamine-insulin aspart (NovoLOG 70/30) 100 units/mL injection   Yes No   Sig: Inject 15 Units under the skin daily at bedtime   insulin isophane-insulin regular (HumuLIN 70/30 KwikPen) 100 units/mL injection pen   No No   Si units before breakfast and 15 before dinner   lisinopril (ZESTRIL) 40 mg tablet   No No   Sig: Take 1 tablet (40 mg total) by mouth daily   metFORMIN (GLUCOPHAGE-XR) 500 mg 24 hr tablet   No No   Sig: Take 2 tablets (1,000 mg total) by mouth 2 (two) times a day with meals   metoprolol succinate (TOPROL-XL) 50 mg 24 hr tablet   No No   Sig: Take 1 tablet by mouth once daily   pantoprazole (PROTONIX) 40 mg tablet   No No   Sig: Take 1 tablet (40 mg total) by mouth daily before breakfast   Patient not taking: Reported on 2020   predniSONE 10 mg tablet   No No   Sig: Take 3 tab x3 d, then 2 tab x3 d, 1 tab x10 d, 1/2 tab x10d, and then stop   Patient not taking: Reported on 2020      Facility-Administered Medications: None       Past Medical History:   Diagnosis Date    Diabetes mellitus ([facility] )     External hemorrhoids     last assessed 16, resolved 16    Hernia, ventral     last assessed 12/14/15, resolved 16    Hypertension     Incarcerated incisional hernia     last assessed 12/21/15, resolved 16    Insomnia     last assessed 16, resolved 10/9/17    Lyme disease     Morbid obesity with BMI of 45 0-49 9, adult ([facility] ) 5/3/2017    MS (multiple sclerosis) ([facility] )     Stroke (cerebrum) ([facility] ) 2020    Type 2 diabetes mellitus with hyperglycemia, without long-term current use of insulin ([facility] )        Past Surgical History:   Procedure Laterality Date    ABCESS DRAINAGE       SECTION x3    COLONOSCOPY N/A 4/28/2017    Procedure: COLONOSCOPY with biopsy;  Surgeon: Emelyn Woodson DO;  Location: [facility]; Service: Complete    HYSTERECTOMY      IR LUMBAR PUNCTURE  3/13/2020       Family History   Problem Relation Age of Onset   Earna Genoveva Glaucoma Mother     Diabetes Father     Hypertension Father     Pancreatic cancer Father     Colon cancer Father     Breast cancer Maternal Grandmother     Breast cancer Maternal Aunt     Coronary artery disease Family     Diabetes Family     Hypertension Family     Cervical cancer Sister 61    Breast cancer Sister 50    Breast cancer Sister 45     I have reviewed and agree with the history as documented  E-Cigarette/Vaping    E-Cigarette Use Never User      E-Cigarette/Vaping Substances     Social History     Tobacco Use    Smoking status: Never Smoker    Smokeless tobacco: Never Used   Substance Use Topics    Alcohol use: Never     Frequency: Never     Comment: Social    Drug use: Never       Review of Systems   Constitutional: Negative for appetite change, fatigue and fever  HENT: Negative for rhinorrhea and sore throat  Eyes: Negative for pain  Respiratory: Negative for cough, shortness of breath and wheezing  Cardiovascular: Negative for chest pain and leg swelling  Gastrointestinal: Positive for nausea  Negative for abdominal pain, diarrhea and vomiting  Genitourinary: Negative for dysuria and flank pain  Musculoskeletal: Negative for back pain and neck pain  Skin: Negative for rash  Neurological: Positive for headaches  Negative for syncope  Psychiatric/Behavioral:        Mood normal       Physical Exam  Physical Exam  Vitals signs and nursing note reviewed  Constitutional:       Appearance: She is well-developed  HENT:      Head: Normocephalic and atraumatic  Eyes:      Pupils: Pupils are equal, round, and reactive to light  Neck:      Musculoskeletal: Normal range of motion and neck supple  Comments: Nontender  Cardiovascular:      Rate and Rhythm: Normal rate and regular rhythm  Pulmonary:      Effort: Pulmonary effort is normal       Breath sounds: Normal breath sounds  Abdominal:      Palpations: Abdomen is soft  Tenderness: There is no abdominal tenderness  Musculoskeletal: Normal range of motion  Skin:     General: Skin is warm and dry  Neurological:      Mental Status: She is alert and oriented to person, place, and time  Cranial Nerves: No cranial nerve deficit           Vital Signs  ED Triage Vitals   Temperature Pulse Respirations Blood Pressure SpO2   08/31/20 1145 08/31/20 1145 08/31/20 1145 08/31/20 1145 08/31/20 1145   97 9 °F (36 6 °C) 68 16 (!) 226/98 98 %      Temp Source Heart Rate Source Patient Position - Orthostatic VS BP Location FiO2 (%)   08/31/20 1145 08/31/20 1322 08/31/20 1145 08/31/20 1145 --   Temporal Monitor Sitting Right arm       Pain Score       08/31/20 1145       Worst Possible Pain           Vitals:    08/31/20 1252 08/31/20 1322 08/31/20 1335 08/31/20 1420   BP: (!) 201/93 (!) 185/90 (!) 174/79 (!) 182/86   Pulse:  66 62 63   Patient Position - Orthostatic VS:  Lying  Lying         Visual Acuity  Visual Acuity      Most Recent Value   L Pupil Size (mm)  4   R Pupil Size (mm)  4          ED Medications  Medications   ondansetron (ZOFRAN) injection 4 mg (4 mg Intravenous Given 8/31/20 1257)   hydrALAZINE (APRESOLINE) injection 10 mg (10 mg Intravenous Given 8/31/20 1252)   morphine (PF) 4 mg/mL injection 4 mg (4 mg Intravenous Given 8/31/20 1257)       Diagnostic Studies  Results Reviewed     Procedure Component Value Units Date/Time    Comprehensive metabolic panel [[id number]]  (Abnormal) Collected:  08/31/20 1251    Lab Status:  Final result Specimen:  Blood from Arm, Right Updated:  08/31/20 1339     Sodium 139 mmol/L      Potassium 4 0 mmol/L      Chloride 106 mmol/L      CO2 26 mmol/L      ANION GAP 7 mmol/L      BUN 13 mg/dL      Creatinine 0 67 mg/dL      Glucose 172 mg/dL      Calcium 10 3 mg/dL      AST 16 U/L      ALT 27 U/L      Alkaline Phosphatase 109 U/L      Total Protein 7 0 g/dL      Albumin 3 2 g/dL      Total Bilirubin 0 72 mg/dL      eGFR 97 ml/min/1 73sq m     Narrative:       [place] guidelines for Chronic Kidney Disease (CKD):     Stage 1 with normal or high GFR (GFR > 90 mL/min/1 73 square meters)    Stage 2 Mild CKD (GFR = 60-89 mL/min/1 73 square meters)    Stage 3A Moderate CKD (GFR = 45-59 mL/min/1 73 square meters)    Stage 3B Moderate CKD (GFR = 30-44 mL/min/1 73 square meters)    Stage 4 Severe CKD (GFR = 15-29 mL/min/1 73 square meters)    Stage 5 End Stage CKD (GFR <15 mL/min/1 73 square meters)  Note: GFR calculation is accurate only with a steady state creatinine    CBC and differential [[id number]]  (Abnormal) Collected:  08/31/20 1251    Lab Status:  Final result Specimen:  Blood from Arm, Right Updated:  08/31/20 1315     WBC 6 51 Thousand/uL      RBC 3 79 Million/uL      Hemoglobin 10 9 g/dL      Hematocrit 34 6 %      MCV 91 fL      MCH 28 8 pg      MCHC 31 5 g/dL      RDW 12 2 %      MPV 9 5 fL      Platelets 240 Thousands/uL      nRBC 0 /100 WBCs      Neutrophils Relative 63 %      Immat GRANS % 0 %      Lymphocytes Relative 26 %      Monocytes Relative 8 %      Eosinophils Relative 3 %      Basophils Relative 0 %      Neutrophils Absolute 4 09 Thousands/µL      Immature Grans Absolute 0 02 Thousand/uL      Lymphocytes Absolute 1 71 Thousands/µL      Monocytes Absolute 0 52 Thousand/µL      Eosinophils Absolute 0 16 Thousand/µL      Basophils Absolute 0 01 Thousands/µL     Fingerstick Glucose (POCT) [[id number]]  (Abnormal) Collected:  08/31/20 1306    Lab Status:  Final result Updated:  08/31/20 1309     POC Glucose 158 mg/dl                  CT head without contrast   Final Result by Agustin Jose MD (08/31 5188)   Persistent mild supratentorial white matter hypointensity consistent with known demyelinating disease      No acute intracranial abnormality  Similar to prior studies            Workstation performed: [id number]                    Procedures  Procedures         ED Course       US AUDIT      Most Recent Value   Initial Alcohol Screen: US AUDIT-C    1  How often do you have a drink containing alcohol?  0 Filed at: 08/31/2020 1146   2  How many drinks containing alcohol do you have on a typical day you are drinking? 0 Filed at: 08/31/2020 1146   3b  FEMALE Any Age, or MALE 65+: How often do you have 4 or more drinks on one occassion? 0 Filed at: 08/31/2020 1146   Audit-C Score  0 Filed at: 08/31/2020 1146                  GOVIND/DAST-10      Most Recent Value   How many times in the past year have you    Used an illegal drug or used a prescription medication for non-medical reasons? Never Filed at: 08/31/2020 1146                                MDM  Number of Diagnoses or Management Options  Headache:   Hypertension:      Amount and/or Complexity of Data Reviewed  Clinical lab tests: ordered and reviewed  Tests in the radiology section of CPT®: ordered and reviewed    Risk of Complications, Morbidity, and/or Mortality  Presenting problems: moderate  General comments: I went over the CT scan and lab work with the patient  Her blood pressure lowered to 182/86 while in the ER after 1 dose of hydralazine  She states that her headache is gone and she feels better and wanted to go home    She is stable for outpatient follow-up          Disposition  Final diagnoses:   Headache   Hypertension     Time reflects when diagnosis was documented in both MDM as applicable and the Disposition within this note     Time User Action Codes Description Comment    8/31/2020  2:11 PM Desirae Cordoba [R51] Headache     8/31/2020  2:11 PM Desirae Cordoba [I10] Hypertension       ED Disposition     ED Disposition Condition Date/Time Comment    Discharge Stable Mon Aug 31, 2020  2:10 PM Joan Davila discharge to home/self care              Follow-up Information     Follow up With Specialties Details Why Contact Info    [facility], [address], Nurse Practitioner   [facility] [id number] [facility]  [place] [address] [facility]  [phone]            Discharge Medication List as of 8/31/2020  2:11 PM      CONTINUE these medications which have NOT CHANGED    Details   acetaminophen (TYLENOL) 500 mg tablet Take 2 tablets (1,000 mg total) by mouth every 6 (six) hours as needed for moderate pain, Starting Wed 6/10/2020, Normal      Alcohol Swabs PADS by Does not apply route 4 (four) times a day *Latex Free*, Starting Thu 5/21/2020, Normal      aspirin 81 mg chewable tablet Chew 1 tablet (81 mg total) daily, Starting Sun 5/17/2020, No Print      atorvastatin (LIPITOR) 40 mg tablet Take 1 tablet (40 mg total) by mouth every evening, Starting Wed 6/24/2020, Normal      Blood Pressure KIT by Does not apply route daily, Starting Thu 5/21/2020, Normal      capsicum (ZOSTRIX) 0 075 % topical cream Apply topically 3 (three) times a day, Starting Fri 6/26/2020, Normal      furosemide (LASIX) 40 mg tablet Take 1 tablet (40 mg total) by mouth daily, Starting Mon 8/24/2020, Normal      gabapentin (NEURONTIN) 300 mg capsule Take 1 capsule (300 mg total) by mouth 3 (three) times a day, Starting Thu 7/23/2020, Normal      !! glucose blood (RELION GLUCOSE TEST STRIPS) test strip 1 each by Other route 3 (three) times a day, Starting Mon 6/15/2020, Normal      !! glucose blood test strip Check blood glucose levels three times per day before meals, Normal      ibuprofen (MOTRIN) 400 mg tablet Take 1 tablet (400 mg total) by mouth every 6 (six) hours as needed for mild pain, Starting Tue 8/4/2020, Normal      insulin aspart protamine-insulin aspart (NovoLOG 70/30) 100 units/mL injection Inject 15 Units under the skin daily at bedtime, Historical Med      insulin isophane-insulin regular (HumuLIN 70/30 KwikPen) 100 units/mL injection pen 20 units before breakfast and 15 before dinner, Normal      insulin NPH-insulin regular (NovoLIN 70/30) 100 units/mL subcutaneous injection 20 units before breakfast and 10 before dinner, Normal      Insulin Syringe-Needle U-100 (BD Insulin Syringe Ultrafine) 31G X 15/64" 0 3 ML MISC by Does not apply route 2 (two) times a day, Starting Fri 7/31/2020, Normal      lisinopril (ZESTRIL) 40 mg tablet Take 1 tablet (40 mg total) by mouth daily, Starting Fri 8/21/2020, Until Thu 11/19/2020, Normal      metFORMIN (GLUCOPHAGE-XR) 500 mg 24 hr tablet Take 2 tablets (1,000 mg total) by mouth 2 (two) times a day with meals, Starting Tue 5/12/2020, Normal      metoprolol succinate (TOPROL-XL) 50 mg 24 hr tablet Take 1 tablet by mouth once daily, Normal      Misc  Devices (DIGITAL GLASS SCALE) MISC by Does not apply route daily, Starting Thu 5/21/2020, Normal      pantoprazole (PROTONIX) 40 mg tablet Take 1 tablet (40 mg total) by mouth daily before breakfast, Starting Sat 5/16/2020, Normal      predniSONE 10 mg tablet Take 3 tab x3 d, then 2 tab x3 d, 1 tab x10 d, 1/2 tab x10d, and then stop, Normal       !! - Potential duplicate medications found  Please discuss with provider  No discharge procedures on file      PDMP Review     None          ED Provider  Electronically Signed by           Grisel Mei MD  08/31/20 0324

## 2020-08-31 NOTE — PROGRESS NOTES
Virtual Brief Visit    Assessment/Plan:    Problem List Items Addressed This Visit        Cardiovascular and Mediastinum    Severe hypertension - Primary     Home systolic BP measurements up to >200 mmHg  Associated with headaches and blurry vision for the past 4 days  Strongly advised to report to the ED for evaluation and management  Discussed the risks of sustained systolic pressures that high including stroke  Patient assured me that if she could not get a ride from her neighbor, she would call an ambulance  Return in about 1 week (around 9/7/2020) for Recheck blood pressure  Reason for visit is   Chief Complaint   Patient presents with    Virtual Brief Visit        Encounter provider Slade Mcpherson MD    Provider located at [address] [place] [id number] [phone]    Recent Visits  No visits were found meeting these conditions  Showing recent visits within past 7 days and meeting all other requirements     Today's Visits  Date Type Provider Dept   08/31/20 Telemedicine Slade Mcpherson MD  [facility]   Showing today's visits and meeting all other requirements     Future Appointments  No visits were found meeting these conditions  Showing future appointments within next 150 days and meeting all other requirements        After connecting through [employer] and patient was informed that this is a secure, HIPAA-compliant platform  She agrees to proceed  , the patient was identified by name and date of birth  Alvarez Loaiza was informed that this is a telemedicine visit and that the visit is being conducted through [employer] and patient was informed that this is a secure, HIPAA-compliant platform  She agrees to proceed     My office door was closed  The following individuals were in the room with me and the patient informed Michael Bazan, medical student    She acknowledged consent and understanding of privacy and security of the platform  The patient has agreed to participate and understands she can discontinue the visit at any time  Patient is aware this is a billable service  Subjective    Ava Anderson is a 63 y o  female who was evaluated for hypertension  HPI     Her hypertension is managed with lisinopril 40 mg daily  She is also taking metoprolol-XL 50 mg daily  She notes her bp has been very high with sys 190 mmHg this morning two hours after she took her lisinopril  She also mentions that she had headaches and blurry vision for the past four days  Her blood pressure yesterday was 214/124 mmHg  She took 2 ibuprofen for headache today  She currently denies any chest pain, SOB, confusion, lower extremity swelling at this time  She is not on lasix anymore  Stopped 20-Aug-2020, but is still urinating up to 8 times per night  Past Medical History:   Diagnosis Date    [facility] mellitus ([facility] )     External hemorrhoids     last assessed 16, resolved 16    Hernia, ventral     last assessed 12/14/15, resolved 16    Hypertension     Incarcerated incisional hernia     last assessed 12/21/15, resolved 16    Insomnia     last assessed 16, resolved 10/9/17    Lyme disease     Morbid obesity with BMI of 45 0-49 9, adult ([place] [address] ) 5/3/2017    MS (multiple sclerosis) ([place] [address] )     Stroke (cerebrum) ([place] [address] ) 2020    Type 2 diabetes mellitus with hyperglycemia, without long-term current use of insulin ([facility])        Past Surgical History:   Procedure Laterality Date    ABCESS DRAINAGE       SECTION      x3    COLONOSCOPY N/A 2017    Procedure: COLONOSCOPY with biopsy;  Surgeon: Sameer Garcia DO;  Location: [facility];   Service: Complete    HYSTERECTOMY      IR LUMBAR PUNCTURE  3/13/2020       Current Outpatient Medications   Medication Sig Dispense Refill    acetaminophen (TYLENOL) 500 mg tablet Take 2 tablets (1,000 mg total) by mouth every 6 (six) hours as needed for moderate pain 60 tablet 1    Alcohol Swabs PADS by Does not apply route 4 (four) times a day *Latex Free* 120 each 3    aspirin 81 mg chewable tablet Chew 1 tablet (81 mg total) daily  0    atorvastatin (LIPITOR) 40 mg tablet Take 1 tablet (40 mg total) by mouth every evening 90 tablet 2    Blood Pressure KIT by Does not apply route daily 1 each 0    capsicum (ZOSTRIX) 0 075 % topical cream Apply topically 3 (three) times a day (Patient not taking: Reported on 7/7/2020) 28 3 g 0    furosemide (LASIX) 40 mg tablet Take 1 tablet (40 mg total) by mouth daily 30 tablet 0    gabapentin (NEURONTIN) 300 mg capsule Take 1 capsule (300 mg total) by mouth 3 (three) times a day 90 capsule 0    glucose blood (RELION GLUCOSE TEST STRIPS) test strip 1 each by Other route 3 (three) times a day 120 each 5    glucose blood test strip Check blood glucose levels three times per day before meals 300 each 2    ibuprofen (MOTRIN) 400 mg tablet Take 1 tablet (400 mg total) by mouth every 6 (six) hours as needed for mild pain 30 tablet 0    insulin aspart protamine-insulin aspart (NovoLOG 70/30) 100 units/mL injection Inject 15 Units under the skin daily at bedtime      insulin isophane-insulin regular (HumuLIN 70/30 KwikPen) 100 units/mL injection pen 20 units before breakfast and 15 before dinner 5 pen 2    insulin NPH-insulin regular (NovoLIN 70/30) 100 units/mL subcutaneous injection 20 units before breakfast and 10 before dinner 10 mL 5    Insulin Syringe-Needle U-100 (BD Insulin Syringe Ultrafine) 31G X 15/64" 0 3 ML MISC by Does not apply route 2 (two) times a day 200 each 1    lisinopril (ZESTRIL) 40 mg tablet Take 1 tablet (40 mg total) by mouth daily 90 tablet 0    metFORMIN (GLUCOPHAGE-XR) 500 mg 24 hr tablet Take 2 tablets (1,000 mg total) by mouth 2 (two) times a day with meals 360 tablet 1    metoprolol succinate (TOPROL-XL) 50 mg 24 hr tablet Take 1 tablet by mouth once daily 30 tablet 0    Misc   Devices (DIGITAL GLASS SCALE) MISC by Does not apply route daily 1 each 0    pantoprazole (PROTONIX) 40 mg tablet Take 1 tablet (40 mg total) by mouth daily before breakfast (Patient not taking: Reported on 6/18/2020) 30 tablet 0    predniSONE 10 mg tablet Take 3 tab x3 d, then 2 tab x3 d, 1 tab x10 d, 1/2 tab x10d, and then stop (Patient not taking: Reported on 6/18/2020) 30 tablet 0     No current facility-administered medications for this visit  Allergies   Allergen Reactions    Ambien [Zolpidem Tartrate]     Demerol [Meperidine]     Insulin     Insulin Glargine      Annotation - [id number]: memory loss    Latex     Oxycodone-Acetaminophen Vomiting    Zolpidem Hallucinations and Irritability       Review of Systems   Constitutional: Negative for fatigue and fever  Respiratory: Negative for cough and shortness of breath  Cardiovascular: Negative for chest pain, palpitations and leg swelling  Gastrointestinal: Negative for abdominal pain  Musculoskeletal: Negative for back pain and myalgias  Skin: Negative for rash  Neurological: Positive for dizziness and headaches  Negative for weakness  There were no vitals filed for this visit  I spent 10 minutes directly with the patient during this visit    VIRTUAL VISIT DISCLAIMER    Beverli Mcburney acknowledges that she has consented to an online visit or consultation  She understands that the online visit is based solely on information provided by her, and that, in the absence of a face-to-face physical evaluation by the physician, the diagnosis she receives is both limited and provisional in terms of accuracy and completeness  This is not intended to replace a full medical face-to-face evaluation by the physician  Beverli Mcburney understands and accepts these terms

## 2020-08-31 NOTE — ASSESSMENT & PLAN NOTE
Home systolic BP measurements up to >200 mmHg  Associated with headaches and blurry vision for the past 4 days  Strongly advised to report to the ED for evaluation and management  Discussed the risks of sustained systolic pressures that high including stroke  Patient assured me that if she could not get a ride from her neighbor, she would call an ambulance

## 2020-08-31 NOTE — TELEPHONE ENCOUNTER
Regarding: BP is 194/108   ----- Message from Jose R Hercules sent at 8/31/2020  6:53 PM EDT -----  " My BP has gone up too 194/108 "

## 2020-08-31 NOTE — Clinical Note
Zora Mcginnis accompanied Alvarez Loaiza to the emergency department on 8/31/2020  Return date if applicable: 69/63/9930        If you have any questions or concerns, please don't hesitate to call        Azeb Deshpande RN

## 2020-08-31 NOTE — TELEPHONE ENCOUNTER
Reason for Disposition   [7] Systolic BP  >= 927 OR Diastolic >= 448 AND [6] cardiac or neurologic symptoms (e g , chest pain, difficulty breathing, unsteady gait, blurred vision)    Answer Assessment - Initial Assessment Questions  1  BLOOD PRESSURE: "What is the blood pressure?" "Did you take at least two measurements 5 minutes apart?"      At 1800 BP was 194/108, at 1910 BP is 198/103, pulse 73, O2Sat is 98%  2  ONSET: "When did you take your blood pressure?"      BP became high for the last 3 days   3  HOW: "How did you obtain the blood pressure?" (e g , visiting nurse, automatic home BP monitor)      Home BP monitor   4  HISTORY: "Do you have a history of high blood pressure?"      Hx of Hypertension   5  MEDICATIONS: "Are you taking any medications for blood pressure?" "Have you missed any doses recently?"      Patient has been on BP medications, denies missing medications today   6  OTHER SYMPTOMS: "Do you have any symptoms?" (e g , headache, chest pain, blurred vision, difficulty breathing, weakness)      Headache, blurred vision, nausea   7   PREGNANCY: "Is there any chance you are pregnant?" "When was your last menstrual period?"      No    Protocols used: HIGH BLOOD PRESSURE-ADULT-AH

## 2020-09-01 ENCOUNTER — APPOINTMENT (OUTPATIENT)
Dept: CT IMAGING | Facility: HOSPITAL | Age: 60
End: 2020-09-01
Payer: COMMERCIAL

## 2020-09-01 ENCOUNTER — TELEPHONE (OUTPATIENT)
Dept: NEUROLOGY | Facility: CLINIC | Age: 60
End: 2020-09-01

## 2020-09-01 VITALS
DIASTOLIC BLOOD PRESSURE: 92 MMHG | HEART RATE: 103 BPM | OXYGEN SATURATION: 97 % | SYSTOLIC BLOOD PRESSURE: 162 MMHG | TEMPERATURE: 98.2 F | RESPIRATION RATE: 18 BRPM

## 2020-09-01 PROBLEM — R51.9 HEADACHE: Status: RESOLVED | Noted: 2020-08-31 | Resolved: 2020-09-01

## 2020-09-01 PROBLEM — E04.1 THYROID NODULE: Status: ACTIVE | Noted: 2020-09-01

## 2020-09-01 PROBLEM — I16.0 HYPERTENSIVE URGENCY: Status: RESOLVED | Noted: 2020-08-31 | Resolved: 2020-09-01

## 2020-09-01 LAB
ATRIAL RATE: 74 BPM
GLUCOSE SERPL-MCNC: 154 MG/DL (ref 65–140)
GLUCOSE SERPL-MCNC: 213 MG/DL (ref 65–140)
GLUCOSE SERPL-MCNC: 230 MG/DL (ref 65–140)
P AXIS: 112 DEGREES
PR INTERVAL: 170 MS
QRS AXIS: 28 DEGREES
QRSD INTERVAL: 72 MS
QT INTERVAL: 378 MS
QTC INTERVAL: 419 MS
T WAVE AXIS: 73 DEGREES
VENTRICULAR RATE: 74 BPM

## 2020-09-01 PROCEDURE — 70496 CT ANGIOGRAPHY HEAD: CPT

## 2020-09-01 PROCEDURE — G1004 CDSM NDSC: HCPCS

## 2020-09-01 PROCEDURE — 82948 REAGENT STRIP/BLOOD GLUCOSE: CPT

## 2020-09-01 PROCEDURE — 93010 ELECTROCARDIOGRAM REPORT: CPT | Performed by: INTERNAL MEDICINE

## 2020-09-01 PROCEDURE — 4010F ACE/ARB THERAPY RXD/TAKEN: CPT | Performed by: NURSE PRACTITIONER

## 2020-09-01 PROCEDURE — 70498 CT ANGIOGRAPHY NECK: CPT

## 2020-09-01 PROCEDURE — 4010F ACE/ARB THERAPY RXD/TAKEN: CPT | Performed by: PSYCHIATRY & NEUROLOGY

## 2020-09-01 PROCEDURE — 99217 PR OBSERVATION CARE DISCHARGE MANAGEMENT: CPT | Performed by: INTERNAL MEDICINE

## 2020-09-01 RX ORDER — PANTOPRAZOLE SODIUM 40 MG/1
40 TABLET, DELAYED RELEASE ORAL
Status: DISCONTINUED | OUTPATIENT
Start: 2020-09-01 | End: 2020-09-01 | Stop reason: HOSPADM

## 2020-09-01 RX ORDER — METOPROLOL SUCCINATE 50 MG/1
50 TABLET, EXTENDED RELEASE ORAL DAILY
Status: DISCONTINUED | OUTPATIENT
Start: 2020-09-01 | End: 2020-09-01 | Stop reason: HOSPADM

## 2020-09-01 RX ORDER — ONDANSETRON 2 MG/ML
4 INJECTION INTRAMUSCULAR; INTRAVENOUS EVERY 6 HOURS PRN
Status: DISCONTINUED | OUTPATIENT
Start: 2020-09-01 | End: 2020-09-01 | Stop reason: HOSPADM

## 2020-09-01 RX ORDER — ASPIRIN 81 MG/1
81 TABLET, CHEWABLE ORAL DAILY
Status: DISCONTINUED | OUTPATIENT
Start: 2020-09-01 | End: 2020-09-01 | Stop reason: HOSPADM

## 2020-09-01 RX ORDER — LABETALOL 20 MG/4 ML (5 MG/ML) INTRAVENOUS SYRINGE
10 EVERY 4 HOURS PRN
Status: DISCONTINUED | OUTPATIENT
Start: 2020-09-01 | End: 2020-09-01 | Stop reason: HOSPADM

## 2020-09-01 RX ORDER — ACETAMINOPHEN 325 MG/1
650 TABLET ORAL EVERY 6 HOURS PRN
Status: DISCONTINUED | OUTPATIENT
Start: 2020-09-01 | End: 2020-09-01 | Stop reason: HOSPADM

## 2020-09-01 RX ORDER — HYDROCHLOROTHIAZIDE 12.5 MG/1
12.5 TABLET ORAL DAILY
Qty: 30 TABLET | Refills: 0 | Status: SHIPPED | OUTPATIENT
Start: 2020-09-02 | End: 2020-09-08 | Stop reason: SDUPTHER

## 2020-09-01 RX ORDER — HYDRALAZINE HYDROCHLORIDE 20 MG/ML
10 INJECTION INTRAMUSCULAR; INTRAVENOUS EVERY 4 HOURS PRN
Status: DISCONTINUED | OUTPATIENT
Start: 2020-09-01 | End: 2020-09-01 | Stop reason: HOSPADM

## 2020-09-01 RX ORDER — ATORVASTATIN CALCIUM 40 MG/1
40 TABLET, FILM COATED ORAL EVERY EVENING
Status: DISCONTINUED | OUTPATIENT
Start: 2020-09-01 | End: 2020-09-01 | Stop reason: HOSPADM

## 2020-09-01 RX ORDER — LISINOPRIL 20 MG/1
40 TABLET ORAL DAILY
Status: DISCONTINUED | OUTPATIENT
Start: 2020-09-01 | End: 2020-09-01 | Stop reason: HOSPADM

## 2020-09-01 RX ORDER — INSULIN ASPART 100 [IU]/ML
15 INJECTION, SUSPENSION SUBCUTANEOUS
Status: DISCONTINUED | OUTPATIENT
Start: 2020-09-01 | End: 2020-09-01 | Stop reason: HOSPADM

## 2020-09-01 RX ORDER — GABAPENTIN 300 MG/1
300 CAPSULE ORAL 3 TIMES DAILY
Status: DISCONTINUED | OUTPATIENT
Start: 2020-09-01 | End: 2020-09-01 | Stop reason: HOSPADM

## 2020-09-01 RX ORDER — INSULIN ASPART 100 [IU]/ML
20 INJECTION, SUSPENSION SUBCUTANEOUS
Status: DISCONTINUED | OUTPATIENT
Start: 2020-09-01 | End: 2020-09-01 | Stop reason: HOSPADM

## 2020-09-01 RX ADMIN — LISINOPRIL 40 MG: 20 TABLET ORAL at 08:27

## 2020-09-01 RX ADMIN — GABAPENTIN 300 MG: 300 CAPSULE ORAL at 08:24

## 2020-09-01 RX ADMIN — ENOXAPARIN SODIUM 40 MG: 40 INJECTION SUBCUTANEOUS at 08:24

## 2020-09-01 RX ADMIN — IOHEXOL 100 ML: 350 INJECTION, SOLUTION INTRAVENOUS at 00:32

## 2020-09-01 RX ADMIN — ACETAMINOPHEN 650 MG: 325 TABLET ORAL at 05:22

## 2020-09-01 RX ADMIN — INSULIN ASPART 20 UNITS: 100 INJECTION, SUSPENSION SUBCUTANEOUS at 08:24

## 2020-09-01 RX ADMIN — METOPROLOL SUCCINATE 50 MG: 50 TABLET, EXTENDED RELEASE ORAL at 08:27

## 2020-09-01 RX ADMIN — HYDROCHLOROTHIAZIDE 12.5 MG: 12.5 TABLET ORAL at 08:27

## 2020-09-01 RX ADMIN — HYDROCHLOROTHIAZIDE 12.5 MG: 12.5 TABLET ORAL at 00:02

## 2020-09-01 RX ADMIN — INSULIN LISPRO 1 UNITS: 100 INJECTION, SOLUTION INTRAVENOUS; SUBCUTANEOUS at 08:24

## 2020-09-01 RX ADMIN — ASPIRIN 81 MG: 81 TABLET, CHEWABLE ORAL at 08:27

## 2020-09-01 NOTE — ASSESSMENT & PLAN NOTE
Lab Results   Component Value Date    HGBA1C 7 9 (H) 06/27/2020     Resume home medication regimen  Outpatient follow-up with PCP

## 2020-09-01 NOTE — ASSESSMENT & PLAN NOTE
Recently started on betaseron w/[facility] neurology    Has been taking motrin and apap to offset flu like s/sx  D/w son and pt need to avoid further nsaids given worsening htn

## 2020-09-01 NOTE — INCIDENTAL FINDINGS
The following findings require follow up:  Radiographic finding     CTA NECK:  1  No evidence of hemodynamically significant stenosis at either ICA origin  No occlusion or dissection  2   Thyroid nodules as above  Nonemergent dedicated ultrasound should be obtained for further evaluation     Follow up should be done within 1 month(s)    Please notify the following clinician to assist with the follow up:   PCP DEANA Batres

## 2020-09-01 NOTE — UTILIZATION REVIEW
Initial Clinical Review    Admission: Date/Time/Statement:   Admission Orders (From admission, onward)     Ordered        08/31/20 2356  Place in Observation  Once                   Orders Placed This Encounter   Procedures    Place in Observation     Standing Status:   Standing     Number of Occurrences:   1     Order Specific Question:   Admitting Physician     Answer:   [id number]     Order Specific Question:   Level of Care     Answer:   Med Surg [16]     ED Arrival Information     Expected Arrival Acuity Means of Arrival Escorted By Service Admission Type    8/31/2020 20:00 8/31/2020 20:13 Emergent Walk-In Self Hospitalist Emergency    Arrival Complaint    Hypertension, blurred vision, headcahe, nausea   /103        Chief Complaint   Patient presents with    High Blood Pressure     seen in ED today for high blood pressure, states the BP readings at home remained high 197/108, also c/o right sided headache  Assessment/Plan: 63year old female with a PMH of MS, CVA, HTN,type II DM presents to ED from home with  right sided temporal headache that radiates to her parietal, occiput and right neck  Associated photophobia and blurry vision b/l but R>L, nausea  GCS 15  Non-focal neuro exam  CMP nl, Hg 10  6  HA improved with migraine cocktail in ED but still w/ blurry vision  CTA Head&Neck pending  She was seen in the ED earlier today for 3-4 days of HA & HTN  CT Head was neg and she rec'd hydralazine 10 mg with improvement in HA and BP to  182/86 and she requested discharge  Admitted to Observation status with Hypertensive Urgency and Headache  Plan: Continue toprol & Lisinopril,Add HCTZ with Goal sbp of 160mmhg tonight   Prn Hydralazine / Labetolol       ED Triage Vitals   Temperature Pulse Respirations Blood Pressure SpO2   08/31/20 2025 08/31/20 2025 08/31/20 2025 08/31/20 2027 08/31/20 2025   (!) 96 8 °F (36 °C) 70 18 (!) 214/89 98 %      Temp Source Heart Rate Source Patient Position - Orthostatic VS BP Location FiO2 (%)   08/31/20 2025 08/31/20 2025 08/31/20 2025 08/31/20 2025 --   Temporal Monitor Sitting Right arm       Pain Score       08/31/20 2025       8          Wt Readings from Last 1 Encounters:   08/31/20 131 kg (288 lb 12 8 oz)     Additional Vital Signs:    09/01/20 0826   98 2 °F (36 8 °C)   103   18   162/92   97 %  None (Room air)  Lying    09/01/20 0130                 None (Room air)      09/01/20 0003      94   20   173/77Abnormal     97 %  None (Room air)      08/31/20 2308      90   18   187/82Abnormal     95 %  None (Room air)  Lying    08/31/20 2211            218/91Abnormal             08/31/20 2209      77   20   218/91Abnormal     97 %  None (Room air)      08/31/20 2050            190/82Abnormal                Pertinent Labs/Diagnostic Test Results:  Results from last 7 days   Lab Units 08/31/20 2244 08/31/20  1251   WBC Thousand/uL 9 83 6 51   HEMOGLOBIN g/dL 10 6* 10 9*   HEMATOCRIT % 34 0* 34 6*   PLATELETS Thousands/uL 247 245   NEUTROS ABS Thousands/µL 7 93* 4 09     Results from last 7 days   Lab Units 08/31/20 2244 08/31/20  1251   SODIUM mmol/L 141 139   POTASSIUM mmol/L 4 1 4 0   CHLORIDE mmol/L 108 106   CO2 mmol/L 25 26   ANION GAP mmol/L 8 7   BUN mg/dL 14 13   CREATININE mg/dL 0 87 0 67   EGFR ml/min/1 73sq m 74 97   CALCIUM mg/dL 10 1 10 3*     Results from last 7 days   Lab Units 08/31/20 2244 08/31/20  1251   AST U/L 16 16   ALT U/L 22 27   ALK PHOS U/L 105 109   TOTAL PROTEIN g/dL 6 9 7 0   ALBUMIN g/dL 3 2* 3 2*   TOTAL BILIRUBIN mg/dL 0 70 0 72     Results from last 7 days   Lab Units 09/01/20  0724 09/01/20  0051 08/31/20  1306   POC GLUCOSE mg/dl 213* 154* 158*     Results from last 7 days   Lab Units 08/31/20 2244 08/31/20  1251   GLUCOSE RANDOM mg/dL 114 172*        8/31:   CT HEAD:   1   No acute intracranial abnormality   No significant interval change compared to prior study dated 8/31/2020     CTA NECK:   1   No evidence of hemodynamically significant stenosis at either ICA origin   No occlusion or dissection  2   Thyroid nodules as above   Nonemergent dedicated ultrasound should be obtained for further evaluation  CTA HEAD:   1   No evidence of hemodynamically significant stenosis, occlusion, or aneurysm in the [place]       8/31 EKG: NSR    ED Treatment:   Medication Administration from 08/31/2020 1928 to 09/01/2020 0035       Date/Time Order Dose Route Action     08/31/2020 2108 diphenhydrAMINE (BENADRYL) injection 25 mg 25 mg Intravenous Given     08/31/2020 2113 metoclopramide (REGLAN) injection 10 mg 10 mg Intravenous Given     08/31/2020 2111 dexamethasone (DECADRON) injection 10 mg 10 mg Intravenous Given     08/31/2020 2107 butalbital-acetaminophen-caffeine (FIORICET,ESGIC) -40 mg per tablet 1 tablet 1 tablet Oral Given     08/31/2020 2106 sodium chloride 0 9 % bolus 1,000 mL 1,000 mL Intravenous New Bag     08/31/2020 2107 lisinopril (ZESTRIL) tablet 10 mg 10 mg Oral Given     08/31/2020 2227 hydrALAZINE (APRESOLINE) injection 10 mg 10 mg Intravenous Given     08/31/2020 2341 Labetalol HCl (NORMODYNE) injection 10 mg 10 mg Intravenous Given     09/01/2020 0002 hydrochlorothiazide (HYDRODIURIL) tablet 12 5 mg 12 5 mg Oral Given        Past Medical History:   Diagnosis Date    Diabetes mellitus ([facility] )     External hemorrhoids     last assessed 4/7/16, resolved 7/21/16    Hernia, ventral     last assessed 12/14/15, resolved 7/21/16    Hypertension     Incarcerated incisional hernia     last assessed 12/21/15, resolved 7/21/16    Insomnia     last assessed 12/8/16, resolved 10/9/17    Lyme disease     Morbid obesity with BMI of 45 0-49 9, adult ([facility] ) 5/3/2017    MS (multiple sclerosis) ([facility] )     [facility] 03/09/2020    Type 2 diabetes mellitus with hyperglycemia, without long-term current use of insulin ([facility] )      Present on Admission:   MS (multiple sclerosis) [facility])      Admitting Diagnosis: Hypertension [I10]  Hypertensive urgency [I16 0]  Acute nonintractable headache, unspecified headache type [R51]  Age/Sex: 62 y o  female     Admission Orders:  Scheduled Medications:  aspirin, 81 mg, Oral, Daily  atorvastatin, 40 mg, Oral, QPM  enoxaparin, 40 mg, Subcutaneous, Daily  gabapentin, 300 mg, Oral, TID  hydrochlorothiazide, 12 5 mg, Oral, Daily  insulin aspart protamine-insulin aspart, 15 Units, Subcutaneous, HS  insulin aspart protamine-insulin aspart, 20 Units, Subcutaneous, Daily With Breakfast  insulin lispro, 1-5 Units, Subcutaneous, 4x Daily (AC & HS)  lisinopril, 40 mg, Oral, Daily  metoprolol succinate, 50 mg, Oral, Daily  pantoprazole, 40 mg, Oral, Daily Before Breakfast    Continuous IV Infusions:     PRN Meds:  acetaminophen, 650 mg, Oral, Q6H PRN  X 1 9/1  hydrALAZINE, 10 mg, Intravenous, Q4H PRN  Labetalol HCl, 10 mg, Intravenous, Q4H PRN  ondansetron, 4 mg, Intravenous, Q6H PRN    SCDs      Network Utilization Review Department  [email or web address]  ATTENTION: Please call with any questions or concerns to [phone] and carefully listen to the prompts so that you are directed to the right person  All voicemails are confidential   Valeta Pastel all requests for admission clinical reviews, approved or denied determinations and any other requests to dedicated fax number below belonging to the campus where the patient is receiving treatment   List of dedicated fax numbers for the Facilities:  FACILITY NAME UR FAX NUMBER   ADMISSION DENIALS (Administrative/Medical Necessity) [phone]   PARENT CHILD HEALTH (Maternity/NICU/Pediatrics) [phone]   Phoenix Walsh [phone]   Janes Daniels [phone]   Kaya Paul [address] [address] [address] [phone]   [address] [phone]

## 2020-09-01 NOTE — ASSESSMENT & PLAN NOTE
Lab Results   Component Value Date    HGBA1C 7 9 (H) 06/27/2020     Follows with endocrinology  Hold oral agents  Continue NovoLog 70 30 mix 20 units q a m  With breakfast and 15 units q h s   With dinner  Start sliding scale on POC blood sugar

## 2020-09-01 NOTE — ASSESSMENT & PLAN NOTE
Right temporal pain radiating down right neck with htn urgency  Pt has right worse than left blurry vision but no other focal neurologic deficits    Now improved  cta head/neck to r/o dissection  Treat htn urgency, continue to monitor

## 2020-09-01 NOTE — TELEPHONE ENCOUNTER
Left message confirming appointment for 9/9/20 with Cb Lozada    Asking patient to return the call to confirm appointment

## 2020-09-01 NOTE — ED PROVIDER NOTES
=  History  Chief Complaint   Patient presents with    High Blood Pressure     seen in ED today for high blood pressure, states the BP readings at home remained high 197/108, also c/o right sided headache  Pt is a 63year old female with a PMH of MS, CVA, hypertension, type II DM presenting with headache  Pt was seen earlier in the ED for headache as well and HTN  States she did feel better and was discharged after symptoms and BP improved  States the headache worsened and was not improved by Motrin  States she has a right sided temporal headache that radiates to her parietal, occiput and right neck  Associated photophobia and blurry vision b/l but R>L  Denies lightheadedness, dizziness, facial droop, changes in speech, weakness or new numbness, chest pain, SOB, vomiting  Does admit to nausea  No history of migraines  Compliant with medications and took her meds this morning  History provided by:  Patient   used: No    Headache   Pain location:  R temporal  Radiates to:  R neck  Onset quality:  Gradual  Duration:  3 days  Timing:  Constant  Progression:  Worsening  Chronicity:  New  Similar to prior headaches: no    Context: bright light    Relieved by:  Nothing  Worsened by:  Light  Ineffective treatments:  NSAIDs  Associated symptoms: nausea and photophobia    Associated symptoms: no abdominal pain, no diarrhea, no dizziness, no eye pain, no numbness, no vomiting and no weakness        Prior to Admission Medications   Prescriptions Last Dose Informant Patient Reported? Taking? Alcohol Swabs PADS   No No   Sig: by Does not apply route 4 (four) times a day *Latex Free*   Blood Pressure KIT   No No   Sig: by Does not apply route daily   Insulin Syringe-Needle U-100 (BD Insulin Syringe Ultrafine) 31G X 15/64" 0 3 ML MISC   No No   Sig: by Does not apply route 2 (two) times a day   Misc   Devices (DIGITAL GLASS SCALE) MISC   No No   Sig: by Does not apply route daily   acetaminophen (TYLENOL) 500 mg tablet   No No   Sig: Take 2 tablets (1,000 mg total) by mouth every 6 (six) hours as needed for moderate pain   aspirin 81 mg chewable tablet   No No   Sig: Chew 1 tablet (81 mg total) daily   atorvastatin (LIPITOR) 40 mg tablet   No No   Sig: Take 1 tablet (40 mg total) by mouth every evening   capsicum (ZOSTRIX) 0 075 % topical cream   No No   Sig: Apply topically 3 (three) times a day   Patient not taking: Reported on 2020   gabapentin (NEURONTIN) 300 mg capsule   No No   Sig: Take 1 capsule (300 mg total) by mouth 3 (three) times a day   glucose blood (RELION GLUCOSE TEST STRIPS) test strip   No No   Si each by Other route 3 (three) times a day   glucose blood test strip   No No   Sig: Check blood glucose levels three times per day before meals   insulin aspart protamine-insulin aspart (NovoLOG 70/30) 100 units/mL injection   Yes No   Sig: Inject 15 Units under the skin daily at bedtime   insulin isophane-insulin regular (HumuLIN 70/30 KwikPen) 100 units/mL injection pen   No No   Si units before breakfast and 15 before dinner   lisinopril (ZESTRIL) 40 mg tablet   No No   Sig: Take 1 tablet (40 mg total) by mouth daily   metFORMIN (GLUCOPHAGE-XR) 500 mg 24 hr tablet   No No   Sig: Take 2 tablets (1,000 mg total) by mouth 2 (two) times a day with meals   metoprolol succinate (TOPROL-XL) 50 mg 24 hr tablet   No No   Sig: Take 1 tablet by mouth once daily   pantoprazole (PROTONIX) 40 mg tablet   No No   Sig: Take 1 tablet (40 mg total) by mouth daily before breakfast   Patient not taking: Reported on 2020      Facility-Administered Medications: None       Past Medical History:   Diagnosis Date    Diabetes mellitus ([facility] [address] )     External hemorrhoids     last assessed 16, resolved 16    Hernia, ventral     last assessed 12/14/15, resolved 16    Hypertension     Incarcerated incisional hernia     last assessed 12/21/15, resolved 16    Insomnia     last assessed 16, resolved 10/9/17    Lyme disease     Morbid obesity with BMI of 45 0-49 9, adult ([facility] ) 5/3/2017    MS (multiple sclerosis) ([facility] )     Stroke (cerebrum) ([facility] ) 2020    Type 2 diabetes mellitus with hyperglycemia, without long-term current use of insulin ([facility] )        Past Surgical History:   Procedure Laterality Date    ABCESS DRAINAGE       SECTION      x3    COLONOSCOPY N/A 2017    Procedure: COLONOSCOPY with biopsy;  Surgeon: Nathalie Walter DO;  Location: [facility]; Service: Complete    HYSTERECTOMY      IR LUMBAR PUNCTURE  3/13/2020       Family History   Problem Relation Age of Onset   Sugar [place]er Glaucoma Mother     Diabetes Father     Hypertension Father     Pancreatic cancer Father     Colon cancer Father     Breast cancer Maternal Grandmother     Breast cancer Maternal Aunt     Coronary artery disease Family     Diabetes Family     Hypertension Family     Cervical cancer Sister 61    Breast cancer Sister 50    Breast cancer Sister 45     I have reviewed and agree with the history as documented  E-Cigarette/Vaping    E-Cigarette Use Never User      E-Cigarette/Vaping Substances     Social History     Tobacco Use    Smoking status: Never Smoker    Smokeless tobacco: Never Used   Substance Use Topics    Alcohol use: Never     Frequency: Never     Comment: Social    Drug use: Never       Review of Systems   Constitutional: Negative  HENT: Negative  Eyes: Positive for photophobia and visual disturbance  Negative for pain and redness  Respiratory: Negative  Cardiovascular: Negative  Gastrointestinal: Positive for nausea  Negative for abdominal pain, constipation, diarrhea and vomiting  Genitourinary: Negative  Musculoskeletal: Negative  Neurological: Positive for headaches  Negative for dizziness, syncope, speech difficulty, weakness, light-headedness and numbness  All other systems reviewed and are negative        Physical Exam  Physical Exam  Constitutional:       General: She is not in acute distress  Appearance: She is well-developed  She is not diaphoretic  HENT:      Head: Normocephalic and atraumatic  Right Ear: External ear normal       Left Ear: External ear normal       Nose: Nose normal    Eyes:      General: No scleral icterus  Right eye: No discharge  Left eye: No discharge  Extraocular Movements: Extraocular movements intact  Conjunctiva/sclera: Conjunctivae normal       Pupils: Pupils are equal, round, and reactive to light  Neck:      Musculoskeletal: Normal range of motion and neck supple  Cardiovascular:      Rate and Rhythm: Normal rate and regular rhythm  Heart sounds: Normal heart sounds  Pulmonary:      Effort: Pulmonary effort is normal       Breath sounds: Normal breath sounds  Musculoskeletal: Normal range of motion  Skin:     General: Skin is warm and dry  Neurological:      Mental Status: She is alert and oriented to person, place, and time  GCS: GCS eye subscore is 4  GCS verbal subscore is 5  GCS motor subscore is 6  Cranial Nerves: Cranial nerves are intact  Sensory: Sensation is intact  No sensory deficit  Motor: Motor function is intact  Coordination: Coordination is intact  Comments: Non-focal neuro exam    Psychiatric:         Mood and Affect: Mood normal          Behavior: Behavior normal          Thought Content:  Thought content normal          Judgment: Judgment normal          Vital Signs  ED Triage Vitals   Temperature Pulse Respirations Blood Pressure SpO2   08/31/20 2025 08/31/20 2025 08/31/20 2025 08/31/20 2027 08/31/20 2025   (!) 96 8 °F (36 °C) 70 18 (!) 214/89 98 %      Temp Source Heart Rate Source Patient Position - Orthostatic VS BP Location FiO2 (%)   08/31/20 2025 08/31/20 2025 08/31/20 2025 08/31/20 2025 --   Temporal Monitor Sitting Right arm       Pain Score       08/31/20 2025       8           Vitals:    08/31/20 2209 08/31/20 2211 08/31/20 2308 09/01/20 0003   BP: (!) 218/91 (!) 218/91 (!) 187/82 (!) 173/77   Pulse: 77  90 94   Patient Position - Orthostatic VS:   Lying          Visual Acuity  Visual Acuity      Most Recent Value   L Pupil Size (mm)  3   R Pupil Size (mm)  3          ED Medications  Medications   hydrochlorothiazide (HYDRODIURIL) tablet 12 5 mg (12 5 mg Oral Given 9/1/20 0002)   diphenhydrAMINE (BENADRYL) injection 25 mg (25 mg Intravenous Given 8/31/20 2108)   metoclopramide (REGLAN) injection 10 mg (10 mg Intravenous Given 8/31/20 2113)   dexamethasone (DECADRON) injection 10 mg (10 mg Intravenous Given 8/31/20 2111)   butalbital-acetaminophen-caffeine (FIORICET,ESGIC) -40 mg per tablet 1 tablet (1 tablet Oral Given 8/31/20 2107)   sodium chloride 0 9 % bolus 1,000 mL (0 mL Intravenous Stopped 8/31/20 2220)   lisinopril (ZESTRIL) tablet 10 mg (10 mg Oral Given 8/31/20 2107)   hydrALAZINE (APRESOLINE) injection 10 mg (10 mg Intravenous Given 8/31/20 2227)   Labetalol HCl (NORMODYNE) injection 10 mg (10 mg Intravenous Given 8/31/20 2341)       Diagnostic Studies  Results Reviewed     Procedure Component Value Units Date/Time    Comprehensive metabolic panel [[id number]]  (Abnormal) Collected:  08/31/20 2244    Lab Status:  Final result Specimen:  Blood from Hand, Left Updated:  08/31/20 2302     Sodium 141 mmol/L      Potassium 4 1 mmol/L      Chloride 108 mmol/L      CO2 25 mmol/L      ANION GAP 8 mmol/L      BUN 14 mg/dL      Creatinine 0 87 mg/dL      Glucose 114 mg/dL      Calcium 10 1 mg/dL      AST 16 U/L      ALT 22 U/L      Alkaline Phosphatase 105 U/L      Total Protein 6 9 g/dL      Albumin 3 2 g/dL      Total Bilirubin 0 70 mg/dL      eGFR 74 ml/min/1 73sq m     Narrative:       [place] guidelines for Chronic Kidney Disease (CKD):     Stage 1 with normal or high GFR (GFR > 90 mL/min/1 73 square meters)    Stage 2 Mild CKD (GFR = 60-89 mL/min/1 73 square meters)    Stage 3A Moderate CKD (GFR = 45-59 mL/min/1 73 square meters)    Stage 3B Moderate CKD (GFR = 30-44 mL/min/1 73 square meters)    Stage 4 Severe CKD (GFR = 15-29 mL/min/1 73 square meters)    Stage 5 End Stage CKD (GFR <15 mL/min/1 73 square meters)  Note: GFR calculation is accurate only with a steady state creatinine    CBC and differential [[id number]]  (Abnormal) Collected:  08/31/20 2244    Lab Status:  Final result Specimen:  Blood from Hand, Left Updated:  08/31/20 2248     WBC 9 83 Thousand/uL      RBC 3 72 Million/uL      Hemoglobin 10 6 g/dL      Hematocrit 34 0 %      MCV 91 fL      MCH 28 5 pg      MCHC 31 2 g/dL      RDW 12 4 %      MPV 9 4 fL      Platelets 442 Thousands/uL      nRBC 0 /100 WBCs      Neutrophils Relative 81 %      Immat GRANS % 0 %      Lymphocytes Relative 11 %      Monocytes Relative 5 %      Eosinophils Relative 3 %      Basophils Relative 0 %      Neutrophils Absolute 7 93 Thousands/µL      Immature Grans Absolute 0 04 Thousand/uL      Lymphocytes Absolute 1 11 Thousands/µL      Monocytes Absolute 0 46 Thousand/µL      Eosinophils Absolute 0 26 Thousand/µL      Basophils Absolute 0 03 Thousands/µL                  CTA head and neck with and without contrast    (Results Pending)              Procedures  ECG 12 Lead Documentation Only    Date/Time: 8/31/2020 10:40 PM  Performed by: Dale Donovan PA-C  Authorized by: Dale Donovan PA-C     ECG reviewed by me, the ED Provider: yes    Patient location:  ED  Previous ECG:     Previous ECG:  Compared to current    Similarity:  No change    Comparison to cardiac monitor: No    Interpretation:     Interpretation: normal    Rate:     ECG rate:  74    ECG rate assessment: normal    Rhythm:     Rhythm: sinus rhythm    Ectopy:     Ectopy: none    QRS:     QRS axis:  Normal    QRS intervals:  Normal  Conduction:     Conduction: normal    ST segments:     ST segments:  Normal  T waves:     T waves: normal               ED Course  ED Course as of Sep 01 0006   Mon Aug 31, 2020   2251 Pt having worsening symptoms since being discharged without changes in her BP  Gave a dose of Lisinopril and migraine cocktail in ED  Headache improved but BP worsened  Spoke to Aishwarya Solis about admission, but they would like dose of hydralazine and CTA to rule out dissection  Will reassess and order blood work as well  US AUDIT      Most Recent Value   Initial Alcohol Screen: US AUDIT-C    1  How often do you have a drink containing alcohol?  0 Filed at: 08/31/2020 2230   2  How many drinks containing alcohol do you have on a typical day you are drinking? 0 Filed at: 08/31/2020 2230   3a  Male UNDER 65: How often do you have five or more drinks on one occasion? 0 Filed at: 08/31/2020 2230   3b  FEMALE Any Age, or MALE 65+: How often do you have 4 or more drinks on one occassion? 0 Filed at: 08/31/2020 2230   Audit-C Score  0 Filed at: 08/31/2020 2230                  GOVIND/DAST-10      Most Recent Value   How many times in the past year have you    Used an illegal drug or used a prescription medication for non-medical reasons?   Never Filed at: 08/31/2020 2230                                MDM  Number of Diagnoses or Management Options  Acute nonintractable headache, unspecified headache type: established and worsening  Hypertensive urgency: new and requires workup     Amount and/or Complexity of Data Reviewed  Clinical lab tests: ordered and reviewed  Tests in the radiology section of CPT®: ordered and reviewed  Review and summarize past medical records: yes  Discuss the patient with other providers: yes  Independent visualization of images, tracings, or specimens: yes    Risk of Complications, Morbidity, and/or Mortality  Presenting problems: moderate  Management options: moderate    Patient Progress  Patient progress: improved        Disposition  Final diagnoses:   Hypertensive urgency   Acute nonintractable headache, unspecified headache type Time reflects when diagnosis was documented in both MDM as applicable and the Disposition within this note     Time User Action Codes Description Comment    9/1/2020 12:05 AM rJ GREGORY Add [I16 0] Hypertensive urgency     9/1/2020 12:05 AM Jr GREGORY Add [R51] Acute nonintractable headache, unspecified headache type       ED Disposition     ED Disposition Condition Date/Time Comment    Admit Stable Tue Sep 1, 2020 12:05 AM Case was discussed with JONAH and the patient's admission status was agreed to be Admission Status: observation status to the service of Dr Virgen Santiago   Follow-up Information    None         Patient's Medications   Discharge Prescriptions    No medications on file     No discharge procedures on file      PDMP Review     None          ED Provider  Electronically Signed by           Ellie Helms PA-C  09/01/20 0006

## 2020-09-01 NOTE — H&P
H&P- Tamie Salts 1960, 63 y o  female MRN: [id number]    Unit/Bed#: ED 32 Encounter: [id number]    Primary Care Provider: DEANA Freire   Date and time admitted to hospital: 8/31/2020  8:30 PM        * Hypertensive urgency  Assessment & Plan  -Likely 2* motrin and uncontrolled severe htn  Hasn't failed triple therapy but suspect underlying SELENE contributory as well  On toprol 50mg po daily and lisinopril 40mg po daily  -rec'd hydralazine 10mg in ED and lisinopril 20mg with modest improvement to 190 from 218  Give labetalol 10mg IV now  -Goal sbp of 160mmhg tonight, if no significant improvement w/labetalol will need cardene gtt  -add hctz 12 5mg po tonight and likely can go up to 25mg po in am if still having difficulty with bp control  -admit to observation w/prn hydralazine or labetalol or cardene pending clinical response  Morbid obesity ([facility] [address] )  Assessment & Plan  Encourage weight loss    Snoring  Assessment & Plan  Concern for SELENE  Ambulatory referral to sleep medicine upon d/c    Headache  Assessment & Plan  Right temporal pain radiating down right neck with htn urgency  Pt has right worse than left blurry vision but no other focal neurologic deficits  Now improved  cta head/neck to r/o dissection  Treat htn urgency, continue to monitor    MS (multiple sclerosis) ([facility] [address] )  Assessment & Plan  Recently started on betaseron w/[facility] neurology  Has been taking motrin and apap to offset flu like s/sx  D/w son and pt need to avoid further nsaids given worsening htn    Type 2 diabetes mellitus with hyperglycemia, with long-term current use of insulin [facility])  Assessment & Plan    Lab Results   Component Value Date    HGBA1C 7 9 (H) 06/27/2020     Follows with endocrinology  Hold oral agents  Continue NovoLog 70 30 mix 20 units q a m  With breakfast and 15 units q h s   With dinner  Start sliding scale on POC blood sugar      VTE Prophylaxis: Enoxaparin (Lovenox)  / sequential compression device   Code Status: fc  POLST: There is no POLST form on file for this patient (pre-hospital)  Discussion with family: son at bedside    Anticipated Length of Stay:  Patient will be admitted on an Observation basis with an anticipated length of stay of  Less than 2 midnights  Justification for Hospital Stay: htn urgency    Total Time for Visit, including Counseling / Coordination of Care: 45 minutes  Greater than 50% of this total time spent on direct patient counseling and coordination of care  Chief Complaint:   Headache, uncontrolled bp x 3-4d    History of Present Illness:    Jorge Lara is a 63 y o  female who presents with pmh of Grade 2 HTN, IDDM, morbid obesity, MS, coming to hospital for right sided ha/blurry vision and uncontrolled bp  Pt accompanied by son who is at bedside  Patient was seen in the ED at [facility] on day of admission due to headache and blood pressure  She had taken some ibuprofen prior to her arrival   Her blood pressure at that time was 0220 6/98  She underwent CT head which was negative she was given hydralazine 10 mg with improvement 182/86 with improvement in her headache and as patient wanted to go home she was discharged from the emergency room  She notes 3-4 days of headache with associated blurry vision  She checks her blood pressure and has this documented in her buttock which her son brought with them and has had blood pressures from the 150s to 170s routinely over the last week or 2 and over the last 3-4 days increasingly frequent blood pressures of 180 upwards of 200 pre-hospital   She notes that she has been on lisinopril 20 mg and Toprol 37 5 mg which recently both were increased to lisinopril 40 mg and Toprol 50  She takes these daily  She notes that she has been taking Motrin every other day as she is on Betaseron for her MS and takes Motrin and Tylenol to offset her flu like symptoms she gets with injection    She also notes that she snores rather loudly but has never been tested for sleep apnea  She does not smoke and has not taken any over-the-counter cough and cold medications  She notes that she has right-sided headache at her right temple which radiates posteriorly behind her ear and then down her neck at her right lateral neck  She reports bilateral blurry vision worse in her right than her left eye  She returned to the ED given this and her persistent BP elevation  Her headache has since improved with migraine cocktail in the emergency room but still has blurry vision  Her BP improved from 218 to 187 w/hydralazine 10mg IV  On ROS she denies numbness no weakness that is new  She has not been waking up in the middle night due to her headaches, cp/sob/palpitations  Review of Systems:    Review of Systems   Constitutional: Negative for appetite change, chills and fever  Eyes: Positive for visual disturbance  Respiratory: Negative for cough and shortness of breath  Cardiovascular: Negative for chest pain  Neurological: Positive for headaches  Negative for weakness, light-headedness and numbness  All other systems reviewed and are negative        Past Medical and Surgical History:     Past Medical History:   Diagnosis Date    Diabetes mellitus ([place] [address] )     External hemorrhoids     last assessed 16, resolved 16    Hernia, ventral     last assessed 12/14/15, resolved 16    Hypertension     Incarcerated incisional hernia     last assessed 12/21/15, resolved 16    Insomnia     last assessed 16, resolved 10/9/17    Lyme disease     Morbid obesity with BMI of 45 0-49 9, adult ([place] [address] ) 5/3/2017    MS (multiple sclerosis) ([place] [address] )     Stroke (cerebrum) ([place] [address] ) 2020    Type 2 diabetes mellitus with hyperglycemia, without long-term current use of insulin (HCC)        Past Surgical History:   Procedure Laterality Date    ABCESS DRAINAGE       SECTION      x3    COLONOSCOPY N/A 2017    Procedure: COLONOSCOPY with biopsy;  Surgeon: Jet Barton DO;  Location: [facility]; Service: Complete    HYSTERECTOMY      IR LUMBAR PUNCTURE  3/13/2020       Meds/Allergies:    Prior to Admission medications    Medication Sig Start Date End Date Taking?  Authorizing Provider   acetaminophen (TYLENOL) 500 mg tablet Take 2 tablets (1,000 mg total) by mouth every 6 (six) hours as needed for moderate pain 6/10/20   Belkys Andrade PA-C   Alcohol Swabs PADS by Does not apply route 4 (four) times a day *Latex Free* 5/21/20   DEANA Stover   aspirin 81 mg chewable tablet Chew 1 tablet (81 mg total) daily 5/17/20   Belkys Capps DO   atorvastatin (LIPITOR) 40 mg tablet Take 1 tablet (40 mg total) by mouth every evening 6/24/20   DEANA Stover   Blood Pressure KIT by Does not apply route daily 5/21/20   DEANA Stover   capsicum (ZOSTRIX) 0 075 % topical cream Apply topically 3 (three) times a day  Patient not taking: Reported on 7/7/2020 6/26/20   DEANA Stover   furosemide (LASIX) 40 mg tablet Take 1 tablet (40 mg total) by mouth daily 8/24/20   DEANA Stover   gabapentin (NEURONTIN) 300 mg capsule Take 1 capsule (300 mg total) by mouth 3 (three) times a day 7/23/20   DEANA Stover   glucose blood (RELION GLUCOSE TEST STRIPS) test strip 1 each by Other route 3 (three) times a day 6/15/20   Enzo Landaverde MD   glucose blood test strip Check blood glucose levels three times per day before meals 3/23/20   DEANA Stover   ibuprofen (MOTRIN) 400 mg tablet Take 1 tablet (400 mg total) by mouth every 6 (six) hours as needed for mild pain 8/4/20   Talha Polk MD   insulin aspart protamine-insulin aspart (NovoLOG 70/30) 100 units/mL injection Inject 15 Units under the skin daily at bedtime    Historical Provider, MD   insulin isophane-insulin regular (HumuLIN 70/30 KwikPen) 100 units/mL injection pen 20 units before breakfast and 15 before dinner 6/17/20   Enzo Landaverde MD insulin NPH-insulin regular (NovoLIN 70/30) 100 units/mL subcutaneous injection 20 units before breakfast and 10 before dinner 6/15/20   Tanya Parker MD   Insulin Syringe-Needle U-100 (BD Insulin Syringe Ultrafine) 31G X 15/64" 0 3 ML MISC by Does not apply route 2 (two) times a day 7/31/20   Tanya Parker MD   lisinopril (ZESTRIL) 40 mg tablet Take 1 tablet (40 mg total) by mouth daily 8/21/20 11/19/20  DEANA Monet   metFORMIN (GLUCOPHAGE-XR) 500 mg 24 hr tablet Take 2 tablets (1,000 mg total) by mouth 2 (two) times a day with meals 5/12/20   Tanya Parker MD   metoprolol succinate (TOPROL-XL) 50 mg 24 hr tablet Take 1 tablet by mouth once daily 8/5/20   Devyn Shahid [address]  Devices (DIGITAL GLASS SCALE) MISC by Does not apply route daily 5/21/20   DEANA Monet   pantoprazole (PROTONIX) 40 mg tablet Take 1 tablet (40 mg total) by mouth daily before breakfast  Patient not taking: Reported on 6/18/2020 5/16/20   Adrian Bennett DO   predniSONE 10 mg tablet Take 3 tab x3 d, then 2 tab x3 d, 1 tab x10 d, 1/2 tab x10d, and then stop  Patient not taking: Reported on 6/18/2020 5/15/20   Lucille Newton MD     I have reviewed home medications with patient personally  Allergies:    Allergies   Allergen Reactions    Ambien [Zolpidem Tartrate]     Demerol [Meperidine]     Insulin     Insulin Glargine      [facility] - [id number]: memory loss    Latex     Oxycodone-Acetaminophen Vomiting    Zolpidem Hallucinations and Irritability       Social History:     Marital Status: /Civil Union   Occupation:   Patient Pre-hospital Living Situation:   Patient Pre-hospital Level of Mobility:   Patient Pre-hospital Diet Restrictions:   Substance Use History:   Social History     Substance and Sexual Activity   Alcohol Use Never    Frequency: Never    Comment: Social     Social History     Tobacco Use   Smoking Status Never Smoker   Smokeless Tobacco Never Used     Social History Substance and Sexual Activity   Drug Use Never       Family History:    Family History   Problem Relation Age of Onset   Dayana Cain Glaucoma Mother     Diabetes Father     Hypertension Father     Pancreatic cancer Father     Colon cancer Father     Breast cancer Maternal Grandmother     Breast cancer Maternal Aunt     Coronary artery disease Family     Diabetes Family     Hypertension Family     Cervical cancer Sister 61    Breast cancer Sister 50    Breast cancer Sister 45       Physical Exam:     Vitals:   Blood Pressure: (!) 187/82 (08/31/20 2308)  Pulse: 90 (08/31/20 2308)  Temperature: (!) 96 8 °F (36 °C) (08/31/20 2025)  Temp Source: Temporal (08/31/20 2025)  Respirations: 18 (08/31/20 2308)  SpO2: 95 % (08/31/20 2308)    Physical Exam  Vitals signs reviewed  HENT:      Head: Normocephalic and atraumatic  Right Ear: External ear normal       Left Ear: External ear normal       Nose: Nose normal    Eyes:      Extraocular Movements: Extraocular movements intact  Cardiovascular:      Rate and Rhythm: Normal rate and regular rhythm  Heart sounds: No murmur  No friction rub  No gallop  Pulmonary:      Effort: Pulmonary effort is normal       Breath sounds: Normal breath sounds  Abdominal:      General: There is no distension  Palpations: There is no mass  Tenderness: There is no abdominal tenderness  There is no guarding or rebound  Hernia: No hernia is present  Skin:     General: Skin is warm and dry  Neurological:      Mental Status: She is alert  Mental status is at baseline  Psychiatric:         Mood and Affect: Mood normal          (  Be Sure to Include Physical Exam: Delete this entire line when you have entered your exam)    Additional Data:     Lab Results: I have personally reviewed pertinent reports        Results from last 7 days   Lab Units 08/31/20  2244   WBC Thousand/uL 9 83   HEMOGLOBIN g/dL 10 6*   HEMATOCRIT % 34 0*   PLATELETS Thousands/uL 247 NEUTROS PCT % 81*   LYMPHS PCT % 11*   MONOS PCT % 5   EOS PCT % 3     Results from last 7 days   Lab Units 08/31/20  2244   SODIUM mmol/L 141   POTASSIUM mmol/L 4 1   CHLORIDE mmol/L 108   CO2 mmol/L 25   BUN mg/dL 14   CREATININE mg/dL 0 87   ANION GAP mmol/L 8   CALCIUM mg/dL 10 1   ALBUMIN g/dL 3 2*   TOTAL BILIRUBIN mg/dL 0 70   ALK PHOS U/L 105   ALT U/L 22   AST U/L 16   GLUCOSE RANDOM mg/dL 114         Results from last 7 days   Lab Units 08/31/20  1306   POC GLUCOSE mg/dl 158*               Imaging: I have personally reviewed pertinent reports  CTA head and neck with and without contrast    (Results Pending)       EKG, Pathology, and Other Studies Reviewed on Admission:   · EKG: nsr t wave flattening in I/AVL    Allscripts / Epic Records Reviewed: Yes     ** Please Note: This note has been constructed using a voice recognition system   **

## 2020-09-01 NOTE — ED NOTES
Patient is self administering MS medication in triage room prior to going to ED bed        Sade Lazar RN  08/31/20 2033

## 2020-09-01 NOTE — ASSESSMENT & PLAN NOTE
-Likely 2* motrin and uncontrolled severe htn  Hasn't failed triple therapy but suspect underlying SELENE contributory as well  On toprol 50mg po daily and lisinopril 40mg po daily  -rec'd hydralazine 10mg in ED and lisinopril 20mg with modest improvement to 190 from 218  Give labetalol 10mg IV now  -Goal sbp of 160mmhg tonight, if no significant improvement w/labetalol will need cardene gtt  -add hctz 12 5mg po tonight and likely can go up to 25mg po in am if still having difficulty with bp control  -admit to observation w/prn hydralazine or labetalol or cardene pending clinical response

## 2020-09-01 NOTE — PLAN OF CARE
Problem: Potential for Falls  Goal: Patient will remain free of falls  Description: INTERVENTIONS:  - Assess patient frequently for physical needs  -  Identify cognitive and physical deficits and behaviors that affect risk of falls  -  [place] fall precautions as indicated by assessment   - Educate patient/family on patient safety including physical limitations  - Instruct patient to call for assistance with activity based on assessment  - Modify environment to reduce risk of injury  - Consider OT/PT consult to assist with strengthening/mobility  Outcome: Progressing     Problem: DISCHARGE PLANNING  Goal: Discharge to home or other facility with appropriate resources  Description: INTERVENTIONS:  - Identify barriers to discharge w/patient and caregiver  - Arrange for needed discharge resources and transportation as appropriate  - Identify discharge learning needs (meds, wound care, etc )  - Arrange for interpretive services to assist at discharge as needed  - Refer to Case Management Department for coordinating discharge planning if the patient needs post-hospital services based on physician/advanced practitioner order or complex needs related to functional status, cognitive ability, or social support system  Outcome: Progressing     Problem: Knowledge Deficit  Goal: Patient/family/caregiver demonstrates understanding of disease process, treatment plan, medications, and discharge instructions  Description: Complete learning assessment and assess knowledge base    Interventions:  - Provide teaching at level of understanding  - Provide teaching via preferred learning methods  Outcome: Progressing     Problem: CARDIOVASCULAR - ADULT  Goal: Maintains optimal cardiac output and hemodynamic stability  Description: INTERVENTIONS:  - Monitor I/O, vital signs and rhythm  - Monitor for S/S and trends of decreased cardiac output  - Administer and titrate ordered vasoactive medications to optimize hemodynamic stability  - Assess quality of pulses, skin color and temperature  - Assess for signs of decreased coronary artery perfusion  - Instruct patient to report change in severity of symptoms  Outcome: Progressing  Goal: Absence of cardiac dysrhythmias or at baseline rhythm  Description: INTERVENTIONS:  - Continuous cardiac monitoring, vital signs, obtain 12 lead EKG if ordered  - Administer antiarrhythmic and heart rate control medications as ordered  - Monitor electrolytes and administer replacement therapy as ordered  Outcome: Progressing     Problem: METABOLIC, FLUID AND ELECTROLYTES - ADULT  Goal: Glucose maintained within target range  Description: INTERVENTIONS:  - Monitor Blood Glucose as ordered  - Assess for signs and symptoms of hyperglycemia and hypoglycemia  - Administer ordered medications to maintain glucose within target range  - Assess nutritional intake and initiate nutrition service referral as needed  Outcome: Progressing     Problem: MUSCULOSKELETAL - ADULT  Goal: Maintain or return mobility to safest level of function  Description: INTERVENTIONS:  - Assess patient's ability to carry out ADLs; assess patient's baseline for ADL function and identify physical deficits which impact ability to perform ADLs (bathing, care of mouth/teeth, toileting, grooming, dressing, etc )  - Assess/evaluate cause of self-care deficits   - Assess range of motion  - Assess patient's mobility  - Assess patient's need for assistive devices and provide as appropriate  - Encourage maximum independence but intervene and supervise when necessary  - Involve family in performance of ADLs  - Assess for home care needs following discharge   - Consider OT consult to assist with ADL evaluation and planning for discharge  - Provide patient education as appropriate  Outcome: Progressing

## 2020-09-01 NOTE — PLAN OF CARE
Problem: Potential for Falls  Goal: Patient will remain free of falls  Description: INTERVENTIONS:  - Assess patient frequently for physical needs  -  Identify cognitive and physical deficits and behaviors that affect risk of falls  -  [place] fall precautions as indicated by assessment   - Educate patient/family on patient safety including physical limitations  - Instruct patient to call for assistance with activity based on assessment  - Modify environment to reduce risk of injury  - Consider OT/PT consult to assist with strengthening/mobility  Outcome: Progressing     Problem: DISCHARGE PLANNING  Goal: Discharge to home or other facility with appropriate resources  Description: INTERVENTIONS:  - Identify barriers to discharge w/patient and caregiver  - Arrange for needed discharge resources and transportation as appropriate  - Identify discharge learning needs (meds, wound care, etc )  - Arrange for interpretive services to assist at discharge as needed  - Refer to Case Management Department for coordinating discharge planning if the patient needs post-hospital services based on physician/advanced practitioner order or complex needs related to functional status, cognitive ability, or social support system  Outcome: Progressing     Problem: Knowledge Deficit  Goal: Patient/family/caregiver demonstrates understanding of disease process, treatment plan, medications, and discharge instructions  Description: Complete learning assessment and assess knowledge base    Interventions:  - Provide teaching at level of understanding  - Provide teaching via preferred learning methods  Outcome: Progressing     Problem: CARDIOVASCULAR - ADULT  Goal: Maintains optimal cardiac output and hemodynamic stability  Description: INTERVENTIONS:  - Monitor I/O, vital signs and rhythm  - Monitor for S/S and trends of decreased cardiac output  - Administer and titrate ordered vasoactive medications to optimize hemodynamic stability  - Assess quality of pulses, skin color and temperature  - Assess for signs of decreased coronary artery perfusion  - Instruct patient to report change in severity of symptoms  Outcome: Progressing  Goal: Absence of cardiac dysrhythmias or at baseline rhythm  Description: INTERVENTIONS:  - Continuous cardiac monitoring, vital signs, obtain 12 lead EKG if ordered  - Administer antiarrhythmic and heart rate control medications as ordered  - Monitor electrolytes and administer replacement therapy as ordered  Outcome: Progressing     Problem: METABOLIC, FLUID AND ELECTROLYTES - ADULT  Goal: Glucose maintained within target range  Description: INTERVENTIONS:  - Monitor Blood Glucose as ordered  - Assess for signs and symptoms of hyperglycemia and hypoglycemia  - Administer ordered medications to maintain glucose within target range  - Assess nutritional intake and initiate nutrition service referral as needed  Outcome: Progressing     Problem: MUSCULOSKELETAL - ADULT  Goal: Maintain or return mobility to safest level of function  Description: INTERVENTIONS:  - Assess patient's ability to carry out ADLs; assess patient's baseline for ADL function and identify physical deficits which impact ability to perform ADLs (bathing, care of mouth/teeth, toileting, grooming, dressing, etc )  - Assess/evaluate cause of self-care deficits   - Assess range of motion  - Assess patient's mobility  - Assess patient's need for assistive devices and provide as appropriate  - Encourage maximum independence but intervene and supervise when necessary  - Involve family in performance of ADLs  - Assess for home care needs following discharge   - Consider OT consult to assist with ADL evaluation and planning for discharge  - Provide patient education as appropriate  Outcome: Progressing

## 2020-09-01 NOTE — PLAN OF CARE
Problem: Potential for Falls  Goal: Patient will remain free of falls  Description: INTERVENTIONS:  - Assess patient frequently for physical needs  -  Identify cognitive and physical deficits and behaviors that affect risk of falls  -  [place] fall precautions as indicated by assessment   - Educate patient/family on patient safety including physical limitations  - Instruct patient to call for assistance with activity based on assessment  - Modify environment to reduce risk of injury  - Consider OT/PT consult to assist with strengthening/mobility  9/1/2020 1124 by Saintclair Savage, RN  Outcome: Adequate for Discharge  9/1/2020 1008 by Saintclair Savage, RN  Outcome: Progressing     Problem: DISCHARGE PLANNING  Goal: Discharge to home or other facility with appropriate resources  Description: INTERVENTIONS:  - Identify barriers to discharge w/patient and caregiver  - Arrange for needed discharge resources and transportation as appropriate  - Identify discharge learning needs (meds, wound care, etc )  - Arrange for interpretive services to assist at discharge as needed  - Refer to Case Management Department for coordinating discharge planning if the patient needs post-hospital services based on physician/advanced practitioner order or complex needs related to functional status, cognitive ability, or social support system  9/1/2020 1124 by Saintclair Savage, RN  Outcome: Adequate for Discharge  9/1/2020 1008 by Saintclair Savage, RN  Outcome: Progressing     Problem: Knowledge Deficit  Goal: Patient/family/caregiver demonstrates understanding of disease process, treatment plan, medications, and discharge instructions  Description: Complete learning assessment and assess knowledge base    Interventions:  - Provide teaching at level of understanding  - Provide teaching via preferred learning methods  9/1/2020 1124 by Saintclair Savage, RN  Outcome: Adequate for Discharge  9/1/2020 1008 by Saintclair Savage, RN  Outcome: Progressing     Problem: CARDIOVASCULAR - ADULT  Goal: Maintains optimal cardiac output and hemodynamic stability  Description: INTERVENTIONS:  - Monitor I/O, vital signs and rhythm  - Monitor for S/S and trends of decreased cardiac output  - Administer and titrate ordered vasoactive medications to optimize hemodynamic stability  - Assess quality of pulses, skin color and temperature  - Assess for signs of decreased coronary artery perfusion  - Instruct patient to report change in severity of symptoms  9/1/2020 1124 by Natalia Murrell RN  Outcome: Adequate for Discharge  9/1/2020 1008 by Natalia Murrell RN  Outcome: Progressing  Goal: Absence of cardiac dysrhythmias or at baseline rhythm  Description: INTERVENTIONS:  - Continuous cardiac monitoring, vital signs, obtain 12 lead EKG if ordered  - Administer antiarrhythmic and heart rate control medications as ordered  - Monitor electrolytes and administer replacement therapy as ordered  9/1/2020 1124 by Natalia Murrell RN  Outcome: Adequate for Discharge  9/1/2020 1008 by Natalia Murrell RN  Outcome: Progressing     Problem: METABOLIC, FLUID AND ELECTROLYTES - ADULT  Goal: Glucose maintained within target range  Description: INTERVENTIONS:  - Monitor Blood Glucose as ordered  - Assess for signs and symptoms of hyperglycemia and hypoglycemia  - Administer ordered medications to maintain glucose within target range  - Assess nutritional intake and initiate nutrition service referral as needed  9/1/2020 1124 by Natalia Murrell RN  Outcome: Adequate for Discharge  9/1/2020 1008 by Natalia Murrell RN  Outcome: Progressing     Problem: MUSCULOSKELETAL - ADULT  Goal: Maintain or return mobility to safest level of function  Description: INTERVENTIONS:  - Assess patient's ability to carry out ADLs; assess patient's baseline for ADL function and identify physical deficits which impact ability to perform ADLs (bathing, care of mouth/teeth, toileting, grooming, dressing, etc )  - Assess/evaluate cause of self-care deficits   - Assess range of motion  - Assess patient's mobility  - Assess patient's need for assistive devices and provide as appropriate  - Encourage maximum independence but intervene and supervise when necessary  - Involve family in performance of ADLs  - Assess for home care needs following discharge   - Consider OT consult to assist with ADL evaluation and planning for discharge  - Provide patient education as appropriate  9/1/2020 1124 by Elle Mitchell RN  Outcome: Adequate for Discharge  9/1/2020 1008 by Elle Mitchell RN  Outcome: Progressing

## 2020-09-01 NOTE — NURSING NOTE
Patient was discharged home via walking  Patient was accompanied by PCA and met daughter in the parking garage  IV x2 were removed  AVS was reviewed in detail with patient  All follow up appointments were discussed and patient agreed to attend all appointments  All questions and concerns were addressed

## 2020-09-01 NOTE — ASSESSMENT & PLAN NOTE
Plan:  Continue hydrochlorothiazide 12 5 mg daily, lisinopril 40 mg daily, metoprolol 50 mg daily  Patient to follow-up with primary care provider 9/2/2020 for additional monitoring  Avoid NSAIDs  Consider outpatient sleep study

## 2020-09-01 NOTE — DISCHARGE SUMMARY
Discharge- Jennifer Mota 1960, 62 y o  female MRN: [id number]    Unit/Bed#: [address] 207[address]01 Encounter: [id number]    Primary Care Provider: DEANA Underwood   Date and time admitted to hospital: 8/31/2020  8:30 PM        Thyroid nodule  Assessment & Plan  Incidental finding on CTA head and neck  1 4 cm nodule  Plan:  Patient made aware  Recommended thyroid ultrasound  Follow-up with PCP to discuss    Snoring  Assessment & Plan  Given history of snoring, hypertension, obesity  Consider outpatient sleep study    MS (multiple sclerosis) ([facility] )  Assessment & Plan  Outpatient follow-up with Neurology    Type 2 diabetes mellitus with hyperglycemia, with long-term current use of insulin ([facility] )  Assessment & Plan    Lab Results   Component Value Date    HGBA1C 7 9 (H) 06/27/2020     Resume home medication regimen  Outpatient follow-up with PCP    Hypertension  Assessment & Plan  Plan:  Continue hydrochlorothiazide 12 5 mg daily, lisinopril 40 mg daily, metoprolol 50 mg daily  Patient to follow-up with primary care provider 9/2/2020 for additional monitoring  Avoid NSAIDs  Consider outpatient sleep study      Discharging Physician / Practitioner: Heidi Lund DO  PCP: Bernard Underwood  Admission Date:   Admission Orders (From admission, onward)     Ordered        08/31/20 2356  Place in Observation  Once                   Discharge Date: 09/01/20    Resolved Problems  Date Reviewed: 8/31/2020          Resolved    Headache 9/1/2020     Resolved by  Heidi Lund DO    * (Principal) Hypertensive urgency 9/1/2020     Resolved by  Heidi Lund DO          Consultations During Hospital Stay:  · None    Procedures Performed:   Cta Head And Neck With And Without Contrast    Result Date: 9/1/2020  Impression: CT HEAD: 1   No acute intracranial abnormality  No significant interval change compared to prior study dated 8/31/2020  CTA NECK: 1    No evidence of hemodynamically significant stenosis at either ICA origin  No occlusion or dissection  2   Thyroid nodules as above  Nonemergent dedicated ultrasound should be obtained for further evaluation  CTA HEAD: 1  No evidence of hemodynamically significant stenosis, occlusion, or aneurysm in the [place]  Workstation performed: [id number]     Ct Head Without Contrast    Result Date: 8/31/2020  · Impression: Persistent mild supratentorial white matter hypointensity consistent with known demyelinating disease No acute intracranial abnormality  Similar to prior studies Workstation performed: [id number]   ·     Significant Findings / Test Results:   · None    Incidental Findings:   · Thyroid Nodule     Test Results Pending at Discharge (will require follow up): · None     Outpatient Tests Requested:  · Thyroid ultrasound   · Sleep study     Complications:  None    Reason for Admission: Hypertensive Urgency     Hospital Course:     Abdi Martinez is a 62 y o  female patient who originally presented to the hospital on 8/31/2020  with past medical history of hypertension, insulin-dependent type 2 diabetes, morbid obesity, multiple sclerosis who originally presented for 3 days of uncontrolled blood pressure as well as right-sided headache/blurry vision  Patient had additionally been taking ibuprofen and Tylenol for headache  Patient underwent CTA the head and neck which showed no acute intracranial abnormality, significant stenosis, occlusion, aneurysm  Incidental finding of 1 4 cm hypoattenuating nodule in the posterior superior left thyroid lobe recommending for dedicated thyroid ultrasound  Patient's blood pressure was initially managed with IV medications  Hydrochlorothiazide 12 5 mg was then added to regimen  Patient's blood pressure slowly improved  Patient has follow-up with primary care provider 9/2/2020 to discuss blood pressure and thyroid ultrasound    Patient was recommended to continue hydrochlorothiazide in addition to lisinopril and metoprolol  Avoid NSAIDs as a secondary cause of hypertension  May benefit from outpatient sleep study given history of snoring and hypertension  Please see above list of diagnoses and related plan for additional information  Condition at Discharge: stable     Discharge Day Visit / Exam:     Subjective:  Patient seen examined at bedside  She feels well  Notes improvement in headache  Vitals: Blood Pressure: 162/92 (09/01/20 0826)  Pulse: 103 (09/01/20 0826)  Temperature: 98 2 °F (36 8 °C) (09/01/20 0826)  Temp Source: Oral (09/01/20 0826)  Respirations: 18 (09/01/20 0826)  SpO2: 97 % (09/01/20 0826)  Exam:   Physical Exam  Constitutional:       General: She is not in acute distress  Appearance: Normal appearance  She is obese  She is not ill-appearing, toxic-appearing or diaphoretic  HENT:      Head: Normocephalic and atraumatic  Mouth/Throat:      Mouth: Mucous membranes are moist    Eyes:      General: No scleral icterus  Extraocular Movements: Extraocular movements intact  Conjunctiva/sclera: Conjunctivae normal       Pupils: Pupils are equal, round, and reactive to light  Cardiovascular:      Rate and Rhythm: Normal rate and regular rhythm  Heart sounds: No murmur  Pulmonary:      Effort: Pulmonary effort is normal       Breath sounds: Normal breath sounds  No wheezing  Abdominal:      General: Bowel sounds are normal  There is no distension  Tenderness: There is no abdominal tenderness  There is no guarding  Skin:     General: Skin is warm and dry  Neurological:      Mental Status: She is alert  Psychiatric:         Mood and Affect: Mood normal          Behavior: Behavior normal          Thought Content: Thought content normal          Judgment: Judgment normal           Discussion with Family: none     Discharge instructions/Information to patient and family:   See after visit summary for information provided to patient and family        Provisions for Follow-Up Care:  See after visit summary for information related to follow-up care and any pertinent home health orders  Disposition:     Home    For Discharges to [facility] SNF:   · Not Applicable to this Patient - Not Applicable to this Patient    Planned Readmission: none     Discharge Statement:  I spent 26 minutes discharging the patient  This time was spent on the day of discharge  I had direct contact with the patient on the day of discharge  Greater than 50% of the total time was spent examining patient, answering all patient questions, arranging and discussing plan of care with patient as well as directly providing post-discharge instructions  Additional time then spent on discharge activities  Discharge Medications:  See after visit summary for reconciled discharge medications provided to patient and family        ** Please Note: This note has been constructed using a voice recognition system **

## 2020-09-02 ENCOUNTER — PATIENT OUTREACH (OUTPATIENT)
Dept: FAMILY MEDICINE CLINIC | Facility: CLINIC | Age: 60
End: 2020-09-02

## 2020-09-02 ENCOUNTER — TELEMEDICINE (OUTPATIENT)
Dept: FAMILY MEDICINE CLINIC | Facility: CLINIC | Age: 60
End: 2020-09-02

## 2020-09-02 DIAGNOSIS — G47.30 SLEEP APNEA, UNSPECIFIED TYPE: ICD-10-CM

## 2020-09-02 DIAGNOSIS — I16.0 HYPERTENSIVE URGENCY: Primary | ICD-10-CM

## 2020-09-02 DIAGNOSIS — E04.1 THYROID NODULE: ICD-10-CM

## 2020-09-02 PROCEDURE — G2012 BRIEF CHECK IN BY MD/QHP: HCPCS | Performed by: NURSE PRACTITIONER

## 2020-09-02 NOTE — PROGRESS NOTES
Virtual Brief Visit    Assessment/Plan:    Problem List Items Addressed This Visit        Endocrine    Thyroid nodule    Relevant Orders    US thyroid      Other Visit Diagnoses     Hypertensive urgency    -  Primary    Sleep apnea, unspecified type        Relevant Orders    Ambulatory referral to Sleep Medicine        Discussed with the patient to continue with current medication regimen  She should continue to monitor her blood pressure as discussed  Avoid NSAIDS, excessive caffeine, and dietary salt intake  We will have a virtual visit again on Friday to review blood pressures and determine if medication needs to be increased  Advised the patient that if she has any concerns or issues between now and then to call the office to discuss  ED parameters discussed  Reason for visit is   Chief Complaint   Patient presents with    Virtual Brief Visit        Encounter provider DEANA Michaud    Provider located at [address] [place] [id number] [phone]    Recent Visits  Date Type Provider Dept   08/31/20 Telemedicine Vi Riddle MD [facility] recent visits within past 7 days and meeting all other requirements     Today's Visits  Date Type Provider Dept   09/02/20 [address] [place] [address] today's visits and meeting all other requirements     Future Appointments  No visits were found meeting these conditions  Showing future appointments within next 150 days and meeting all other requirements        After connecting through telephone, the patient was identified by name and date of birth  Beverli Mcburney was informed that this is a telemedicine visit and that the visit is being conducted through telephone  My office door was closed  No one else was in the room  She acknowledged consent and understanding of privacy and security of the platform   The patient has agreed to participate and understands she can discontinue the visit at any time  It was my intention to perform this visit via video technology but the patient was not able to do a video connection so the visit was completed via telephone audio only  Patient is aware this is a billable service  Jacinto Quintero is a 62 y o  female with past medical history of hypertension, insulin-dependent type 2 diabetes, morbid obesity, multiple sclerosis who presents today for this virtual visit for ED follow up  She was seen in the ED on 8/31/2020 for persistently elevated BP  On arrival her BP was 226/98  She received IV hydralazine which decreased BP to 182/86  She was discharged home  She then again presented to the ED the same day for elevated BP, headache, and blurred vision  Head CTA showed no acute abnormality  There was an incidental finding of a 1 4 cm hypoattenuating nodule in the posterior superior left thyroid lobe  BP was decreased with IV medications (hydralazine, labetalol) during hospital stay  Hospital provider added hctz to her medication regimen on discharge  On D/C BP is noted to have been 162/92  Today the patient reports that her blood pressure was 142/92 when she checked it after taking her BP medications  She does report she is due for a Betaseron injection this evening, which she has been taking frequent NSAIDS for the flu-like sx associated with this but she will not take any ibuprofen after the injection tonight  Discussed that she should take acetaminophen only  She today reports that she is not having any blurred vision, headache, chest pain, shortness of breath, palpitations          Past Medical History:   Diagnosis Date    Diabetes mellitus ([facility] )     External hemorrhoids     last assessed 4/7/16, resolved 7/21/16    Hernia, ventral     last assessed 12/14/15, resolved 7/21/16    Hypertension     Incarcerated incisional hernia     last assessed 12/21/15, resolved 7/21/16    Insomnia last assessed 16, resolved 10/9/17    Lyme disease     Morbid obesity with BMI of 45 0-49 9, adult ([facility] ) 5/3/2017    MS (multiple sclerosis) ([facility] )     Stroke (cerebrum) ([facility] ) 2020    Type 2 diabetes mellitus with hyperglycemia, without long-term current use of insulin ([facility] )        Past Surgical History:   Procedure Laterality Date    ABCESS DRAINAGE       SECTION      x3    COLONOSCOPY N/A 2017    Procedure: COLONOSCOPY with biopsy;  Surgeon: Radha Menezes DO;  Location: [facility];   Service: Complete    HYSTERECTOMY      IR LUMBAR PUNCTURE  3/13/2020       Current Outpatient Medications   Medication Sig Dispense Refill    acetaminophen (TYLENOL) 500 mg tablet Take 2 tablets (1,000 mg total) by mouth every 6 (six) hours as needed for moderate pain 60 tablet 1    Alcohol Swabs PADS by Does not apply route 4 (four) times a day *Latex Free* 120 each 3    aspirin 81 mg chewable tablet Chew 1 tablet (81 mg total) daily  0    atorvastatin (LIPITOR) 40 mg tablet Take 1 tablet (40 mg total) by mouth every evening 90 tablet 2    Blood Pressure KIT by Does not apply route daily 1 each 0    capsicum (ZOSTRIX) 0 075 % topical cream Apply topically 3 (three) times a day (Patient not taking: Reported on 2020) 28 3 g 0    gabapentin (NEURONTIN) 300 mg capsule Take 1 capsule (300 mg total) by mouth 3 (three) times a day 90 capsule 0    glucose blood (RELION GLUCOSE TEST STRIPS) test strip 1 each by Other route 3 (three) times a day 120 each 5    glucose blood test strip Check blood glucose levels three times per day before meals 300 each 2    hydrochlorothiazide (HYDRODIURIL) 12 5 mg tablet Take 1 tablet (12 5 mg total) by mouth daily 30 tablet 0    insulin aspart protamine-insulin aspart (NovoLOG 70/30) 100 units/mL injection Inject 15 Units under the skin daily at bedtime      insulin isophane-insulin regular (HumuLIN 70/30 KwikPen) 100 units/mL injection pen 20 units before breakfast and 15 before dinner 5 pen 2    Insulin Syringe-Needle U-100 (BD Insulin Syringe Ultrafine) 31G X 15/64" 0 3 ML MISC by Does not apply route 2 (two) times a day 200 each 1    lisinopril (ZESTRIL) 40 mg tablet Take 1 tablet (40 mg total) by mouth daily 90 tablet 0    metFORMIN (GLUCOPHAGE-XR) 500 mg 24 hr tablet Take 2 tablets (1,000 mg total) by mouth 2 (two) times a day with meals 360 tablet 1    metoprolol succinate (TOPROL-XL) 50 mg 24 hr tablet Take 1 tablet by mouth once daily 30 tablet 0    Misc  Devices (DIGITAL GLASS SCALE) MISC by Does not apply route daily 1 each 0    pantoprazole (PROTONIX) 40 mg tablet Take 1 tablet (40 mg total) by mouth daily before breakfast (Patient not taking: Reported on 6/18/2020) 30 tablet 0     No current facility-administered medications for this visit  Allergies   Allergen Reactions    Ambien [Zolpidem Tartrate]     Demerol [Meperidine]     Insulin     Insulin Glargine      Annotation - [id number]: memory loss    Latex     Oxycodone-Acetaminophen Vomiting    Zolpidem Hallucinations and Irritability       Review of Systems   Constitutional: Negative for chills and fever  Eyes: Negative for visual disturbance  Respiratory: Negative for cough and shortness of breath  Cardiovascular: Negative for chest pain and palpitations  Neurological: Negative for dizziness and headaches  There were no vitals filed for this visit  I spent 18 minutes directly with the patient during this visit    VIRTUAL VISIT DISCLAIMER    Kathi Robert acknowledges that she has consented to an online visit or consultation  She understands that the online visit is based solely on information provided by her, and that, in the absence of a face-to-face physical evaluation by the physician, the diagnosis she receives is both limited and provisional in terms of accuracy and completeness   This is not intended to replace a full medical face-to-face evaluation by the physician  Kaylene Dave understands and accepts these terms

## 2020-09-02 NOTE — PATIENT INSTRUCTIONS
Hypertension   AMBULATORY CARE:   Hypertension  is high blood pressure (BP)  Your BP is the force of your blood moving against the walls of your arteries  Normal BP is less than 120/80  Prehypertension is between 120/80 and 139/89  Hypertension is 140/90 or higher  Hypertension causes your BP to get so high that your heart has to work much harder than normal  This can damage your heart  You can control hypertension with a healthy lifestyle or medicines  A controlled blood pressure helps protect your organs, such as your heart, lungs, brain, and kidneys  Common symptoms include the following:   · Headache     · Blurred vision     · Chest pain     · Dizziness or weakness     · Trouble breathing    · Nosebleeds  Call 911 for any of the following:   · You have discomfort in your chest that feels like squeezing, pressure, fullness, or pain  · You become confused or have difficulty speaking  · You suddenly feel lightheaded or have trouble breathing  · You have pain or discomfort in your back, neck, jaw, stomach, or arm  Seek care immediately if:   · You have a severe headache or vision loss  · You have weakness in an arm or leg  Contact your healthcare provider if:   · You feel faint, dizzy, confused, or drowsy  · You have been taking your BP medicine and your BP is still higher than your healthcare provider says it should be  · You have questions or concerns about your condition or care  Treatment for hypertension  may include medicine to lower your BP and lower your cholesterol level  A low cholesterol level helps prevent heart disease and makes it easier to control your blood pressure  You may also need to make lifestyle changes  Take your medicine exactly as directed  Manage hypertension:  Talk with your healthcare provider about these and other ways to manage hypertension:  · Check your BP at home  Sit and rest for 5 minutes before you take your BP   Extend your arm and support it on a flat surface  Your arm should be at the same level as your heart  Follow the directions that came with your BP monitor  If possible, take at least 2 BP readings each time  Take your BP at least twice a day at the same times each day, such as morning and evening  Keep a record of your BP readings and bring it to your follow-up visits  Ask your healthcare provider what your BP should be  · Limit sodium (salt) as directed  Too much sodium can affect your fluid balance  Check labels to find low-sodium or no-salt-added foods  Some low-sodium foods use potassium salts for flavor  Too much potassium can also cause health problems  Your healthcare provider will tell you how much sodium and potassium are safe for you to have in a day  He or she may recommend that you limit sodium to 2,300 mg a day  · Follow the meal plan recommended by your healthcare provider  A dietitian or your provider can give you more information on low-sodium plans or the DASH (Dietary Approaches to Stop Hypertension) eating plan  The DASH plan is low in sodium, unhealthy fats, and total fat  It is high in potassium, calcium, and fiber  · Exercise to maintain a healthy weight  Exercise at least 30 minutes per day, on most days of the week  This will help decrease your blood pressure  Ask your healthcare provider about the best exercise plan for you  · Decrease stress  This may help lower your BP  Learn ways to relax, such as deep breathing or listening to music  · Limit alcohol  Women should limit alcohol to 1 drink a day  Men should limit alcohol to 2 drinks a day  A drink of alcohol is 12 ounces of beer, 5 ounces of wine, or 1½ ounces of liquor  · Do not smoke  Nicotine and other chemicals in cigarettes and cigars can increase your BP and also cause lung damage  Ask your healthcare provider for information if you currently smoke and need help to quit  E-cigarettes or smokeless tobacco still contain nicotine  Talk to your healthcare provider before you use these products  · Manage any other health conditions you have  Health conditions such as diabetes can increase your risk for hypertension  Follow your healthcare provider's instructions and take all your medicines as directed  Follow up with your healthcare provider as directed: You will need to return to have your BP checked and to have other lab tests done  Write down your questions so you remember to ask them during your visits  © 2017 [employer] Information is for End User's use only and may not be sold, redistributed or otherwise used for commercial purposes  All illustrations and images included in CareNotes® are the copyrighted property of [employer]  or [employer]  The above information is an  only  It is not intended as medical advice for individual conditions or treatments  Talk to your doctor, nurse or pharmacist before following any medical regimen to see if it is safe and effective for you

## 2020-09-02 NOTE — PROGRESS NOTES
Called patient to follow up after her recent ED visits but received her voicemail  Patient has a virtual visit with her PCP today  Will make further outreach attempts

## 2020-09-03 ENCOUNTER — PATIENT OUTREACH (OUTPATIENT)
Dept: FAMILY MEDICINE CLINIC | Facility: CLINIC | Age: 60
End: 2020-09-03

## 2020-09-03 NOTE — PROGRESS NOTES
Called patient to follow up after her recent ED visits  Patient notes she is doing well  She reports her blood pressure this morning is much improved at 157/93  Patient denies any headache, blurry vision, chest pain or shortness of breath  She states she believes ibuprofen was the reason her blood pressure became elevated  She has since stopped taking it and her levels have improved  She continues taking her medications as prescribed  She has been quite active doing many activities at her home  Patient states her blood sugar this morning was 127  She notes last night it was 102 and she did not take her insulin  Patient is aware of her Neuro appointment next week, 9/9 and states her  will be taking her  She also states she will be scheduling her appointments on Tuesdays and Thursday so [employer] bus can provide the transportation for her  Patient was very appreciative of the call  Will continue to follow

## 2020-09-04 ENCOUNTER — TELEPHONE (OUTPATIENT)
Dept: FAMILY MEDICINE CLINIC | Facility: CLINIC | Age: 60
End: 2020-09-04

## 2020-09-05 DIAGNOSIS — R07.89 OTHER CHEST PAIN: ICD-10-CM

## 2020-09-05 DIAGNOSIS — R00.2 PALPITATIONS: ICD-10-CM

## 2020-09-08 ENCOUNTER — TELEPHONE (OUTPATIENT)
Dept: FAMILY MEDICINE CLINIC | Facility: CLINIC | Age: 60
End: 2020-09-08

## 2020-09-08 DIAGNOSIS — I10 ESSENTIAL HYPERTENSION: Chronic | ICD-10-CM

## 2020-09-08 DIAGNOSIS — R00.2 PALPITATIONS: ICD-10-CM

## 2020-09-08 DIAGNOSIS — R07.89 OTHER CHEST PAIN: ICD-10-CM

## 2020-09-08 RX ORDER — AMLODIPINE BESYLATE 2.5 MG/1
2.5 TABLET ORAL DAILY
Qty: 30 TABLET | Refills: 0 | Status: SHIPPED | OUTPATIENT
Start: 2020-09-08 | End: 2021-01-19 | Stop reason: ALTCHOICE

## 2020-09-08 RX ORDER — METOPROLOL SUCCINATE 50 MG/1
TABLET, EXTENDED RELEASE ORAL
Qty: 30 TABLET | Refills: 0 | Status: SHIPPED | OUTPATIENT
Start: 2020-09-08 | End: 2020-09-08 | Stop reason: SDUPTHER

## 2020-09-08 RX ORDER — METOPROLOL SUCCINATE 50 MG/1
50 TABLET, EXTENDED RELEASE ORAL DAILY
Qty: 30 TABLET | Refills: 0 | Status: SHIPPED | OUTPATIENT
Start: 2020-09-08 | End: 2020-09-08

## 2020-09-08 RX ORDER — METOPROLOL SUCCINATE 50 MG/1
TABLET, EXTENDED RELEASE ORAL
Qty: 30 TABLET | Refills: 0 | Status: SHIPPED | OUTPATIENT
Start: 2020-09-08 | End: 2020-11-02

## 2020-09-08 RX ORDER — HYDROCHLOROTHIAZIDE 25 MG/1
25 TABLET ORAL DAILY
Qty: 90 TABLET | Refills: 0 | Status: SHIPPED | OUTPATIENT
Start: 2020-09-08 | End: 2020-12-18

## 2020-09-09 ENCOUNTER — OFFICE VISIT (OUTPATIENT)
Dept: NEUROLOGY | Facility: CLINIC | Age: 60
End: 2020-09-09
Payer: COMMERCIAL

## 2020-09-09 ENCOUNTER — TELEPHONE (OUTPATIENT)
Dept: NEUROLOGY | Facility: CLINIC | Age: 60
End: 2020-09-09

## 2020-09-09 ENCOUNTER — TELEPHONE (OUTPATIENT)
Dept: LAB | Facility: HOSPITAL | Age: 60
End: 2020-09-09

## 2020-09-09 VITALS
BODY MASS INDEX: 45.11 KG/M2 | DIASTOLIC BLOOD PRESSURE: 68 MMHG | SYSTOLIC BLOOD PRESSURE: 124 MMHG | HEART RATE: 76 BPM | WEIGHT: 288 LBS | TEMPERATURE: 97.9 F

## 2020-09-09 DIAGNOSIS — G35 MS (MULTIPLE SCLEROSIS) (HCC): Primary | ICD-10-CM

## 2020-09-09 DIAGNOSIS — Z79.4 TYPE 2 DIABETES MELLITUS WITH HYPERGLYCEMIA, WITH LONG-TERM CURRENT USE OF INSULIN (HCC): Primary | ICD-10-CM

## 2020-09-09 DIAGNOSIS — E11.65 TYPE 2 DIABETES MELLITUS WITH HYPERGLYCEMIA, WITH LONG-TERM CURRENT USE OF INSULIN (HCC): Primary | ICD-10-CM

## 2020-09-09 DIAGNOSIS — E53.8 B12 DEFICIENCY: ICD-10-CM

## 2020-09-09 DIAGNOSIS — E55.9 VITAMIN D DEFICIENCY: ICD-10-CM

## 2020-09-09 PROCEDURE — 99214 OFFICE O/P EST MOD 30 MIN: CPT | Performed by: PHYSICIAN ASSISTANT

## 2020-09-09 PROCEDURE — 96372 THER/PROPH/DIAG INJ SC/IM: CPT | Performed by: PHYSICIAN ASSISTANT

## 2020-09-09 RX ORDER — INSULIN ASPART 100 [IU]/ML
15 INJECTION, SUSPENSION SUBCUTANEOUS
Qty: 450 UNITS | Refills: 5 | Status: SHIPPED | OUTPATIENT
Start: 2020-09-09 | End: 2020-09-10 | Stop reason: SDUPTHER

## 2020-09-09 RX ORDER — CYANOCOBALAMIN 1000 UG/ML
1000 INJECTION INTRAMUSCULAR; SUBCUTANEOUS ONCE
Status: COMPLETED | OUTPATIENT
Start: 2020-09-09 | End: 2020-09-09

## 2020-09-09 RX ORDER — CYANOCOBALAMIN (VITAMIN B-12) 500 MCG
1000 TABLET ORAL DAILY
Qty: 60 TABLET | Refills: 3 | Status: SHIPPED | OUTPATIENT
Start: 2020-09-09 | End: 2020-10-13

## 2020-09-09 RX ORDER — ERGOCALCIFEROL 1.25 MG/1
50000 CAPSULE ORAL WEEKLY
Qty: 8 CAPSULE | Refills: 0 | Status: SHIPPED | OUTPATIENT
Start: 2020-09-09 | End: 2021-01-19 | Stop reason: ALTCHOICE

## 2020-09-09 RX ADMIN — CYANOCOBALAMIN 1000 MCG: 1000 INJECTION INTRAMUSCULAR; SUBCUTANEOUS at 12:11

## 2020-09-09 NOTE — ASSESSMENT & PLAN NOTE
Reviewed labs done in the last few months, and noted that her vitamin-D level was 16  I will send in prescription vitamin D to take once a week x8 weeks, then she should be taking daily OTC vitamin D3 4000 International Units  We discussed that vitamin-D deficiency can lead to poorer outcomes in MS patients

## 2020-09-09 NOTE — PROGRESS NOTES
Patient ID: Monica Talbot is a 62 y o  female  Assessment/Plan:    MS (multiple sclerosis) ([facility] )  Patient with MS recently diagnosed within the last 6 months  She started Betaseron about 1 month ago and took her 1st full dose injection last night after the titration  Unfortunately, she has been having some flu-like symptoms with the medication, especially with the full dose last night  She was also told to stop taking NSAIDs by the hospital and PCP due to she was admitted last week for hypertensive urgency  She premedicated last night's injection with Tylenol and also took some this morning  She is having some residual joint pain today  We discussed trying to continue with the Betaseron, as the flu like symptoms should get better over time  She is due for another full dose injection tomorrow  I told her that since her BP is currently controlled, she can take only 200 mg of ibuprofen along with her Tylenol as a premed for her Betaseron tomorrow night  Otherwise, she should be avoiding NSAIDs and continue with Tylenol only  She will continue to follow with her PCP for BP management  She has not had any new focal symptoms  Her last imaging was in June 2020  Would suggest repeating MRI brain and C-spine before the end of 2020 to ensure stability on the Betaseron  Will check CBC and CMP in 2 months  Her neurologic exam is stable today, if not improved from prior when 1st diagnosed  I would like to see her back in 2 months or sooner if needed  She was advised to call the office for any new or worsening symptoms, or if she continues to have issues with Betaseron  Vitamin D deficiency  Reviewed labs done in the last few months, and noted that her vitamin-D level was 16  I will send in prescription vitamin D to take once a week x8 weeks, then she should be taking daily OTC vitamin D3 4000 International Units    We discussed that vitamin-D deficiency can lead to poorer outcomes in MS patients  B12 deficiency  B12 level was checked within the last few months and low at 172  I gave her a B12 injection in the office today, I would like her to then continue oral B12 supplement 1000 mcg daily and recheck her level in 2 months  If the level cannot be maintained with oral B12 alone, she may require ongoing injections  Diagnoses and all orders for this visit:    MS (multiple sclerosis) ([facility] )  -     CBC and differential; Future  -     Comprehensive metabolic panel; Future    B12 deficiency  -     cyanocobalamin injection 1,000 mcg  -     vitamin B-12 (CYANOCOBALAMIN) 500 MCG TABS; Take 2 tablets (1,000 mcg total) by mouth daily  -     Vitamin B12; Future    Vitamin D deficiency  -     ergocalciferol (VITAMIN D2) 50,000 units; Take 1 capsule (50,000 Units total) by mouth once a week  -     Vitamin D 25 hydroxy; Future           Subjective:    HPI    Patient is a 64 yo female who presents to the [place] [id number] for follow up regarding recent MS diagnosis  Patient with PMH of HTN, uncontrolled DM type 2  Patient was initially seen in April 2020 following a hospitalization  Patient described developing right-sided numbness and weakness acutely on March 9, 2020 in the morning before leaving for work  She was assessed by neurology while in the hospital   MRI C-spine WO contrast 3/10/2020: There is focal intramedullary T2 hyperintense signal epicentered at the C4 vertebral level  This is more pronounced in the right and central hemicord  The remaining mid to lower cervical cord is more difficult to assess due to the image degradation  There may be additional right hemicord signal abnormality at C5 and C6  MRI c-spine W contrast 3/11/2020: Postcontrast imaging demonstrates enhancement associated with multiple previously described foci of T2 prolongation within the cervical and upper thoracic CORD  MRI brain WO contrast 3/10/2020:  There is no discrete mass, mass effect or midline shift  There is no intracranial hemorrhage  There is no evidence of acute infarction and diffusion imaging is unremarkable  Multiple foci of T2 and FLAIR hyperintensity are present within the supratentorial white matter, most pronounced in the anterolateral left frontal lobe, anterior right temporal lobe and right cerebellum/middle cerebellar peduncle  MRI brain W contrast 3/11/2020: Redemonstration of multiple supratentorial and infratentorial scattered areas of white matter FLAIR signal hyperintensity, as described above  Many of the lesions demonstrate a perpendicular configuration of the ventricles and lesions are seen involving the callosal septal interface  Imaging appearance is most suggestive of demyelinating disease/multiple sclerosis with areas of active demyelination as correlated with dedicated MRI of the cervical spine  Concern for MS vs neoplasm vs sarcoid  CT chest abdomen pelvis was unremarkable for any solid tumor, although did demonstrate some calcified lymph nodes and granulomata in the lung fields  Patient had completed CSF analysis was for increased cells: CSF protein 80, CSF white blood cell 37, lymphocyte predominant, JCV and ACE negative, meningitis panel negative, MS panel high IgG index, 3 OCB, flow cytometry hypercellular  Serum NMO/MOG negative, ANCA/DNA/Sjogren's negative,ESR 32, CRP 9 1  She was given IV steroids and oral steroid taper  Patient had repeat imaging in June 2020:   MRI brain June 12, 2020: There are several new enhancing demyelinating lesions identified compatible with active demyelination in the setting of multiple sclerosis  On the prior study, there were several enhancing lesions which no longer enhance  MRI C-spine June 12, 2020: Overall improved appearance of demyelinating disease with no new lesions identified   There are enhancing lesions again identified although overall, there are less enhancing lesions and the enhancing lesions have decreased in intensity from the prior study  At timing of visit on 7/7/2020, diagnosis of MS was made  Betaseron was initiated, with first injection 7/24/2020  At first, she was having flu-like symptoms with the titration, but patient was advised to pre-med with NSAIDs/Tylenol and warm showers, and this improved her symptoms  Today, patient reports she is not feeling so great  Last night she took her first full dose of Betaseron and had chills, body aches, joint pain  She was hospitalized last week for elevated blood pressure and her medications were adjusted  She was asked to avoid NSAIDs, as this was proposed to be a potential cause for elevated BP  She premedicated her injection last night with Tylenol 500 mg x 2  She is still having some joint pains today  She denies any new, focal neurologic symptoms  I did go back and review some prior labs and her vitamin-D level was low at 16 and B12 low at 172  She says this was never addressed to her knowledge and she is not taking any supplements  The following portions of the patient's history were reviewed and updated as appropriate: current medications, past family history, past medical history, past social history, past surgical history and problem list          Objective:    Blood pressure 124/68, pulse 76, temperature 97 9 °F (36 6 °C), weight 131 kg (288 lb)    Physical Exam  Constitutional:       Appearance: Normal appearance  She is well-developed  HENT:      Head: Normocephalic and atraumatic  Eyes:      Extraocular Movements: EOM normal       Pupils: Pupils are equal, round, and reactive to light  Skin:     General: Skin is warm and dry  Neurological:      Mental Status: She is alert  Coordination: Coordination is intact  Deep Tendon Reflexes: Reflexes are normal and symmetric     Psychiatric:         Mood and Affect: Mood normal          Speech: Speech normal          Behavior: Behavior normal          Neurological Exam  Mental Status  Alert  Oriented to person, place, time and situation  Speech is normal  Language is fluent with no aphasia  Attention and concentration are normal     Cranial Nerves  CN II: Visual fields full to confrontation  CN III, IV, VI: Extraocular movements intact bilaterally  Pupils equal round and reactive to light bilaterally  CN V: Facial sensation is normal   CN VII: Full and symmetric facial movement  CN VIII: Hearing is normal   CN IX, X: Palate elevates symmetrically  CN XI: Shoulder shrug strength is normal   CN XII: Tongue midline without atrophy or fasciculations  Motor   Normal muscle tone  Strength is 5/5 in all four extremities except as noted  R hip flexion 5-/5  Sensory  Sensation is intact to light touch, pinprick, vibration and proprioception in all four extremities  Reflexes  Deep tendon reflexes are 2+ and symmetric in all four extremities with downgoing toes bilaterally  Coordination  Finger-to-nose, rapid alternating movements and heel-to-shin normal bilaterally without dysmetria  Gait  Casual gait is normal including stance, stride, and arm swing  Timed 25 ft walk with rolling walker: 10 35 seconds  ROS:    Review of Systems   Constitutional: Negative  Negative for appetite change and fever  HENT: Negative  Negative for hearing loss, tinnitus, trouble swallowing and voice change  Eyes: Negative  Negative for photophobia and pain  Respiratory: Negative  Negative for shortness of breath  Cardiovascular: Negative  Negative for palpitations  Gastrointestinal: Negative  Negative for nausea and vomiting  Endocrine: Negative  Negative for cold intolerance  Genitourinary: Negative  Negative for dysuria, frequency and urgency  Musculoskeletal: Negative  Negative for myalgias and neck pain  Hip pain   Skin: Negative  Negative for rash  Neurological: Negative    Negative for dizziness, tremors, seizures, syncope, facial asymmetry, speech difficulty, weakness, light-headedness, numbness and headaches  Difficulty walking, balance problems   Hematological: Negative  Does not bruise/bleed easily  Psychiatric/Behavioral: Negative  Negative for confusion, hallucinations and sleep disturbance       I personally reviewed and updated the ROS as appropriate

## 2020-09-09 NOTE — TELEPHONE ENCOUNTER
Needs refill on novolin 70/30 called into [facility] [place]     Last seen 6-8-2020 and 9-  Thank you

## 2020-09-09 NOTE — ASSESSMENT & PLAN NOTE
B12 level was checked within the last few months and low at 172  I gave her a B12 injection in the office today, I would like her to then continue oral B12 supplement 1000 mcg daily and recheck her level in 2 months  If the level cannot be maintained with oral B12 alone, she may require ongoing injections

## 2020-09-09 NOTE — TELEPHONE ENCOUNTER
pt's son Buzz Ricardo called and states that pt told him that someone from our office told her that she could drive  he states that this was about 2-3 weeks ago at a telephone visit  he states that she has not been driving  they are concerned about her driving with her reaction time  i do not see any notation about driving in today office note or recent encounters  i made him aware that i do not see any notation regarding driving  he is not on communication consent  i advised that he speak to pt to see who she thinks told her this or who she had a recent televisit with

## 2020-09-09 NOTE — ASSESSMENT & PLAN NOTE
Patient with MS recently diagnosed within the last 6 months  She started Betaseron about 1 month ago and took her 1st full dose injection last night after the titration  Unfortunately, she has been having some flu-like symptoms with the medication, especially with the full dose last night  She was also told to stop taking NSAIDs by the hospital and PCP due to she was admitted last week for hypertensive urgency  She premedicated last night's injection with Tylenol and also took some this morning  She is having some residual joint pain today  We discussed trying to continue with the Betaseron, as the flu like symptoms should get better over time  She is due for another full dose injection tomorrow  I told her that since her BP is currently controlled, she can take only 200 mg of ibuprofen along with her Tylenol as a premed for her Betaseron tomorrow night  Otherwise, she should be avoiding NSAIDs and continue with Tylenol only  She will continue to follow with her PCP for BP management  She has not had any new focal symptoms  Her last imaging was in June 2020  Would suggest repeating MRI brain and C-spine before the end of 2020 to ensure stability on the Betaseron  Will check CBC and CMP in 2 months  Her neurologic exam is stable today, if not improved from prior when 1st diagnosed  I would like to see her back in 2 months or sooner if needed  She was advised to call the office for any new or worsening symptoms, or if she continues to have issues with Betaseron

## 2020-09-09 NOTE — PATIENT INSTRUCTIONS
For MS:  Continue Betaseron  Continue to pre-medicate with Tylenol  Before tour next full dose injection, I am ok with you taking 200mg of ibuprofen prior to the shot    Otherwise, avoid the NSAIDs and continue Tylenol    Continue to monitor blood pressure and report numbers to your PCP for management    B12 deficiency noted on labs:  -B12 shot in the office today  -then take OTC b12 1000mcg daily (I sent the Rx to your pharmacy, it may or may not be covered)    Vit D deficiency noted on labs:  -I sent in prescription vit D that you will take ONCE A WEEK for 8 weeks  -once that is finished, would take OTC vitamin D3 4,000IU daily (can buy over the counter)    Repeat labs in 2 months prior to our next visit     Return in 2 months  Call for any new symptoms

## 2020-09-10 DIAGNOSIS — E11.65 TYPE 2 DIABETES MELLITUS WITH HYPERGLYCEMIA, WITH LONG-TERM CURRENT USE OF INSULIN (HCC): Primary | ICD-10-CM

## 2020-09-10 DIAGNOSIS — Z79.4 TYPE 2 DIABETES MELLITUS WITH HYPERGLYCEMIA, WITH LONG-TERM CURRENT USE OF INSULIN (HCC): ICD-10-CM

## 2020-09-10 DIAGNOSIS — Z79.4 TYPE 2 DIABETES MELLITUS WITH HYPERGLYCEMIA, WITH LONG-TERM CURRENT USE OF INSULIN (HCC): Primary | ICD-10-CM

## 2020-09-10 DIAGNOSIS — E11.65 TYPE 2 DIABETES MELLITUS WITH HYPERGLYCEMIA, WITH LONG-TERM CURRENT USE OF INSULIN (HCC): ICD-10-CM

## 2020-09-10 RX ORDER — INSULIN ASPART 100 [IU]/ML
15 INJECTION, SUSPENSION SUBCUTANEOUS
Qty: 450 UNITS | Refills: 5 | Status: SHIPPED | OUTPATIENT
Start: 2020-09-10 | End: 2020-09-10

## 2020-09-17 ENCOUNTER — TELEPHONE (OUTPATIENT)
Dept: ENDOCRINOLOGY | Facility: CLINIC | Age: 60
End: 2020-09-17

## 2020-09-17 DIAGNOSIS — E11.65 TYPE 2 DIABETES MELLITUS WITH HYPERGLYCEMIA, WITH LONG-TERM CURRENT USE OF INSULIN (HCC): Primary | ICD-10-CM

## 2020-09-17 DIAGNOSIS — Z79.4 TYPE 2 DIABETES MELLITUS WITH HYPERGLYCEMIA, WITH LONG-TERM CURRENT USE OF INSULIN (HCC): Primary | ICD-10-CM

## 2020-09-17 RX ORDER — INSULIN ASPART 100 [IU]/ML
INJECTION, SUSPENSION SUBCUTANEOUS
Qty: 10 ML | Refills: 5 | Status: SHIPPED | OUTPATIENT
Start: 2020-09-17 | End: 2021-01-19 | Stop reason: ALTCHOICE

## 2020-09-17 NOTE — TELEPHONE ENCOUNTER
Patient called because she picked up a Rx at [facility] for Novolog 70/30, but the wrongs instructions were written on the Rx  It said to take 15 units daily and she takes 20 units with breakfast and 10 units with dinner  I called [facility] to find out what was going on since we had sent a Rx in for Novolin 70/30  They stated that her insurance will not cover Novolin 70/30  They will cover Novolog 70/30 and that's what they filled  Called the patient and notified her  She has no copay at the pharmacy for the Novolog 70/30  Can patient be switched to Novolog 70/30? We will need to send updated Rx to the pharmacy with current instructions  Please advise

## 2020-09-18 ENCOUNTER — TELEPHONE (OUTPATIENT)
Dept: ENDOCRINOLOGY | Facility: CLINIC | Age: 60
End: 2020-09-18

## 2020-09-22 ENCOUNTER — TELEMEDICINE (OUTPATIENT)
Dept: ENDOCRINOLOGY | Facility: CLINIC | Age: 60
End: 2020-09-22
Payer: COMMERCIAL

## 2020-09-22 DIAGNOSIS — E04.1 THYROID NODULE: ICD-10-CM

## 2020-09-22 DIAGNOSIS — I10 BENIGN ESSENTIAL HYPERTENSION: ICD-10-CM

## 2020-09-22 DIAGNOSIS — E21.3 HYPERPARATHYROIDISM (HCC): Primary | ICD-10-CM

## 2020-09-22 DIAGNOSIS — Z79.4 TYPE 2 DIABETES MELLITUS WITH HYPERGLYCEMIA, WITH LONG-TERM CURRENT USE OF INSULIN (HCC): ICD-10-CM

## 2020-09-22 DIAGNOSIS — E11.65 TYPE 2 DIABETES MELLITUS WITH HYPERGLYCEMIA, WITH LONG-TERM CURRENT USE OF INSULIN (HCC): ICD-10-CM

## 2020-09-22 DIAGNOSIS — E83.52 HYPERCALCEMIA: ICD-10-CM

## 2020-09-22 DIAGNOSIS — E55.9 VITAMIN D DEFICIENCY: ICD-10-CM

## 2020-09-22 PROCEDURE — 99213 OFFICE O/P EST LOW 20 MIN: CPT | Performed by: PHYSICIAN ASSISTANT

## 2020-09-22 PROCEDURE — 1036F TOBACCO NON-USER: CPT | Performed by: PHYSICIAN ASSISTANT

## 2020-09-22 NOTE — PROGRESS NOTES
Virtual Regular Visit      Assessment/Plan:    Problem List Items Addressed This Visit        Endocrine    Type 2 diabetes mellitus with hyperglycemia, with long-term current use of insulin (HCC)     A1C has improved, reports of home glucose monitoring is stable  Continue current regimen  Lab Results   Component Value Date    HGBA1C 7 9 (H) 06/27/2020            Relevant Medications    insulin NPH-insulin regular (NovoLIN 70/30) 100 units/mL subcutaneous injection    Other Relevant Orders    Hemoglobin A1C    Microalbumin / creatinine urine ratio    Hyperparathyroidism ([address] ) - Primary     Sensipar was not covered, but now hypercalcemia has improved  She was not interested in surgical treatment  Will continue to monitor  Ordered DEXA Scan  Relevant Orders    DXA bone density spine hip and pelvis    PTH, intact    Thyroid nodule     Encouraged her to completed the ultrasound ordered to better evaluate the abnormality seen on CT Scan  Cardiovascular and Mediastinum    Benign essential hypertension     This has improved  Other    Hypercalcemia     Stable/improved  Will monitor  Relevant Orders    Comprehensive metabolic panel    PTH, intact    Vitamin D deficiency     Continue supplements  Reason for visit is   Chief Complaint   Patient presents with    Virtual Regular Visit        Encounter provider Shannan Norman PA-C    Provider located at [address] [place] [address] [id number] [place], [place] [id number]      Recent Visits  Date Type Provider Dept   09/22/20 Telemedicine Shannan Norman PA-C [facility] [address]   Showing recent visits within past 7 days and meeting all other requirements     Future Appointments  No visits were found meeting these conditions     Showing future appointments within next 150 days and meeting all other requirements        The patient was identified by name and date of birth  Jorge Lara was informed that this is a telemedicine visit and that the visit is being conducted through telephone  My office door was closed  No one else was in the room  She acknowledged consent and understanding of privacy and security of the video platform  The patient has agreed to participate and understands they can discontinue the visit at any time  It was my intent to perform this visit via video technology but the patient was not able to do a video connection so the visit was completed via audio telephone only  Patient is aware this is a billable service  Subjective  Jorge Lara is a 62 y o  female  With history of Type 2 Diabetes, Hyperparathyroidism, and Vitamin D Deficiency   For the hyperparathyroidism, Sensipar prescribed in the past but was not covered and was too expensive  She was not interested in surgical intervention  Denies kidney stones/fractures  For the Diabetes,  She has been taking Novolin 70/30- 20 units before breakfast and 10 before dinner  BG mostly 100-140  No recent lows  No recent steroid use  UTD on foot exam      Recently had a high BP- Was told to start amlodipine 2 5mg    For Vitamin D Deficiency, taking supplements weekly  She was recently found to have a growth on thyroid on a CT Scan, -- ultrasound needs to be done and has been ordered by PCP  Does report dysphagia/choking  Denies FH Thyroid CA/Thyroid problems     TSH normal    HPI     Past Medical History:   Diagnosis Date    Diabetes mellitus ([facility] )     External hemorrhoids     last assessed 4/7/16, resolved 7/21/16    Hernia, ventral     last assessed 12/14/15, resolved 7/21/16    Hypertension     Incarcerated incisional hernia     last assessed 12/21/15, resolved 7/21/16    Insomnia     last assessed 12/8/16, resolved 10/9/17    Lyme disease     Morbid obesity with BMI of 45 0-49 9, adult ([facility] ) 5/3/2017    MS (multiple sclerosis) ([facility] )     Stroke (cerebrum) ([address] ) 2020    Type 2 diabetes mellitus with hyperglycemia, without long-term current use of insulin (HCC)        Past Surgical History:   Procedure Laterality Date    ABCESS DRAINAGE       SECTION      x3    COLONOSCOPY N/A 2017    Procedure: COLONOSCOPY with biopsy;  Surgeon: Liban Sierra DO;  Location: [facility];   Service: Complete    HYSTERECTOMY      IR LUMBAR PUNCTURE  3/13/2020       Current Outpatient Medications   Medication Sig Dispense Refill    acetaminophen (TYLENOL) 500 mg tablet Take 2 tablets (1,000 mg total) by mouth every 6 (six) hours as needed for moderate pain 60 tablet 1    insulin NPH-insulin regular (NovoLIN 70/30) 100 units/mL subcutaneous injection Inject under the skin 2 (two) times a day before meals 20 units before breakfast, 10 units before dinner (does not take if BG less than 100)      Alcohol Swabs PADS by Does not apply route 4 (four) times a day *Latex Free* 120 each 3    amLODIPine (NORVASC) 2 5 mg tablet Take 1 tablet (2 5 mg total) by mouth daily (Patient not taking: Reported on 2020) 30 tablet 0    aspirin 81 mg chewable tablet Chew 1 tablet (81 mg total) daily  0    atorvastatin (LIPITOR) 40 mg tablet Take 1 tablet (40 mg total) by mouth every evening 90 tablet 2    Blood Pressure KIT by Does not apply route daily 1 each 0    capsicum (ZOSTRIX) 0 075 % topical cream Apply topically 3 (three) times a day (Patient not taking: Reported on 2020) 28 3 g 0    ergocalciferol (VITAMIN D2) 50,000 units Take 1 capsule (50,000 Units total) by mouth once a week 8 capsule 0    gabapentin (NEURONTIN) 300 mg capsule TAKE 1 CAPSULE BY MOUTH THREE TIMES DAILY 90 capsule 0    glucose blood (RELION GLUCOSE TEST STRIPS) test strip 1 each by Other route 3 (three) times a day 120 each 5    glucose blood test strip Check blood glucose levels three times per day before meals 300 each 2    hydrochlorothiazide (HYDRODIURIL) 25 mg tablet Take 1 tablet (25 mg total) by mouth daily 90 tablet 0    insulin aspart protamine-insulin aspart (NovoLOG 70/30) 100 units/mL injection Inject 20 units breakfast & 10 units dinner (Patient not taking: Reported on 9/22/2020) 10 mL 5    Insulin Syringe-Needle U-100 (BD Insulin Syringe Ultrafine) 31G X 15/64" 0 3 ML MISC by Does not apply route 2 (two) times a day 200 each 1    lisinopril (ZESTRIL) 40 mg tablet Take 1 tablet (40 mg total) by mouth daily 90 tablet 0    metFORMIN (GLUCOPHAGE-XR) 500 mg 24 hr tablet Take 2 tablets (1,000 mg total) by mouth 2 (two) times a day with meals 360 tablet 1    metoprolol succinate (TOPROL-XL) 50 mg 24 hr tablet Take 1 tablet by mouth once daily 30 tablet 0    Misc  Devices (DIGITAL GLASS SCALE) MISC by Does not apply route daily 1 each 0    pantoprazole (PROTONIX) 40 mg tablet Take 1 tablet (40 mg total) by mouth daily before breakfast (Patient not taking: Reported on 6/18/2020) 30 tablet 0    vitamin B-12 (CYANOCOBALAMIN) 500 MCG TABS Take 2 tablets (1,000 mcg total) by mouth daily 60 tablet 3     No current facility-administered medications for this visit  Allergies   Allergen Reactions    Ambien [Zolpidem Tartrate]     Demerol [Meperidine]     Insulin     Insulin Glargine      [facility] - [id number]: memory loss    Latex     Oxycodone-Acetaminophen Vomiting    Zolpidem Hallucinations and Irritability       Review of Systems   Constitutional: Negative for activity change, appetite change, chills, diaphoresis, fatigue, fever and unexpected weight change  HENT: Positive for trouble swallowing  Negative for voice change  Eyes: Negative for visual disturbance  Respiratory: Negative for shortness of breath  Cardiovascular: Negative for chest pain and palpitations  Gastrointestinal: Negative for abdominal pain, constipation and diarrhea     Endocrine: Negative for cold intolerance, heat intolerance, polydipsia, polyphagia and polyuria  Genitourinary: Negative for frequency and menstrual problem  Musculoskeletal: Negative for arthralgias and myalgias  Skin: Negative for rash  Allergic/Immunologic: Negative for food allergies  Neurological: Negative for dizziness and tremors  Hematological: Negative for adenopathy  Psychiatric/Behavioral: Negative for sleep disturbance  All other systems reviewed and are negative  Video Exam    There were no vitals filed for this visit  Physical Exam   Unable by phone    I spent 15 minutes with patient today in which greater than 50% of the time was spent in counseling/coordination of care regarding reviewing lab results, CT Scan, plans for future lab testing and imaging testing,       VIRTUAL VISIT DISCLAIMER    Monica Talbot acknowledges that she has consented to an online visit or consultation  She understands that the online visit is based solely on information provided by her, and that, in the absence of a face-to-face physical evaluation by the physician, the diagnosis she receives is both limited and provisional in terms of accuracy and completeness  This is not intended to replace a full medical face-to-face evaluation by the physician  Madanyuri Antione understands and accepts these terms

## 2020-09-24 ENCOUNTER — PATIENT OUTREACH (OUTPATIENT)
Dept: FAMILY MEDICINE CLINIC | Facility: CLINIC | Age: 60
End: 2020-09-24

## 2020-09-24 NOTE — PROGRESS NOTES
Called patient to follow up but received her voicemail  Message was left to return my call  Will make further outreach attempts

## 2020-09-25 DIAGNOSIS — G57.93 NEUROPATHIC PAIN, LEG, BILATERAL: ICD-10-CM

## 2020-09-28 ENCOUNTER — PATIENT OUTREACH (OUTPATIENT)
Dept: FAMILY MEDICINE CLINIC | Facility: CLINIC | Age: 60
End: 2020-09-28

## 2020-09-28 RX ORDER — GABAPENTIN 300 MG/1
CAPSULE ORAL
Qty: 90 CAPSULE | Refills: 0 | Status: SHIPPED | OUTPATIENT
Start: 2020-09-28 | End: 2020-10-14 | Stop reason: SDUPTHER

## 2020-09-28 NOTE — ASSESSMENT & PLAN NOTE
A1C has improved, reports of home glucose monitoring is stable  Continue current regimen     Lab Results   Component Value Date    HGBA1C 7 9 (H) 06/27/2020

## 2020-09-28 NOTE — ASSESSMENT & PLAN NOTE
Encouraged her to completed the ultrasound ordered to better evaluate the abnormality seen on CT Scan

## 2020-09-28 NOTE — ASSESSMENT & PLAN NOTE
Sensipar was not covered, but now hypercalcemia has improved  She was not interested in surgical treatment  Will continue to monitor  Ordered DEXA Scan

## 2020-09-30 ENCOUNTER — PATIENT OUTREACH (OUTPATIENT)
Dept: FAMILY MEDICINE CLINIC | Facility: CLINIC | Age: 60
End: 2020-09-30

## 2020-09-30 NOTE — LETTER
Date: 10/29/20    Dear Jennifer Mota,   My name is ***; I am a registered nurse care manager working with [address] [place] [address]  [facility] [place]  [facility] [place] [address]  [phone]  I have not been able to reach you and would like to set a time that I can talk with you over the phone or in-person  My work is to help patients that have complex medical conditions get the care they need  This includes patients who may have been in the hospital or emergency room  I have enclosed information for you  Please call me with any questions you may have  I look forward to meeting with you    Sincerely,  ***  [phone]  Outpatient Care Manager  Copy:  (primary care physician name and address)

## 2020-09-30 NOTE — PROGRESS NOTES
Called patient but received her voicemail once again  I am sending the 'unable to reach' letter since this was my third failed attempt in trying to reach the patient

## 2020-09-30 NOTE — LETTER
Date: 09/30/20    Dear Kingsley Ospina,   My name is Shiva Barragan; I am a registered nurse care manager working with [employer]    I have not been able to reach you and would like to set a time that I can talk with you over the phone or in-person  My work is to help patients that have complex medical conditions get the care they need  This includes patients who may have been in the hospital or emergency room  Please call me with any questions you may have  I look forward to meeting with you    Sincerely,  Shiva Barragan, [address] [facility]  [phone]  Outpatient Care Manager

## 2020-10-05 ENCOUNTER — PATIENT OUTREACH (OUTPATIENT)
Dept: FAMILY MEDICINE CLINIC | Facility: CLINIC | Age: 60
End: 2020-10-05

## 2020-10-05 DIAGNOSIS — Z78.9 NEEDS ASSISTANCE WITH COMMUNITY RESOURCES: Primary | ICD-10-CM

## 2020-10-06 ENCOUNTER — PATIENT OUTREACH (OUTPATIENT)
Dept: FAMILY MEDICINE CLINIC | Facility: CLINIC | Age: 60
End: 2020-10-06

## 2020-10-06 ENCOUNTER — OFFICE VISIT (OUTPATIENT)
Dept: FAMILY MEDICINE CLINIC | Facility: CLINIC | Age: 60
End: 2020-10-06

## 2020-10-06 ENCOUNTER — HOSPITAL ENCOUNTER (OUTPATIENT)
Dept: ULTRASOUND IMAGING | Facility: HOSPITAL | Age: 60
Discharge: HOME | End: 2020-10-06
Payer: COMMERCIAL

## 2020-10-06 ENCOUNTER — HOSPITAL ENCOUNTER (OUTPATIENT)
Dept: BONE DENSITY | Facility: CLINIC | Age: 60
Discharge: HOME | End: 2020-10-06
Payer: COMMERCIAL

## 2020-10-06 VITALS
SYSTOLIC BLOOD PRESSURE: 124 MMHG | BODY MASS INDEX: 43.7 KG/M2 | DIASTOLIC BLOOD PRESSURE: 80 MMHG | WEIGHT: 279 LBS | HEART RATE: 79 BPM | OXYGEN SATURATION: 98 % | RESPIRATION RATE: 17 BRPM | TEMPERATURE: 97.1 F

## 2020-10-06 DIAGNOSIS — Z11.4 SCREENING FOR HIV (HUMAN IMMUNODEFICIENCY VIRUS): ICD-10-CM

## 2020-10-06 DIAGNOSIS — E04.1 THYROID NODULE: ICD-10-CM

## 2020-10-06 DIAGNOSIS — Z23 NEED FOR PNEUMOCOCCAL VACCINE: ICD-10-CM

## 2020-10-06 DIAGNOSIS — Z00.00 WELL ADULT EXAM: ICD-10-CM

## 2020-10-06 DIAGNOSIS — E21.3 HYPERPARATHYROIDISM (HCC): ICD-10-CM

## 2020-10-06 DIAGNOSIS — Z77.22 EXPOSURE TO SECOND HAND SMOKE: ICD-10-CM

## 2020-10-06 DIAGNOSIS — Z23 NEED FOR VACCINATION: Primary | ICD-10-CM

## 2020-10-06 DIAGNOSIS — E11.65 TYPE 2 DIABETES MELLITUS WITH HYPERGLYCEMIA, WITH LONG-TERM CURRENT USE OF INSULIN (HCC): ICD-10-CM

## 2020-10-06 DIAGNOSIS — Z79.4 TYPE 2 DIABETES MELLITUS WITH HYPERGLYCEMIA, WITH LONG-TERM CURRENT USE OF INSULIN (HCC): ICD-10-CM

## 2020-10-06 PROCEDURE — 90682 RIV4 VACC RECOMBINANT DNA IM: CPT

## 2020-10-06 PROCEDURE — 3725F SCREEN DEPRESSION PERFORMED: CPT | Performed by: FAMILY MEDICINE

## 2020-10-06 PROCEDURE — 90471 IMMUNIZATION ADMIN: CPT

## 2020-10-06 PROCEDURE — 1036F TOBACCO NON-USER: CPT | Performed by: FAMILY MEDICINE

## 2020-10-06 PROCEDURE — 76536 US EXAM OF HEAD AND NECK: CPT

## 2020-10-06 PROCEDURE — 99396 PREV VISIT EST AGE 40-64: CPT | Performed by: FAMILY MEDICINE

## 2020-10-06 PROCEDURE — 77080 DXA BONE DENSITY AXIAL: CPT

## 2020-10-08 ENCOUNTER — PATIENT OUTREACH (OUTPATIENT)
Dept: FAMILY MEDICINE CLINIC | Facility: CLINIC | Age: 60
End: 2020-10-08

## 2020-10-09 ENCOUNTER — PATIENT OUTREACH (OUTPATIENT)
Dept: FAMILY MEDICINE CLINIC | Facility: CLINIC | Age: 60
End: 2020-10-09

## 2020-10-11 ENCOUNTER — NURSE TRIAGE (OUTPATIENT)
Dept: OTHER | Facility: OTHER | Age: 60
End: 2020-10-11

## 2020-10-11 ENCOUNTER — TELEPHONE (OUTPATIENT)
Dept: OTHER | Facility: OTHER | Age: 60
End: 2020-10-11

## 2020-10-12 ENCOUNTER — PATIENT OUTREACH (OUTPATIENT)
Dept: FAMILY MEDICINE CLINIC | Facility: CLINIC | Age: 60
End: 2020-10-12

## 2020-10-12 DIAGNOSIS — E04.1 THYROID NODULE: Primary | ICD-10-CM

## 2020-10-13 ENCOUNTER — TELEPHONE (OUTPATIENT)
Dept: NEUROLOGY | Facility: CLINIC | Age: 60
End: 2020-10-13

## 2020-10-13 ENCOUNTER — PATIENT OUTREACH (OUTPATIENT)
Dept: FAMILY MEDICINE CLINIC | Facility: CLINIC | Age: 60
End: 2020-10-13

## 2020-10-13 DIAGNOSIS — E53.8 B12 DEFICIENCY: ICD-10-CM

## 2020-10-13 RX ORDER — CHOLECALCIFEROL (VITAMIN D3) 125 MCG
CAPSULE ORAL
Qty: 60 TABLET | Refills: 0 | Status: SHIPPED | OUTPATIENT
Start: 2020-10-13 | End: 2020-11-16

## 2020-10-14 ENCOUNTER — PATIENT OUTREACH (OUTPATIENT)
Dept: FAMILY MEDICINE CLINIC | Facility: CLINIC | Age: 60
End: 2020-10-14

## 2020-10-14 DIAGNOSIS — G57.93 NEUROPATHIC PAIN, LEG, BILATERAL: ICD-10-CM

## 2020-10-14 RX ORDER — GABAPENTIN 300 MG/1
600 CAPSULE ORAL 3 TIMES DAILY
Qty: 180 CAPSULE | Refills: 3 | Status: SHIPPED | OUTPATIENT
Start: 2020-10-14 | End: 2021-03-05 | Stop reason: SDUPTHER

## 2020-10-15 ENCOUNTER — PATIENT OUTREACH (OUTPATIENT)
Dept: FAMILY MEDICINE CLINIC | Facility: CLINIC | Age: 60
End: 2020-10-15

## 2020-10-22 ENCOUNTER — PATIENT OUTREACH (OUTPATIENT)
Dept: FAMILY MEDICINE CLINIC | Facility: CLINIC | Age: 60
End: 2020-10-22

## 2020-10-26 ENCOUNTER — TELEPHONE (OUTPATIENT)
Dept: NEUROLOGY | Facility: CLINIC | Age: 60
End: 2020-10-26

## 2020-10-26 ENCOUNTER — PATIENT OUTREACH (OUTPATIENT)
Dept: FAMILY MEDICINE CLINIC | Facility: CLINIC | Age: 60
End: 2020-10-26

## 2020-10-26 ENCOUNTER — OFFICE VISIT (OUTPATIENT)
Dept: NEUROLOGY | Facility: CLINIC | Age: 60
End: 2020-10-26
Payer: COMMERCIAL

## 2020-10-26 VITALS
SYSTOLIC BLOOD PRESSURE: 130 MMHG | DIASTOLIC BLOOD PRESSURE: 90 MMHG | HEIGHT: 67 IN | BODY MASS INDEX: 45.2 KG/M2 | TEMPERATURE: 97.7 F | WEIGHT: 288 LBS | HEART RATE: 90 BPM | RESPIRATION RATE: 16 BRPM

## 2020-10-26 DIAGNOSIS — R20.0 NUMBNESS AND TINGLING OF RIGHT ARM AND LEG: ICD-10-CM

## 2020-10-26 DIAGNOSIS — I63.439 CEREBROVASCULAR ACCIDENT (CVA) DUE TO EMBOLISM OF POSTERIOR CEREBRAL ARTERY, UNSPECIFIED BLOOD VESSEL LATERALITY (HCC): Primary | ICD-10-CM

## 2020-10-26 DIAGNOSIS — G57.93 NEUROPATHIC PAIN, LEG, BILATERAL: ICD-10-CM

## 2020-10-26 DIAGNOSIS — E11.65 TYPE 2 DIABETES MELLITUS WITH HYPERGLYCEMIA, WITH LONG-TERM CURRENT USE OF INSULIN (HCC): ICD-10-CM

## 2020-10-26 DIAGNOSIS — S14.159S: ICD-10-CM

## 2020-10-26 DIAGNOSIS — Z79.4 TYPE 2 DIABETES MELLITUS WITH HYPERGLYCEMIA, WITH LONG-TERM CURRENT USE OF INSULIN (HCC): ICD-10-CM

## 2020-10-26 DIAGNOSIS — R00.0 TACHYCARDIA: ICD-10-CM

## 2020-10-26 DIAGNOSIS — G35 MS (MULTIPLE SCLEROSIS) (HCC): ICD-10-CM

## 2020-10-26 DIAGNOSIS — R20.2 NUMBNESS AND TINGLING OF RIGHT ARM AND LEG: ICD-10-CM

## 2020-10-26 DIAGNOSIS — R26.2 AMBULATORY DYSFUNCTION: Chronic | ICD-10-CM

## 2020-10-26 PROCEDURE — 99214 OFFICE O/P EST MOD 30 MIN: CPT | Performed by: PSYCHIATRY & NEUROLOGY

## 2020-10-26 PROCEDURE — 3075F SYST BP GE 130 - 139MM HG: CPT | Performed by: PSYCHIATRY & NEUROLOGY

## 2020-10-26 PROCEDURE — 3080F DIAST BP >= 90 MM HG: CPT | Performed by: PSYCHIATRY & NEUROLOGY

## 2020-10-26 RX ORDER — DULOXETIN HYDROCHLORIDE 20 MG/1
20 CAPSULE, DELAYED RELEASE ORAL DAILY
Qty: 90 CAPSULE | Refills: 1 | Status: SHIPPED | OUTPATIENT
Start: 2020-10-26 | End: 2021-03-05 | Stop reason: SDUPTHER

## 2020-10-30 ENCOUNTER — TELEPHONE (OUTPATIENT)
Dept: NEUROLOGY | Facility: CLINIC | Age: 60
End: 2020-10-30

## 2020-10-30 DIAGNOSIS — R00.2 PALPITATIONS: ICD-10-CM

## 2020-10-30 DIAGNOSIS — R07.89 OTHER CHEST PAIN: ICD-10-CM

## 2020-11-02 DIAGNOSIS — R07.89 OTHER CHEST PAIN: ICD-10-CM

## 2020-11-02 DIAGNOSIS — R00.2 PALPITATIONS: ICD-10-CM

## 2020-11-02 RX ORDER — METOPROLOL SUCCINATE 50 MG/1
TABLET, EXTENDED RELEASE ORAL
Qty: 30 TABLET | Refills: 0 | Status: SHIPPED | OUTPATIENT
Start: 2020-11-02 | End: 2020-11-02 | Stop reason: SDUPTHER

## 2020-11-02 RX ORDER — METOPROLOL SUCCINATE 50 MG/1
50 TABLET, EXTENDED RELEASE ORAL DAILY
Qty: 30 TABLET | Refills: 2 | Status: SHIPPED | OUTPATIENT
Start: 2020-11-02 | End: 2021-01-07 | Stop reason: SDUPTHER

## 2020-11-04 ENCOUNTER — PATIENT OUTREACH (OUTPATIENT)
Dept: FAMILY MEDICINE CLINIC | Facility: CLINIC | Age: 60
End: 2020-11-04

## 2020-11-06 ENCOUNTER — PATIENT OUTREACH (OUTPATIENT)
Dept: FAMILY MEDICINE CLINIC | Facility: CLINIC | Age: 60
End: 2020-11-06

## 2020-11-09 ENCOUNTER — LAB (OUTPATIENT)
Dept: LAB | Facility: CLINIC | Age: 60
End: 2020-11-09
Payer: COMMERCIAL

## 2020-11-09 DIAGNOSIS — E83.52 HYPERCALCEMIA: ICD-10-CM

## 2020-11-09 DIAGNOSIS — E55.9 VITAMIN D DEFICIENCY: ICD-10-CM

## 2020-11-09 DIAGNOSIS — E21.3 HYPERPARATHYROIDISM (HCC): ICD-10-CM

## 2020-11-09 DIAGNOSIS — G35 MS (MULTIPLE SCLEROSIS) (HCC): ICD-10-CM

## 2020-11-09 DIAGNOSIS — Z79.4 TYPE 2 DIABETES MELLITUS WITH HYPERGLYCEMIA, WITH LONG-TERM CURRENT USE OF INSULIN (HCC): ICD-10-CM

## 2020-11-09 DIAGNOSIS — E53.8 B12 DEFICIENCY: ICD-10-CM

## 2020-11-09 DIAGNOSIS — Z11.4 SCREENING FOR HIV (HUMAN IMMUNODEFICIENCY VIRUS): ICD-10-CM

## 2020-11-09 DIAGNOSIS — E11.65 TYPE 2 DIABETES MELLITUS WITH HYPERGLYCEMIA, WITH LONG-TERM CURRENT USE OF INSULIN (HCC): ICD-10-CM

## 2020-11-09 LAB
25(OH)D3 SERPL-MCNC: 53.8 NG/ML (ref 30–100)
ALBUMIN SERPL BCP-MCNC: 3.6 G/DL (ref 3.5–5)
ALP SERPL-CCNC: 105 U/L (ref 46–116)
ALT SERPL W P-5'-P-CCNC: 25 U/L (ref 12–78)
ANION GAP SERPL CALCULATED.3IONS-SCNC: 5 MMOL/L (ref 4–13)
AST SERPL W P-5'-P-CCNC: 10 U/L (ref 5–45)
BASOPHILS # BLD AUTO: 0.03 THOUSANDS/ΜL (ref 0–0.1)
BASOPHILS NFR BLD AUTO: 0 % (ref 0–1)
BILIRUB SERPL-MCNC: 0.89 MG/DL (ref 0.2–1)
BUN SERPL-MCNC: 11 MG/DL (ref 5–25)
CALCIUM SERPL-MCNC: 11.6 MG/DL (ref 8.3–10.1)
CHLORIDE SERPL-SCNC: 105 MMOL/L (ref 100–108)
CO2 SERPL-SCNC: 29 MMOL/L (ref 21–32)
CREAT SERPL-MCNC: 0.68 MG/DL (ref 0.6–1.3)
CREAT UR-MCNC: 100 MG/DL
EOSINOPHIL # BLD AUTO: 0.5 THOUSAND/ΜL (ref 0–0.61)
EOSINOPHIL NFR BLD AUTO: 6 % (ref 0–6)
ERYTHROCYTE [DISTWIDTH] IN BLOOD BY AUTOMATED COUNT: 13.9 % (ref 11.6–15.1)
EST. AVERAGE GLUCOSE BLD GHB EST-MCNC: 151 MG/DL
GFR SERPL CREATININE-BSD FRML MDRD: 96 ML/MIN/1.73SQ M
GLUCOSE P FAST SERPL-MCNC: 134 MG/DL (ref 65–99)
HBA1C MFR BLD: 6.9 %
HCT VFR BLD AUTO: 39.6 % (ref 34.8–46.1)
HGB BLD-MCNC: 12 G/DL (ref 11.5–15.4)
IMM GRANULOCYTES # BLD AUTO: 0.02 THOUSAND/UL (ref 0–0.2)
IMM GRANULOCYTES NFR BLD AUTO: 0 % (ref 0–2)
LYMPHOCYTES # BLD AUTO: 2.52 THOUSANDS/ΜL (ref 0.6–4.47)
LYMPHOCYTES NFR BLD AUTO: 29 % (ref 14–44)
MCH RBC QN AUTO: 27.1 PG (ref 26.8–34.3)
MCHC RBC AUTO-ENTMCNC: 30.3 G/DL (ref 31.4–37.4)
MCV RBC AUTO: 89 FL (ref 82–98)
MICROALBUMIN UR-MCNC: 463 MG/L (ref 0–20)
MICROALBUMIN/CREAT 24H UR: 463 MG/G CREATININE (ref 0–30)
MONOCYTES # BLD AUTO: 0.64 THOUSAND/ΜL (ref 0.17–1.22)
MONOCYTES NFR BLD AUTO: 7 % (ref 4–12)
NEUTROPHILS # BLD AUTO: 4.92 THOUSANDS/ΜL (ref 1.85–7.62)
NEUTS SEG NFR BLD AUTO: 58 % (ref 43–75)
NRBC BLD AUTO-RTO: 0 /100 WBCS
PLATELET # BLD AUTO: 307 THOUSANDS/UL (ref 149–390)
PMV BLD AUTO: 10 FL (ref 8.9–12.7)
POTASSIUM SERPL-SCNC: 4 MMOL/L (ref 3.5–5.3)
PROT SERPL-MCNC: 7.5 G/DL (ref 6.4–8.2)
PTH-INTACT SERPL-MCNC: 78.3 PG/ML (ref 18.4–80.1)
RBC # BLD AUTO: 4.43 MILLION/UL (ref 3.81–5.12)
SODIUM SERPL-SCNC: 139 MMOL/L (ref 136–145)
VIT B12 SERPL-MCNC: 548 PG/ML (ref 100–900)
WBC # BLD AUTO: 8.63 THOUSAND/UL (ref 4.31–10.16)

## 2020-11-09 PROCEDURE — 36415 COLL VENOUS BLD VENIPUNCTURE: CPT

## 2020-11-09 PROCEDURE — 82306 VITAMIN D 25 HYDROXY: CPT

## 2020-11-09 PROCEDURE — 82043 UR ALBUMIN QUANTITATIVE: CPT

## 2020-11-09 PROCEDURE — 83036 HEMOGLOBIN GLYCOSYLATED A1C: CPT

## 2020-11-09 PROCEDURE — 3044F HG A1C LEVEL LT 7.0%: CPT | Performed by: PHYSICIAN ASSISTANT

## 2020-11-09 PROCEDURE — 83970 ASSAY OF PARATHORMONE: CPT

## 2020-11-09 PROCEDURE — 85025 COMPLETE CBC W/AUTO DIFF WBC: CPT

## 2020-11-09 PROCEDURE — 82607 VITAMIN B-12: CPT

## 2020-11-09 PROCEDURE — 3062F POS MACROALBUMINURIA REV: CPT | Performed by: PHYSICIAN ASSISTANT

## 2020-11-09 PROCEDURE — 87389 HIV-1 AG W/HIV-1&-2 AB AG IA: CPT

## 2020-11-09 PROCEDURE — 3044F HG A1C LEVEL LT 7.0%: CPT | Performed by: FAMILY MEDICINE

## 2020-11-09 PROCEDURE — 82570 ASSAY OF URINE CREATININE: CPT

## 2020-11-09 PROCEDURE — 3062F POS MACROALBUMINURIA REV: CPT | Performed by: FAMILY MEDICINE

## 2020-11-09 PROCEDURE — 80053 COMPREHEN METABOLIC PANEL: CPT

## 2020-11-10 ENCOUNTER — TELEPHONE (OUTPATIENT)
Dept: FAMILY MEDICINE CLINIC | Facility: CLINIC | Age: 60
End: 2020-11-10

## 2020-11-10 ENCOUNTER — OFFICE VISIT (OUTPATIENT)
Dept: NEUROLOGY | Facility: CLINIC | Age: 60
End: 2020-11-10
Payer: COMMERCIAL

## 2020-11-10 VITALS
HEART RATE: 74 BPM | BODY MASS INDEX: 43.79 KG/M2 | DIASTOLIC BLOOD PRESSURE: 93 MMHG | SYSTOLIC BLOOD PRESSURE: 129 MMHG | TEMPERATURE: 98.2 F | WEIGHT: 279 LBS | HEIGHT: 67 IN

## 2020-11-10 DIAGNOSIS — R06.83 SNORING: ICD-10-CM

## 2020-11-10 DIAGNOSIS — E53.8 B12 DEFICIENCY: ICD-10-CM

## 2020-11-10 DIAGNOSIS — G35 MS (MULTIPLE SCLEROSIS) (HCC): Primary | ICD-10-CM

## 2020-11-10 DIAGNOSIS — E55.9 VITAMIN D DEFICIENCY: ICD-10-CM

## 2020-11-10 DIAGNOSIS — E04.1 THYROID NODULE: Primary | ICD-10-CM

## 2020-11-10 LAB — HIV 1+2 AB+HIV1 P24 AG SERPL QL IA: NORMAL

## 2020-11-10 PROCEDURE — 1036F TOBACCO NON-USER: CPT | Performed by: PHYSICIAN ASSISTANT

## 2020-11-10 PROCEDURE — 99214 OFFICE O/P EST MOD 30 MIN: CPT | Performed by: PHYSICIAN ASSISTANT

## 2020-11-10 PROCEDURE — 3008F BODY MASS INDEX DOCD: CPT | Performed by: FAMILY MEDICINE

## 2020-11-10 PROCEDURE — 3008F BODY MASS INDEX DOCD: CPT | Performed by: PHYSICIAN ASSISTANT

## 2020-11-15 DIAGNOSIS — E53.8 B12 DEFICIENCY: ICD-10-CM

## 2020-11-16 RX ORDER — CHOLECALCIFEROL (VITAMIN D3) 125 MCG
CAPSULE ORAL
Qty: 60 TABLET | Refills: 0 | Status: SHIPPED | OUTPATIENT
Start: 2020-11-16 | End: 2020-11-16

## 2020-11-16 RX ORDER — CHOLECALCIFEROL (VITAMIN D3) 125 MCG
1000 CAPSULE ORAL DAILY
Qty: 60 TABLET | Refills: 5 | Status: SHIPPED | OUTPATIENT
Start: 2020-11-16 | End: 2021-06-30 | Stop reason: SDUPTHER

## 2020-11-27 DIAGNOSIS — E11.65 TYPE 2 DIABETES MELLITUS WITH HYPERGLYCEMIA, WITHOUT LONG-TERM CURRENT USE OF INSULIN (HCC): Chronic | ICD-10-CM

## 2020-11-27 DIAGNOSIS — I10 BENIGN ESSENTIAL HYPERTENSION: ICD-10-CM

## 2020-11-27 RX ORDER — METFORMIN HYDROCHLORIDE 500 MG/1
TABLET, EXTENDED RELEASE ORAL
Qty: 360 TABLET | Refills: 0 | Status: SHIPPED | OUTPATIENT
Start: 2020-11-27 | End: 2021-02-25

## 2020-11-29 DIAGNOSIS — I10 BENIGN ESSENTIAL HYPERTENSION: ICD-10-CM

## 2020-11-29 PROCEDURE — 4010F ACE/ARB THERAPY RXD/TAKEN: CPT | Performed by: PHYSICIAN ASSISTANT

## 2020-11-29 PROCEDURE — 4010F ACE/ARB THERAPY RXD/TAKEN: CPT | Performed by: FAMILY MEDICINE

## 2020-11-29 RX ORDER — LISINOPRIL 40 MG/1
40 TABLET ORAL DAILY
Qty: 90 TABLET | Refills: 0 | Status: SHIPPED | OUTPATIENT
Start: 2020-11-29 | End: 2021-01-07 | Stop reason: SDUPTHER

## 2020-11-29 RX ORDER — LISINOPRIL 40 MG/1
TABLET ORAL
Qty: 90 TABLET | Refills: 0 | Status: SHIPPED | OUTPATIENT
Start: 2020-11-29 | End: 2020-11-29 | Stop reason: SDUPTHER

## 2020-12-02 ENCOUNTER — PATIENT OUTREACH (OUTPATIENT)
Dept: FAMILY MEDICINE CLINIC | Facility: CLINIC | Age: 60
End: 2020-12-02

## 2020-12-17 DIAGNOSIS — I10 ESSENTIAL HYPERTENSION: Chronic | ICD-10-CM

## 2020-12-18 RX ORDER — HYDROCHLOROTHIAZIDE 25 MG/1
TABLET ORAL
Qty: 90 TABLET | Refills: 0 | Status: SHIPPED | OUTPATIENT
Start: 2020-12-18 | End: 2021-01-07 | Stop reason: SDUPTHER

## 2020-12-21 ENCOUNTER — PATIENT OUTREACH (OUTPATIENT)
Dept: FAMILY MEDICINE CLINIC | Facility: CLINIC | Age: 60
End: 2020-12-21

## 2021-01-05 ENCOUNTER — PATIENT OUTREACH (OUTPATIENT)
Dept: FAMILY MEDICINE CLINIC | Facility: CLINIC | Age: 61
End: 2021-01-05

## 2021-01-05 NOTE — PROGRESS NOTES
I called the patient today to remind her of her PCP appointment, 1/7, which she was already aware  She states the visit will be over the phone because she was exposed to someone with Covid  She states she feels well, except for her usual fatigue, and does not plan on getting tested  Patient notes she is staying active by cooking, making more blankets, vacuuming and exercising by riding her stationary bike and lifting weights  She reports she has not had to use her insulin often as he blood sugars are very stable: morning fasting at 114; after lunch it ranges from 120-140 and at nighttime it is about 130  Patient states she has a Podiatry appointment on 1/7 (Dr Gita Wright) and is aware of her Neuro appointment 1/11  I will continue to follow

## 2021-01-07 ENCOUNTER — TELEMEDICINE (OUTPATIENT)
Dept: FAMILY MEDICINE CLINIC | Facility: CLINIC | Age: 61
End: 2021-01-07

## 2021-01-07 DIAGNOSIS — Z20.822 CLOSE EXPOSURE TO 2019 NOVEL CORONAVIRUS: ICD-10-CM

## 2021-01-07 DIAGNOSIS — R00.2 PALPITATIONS: ICD-10-CM

## 2021-01-07 DIAGNOSIS — I10 BENIGN ESSENTIAL HYPERTENSION: ICD-10-CM

## 2021-01-07 DIAGNOSIS — I10 ESSENTIAL HYPERTENSION: Primary | Chronic | ICD-10-CM

## 2021-01-07 DIAGNOSIS — R07.89 OTHER CHEST PAIN: ICD-10-CM

## 2021-01-07 PROCEDURE — 4010F ACE/ARB THERAPY RXD/TAKEN: CPT | Performed by: PHYSICIAN ASSISTANT

## 2021-01-07 PROCEDURE — G2012 BRIEF CHECK IN BY MD/QHP: HCPCS | Performed by: NURSE PRACTITIONER

## 2021-01-07 RX ORDER — LISINOPRIL 40 MG/1
40 TABLET ORAL DAILY
Qty: 90 TABLET | Refills: 1 | Status: SHIPPED | OUTPATIENT
Start: 2021-01-07 | End: 2021-03-01

## 2021-01-07 RX ORDER — HYDROCHLOROTHIAZIDE 25 MG/1
25 TABLET ORAL DAILY
Qty: 90 TABLET | Refills: 1 | Status: SHIPPED | OUTPATIENT
Start: 2021-01-07 | End: 2021-03-25

## 2021-01-07 RX ORDER — ATORVASTATIN CALCIUM 40 MG/1
40 TABLET, FILM COATED ORAL EVERY EVENING
Qty: 90 TABLET | Refills: 1 | Status: SHIPPED | OUTPATIENT
Start: 2021-01-07 | End: 2021-06-30 | Stop reason: SDUPTHER

## 2021-01-07 RX ORDER — METOPROLOL SUCCINATE 50 MG/1
50 TABLET, EXTENDED RELEASE ORAL DAILY
Qty: 90 TABLET | Refills: 1 | Status: SHIPPED | OUTPATIENT
Start: 2021-01-07 | End: 2021-06-30 | Stop reason: SDUPTHER

## 2021-01-07 NOTE — PROGRESS NOTES
accepts these terms  Virtual Brief Visit    Assessment/Plan:    Problem List Items Addressed This Visit        Cardiovascular and Mediastinum    Hypertension - Primary (Chronic)    Relevant Medications    lisinopril (ZESTRIL) 40 mg tablet    hydrochlorothiazide (HYDRODIURIL) 25 mg tablet    metoprolol succinate (TOPROL-XL) 50 mg 24 hr tablet    Benign essential hypertension    Relevant Medications    lisinopril (ZESTRIL) 40 mg tablet    hydrochlorothiazide (HYDRODIURIL) 25 mg tablet    metoprolol succinate (TOPROL-XL) 50 mg 24 hr tablet      Other Visit Diagnoses     Close exposure to 2019 novel coronavirus        Other chest pain        Relevant Medications    atorvastatin (LIPITOR) 40 mg tablet    metoprolol succinate (TOPROL-XL) 50 mg 24 hr tablet    Palpitations        Relevant Medications    metoprolol succinate (TOPROL-XL) 50 mg 24 hr tablet        Patient's sister was COVID positive and around patient  Patient has completed a 3 week quarantine and has not developed any sx  At this time she is able to come off of self isolation and does not require COVID swab at this time  She is aware to contact office with any change/ new sx  Recommend patient follow up in 2-3 months or sooner if needed  Reason for visit is   Chief Complaint   Patient presents with    COVID-19    Virtual Brief Visit        Encounter provider Bernard Carson    Provider located at [address] [place] [id number] [phone]    Recent Visits  No visits were found meeting these conditions  Showing recent visits within past 7 days and meeting all other requirements     Today's Visits  Date Type Provider Dept   01/07/21 Telemedicine Jerson Carson 49 today's visits and meeting all other requirements     Future Appointments  No visits were found meeting these conditions     Showing future appointments within next 150 days and meeting all other requirements        After connecting through telephone, the patient was identified by name and date of birth  Adelaida Tyler was informed that this is a telemedicine visit and that the visit is being conducted through telephone  My office door was closed  No one else was in the room  She acknowledged consent and understanding of privacy and security of the platform  The patient has agreed to participate and understands she can discontinue the visit at any time  It was my intention to perform this visit via video technology but the patient was not able to do a video connection so the visit was completed via telephone audio only  Patient is aware this is a billable service  Laura Cerda is a 62 y o  female who presents for this virtual visit for follow up of hypertension  She states blood pressures are stable and blood sugars are as well  She continues to take Betaseron every other day to treat sx 2/2 MS and is tolerating better than previously but still does need to lay down after taking the injection  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 )     External hemorrhoids     last assessed 4/7/16, resolved 7/21/16    Hernia, ventral     last assessed 12/14/15, resolved 7/21/16    Hypertension     Incarcerated incisional hernia     last assessed 12/21/15, resolved 7/21/16    Insomnia     last assessed 12/8/16, resolved 10/9/17    Lyme disease     Morbid obesity with BMI of 45 0-49 9, adult ([facility] ) 5/3/2017    MS (multiple sclerosis) ([facility] )     [facility]    Type 2 diabetes mellitus with hyperglycemia, without long-term current use of insulin (HCC)        Past Surgical History:   Procedure Laterality Date    ABCESS DRAINAGE       SECTION      x3    COLONOSCOPY N/A 2017    Procedure: COLONOSCOPY with biopsy;  Surgeon: Sheila Soliz DO;  Location: [facility];   Service: Complete    HYSTERECTOMY      IR LUMBAR PUNCTURE  3/13/2020       Current Outpatient Medications   Medication Sig Dispense Refill    acetaminophen (TYLENOL) 500 mg tablet Take 2 tablets (1,000 mg total) by mouth every 6 (six) hours as needed for moderate pain 60 tablet 1    Alcohol Swabs PADS by Does not apply route 4 (four) times a day *Latex Free* 120 each 3    amLODIPine (NORVASC) 2 5 mg tablet Take 1 tablet (2 5 mg total) by mouth daily (Patient not taking: Reported on 2020) 30 tablet 0    aspirin 81 mg chewable tablet Chew 1 tablet (81 mg total) daily  0    atorvastatin (LIPITOR) 40 mg tablet Take 1 tablet (40 mg total) by mouth every evening 90 tablet 1    Blood Pressure KIT by Does not apply route daily 1 each 0    capsicum (ZOSTRIX) 0 075 % topical cream Apply topically 3 (three) times a day (Patient not taking: Reported on 2020) 28 3 g 0    DULoxetine (CYMBALTA) 20 mg capsule Take 1 capsule (20 mg total) by mouth daily 90 capsule 1    ergocalciferol (VITAMIN D2) 50,000 units Take 1 capsule (50,000 Units total) by mouth once a week 8 capsule 0    gabapentin (NEURONTIN) 300 mg capsule Take 2 capsules (600 mg total) by mouth 3 (three) times a day (Patient taking differently: Take 600 mg by mouth 3 (three) times a day 2 tablets in the morning 1 tablet at 2 pm and 2 tablets at night) 180 capsule 3    glucose blood (RELION GLUCOSE TEST STRIPS) test strip 1 each by Other route 3 (three) times a day 120 each 5    glucose blood test strip Check blood glucose levels three times per day before meals 300 each 2    hydrochlorothiazide (HYDRODIURIL) 25 mg tablet Take 1 tablet (25 mg total) by mouth daily 90 tablet 1    insulin aspart protamine-insulin aspart (NovoLOG 70/30) 100 units/mL injection Inject 20 units breakfast & 10 units dinner 10 mL 5    insulin NPH-insulin regular (NovoLIN 70/30) 100 units/mL subcutaneous injection Inject under the skin 2 (two) times a day before meals 20 units before breakfast, 10 units before dinner (does not take if BG less than 100)      Insulin Syringe-Needle U-100 (BD Insulin Syringe Ultrafine) 31G X 15/64" 0 3 ML MISC by Does not apply route 2 (two) times a day 200 each 1    INTERFERON BETA-1B SC Inject 1 mL under the skin every other day      lisinopril (ZESTRIL) 40 mg tablet Take 1 tablet (40 mg total) by mouth daily 90 tablet 1    metFORMIN (GLUCOPHAGE-XR) 500 mg 24 hr tablet TAKE 2 TABLETS BY MOUTH TWICE DAILY WITH MEALS 360 tablet 0    metoprolol succinate (TOPROL-XL) 50 mg 24 hr tablet Take 1 tablet (50 mg total) by mouth daily 90 tablet 1    Misc  Devices (DIGITAL GLASS SCALE) MISC by Does not apply route daily 1 each 0    pantoprazole (PROTONIX) 40 mg tablet Take 1 tablet (40 mg total) by mouth daily before breakfast (Patient not taking: Reported on 6/18/2020) 30 tablet 0    vitamin B-12 (VITAMIN B-12) 500 mcg tablet Take 2 tablets (1,000 mcg total) by mouth daily 60 tablet 5     No current facility-administered medications for this visit  Allergies   Allergen Reactions    Ambien [Zolpidem Tartrate]     Demerol [Meperidine]     Insulin     Insulin Glargine      [facility] - [id number]: memory loss    Latex     Oxycodone-Acetaminophen Vomiting    Zolpidem Hallucinations and Irritability       Review of Systems   Constitutional: Negative for chills and fever  HENT: Negative for congestion, ear pain and sore throat  Eyes: Negative for pain and visual disturbance  Respiratory: Negative for cough and shortness of breath  Cardiovascular: Negative for chest pain, palpitations and leg swelling  Gastrointestinal: Negative for abdominal pain, diarrhea, nausea and vomiting  Genitourinary: Negative for dysuria and hematuria  Musculoskeletal: Positive for gait problem and myalgias  Negative for arthralgias and back pain  Skin: Negative for color change and rash  Neurological: Negative for dizziness, seizures, syncope and headaches  All other systems reviewed and are negative  There were no vitals filed for this visit  I spent 20 minutes directly with the patient during this visit    VIRTUAL VISIT DISCLAIMER    Cheryl Dominguez acknowledges that she has consented to an online visit or consultation  She understands that the online visit is based solely on information provided by her, and that, in the absence of a face-to-face physical evaluation by the physician, the diagnosis she receives is both limited and provisional in terms of accuracy and completeness  This is not intended to replace a full medical face-to-face evaluation by the physician  Cheryl Dominguez understands and accepts these terms

## 2021-01-11 ENCOUNTER — OFFICE VISIT (OUTPATIENT)
Dept: NEUROLOGY | Facility: CLINIC | Age: 61
End: 2021-01-11
Payer: COMMERCIAL

## 2021-01-11 VITALS
BODY MASS INDEX: 44.04 KG/M2 | WEIGHT: 281.2 LBS | SYSTOLIC BLOOD PRESSURE: 126 MMHG | DIASTOLIC BLOOD PRESSURE: 72 MMHG | HEART RATE: 60 BPM

## 2021-01-11 DIAGNOSIS — G35 MS (MULTIPLE SCLEROSIS) (HCC): Primary | ICD-10-CM

## 2021-01-11 PROBLEM — G37.9 CNS DEMYELINATION (HCC): Status: RESOLVED | Noted: 2020-04-02 | Resolved: 2021-01-11

## 2021-01-11 PROCEDURE — 3078F DIAST BP <80 MM HG: CPT | Performed by: PHYSICIAN ASSISTANT

## 2021-01-11 PROCEDURE — 99214 OFFICE O/P EST MOD 30 MIN: CPT | Performed by: PHYSICIAN ASSISTANT

## 2021-01-11 PROCEDURE — 3074F SYST BP LT 130 MM HG: CPT | Performed by: PHYSICIAN ASSISTANT

## 2021-01-11 NOTE — ASSESSMENT & PLAN NOTE
Patient with MS diagnosed about 6 months ago, now on Betaseron  She had initially not been tolerating the medication and it was held for some time, but has been back on consistently for the last 2-2 5 months and tolerating well  Labs from November 2020 with normal CBC and LFTs  She is taking B12 and D supplements and those levels were normal   Will repeat labs around March  Will update MRI brain and c-spine in March 2021  I had referred her to Sleep Medicine due to her excessive fatigue, snoring, and my concern for sleep apnea  She had to cancel her appointment last month but would like to reschedule  Her neurologic exam is stable  Her neurologic exam is stable today  She will return in 3 months or sooner if needed  She was advised to call for any new or worsening symptoms

## 2021-01-11 NOTE — PROGRESS NOTES
Patient ID: Giovanni Mayes is a 62 y o  female  Assessment/Plan:    MS (multiple sclerosis) ([facility] )  Patient with MS diagnosed about 6 months ago, now on Betaseron  She had initially not been tolerating the medication and it was held for some time, but has been back on consistently for the last 2-2 5 months and tolerating well  Labs from November 2020 with normal CBC and LFTs  She is taking B12 and D supplements and those levels were normal   Will repeat labs around March  Will update MRI brain and c-spine in March 2021  I had referred her to Sleep Medicine due to her excessive fatigue, snoring, and my concern for sleep apnea  She had to cancel her appointment last month but would like to reschedule  Her neurologic exam is stable  Her neurologic exam is stable today  She will return in 3 months or sooner if needed  She was advised to call for any new or worsening symptoms  Diagnoses and all orders for this visit:    MS (multiple sclerosis) ([facility] )  -     CBC and differential; Future  -     Comprehensive metabolic panel; Future  -     MRI brain MS wo and w contrast; Future  -     MRI cervical spine with and without contrast; Future           Subjective:    HPI    Patient is a 61 yo female who presents to the [place] [address] for follow up regarding recent MS diagnosis  Patient with PMH of HTN, uncontrolled DM type 2  She was last seen in November 2020  Patient was initially seen in April 2020 following a hospitalization  Patient described developing right-sided numbness and weakness acutely on March 9, 2020 in the morning before leaving for work  She was assessed by neurology while in the hospital   MRI C-spine WO contrast 3/10/2020: There is focal intramedullary T2 hyperintense signal epicentered at the C4 vertebral level  This is more pronounced in the right and central hemicord  The remaining mid to lower cervical cord is more difficult to assess due to the image degradation  There may be additional right hemicord signal abnormality at C5 and C6  MRI c-spine W contrast 3/11/2020: Postcontrast imaging demonstrates enhancement associated with multiple previously described foci of T2 prolongation within the cervical and upper thoracic CORD  MRI brain WO contrast 3/10/2020: There is no discrete mass, mass effect or midline shift  There is no intracranial hemorrhage  There is no evidence of acute infarction and diffusion imaging is unremarkable  Multiple foci of T2 and FLAIR hyperintensity are present within the supratentorial white matter, most pronounced in the anterolateral left frontal lobe, anterior right temporal lobe and right cerebellum/middle cerebellar peduncle  MRI brain W contrast 3/11/2020: Redemonstration of multiple supratentorial and infratentorial scattered areas of white matter FLAIR signal hyperintensity, as described above  Many of the lesions demonstrate a perpendicular configuration of the ventricles and lesions are seen involving the callosal septal interface  Imaging appearance is most suggestive of demyelinating disease/multiple sclerosis with areas of active demyelination as correlated with dedicated MRI of the cervical spine  Concern for MS vs neoplasm vs sarcoid  CT chest abdomen pelvis was unremarkable for any solid tumor, although did demonstrate some calcified lymph nodes and granulomata in the lung fields  Patient had completed CSF analysis was for increased cells: CSF protein 80, CSF white blood cell 37, lymphocyte predominant, JCV and ACE negative, meningitis panel negative, MS panel high IgG index, 3 OCB, flow cytometry hypercellular  Serum NMO/MOG negative, ANCA/DNA/Sjogren's negative, ESR 32, CRP 9 1  She was given IV steroids and oral steroid taper  Patient had repeat imaging in June 2020:   MRI brain June 12, 2020:  There are several new enhancing demyelinating lesions identified compatible with active demyelination in the setting of multiple sclerosis  On the prior study, there were several enhancing lesions which no longer enhance  MRI C-spine June 12, 2020: Overall improved appearance of demyelinating disease with no new lesions identified  There are enhancing lesions again identified although overall, there are less enhancing lesions and the enhancing lesions have decreased in intensity from the prior study  At timing of visit on 7/7/2020, diagnosis of MS was made  Betaseron was initiated, with first injection 7/24/2020  At first, she was having flu-like symptoms with the titration, but patient was advised to pre-med with NSAIDs/Tylenol and warm showers, and this improved her symptoms  She continued to have issues with the Betaseron  She called in mid October reporting ongoing issues, so Betaseron was held for 2-3 weeks  in November, she was doing better with the Betaseron  Today, patient reports she is overall doing well  I had referred her to sleep medicine due to excessive fatigue, snoring, concern for sleep apnea  She had to cancel that visit due to "a lot of COVID outbreaks in her family"  She has not rescheduled  She denies any new, focal symptoms  She is tolerating Betaseron well  Last labs were in November 2020--normal LFTs, CBC  B12 and Vit D normal       The following portions of the patient's history were reviewed and updated as appropriate: current medications, past family history, past medical history, past social history, past surgical history and problem list          Objective:    Blood pressure 126/72, pulse 60, weight 128 kg (281 lb 3 2 oz)    Physical Exam  Constitutional:       Appearance: Normal appearance  She is well-developed  HENT:      Head: Normocephalic and atraumatic  Eyes:      Extraocular Movements: EOM normal       Pupils: Pupils are equal, round, and reactive to light  Skin:     General: Skin is warm and dry  Neurological:      Mental Status: She is alert        Deep Tendon Reflexes: Reflexes are normal and symmetric  Psychiatric:         Mood and Affect: Mood normal          Speech: Speech normal          Behavior: Behavior normal          Neurological Exam  Mental Status  Alert  Oriented to person, place, time and situation  Speech is normal  Language is fluent with no aphasia  Attention and concentration are normal     Cranial Nerves  CN II: Visual fields full to confrontation  CN III, IV, VI: Extraocular movements intact bilaterally  Pupils equal round and reactive to light bilaterally  CN V: Facial sensation is normal   CN VII: Full and symmetric facial movement  CN VIII: Hearing is normal   CN IX, X: Palate elevates symmetrically  CN XI: Shoulder shrug strength is normal   CN XII: Tongue midline without atrophy or fasciculations  Motor   Normal muscle tone  Strength is 5/5 in all four extremities except as noted  Left hip flexor 5-/5  Sensory  Sensation is intact to light touch, pinprick, vibration and proprioception in all four extremities  Reflexes  Deep tendon reflexes are 2+ and symmetric in all four extremities with downgoing toes bilaterally  Coordination  Right: Finger-to-nose normal   Left: Finger-to-nose abnormality: Some past pointing, no dysmetria  Slower JIHAN on left compared to right   Gait  Casual gait is normal including stance, stride, and arm swing  ROS:    Review of Systems   Constitutional: Negative  Negative for appetite change and fever  HENT: Negative  Negative for hearing loss, tinnitus, trouble swallowing and voice change  Eyes: Negative  Negative for photophobia and pain  Respiratory: Negative  Negative for shortness of breath  Cardiovascular: Negative  Negative for palpitations  Gastrointestinal: Negative  Negative for nausea and vomiting  Endocrine: Negative  Negative for cold intolerance  Genitourinary: Negative  Negative for dysuria, frequency and urgency  Musculoskeletal: Negative    Negative for myalgias and neck pain  Skin: Negative  Negative for rash  Neurological: Negative  Negative for dizziness, tremors, seizures, syncope, facial asymmetry, speech difficulty, weakness, light-headedness, numbness and headaches  Hematological: Negative  Does not bruise/bleed easily  Psychiatric/Behavioral: Negative  Negative for confusion, hallucinations and sleep disturbance       I personally reviewed and updated the ROS as appropriate

## 2021-01-11 NOTE — PATIENT INSTRUCTIONS
Continue Betaseron  Will update MRI brain and cervical spine prior to next visit  Will update labs around March 2021  Make another appointment with sleep medicine for evaluation  Follow up in 3-4 months  Call for any new symptoms

## 2021-01-19 ENCOUNTER — TELEMEDICINE (OUTPATIENT)
Dept: ENDOCRINOLOGY | Facility: CLINIC | Age: 61
End: 2021-01-19
Payer: COMMERCIAL

## 2021-01-19 ENCOUNTER — TELEPHONE (OUTPATIENT)
Dept: LAB | Facility: HOSPITAL | Age: 61
End: 2021-01-19

## 2021-01-19 DIAGNOSIS — E83.52 HYPERCALCEMIA: Primary | ICD-10-CM

## 2021-01-19 DIAGNOSIS — Z79.4 TYPE 2 DIABETES MELLITUS WITH HYPERGLYCEMIA, WITH LONG-TERM CURRENT USE OF INSULIN (HCC): ICD-10-CM

## 2021-01-19 DIAGNOSIS — E21.3 HYPERPARATHYROIDISM (HCC): ICD-10-CM

## 2021-01-19 DIAGNOSIS — E11.65 TYPE 2 DIABETES MELLITUS WITH HYPERGLYCEMIA, WITH LONG-TERM CURRENT USE OF INSULIN (HCC): ICD-10-CM

## 2021-01-19 DIAGNOSIS — E04.1 THYROID NODULE: ICD-10-CM

## 2021-01-19 DIAGNOSIS — E55.9 VITAMIN D DEFICIENCY: ICD-10-CM

## 2021-01-19 PROCEDURE — 99213 OFFICE O/P EST LOW 20 MIN: CPT | Performed by: PHYSICIAN ASSISTANT

## 2021-01-19 PROCEDURE — 1036F TOBACCO NON-USER: CPT | Performed by: PHYSICIAN ASSISTANT

## 2021-01-19 NOTE — ASSESSMENT & PLAN NOTE
Calcium level higher on repeat testing in November  If consistently greater than 11 will treat with sensipar or consider surgery

## 2021-01-19 NOTE — ASSESSMENT & PLAN NOTE
A1C has improved to 6 9  She reports most blood sugar readings have been in the 90s to 100 and she is rarely using insulin  Will discontinue insulin from her medication list  Advised her to let us know if blood sugars are consistently above 150     Lab Results   Component Value Date    HGBA1C 6 9 (H) 11/09/2020

## 2021-01-19 NOTE — ASSESSMENT & PLAN NOTE
Ultrasound completed showed 2 nodules and 1 nodule met criteria for FNA  This was ordered by her PCP but not completed  I will re-order FNA with Jesus  She was given the phone number for central scheduling and she will call to set up appt after telephone call

## 2021-01-19 NOTE — ASSESSMENT & PLAN NOTE
Previously was not interested in surgery so sensipar was prescribed  She never started sensipar due to cost but calcium level improved  Now calcium level is higher again on 11/2020 labs  Repeat lab testing this week and will consider surgery/sensipar if calcium remains over 11

## 2021-01-19 NOTE — PROGRESS NOTES
Virtual Regular Visit      Assessment/Plan:    Problem List Items Addressed This Visit        Endocrine    Type 2 diabetes mellitus with hyperglycemia, with long-term current use of insulin ([facility])     A1C has improved to 6 9  She reports most blood sugar readings have been in the 90s to 100 and she is rarely using insulin  Will discontinue insulin from her medication list  Advised her to let us know if blood sugars are consistently above 150  Lab Results   Component Value Date    HGBA1C 6 9 (H) 11/09/2020            Hyperparathyroidism ([address] )     Previously was not interested in surgery so sensipar was prescribed  She never started sensipar due to cost but calcium level improved  Now calcium level is higher again on 11/2020 labs  Repeat lab testing this week and will consider surgery/sensipar if calcium remains over 11  Thyroid nodule     Ultrasound completed showed 2 nodules and 1 nodule met criteria for FNA  This was ordered by her PCP but not completed  I will re-order FNA with Bernabe  She was given the phone number for central scheduling and she will call to set up appt after telephone call  Relevant Orders    US guided thyroid biopsy with BERNABE       Other    Hypercalcemia - Primary     Calcium level higher on repeat testing in November  If consistently greater than 11 will treat with sensipar or consider surgery  Relevant Orders    PTH, intact Lab Collect Lab Collect    Phosphorus Lab Collect    Vitamin D deficiency     She is no longer taking supplements  Check Vitamin D level            Relevant Orders    Vitamin D 25 hydroxy Lab Collect               Reason for visit is   Chief Complaint   Patient presents with    Diabetes Type 2    Hyperparathyroidism    Virtual Regular Visit        Encounter provider Jackie Barkley PA-C    Provider located at [address] [place] [address] [place] [id number]      Recent Visits  No visits were found meeting these conditions  Showing recent visits within past 7 days and meeting all other requirements     Today's Visits  Date Type Provider Dept   01/19/21 Telemedicine Edward Weeks PA-C [facility] [address] today's visits and meeting all other requirements     Future Appointments  No visits were found meeting these conditions  Showing future appointments within next 150 days and meeting all other requirements        The patient was identified by name and date of birth  Adal Gomez was informed that this is a telemedicine visit and that the visit is being conducted through telephone  My office door was closed  No one else was in the room  She acknowledged consent and understanding of privacy and security of the video platform  The patient has agreed to participate and understands they can discontinue the visit at any time  It was my intent to perform this visit via video technology but the patient was not able to do a video connection so the visit was completed via audio telephone only  Patient is aware this is a billable service  Subjective  History of Present Illness:   Adal Gomez is a 62 y o  female with a history of Type 2 Diabetes, Hyperparathyroidism, and Vitamin D Deficiency       For the hyperparathyroidism, Sensipar prescribed in the past but was not covered and was too expensive  She was not interested in surgical intervention when previously discussed  Denies kidney stones/fractures  She had a DEXA scan which showed normal bone Density  She does keep well hydrated and drinks about 64 oz water per day       For the Diabetes,  She has not been taking insulin  She will rarely take it if blood sugars are over 150 but this was only when she ate a lot of cookies  Most readings are 90s to low 100s        For Vitamin D Deficiency, she is no longer taking supplements       She was recently found to have a growth on thyroid on a CT Scan  Ultrasound was completed and showed 2 thyroid nodules  1 nodule met criteria for FNA  FNA was ordered, but not performed  [facility]     Past Medical History:   Diagnosis Date    Diabetes mellitus ([facility] )     External hemorrhoids     last assessed 16, resolved 16    Hernia, ventral     last assessed 12/14/15, resolved 16    Hypertension     Incarcerated incisional hernia     last assessed 12/21/15, resolved 16    Insomnia     last assessed 16, resolved 10/9/17    Lyme disease     Morbid obesity with BMI of 45 0-49 9, adult ([place] [address] ) 5/3/2017    MS (multiple sclerosis) ([place] [address] )     Stroke (cerebrum) ([place] [address] ) 2020    Type 2 diabetes mellitus with hyperglycemia, without long-term current use of insulin (HCC)        Past Surgical History:   Procedure Laterality Date    ABCESS DRAINAGE       SECTION      x3    COLONOSCOPY N/A 2017    Procedure: COLONOSCOPY with biopsy;  Surgeon: Renzo Cooney DO;  Location: [facility];   Service: Complete    HYSTERECTOMY      IR LUMBAR PUNCTURE  3/13/2020       Current Outpatient Medications   Medication Sig Dispense Refill    acetaminophen (TYLENOL) 500 mg tablet Take 2 tablets (1,000 mg total) by mouth every 6 (six) hours as needed for moderate pain 60 tablet 1    Alcohol Swabs PADS by Does not apply route 4 (four) times a day *Latex Free* 120 each 3    aspirin 81 mg chewable tablet Chew 1 tablet (81 mg total) daily  0    atorvastatin (LIPITOR) 40 mg tablet Take 1 tablet (40 mg total) by mouth every evening 90 tablet 1    Blood Pressure KIT by Does not apply route daily 1 each 0    DULoxetine (CYMBALTA) 20 mg capsule Take 1 capsule (20 mg total) by mouth daily 90 capsule 1    gabapentin (NEURONTIN) 300 mg capsule Take 2 capsules (600 mg total) by mouth 3 (three) times a day (Patient taking differently: Take 600 mg by mouth 3 (three) times a day 2 tablets in the morning 1 tablet at 2 pm and 2 tablets at night) 180 capsule 3    glucose blood (RELION GLUCOSE TEST STRIPS) test strip 1 each by Other route 3 (three) times a day 120 each 5    glucose blood test strip Check blood glucose levels three times per day before meals 300 each 2    hydrochlorothiazide (HYDRODIURIL) 25 mg tablet Take 1 tablet (25 mg total) by mouth daily 90 tablet 1    Insulin Syringe-Needle U-100 (BD Insulin Syringe Ultrafine) 31G X 15/64" 0 3 ML MISC by Does not apply route 2 (two) times a day (Patient not taking: Reported on 1/11/2021) 200 each 1    INTERFERON BETA-1B SC Inject 1 mL under the skin every other day      lisinopril (ZESTRIL) 40 mg tablet Take 1 tablet (40 mg total) by mouth daily 90 tablet 1    metFORMIN (GLUCOPHAGE-XR) 500 mg 24 hr tablet TAKE 2 TABLETS BY MOUTH TWICE DAILY WITH MEALS 360 tablet 0    metoprolol succinate (TOPROL-XL) 50 mg 24 hr tablet Take 1 tablet (50 mg total) by mouth daily 90 tablet 1    Misc  Devices (DIGITAL GLASS SCALE) MISC by Does not apply route daily 1 each 0    vitamin B-12 (VITAMIN B-12) 500 mcg tablet Take 2 tablets (1,000 mcg total) by mouth daily 60 tablet 5     No current facility-administered medications for this visit  Allergies   Allergen Reactions    Ambien [Zolpidem Tartrate]     Demerol [Meperidine]     Insulin     Insulin Glargine      Annotation - [id number]: memory loss    Latex     Oxycodone-Acetaminophen Vomiting    Zolpidem Hallucinations and Irritability       Review of Systems   Constitutional: Negative for activity change, appetite change, chills, diaphoresis, fatigue, fever and unexpected weight change  HENT: Negative for trouble swallowing and voice change  Eyes: Negative for visual disturbance  Respiratory: Negative for shortness of breath  Cardiovascular: Negative for chest pain and palpitations  Gastrointestinal: Negative for abdominal pain, constipation and diarrhea     Endocrine: Negative for cold intolerance, heat intolerance, polydipsia, polyphagia and polyuria  Genitourinary: Negative for frequency and menstrual problem  Musculoskeletal: Positive for gait problem  Negative for arthralgias and myalgias  Skin: Negative for rash  Allergic/Immunologic: Negative for food allergies  Neurological: Negative for dizziness and tremors  Hematological: Negative for adenopathy  Psychiatric/Behavioral: Positive for decreased concentration (memory problems  )  Negative for sleep disturbance  All other systems reviewed and are negative  Video Exam    There were no vitals filed for this visit  Physical Exam   Unable by phone  Component      Latest Ref Rng & Units 11/9/2020   Sodium      136 - 145 mmol/L 139   Potassium      3 5 - 5 3 mmol/L 4 0   Chloride      100 - 108 mmol/L 105   CO2      21 - 32 mmol/L 29   Anion Gap      4 - 13 mmol/L 5   BUN      5 - 25 mg/dL 11   Creatinine      0 60 - 1 30 mg/dL 0 68   GLUCOSE FASTING      65 - 99 mg/dL 134 (H)   Calcium      8 3 - 10 1 mg/dL 11 6 (H)   AST      5 - 45 U/L 10   ALT      12 - 78 U/L 25   Alkaline Phosphatase      46 - 116 U/L 105   Total Protein      6 4 - 8 2 g/dL 7 5   Albumin      3 5 - 5 0 g/dL 3 6   TOTAL BILIRUBIN      0 20 - 1 00 mg/dL 0 89   eGFR      ml/min/1 73sq m 96   EXT Creatinine Urine      mg/dL 100 0   MICROALBUM ,U,RANDOM      0 0 - 20 0 mg/L 463 0 (H)   MICROALBUMIN/CREATININE RATIO      0 - 30 mg/g creatinine 463 (H)   Hemoglobin A1C      Normal 3 8-5 6%; PreDiabetic 5 7-6 4%;  Diabetic >=6 5%; Glycemic control for adults with diabetes <7 0% % 6 9 (H)   eAG, EST AVG Glucose      mg/dl 151   Vit D, 25-Hydroxy      30 0 - 100 0 ng/mL 53 8   PARATHYROID HORMONE      18 4 - 80 1 pg/mL 78 3     I spent 20 minutes with patient today in which greater than 50% of the time was spent in counseling/coordination of care regarding reviewing medication list, reviewing labs, reviewing dexa scan, reviewing thyroid ultrasound and need for biopsy, plan of care        VIRTUAL VISIT DISCLAIMER    Duane Ras acknowledges that she has consented to an online visit or consultation  She understands that the online visit is based solely on information provided by her, and that, in the absence of a face-to-face physical evaluation by the physician, the diagnosis she receives is both limited and provisional in terms of accuracy and completeness  This is not intended to replace a full medical face-to-face evaluation by the physician  Duane Ras understands and accepts these terms

## 2021-01-21 ENCOUNTER — APPOINTMENT (OUTPATIENT)
Dept: LAB | Facility: HOSPITAL | Age: 61
End: 2021-01-21
Payer: COMMERCIAL

## 2021-01-21 ENCOUNTER — TELEPHONE (OUTPATIENT)
Dept: NEUROLOGY | Facility: CLINIC | Age: 61
End: 2021-01-21

## 2021-01-21 DIAGNOSIS — G35 MS (MULTIPLE SCLEROSIS) (HCC): ICD-10-CM

## 2021-01-21 DIAGNOSIS — E55.9 VITAMIN D DEFICIENCY: ICD-10-CM

## 2021-01-21 DIAGNOSIS — E83.52 HYPERCALCEMIA: ICD-10-CM

## 2021-01-21 LAB
25(OH)D3 SERPL-MCNC: 30.6 NG/ML (ref 30–100)
ALBUMIN SERPL BCP-MCNC: 3.1 G/DL (ref 3.5–5)
ALP SERPL-CCNC: 101 U/L (ref 46–116)
ALT SERPL W P-5'-P-CCNC: 27 U/L (ref 12–78)
ANION GAP SERPL CALCULATED.3IONS-SCNC: 4 MMOL/L (ref 4–13)
AST SERPL W P-5'-P-CCNC: 15 U/L (ref 5–45)
BASOPHILS # BLD AUTO: 0.05 THOUSANDS/ΜL (ref 0–0.1)
BASOPHILS NFR BLD AUTO: 1 % (ref 0–1)
BILIRUB SERPL-MCNC: 0.79 MG/DL (ref 0.2–1)
BUN SERPL-MCNC: 15 MG/DL (ref 5–25)
CALCIUM ALBUM COR SERPL-MCNC: 11.5 MG/DL (ref 8.3–10.1)
CALCIUM SERPL-MCNC: 10.8 MG/DL (ref 8.3–10.1)
CHLORIDE SERPL-SCNC: 111 MMOL/L (ref 100–108)
CO2 SERPL-SCNC: 27 MMOL/L (ref 21–32)
CREAT SERPL-MCNC: 0.63 MG/DL (ref 0.6–1.3)
EOSINOPHIL # BLD AUTO: 0.22 THOUSAND/ΜL (ref 0–0.61)
EOSINOPHIL NFR BLD AUTO: 3 % (ref 0–6)
ERYTHROCYTE [DISTWIDTH] IN BLOOD BY AUTOMATED COUNT: 13.9 % (ref 11.6–15.1)
GFR SERPL CREATININE-BSD FRML MDRD: 98 ML/MIN/1.73SQ M
GLUCOSE P FAST SERPL-MCNC: 119 MG/DL (ref 65–99)
HCT VFR BLD AUTO: 36.3 % (ref 34.8–46.1)
HGB BLD-MCNC: 11.3 G/DL (ref 11.5–15.4)
IMM GRANULOCYTES # BLD AUTO: 0.01 THOUSAND/UL (ref 0–0.2)
IMM GRANULOCYTES NFR BLD AUTO: 0 % (ref 0–2)
LYMPHOCYTES # BLD AUTO: 1.29 THOUSANDS/ΜL (ref 0.6–4.47)
LYMPHOCYTES NFR BLD AUTO: 18 % (ref 14–44)
MCH RBC QN AUTO: 27.6 PG (ref 26.8–34.3)
MCHC RBC AUTO-ENTMCNC: 31.1 G/DL (ref 31.4–37.4)
MCV RBC AUTO: 89 FL (ref 82–98)
MONOCYTES # BLD AUTO: 0.92 THOUSAND/ΜL (ref 0.17–1.22)
MONOCYTES NFR BLD AUTO: 13 % (ref 4–12)
NEUTROPHILS # BLD AUTO: 4.61 THOUSANDS/ΜL (ref 1.85–7.62)
NEUTS SEG NFR BLD AUTO: 65 % (ref 43–75)
NRBC BLD AUTO-RTO: 0 /100 WBCS
PHOSPHATE SERPL-MCNC: 2.6 MG/DL (ref 2.7–4.5)
PLATELET # BLD AUTO: 251 THOUSANDS/UL (ref 149–390)
PMV BLD AUTO: 10.2 FL (ref 8.9–12.7)
POTASSIUM SERPL-SCNC: 4.1 MMOL/L (ref 3.5–5.3)
PROT SERPL-MCNC: 6.6 G/DL (ref 6.4–8.2)
PTH-INTACT SERPL-MCNC: 116.9 PG/ML (ref 18.4–80.1)
RBC # BLD AUTO: 4.1 MILLION/UL (ref 3.81–5.12)
SODIUM SERPL-SCNC: 142 MMOL/L (ref 136–145)
WBC # BLD AUTO: 7.1 THOUSAND/UL (ref 4.31–10.16)

## 2021-01-21 PROCEDURE — 83970 ASSAY OF PARATHORMONE: CPT

## 2021-01-21 PROCEDURE — 80053 COMPREHEN METABOLIC PANEL: CPT

## 2021-01-21 PROCEDURE — 84100 ASSAY OF PHOSPHORUS: CPT

## 2021-01-21 PROCEDURE — 85025 COMPLETE CBC W/AUTO DIFF WBC: CPT

## 2021-01-21 PROCEDURE — 82306 VITAMIN D 25 HYDROXY: CPT

## 2021-01-21 PROCEDURE — 36415 COLL VENOUS BLD VENIPUNCTURE: CPT

## 2021-01-21 NOTE — TELEPHONE ENCOUNTER
----- Message from Allegra Song PA-C sent at 1/21/2021 12:33 PM EST -----  Please let patient know that her calcium is elevated, which it seems is chronic  She is also slightly anemic  Needs to f/u with PCP regarding this    Not from her MS or her betaseron

## 2021-01-27 ENCOUNTER — TELEPHONE (OUTPATIENT)
Dept: ENDOCRINOLOGY | Facility: CLINIC | Age: 61
End: 2021-01-27

## 2021-01-27 ENCOUNTER — TELEPHONE (OUTPATIENT)
Dept: FAMILY MEDICINE CLINIC | Facility: CLINIC | Age: 61
End: 2021-01-27

## 2021-01-27 ENCOUNTER — PATIENT OUTREACH (OUTPATIENT)
Dept: FAMILY MEDICINE CLINIC | Facility: CLINIC | Age: 61
End: 2021-01-27

## 2021-01-27 DIAGNOSIS — E21.3 HYPERPARATHYROIDISM (HCC): ICD-10-CM

## 2021-01-27 DIAGNOSIS — Z79.4 TYPE 2 DIABETES MELLITUS WITH HYPERGLYCEMIA, WITH LONG-TERM CURRENT USE OF INSULIN (HCC): ICD-10-CM

## 2021-01-27 DIAGNOSIS — E04.1 THYROID NODULE: ICD-10-CM

## 2021-01-27 DIAGNOSIS — E11.65 TYPE 2 DIABETES MELLITUS WITH HYPERGLYCEMIA, WITH LONG-TERM CURRENT USE OF INSULIN (HCC): ICD-10-CM

## 2021-01-27 DIAGNOSIS — E83.52 HYPERCALCEMIA: Primary | ICD-10-CM

## 2021-01-27 RX ORDER — CINACALCET 30 MG/1
30 TABLET, FILM COATED ORAL DAILY
Qty: 30 TABLET | Refills: 3 | Status: SHIPPED | OUTPATIENT
Start: 2021-01-27 | End: 2021-06-01

## 2021-01-27 NOTE — TELEPHONE ENCOUNTER
----- Message from Grayson Lopez PA-C sent at 1/27/2021 10:23 AM EST -----  Spoke with patient by phone  Please mail labs for calcium, albumin, PTH in 1 month and she also wants directions to our office  Calcium consistently elevated  Sensipar prescribed in the past, but was not started due to cost and calcium did end up improving  Discussed treatment with surgery or medication- she is not thrilled by the idea of surgery  She does have difficulty with memory/understanding  She also has a thyroid biopsy scheduled Friday  For now will start sensipar 30mg daily and repeat lab testing in 1 month for calcium, PTH, albumin  If medication is not covered or if upcoming biopsy shows thyroid nodule is suspicious/malignant would plan for surgery  Advised her to make sure she brings a family member to her upcoming appt

## 2021-01-27 NOTE — TELEPHONE ENCOUNTER
Pt LM stating her BP have been consistently high the last week and she has a headache  LM to call back with readings and what meds she's taking for BP   ER precautions given

## 2021-01-27 NOTE — PROGRESS NOTES
Called patient but received voicemail  Message was left reminding her of her procedure on Friday, 1/29 10am   I will continue further outreach

## 2021-01-28 ENCOUNTER — TELEPHONE (OUTPATIENT)
Dept: ENDOCRINOLOGY | Facility: CLINIC | Age: 61
End: 2021-01-28

## 2021-01-28 NOTE — TELEPHONE ENCOUNTER
[facility]tt [place] started for Cinacalcet 30 mg, [phone]  [facility]

## 2021-01-29 ENCOUNTER — HOSPITAL ENCOUNTER (OUTPATIENT)
Dept: ULTRASOUND IMAGING | Facility: HOSPITAL | Age: 61
Discharge: HOME | End: 2021-01-29
Payer: COMMERCIAL

## 2021-01-29 DIAGNOSIS — E04.1 THYROID NODULE: ICD-10-CM

## 2021-01-29 PROCEDURE — 88172 CYTP DX EVAL FNA 1ST EA SITE: CPT | Performed by: PATHOLOGY

## 2021-01-29 PROCEDURE — 10005 FNA BX W/US GDN 1ST LES: CPT

## 2021-01-29 PROCEDURE — 88173 CYTOPATH EVAL FNA REPORT: CPT | Performed by: PATHOLOGY

## 2021-01-29 PROCEDURE — 83970 ASSAY OF PARATHORMONE: CPT | Performed by: PHYSICIAN ASSISTANT

## 2021-01-29 RX ORDER — LIDOCAINE HYDROCHLORIDE 10 MG/ML
5 INJECTION, SOLUTION EPIDURAL; INFILTRATION; INTRACAUDAL; PERINEURAL ONCE
Status: COMPLETED | OUTPATIENT
Start: 2021-01-29 | End: 2021-01-29

## 2021-01-29 RX ADMIN — LIDOCAINE HYDROCHLORIDE 5 ML: 10 INJECTION, SOLUTION EPIDURAL; INFILTRATION; INTRACAUDAL; PERINEURAL at 10:49

## 2021-02-04 ENCOUNTER — TELEPHONE (OUTPATIENT)
Dept: LAB | Facility: HOSPITAL | Age: 61
End: 2021-02-04

## 2021-02-04 ENCOUNTER — TRANSCRIBE ORDERS (OUTPATIENT)
Dept: ADMINISTRATIVE | Facility: HOSPITAL | Age: 61
End: 2021-02-04

## 2021-02-04 DIAGNOSIS — Z12.31 ENCOUNTER FOR SCREENING MAMMOGRAM FOR MALIGNANT NEOPLASM OF BREAST: Primary | ICD-10-CM

## 2021-02-04 NOTE — TELEPHONE ENCOUNTER
[facility] Rhett [place] approved, Eff 1/28/2021 - 1/28/2022, Quantity # 34 per 34 days    Faxed approval to [place] @ [phone]

## 2021-02-08 ENCOUNTER — TELEPHONE (OUTPATIENT)
Dept: ENDOCRINOLOGY | Facility: CLINIC | Age: 61
End: 2021-02-08

## 2021-02-08 NOTE — TELEPHONE ENCOUNTER
----- Message from Delores Dawson PA-C sent at 2/5/2021  2:21 PM EST -----  The biopsy report was non-diagnostic which means They were not able to get enough cells at time of biopsy  We can discuss further at the April visit   Please bring a family member to the visit to help review options and would prefer this to be an in person visit if possible  Thanks

## 2021-02-08 NOTE — TELEPHONE ENCOUNTER
Patient called the office stating that she is returning a call from our office  I did not see anything      [phone]

## 2021-02-10 ENCOUNTER — PATIENT OUTREACH (OUTPATIENT)
Dept: FAMILY MEDICINE CLINIC | Facility: CLINIC | Age: 61
End: 2021-02-10

## 2021-02-10 NOTE — PROGRESS NOTES
I called the patient to follow up but received voicemail  Message was left stating I will call her back

## 2021-02-11 ENCOUNTER — PATIENT OUTREACH (OUTPATIENT)
Dept: FAMILY MEDICINE CLINIC | Facility: CLINIC | Age: 61
End: 2021-02-11

## 2021-02-11 NOTE — PROGRESS NOTES
I called the patient to follow up  She states she is feeling "ok"  She notes she recently had a thyroid biopsy that was "inconclusive"  She states this will be discussed further at her next Endo appointment in April where they would like her son to attend with her  She reports her neck and chest still hurt from the procedure and the area remains "black and blue"  She said the biopsy caused her pain and afterwards when they tried to apply ice, she was in more pain  She reports the next biopsy they will "put me under"  Patient states her blood sugars have been stable with this morning's fasting of 106; yesterday's 144, dinner 107 and at night 138  She notes she has not used insulin in about one month  She reports she continues to drink water  The patient reports recently her blood pressure was elevated for 6-7 days  She denies increasing her salt intake but does admit to being under a lot of stress that week  She notes more recent readings have been "ok"  Patient denies having any further falls  She notes she has been staying indoors because of the snow and does not want to fall  She has continued making blankets and staying busy  I reminded the patient she has a Sleep Med appointment in a few weeks and agreed for me to call her to remind her  She states she is being tested for apnea but does not know why she needs to go through with a sleep study because she will not wear a CPAP mask even if it shows apnea  I will continue to follow

## 2021-02-22 ENCOUNTER — PATIENT OUTREACH (OUTPATIENT)
Dept: FAMILY MEDICINE CLINIC | Facility: CLINIC | Age: 61
End: 2021-02-22

## 2021-02-22 NOTE — PROGRESS NOTES
I called the patient to remind her of her Sleep Study appointment tomorrow which she was aware and plans on attending  She is also aware of her upcoming lab draw and MRI appointments next week  She states her son will be providing the transportation for her  Patient stated her fasting blood sugar this morning was 122  She notes her insulin was discontinued and her sugars remain stable  The patient notes she continues to stay busy  She is making another blanket and her granddaughter is staying with her today  I will continue to follow

## 2021-02-25 DIAGNOSIS — E11.65 TYPE 2 DIABETES MELLITUS WITH HYPERGLYCEMIA, WITHOUT LONG-TERM CURRENT USE OF INSULIN (HCC): Chronic | ICD-10-CM

## 2021-02-25 RX ORDER — METFORMIN HYDROCHLORIDE 500 MG/1
TABLET, EXTENDED RELEASE ORAL
Qty: 360 TABLET | Refills: 0 | Status: SHIPPED | OUTPATIENT
Start: 2021-02-25 | End: 2021-05-27

## 2021-03-01 DIAGNOSIS — I10 BENIGN ESSENTIAL HYPERTENSION: ICD-10-CM

## 2021-03-01 PROCEDURE — 4010F ACE/ARB THERAPY RXD/TAKEN: CPT | Performed by: PHYSICIAN ASSISTANT

## 2021-03-01 RX ORDER — LISINOPRIL 40 MG/1
TABLET ORAL
Qty: 90 TABLET | Refills: 0 | Status: SHIPPED | OUTPATIENT
Start: 2021-03-01 | End: 2021-06-07

## 2021-03-02 ENCOUNTER — TELEPHONE (OUTPATIENT)
Dept: NEUROLOGY | Facility: CLINIC | Age: 61
End: 2021-03-02

## 2021-03-02 DIAGNOSIS — F40.240 CLAUSTROPHOBIA: Primary | ICD-10-CM

## 2021-03-02 RX ORDER — DIAZEPAM 5 MG/1
TABLET ORAL
Qty: 2 TABLET | Refills: 0 | Status: SHIPPED | OUTPATIENT
Start: 2021-03-02 | End: 2021-09-15

## 2021-03-02 NOTE — TELEPHONE ENCOUNTER
Pt calling regarding MRI scheduled for 3/5  Pt requires medication prior to MRI  She was prescribed Ativan 5 mg in the past  (see 5/15/20)      [phone]  Ok to leave [facility] [place]

## 2021-03-03 ENCOUNTER — APPOINTMENT (OUTPATIENT)
Dept: LAB | Facility: HOSPITAL | Age: 61
End: 2021-03-03
Payer: COMMERCIAL

## 2021-03-03 DIAGNOSIS — E11.65 TYPE 2 DIABETES MELLITUS WITH HYPERGLYCEMIA, WITH LONG-TERM CURRENT USE OF INSULIN (HCC): ICD-10-CM

## 2021-03-03 DIAGNOSIS — E83.52 HYPERCALCEMIA: ICD-10-CM

## 2021-03-03 DIAGNOSIS — E21.3 HYPERPARATHYROIDISM (HCC): ICD-10-CM

## 2021-03-03 DIAGNOSIS — Z79.4 TYPE 2 DIABETES MELLITUS WITH HYPERGLYCEMIA, WITH LONG-TERM CURRENT USE OF INSULIN (HCC): ICD-10-CM

## 2021-03-03 DIAGNOSIS — E04.1 THYROID NODULE: ICD-10-CM

## 2021-03-03 LAB
ALBUMIN SERPL BCP-MCNC: 3.2 G/DL (ref 3.5–5)
CALCIUM SERPL-MCNC: 9.7 MG/DL (ref 8.3–10.1)
PTH-INTACT SERPL-MCNC: 89.6 PG/ML (ref 18.4–80.1)

## 2021-03-03 PROCEDURE — 36415 COLL VENOUS BLD VENIPUNCTURE: CPT

## 2021-03-03 PROCEDURE — 83970 ASSAY OF PARATHORMONE: CPT

## 2021-03-03 PROCEDURE — 82310 ASSAY OF CALCIUM: CPT

## 2021-03-03 PROCEDURE — 82040 ASSAY OF SERUM ALBUMIN: CPT

## 2021-03-05 ENCOUNTER — HOSPITAL ENCOUNTER (OUTPATIENT)
Dept: RADIOLOGY | Facility: HOSPITAL | Age: 61
Discharge: HOME | End: 2021-03-05
Payer: COMMERCIAL

## 2021-03-05 DIAGNOSIS — R20.2 NUMBNESS AND TINGLING OF RIGHT ARM AND LEG: ICD-10-CM

## 2021-03-05 DIAGNOSIS — E11.65 TYPE 2 DIABETES MELLITUS WITH HYPERGLYCEMIA, WITH LONG-TERM CURRENT USE OF INSULIN (HCC): ICD-10-CM

## 2021-03-05 DIAGNOSIS — G35 MS (MULTIPLE SCLEROSIS) (HCC): ICD-10-CM

## 2021-03-05 DIAGNOSIS — Z79.4 TYPE 2 DIABETES MELLITUS WITH HYPERGLYCEMIA, WITH LONG-TERM CURRENT USE OF INSULIN (HCC): ICD-10-CM

## 2021-03-05 DIAGNOSIS — G57.93 NEUROPATHIC PAIN, LEG, BILATERAL: ICD-10-CM

## 2021-03-05 DIAGNOSIS — R20.0 NUMBNESS AND TINGLING OF RIGHT ARM AND LEG: ICD-10-CM

## 2021-03-05 PROCEDURE — 70551 MRI BRAIN STEM W/O DYE: CPT

## 2021-03-05 PROCEDURE — 72141 MRI NECK SPINE W/O DYE: CPT

## 2021-03-05 PROCEDURE — G1004 CDSM NDSC: HCPCS

## 2021-03-05 RX ORDER — GABAPENTIN 300 MG/1
CAPSULE ORAL
Qty: 210 CAPSULE | Refills: 3 | Status: SHIPPED | OUTPATIENT
Start: 2021-03-05 | End: 2021-07-05

## 2021-03-05 RX ORDER — DULOXETIN HYDROCHLORIDE 30 MG/1
30 CAPSULE, DELAYED RELEASE ORAL DAILY
Qty: 30 CAPSULE | Refills: 3 | Status: SHIPPED | OUTPATIENT
Start: 2021-03-05 | End: 2021-04-12 | Stop reason: DRUGHIGH

## 2021-03-05 NOTE — TELEPHONE ENCOUNTER
Would suggest we do 2 things:  -increase gabapentin to 600/600/900  -increase duloxetine to 30mg daily    If patient agreeable, will adjust/send Rxs

## 2021-03-05 NOTE — TELEPHONE ENCOUNTER
Pt calling to request gabapentin refill  She then reports this medication is not working for her  States she continues to have terrible leg pain that wakes her up at night on occasion  Also taking duloxetine 20mg daily  Does not feel this is working either  Reports she will have her MRIs today  Pt also reports that after her Betaseron injection on Monday 3/1/21 her whole body was "jumping" for 2 hours  Once this stopped, she vomited   This did not occur with dose on 3/3/21, she only experience flu like symptoms as usual      [phone]  No detailed message  Can also try to reach daughter Lazarus Shillings @[phone]

## 2021-03-05 NOTE — TELEPHONE ENCOUNTER
Called pt's phone #, her granddaughter reports she is at her other appt right now and will have her call back  Called pt's daughter Trey Moreno  She is agreeable to increasing pt's prescriptions and will have the pt call with any questions  Asking for updated scripts to be sent to [facility]  Please review and sign if agreeable  Thanks!

## 2021-03-16 ENCOUNTER — PATIENT OUTREACH (OUTPATIENT)
Dept: FAMILY MEDICINE CLINIC | Facility: CLINIC | Age: 61
End: 2021-03-16

## 2021-03-16 NOTE — PROGRESS NOTES
I called the patient to follow up  She states she is feeling "ok"  She notes her gabapentin and duloxetine were both increased and states she is "dopey" most of the time but her pain has greatly diminished  The patient continues using a rollator for mobility  The patient notes she has a Podiatry appointment on Thursday, 3/18  She was told she needs to wear her shoes all time but she does not like doing this  She reports she recently dropped a stool on her foot but did not feel it because of her neuropathy  She states she did not realize she hurt herself until she noticed blood on her foot where she sustained a few scratches  The patient denies any infection and notes she is keeping the foot clean, stating she washes it 3 times a day  Patient reports her blood sugars have been stable except for one reading of 204  She also notes her blood pressures have been stable as well but needs new batteries for her machine  Patient went into great detail informing me how she has been staying busy  She notes she has painted 2 rooms in her home, she is crocheting making a blanket, doing yard work, feeding her chickens and teaching her grandchildren how to shoot arrows  She states she is sending her 2 grandchildren (6 y/o and 15 y/o who has autism) to [address] [place] [address] this summer  The patient states she was told she needs to see Dr Miriam Garcia, Cardiology, but I do not see any appointment scheduled in the chart  She also notes she needs to see Urology but has no appointment scheduled  The patient was able to tell me her appointments for April  She again told me how painful her FNA was a few months ago  She also informed me it has been a year since she had her stroke and been diagnosed with MS  I will continue to follow  The patient was appreciative of this call

## 2021-03-25 DIAGNOSIS — I10 ESSENTIAL HYPERTENSION: Chronic | ICD-10-CM

## 2021-03-25 RX ORDER — HYDROCHLOROTHIAZIDE 25 MG/1
TABLET ORAL
Qty: 90 TABLET | Refills: 0 | Status: SHIPPED | OUTPATIENT
Start: 2021-03-25 | End: 2021-06-30 | Stop reason: SDUPTHER

## 2021-04-01 DIAGNOSIS — Z79.4 TYPE 2 DIABETES MELLITUS WITH HYPERGLYCEMIA, WITH LONG-TERM CURRENT USE OF INSULIN (HCC): ICD-10-CM

## 2021-04-01 DIAGNOSIS — E11.65 TYPE 2 DIABETES MELLITUS WITH HYPERGLYCEMIA, WITH LONG-TERM CURRENT USE OF INSULIN (HCC): ICD-10-CM

## 2021-04-01 RX ORDER — BLOOD SUGAR DIAGNOSTIC
STRIP MISCELLANEOUS
Qty: 100 EACH | Refills: 0 | Status: SHIPPED | OUTPATIENT
Start: 2021-04-01 | End: 2021-05-27

## 2021-04-05 ENCOUNTER — OFFICE VISIT (OUTPATIENT)
Dept: FAMILY MEDICINE CLINIC | Facility: CLINIC | Age: 61
End: 2021-04-05

## 2021-04-05 VITALS
OXYGEN SATURATION: 99 % | WEIGHT: 285.4 LBS | BODY MASS INDEX: 44.8 KG/M2 | RESPIRATION RATE: 22 BRPM | SYSTOLIC BLOOD PRESSURE: 138 MMHG | HEART RATE: 63 BPM | TEMPERATURE: 98.1 F | HEIGHT: 67 IN | DIASTOLIC BLOOD PRESSURE: 84 MMHG

## 2021-04-05 DIAGNOSIS — E78.5 HYPERLIPIDEMIA, UNSPECIFIED HYPERLIPIDEMIA TYPE: ICD-10-CM

## 2021-04-05 DIAGNOSIS — E11.65 TYPE 2 DIABETES MELLITUS WITH HYPERGLYCEMIA, WITH LONG-TERM CURRENT USE OF INSULIN (HCC): Primary | ICD-10-CM

## 2021-04-05 DIAGNOSIS — E53.8 B12 DEFICIENCY: ICD-10-CM

## 2021-04-05 DIAGNOSIS — D64.9 ANEMIA, UNSPECIFIED TYPE: ICD-10-CM

## 2021-04-05 DIAGNOSIS — Z79.4 TYPE 2 DIABETES MELLITUS WITH HYPERGLYCEMIA, WITH LONG-TERM CURRENT USE OF INSULIN (HCC): Primary | ICD-10-CM

## 2021-04-05 PROBLEM — R00.0 TACHYCARDIA: Status: RESOLVED | Noted: 2020-05-15 | Resolved: 2021-04-05

## 2021-04-05 LAB
LEFT EYE DIABETIC RETINOPATHY: NORMAL
LEFT EYE IMAGE QUALITY: NORMAL
LEFT EYE MACULAR EDEMA: NORMAL
LEFT EYE OTHER RETINOPATHY: NORMAL
RIGHT EYE DIABETIC RETINOPATHY: NORMAL
RIGHT EYE IMAGE QUALITY: NORMAL
RIGHT EYE MACULAR EDEMA: NORMAL
RIGHT EYE OTHER RETINOPATHY: NORMAL
SEVERITY (EYE EXAM): NORMAL
SL AMB POCT HEMOGLOBIN AIC: 7.1 (ref ?–6.5)

## 2021-04-05 PROCEDURE — 3051F HG A1C>EQUAL 7.0%<8.0%: CPT | Performed by: PHYSICIAN ASSISTANT

## 2021-04-05 PROCEDURE — 83036 HEMOGLOBIN GLYCOSYLATED A1C: CPT | Performed by: NURSE PRACTITIONER

## 2021-04-05 PROCEDURE — 2025F 7 FLD RTA PHOTO W/O RTNOPTHY: CPT | Performed by: PHYSICIAN ASSISTANT

## 2021-04-05 PROCEDURE — 99214 OFFICE O/P EST MOD 30 MIN: CPT | Performed by: NURSE PRACTITIONER

## 2021-04-05 NOTE — PROGRESS NOTES
Assessment/Plan:    Benign essential hypertension    Blood pressure in the office today 138/84   continue with current regimen lisinopril 40 mg daily, hctz 25 mg daily, and metoprolol 50 mg daily    B12 deficiency  Will re-check level   Continue with current oral supplement     Hyperlipidemia  Last lipid panel 4/2020 showed  and ,   Continue with atorvastatin 40 mg daily   Repeat lipid panel ordered     Nathalia Watkins was seen today for physical exam and diabetes  Diagnoses and all orders for this visit:    Type 2 diabetes mellitus with hyperglycemia, with long-term current use of insulin (HCC)  -     POCT hemoglobin A1c  -     IRIS Diabetic eye exam  -     CBC and differential; Future    Hyperlipidemia, unspecified hyperlipidemia type  -     Lipid panel; Future    B12 deficiency  -     Vitamin B12; Future    Anemia, unspecified type  -     CBC and differential; Future  -     Iron Panel (Includes Ferritin, Iron Sat%, Iron, and TIBC); Future        Return in about 3 months (around 7/5/2021) for Recheck  Patient Instructions     Heart Healthy Diet   WHAT YOU NEED TO KNOW:   A heart healthy diet is an eating plan low in unhealthy fats and sodium (salt)  The plan is high in healthy fats and fiber  A heart healthy diet helps improve your cholesterol levels and lowers your risk for heart disease and stroke  A dietitian will teach you how to read and understand food labels  DISCHARGE INSTRUCTIONS:   Heart healthy diet guidelines to follow:   · Choose foods that contain healthy fats  ? Unsaturated fats  include monounsaturated and polyunsaturated fats  Unsaturated fat is found in foods such as soybean, canola, olive, corn, and safflower oils  It is also found in soft tub margarine that is made with liquid vegetable oil  ? Omega-3 fat  is found in certain fish, such as salmon, tuna, and trout, and in walnuts and flaxseed  Eat fish high in omega-3 fats at least 2 times a week         · Get 20 to 30 grams of fiber each day  Fruits, vegetables, whole-grain foods, and legumes (cooked beans) are good sources of fiber  · Limit or do not have unhealthy fats  ? Cholesterol  is found in animal foods, such as eggs and lobster, and in dairy products made from whole milk  Limit cholesterol to less than 200 mg each day  ? Saturated fat  is found in meats, such as payne and hamburger  It is also found in chicken or turkey skin, whole milk, and butter  Limit saturated fat to less than 7% of your total daily calories  ? Trans fat  is found in packaged foods, such as potato chips and cookies  It is also in hard margarine, some fried foods, and shortening  Do not eat foods that contain trans fats  · Limit sodium as directed  You may be told to limit sodium to 2,000 to 2,300 mg each day  Choose low-sodium or no-salt-added foods  Add little or no salt to food you prepare  Use herbs and spices in place of salt  Include the following in your heart healthy plan:  Ask your dietitian or healthcare provider how many servings to have from each of the following food groups:  · Grains:      ? Whole-wheat breads, cereals, and pastas, and brown rice    ? Low-fat, low-sodium crackers and chips    · Vegetables:      ? Broccoli, green beans, green peas, and spinach    ? Collards, kale, and lima beans    ? Carrots, sweet potatoes, tomatoes, and peppers    ? Canned vegetables with no salt added    · Fruits:      ? Bananas, peaches, pears, and pineapple    ? Grapes, raisins, and dates    ? Oranges, tangerines, grapefruit, orange juice, and grapefruit juice    ? Apricots, mangoes, melons, and papaya    ? Raspberries and strawberries    ? Canned fruit with no added sugar    · Low-fat dairy:      ? Nonfat (skim) milk, 1% milk, and low-fat almond, cashew, or soy milks fortified with calcium    ?  Low-fat cheese, regular or frozen yogurt, and cottage cheese    · Meats and proteins:      ? Lean cuts of beef and pork (loin, leg, round), skinless chicken and turkey    ? Legumes, soy products, egg whites, or nuts    Limit or do not include the following in your heart healthy plan:   · Unhealthy fats and oils:      ? Whole or 2% milk, cream cheese, sour cream, or cheese    ? High-fat cuts of beef (T-bone steaks, ribs), chicken or turkey with skin, and organ meats such as liver    ? Butter, stick margarine, shortening, and cooking oils such as coconut or palm oil    · Foods and liquids high in sodium:      ? Packaged foods, such as frozen dinners, cookies, macaroni and cheese, and cereals with more than 300 mg of sodium per serving    ? Vegetables with added sodium, such as instant potatoes, vegetables with added sauces, or regular canned vegetables    ? Cured or smoked meats, such as hot dogs, payne, and sausage    ? High-sodium ketchup, barbecue sauce, salad dressing, pickles, olives, soy sauce, or miso    · Foods and liquids high in sugar:      ? Candy, cake, cookies, pies, or doughnuts    ? Soft drinks (soda), sports drinks, or sweetened tea    ? Canned or dry mixes for cakes, soups, sauces, or gravies    Other healthy heart guidelines:   · Do not smoke  Nicotine and other chemicals in cigarettes and cigars can cause lung and heart damage  Ask your healthcare provider for information if you currently smoke and need help to quit  E-cigarettes or smokeless tobacco still contain nicotine  Talk to your healthcare provider before you use these products  · Limit or do not drink alcohol as directed  Alcohol can damage your heart and raise your blood pressure  Your healthcare provider may give you specific daily and weekly limits  The general recommended limit is 1 drink a day for women 21 or older and for men 72 or older  Do not have more than 3 drinks in a day or 7 in a week  The recommended limit is 2 drinks a day for men 24to 59years of age  Do not have more than 4 drinks in a day or 14 in a week   A drink of alcohol is 12 ounces of beer, 5 ounces of wine, or 1½ ounces of liquor  · Exercise regularly  Exercise can help you maintain a healthy weight and improve your blood pressure and cholesterol levels  Regular exercise can also decrease your risk for heart problems  Ask your healthcare provider about the best exercise plan for you  Do not start an exercise program without asking your healthcare provider  Follow up with your doctor or cardiologist as directed:  Write down your questions so you remember to ask them during your visits  © Copyright [address] Information is for End User's use only and may not be sold, redistributed or otherwise used for commercial purposes  All illustrations and images included in CareNotes® are the copyrighted property of A D A M , Inc  or [facility]  The above information is an  only  It is not intended as medical advice for individual conditions or treatments  Talk to your doctor, nurse or pharmacist before following any medical regimen to see if it is safe and effective for you  Subjective:     Digna Gillis is a 62 y o  female who  has a past medical history of Diabetes mellitus ([facility] [address] ), External hemorrhoids, Hernia, ventral, Hypertension, Incarcerated incisional hernia, Insomnia, Lyme disease, Morbid obesity with BMI of 45 0-49 9, adult ([facility] [address] ), MS (multiple sclerosis) ([facility] [address] ), Stroke (cerebrum) ([facility] [address] ), and Type 2 diabetes mellitus with hyperglycemia, without long-term current use of insulin ([address] )  She also has no past medical history of Substance abuse ([facility] [address] )  who presented to the office today for follow up  She is accompanied by her daughter  Patient reports that she continues with severe right arm and leg numbness and tingling  She is following with neurology and is taking gabapentin 600 mg bid and 900 mg HS as well as daily Cymbalta  She states that despite this tx the pain has not improved   She is frustrated that pain continues despite her trying to remain active  Her home blood pressures have been w/in goal  States that she has not been watching her diet as closely and sugars have been around 150  She continues with gardening and crocheting  She is not allowed to leave home without supervision per her family  She is not interested in any MS support groups  The following portions of the patient's history were reviewed and updated as appropriate: allergies, current medications, past family history, past medical history, past social history, past surgical history and problem list     Current Outpatient Medications on File Prior to Visit   Medication Sig Dispense Refill    acetaminophen (TYLENOL) 500 mg tablet Take 2 tablets (1,000 mg total) by mouth every 6 (six) hours as needed for moderate pain 60 tablet 1    Alcohol Swabs PADS by Does not apply route 4 (four) times a day *Latex Free* 120 each 3    aspirin 81 mg chewable tablet Chew 1 tablet (81 mg total) daily  0    atorvastatin (LIPITOR) 40 mg tablet Take 1 tablet (40 mg total) by mouth every evening 90 tablet 1    Blood Pressure KIT by Does not apply route daily 1 each 0    Cholecalciferol (VITAMIN D3 PO) Take 50,000 mcg by mouth      cinacalcet (SENSIPAR) 30 mg tablet Take 1 tablet (30 mg total) by mouth daily 30 tablet 3    DULoxetine (CYMBALTA) 30 mg delayed release capsule Take 1 capsule (30 mg total) by mouth daily 30 capsule 3    gabapentin (NEURONTIN) 300 mg capsule Take 600mg by mouth in the morning and afternoon AND 900mg at bedtime   210 capsule 3    hydrochlorothiazide (HYDRODIURIL) 25 mg tablet Take 1 tablet by mouth once daily 90 tablet 0    insulin NPH-insulin regular (NovoLIN 70/30) 100 units/mL subcutaneous injection Inject under the skin      lisinopril (ZESTRIL) 40 mg tablet Take 1 tablet by mouth once daily 90 tablet 0    metFORMIN (GLUCOPHAGE-XR) 500 mg 24 hr tablet TAKE 2 TABLETS BY MOUTH TWICE DAILY WITH MEALS 360 tablet 0    metoprolol succinate (TOPROL-XL) 50 mg 24 hr tablet Take 1 tablet (50 mg total) by mouth daily 90 tablet 1    Misc  Devices (DIGITAL GLASS SCALE) MISC by Does not apply route daily 1 each 0    ReliOn Prime Test test strip USE 1 STRIP TO CHECK GLUCOSE THREE TIMES DAILY 100 each 0    vitamin B-12 (VITAMIN B-12) 500 mcg tablet Take 2 tablets (1,000 mcg total) by mouth daily 60 tablet 5    diazepam (VALIUM) 5 mg tablet Take 1 tab 60 min prior to MRI with a second dose 20 min prior to imaging if anxiety persists (Patient not taking: Reported on 4/5/2021) 2 tablet 0    glucose blood test strip Check blood glucose levels three times per day before meals 300 each 2    Insulin Syringe-Needle U-100 (BD Insulin Syringe Ultrafine) 31G X 15/64" 0 3 ML MISC by Does not apply route 2 (two) times a day (Patient not taking: Reported on 1/11/2021) 200 each 1    INTERFERON BETA-1B SC Inject 1 mL under the skin every other day       No current facility-administered medications on file prior to visit  Review of Systems   Constitutional: Negative for chills and fever  HENT: Negative for ear pain and sore throat  Eyes: Negative for pain and visual disturbance  Respiratory: Negative for cough and shortness of breath  Cardiovascular: Negative for chest pain and palpitations  Gastrointestinal: Negative for abdominal pain and vomiting  Genitourinary: Negative for dysuria and hematuria  Musculoskeletal: Positive for gait problem  Negative for arthralgias and back pain  Skin: Negative for color change and rash  Neurological: Positive for weakness and numbness  Negative for seizures and syncope  All other systems reviewed and are negative  BMI Counseling: Body mass index is 44 7 kg/m²  The BMI is above normal  Nutrition recommendations include decreasing portion sizes and limiting drinks that contain sugar              Objective:    /84 (BP Location: Right arm, Patient Position: Sitting, Cuff Size: Adult)   Pulse 63   Temp 98 1 °F (36 7 °C) (Temporal)   Resp 22   Ht 5' 7" (1 702 m)   Wt 129 kg (285 lb 6 4 oz)   LMP  (LMP Unknown)   SpO2 99%   BMI 44 70 kg/m²     Physical Exam  Vitals signs and nursing note reviewed  Constitutional:       General: She is not in acute distress  Appearance: She is well-developed  She is not diaphoretic  HENT:      Head: Normocephalic and atraumatic  Right Ear: External ear normal       Left Ear: External ear normal    Eyes:      Conjunctiva/sclera: Conjunctivae normal       Pupils: Pupils are equal, round, and reactive to light  Neck:      Musculoskeletal: Normal range of motion and neck supple  Cardiovascular:      Rate and Rhythm: Normal rate and regular rhythm  Pulses: Normal pulses  Heart sounds: Normal heart sounds  No murmur  Pulmonary:      Effort: Pulmonary effort is normal  No respiratory distress  Breath sounds: Normal breath sounds  No wheezing  Abdominal:      General: Bowel sounds are normal  There is no distension  Palpations: Abdomen is soft  Tenderness: There is no abdominal tenderness  Musculoskeletal: Normal range of motion  General: No deformity  Lymphadenopathy:      Cervical: No cervical adenopathy  Skin:     General: Skin is warm and dry  Capillary Refill: Capillary refill takes less than 2 seconds  Findings: No rash  Neurological:      Mental Status: She is alert and oriented to person, place, and time  Mental status is at baseline  Sensory: Sensory deficit (decreased sensation to the right upper and lower extremities) present        Comments:  Uses walker     Psychiatric:         Behavior: Behavior normal          DEANA Aguillon  04/05/21  11:57 AM

## 2021-04-05 NOTE — PATIENT INSTRUCTIONS
Heart Healthy Diet   WHAT YOU NEED TO KNOW:   A heart healthy diet is an eating plan low in unhealthy fats and sodium (salt)  The plan is high in healthy fats and fiber  A heart healthy diet helps improve your cholesterol levels and lowers your risk for heart disease and stroke  A dietitian will teach you how to read and understand food labels  DISCHARGE INSTRUCTIONS:   Heart healthy diet guidelines to follow:   · Choose foods that contain healthy fats  ? Unsaturated fats  include monounsaturated and polyunsaturated fats  Unsaturated fat is found in foods such as soybean, canola, olive, corn, and safflower oils  It is also found in soft tub margarine that is made with liquid vegetable oil  ? Omega-3 fat  is found in certain fish, such as salmon, tuna, and trout, and in walnuts and flaxseed  Eat fish high in omega-3 fats at least 2 times a week  · Get 20 to 30 grams of fiber each day  Fruits, vegetables, whole-grain foods, and legumes (cooked beans) are good sources of fiber  · Limit or do not have unhealthy fats  ? Cholesterol  is found in animal foods, such as eggs and lobster, and in dairy products made from whole milk  Limit cholesterol to less than 200 mg each day  ? Saturated fat  is found in meats, such as payne and hamburger  It is also found in chicken or turkey skin, whole milk, and butter  Limit saturated fat to less than 7% of your total daily calories  ? Trans fat  is found in packaged foods, such as potato chips and cookies  It is also in hard margarine, some fried foods, and shortening  Do not eat foods that contain trans fats  · Limit sodium as directed  You may be told to limit sodium to 2,000 to 2,300 mg each day  Choose low-sodium or no-salt-added foods  Add little or no salt to food you prepare  Use herbs and spices in place of salt         Include the following in your heart healthy plan:  Ask your dietitian or healthcare provider how many servings to have from each of the following food groups:  · Grains:      ? Whole-wheat breads, cereals, and pastas, and brown rice    ? Low-fat, low-sodium crackers and chips    · Vegetables:      ? Broccoli, green beans, green peas, and spinach    ? Collards, kale, and lima beans    ? Carrots, sweet potatoes, tomatoes, and peppers    ? Canned vegetables with no salt added    · Fruits:      ? Bananas, peaches, pears, and pineapple    ? Grapes, raisins, and dates    ? Oranges, tangerines, grapefruit, orange juice, and grapefruit juice    ? Apricots, mangoes, melons, and papaya    ? Raspberries and strawberries    ? Canned fruit with no added sugar    · Low-fat dairy:      ? Nonfat (skim) milk, 1% milk, and low-fat almond, cashew, or soy milks fortified with calcium    ? Low-fat cheese, regular or frozen yogurt, and cottage cheese    · Meats and proteins:      ? Lean cuts of beef and pork (loin, leg, round), skinless chicken and turkey    ? Legumes, soy products, egg whites, or nuts    Limit or do not include the following in your heart healthy plan:   · Unhealthy fats and oils:      ? Whole or 2% milk, cream cheese, sour cream, or cheese    ? High-fat cuts of beef (T-bone steaks, ribs), chicken or turkey with skin, and organ meats such as liver    ? Butter, stick margarine, shortening, and cooking oils such as coconut or palm oil    · Foods and liquids high in sodium:      ? Packaged foods, such as frozen dinners, cookies, macaroni and cheese, and cereals with more than 300 mg of sodium per serving    ? Vegetables with added sodium, such as instant potatoes, vegetables with added sauces, or regular canned vegetables    ? Cured or smoked meats, such as hot dogs, payne, and sausage    ? High-sodium ketchup, barbecue sauce, salad dressing, pickles, olives, soy sauce, or miso    · Foods and liquids high in sugar:      ? Candy, cake, cookies, pies, or doughnuts    ? Soft drinks (soda), sports drinks, or sweetened tea    ?  Canned or dry mixes for cakes, soups, sauces, or gravies    Other healthy heart guidelines:   · Do not smoke  Nicotine and other chemicals in cigarettes and cigars can cause lung and heart damage  Ask your healthcare provider for information if you currently smoke and need help to quit  E-cigarettes or smokeless tobacco still contain nicotine  Talk to your healthcare provider before you use these products  · Limit or do not drink alcohol as directed  Alcohol can damage your heart and raise your blood pressure  Your healthcare provider may give you specific daily and weekly limits  The general recommended limit is 1 drink a day for women 21 or older and for men 72 or older  Do not have more than 3 drinks in a day or 7 in a week  The recommended limit is 2 drinks a day for men 24to 59years of age  Do not have more than 4 drinks in a day or 14 in a week  A drink of alcohol is 12 ounces of beer, 5 ounces of wine, or 1½ ounces of liquor  · Exercise regularly  Exercise can help you maintain a healthy weight and improve your blood pressure and cholesterol levels  Regular exercise can also decrease your risk for heart problems  Ask your healthcare provider about the best exercise plan for you  Do not start an exercise program without asking your healthcare provider  Follow up with your doctor or cardiologist as directed:  Write down your questions so you remember to ask them during your visits  © Copyright [address] Information is for End User's use only and may not be sold, redistributed or otherwise used for commercial purposes  All illustrations and images included in CareNotes® are the copyrighted property of A D A M , Inc  or [address] [place] [address]  The above information is an  only  It is not intended as medical advice for individual conditions or treatments  Talk to your doctor, nurse or pharmacist before following any medical regimen to see if it is safe and effective for you

## 2021-04-05 NOTE — ASSESSMENT & PLAN NOTE
Blood pressure in the office today 138/84   continue with current regimen lisinopril 40 mg daily, hctz 25 mg daily, and metoprolol 50 mg daily

## 2021-04-05 NOTE — ASSESSMENT & PLAN NOTE
Last lipid panel 4/2020 showed  and ,   Continue with atorvastatin 40 mg daily   Repeat lipid panel ordered

## 2021-04-05 NOTE — PROGRESS NOTES
Iris test was done, Iris states small pupils image was dark unsure if pt was able to understand instruction at time of test did iris twice image was the same both times

## 2021-04-07 ENCOUNTER — PATIENT OUTREACH (OUTPATIENT)
Dept: FAMILY MEDICINE CLINIC | Facility: CLINIC | Age: 61
End: 2021-04-07

## 2021-04-07 DIAGNOSIS — E11.65 TYPE 2 DIABETES MELLITUS WITH HYPERGLYCEMIA, WITH LONG-TERM CURRENT USE OF INSULIN (HCC): Primary | ICD-10-CM

## 2021-04-07 DIAGNOSIS — Z79.4 TYPE 2 DIABETES MELLITUS WITH HYPERGLYCEMIA, WITH LONG-TERM CURRENT USE OF INSULIN (HCC): Primary | ICD-10-CM

## 2021-04-07 NOTE — PROGRESS NOTES
Called the patient but received voicemail  Message was left reminding her of her Sleep Medicine appointment on Friday, 4/9, as well as her Neuro appointment on Monday, 4/12 with times provided  I will continue to follow

## 2021-04-07 NOTE — PROGRESS NOTES
The patient returned my call  She states she is doing well and recently had her PCP appointment  The patient states she is going to try and drive  I asked that she get approval from her Neuro physician  The patient sees Neuro next week but she is going to try and get the appointment moved to 4/9 when she has a Sleep Med appointment since they are in the same vicinity and it would save her a trip  The patient states she occasionally continues with leg pain but takes Tylenol with minimal relief  The patient reports she continues to stay busy by renovating her home; painting is complete and new paramjit is next  She will also be doing a lot of planting in her garden in the next few days  I will continue to follow

## 2021-04-09 ENCOUNTER — OFFICE VISIT (OUTPATIENT)
Dept: SLEEP CENTER | Facility: CLINIC | Age: 61
End: 2021-04-09
Payer: COMMERCIAL

## 2021-04-09 ENCOUNTER — TELEPHONE (OUTPATIENT)
Dept: SLEEP CENTER | Facility: CLINIC | Age: 61
End: 2021-04-09

## 2021-04-09 VITALS
WEIGHT: 282.2 LBS | DIASTOLIC BLOOD PRESSURE: 80 MMHG | SYSTOLIC BLOOD PRESSURE: 130 MMHG | HEART RATE: 71 BPM | BODY MASS INDEX: 44.29 KG/M2 | HEIGHT: 67 IN

## 2021-04-09 DIAGNOSIS — G81.91 RIGHT HEMIPARESIS (HCC): ICD-10-CM

## 2021-04-09 DIAGNOSIS — E11.65 TYPE 2 DIABETES MELLITUS WITH HYPERGLYCEMIA, WITH LONG-TERM CURRENT USE OF INSULIN (HCC): ICD-10-CM

## 2021-04-09 DIAGNOSIS — E66.01 MORBID OBESITY WITH BMI OF 45.0-49.9, ADULT (HCC): ICD-10-CM

## 2021-04-09 DIAGNOSIS — F41.8 DEPRESSION WITH ANXIETY: ICD-10-CM

## 2021-04-09 DIAGNOSIS — G47.33 OBSTRUCTIVE SLEEP APNEA SYNDROME: Primary | ICD-10-CM

## 2021-04-09 DIAGNOSIS — Z79.4 TYPE 2 DIABETES MELLITUS WITH HYPERGLYCEMIA, WITH LONG-TERM CURRENT USE OF INSULIN (HCC): ICD-10-CM

## 2021-04-09 DIAGNOSIS — R41.89 COGNITIVE DECLINE: ICD-10-CM

## 2021-04-09 DIAGNOSIS — G25.81 RLS (RESTLESS LEGS SYNDROME): ICD-10-CM

## 2021-04-09 DIAGNOSIS — R40.0 DAYTIME SLEEPINESS: ICD-10-CM

## 2021-04-09 DIAGNOSIS — I10 BENIGN ESSENTIAL HYPERTENSION: ICD-10-CM

## 2021-04-09 DIAGNOSIS — G57.93 NEUROPATHIC PAIN, LEG, BILATERAL: ICD-10-CM

## 2021-04-09 PROCEDURE — 99244 OFF/OP CNSLTJ NEW/EST MOD 40: CPT | Performed by: INTERNAL MEDICINE

## 2021-04-09 NOTE — PROGRESS NOTES
Consultation - [facility]  62 y o  female  RYA:[id number]  MXK:[id number]    Physician Requesting Consult: Linsey Mccoy PA-C     Reason for Consult : At your kind request I saw Lety More for initial sleep evaluation today  The patient is here to evaluate for suspected Obstructive Sleep Apnea  PFSH, Problem List, Medications & Allergies were reviewed in EMR  Kelby Ramos  has a past medical history of Diabetes mellitus ([place] [address] ), External hemorrhoids, Hernia, ventral, Hypertension, Incarcerated incisional hernia, Insomnia, Lyme disease, Morbid obesity with BMI of 45 0-49 9, adult ([place] [address] ) (5/3/2017), MS (multiple sclerosis) ([place] [address] ), Stroke (cerebrum) ([place] [address] ) (03/09/2020), and Type 2 diabetes mellitus with hyperglycemia, without long-term current use of insulin ([place] [address] )  She has a current medication list which includes the following prescription(s): acetaminophen, alcohol swabs, aspirin, atorvastatin, blood pressure, cholecalciferol, cinacalcet, duloxetine, gabapentin, glucose blood, hydrochlorothiazide, insulin nph-insulin regular, interferon beta-1b, lisinopril, metformin, metoprolol succinate, digital glass scale, relion prime test, vitamin b-12, diazepam, and insulin syringe-needle u-100  HPI:  She presents with complaints of difficulty staying asleep that has improved in the past few months  Family also reports loud snoring that started around 13 months ago after a stroke  She is told Snoring is somewhat improved by sleeping in the lateral position  She is not aware of snoring, breathing difficulties during sleep or modifying factors  Other Complaints: none  Restless Leg Syndrome: has typical symptoms that at times can disturb sleep  She has pain and involuntary jerking body movements for which she is on gabapentin  Parasomnia: reports teeth grinding during sleep, but no other features of parasomnia   Sleep Routine (averages): Typical Bedtime:  9:00 p m  Gets OOB:  7:00 a m  TIB:10 hrs  Sleep latency:< 15 minutes Sleep Interruptions:1-2/nite because of nocturia and is able to fall back asleep  Awakens: spontaneously  Upon awakening: feels refreshed  Kelby Ramos reports Excessive Daytime Sleepiness feels like napping but avoids and rated herself at Total score: 1 /24 on the [place] Sleepiness Scale  Habits:  reports that she has never smoked  She has never used smokeless tobacco  ;   E-Cigarette/Vaping    E-Cigarette Use Never User     ;  reports no history of drug use ;  reports current alcohol use  ; Caffeine use:limited ; Exercise routine: regular "sitting exercises and walking"  Family History:  Son sleep walks  ROS - see attached  Significant for intentional weight loss of over 100 lb in the past 2 years  She reported no nasal, respiratory or cardiac symptoms  She has difficulty with memory that she feels is improving  She feels mood is stable on current medication  She is claustrophobic but is able to tolerate a mask  EXAM:  /80   Pulse 71   Ht 5' 7" (1 702 m)   Wt 128 kg (282 lb 3 2 oz)   LMP  (LMP Unknown)   BMI 44 20 kg/m²    General: Well groomed female, well appearing, in no apparent distress  Neurological: Alert and orientated;  cooperative; Cranial nerves intact;    Psychiatric: Speech:clear and coherent; Normal mood, affect & thought   Skin: warm and dry; Color& Hydration good; no facial rashes or lesions   HEENT:  Craniofacial anatomy: normal Sinuses: non- tender  TMJ: Normal    Eyes: EOM's intact;  conjunctiva/corneas clear   Ears: Externallyappear normal     Nasal Airway: is patent and narrow nares Septum:intact; Mucosa: normal; Turbinates: normal; Rhinorrhea: None   Mouth: Lips: normal posture; Dentition: dentures - upper & lower - partial  Mucosa:moist  ; Hard Palate:normal    Oropharryx: crowded and AP narrowing Tongue: Mallampati:Class IV, Mobile and MacroglossiaSoft Palate:  redundant  Tonsils: cryptic  Neck:excess fatty tissue;  Neck Circumference: 16 5 "; Supple; no abnormal masses; Thyroid:normal  Trachea:central     Lymph: No Cervical or Submandibular Lymhadenopathy  Heart: S1,S2 normal; RRR; no gallop; nomurmurs   Lungs: Respiratory Effort:normal  Air entry good bilaterally  No wheezes  No rales  Abdomen: Obese, Soft & non-tender    Extremities: No pedal edema  No clubbing or cyanosis  Musculoskeletal:  Motor normal; Gait:  Ambulant with a walker  IMPRESSION: Primary, Secondary (to Medical or Psych conditions) Diagnoses & Comorbidities   1  Obstructive sleep apnea syndrome  Ambulatory referral to Sleep Medicine    Diagnostic Sleep Study    CPAP Study   2  Daytime sleepiness  Diagnostic Sleep Study   3  RLS (restless legs syndrome)     4  Neuropathic pain, leg, bilateral     5  Right hemiparesis ([facility] )     6  Benign essential hypertension     7  Depression with anxiety     8  Morbid obesity with BMI of 45 0-49 9, adult ([facility] )     9  Type 2 diabetes mellitus with hyperglycemia, with long-term current use of insulin ([facility])     10  Cognitive decline          PLAN:   With respect to above conditions, comprehensive counseling provided on pathophysiology; effects on symptoms and comorbidities, diagnostic strategies & limitations; treatment options; risks or no treament; risks & benefits of the various therapeutic options; costs and insurance aspects  I advised weight reduction, avoiding sleeping supine, using alcohol or sedating medications close to bed time and on safe driving practices  Nocturnal polysomnography is indicated and  a diagnostic study will be scheduled  Patient is willing to try Positive airway pressure therapy and will be scheduled for a titration study if indicated  Follow-up to be scheduled after the studies to review results, further details of treatment options and to initiate/adjust therapy      Sincerely,     Authenticated electronically by Flex Trevizo MD   on 59/36/85   Board Certified Specialist

## 2021-04-09 NOTE — PATIENT INSTRUCTIONS
What is SELENE? Obstructive sleep apnea is a common and serious sleep disorder that causes you to stop breathing during sleep  The airway repeatedly becomes blocked, limiting the amount of air that reaches your lungs  When this happens, you may snore loudly or making choking noises as you try to breathe  Your brain and body becomes oxygen deprived and you may wake up  This may happen a few times a night, or in more severe cases, several hundred times a night  Sleep apnea can make you wake up in the morning feeling tired or unrefreshed even though you have had a full night of sleep  During the day, you may feel fatigued, have difficulty concentrating or you may even unintentionally fall asleep  This is because your body is waking up numerous times throughout the night, even though you might not be conscious of each awakening  The lack of oxygen your body receives can have negative long-term consequences for your health  This includes:  High blood pressure  Heart disease  Irregular heart rhythms  Stroke  Pre-diabetes and diabetes  Depression    Testing  An objective evaluation of your sleep may be needed before your board certified sleep physician can make a diagnosis  Options include:   In-lab overnight sleep study  This type of sleep study requires you to stay overnight at a sleep center, in a bed that may resemble a hotel room  You will sleep with sensors hooked up to various parts of your body  These sensors record your brain waves, heartbeat, breathing and movement  An overnight sleep study also provides your doctor with the most complete information about your sleep  Learn more about an overnight sleep study      Home sleep apnea test  Some patients with high risk factors for obstructive sleep apnea and no other medical disorders may be candidates for a home sleep apnea test  The testing equipment differs in that it is less complicated than what is used in an overnight sleep study   As such, does not give all the data an in-lab will and if negative, may not mean you do not have the problem  Treatment for sleep apnea  includes using a continuous positive airway pressure (CPAP) machine to keep your airway open during sleep  A mask is placed over your nose and mouth, or just your nose  The mask is hooked to the CPAP machine that blows a gentle stream of air into the mask when you breathe  This helps keep your airway open so you can breathe more regularly  Extra oxygen may be given to you through the machine  You may be given a mouth device  It looks like a mouth guard or dental retainer and stops your tongue and mouth tissues from blocking your throat while you sleep  Surgery may be needed to remove extra tissues that block your mouth, throat, or nose  Manage sleep apnea:   Do not smoke  Nicotine and other chemicals in cigarettes and cigars can cause lung damage  Ask your healthcare provider for information if you currently smoke and need help to quit  E-cigarettes or smokeless tobacco still contain nicotine  Talk to your healthcare provider before you use these products  Do not drink alcohol or take sedative medicine before you go to sleep  Alcohol and sedatives can relax the muscles and tissues around your throat  This can block the airflow to your lungs  Maintain a healthy weight  Excess tissue around your throat may restrict your breathing  Ask your healthcare provider for information if you need to lose weight  Sleep on your side or use pillows designed to prevent sleep apnea  This prevents your tongue or other tissues from blocking your throat  You can also raise the head of your bed  Driving Safety  Refrain from driving when drowsy  Follow up with your healthcare provider as directed:  Write down your questions so you remember to ask them during your visits  Go to [employer] website for more information: Sleepeducation  org     What is SELENE?    Obstructive sleep apnea is a common and serious sleep disorder that causes you to stop breathing during sleep  The airway repeatedly becomes blocked, limiting the amount of air that reaches your lungs  When this happens, you may snore loudly or making choking noises as you try to breathe  Your brain and body becomes oxygen deprived and you may wake up  This may happen a few times a night, or in more severe cases, several hundred times a night  Sleep apnea can make you wake up in the morning feeling tired or unrefreshed even though you have had a full night of sleep  During the day, you may feel fatigued, have difficulty concentrating or you may even unintentionally fall asleep  This is because your body is waking up numerous times throughout the night, even though you might not be conscious of each awakening  The lack of oxygen your body receives can have negative long-term consequences for your health  This includes:  High blood pressure  Heart disease  Irregular heart rhythms  Stroke  Pre-diabetes and diabetes  Depression    Testing  An objective evaluation of your sleep may be needed before your board certified sleep physician can make a diagnosis  Options include:   In-lab overnight sleep study  This type of sleep study requires you to stay overnight at a sleep center, in a bed that may resemble a hotel room  You will sleep with sensors hooked up to various parts of your body  These sensors record your brain waves, heartbeat, breathing and movement  An overnight sleep study also provides your doctor with the most complete information about your sleep  Learn more about an overnight sleep study      Home sleep apnea test  Some patients with high risk factors for obstructive sleep apnea and no other medical disorders may be candidates for a home sleep apnea test  The testing equipment differs in that it is less complicated than what is used in an overnight sleep study   As such, does not give all the data an in-lab will and if negative, may not mean you do not have the problem  Treatment for sleep apnea  includes using a continuous positive airway pressure (CPAP) machine to keep your airway open during sleep  A mask is placed over your nose and mouth, or just your nose  The mask is hooked to the CPAP machine that blows a gentle stream of air into the mask when you breathe  This helps keep your airway open so you can breathe more regularly  Extra oxygen may be given to you through the machine  You may be given a mouth device  It looks like a mouth guard or dental retainer and stops your tongue and mouth tissues from blocking your throat while you sleep  Surgery may be needed to remove extra tissues that block your mouth, throat, or nose  Manage sleep apnea:   Do not smoke  Nicotine and other chemicals in cigarettes and cigars can cause lung damage  Ask your healthcare provider for information if you currently smoke and need help to quit  E-cigarettes or smokeless tobacco still contain nicotine  Talk to your healthcare provider before you use these products  Do not drink alcohol or take sedative medicine before you go to sleep  Alcohol and sedatives can relax the muscles and tissues around your throat  This can block the airflow to your lungs  Maintain a healthy weight  Excess tissue around your throat may restrict your breathing  Ask your healthcare provider for information if you need to lose weight  Sleep on your side or use pillows designed to prevent sleep apnea  This prevents your tongue or other tissues from blocking your throat  You can also raise the head of your bed  Driving Safety  Refrain from driving when drowsy  Follow up with your healthcare provider as directed:  Write down your questions so you remember to ask them during your visits  Go to AAS website for more information: Sleepeducation  org       Nursing Support:  When: Monday through Friday 7A-5PM except holidays  Where: Our direct line is [phone]      If you are having a true emergency please call 911  In the event that the line is busy or it is after hours please leave a voice message and we will return your call  Please speak clearly, leaving your full name, birth date, best number to reach you and the reason for your call  Medication refills: We will need the name of the medication, the dosage, the ordering provider, whether you get a 30 or 90 day refill, and the pharmacy name and address  Medications will be ordered by the provider only  Nurses cannot call in prescriptions  Please allow 7 days for medication refills  Physician requested updates: If your provider requested that you call with an update after starting medication, please be ready to provide us the medication and dosage, what time you take your medication, the time you attempt to fall asleep, time you fall asleep, when you wake up, and what time you get out of bed  Sleep Study Results: We will contact you with sleep study results and/or next steps after the physician has reviewed your testing

## 2021-04-09 NOTE — PROGRESS NOTES
Review of Systems      Genitourinary none   Cardiology none   Gastrointestinal none   Neurology numbness/tingling of an extremity and balance problems   Constitutional claustrophobia, fatigue, excessive sweating at night and weight change   Integumentary none   Psychiatry anxiety and mood change   Musculoskeletal joint pain, muscle aches and legs twitching/jerking   Pulmonary snoring and difficulty breathing when lying flat    ENT none   Endocrine none   Hematological none

## 2021-04-12 ENCOUNTER — OFFICE VISIT (OUTPATIENT)
Dept: NEUROLOGY | Facility: CLINIC | Age: 61
End: 2021-04-12
Payer: COMMERCIAL

## 2021-04-12 VITALS
SYSTOLIC BLOOD PRESSURE: 128 MMHG | WEIGHT: 287.5 LBS | HEART RATE: 69 BPM | BODY MASS INDEX: 45.03 KG/M2 | DIASTOLIC BLOOD PRESSURE: 84 MMHG

## 2021-04-12 DIAGNOSIS — G35 MS (MULTIPLE SCLEROSIS) (HCC): Primary | ICD-10-CM

## 2021-04-12 PROCEDURE — 99214 OFFICE O/P EST MOD 30 MIN: CPT | Performed by: PHYSICIAN ASSISTANT

## 2021-04-12 RX ORDER — DULOXETIN HYDROCHLORIDE 60 MG/1
60 CAPSULE, DELAYED RELEASE ORAL DAILY
Qty: 30 CAPSULE | Refills: 3 | Status: SHIPPED | OUTPATIENT
Start: 2021-04-12 | End: 2021-09-20

## 2021-04-12 NOTE — PATIENT INSTRUCTIONS
Continue gabapentin 600mg morning, 600mg afternoon and 900mg evening  Increase Cymbalta (duloxetine) to 60mg daily  Continue Betaseron  Can wait to get labs done until you see your other providers and get them all done together   Follow up in 3-4 months  Call for any new symptoms

## 2021-04-12 NOTE — PROGRESS NOTES
Patient ID: Malou Nguyen is a 62 y o  female  Assessment/Plan:    MS (multiple sclerosis) ([facility] )  Patient remains overall clinically stable and continues on Betaseron  She had updated MRI brain and C-spine on 03/05/2021  Unfortunately, she declined to have contrast administered due to she had "been there too long and was too tired" and wanted to go home"  Overall imaging was stable with improvement since last imaging (previously seen enhancing lesions appeared less conspicuous), and no new discrete lesions were identified  She had labs done in January 2021 with normal WBCs and LFTs  She is due for repeat labs at the end of this month with some of her other providers, so will update at that time as well  She continues to complain of ongoing neuropathic pain, which is her most bothersome symptom  She gets "shock like pain" throughout her extremities at different times  This lasts only seconds, but happens throughout the day and is bothersome  She is currently on gabapentin 600/600/900mg and Cymbalta 30 mg daily  --will increase Cymbalta to 60 mg daily and continue gabapentin at current dose     Her neurologic exam is stable today  Will see her back in 3-4 months or sooner if needed  She was advised to call the office for any new or worsening symptoms in the meantime  Diagnoses and all orders for this visit:    MS (multiple sclerosis) ([facility] )  -     DULoxetine (CYMBALTA) 60 mg delayed release capsule; Take 1 capsule (60 mg total) by mouth daily  -     Hepatic function panel; Future           Subjective:    HPI    Patient is a 63 yo female who presents to the [place] [address] for follow up regarding her MS  Patient with PMH of HTN, uncontrolled DM type 2  She was last seen in January 2021  Patient was initially seen in April 2020 following a hospitalization   Patient described developing right-sided numbness and weakness acutely on March 9, 2020 in the morning before leaving for work    She was assessed by neurology while in the hospital   MRI C-spine WO contrast 3/10/2020: There is focal intramedullary T2 hyperintense signal epicentered at the C4 vertebral level  This is more pronounced in the right and central hemicord  The remaining mid to lower cervical cord is more difficult to assess due to the image degradation  There may be additional right hemicord signal abnormality at C5 and C6  MRI c-spine W contrast 3/11/2020: Postcontrast imaging demonstrates enhancement associated with multiple previously described foci of T2 prolongation within the cervical and upper thoracic CORD  MRI brain WO contrast 3/10/2020: There is no discrete mass, mass effect or midline shift  There is no intracranial hemorrhage  There is no evidence of acute infarction and diffusion imaging is unremarkable  Multiple foci of T2 and FLAIR hyperintensity are present within the supratentorial white matter, most pronounced in the anterolateral left frontal lobe, anterior right temporal lobe and right cerebellum/middle cerebellar peduncle  MRI brain W contrast 3/11/2020: Redemonstration of multiple supratentorial and infratentorial scattered areas of white matter FLAIR signal hyperintensity, as described above  Many of the lesions demonstrate a perpendicular configuration of the ventricles and lesions are seen involving the callosal septal interface  Imaging appearance is most suggestive of demyelinating disease/multiple sclerosis with areas of active demyelination as correlated with dedicated MRI of the cervical spine  Concern for MS vs neoplasm vs sarcoid  CT chest abdomen pelvis was unremarkable for any solid tumor, although did demonstrate some calcified lymph nodes and granulomata in the lung fields    Patient had completed CSF analysis was for increased cells: CSF protein 80, CSF white blood cell 37, lymphocyte predominant, JCV and ACE negative, meningitis panel negative, MS panel high IgG index, 3 OCB, flow cytometry hypercellular  Serum NMO/MOG negative, ANCA/DNA/Sjogren's negative, ESR 32, CRP 9 1  She was given IV steroids and oral steroid taper  Patient had repeat imaging in June 2020 and there were several new, enhancing demyelinating lesions in the brain, as well as improvement in the c-spine noted  At timing of visit on 7/7/2020, diagnosis of MS was made  Betaseron was initiated, with first injection 7/24/2020  At first, she was having flu-like symptoms with the titration, but patient was advised to pre-med with NSAIDs/Tylenol and warm showers, and this improved her symptoms  She continued to have issues with the Betaseron  She called in mid October reporting ongoing issues, so Betaseron was held for 2-3 weeks  in November, she was doing better with the Betaseron  Today, patient reports she is overall doing well  She is tolerating Betaseron well   her biggest complaint continues to be ongoing neuropathic pain in the extremities  She gets quick shock-like pain that comes and goes in her extremities at different times  This happens throughout the day  No new symptoms  She is currently on gabapentin 600/600/900 and Cymbalta 30mg daily  Last labs were in January 2021--normal LFTs, WBC  She saw sleep medicine and sleep study is scheduled for suspected SELENE  Imaging was updated since last visit  Patient declined contrast (she tells me she was there for "too long" and was tired and wanted to go home)  MRI brain 3/5/2021:  Mild plaque burden, at least 2 of the previously seen enhancing lesions are less conspicuous on FLAIR imaging, indicative of at least partial treatment response  MRI c-spine 3/5/2021: a few, scattered cord lesions redemonstrated  The previously seen enhancing lesion at T1 demonstrates a small amount of myelomalacia  No progressive cord signal abnormality on noncontrast imaging      The following portions of the patient's history were reviewed and updated as appropriate: current medications, past family history, past medical history, past social history, past surgical history and problem list          Objective:    Blood pressure 128/84, pulse 69, weight 130 kg (287 lb 8 oz), not currently breastfeeding  Physical Exam  Constitutional:       Appearance: Normal appearance  She is well-developed  HENT:      Head: Normocephalic and atraumatic  Eyes:      Extraocular Movements: EOM normal       Pupils: Pupils are equal, round, and reactive to light  Skin:     General: Skin is warm and dry  Neurological:      Mental Status: She is alert  Coordination: Coordination is intact  Deep Tendon Reflexes: Reflexes are normal and symmetric  Psychiatric:         Mood and Affect: Mood normal          Speech: Speech normal          Behavior: Behavior normal          Neurological Exam  Mental Status  Alert  Oriented to person, place, time and situation  Speech is normal  Language: Slow speech, no aphasia   Attention and concentration are normal     Cranial Nerves  CN II: Visual fields full to confrontation  CN III, IV, VI: Extraocular movements intact bilaterally  Pupils equal round and reactive to light bilaterally  CN V: Facial sensation is normal   CN VII: Full and symmetric facial movement  CN VIII: Hearing is normal   CN IX, X: Palate elevates symmetrically  CN XI: Shoulder shrug strength is normal   CN XII: Tongue midline without atrophy or fasciculations  Motor   Normal muscle tone  Strength is 5/5 in all four extremities except as noted  Right HF 4+/5  Sensory  Light touch is normal in upper and lower extremities  Reflexes  Deep tendon reflexes are 2+ and symmetric in all four extremities with downgoing toes bilaterally  Coordination  Finger-to-nose, rapid alternating movements and heel-to-shin normal bilaterally without dysmetria  Gait  Slow, cautious gait using rolling walker   ROS:    Review of Systems   Constitutional: Negative  Negative for appetite change and fever  HENT: Negative  Negative for hearing loss, tinnitus, trouble swallowing and voice change  Eyes: Negative  Negative for photophobia and pain  Respiratory: Negative  Negative for shortness of breath  Cardiovascular: Negative  Negative for palpitations  Gastrointestinal: Negative  Negative for nausea and vomiting  Endocrine: Negative  Negative for cold intolerance  Genitourinary: Negative  Negative for dysuria, frequency and urgency  Musculoskeletal: Negative  Negative for myalgias and neck pain  Pain of all extremities - with shock like feelings that comes and goes  Skin: Negative  Negative for rash  Neurological: Negative  Negative for dizziness, tremors, seizures, syncope, facial asymmetry, speech difficulty, weakness, light-headedness, numbness and headaches  Hematological: Negative  Does not bruise/bleed easily  Psychiatric/Behavioral: Negative  Negative for confusion, hallucinations and sleep disturbance       I personally reviewed and updated the ROS as appropriate

## 2021-04-20 ENCOUNTER — PATIENT OUTREACH (OUTPATIENT)
Dept: FAMILY MEDICINE CLINIC | Facility: CLINIC | Age: 61
End: 2021-04-20

## 2021-04-20 NOTE — PROGRESS NOTES
Called the patient but received voicemail  Message was left asking for a return call as well as reminding her of upcoming appointments: Endo 4/21 and Covid vaccine 4/22 with times provided  Chart reviewed

## 2021-04-21 ENCOUNTER — OFFICE VISIT (OUTPATIENT)
Dept: ENDOCRINOLOGY | Facility: CLINIC | Age: 61
End: 2021-04-21
Payer: COMMERCIAL

## 2021-04-21 VITALS
HEART RATE: 84 BPM | SYSTOLIC BLOOD PRESSURE: 130 MMHG | WEIGHT: 288 LBS | DIASTOLIC BLOOD PRESSURE: 100 MMHG | HEIGHT: 67 IN | BODY MASS INDEX: 45.2 KG/M2

## 2021-04-21 DIAGNOSIS — E83.52 HYPERCALCEMIA: ICD-10-CM

## 2021-04-21 DIAGNOSIS — Z79.4 TYPE 2 DIABETES MELLITUS WITH HYPERGLYCEMIA, WITH LONG-TERM CURRENT USE OF INSULIN (HCC): ICD-10-CM

## 2021-04-21 DIAGNOSIS — E04.1 THYROID NODULE: ICD-10-CM

## 2021-04-21 DIAGNOSIS — E11.65 TYPE 2 DIABETES MELLITUS WITH HYPERGLYCEMIA, WITH LONG-TERM CURRENT USE OF INSULIN (HCC): ICD-10-CM

## 2021-04-21 DIAGNOSIS — E21.3 HYPERPARATHYROIDISM (HCC): Primary | ICD-10-CM

## 2021-04-21 PROCEDURE — 3008F BODY MASS INDEX DOCD: CPT | Performed by: PHYSICIAN ASSISTANT

## 2021-04-21 PROCEDURE — 99215 OFFICE O/P EST HI 40 MIN: CPT | Performed by: PHYSICIAN ASSISTANT

## 2021-04-21 PROCEDURE — 1036F TOBACCO NON-USER: CPT | Performed by: PHYSICIAN ASSISTANT

## 2021-04-21 NOTE — PROGRESS NOTES
Established Patient Progress Note       Chief Complaint   Patient presents with    Thyroid Problem    Hyperparathyroidism    Diabetes Type 2        History of Present Illness:     eLty More is a 62 y o  female with a history of Type 2 Diabetes, Hyperparathyroidism, and Vitamin D Deficiency       For the hyperparathyroidism, Sensipar prescribed in the past but was not covered and was too expensive  She was not interested in surgical intervention  Denies kidney stones/fractures  Bone density was normal on 10/2020 DEXA  Since the last visit, she was able to get the sensipar covered by insurance and has been taking 30mg daily and calcium levels have improved        For the Diabetes,  She has been completely off the Novolin 70/30 and is taking metformin  She reports blood sugars have been good and all readings have been less than 150  Today blood sugar was 118       For Vitamin D Deficiency, she remains off supplements       For the thyroid nodules, FNA was attempted 1/29/2021 but this was non-diagnostic  She reported significant difficulty and pain with test and would not want to repeat unless she was asleep  Denies FH Thyroid CA/Thyroid problems  TSH normal   She will be having sleep study in the near future for suspected sleep apnea  She does snore  Denies problems with her breathing         Patient Active Problem List   Diagnosis    Hernia, ventral    Type 2 diabetes mellitus with hyperglycemia, with long-term current use of insulin (HCC)    GI bleed    Morbid obesity with BMI of 45 0-49 9, adult ([address] )    Ischemic colitis ([address] )    Unintentional weight loss    Hypercalcemia    Hyperparathyroidism ([address] )    Vitamin D deficiency    Depression with anxiety    Benign essential hypertension    Falls frequently    Numbness and tingling of right arm and leg    Ambulatory dysfunction    Acute right hemiparesis (HCC)    Cervical spinal cord lesion    Neuropathic pain, leg, bilateral    MS (multiple sclerosis) ([facility] )    Stroke (cerebrum) ([facility] )    Cognitive decline    Left sided numbness    Right hemiparesis ([place] [address] )    Snoring    Morbid obesity (HCC)    Thyroid nodule    B12 deficiency    Hyperlipidemia      Past Medical History:   Diagnosis Date    Diabetes mellitus ([place] [address] )     External hemorrhoids     last assessed 16, resolved 16    Hernia, ventral     last assessed 12/14/15, resolved 16    Hypertension     Incarcerated incisional hernia     last assessed 12/21/15, resolved 16    Insomnia     last assessed 16, resolved 10/9/17    Lyme disease     Morbid obesity with BMI of 45 0-49 9, adult ([place] [address] ) 5/3/2017    MS (multiple sclerosis) ([place] [address] )     Stroke (cerebrum) ([place] [address] ) 2020    Type 2 diabetes mellitus with hyperglycemia, without long-term current use of insulin (HCC)       Past Surgical History:   Procedure Laterality Date    ABCESS DRAINAGE       SECTION      x3    COLONOSCOPY N/A 2017    Procedure: COLONOSCOPY with biopsy;  Surgeon: Tracey Borja DO;  Location: [facility];   Service: Complete    HYSTERECTOMY      IR LUMBAR PUNCTURE  3/13/2020    US GUIDED THYROID BIOPSY  2021      Family History   Problem Relation Age of Onset    Glaucoma Mother     Diabetes Father     Hypertension Father     Pancreatic cancer Father     Colon cancer Father     Breast cancer Maternal Grandmother     Breast cancer Maternal Aunt     Coronary artery disease Family     Diabetes Family     Hypertension Family     Cervical cancer Sister 61    Breast cancer Sister 50    Breast cancer Sister 45     Social History     Tobacco Use    Smoking status: Never Smoker    Smokeless tobacco: Never Used   Substance Use Topics    Alcohol use: Yes     Frequency: Monthly or less     Drinks per session: 1 or 2     Binge frequency: Never     Comment: Social     Allergies   Allergen Reactions    Sorbitan Diarrhea, Vomiting and Abdominal Pain    Ambien [Zolpidem Tartrate]     Demerol [Meperidine]     Insulin     Insulin Glargine      Annotation - [id number]: memory loss    Latex     Oxycodone-Acetaminophen Vomiting    Zolpidem Hallucinations and Irritability       Current Outpatient Medications:     acetaminophen (TYLENOL) 500 mg tablet, Take 2 tablets (1,000 mg total) by mouth every 6 (six) hours as needed for moderate pain, Disp: 60 tablet, Rfl: 1    Alcohol Swabs PADS, by Does not apply route 4 (four) times a day *Latex Free*, Disp: 120 each, Rfl: 3    aspirin 81 mg chewable tablet, Chew 1 tablet (81 mg total) daily, Disp: , Rfl: 0    atorvastatin (LIPITOR) 40 mg tablet, Take 1 tablet (40 mg total) by mouth every evening, Disp: 90 tablet, Rfl: 1    Blood Pressure KIT, by Does not apply route daily, Disp: 1 each, Rfl: 0    cinacalcet (SENSIPAR) 30 mg tablet, Take 1 tablet (30 mg total) by mouth daily, Disp: 30 tablet, Rfl: 3    DULoxetine (CYMBALTA) 60 mg delayed release capsule, Take 1 capsule (60 mg total) by mouth daily, Disp: 30 capsule, Rfl: 3    gabapentin (NEURONTIN) 300 mg capsule, Take 600mg by mouth in the morning and afternoon AND 900mg at bedtime  , Disp: 210 capsule, Rfl: 3    glucose blood test strip, Check blood glucose levels three times per day before meals, Disp: 300 each, Rfl: 2    hydrochlorothiazide (HYDRODIURIL) 25 mg tablet, Take 1 tablet by mouth once daily, Disp: 90 tablet, Rfl: 0    Insulin Syringe-Needle U-100 (BD Insulin Syringe Ultrafine) 31G X 15/64" 0 3 ML MISC, by Does not apply route 2 (two) times a day, Disp: 200 each, Rfl: 1    INTERFERON BETA-1B SC, Inject 1 mL under the skin every other day, Disp: , Rfl:     lisinopril (ZESTRIL) 40 mg tablet, Take 1 tablet by mouth once daily, Disp: 90 tablet, Rfl: 0    metFORMIN (GLUCOPHAGE-XR) 500 mg 24 hr tablet, TAKE 2 TABLETS BY MOUTH TWICE DAILY WITH MEALS, Disp: 360 tablet, Rfl: 0    metoprolol succinate (TOPROL-XL) 50 mg 24 hr tablet, Take 1 tablet (50 mg total) by mouth daily, Disp: 90 tablet, Rfl: 1    ReliOn Prime Test test strip, USE 1 STRIP TO CHECK GLUCOSE THREE TIMES DAILY, Disp: 100 each, Rfl: 0    vitamin B-12 (VITAMIN B-12) 500 mcg tablet, Take 2 tablets (1,000 mcg total) by mouth daily, Disp: 60 tablet, Rfl: 5    diazepam (VALIUM) 5 mg tablet, Take 1 tab 60 min prior to MRI with a second dose 20 min prior to imaging if anxiety persists (Patient not taking: Reported on 4/5/2021), Disp: 2 tablet, Rfl: 0    Misc  Devices (DIGITAL GLASS SCALE) MISC, by Does not apply route daily (Patient not taking: Reported on 4/21/2021), Disp: 1 each, Rfl: 0    Review of Systems   Constitutional: Positive for fatigue  Negative for activity change, appetite change, chills, diaphoresis, fever and unexpected weight change  HENT: Negative for trouble swallowing and voice change  Eyes: Negative for visual disturbance  Respiratory: Negative for shortness of breath  Cardiovascular: Negative for chest pain and palpitations  Gastrointestinal: Negative for abdominal pain, constipation and diarrhea  Endocrine: Negative for cold intolerance, heat intolerance, polydipsia, polyphagia and polyuria  Genitourinary: Negative for frequency and menstrual problem  Musculoskeletal: Positive for gait problem and myalgias  Negative for arthralgias  Skin: Negative for rash  Allergic/Immunologic: Negative for food allergies  Neurological: Negative for dizziness and tremors  Hematological: Negative for adenopathy  Psychiatric/Behavioral: Negative for sleep disturbance  All other systems reviewed and are negative  Physical Exam:  Body mass index is 45 11 kg/m²  /100   Pulse 84   Ht 5' 7" (1 702 m)   Wt 131 kg (288 lb)   LMP  (LMP Unknown)   BMI 45 11 kg/m²    Wt Readings from Last 3 Encounters:   04/21/21 131 kg (288 lb)   04/12/21 130 kg (287 lb 8 oz)   04/09/21 128 kg (282 lb 3 2 oz)       Physical Exam  Vitals signs reviewed     Constitutional:       General: She is not in acute distress  Appearance: She is well-developed  HENT:      Head: Normocephalic and atraumatic  Eyes:      Conjunctiva/sclera: Conjunctivae normal       Pupils: Pupils are equal, round, and reactive to light  Neck:      Musculoskeletal: Normal range of motion and neck supple  Thyroid: No thyromegaly  Cardiovascular:      Rate and Rhythm: Normal rate and regular rhythm  Heart sounds: Normal heart sounds  Pulmonary:      Effort: Pulmonary effort is normal  No respiratory distress  Breath sounds: Normal breath sounds  No wheezing or rales  Abdominal:      General: Bowel sounds are normal  There is no distension  Palpations: Abdomen is soft  Tenderness: There is no abdominal tenderness  Musculoskeletal: Normal range of motion  Skin:     General: Skin is warm and dry  Neurological:      Mental Status: She is alert and oriented to person, place, and time  Labs:       Lab Results   Component Value Date    CREATININE 0 63 01/21/2021    CREATININE 0 68 11/09/2020    CREATININE 0 87 08/31/2020    BUN 15 01/21/2021    K 4 1 01/21/2021     (H) 01/21/2021    CO2 27 01/21/2021     eGFR   Date Value Ref Range Status   01/21/2021 98 ml/min/1 73sq m Final   09/26/2016 >60 0 ml/min/1 73sq m Final       Lab Results   Component Value Date    HDL 44 06/27/2020    TRIG 169 (H) 06/27/2020       Lab Results   Component Value Date    ALT 27 01/21/2021    AST 15 01/21/2021    ALKPHOS 101 01/21/2021       No results found for: FREET4, TSI      Impression & Plan:    Problem List Items Addressed This Visit        Endocrine    Type 2 diabetes mellitus with hyperglycemia, with long-term current use of insulin (HCC)     Stable off insulin  Continue metformin and glucose monitoring  If she notes blood sugars frequently over 150 she will let us know     Lab Results   Component Value Date    HGBA1C 7 1 (A) 04/05/2021            Relevant Orders    Hemoglobin A1C Hyperparathyroidism ([facility] ) - Primary     Has been taking Sensipar since last visit and Calcium improving  Continue to monitor  Relevant Orders    Basic metabolic panel    Thyroid nodule     FNA non-diagnostic  Patient/Provider performing FNA reported she did not tolerate procedure well and if repeat needed should be done with anesthesia  Discussed options with patient/son-- we could monitor this nodule over time and refer for repeat biopsy if nodule changes in size or refer for another FNA with afirma with anesthesia for definitive diagnosis  Patient/family would prefer to repeat FNA  Discussed possible outcome of FNA- if this is cancerous or suspicious for cancer will refer for Surgery  Relevant Orders    TSH, 3rd generation    T4, free    US guided thyroid biopsy with AFIRMA       Other    Hypercalcemia     Due to primary hyperparathyroidism  Improving with sensipar  Consider evaluation for surgical resection only if she needs surgery for thyroid nodules  Relevant Orders    Comprehensive metabolic panel    Lipid Panel with Direct LDL reflex    PTH, intact          Orders Placed This Encounter   Procedures    US guided thyroid biopsy with AFIRMA     Previous FNA non-diagnostic, patient did not tolerate procedure well, needs anesthesia  Patient with obesity, MS and undergoing sleep study soon for suspected SELENE  Nodule #1  Image 45  Left midpole nodule measuring 1 9 x 0 9 x 1 7 cm  Standing Status:   Future     Standing Expiration Date:   4/23/2025     Scheduling Instructions:      No prep required  Please bring your insurance cards, a form of photo ID and a list of your medications with you  Arrive 15 minutes prior to your appointment time in order to register  To schedule this appointment, please contact Central Scheduling at [phone]  Order Specific Question:   Is the patient pregnant?      Answer:   No     Order Specific Question:   What is the patient's sedation requirement? Answer: Anesthesia    Basic metabolic panel     This is a patient instruction: Patient fasting for 8 hours or longer recommended  Standing Status:   Future     Standing Expiration Date:   10/21/2021    Hemoglobin A1C     Standing Status:   Future     Standing Expiration Date:   4/21/2022    Comprehensive metabolic panel     This is a patient instruction: Patient fasting for 8 hours or longer recommended  Standing Status:   Future     Standing Expiration Date:   4/21/2022    Lipid Panel with Direct LDL reflex     This is a patient instruction: This test requires patient fasting for 10-12 hours or longer  Drinking of black coffee or black tea is acceptable  Standing Status:   Future     Standing Expiration Date:   4/21/2022    TSH, 3rd generation     This is a patient instruction: This test is non-fasting  Please drink two glasses of water morning of bloodwork  Standing Status:   Future     Standing Expiration Date:   4/21/2022    T4, free     Standing Status:   Future     Standing Expiration Date:   4/21/2022    PTH, intact     Standing Status:   Future     Standing Expiration Date:   4/21/2022       There are no Patient Instructions on file for this visit  Discussed with the patient and all questioned fully answered  She will call me if any problems arise  Follow-up appointment in 3 months       Counseled patient on diagnostic results, prognosis, risk and benefit of treatment options, instruction for management, importance of treatment compliance, Risk  factor reduction and impressions      Keri De Luna PA-C

## 2021-04-22 ENCOUNTER — IMMUNIZATIONS (OUTPATIENT)
Dept: FAMILY MEDICINE CLINIC | Facility: HOSPITAL | Age: 61
End: 2021-04-22

## 2021-04-22 DIAGNOSIS — Z23 ENCOUNTER FOR IMMUNIZATION: Primary | ICD-10-CM

## 2021-04-22 PROCEDURE — 0011A SARS-COV-2 / COVID-19 MRNA VACCINE (MODERNA) 100 MCG: CPT

## 2021-04-22 PROCEDURE — 91301 SARS-COV-2 / COVID-19 MRNA VACCINE (MODERNA) 100 MCG: CPT

## 2021-04-23 NOTE — ASSESSMENT & PLAN NOTE
FNA non-diagnostic  Patient/Provider performing FNA reported she did not tolerate procedure well and if repeat needed should be done with anesthesia  Discussed options with patient/son-- we could monitor this nodule over time and refer for repeat biopsy if nodule changes in size or refer for another FNA with afirma with anesthesia for definitive diagnosis  Patient/family would prefer to repeat FNA  Discussed possible outcome of FNA- if this is cancerous or suspicious for cancer will refer for Surgery

## 2021-04-23 NOTE — ASSESSMENT & PLAN NOTE
Due to primary hyperparathyroidism  Improving with sensipar  Consider evaluation for surgical resection only if she needs surgery for thyroid nodules

## 2021-04-23 NOTE — ASSESSMENT & PLAN NOTE
Stable off insulin  Continue metformin and glucose monitoring  If she notes blood sugars frequently over 150 she will let us know     Lab Results   Component Value Date    HGBA1C 7 1 (A) 04/05/2021

## 2021-04-27 ENCOUNTER — PATIENT OUTREACH (OUTPATIENT)
Dept: FAMILY MEDICINE CLINIC | Facility: CLINIC | Age: 61
End: 2021-04-27

## 2021-04-27 NOTE — PROGRESS NOTES
I called the patient but received voicemail  Message was left reminding her of her mammogram appointment scheduled for tomorrow, 4/28 @820am     I will continue to follow

## 2021-04-28 ENCOUNTER — APPOINTMENT (OUTPATIENT)
Dept: LAB | Facility: MEDICAL CENTER | Age: 61
End: 2021-04-28
Payer: COMMERCIAL

## 2021-04-28 ENCOUNTER — HOSPITAL ENCOUNTER (OUTPATIENT)
Dept: MAMMOGRAPHY | Facility: MEDICAL CENTER | Age: 61
Discharge: HOME | End: 2021-04-28
Payer: COMMERCIAL

## 2021-04-28 VITALS — HEIGHT: 67 IN | BODY MASS INDEX: 45.2 KG/M2 | WEIGHT: 288 LBS

## 2021-04-28 DIAGNOSIS — E11.65 TYPE 2 DIABETES MELLITUS WITH HYPERGLYCEMIA, WITH LONG-TERM CURRENT USE OF INSULIN (HCC): ICD-10-CM

## 2021-04-28 DIAGNOSIS — E21.3 HYPERPARATHYROIDISM (HCC): ICD-10-CM

## 2021-04-28 DIAGNOSIS — G35 MS (MULTIPLE SCLEROSIS) (HCC): ICD-10-CM

## 2021-04-28 DIAGNOSIS — D64.9 ANEMIA, UNSPECIFIED TYPE: ICD-10-CM

## 2021-04-28 DIAGNOSIS — Z12.31 ENCOUNTER FOR SCREENING MAMMOGRAM FOR MALIGNANT NEOPLASM OF BREAST: ICD-10-CM

## 2021-04-28 DIAGNOSIS — E78.5 HYPERLIPIDEMIA, UNSPECIFIED HYPERLIPIDEMIA TYPE: ICD-10-CM

## 2021-04-28 DIAGNOSIS — E53.8 B12 DEFICIENCY: ICD-10-CM

## 2021-04-28 DIAGNOSIS — Z79.4 TYPE 2 DIABETES MELLITUS WITH HYPERGLYCEMIA, WITH LONG-TERM CURRENT USE OF INSULIN (HCC): ICD-10-CM

## 2021-04-28 LAB
ALBUMIN SERPL BCP-MCNC: 3.4 G/DL (ref 3.5–5)
ALP SERPL-CCNC: 114 U/L (ref 46–116)
ALT SERPL W P-5'-P-CCNC: 21 U/L (ref 12–78)
ANION GAP SERPL CALCULATED.3IONS-SCNC: 4 MMOL/L (ref 4–13)
AST SERPL W P-5'-P-CCNC: 6 U/L (ref 5–45)
BASOPHILS # BLD AUTO: 0.04 THOUSANDS/ΜL (ref 0–0.1)
BASOPHILS NFR BLD AUTO: 1 % (ref 0–1)
BILIRUB DIRECT SERPL-MCNC: 0.22 MG/DL (ref 0–0.2)
BILIRUB SERPL-MCNC: 0.72 MG/DL (ref 0.2–1)
BUN SERPL-MCNC: 16 MG/DL (ref 5–25)
CALCIUM SERPL-MCNC: 10.7 MG/DL (ref 8.3–10.1)
CHLORIDE SERPL-SCNC: 110 MMOL/L (ref 100–108)
CHOLEST SERPL-MCNC: 108 MG/DL (ref 50–200)
CO2 SERPL-SCNC: 27 MMOL/L (ref 21–32)
CREAT SERPL-MCNC: 0.66 MG/DL (ref 0.6–1.3)
EOSINOPHIL # BLD AUTO: 0.52 THOUSAND/ΜL (ref 0–0.61)
EOSINOPHIL NFR BLD AUTO: 6 % (ref 0–6)
ERYTHROCYTE [DISTWIDTH] IN BLOOD BY AUTOMATED COUNT: 13.4 % (ref 11.6–15.1)
FERRITIN SERPL-MCNC: 103 NG/ML (ref 8–388)
GFR SERPL CREATININE-BSD FRML MDRD: 97 ML/MIN/1.73SQ M
GLUCOSE P FAST SERPL-MCNC: 153 MG/DL (ref 65–99)
HCT VFR BLD AUTO: 38 % (ref 34.8–46.1)
HDLC SERPL-MCNC: 44 MG/DL
HGB BLD-MCNC: 12.1 G/DL (ref 11.5–15.4)
IMM GRANULOCYTES # BLD AUTO: 0.02 THOUSAND/UL (ref 0–0.2)
IMM GRANULOCYTES NFR BLD AUTO: 0 % (ref 0–2)
IRON SATN MFR SERPL: 16 %
IRON SERPL-MCNC: 46 UG/DL (ref 50–170)
LDLC SERPL CALC-MCNC: 52 MG/DL (ref 0–100)
LYMPHOCYTES # BLD AUTO: 1.81 THOUSANDS/ΜL (ref 0.6–4.47)
LYMPHOCYTES NFR BLD AUTO: 21 % (ref 14–44)
MCH RBC QN AUTO: 28.7 PG (ref 26.8–34.3)
MCHC RBC AUTO-ENTMCNC: 31.8 G/DL (ref 31.4–37.4)
MCV RBC AUTO: 90 FL (ref 82–98)
MONOCYTES # BLD AUTO: 0.67 THOUSAND/ΜL (ref 0.17–1.22)
MONOCYTES NFR BLD AUTO: 8 % (ref 4–12)
NEUTROPHILS # BLD AUTO: 5.56 THOUSANDS/ΜL (ref 1.85–7.62)
NEUTS SEG NFR BLD AUTO: 64 % (ref 43–75)
NONHDLC SERPL-MCNC: 64 MG/DL
NRBC BLD AUTO-RTO: 0 /100 WBCS
PLATELET # BLD AUTO: 268 THOUSANDS/UL (ref 149–390)
PMV BLD AUTO: 9.9 FL (ref 8.9–12.7)
POTASSIUM SERPL-SCNC: 4.3 MMOL/L (ref 3.5–5.3)
PROT SERPL-MCNC: 7.2 G/DL (ref 6.4–8.2)
RBC # BLD AUTO: 4.21 MILLION/UL (ref 3.81–5.12)
SODIUM SERPL-SCNC: 141 MMOL/L (ref 136–145)
TIBC SERPL-MCNC: 281 UG/DL (ref 250–450)
TRIGL SERPL-MCNC: 60 MG/DL
VIT B12 SERPL-MCNC: 357 PG/ML (ref 100–900)
WBC # BLD AUTO: 8.62 THOUSAND/UL (ref 4.31–10.16)

## 2021-04-28 PROCEDURE — 36415 COLL VENOUS BLD VENIPUNCTURE: CPT

## 2021-04-28 PROCEDURE — 77067 SCR MAMMO BI INCL CAD: CPT

## 2021-04-28 PROCEDURE — 83540 ASSAY OF IRON: CPT

## 2021-04-28 PROCEDURE — 77063 BREAST TOMOSYNTHESIS BI: CPT

## 2021-04-28 PROCEDURE — 85025 COMPLETE CBC W/AUTO DIFF WBC: CPT

## 2021-04-28 PROCEDURE — 80061 LIPID PANEL: CPT

## 2021-04-28 PROCEDURE — 82728 ASSAY OF FERRITIN: CPT

## 2021-04-28 PROCEDURE — 83550 IRON BINDING TEST: CPT

## 2021-04-28 PROCEDURE — 80048 BASIC METABOLIC PNL TOTAL CA: CPT

## 2021-04-28 PROCEDURE — 80076 HEPATIC FUNCTION PANEL: CPT

## 2021-04-28 PROCEDURE — 82607 VITAMIN B-12: CPT

## 2021-04-29 ENCOUNTER — TELEPHONE (OUTPATIENT)
Dept: FAMILY MEDICINE CLINIC | Facility: CLINIC | Age: 61
End: 2021-04-29

## 2021-05-03 DIAGNOSIS — E83.52 HYPERCALCEMIA: Primary | ICD-10-CM

## 2021-05-13 ENCOUNTER — OFFICE VISIT (OUTPATIENT)
Dept: FAMILY MEDICINE CLINIC | Facility: CLINIC | Age: 61
End: 2021-05-13

## 2021-05-13 VITALS
HEART RATE: 70 BPM | HEIGHT: 67 IN | TEMPERATURE: 98 F | WEIGHT: 280 LBS | DIASTOLIC BLOOD PRESSURE: 100 MMHG | RESPIRATION RATE: 16 BRPM | OXYGEN SATURATION: 97 % | BODY MASS INDEX: 43.95 KG/M2 | SYSTOLIC BLOOD PRESSURE: 150 MMHG

## 2021-05-13 DIAGNOSIS — J30.1 ALLERGIC RHINITIS DUE TO POLLEN, UNSPECIFIED SEASONALITY: ICD-10-CM

## 2021-05-13 DIAGNOSIS — R51.9 FRONTAL HEADACHE: Primary | ICD-10-CM

## 2021-05-13 PROCEDURE — 99213 OFFICE O/P EST LOW 20 MIN: CPT | Performed by: FAMILY MEDICINE

## 2021-05-13 PROCEDURE — 3008F BODY MASS INDEX DOCD: CPT | Performed by: PHYSICIAN ASSISTANT

## 2021-05-13 RX ORDER — FLUTICASONE PROPIONATE 50 MCG
1 SPRAY, SUSPENSION (ML) NASAL DAILY
Qty: 1 BOTTLE | Refills: 1 | Status: SHIPPED | OUTPATIENT
Start: 2021-05-13 | End: 2021-09-15

## 2021-05-13 NOTE — PROGRESS NOTES
A/P:  1  Daily right frontal HA for 14 days  No neurological deficits or red flags  Likely sinus congestion due to allergic rhinitis and position while sleeping  Will try flonase 2 squirts per nostril BID, hydration, steam   Return parameters d/w pt   2  Allergic rhinitis: Per #1  F/U 4-6 weeks with DEANA Michaud    HPI    62 ear old woman with 4 week h/o right-sided frontal HA when she awakens that resolve 4 hours after awakening  Reports the pain as throbbing and non-radiating  APAP does not help  No F/C/N/V/visual changes/weakness/numbness  Possible history of allergies  + tobacco exposure at home  ROS per HPI      Current Outpatient Medications:     acetaminophen (TYLENOL) 500 mg tablet, Take 2 tablets (1,000 mg total) by mouth every 6 (six) hours as needed for moderate pain, Disp: 60 tablet, Rfl: 1    Alcohol Swabs PADS, by Does not apply route 4 (four) times a day *Latex Free*, Disp: 120 each, Rfl: 3    aspirin 81 mg chewable tablet, Chew 1 tablet (81 mg total) daily, Disp: , Rfl: 0    atorvastatin (LIPITOR) 40 mg tablet, Take 1 tablet (40 mg total) by mouth every evening, Disp: 90 tablet, Rfl: 1    Blood Pressure KIT, by Does not apply route daily, Disp: 1 each, Rfl: 0    cinacalcet (SENSIPAR) 30 mg tablet, Take 1 tablet (30 mg total) by mouth daily, Disp: 30 tablet, Rfl: 3    DULoxetine (CYMBALTA) 60 mg delayed release capsule, Take 1 capsule (60 mg total) by mouth daily, Disp: 30 capsule, Rfl: 3    gabapentin (NEURONTIN) 300 mg capsule, Take 600mg by mouth in the morning and afternoon AND 900mg at bedtime  , Disp: 210 capsule, Rfl: 3    glucose blood test strip, Check blood glucose levels three times per day before meals, Disp: 300 each, Rfl: 2    hydrochlorothiazide (HYDRODIURIL) 25 mg tablet, Take 1 tablet by mouth once daily, Disp: 90 tablet, Rfl: 0    Insulin Syringe-Needle U-100 (BD Insulin Syringe Ultrafine) 31G X 15/64" 0 3 ML MISC, by Does not apply route 2 (two) times a day, Disp: 200 each, Rfl: 1    INTERFERON BETA-1B SC, Inject 1 mL under the skin every other day, Disp: , Rfl:     lisinopril (ZESTRIL) 40 mg tablet, Take 1 tablet by mouth once daily, Disp: 90 tablet, Rfl: 0    metFORMIN (GLUCOPHAGE-XR) 500 mg 24 hr tablet, TAKE 2 TABLETS BY MOUTH TWICE DAILY WITH MEALS, Disp: 360 tablet, Rfl: 0    metoprolol succinate (TOPROL-XL) 50 mg 24 hr tablet, Take 1 tablet (50 mg total) by mouth daily, Disp: 90 tablet, Rfl: 1    ReliOn Prime Test test strip, USE 1 STRIP TO CHECK GLUCOSE THREE TIMES DAILY, Disp: 100 each, Rfl: 0    vitamin B-12 (VITAMIN B-12) 500 mcg tablet, Take 2 tablets (1,000 mcg total) by mouth daily, Disp: 60 tablet, Rfl: 5    diazepam (VALIUM) 5 mg tablet, Take 1 tab 60 min prior to MRI with a second dose 20 min prior to imaging if anxiety persists (Patient not taking: Reported on 4/5/2021), Disp: 2 tablet, Rfl: 0    fluticasone (FLONASE) 50 mcg/act nasal spray, 1 spray into each nostril daily, Disp: 1 Bottle, Rfl: 1    Misc   Devices (DIGITAL GLASS SCALE) MISC, by Does not apply route daily (Patient not taking: Reported on 4/21/2021), Disp: 1 each, Rfl: 0    Vitals:    05/13/21 1044   BP: 150/100   Pulse: 70   Resp: 16   Temp: 98 °F (36 7 °C)   SpO2: 97%     GNRL: WN, WD woman, NAD  HEENT: Mild TTP over right frontal sinus  NEURO: CN II-XII grossly intact, STR 5/5 b/l UE + LE, no pronator drift, normal tone, DTR 1+ b/l UE

## 2021-05-14 ENCOUNTER — HOSPITAL ENCOUNTER (OUTPATIENT)
Dept: ULTRASOUND IMAGING | Facility: CLINIC | Age: 61
Discharge: HOME | End: 2021-05-14
Payer: COMMERCIAL

## 2021-05-14 ENCOUNTER — HOSPITAL ENCOUNTER (OUTPATIENT)
Dept: MAMMOGRAPHY | Facility: CLINIC | Age: 61
Discharge: HOME | End: 2021-05-14
Payer: COMMERCIAL

## 2021-05-14 VITALS — HEIGHT: 67 IN | WEIGHT: 280 LBS | BODY MASS INDEX: 43.95 KG/M2

## 2021-05-14 DIAGNOSIS — R92.8 ABNORMAL SCREENING MAMMOGRAM: ICD-10-CM

## 2021-05-14 PROCEDURE — G0279 TOMOSYNTHESIS, MAMMO: HCPCS

## 2021-05-14 PROCEDURE — 77065 DX MAMMO INCL CAD UNI: CPT

## 2021-05-14 PROCEDURE — 76642 ULTRASOUND BREAST LIMITED: CPT

## 2021-05-19 ENCOUNTER — PATIENT OUTREACH (OUTPATIENT)
Dept: FAMILY MEDICINE CLINIC | Facility: CLINIC | Age: 61
End: 2021-05-19

## 2021-05-19 NOTE — PROGRESS NOTES
I called the patient but received voicemail  Message was left reminding her of her second Covid vaccine on 5/21 and her Sleep Medicine appointment on 5/23

## 2021-05-21 ENCOUNTER — IMMUNIZATIONS (OUTPATIENT)
Dept: FAMILY MEDICINE CLINIC | Facility: HOSPITAL | Age: 61
End: 2021-05-21

## 2021-05-21 DIAGNOSIS — Z23 ENCOUNTER FOR IMMUNIZATION: Primary | ICD-10-CM

## 2021-05-21 PROCEDURE — 0012A SARS-COV-2 / COVID-19 MRNA VACCINE (MODERNA) 100 MCG: CPT

## 2021-05-21 PROCEDURE — 91301 SARS-COV-2 / COVID-19 MRNA VACCINE (MODERNA) 100 MCG: CPT

## 2021-05-23 ENCOUNTER — HOSPITAL ENCOUNTER (OUTPATIENT)
Dept: SLEEP CENTER | Facility: CLINIC | Age: 61
Discharge: HOME | End: 2021-05-23
Payer: COMMERCIAL

## 2021-05-23 DIAGNOSIS — G47.33 OBSTRUCTIVE SLEEP APNEA SYNDROME: ICD-10-CM

## 2021-05-23 DIAGNOSIS — R40.0 DAYTIME SLEEPINESS: ICD-10-CM

## 2021-05-23 PROCEDURE — 95810 POLYSOM 6/> YRS 4/> PARAM: CPT

## 2021-05-24 NOTE — PROGRESS NOTES
Sleep Study Documentation    Pre-Sleep Study       Sleep testing procedure explained to patient:YES    Patient napped prior to study:NO    Caffeine:Dayshift worker after 12PM   Caffeine use:YES- coffee  6 to 18 ounces    Alcohol:Dayshift workers after 5PM: Alcohol use:NO    Typical day for patient:YES       Study Documentation    Sleep Study Indications: snoring and excessive daytime sleepiness    Sleep Study: Diagnostic   Snore: Moderate  Supplemental O2: no    Minimum SaO2 89  Baseline SaO2 92          EKG abnormalities: Occasional PVC's noted    EEG abnormalities: no    Sleep Study Recorded < 2 hours: N/A    Sleep Study Recorded > 2 hours but incomplete study: N/A    Sleep Study Recorded 6 hours but no sleep obtained: NO    Patient classification: disabled       Post-Sleep Study    Medication used at bedtime or during sleep study:YES other prescription medications    Patient reports time it took to fall asleep:20 to 30 minutes    Patient reports waking up during study:1 to 2 times  Patient reports returning to sleep without difficulty  Patient reports sleeping 4 to 6 hours with dreaming  Patient reports sleep during study:typical    Patient rated sleepiness: Somewhat sleepy or tired    PAP treatment:no

## 2021-05-25 ENCOUNTER — PATIENT OUTREACH (OUTPATIENT)
Dept: FAMILY MEDICINE CLINIC | Facility: CLINIC | Age: 61
End: 2021-05-25

## 2021-05-25 NOTE — LETTER
Date: 05/25/21    Dear Radha Lindsey,   My name is Comfort Castellano; I am a registered nurse care manager working with [facility]    I have not been able to reach you and would like to set a time that I can talk with you over the phone  My work is to help patients that have complex medical conditions get the care they need  This includes patients who may have been in the hospital or emergency room  I have enclosed information for you  Please call me with any questions you may have  I look forward to meeting with you    Sincerely,  Comfort Castellano, [address] [facility]  [phone]  Outpatient Care Manager

## 2021-05-27 ENCOUNTER — TELEPHONE (OUTPATIENT)
Dept: SLEEP CENTER | Facility: CLINIC | Age: 61
End: 2021-05-27

## 2021-05-27 DIAGNOSIS — E11.65 TYPE 2 DIABETES MELLITUS WITH HYPERGLYCEMIA, WITHOUT LONG-TERM CURRENT USE OF INSULIN (HCC): Chronic | ICD-10-CM

## 2021-05-27 DIAGNOSIS — E11.65 TYPE 2 DIABETES MELLITUS WITH HYPERGLYCEMIA, WITH LONG-TERM CURRENT USE OF INSULIN (HCC): ICD-10-CM

## 2021-05-27 DIAGNOSIS — Z79.4 TYPE 2 DIABETES MELLITUS WITH HYPERGLYCEMIA, WITH LONG-TERM CURRENT USE OF INSULIN (HCC): ICD-10-CM

## 2021-05-27 RX ORDER — METFORMIN HYDROCHLORIDE 500 MG/1
TABLET, EXTENDED RELEASE ORAL
Qty: 360 TABLET | Refills: 0 | Status: SHIPPED | OUTPATIENT
Start: 2021-05-27 | End: 2021-09-03

## 2021-05-27 RX ORDER — BLOOD SUGAR DIAGNOSTIC
1 STRIP MISCELLANEOUS 2 TIMES DAILY
Qty: 200 EACH | Refills: 1 | Status: SHIPPED | OUTPATIENT
Start: 2021-05-27 | End: 2022-03-18 | Stop reason: SDUPTHER

## 2021-05-27 NOTE — TELEPHONE ENCOUNTER
Patient called, I did advised she does not need to go for COVID test, and I did advise results of sleep study

## 2021-05-27 NOTE — TELEPHONE ENCOUNTER
Left message for patient that sleep study has been resulted  Mild to moderate SELENE  Dr Elsy Ordaz is recommending to proceed with CPAP study scheduled 6/4/21  Advised patient that due to change in COVID testing protocol, she will not need to have COVID test prior to CPAP study since she is fully vaccinated  Left call back number for any questions

## 2021-06-01 DIAGNOSIS — E83.52 HYPERCALCEMIA: ICD-10-CM

## 2021-06-01 RX ORDER — CINACALCET 30 MG/1
TABLET, FILM COATED ORAL
Qty: 30 TABLET | Refills: 0 | Status: SHIPPED | OUTPATIENT
Start: 2021-06-01 | End: 2021-06-07

## 2021-06-04 ENCOUNTER — HOSPITAL ENCOUNTER (OUTPATIENT)
Dept: SLEEP CENTER | Facility: CLINIC | Age: 61
Discharge: HOME | End: 2021-06-04
Payer: COMMERCIAL

## 2021-06-04 DIAGNOSIS — I10 BENIGN ESSENTIAL HYPERTENSION: ICD-10-CM

## 2021-06-04 DIAGNOSIS — E83.52 HYPERCALCEMIA: ICD-10-CM

## 2021-06-04 DIAGNOSIS — G47.33 OBSTRUCTIVE SLEEP APNEA SYNDROME: ICD-10-CM

## 2021-06-04 PROCEDURE — 95811 POLYSOM 6/>YRS CPAP 4/> PARM: CPT

## 2021-06-05 NOTE — PROGRESS NOTES
Sleep Study Documentation    Pre-Sleep Study       Sleep testing procedure explained to patient:YES    Patient napped prior to study:NO    Caffeine:Dayshift worker after 12PM   Caffeine use:NO    Alcohol:Dayshift workers after 5PM: Alcohol use:NO    Typical day for patient:YES       Study Documentation    Sleep Study Indications: Mild to moderate obstructive sleep apnea diagnosed on in lab study 5/23/2021  Sleep Study: Treatment   Optimal PAP pressure: 7cm   Leak:Small  Snore:Eliminated  REM Obtained:yes  Supplemental O2: no    Minimum SaO2 92%  Baseline SaO2 95%  PAP mask tried (list all) ResMed AirFit P10 mask system, [employer] Simplus   PAP mask choice (final) [employer] Simplus, small   PAP mask type:full face   PAP pressure at which snoring was eliminated 7cm   Minimum SaO2 at final PAP pressure 93%  Mode of Therapy:CPAP  ETCO2:No  CPAP changed to BiPAP:No        EKG abnormalities: yes:  Comments: Occasional PVCs  EEG abnormalities: no    Sleep Study Recorded < 2 hours: N/A    Sleep Study Recorded > 2 hours but incomplete study: N/A    Sleep Study Recorded 6 hours but no sleep obtained: NO    Patient classification: disabled       Post-Sleep Study    Medication used at bedtime or during sleep study:NO    Patient reports time it took to fall asleep:30 to 60 minutes    Patient reports waking up during study:1 to 2 times  Patient reports returning to sleep in 10 to 30 minutes  Patient reports sleeping 6 to 8 hours without dreaming  Patient reports sleep during study:better than usual    Patient rated sleepiness: Not sleepy or tired    PAP treatment:yes: Post PAP treatment patient reports feeling better and  would wear PAP mask at home

## 2021-06-07 ENCOUNTER — PATIENT OUTREACH (OUTPATIENT)
Dept: FAMILY MEDICINE CLINIC | Facility: CLINIC | Age: 61
End: 2021-06-07

## 2021-06-07 DIAGNOSIS — G47.33 OSA (OBSTRUCTIVE SLEEP APNEA): Primary | ICD-10-CM

## 2021-06-07 PROCEDURE — 4010F ACE/ARB THERAPY RXD/TAKEN: CPT | Performed by: PHYSICIAN ASSISTANT

## 2021-06-07 RX ORDER — LISINOPRIL 40 MG/1
TABLET ORAL
Qty: 90 TABLET | Refills: 0 | Status: SHIPPED | OUTPATIENT
Start: 2021-06-07 | End: 2021-06-30 | Stop reason: SDUPTHER

## 2021-06-07 RX ORDER — CINACALCET 30 MG/1
TABLET, FILM COATED ORAL
Qty: 30 TABLET | Refills: 1 | Status: SHIPPED | OUTPATIENT
Start: 2021-06-07 | End: 2021-08-11 | Stop reason: ALTCHOICE

## 2021-06-07 NOTE — PROGRESS NOTES
CARDIOLOGY PROGRESS note      History    Sly Piper is a 69 year old male who has history of coronary artery disease, hypertension, and hyperlipidemia who presents for a follow up. Since his last visit on 10/25/2018, the patient is here to discuss his stress test done today 04/11/2019. He had been having episodes of chest pain and shortness of breath with activity for the past 3 weeks. The patient was in [place] so he did not want to go into the hospital there. He also has had numbness in his left arm to his fingers.  He has had this a few times when sleeping.  He presents today with his wife.      Stress test 04/11/2019:  CONCLUSION:  This is an abnormal stress test and is compatible with ischemia.  At the time of this dictation, the Cardiolite portion is still pending.  Correlation with nuclear scan is recommended.  The patient's Duke treadmill score is -9.    Nuclear portion revealed:  IMPRESSION:  1.  Abnormal study with evidence of inferolateral wall ischemia.  2.  No evidence of previous myocardial injury pattern.  3.  Normal left ventricular size with satisfactory resting systolic performance.        The patient in 05/2008 had undergone a stent placement of the left circumflex and right coronary artery.  The patient had a stress test in 11/2011 which revealed no evidence of ischemia.      Stress echocardiogram 01/22/2018:  Normal stress echocardiogram.    Stress test Maura protocol 01/22/2018:  The patient exercised according to the MAURA for 07:17 min:s, achieving a work level of Max. METS: 8.9. The resting heart rate of 64 bpm joaquín to a maximal heart rate of 142 bpm. This value represents 92% of the maximal, age-predicted heart rate. The resting blood pressure of 152/80 mmHg , joaquín to a maximum blood pressure of 184/70 mmHg.     medical history    Past Medical History:   Diagnosis Date   • ASHD (arteriosclerotic heart disease)     ptcax2 with stents   • Chronic sinusitis    • Esophageal reflux    •  I received a call from the patient stating she received my UTR letter  She states she has been spending a lot of time outdoors and missed my calls  The patient is reporting headaches that occur 3-4 times per week  She reports the pain is always in the same area, "frontal lobe, right side"  She denies any nasal congestion and states the Flonase that was prescribed at a recent visit has not alleviated her symptom  She notes at times she has "double vision" accompanying the headaches  She reports her blood sugars have been stable with all under 140 for weeks  The patient stated she was going to lay down and rest     I will continue to follow  Essential hypertension, benign    • Pure hypercholesterolemia        SURGICAL history    Past Surgical History:   Procedure Laterality Date   • Clavicle surgery     • Colonoscopy  2016   • Colonoscopy  11/07/2016   • Colonoscopy remove lesion by snare  09/25/2007   • Colonoscopy remove lesion by snare  04/25/2011   • Coronary angioplasty with stent placement  05/21/2008    stents x2   • Esophagogastroduodenoscopy  2016   • Esophagogastroduodenoscopy transoral flex w/bx single or mult  09/25/2007    EGD with Bx   • Esophagogastroduodenoscopy transoral flex w/bx single or mult  07/13/2009    EGD with Bx   • Hernia repair     • Service to gastroenterology     • Tonsillectomy and adenoidectomy     • Vasectomy         mEDICATIONS    Current Outpatient Medications   Medication Sig   • pantoprazole (PROTONIX) 40 MG tablet TAKE 1 TABLET BY MOUTH EVERY DAY   • amLODIPine (NORVASC) 10 MG tablet TAKE 1 TABLET BY MOUTH DAILY   • metoPROLOL succinate (TOPROL-XL) 100 MG 24 hr tablet Take 1 tablet by mouth daily.   • potassium chloride (KLOR-CON M) 20 MEQ velvet ER tablet TAKE 1 TABLET BY MOUTH TWICE DAILY   • lovastatin (MEVACOR) 40 MG tablet TAKE 1 TABLET BY MOUTH EVERY NIGHT   • indapamide (LOZOL) 2.5 MG tablet TAKE 1 TABLET BY MOUTH EVERY MORNING   • zolpidem (AMBIEN) 10 MG tablet Take 1 tablet by mouth nightly as needed for Sleep.   • MULTIPLE VITAMIN PO Take 1 tablet by mouth daily.     • aspirin 81 MG tablet Take 81 mg by mouth daily.     • ascorbic acid (VITAMIN C) 1000 MG tablet Take 1,000 mg by mouth daily.     • fish oil-omega-3 fatty acids 1000 MG CAPS Take 1,000 mg by mouth daily.     • isosorbide mononitrate (IMDUR) 60 MG 24 hr tablet Take 1 tablet by mouth daily.   • cefUROXime (CEFTIN) 500 MG tablet Take 1 tablet by mouth 2 times daily. (Patient not taking: Reported on 4/11/2019)   • predniSONE (DELTASONE) 20 MG tablet Take 1 tablets daily with food for 7 days (Patient not taking: Reported on 4/11/2019)   •  promethazine-codeine (PHENERGAN WITH CODEINE) 6.25-10 MG/5ML syrup Take 5 mLs by mouth 4 times daily as needed for Cough. May cause drowsiness (Patient not taking: Reported on 4/11/2019)     No current facility-administered medications for this visit.      Facility-Administered Medications Ordered in Other Visits   Medication   • nitroGLYcerin (NITROLINGUAL) translingual solution 0.4 mg       aLLERGIES    ALLERGIES:  No Known Allergies    Physical Exam    Vital Signs:    Vitals:    04/11/19 0953   BP: 126/68   Pulse: 60   Resp: 16   Weight: 95.3 kg   Height: 5' 10\" (1.778 m)   General: NAD, pleasant, alert and oriented x 3.  Neck: No carotid bruits, no JVD (jugular venous distension). Normal carotid upstroke.  Pulmonary: No retractions or increased work of breathing, clear to auscultation bilaterally. No crackles or wheezing.  Cardiovascular: PMI (point of maximal impulse) in 5th intercostal place. RRR, normal S1 S2, no S3 or S4 appreciated. There are no murmurs.  Abdomen:  Bowel sounds normoactive, soft, non-tender, non-distended, no hepatosplenomegaly.  Extremities:  No edema or cyanosis,     Glucose (mg/dL)   Date Value   04/11/2019 112 (H)      CHOLESTEROL (mg/dL)   Date Value   10/03/2018 179   10/03/2018 178     HDL (mg/dL)   Date Value   10/03/2018 60   10/03/2018 62     CALCULATED LDL (mg/dL)   Date Value   10/03/2018 96   10/03/2018 93     TRIGLYCERIDE (mg/dL)   Date Value   10/03/2018 116   10/03/2018 116      Lab Results   Component Value Date    GPT 67 10/03/2018    GPT 67 10/03/2018       Assessment  1.  Coronary disease, stable.  2.  Hypertension, well controlled.   3.  Hyperlipidemia,stable.  4.  Abnormal stress test 04/11/2019.    HOLDEN Piper is a pleasant 69 year old white gentleman with history of coronary disease who is not having any episodes of angina.  His blood pressure is stable.  His lipids are normal.  Discussed that the stress test on treadmill was not markedly abnormal and  discussed proceeding with cardiac catheterization.  We will do this Monday, 04/15/2019.  They are planning a trip and flying out on Wednesday 04/17/2019 if all goes well and we will discuss this after catheterization.  In the meantime, will increase the Imdur to 60 mg once a day.     I discussed the risks, benefits, and alternatives of the coronary angiogram/possible percutaneous transluminal coronary angioplasty (PTCA)/possible stent(s).  The risks include, but are not limited to:  Stroke, heart attack, death, arrhythmia, renal failure, bleeding, site infection, arterial pseudoaneurysm formation, arterio-venous fistula formation, and peripheral embolization.  I also discussed the possible outcomes including:  Normal findings, coronary artery disease amenable to percutaneous coronary intervention and coronary artery disease needing surgical myocardial revascularization.  The use of bare metal stents and drug eluting stents was discussed, including the rationale and length of treatment with antiplatelet medications for each type of stent.  The patient was informed that additional, unanticipated procedures may be necessary during the procedure, based on my clinical judgment.  I discussed the possibility of using a closure device to close the arteriotomy if a femoral artery approach is used.  I outlined the risks associated with closure device use, including, but not limited to:  Bleeding, site infection, arterial pseudoaneurysm formation, arterio-venous fistula formation, arterial injury, and clot formation. The patient gave informed, written consent.  Lab work and additional diagnostic testing will be ordered, as needed, and reviewed prior to the procedure.  Based on the procedural findings, I will make further recommendations.       He will continue the current medications.  I will follow up with him in 6 weeks.  Thank you for allowing me to participate in the care of this patient.    On 4/11/2019, Cyndi RODRIGUEZ  scribed the services personally performed by Roosevelt Colvin MD    The documentation recorded by the scribe accurately and completely reflects the service(s) I personally performed and the decisions made by me.                Sincerely,      Roosevelt Colvin M.D.-Ph.D. [facility]

## 2021-06-08 ENCOUNTER — TELEPHONE (OUTPATIENT)
Dept: SLEEP CENTER | Facility: CLINIC | Age: 61
End: 2021-06-08

## 2021-06-08 NOTE — TELEPHONE ENCOUNTER
Advised patient patient sleep study results   Advised follow up appointment   Patient has DME set up scheduled also  Appointment information emailed to Bobo Diaz

## 2021-06-18 ENCOUNTER — PATIENT OUTREACH (OUTPATIENT)
Dept: FAMILY MEDICINE CLINIC | Facility: CLINIC | Age: 61
End: 2021-06-18

## 2021-06-18 NOTE — PROGRESS NOTES
The patient returned my call  I informed with the same message I left on her machine earlier  Patient understood and was thankful for the call

## 2021-06-18 NOTE — PROGRESS NOTES
I called the patient but received voicemail  Message was left stating the office will call to schedule an appointment for her headaches and I asked that she notify Neuro of the headaches as well

## 2021-06-22 ENCOUNTER — TELEPHONE (OUTPATIENT)
Dept: SLEEP CENTER | Facility: CLINIC | Age: 61
End: 2021-06-22

## 2021-06-22 ENCOUNTER — OFFICE VISIT (OUTPATIENT)
Dept: SLEEP CENTER | Facility: CLINIC | Age: 61
End: 2021-06-22
Payer: COMMERCIAL

## 2021-06-22 VITALS
HEIGHT: 67 IN | WEIGHT: 270 LBS | SYSTOLIC BLOOD PRESSURE: 136 MMHG | DIASTOLIC BLOOD PRESSURE: 88 MMHG | BODY MASS INDEX: 42.38 KG/M2

## 2021-06-22 DIAGNOSIS — G47.33 OSA (OBSTRUCTIVE SLEEP APNEA): Primary | ICD-10-CM

## 2021-06-22 DIAGNOSIS — R40.0 DAYTIME SLEEPINESS: ICD-10-CM

## 2021-06-22 DIAGNOSIS — G57.93 NEUROPATHIC PAIN, LEG, BILATERAL: ICD-10-CM

## 2021-06-22 DIAGNOSIS — I10 BENIGN ESSENTIAL HYPERTENSION: ICD-10-CM

## 2021-06-22 DIAGNOSIS — G81.91 RIGHT HEMIPARESIS (HCC): ICD-10-CM

## 2021-06-22 DIAGNOSIS — R51.9 HEADACHE UPON AWAKENING: ICD-10-CM

## 2021-06-22 DIAGNOSIS — F41.8 DEPRESSION WITH ANXIETY: ICD-10-CM

## 2021-06-22 DIAGNOSIS — R41.89 COGNITIVE DECLINE: ICD-10-CM

## 2021-06-22 DIAGNOSIS — G25.81 RLS (RESTLESS LEGS SYNDROME): ICD-10-CM

## 2021-06-22 DIAGNOSIS — E66.01 MORBID OBESITY WITH BMI OF 45.0-49.9, ADULT (HCC): ICD-10-CM

## 2021-06-22 PROCEDURE — 99214 OFFICE O/P EST MOD 30 MIN: CPT | Performed by: INTERNAL MEDICINE

## 2021-06-22 NOTE — PROGRESS NOTES
Follow-up Note - Sleep Center   Radha Lindsey  62 y o  female  YDD:[birth year]  QGA:[id number]    I saw Nathalia Watkins in sleep clinic today for her sleep complaints & comorbidities  The patient had both diagnostic and therapeutic sleep studies and is here (accompanied by her granddaughter) to review results and to initiate/adjust therapy  The diagnostic study confirmed   obstructive sleep apnea:  AHI 11 4 /hour considerably higher during stage REM   Intermittent snoring of moderate intensity was noted and there were 2 respiratory effort-related arousals  Minimum oxygen saturation 81 %  and 10 1 minutes of total sleep time was spent with saturations less than 90%  She had difficulty maintaining sleep and sleep efficiency was reduced at 77 5%    During the subsequent therapeutic study, sleep disordered breathing was sufficiently remediated with PAP at 7 cm H2O  She was not observed during stage REM or while supine  Auto titrating Pap 7-10 cm H2O was recommended  PFSH, Problem List, Medications & Allergies were reviewed in EMR  Interval changes: none reported  She  has a past medical history of Diabetes mellitus ([facility] ), External hemorrhoids, Hernia, ventral, Hypertension, Incarcerated incisional hernia, Insomnia, Lyme disease, Morbid obesity with BMI of 45 0-49 9, adult ([facility] ) (5/3/2017), MS (multiple sclerosis) ([place] [address] ), Stroke (cerebrum) ([place] [address] ) (03/09/2020), and Type 2 diabetes mellitus with hyperglycemia, without long-term current use of insulin ([facility] )  She has a current medication list which includes the following prescription(s): acetaminophen, alcohol swabs, aspirin, atorvastatin, blood pressure, cinacalcet, duloxetine, fluticasone, gabapentin, relion prime test, glucose blood, hydrochlorothiazide, insulin syringe-needle u-100, interferon beta-1b, lisinopril, metformin, metoprolol succinate, vitamin b-12, diazepam, and digital glass scale  HPI:  She has ongoing symptoms as outlined in her initial report  She also continues to report awakening with headache  She also is reporting symptoms suggestive of restless leg syndrome  Patient had no difficulty tolerating the mask or the pressure on the study night  Reported no nasal congestion, no mucosal dryness, no chest or abdominal discomfort  Sleep was   better than usual and she had no headache upon awakening  Sleep Routine:  [She reports getting 9-10 hrs sleep and reports no difficulty initiating or maintaining sleep; She arises spontaneously and feels refreshed  Jm Morgan reports Excessive Daytime Sleepiness feels like napping but avoids and rated herself at Total score: 10 /24 on the [place] Sleepiness Scale  Habits:  reports that she has never smoked  She has never used smokeless tobacco  She reports current alcohol use  She reports that she does not use drugs  ROS: as attached  EXAM: /88   Ht 5' 7" (1 702 m)   Wt 122 kg (270 lb)   LMP  (LMP Unknown)   BMI 42 29 kg/m²   Patient is well groomed; well appearing  H&N: EOMI; NC/AT:no facial pressure marks, no rashes  Psych: cooperativeand in no distress  Mental state cognitive impairment  CNS: Alert, orientated, superficial speech  Skin/Extrem: col & hydration normal; no edema  Resp:  Respiratory effort is normal  Ambulant with a walker  Physical findings are otherwise essentially unchanged from previous     IMPRESSION: Diagnoses, Problem List Items & Comorbidities Addressed this Visit   1  SELENE (obstructive sleep apnea)     2  RLS (restless legs syndrome)     3  Daytime sleepiness     4  Right hemiparesis ([address] )     5  Neuropathic pain, leg, bilateral     6  Depression with anxiety     7  Benign essential hypertension     8  Morbid obesity with BMI of 45 0-49 9, adult ([address] )     9  Cognitive decline         PLAN:    1  I reviewed results of the Sleep studies with the patient      2  With respect to above conditions, I counseled on pathophysiology, diagnosis, treatment options, risks and benefits; inter-relationship and effects on symptoms and comorbidities; risks of no treatment; costs and insurance aspects  3  Patient elected positive airway pressure therapy and care coordinated with the DME provider for set up  Pressure setting 7-10 cm H2O  4  Need for compliance with therapy and weight reduction were emphasized  5  Follow-up to be scheduled in 2 months to monitor compliance, progress and to adjust therapy  Thank you for allowing me to participate in the care of this patient  I will keep you apprised of developments      Sincerely,    Authenticated electronically by Leeann Spencer MD on 10/21/78   Board Certified Specialist

## 2021-06-22 NOTE — PATIENT INSTRUCTIONS

## 2021-06-22 NOTE — PROGRESS NOTES
Review of Systems      Genitourinary none   Cardiology none   Gastrointestinal none   Neurology frequent headaches, awaken with headache, numbness/tingling of an extremity, forgetfulness, poor concentration or confusion, , difficulty with memory and balance problems   Constitutional claustrophobia, fatigue and weight change   Integumentary none   Psychiatry anxiety and mood change   Musculoskeletal legs twitching/jerking   Pulmonary snoring   ENT none   Endocrine none   Hematological none

## 2021-06-22 NOTE — PROGRESS NOTES
Follow-up Note - Sleep Center   Elham Schmitt  62 y o  female  QZJ:[birth year]  [facility]:[id number]    I saw Tenisha Hicks in sleep clinic today for her sleep complaints & comorbidities  PFSH, Problem List, Medications & Allergies were reviewed in EMR  Interval changes: [none reported ]   She  has a past medical history of Diabetes mellitus ([facility] 75 ), External hemorrhoids, Hernia, ventral, Hypertension, Incarcerated incisional hernia, Insomnia, Lyme disease, Morbid obesity with BMI of 45 0-49 9, adult ([place] [address] ) (5/3/2017), MS (multiple sclerosis) ([place] [address] ), Stroke (cerebrum) ([place] [address] ) (03/09/2020), and Type 2 diabetes mellitus with hyperglycemia, without long-term current use of insulin ([place] [address] )  She has a current medication list which includes the following prescription(s): acetaminophen, alcohol swabs, aspirin, atorvastatin, blood pressure, cinacalcet, duloxetine, fluticasone, gabapentin, relion prime test, glucose blood, hydrochlorothiazide, insulin syringe-needle u-100, interferon beta-1b, lisinopril, metformin, metoprolol succinate, vitamin b-12, diazepam, and digital glass scale  HPI: ***   Sleep Routine: [No interval changes ] [She reports getting *** hrs sleep[  ]; She arises {Single/Multiple:04842::"spontaneously"} and {refreshed:54261::"feels refreshed"}  Joanne {denies EDS:92101::"denies"} [Excessive] [Daytime] Sleepiness [{eds effects:37125}][ {svand/but:45759::"and"}] rated [herself] at Total score: 10 /24 on the Woden Sleepiness Scale  Habits:  reports that she has never smoked  She has never used smokeless tobacco  She reports current alcohol use  She reports that she does not use drugs  ROS: as attached  [Significant for *** ]       EXAM: LMP  (LMP Unknown)   Patient is well groomed; well appearing  H&N: EOMI; NC/AT:[no] facial pressure marks, [no] rashes  Psych: cooperative[and in no distress]  Mental state [appears normal]  CNS: Alert, orientated, clear & coherent speech    Skin/Extrem: col & hydration [normal]; [no] edema  Resp:  Respiratory effort [is normal]  Physical findings are otherwise essentially unchanged from previous     IMPRESSION: Diagnoses, Problem List Items & Comorbidities Addressed this Visit No diagnosis found  PLAN:    1  I reviewed results of the Sleep studies with the patient  2  With respect to above conditions, I counseled on pathophysiology, diagnosis, treatment options, risks and benefits; inter-relationship and effects on symptoms and comorbidities; risks of no treatment; costs and insurance aspects  3  Patient elected positive airway pressure therapy and [care coordinated with the [facility] provider for set up]  4  Need for compliance with therapy and weight reduction were emphasized  5  Follow-up to be scheduled in 2 months to monitor compliance, progress and to adjust therapy  Thank you for allowing me to participate in the care of this patient  I will keep you apprised of developments      [***]    Sincerely,    Authenticated electronically by Theron Figueroa MD on 69/32/73   Board Certified Specialist

## 2021-06-28 ENCOUNTER — PATIENT OUTREACH (OUTPATIENT)
Dept: FAMILY MEDICINE CLINIC | Facility: CLINIC | Age: 61
End: 2021-06-28

## 2021-06-28 NOTE — PROGRESS NOTES
I called the patient but received voicemail  Message was left reminding her of her PCP appointment 6/30 at [facility]  I will continue to follow

## 2021-06-28 NOTE — PROGRESS NOTES
I returned the patient's call but again received voicemail  Message was left again reminding her of her PCP appointment on Wednesday and if she has any questions to call me back  The patient called me back  She states she slept in because she has been sleeping so much better now that she has been using CPAP  She also reports she no longer has been having daily headaches since starting the CPAP and is very happy/relieved  The patient states she was gifted a new dog  She continues to work outdoors in her yard doing gardening and taking care of her 30 chickens  She is aware of her PCP appointment 6/30 and plans on attending

## 2021-06-30 ENCOUNTER — OFFICE VISIT (OUTPATIENT)
Dept: FAMILY MEDICINE CLINIC | Facility: CLINIC | Age: 61
End: 2021-06-30

## 2021-06-30 VITALS
WEIGHT: 278 LBS | OXYGEN SATURATION: 98 % | HEART RATE: 71 BPM | TEMPERATURE: 98.4 F | RESPIRATION RATE: 20 BRPM | BODY MASS INDEX: 43.54 KG/M2 | DIASTOLIC BLOOD PRESSURE: 80 MMHG | SYSTOLIC BLOOD PRESSURE: 132 MMHG

## 2021-06-30 DIAGNOSIS — R07.89 OTHER CHEST PAIN: ICD-10-CM

## 2021-06-30 DIAGNOSIS — R00.2 PALPITATIONS: ICD-10-CM

## 2021-06-30 DIAGNOSIS — E78.5 HYPERLIPIDEMIA, UNSPECIFIED HYPERLIPIDEMIA TYPE: Primary | ICD-10-CM

## 2021-06-30 DIAGNOSIS — I10 BENIGN ESSENTIAL HYPERTENSION: ICD-10-CM

## 2021-06-30 DIAGNOSIS — E53.8 B12 DEFICIENCY: ICD-10-CM

## 2021-06-30 DIAGNOSIS — I10 ESSENTIAL HYPERTENSION: Chronic | ICD-10-CM

## 2021-06-30 DIAGNOSIS — L98.9 SKIN LESION: ICD-10-CM

## 2021-06-30 PROCEDURE — T1015 CLINIC SERVICE: HCPCS | Performed by: NURSE PRACTITIONER

## 2021-06-30 PROCEDURE — 3075F SYST BP GE 130 - 139MM HG: CPT | Performed by: NURSE PRACTITIONER

## 2021-06-30 PROCEDURE — 3079F DIAST BP 80-89 MM HG: CPT | Performed by: NURSE PRACTITIONER

## 2021-06-30 PROCEDURE — 4010F ACE/ARB THERAPY RXD/TAKEN: CPT | Performed by: PSYCHIATRY & NEUROLOGY

## 2021-06-30 PROCEDURE — 99214 OFFICE O/P EST MOD 30 MIN: CPT | Performed by: NURSE PRACTITIONER

## 2021-06-30 RX ORDER — LISINOPRIL 40 MG/1
40 TABLET ORAL DAILY
Qty: 90 TABLET | Refills: 1 | Status: SHIPPED | OUTPATIENT
Start: 2021-06-30 | End: 2021-08-11

## 2021-06-30 RX ORDER — HYDROCHLOROTHIAZIDE 25 MG/1
25 TABLET ORAL DAILY
Qty: 90 TABLET | Refills: 1 | Status: SHIPPED | OUTPATIENT
Start: 2021-06-30

## 2021-06-30 RX ORDER — CHOLECALCIFEROL (VITAMIN D3) 125 MCG
1000 CAPSULE ORAL DAILY
Qty: 60 TABLET | Refills: 5 | Status: SHIPPED | OUTPATIENT
Start: 2021-06-30

## 2021-06-30 RX ORDER — ASPIRIN 81 MG/1
81 TABLET, CHEWABLE ORAL DAILY
Qty: 90 TABLET | Refills: 1 | Status: SHIPPED | OUTPATIENT
Start: 2021-06-30 | End: 2022-03-28 | Stop reason: SDUPTHER

## 2021-06-30 RX ORDER — METOPROLOL SUCCINATE 50 MG/1
50 TABLET, EXTENDED RELEASE ORAL DAILY
Qty: 90 TABLET | Refills: 1 | Status: SHIPPED | OUTPATIENT
Start: 2021-06-30 | End: 2022-02-28 | Stop reason: SDUPTHER

## 2021-06-30 RX ORDER — ATORVASTATIN CALCIUM 40 MG/1
40 TABLET, FILM COATED ORAL EVERY EVENING
Qty: 90 TABLET | Refills: 1 | Status: SHIPPED | OUTPATIENT
Start: 2021-06-30 | End: 2021-10-13

## 2021-06-30 NOTE — ASSESSMENT & PLAN NOTE
Blood pressure in the office today 132/80   continue with current regimen lisinopril 40 mg daily, hctz 25 mg daily, and metoprolol 50 mg daily

## 2021-06-30 NOTE — PROGRESS NOTES
Assessment/Plan:    Benign essential hypertension    Blood pressure in the office today 132/80   continue with current regimen lisinopril 40 mg daily, hctz 25 mg daily, and metoprolol 50 mg daily    Hyperlipidemia  Last lipid panel 4/202 showed  and LDL 52,   TG 60 - at goal   Continue with atorvastatin 40 mg daily       Obstructive sleep apnea syndrome  She was found to have mild to moderate SELENE and recently started using CPAP   She was getting daily AM HA but reports since starting CPAP this has stopped     Morbid obesity with BMI of 45 0-49 9, adult ([facility] )  -Encouraged diet and lifestyle changes: decrease processed foods (cakes, cookies, chips, soda), decrease total carbohydrate intake, decrease fried/fatty foods, increase fruits and vegetables, increase lean proteins (chicken, turkey), increase healthy fats (avocado, fish, nuts), drink plenty of water (at least four 16 oz bottles per day)      Kira Sarah was seen today for headache  Diagnoses and all orders for this visit:    Hyperlipidemia, unspecified hyperlipidemia type    Benign essential hypertension  -     lisinopril (ZESTRIL) 40 mg tablet; Take 1 tablet (40 mg total) by mouth daily    Other chest pain  -     aspirin 81 mg chewable tablet; Chew 1 tablet (81 mg total) daily  -     atorvastatin (LIPITOR) 40 mg tablet; Take 1 tablet (40 mg total) by mouth every evening  -     metoprolol succinate (TOPROL-XL) 50 mg 24 hr tablet; Take 1 tablet (50 mg total) by mouth daily    Skin lesion  -     Ambulatory referral to Dermatology; Future    Essential hypertension  -     hydrochlorothiazide (HYDRODIURIL) 25 mg tablet; Take 1 tablet (25 mg total) by mouth daily    Palpitations  -     metoprolol succinate (TOPROL-XL) 50 mg 24 hr tablet; Take 1 tablet (50 mg total) by mouth daily    B12 deficiency  -     vitamin B-12 (VITAMIN B-12) 500 mcg tablet;  Take 2 tablets (1,000 mcg total) by mouth daily        Return in about 3 months (around 9/30/2021), or Hypertension  Patient Instructions     Heart Healthy Diet   WHAT YOU NEED TO KNOW:   A heart healthy diet is an eating plan low in unhealthy fats and sodium (salt)  The plan is high in healthy fats and fiber  A heart healthy diet helps improve your cholesterol levels and lowers your risk for heart disease and stroke  A dietitian will teach you how to read and understand food labels  DISCHARGE INSTRUCTIONS:   Heart healthy diet guidelines to follow:   · Choose foods that contain healthy fats  ? Unsaturated fats  include monounsaturated and polyunsaturated fats  Unsaturated fat is found in foods such as soybean, canola, olive, corn, and safflower oils  It is also found in soft tub margarine that is made with liquid vegetable oil  ? Omega-3 fat  is found in certain fish, such as salmon, tuna, and trout, and in walnuts and flaxseed  Eat fish high in omega-3 fats at least 2 times a week  · Get 20 to 30 grams of fiber each day  Fruits, vegetables, whole-grain foods, and legumes (cooked beans) are good sources of fiber  · Limit or do not have unhealthy fats  ? Cholesterol  is found in animal foods, such as eggs and lobster, and in dairy products made from whole milk  Limit cholesterol to less than 200 mg each day  ? Saturated fat  is found in meats, such as payne and hamburger  It is also found in chicken or turkey skin, whole milk, and butter  Limit saturated fat to less than 7% of your total daily calories  ? Trans fat  is found in packaged foods, such as potato chips and cookies  It is also in hard margarine, some fried foods, and shortening  Do not eat foods that contain trans fats  · Limit sodium as directed  You may be told to limit sodium to 2,000 to 2,300 mg each day  Choose low-sodium or no-salt-added foods  Add little or no salt to food you prepare  Use herbs and spices in place of salt         Include the following in your heart healthy plan:  Ask your dietitian or healthcare provider how many servings to have from each of the following food groups:  · Grains:      ? Whole-wheat breads, cereals, and pastas, and brown rice    ? Low-fat, low-sodium crackers and chips    · Vegetables:      ? Broccoli, green beans, green peas, and spinach    ? Collards, kale, and lima beans    ? Carrots, sweet potatoes, tomatoes, and peppers    ? Canned vegetables with no salt added    · Fruits:      ? Bananas, peaches, pears, and pineapple    ? Grapes, raisins, and dates    ? Oranges, tangerines, grapefruit, orange juice, and grapefruit juice    ? Apricots, mangoes, melons, and papaya    ? Raspberries and strawberries    ? Canned fruit with no added sugar    · Low-fat dairy:      ? Nonfat (skim) milk, 1% milk, and low-fat almond, cashew, or soy milks fortified with calcium    ? Low-fat cheese, regular or frozen yogurt, and cottage cheese    · Meats and proteins:      ? Lean cuts of beef and pork (loin, leg, round), skinless chicken and turkey    ? Legumes, soy products, egg whites, or nuts    Limit or do not include the following in your heart healthy plan:   · Unhealthy fats and oils:      ? Whole or 2% milk, cream cheese, sour cream, or cheese    ? High-fat cuts of beef (T-bone steaks, ribs), chicken or turkey with skin, and organ meats such as liver    ? Butter, stick margarine, shortening, and cooking oils such as coconut or palm oil    · Foods and liquids high in sodium:      ? Packaged foods, such as frozen dinners, cookies, macaroni and cheese, and cereals with more than 300 mg of sodium per serving    ? Vegetables with added sodium, such as instant potatoes, vegetables with added sauces, or regular canned vegetables    ? Cured or smoked meats, such as hot dogs, payne, and sausage    ? High-sodium ketchup, barbecue sauce, salad dressing, pickles, olives, soy sauce, or miso    · Foods and liquids high in sugar:      ? Candy, cake, cookies, pies, or doughnuts    ?  Soft drinks (soda), sports drinks, or sweetened tea    ? Canned or dry mixes for cakes, soups, sauces, or gravies    Other healthy heart guidelines:   · Do not smoke  Nicotine and other chemicals in cigarettes and cigars can cause lung and heart damage  Ask your healthcare provider for information if you currently smoke and need help to quit  E-cigarettes or smokeless tobacco still contain nicotine  Talk to your healthcare provider before you use these products  · Limit or do not drink alcohol as directed  Alcohol can damage your heart and raise your blood pressure  Your healthcare provider may give you specific daily and weekly limits  The general recommended limit is 1 drink a day for women 21 or older and for men 72 or older  Do not have more than 3 drinks in a day or 7 in a week  The recommended limit is 2 drinks a day for men 24to 59years of age  Do not have more than 4 drinks in a day or 14 in a week  A drink of alcohol is 12 ounces of beer, 5 ounces of wine, or 1½ ounces of liquor  · Exercise regularly  Exercise can help you maintain a healthy weight and improve your blood pressure and cholesterol levels  Regular exercise can also decrease your risk for heart problems  Ask your healthcare provider about the best exercise plan for you  Do not start an exercise program without asking your healthcare provider  Follow up with your doctor or cardiologist as directed:  Write down your questions so you remember to ask them during your visits  © Copyright [address] Information is for End User's use only and may not be sold, redistributed or otherwise used for commercial purposes  All illustrations and images included in CareNotes® are the copyrighted property of A D A M , Inc  or [address] [place], [place] [address]andrey   The above information is an  only  It is not intended as medical advice for individual conditions or treatments   Talk to your doctor, nurse or pharmacist before following any medical regimen to see if it is safe and effective for you  Subjective:     Petr Shelton is a 62 y o  female who  has a past medical history of Diabetes mellitus ([facility] ), External hemorrhoids, Hernia, ventral, Hypertension, Incarcerated incisional hernia, Insomnia, Lyme disease, Morbid obesity with BMI of 45 0-49 9, adult ([facility] ), MS (multiple sclerosis) ([facility] ), Stroke (cerebrum) ([facility] ), and Type 2 diabetes mellitus with hyperglycemia, without long-term current use of insulin ([facility] )  She also has no past medical history of Substance abuse ([facility] )  who presented to the office today for follow up  Patient reports that she Having daily headaches when she woke up in the morning  She recently started using a CPAP machine due to sleep apnea and reports that headaches have resolved  Otherwise she reports that she is doing well overall  Her blood sugars have been well controlled blood pressures are also stable  She recently had repeat imaging ordered by Neurology which showed no new lesions due to the MS  She recently the a new puppy and has been very busy with the dog  She also has been doing large amount of gardening and is growing multiple fruits and vegetables as well as collecting eggs from her chickens  She does continue with LE neuropathy but this has not worsened and she makes sure that she is always wearing shoes and checking her feet  Her family has been very involved and supportive       The following portions of the patient's history were reviewed and updated as appropriate: allergies, current medications, past family history, past medical history, past social history, past surgical history and problem list     Current Outpatient Medications on File Prior to Visit   Medication Sig Dispense Refill    acetaminophen (TYLENOL) 500 mg tablet Take 2 tablets (1,000 mg total) by mouth every 6 (six) hours as needed for moderate pain 60 tablet 1    Alcohol Swabs PADS by Does not apply route 4 (four) times a day *Latex Free* 120 each 3    Blood Pressure KIT by Does not apply route daily 1 each 0    cinacalcet (SENSIPAR) 30 mg tablet Take 1 tablet by mouth once daily 30 tablet 1    DULoxetine (CYMBALTA) 60 mg delayed release capsule Take 1 capsule (60 mg total) by mouth daily 30 capsule 3    gabapentin (NEURONTIN) 300 mg capsule Take 600mg by mouth in the morning and afternoon AND 900mg at bedtime  210 capsule 3    glucose blood (ReliOn Prime Test) test strip Use 1 each 2 (two) times a day 200 each 1    glucose blood test strip Check blood glucose levels three times per day before meals 300 each 2    Insulin Syringe-Needle U-100 (BD Insulin Syringe Ultrafine) 31G X 15/64" 0 3 ML MISC by Does not apply route 2 (two) times a day 200 each 1    INTERFERON BETA-1B SC Inject 1 mL under the skin every other day      metFORMIN (GLUCOPHAGE-XR) 500 mg 24 hr tablet TAKE 2 TABLETS BY MOUTH TWICE DAILY WITH MEALS 360 tablet 0    Misc   Devices (DIGITAL GLASS SCALE) MISC by Does not apply route daily 1 each 0    [DISCONTINUED] aspirin 81 mg chewable tablet Chew 1 tablet (81 mg total) daily  0    [DISCONTINUED] atorvastatin (LIPITOR) 40 mg tablet Take 1 tablet (40 mg total) by mouth every evening 90 tablet 1    [DISCONTINUED] hydrochlorothiazide (HYDRODIURIL) 25 mg tablet Take 1 tablet by mouth once daily 90 tablet 0    [DISCONTINUED] lisinopril (ZESTRIL) 40 mg tablet Take 1 tablet by mouth once daily 90 tablet 0    [DISCONTINUED] metoprolol succinate (TOPROL-XL) 50 mg 24 hr tablet Take 1 tablet (50 mg total) by mouth daily 90 tablet 1    [DISCONTINUED] vitamin B-12 (VITAMIN B-12) 500 mcg tablet Take 2 tablets (1,000 mcg total) by mouth daily 60 tablet 5    diazepam (VALIUM) 5 mg tablet Take 1 tab 60 min prior to MRI with a second dose 20 min prior to imaging if anxiety persists (Patient not taking: Reported on 4/5/2021) 2 tablet 0    fluticasone (FLONASE) 50 mcg/act nasal spray 1 spray into each nostril daily (Patient not taking: Reported on 6/30/2021) 1 Bottle 1     No current facility-administered medications on file prior to visit  Review of Systems   Constitutional: Positive for activity change  Negative for chills and fever  HENT: Negative for ear pain and sore throat  Eyes: Negative for pain and visual disturbance  Respiratory: Negative for cough and shortness of breath  Cardiovascular: Negative for chest pain and palpitations  Gastrointestinal: Negative for abdominal pain and vomiting  Genitourinary: Negative for dysuria and hematuria  Musculoskeletal: Positive for gait problem  Negative for arthralgias and back pain  Skin: Negative for color change and rash  Neurological: Positive for numbness  Negative for seizures and syncope  All other systems reviewed and are negative  Objective:    /80 (BP Location: Left arm, Patient Position: Sitting, Cuff Size: Large)   Pulse 71   Temp 98 4 °F (36 9 °C)   Resp 20   Wt 126 kg (278 lb)   LMP  (LMP Unknown)   SpO2 98%   BMI 43 54 kg/m²     Physical Exam  Vitals and nursing note reviewed  Constitutional:       General: She is not in acute distress  Appearance: She is well-developed  She is not diaphoretic  HENT:      Head: Normocephalic and atraumatic  Right Ear: External ear normal       Left Ear: External ear normal    Eyes:      Conjunctiva/sclera: Conjunctivae normal       Pupils: Pupils are equal, round, and reactive to light  Cardiovascular:      Rate and Rhythm: Normal rate and regular rhythm  Pulses: Pulses are weak  Dorsalis pedis pulses are 1+ on the right side and 1+ on the left side  Posterior tibial pulses are 1+ on the right side and 1+ on the left side  Heart sounds: Normal heart sounds  Pulmonary:      Effort: Pulmonary effort is normal  No respiratory distress  Breath sounds: Normal breath sounds  No wheezing  Abdominal:      General: Bowel sounds are normal  There is no distension  Palpations: Abdomen is soft  Tenderness: There is no abdominal tenderness  Musculoskeletal:         General: No deformity  Normal range of motion  Cervical back: Normal range of motion and neck supple  Feet:      Right foot:      Skin integrity: No ulcer, skin breakdown, erythema, warmth, callus or dry skin  Left foot:      Skin integrity: No ulcer, skin breakdown, erythema, warmth, callus or dry skin  Lymphadenopathy:      Cervical: No cervical adenopathy  Skin:     General: Skin is warm and dry  Capillary Refill: Capillary refill takes less than 2 seconds  Findings: No rash  Neurological:      Mental Status: She is alert and oriented to person, place, and time  Sensory: Sensory deficit (  ) present  Gait: Gait abnormal       Comments: Uses a walker for ambulation  Decreased sensation to BL feet    Psychiatric:         Behavior: Behavior normal        Patient's shoes and socks removed  Right Foot/Ankle   Right Foot Inspection  Skin Exam: skin normal and skin intact no dry skin, no warmth, no callus, no erythema, no maceration, no abnormal color, no pre-ulcer, no ulcer and no callus                          Toe Exam: ROM and strength within normal limits  Sensory   Vibration: diminished  Proprioception: diminished   Monofilament testing: diminished  Vascular  Capillary refills: < 3 seconds  The right DP pulse is 1+  The right PT pulse is 1+  Left Foot/Ankle  Left Foot Inspection  Skin Exam: skin normal and skin intactno dry skin, no warmth, no erythema, no maceration, normal color, no pre-ulcer, no ulcer and no callus                         Toe Exam: ROM and strength within normal limits                   Sensory   Vibration: diminished  Proprioception: diminished  Monofilament: diminished  Vascular  Capillary refills: < 3 seconds  The left DP pulse is 1+  The left PT pulse is 1+  Assign Risk Category:  No deformity present;  Loss of protective sensation; Weak pulses       Risk: [address]  06/30/21  3:20 PM

## 2021-06-30 NOTE — ASSESSMENT & PLAN NOTE
She was found to have mild to moderate SELENE and recently started using CPAP   She was getting daily AM HA but reports since starting CPAP this has stopped

## 2021-06-30 NOTE — PATIENT INSTRUCTIONS
Heart Healthy Diet   WHAT YOU NEED TO KNOW:   A heart healthy diet is an eating plan low in unhealthy fats and sodium (salt)  The plan is high in healthy fats and fiber  A heart healthy diet helps improve your cholesterol levels and lowers your risk for heart disease and stroke  A dietitian will teach you how to read and understand food labels  DISCHARGE INSTRUCTIONS:   Heart healthy diet guidelines to follow:   · Choose foods that contain healthy fats  ? Unsaturated fats  include monounsaturated and polyunsaturated fats  Unsaturated fat is found in foods such as soybean, canola, olive, corn, and safflower oils  It is also found in soft tub margarine that is made with liquid vegetable oil  ? Omega-3 fat  is found in certain fish, such as salmon, tuna, and trout, and in walnuts and flaxseed  Eat fish high in omega-3 fats at least 2 times a week  · Get 20 to 30 grams of fiber each day  Fruits, vegetables, whole-grain foods, and legumes (cooked beans) are good sources of fiber  · Limit or do not have unhealthy fats  ? Cholesterol  is found in animal foods, such as eggs and lobster, and in dairy products made from whole milk  Limit cholesterol to less than 200 mg each day  ? Saturated fat  is found in meats, such as payne and hamburger  It is also found in chicken or turkey skin, whole milk, and butter  Limit saturated fat to less than 7% of your total daily calories  ? Trans fat  is found in packaged foods, such as potato chips and cookies  It is also in hard margarine, some fried foods, and shortening  Do not eat foods that contain trans fats  · Limit sodium as directed  You may be told to limit sodium to 2,000 to 2,300 mg each day  Choose low-sodium or no-salt-added foods  Add little or no salt to food you prepare  Use herbs and spices in place of salt         Include the following in your heart healthy plan:  Ask your dietitian or healthcare provider how many servings to have from each of the following food groups:  · Grains:      ? Whole-wheat breads, cereals, and pastas, and brown rice    ? Low-fat, low-sodium crackers and chips    · Vegetables:      ? Broccoli, green beans, green peas, and spinach    ? Collards, kale, and lima beans    ? Carrots, sweet potatoes, tomatoes, and peppers    ? Canned vegetables with no salt added    · Fruits:      ? Bananas, peaches, pears, and pineapple    ? Grapes, raisins, and dates    ? Oranges, tangerines, grapefruit, orange juice, and grapefruit juice    ? Apricots, mangoes, melons, and papaya    ? Raspberries and strawberries    ? Canned fruit with no added sugar    · Low-fat dairy:      ? Nonfat (skim) milk, 1% milk, and low-fat almond, cashew, or soy milks fortified with calcium    ? Low-fat cheese, regular or frozen yogurt, and cottage cheese    · Meats and proteins:      ? Lean cuts of beef and pork (loin, leg, round), skinless chicken and turkey    ? Legumes, soy products, egg whites, or nuts    Limit or do not include the following in your heart healthy plan:   · Unhealthy fats and oils:      ? Whole or 2% milk, cream cheese, sour cream, or cheese    ? High-fat cuts of beef (T-bone steaks, ribs), chicken or turkey with skin, and organ meats such as liver    ? Butter, stick margarine, shortening, and cooking oils such as coconut or palm oil    · Foods and liquids high in sodium:      ? Packaged foods, such as frozen dinners, cookies, macaroni and cheese, and cereals with more than 300 mg of sodium per serving    ? Vegetables with added sodium, such as instant potatoes, vegetables with added sauces, or regular canned vegetables    ? Cured or smoked meats, such as hot dogs, payne, and sausage    ? High-sodium ketchup, barbecue sauce, salad dressing, pickles, olives, soy sauce, or miso    · Foods and liquids high in sugar:      ? Candy, cake, cookies, pies, or doughnuts    ? Soft drinks (soda), sports drinks, or sweetened tea    ?  Canned or dry mixes for cakes, soups, sauces, or gravies    Other healthy heart guidelines:   · Do not smoke  Nicotine and other chemicals in cigarettes and cigars can cause lung and heart damage  Ask your healthcare provider for information if you currently smoke and need help to quit  E-cigarettes or smokeless tobacco still contain nicotine  Talk to your healthcare provider before you use these products  · Limit or do not drink alcohol as directed  Alcohol can damage your heart and raise your blood pressure  Your healthcare provider may give you specific daily and weekly limits  The general recommended limit is 1 drink a day for women 21 or older and for men 72 or older  Do not have more than 3 drinks in a day or 7 in a week  The recommended limit is 2 drinks a day for men 24to 59years of age  Do not have more than 4 drinks in a day or 14 in a week  A drink of alcohol is 12 ounces of beer, 5 ounces of wine, or 1½ ounces of liquor  · Exercise regularly  Exercise can help you maintain a healthy weight and improve your blood pressure and cholesterol levels  Regular exercise can also decrease your risk for heart problems  Ask your healthcare provider about the best exercise plan for you  Do not start an exercise program without asking your healthcare provider  Follow up with your doctor or cardiologist as directed:  Write down your questions so you remember to ask them during your visits  © Copyright [address] Information is for End User's use only and may not be sold, redistributed or otherwise used for commercial purposes  All illustrations and images included in CareNotes® are the copyrighted property of A D A M , Inc  or [address] [place] [address]  The above information is an  only  It is not intended as medical advice for individual conditions or treatments  Talk to your doctor, nurse or pharmacist before following any medical regimen to see if it is safe and effective for you

## 2021-06-30 NOTE — ASSESSMENT & PLAN NOTE
Last lipid panel 4/202 showed  and LDL 52,   TG 60 - at goal   Continue with atorvastatin 40 mg daily

## 2021-07-01 DIAGNOSIS — G35 MS (MULTIPLE SCLEROSIS) (HCC): Primary | ICD-10-CM

## 2021-07-01 NOTE — TELEPHONE ENCOUNTER
[facility] requesting betaseron refill  Team 3, can you pls check if I entered the script correctly?        thanks

## 2021-07-03 DIAGNOSIS — G57.93 NEUROPATHIC PAIN, LEG, BILATERAL: ICD-10-CM

## 2021-07-05 RX ORDER — GABAPENTIN 300 MG/1
CAPSULE ORAL
Qty: 210 CAPSULE | Refills: 0 | Status: SHIPPED | OUTPATIENT
Start: 2021-07-05 | End: 2021-08-11

## 2021-07-08 ENCOUNTER — TELEPHONE (OUTPATIENT)
Dept: FAMILY MEDICINE CLINIC | Facility: CLINIC | Age: 61
End: 2021-07-08

## 2021-07-08 DIAGNOSIS — G35 MULTIPLE SCLEROSIS (HCC): Primary | ICD-10-CM

## 2021-07-12 ENCOUNTER — PATIENT OUTREACH (OUTPATIENT)
Dept: FAMILY MEDICINE CLINIC | Facility: CLINIC | Age: 61
End: 2021-07-12

## 2021-07-12 NOTE — PROGRESS NOTES
I called the patient but received voicemail  Message was left reminding her of her Neuro appointment for tomorrow at [place] [address]  I will continue to follow

## 2021-07-13 ENCOUNTER — OFFICE VISIT (OUTPATIENT)
Dept: NEUROLOGY | Facility: CLINIC | Age: 61
End: 2021-07-13
Payer: COMMERCIAL

## 2021-07-13 ENCOUNTER — TELEPHONE (OUTPATIENT)
Dept: NEUROLOGY | Facility: CLINIC | Age: 61
End: 2021-07-13

## 2021-07-13 VITALS
DIASTOLIC BLOOD PRESSURE: 98 MMHG | HEART RATE: 63 BPM | HEIGHT: 68 IN | WEIGHT: 283 LBS | BODY MASS INDEX: 42.89 KG/M2 | SYSTOLIC BLOOD PRESSURE: 152 MMHG

## 2021-07-13 DIAGNOSIS — S14.159S: ICD-10-CM

## 2021-07-13 DIAGNOSIS — R41.89 COGNITIVE DECLINE: ICD-10-CM

## 2021-07-13 DIAGNOSIS — Z79.4 TYPE 2 DIABETES MELLITUS WITH HYPERGLYCEMIA, WITH LONG-TERM CURRENT USE OF INSULIN (HCC): ICD-10-CM

## 2021-07-13 DIAGNOSIS — G47.33 OBSTRUCTIVE SLEEP APNEA SYNDROME: ICD-10-CM

## 2021-07-13 DIAGNOSIS — G35 MS (MULTIPLE SCLEROSIS) (HCC): Primary | ICD-10-CM

## 2021-07-13 DIAGNOSIS — E11.65 TYPE 2 DIABETES MELLITUS WITH HYPERGLYCEMIA, WITH LONG-TERM CURRENT USE OF INSULIN (HCC): ICD-10-CM

## 2021-07-13 DIAGNOSIS — I63.439 CEREBROVASCULAR ACCIDENT (CVA) DUE TO EMBOLISM OF POSTERIOR CEREBRAL ARTERY, UNSPECIFIED BLOOD VESSEL LATERALITY (HCC): ICD-10-CM

## 2021-07-13 DIAGNOSIS — R20.0 NUMBNESS AND TINGLING OF RIGHT ARM AND LEG: ICD-10-CM

## 2021-07-13 DIAGNOSIS — R20.2 NUMBNESS AND TINGLING OF RIGHT ARM AND LEG: ICD-10-CM

## 2021-07-13 DIAGNOSIS — R26.2 AMBULATORY DYSFUNCTION: Chronic | ICD-10-CM

## 2021-07-13 DIAGNOSIS — G81.91 ACUTE RIGHT HEMIPARESIS (HCC): ICD-10-CM

## 2021-07-13 DIAGNOSIS — G35 MULTIPLE SCLEROSIS (HCC): ICD-10-CM

## 2021-07-13 DIAGNOSIS — G81.91 RIGHT HEMIPARESIS (HCC): ICD-10-CM

## 2021-07-13 PROCEDURE — 99215 OFFICE O/P EST HI 40 MIN: CPT | Performed by: PSYCHIATRY & NEUROLOGY

## 2021-07-13 PROCEDURE — 1036F TOBACCO NON-USER: CPT | Performed by: PSYCHIATRY & NEUROLOGY

## 2021-07-13 PROCEDURE — 3008F BODY MASS INDEX DOCD: CPT | Performed by: PSYCHIATRY & NEUROLOGY

## 2021-07-13 NOTE — PROGRESS NOTES
[facility] MULTIPLE SCLEROSIS [facility]  PATIENT:  Cristine Wylie  MRN:  [id number]  :  1960  DATE OF SERVICE:  2021    Assessment/Plan:           Problem List Items Addressed This Visit        Endocrine    Type 2 diabetes mellitus with hyperglycemia, with long-term current use of insulin (HCC)       Respiratory    Obstructive sleep apnea syndrome       Cardiovascular and Mediastinum    Stroke (cerebrum) (HCC)       Nervous and Auditory    Acute right hemiparesis (HCC)    Cervical spinal cord lesion    MS (multiple sclerosis) (HCC) - Primary    Right hemiparesis (HCC)       Other    Ambulatory dysfunction (Chronic)    Numbness and tingling of right arm and leg    Cognitive decline      Other Visit Diagnoses     Multiple sclerosis [facility])              Mrs Calvin Keen has presented to [address] [place] [address] Drive for follow-up on multiple sclerosis and related issues  Patient presented today with her son  Significant improvement in patient function has been noted to take, patient ambulates without assistive devices;  - betaseron was advised to be continued considering no side effects has been described;  - CPAP use helped with headaches and generalized wellness as sleep related issues has resolved; we believe patient obstructive sleep apnea is under control now; patient also stated she is no longer using insulin for her diabetes;  - MRI B/C in 2021- stable radiographic findings, may repeat imaging in   - patient ambulates with no assistive devices, with a good strength and balance noted during evaluation  Patient was advised to continue walking outside with her new puppy; patient is interested if her 1 months old puppy can be trained as a service dog, considering patient has CVA with MS and diabetes related dysfunction;  Patient is to follow with [address] [place] [address] Neurology within 4-6 months  Subjective:  Multiple sclerosis and related issues    HPI  Mrs Hu is a 63 yo female who has presented to [facility] multiple sclerosis Center for follow-up on MS and related issues      Patient has known history of hypertension, uncontrolled diabetes with hemoglobin A1c range from 9-11 9, while taking insulin   Patient had ischemic colitis with GI bleed  Patient presented with her son  Since last office visit, patient described having significant improvement in her function, she has been outside with her new puppy on multiple occasions during the day and she is trying to walk without any assistive devices  Patient is no longer taking insulin for her diabetes, condition is under control  Patient described no other concerns with Betaseron, no significant side effects; no neuropathic pain has been reported, after gabapentin 600 mg was adjusted; and patient will be adding duloxetine 20 mg;     CTA head and neck completed on 9/1/2020 for Carotid or vertebral dissection suspected 69-year-old female; history of right sided occipital headache radiating to the neck; unremarkable findings;   1 4 cm hypoattenuating nodule in the posterior superior left thyroid lobe, f patient was previously advised to follow with primary care team             The following portions of the patient's history were reviewed and updated as appropriate:   She  has a past medical history of Diabetes mellitus ([address] ), External hemorrhoids, Hernia, ventral, Hypertension, Incarcerated incisional hernia, Insomnia, Lyme disease, Morbid obesity with BMI of 45 0-49 9, adult ([facility] ) (5/3/2017), MS (multiple sclerosis) ([facility] ), Stroke (cerebrum) ([facility] ) (03/09/2020), and Type 2 diabetes mellitus with hyperglycemia, without long-term current use of insulin ([facility] )    She   Patient Active Problem List    Diagnosis Date Noted    Obstructive sleep apnea syndrome     Hyperlipidemia 04/05/2021    B12 deficiency 09/09/2020    Thyroid nodule 09/01/2020    Snoring 08/31/2020    Morbid obesity ([address] ) 08/31/2020    MS (multiple sclerosis) ([address] ) 07/07/2020    Cognitive decline 2020    Left sided numbness 2020    Right hemiparesis ([facility] ) 2020    Neuropathic pain, leg, bilateral 2020    Cervical spinal cord lesion 05/15/2020    Acute right hemiparesis (HCC) 2020    Numbness and tingling of right arm and leg 2020    Ambulatory dysfunction 2020    Stroke (cerebrum) ([place] [address] ) 2020    Falls frequently 2020    Morbid obesity with BMI of 45 0-49 9, adult ([place] [address] ) 2017    Ischemic colitis ([place] [address] ) 2017    Unintentional weight loss 2017    Hypercalcemia 2017    Hyperparathyroidism ([place] [address] ) 2017    GI bleed 2017    Hernia, ventral     Type 2 diabetes mellitus with hyperglycemia, with long-term current use of insulin ([place] [address] )     Depression with anxiety 2016    Vitamin D deficiency 2015    Benign essential hypertension 2012     She  has a past surgical history that includes  section; Hysterectomy; Abscess drainage; Colonoscopy (N/A, 2017); IR lumbar puncture (3/13/2020); and US guided thyroid biopsy (2021)  Her family history includes Breast cancer in her maternal aunt and maternal grandmother; Breast cancer (age of onset: 45) in her sister; Breast cancer (age of onset: 50) in her sister; Cervical cancer (age of onset: 61) in her sister; Colon cancer in her father; Coronary artery disease in her family; Diabetes in her family and father; Glaucoma in her mother; Hypertension in her family and father  She  reports that she has never smoked  She has never used smokeless tobacco  She reports current alcohol use  She reports that she does not use drugs    Current Outpatient Medications   Medication Sig Dispense Refill    acetaminophen (TYLENOL) 500 mg tablet Take 2 tablets (1,000 mg total) by mouth every 6 (six) hours as needed for moderate pain 60 tablet 1    Alcohol Swabs PADS by Does not apply route 4 (four) times a day *Latex Free* 120 each 3    aspirin 81 mg chewable tablet Chew 1 tablet (81 mg total) daily 90 tablet 1    atorvastatin (LIPITOR) 40 mg tablet Take 1 tablet (40 mg total) by mouth every evening 90 tablet 1    Blood Pressure KIT by Does not apply route daily 1 each 0    cinacalcet (SENSIPAR) 30 mg tablet Take 1 tablet by mouth once daily 30 tablet 1    DULoxetine (CYMBALTA) 60 mg delayed release capsule Take 1 capsule (60 mg total) by mouth daily 30 capsule 3    gabapentin (NEURONTIN) 300 mg capsule TAKE 2 CAPSULES BY MOUTH IN THE MORNING AND TAKE 2 CAPSULES IN THE AFTERNOON AND TAKE 3 CAPSULES AT BEDTIME 210 capsule 0    glucose blood (ReliOn Prime Test) test strip Use 1 each 2 (two) times a day 200 each 1    glucose blood test strip Check blood glucose levels three times per day before meals 300 each 2    hydrochlorothiazide (HYDRODIURIL) 25 mg tablet Take 1 tablet (25 mg total) by mouth daily 90 tablet 1    Interferon Beta-1b 0 3 MG KIT Inject 1 ml (0 25 mg) subcutaneously every other day 14 kit 4    lisinopril (ZESTRIL) 40 mg tablet Take 1 tablet (40 mg total) by mouth daily 90 tablet 1    metFORMIN (GLUCOPHAGE-XR) 500 mg 24 hr tablet TAKE 2 TABLETS BY MOUTH TWICE DAILY WITH MEALS 360 tablet 0    metoprolol succinate (TOPROL-XL) 50 mg 24 hr tablet Take 1 tablet (50 mg total) by mouth daily 90 tablet 1    diazepam (VALIUM) 5 mg tablet Take 1 tab 60 min prior to MRI with a second dose 20 min prior to imaging if anxiety persists (Patient not taking: Reported on 4/5/2021) 2 tablet 0    fluticasone (FLONASE) 50 mcg/act nasal spray 1 spray into each nostril daily (Patient not taking: Reported on 6/30/2021) 1 Bottle 1    Insulin Syringe-Needle U-100 (BD Insulin Syringe Ultrafine) 31G X 15/64" 0 3 ML MISC by Does not apply route 2 (two) times a day (Patient not taking: Reported on 7/13/2021) 200 each 1    Misc   Devices (DIGITAL GLASS SCALE) MISC by Does not apply route daily 1 each 0    vitamin B-12 (VITAMIN B-12) 500 mcg tablet Take 2 tablets (1,000 mcg total) by mouth daily 60 tablet 5     No current facility-administered medications for this visit       Current Outpatient Medications on File Prior to Visit   Medication Sig    acetaminophen (TYLENOL) 500 mg tablet Take 2 tablets (1,000 mg total) by mouth every 6 (six) hours as needed for moderate pain    Alcohol Swabs PADS by Does not apply route 4 (four) times a day *Latex Free*    aspirin 81 mg chewable tablet Chew 1 tablet (81 mg total) daily    atorvastatin (LIPITOR) 40 mg tablet Take 1 tablet (40 mg total) by mouth every evening    Blood Pressure KIT by Does not apply route daily    cinacalcet (SENSIPAR) 30 mg tablet Take 1 tablet by mouth once daily    DULoxetine (CYMBALTA) 60 mg delayed release capsule Take 1 capsule (60 mg total) by mouth daily    gabapentin (NEURONTIN) 300 mg capsule TAKE 2 CAPSULES BY MOUTH IN THE MORNING AND TAKE 2 CAPSULES IN THE AFTERNOON AND TAKE 3 CAPSULES AT BEDTIME    glucose blood (ReliOn Prime Test) test strip Use 1 each 2 (two) times a day    glucose blood test strip Check blood glucose levels three times per day before meals    hydrochlorothiazide (HYDRODIURIL) 25 mg tablet Take 1 tablet (25 mg total) by mouth daily    Interferon Beta-1b 0 3 MG KIT Inject 1 ml (0 25 mg) subcutaneously every other day    lisinopril (ZESTRIL) 40 mg tablet Take 1 tablet (40 mg total) by mouth daily    metFORMIN (GLUCOPHAGE-XR) 500 mg 24 hr tablet TAKE 2 TABLETS BY MOUTH TWICE DAILY WITH MEALS    metoprolol succinate (TOPROL-XL) 50 mg 24 hr tablet Take 1 tablet (50 mg total) by mouth daily    diazepam (VALIUM) 5 mg tablet Take 1 tab 60 min prior to MRI with a second dose 20 min prior to imaging if anxiety persists (Patient not taking: Reported on 4/5/2021)    fluticasone (FLONASE) 50 mcg/act nasal spray 1 spray into each nostril daily (Patient not taking: Reported on 6/30/2021)    Insulin Syringe-Needle U-100 (BD Insulin Syringe Ultrafine) 31G X 15/64" 0 3 ML MISC by Does not apply route 2 (two) times a day (Patient not taking: Reported on 7/13/2021)    Misc  Devices (DIGITAL GLASS SCALE) MISC by Does not apply route daily    vitamin B-12 (VITAMIN B-12) 500 mcg tablet Take 2 tablets (1,000 mcg total) by mouth daily     No current facility-administered medications on file prior to visit  She is allergic to sorbitan, ambien [zolpidem tartrate], demerol [meperidine], insulin, insulin glargine, latex, oxycodone-acetaminophen, and zolpidem            Objective:    Blood pressure 152/98, pulse 63, height 5' 7 5" (1 715 m), weight 128 kg (283 lb), not currently breastfeeding  Physical Exam    Neurological Exam  CONSTITUTIONAL: NAD, pleasant  NECK: supple, no lymphadenopathy, no thyromegaly, no JVD  CARDIOVASCULAR: RRR, normal S1S2, no murmurs, no rubs  RESP: clear to auscultation bilaterally, no wheezes/rhonchi/rales  ABDOMEN: soft, non tender, non distended  SKIN: no rash or skin lesions  EXTREMITIES: no edema, pulses 2+bilaterally  PSYCH: appropriate mood and affect  NEUROLOGIC COMPREHENSIVE EXAM: Patient is oriented to person, place and time, NAD; appropriate affect  CN II, III, IV, V, VI, VII,VIII,IX,X,XI-XII intact with EOMI, PERRLA, OKN intact, VF grossly intact, fundi poorly visualized secondary to pupillary constriction; symmetric face noted  Motor: 5/5 UE/LE bilateral symmetric; Sensory: intact to light touch and pinprick bilaterally; normal vibration sensation feet bilaterally; Coordination within normal limits on FTN and JIHAN testing; DTR: 2/4 through, no Babinski, no clonus  Tandem gait is abnormal  Romberg: negative  ROS:  12 points of review of system was reviewed with the patient and was unremarkable with exception: see HPI  Review of Systems   Constitutional: Negative  Negative for appetite change and fever  HENT: Negative  Negative for hearing loss, tinnitus, trouble swallowing and voice change  Eyes: Negative  Negative for photophobia and pain  Respiratory: Negative  Negative for shortness of breath  Cardiovascular: Negative  Negative for palpitations  Gastrointestinal: Negative  Negative for nausea and vomiting  Endocrine: Negative  Negative for cold intolerance  Genitourinary: Negative  Negative for dysuria, frequency and urgency  Musculoskeletal: Negative  Negative for myalgias and neck pain  Skin: Negative  Negative for rash  Neurological: Positive for numbness  Negative for dizziness, tremors, seizures, syncope, facial asymmetry, speech difficulty, weakness, light-headedness and headaches  Legs and arms are numb all the time and tingly   Hematological: Negative  Does not bruise/bleed easily  Psychiatric/Behavioral: Negative  Negative for confusion, hallucinations and sleep disturbance

## 2021-07-13 NOTE — TELEPHONE ENCOUNTER
please reach the patient - she has a puppy and she would like to have a formal training for this puppy to become a service dog   Patient has MS with ambulatory dysfunction

## 2021-07-15 NOTE — TELEPHONE ENCOUNTER
Potential Options for the patient to get her puppy certified as a service dog:     Emotional Support:     [facility]/mustaphaserviceDonalddogs/application-process  php    [employer]  php/programs-services/open-doors    Mobility Assistance:   [email or web address]  [email or web address]    [employer]  php/programs-services/open-doors    [email or web address]    MSW called the patient to discuss the options above  She was preparing to spend time with her grandchildren so she was unavailable to discuss  She agreed to reconnect on Monday  MSW will call the patient next week

## 2021-07-19 NOTE — TELEPHONE ENCOUNTER
MSW received a call back from the patient this afternoon  MSW explained the different types service dog training opportunities available and the agencies that support the training  The patient was interested in being sent a list of the options that ORALIA found  She asked for MSW to please mail the information to her home  MSW will mail the list as requested

## 2021-07-20 NOTE — TELEPHONE ENCOUNTER
MSW mailed the companies below to the patient's home  [employer]    Email: [email or web address]   Phone: [phone]   Fax: [phone]   Website: [employer] (therapy/emotional support service dogs)    Email: [email or web address]   Phone: [phone]   Website: [employer] (for therapy/emotional support service dogs)    E-mail: [email or web address]   Phone: [phone]   Website: [email or web address]       [employer]    E-mail [email or web address]   Phone: [phone]   Website: [email or web address]       There are no further social needs at this time  MSW will be available to assist with any future needs in regard to this patient

## 2021-08-04 ENCOUNTER — PATIENT OUTREACH (OUTPATIENT)
Dept: FAMILY MEDICINE CLINIC | Facility: CLINIC | Age: 61
End: 2021-08-04

## 2021-08-04 NOTE — PROGRESS NOTES
I called the patient but received voicemail  Message was left reminding her of her Sleep Med appointment and Endo appointment with dates and times provided  I will continue to follow

## 2021-08-06 ENCOUNTER — OFFICE VISIT (OUTPATIENT)
Dept: SLEEP CENTER | Facility: CLINIC | Age: 61
End: 2021-08-06
Payer: COMMERCIAL

## 2021-08-06 VITALS
WEIGHT: 282 LBS | SYSTOLIC BLOOD PRESSURE: 148 MMHG | HEIGHT: 67 IN | BODY MASS INDEX: 44.26 KG/M2 | DIASTOLIC BLOOD PRESSURE: 80 MMHG

## 2021-08-06 DIAGNOSIS — R40.0 DAYTIME SLEEPINESS: ICD-10-CM

## 2021-08-06 DIAGNOSIS — R41.89 COGNITIVE DECLINE: ICD-10-CM

## 2021-08-06 DIAGNOSIS — G47.33 OSA (OBSTRUCTIVE SLEEP APNEA): Primary | ICD-10-CM

## 2021-08-06 DIAGNOSIS — E66.01 MORBID OBESITY WITH BMI OF 45.0-49.9, ADULT (HCC): ICD-10-CM

## 2021-08-06 DIAGNOSIS — G25.81 RLS (RESTLESS LEGS SYNDROME): ICD-10-CM

## 2021-08-06 DIAGNOSIS — I10 BENIGN ESSENTIAL HYPERTENSION: ICD-10-CM

## 2021-08-06 DIAGNOSIS — R51.9 HEADACHE UPON AWAKENING: ICD-10-CM

## 2021-08-06 DIAGNOSIS — F41.8 DEPRESSION WITH ANXIETY: ICD-10-CM

## 2021-08-06 DIAGNOSIS — G57.93 NEUROPATHIC PAIN, LEG, BILATERAL: ICD-10-CM

## 2021-08-06 DIAGNOSIS — G81.91 RIGHT HEMIPARESIS (HCC): ICD-10-CM

## 2021-08-06 PROCEDURE — 3077F SYST BP >= 140 MM HG: CPT | Performed by: INTERNAL MEDICINE

## 2021-08-06 PROCEDURE — 99214 OFFICE O/P EST MOD 30 MIN: CPT | Performed by: INTERNAL MEDICINE

## 2021-08-06 PROCEDURE — 3079F DIAST BP 80-89 MM HG: CPT | Performed by: INTERNAL MEDICINE

## 2021-08-06 PROCEDURE — 1036F TOBACCO NON-USER: CPT | Performed by: INTERNAL MEDICINE

## 2021-08-06 PROCEDURE — 3008F BODY MASS INDEX DOCD: CPT | Performed by: INTERNAL MEDICINE

## 2021-08-06 NOTE — PATIENT INSTRUCTIONS

## 2021-08-06 NOTE — PROGRESS NOTES
Review of Systems      Genitourinary none   Cardiology none   Gastrointestinal none   Neurology frequent headaches, awaken with headache, numbness/tingling of an extremity, difficulty with memory and balance problems   Constitutional claustrophobia and fatigue   Integumentary none   Psychiatry anxiety   Musculoskeletal none   Pulmonary none   ENT none   Endocrine none   Hematological none

## 2021-08-09 ENCOUNTER — TELEPHONE (OUTPATIENT)
Dept: SLEEP CENTER | Facility: CLINIC | Age: 61
End: 2021-08-09

## 2021-08-10 ENCOUNTER — APPOINTMENT (OUTPATIENT)
Dept: LAB | Facility: CLINIC | Age: 61
End: 2021-08-10
Payer: COMMERCIAL

## 2021-08-10 DIAGNOSIS — E04.1 THYROID NODULE: ICD-10-CM

## 2021-08-10 DIAGNOSIS — Z79.4 TYPE 2 DIABETES MELLITUS WITH HYPERGLYCEMIA, WITH LONG-TERM CURRENT USE OF INSULIN (HCC): ICD-10-CM

## 2021-08-10 DIAGNOSIS — E11.65 TYPE 2 DIABETES MELLITUS WITH HYPERGLYCEMIA, WITH LONG-TERM CURRENT USE OF INSULIN (HCC): ICD-10-CM

## 2021-08-10 DIAGNOSIS — E83.52 HYPERCALCEMIA: ICD-10-CM

## 2021-08-10 LAB
ALBUMIN SERPL BCP-MCNC: 3.3 G/DL (ref 3.5–5)
ALP SERPL-CCNC: 148 U/L (ref 46–116)
ALT SERPL W P-5'-P-CCNC: 25 U/L (ref 12–78)
ANION GAP SERPL CALCULATED.3IONS-SCNC: 9 MMOL/L (ref 4–13)
AST SERPL W P-5'-P-CCNC: 11 U/L (ref 5–45)
BILIRUB SERPL-MCNC: 1.09 MG/DL (ref 0.2–1)
BUN SERPL-MCNC: 15 MG/DL (ref 5–25)
CALCIUM ALBUM COR SERPL-MCNC: 11.2 MG/DL (ref 8.3–10.1)
CALCIUM SERPL-MCNC: 10.6 MG/DL (ref 8.3–10.1)
CHLORIDE SERPL-SCNC: 105 MMOL/L (ref 100–108)
CHOLEST SERPL-MCNC: 124 MG/DL (ref 50–200)
CO2 SERPL-SCNC: 23 MMOL/L (ref 21–32)
CREAT SERPL-MCNC: 0.56 MG/DL (ref 0.6–1.3)
EST. AVERAGE GLUCOSE BLD GHB EST-MCNC: 157 MG/DL
GFR SERPL CREATININE-BSD FRML MDRD: 102 ML/MIN/1.73SQ M
GLUCOSE P FAST SERPL-MCNC: 143 MG/DL (ref 65–99)
HBA1C MFR BLD: 7.1 %
HDLC SERPL-MCNC: 42 MG/DL
LDLC SERPL CALC-MCNC: 59 MG/DL (ref 0–100)
POTASSIUM SERPL-SCNC: 4.2 MMOL/L (ref 3.5–5.3)
PROT SERPL-MCNC: 7.4 G/DL (ref 6.4–8.2)
PTH-INTACT SERPL-MCNC: 93 PG/ML (ref 18.4–80.1)
SODIUM SERPL-SCNC: 137 MMOL/L (ref 136–145)
T4 FREE SERPL-MCNC: 0.94 NG/DL (ref 0.76–1.46)
TRIGL SERPL-MCNC: 113 MG/DL
TSH SERPL DL<=0.05 MIU/L-ACNC: 1.35 UIU/ML (ref 0.36–3.74)

## 2021-08-10 PROCEDURE — 83970 ASSAY OF PARATHORMONE: CPT

## 2021-08-10 PROCEDURE — 84443 ASSAY THYROID STIM HORMONE: CPT

## 2021-08-10 PROCEDURE — 83036 HEMOGLOBIN GLYCOSYLATED A1C: CPT

## 2021-08-10 PROCEDURE — 36415 COLL VENOUS BLD VENIPUNCTURE: CPT

## 2021-08-10 PROCEDURE — 80053 COMPREHEN METABOLIC PANEL: CPT

## 2021-08-10 PROCEDURE — 84439 ASSAY OF FREE THYROXINE: CPT

## 2021-08-10 PROCEDURE — 3051F HG A1C>EQUAL 7.0%<8.0%: CPT | Performed by: INTERNAL MEDICINE

## 2021-08-10 PROCEDURE — 80061 LIPID PANEL: CPT

## 2021-08-11 ENCOUNTER — OFFICE VISIT (OUTPATIENT)
Dept: ENDOCRINOLOGY | Facility: CLINIC | Age: 61
End: 2021-08-11
Payer: COMMERCIAL

## 2021-08-11 VITALS
HEIGHT: 67 IN | BODY MASS INDEX: 45.3 KG/M2 | HEART RATE: 77 BPM | WEIGHT: 288.6 LBS | SYSTOLIC BLOOD PRESSURE: 148 MMHG | DIASTOLIC BLOOD PRESSURE: 90 MMHG

## 2021-08-11 DIAGNOSIS — I10 BENIGN ESSENTIAL HYPERTENSION: ICD-10-CM

## 2021-08-11 DIAGNOSIS — E11.65 TYPE 2 DIABETES MELLITUS WITH HYPERGLYCEMIA, WITH LONG-TERM CURRENT USE OF INSULIN (HCC): ICD-10-CM

## 2021-08-11 DIAGNOSIS — Z79.4 TYPE 2 DIABETES MELLITUS WITH HYPERGLYCEMIA, WITH LONG-TERM CURRENT USE OF INSULIN (HCC): ICD-10-CM

## 2021-08-11 DIAGNOSIS — E83.52 HYPERCALCEMIA: Primary | ICD-10-CM

## 2021-08-11 DIAGNOSIS — E04.1 THYROID NODULE: ICD-10-CM

## 2021-08-11 DIAGNOSIS — E21.3 HYPERPARATHYROIDISM (HCC): ICD-10-CM

## 2021-08-11 PROCEDURE — 3077F SYST BP >= 140 MM HG: CPT | Performed by: PHYSICIAN ASSISTANT

## 2021-08-11 PROCEDURE — 99215 OFFICE O/P EST HI 40 MIN: CPT | Performed by: PHYSICIAN ASSISTANT

## 2021-08-11 PROCEDURE — 3080F DIAST BP >= 90 MM HG: CPT | Performed by: PHYSICIAN ASSISTANT

## 2021-08-11 RX ORDER — LISINOPRIL 40 MG/1
TABLET ORAL
Qty: 90 TABLET | Refills: 0 | Status: SHIPPED | OUTPATIENT
Start: 2021-08-11 | End: 2021-11-04

## 2021-08-11 RX ORDER — CINACALCET 60 MG/1
60 TABLET, FILM COATED ORAL DAILY
Qty: 30 TABLET | Refills: 5 | Status: SHIPPED | OUTPATIENT
Start: 2021-08-11 | End: 2021-11-23 | Stop reason: SDUPTHER

## 2021-08-11 NOTE — PROGRESS NOTES
Established Patient Progress Note       Chief Complaint   Patient presents with    Diabetes Type 2    Thyroid Problem    Hyperparathyroidism        History of Present Illness:     Deven Kaiser is a 62 y o  female with a history of Type 2 Diabetes, Hyperparathyroidism, and Vitamin D Deficiency  Diabetes control has been stable off the insulin and was not discussed today  Since last visit she was dx with sleep apnea and started using CPAP  She got a therapy dog  She has been having headaches       For the hyperparathyroidism, she is now taking sensipar 30mg daily   She was not interested in surgical intervention at previous visits, but now would consider   Denies kidney stones/fractures  Bone density was normal on 10/2020 DEXA  She keeps well hydrated  Does not take any calcium supplements        For Vitamin D Deficiency, she remains off supplements       For the thyroid nodules, FNA was attempted 1/29/2021 but this was non-diagnostic  She reported significant difficulty and pain with test and would not want to repeat unless she was asleep  Denies FH Thyroid CA/Thyroid problems  TSH normal     At the last visit she and her son decided on repeat FNA with Afirma and sedation but this has not been completed yet              Patient Active Problem List   Diagnosis    Hernia, ventral    Type 2 diabetes mellitus with hyperglycemia, with long-term current use of insulin ([facility])    GI bleed    Morbid obesity with BMI of 45 0-49 9, adult ([address] )    Ischemic colitis [facility] )    Unintentional weight loss    Hypercalcemia    Hyperparathyroidism ([facility]    Vitamin D deficiency    Depression with anxiety    Benign essential hypertension    Falls frequently    Numbness and tingling of right arm and leg    Ambulatory dysfunction    Acute right hemiparesis (HCC)    Cervical spinal cord lesion    Neuropathic pain, leg, bilateral    MS (multiple sclerosis) (HCC)    Stroke (cerebrum) ([facility]    Cognitive decline    Left sided numbness    Right hemiparesis (HCC)    Snoring    Morbid obesity (HCC)    Thyroid nodule    B12 deficiency    Hyperlipidemia    Obstructive sleep apnea syndrome      Past Medical History:   Diagnosis Date    Diabetes mellitus ([facility] )     External hemorrhoids     last assessed 16, resolved 16    Hernia, ventral     last assessed 12/14/15, resolved 16    Hypertension     Incarcerated incisional hernia     last assessed 12/21/15, resolved 16    Insomnia     last assessed 16, resolved 10/9/17    Lyme disease     Morbid obesity with BMI of 45 0-49 9, adult ([facility] ) 5/3/2017    MS (multiple sclerosis) ([facility] )     Stroke (cerebrum) ([place] [id number] ) 2020    Type 2 diabetes mellitus with hyperglycemia, without long-term current use of insulin (HCC)       Past Surgical History:   Procedure Laterality Date    ABCESS DRAINAGE       SECTION      x3    COLONOSCOPY N/A 2017    Procedure: COLONOSCOPY with biopsy;  Surgeon: Freda Velazquez DO;  Location: [facility];   Service: Complete    HYSTERECTOMY      IR LUMBAR PUNCTURE  3/13/2020    US GUIDED THYROID BIOPSY  2021      Family History   Problem Relation Age of Onset    Glaucoma Mother     Diabetes Father     Hypertension Father     Colon cancer Father     Breast cancer Maternal Grandmother     Breast cancer Maternal Aunt     Coronary artery disease Family     Diabetes Family     Hypertension Family     Cervical cancer Sister 61    Breast cancer Sister 50    Breast cancer Sister 45     Social History     Tobacco Use    Smoking status: Never Smoker    Smokeless tobacco: Never Used   Substance Use Topics    Alcohol use: Not Currently     Comment: Social     Allergies   Allergen Reactions    Sorbitan Diarrhea, Vomiting and Abdominal Pain    Ambien [Zolpidem Tartrate]     Demerol [Meperidine]     Insulin     Insulin Glargine      Annotation - [id number]: memory loss    Latex     Oxycodone-Acetaminophen Vomiting    Zolpidem Hallucinations and Irritability       Current Outpatient Medications:     acetaminophen (TYLENOL) 500 mg tablet, Take 2 tablets (1,000 mg total) by mouth every 6 (six) hours as needed for moderate pain, Disp: 60 tablet, Rfl: 1    Alcohol Swabs PADS, by Does not apply route 4 (four) times a day *Latex Free*, Disp: 120 each, Rfl: 3    aspirin 81 mg chewable tablet, Chew 1 tablet (81 mg total) daily, Disp: 90 tablet, Rfl: 1    atorvastatin (LIPITOR) 40 mg tablet, Take 1 tablet (40 mg total) by mouth every evening, Disp: 90 tablet, Rfl: 1    Blood Pressure KIT, by Does not apply route daily, Disp: 1 each, Rfl: 0    DULoxetine (CYMBALTA) 60 mg delayed release capsule, Take 1 capsule (60 mg total) by mouth daily, Disp: 30 capsule, Rfl: 3    gabapentin (NEURONTIN) 300 mg capsule, TAKE 2 CAPSULES BY MOUTH IN THE MORNING AND TAKE 2 CAPSULES IN THE AFTERNOON AND TAKE 3 CAPSULES AT BEDTIME, Disp: 210 capsule, Rfl: 0    glucose blood (ReliOn Prime Test) test strip, Use 1 each 2 (two) times a day, Disp: 200 each, Rfl: 1    glucose blood test strip, Check blood glucose levels three times per day before meals, Disp: 300 each, Rfl: 2    Interferon Beta-1b 0 3 MG KIT, Inject 1 ml (0 25 mg) subcutaneously every other day, Disp: 14 kit, Rfl: 4    lisinopril (ZESTRIL) 40 mg tablet, Take 1 tablet (40 mg total) by mouth daily, Disp: 90 tablet, Rfl: 1    metFORMIN (GLUCOPHAGE-XR) 500 mg 24 hr tablet, TAKE 2 TABLETS BY MOUTH TWICE DAILY WITH MEALS, Disp: 360 tablet, Rfl: 0    metoprolol succinate (TOPROL-XL) 50 mg 24 hr tablet, Take 1 tablet (50 mg total) by mouth daily, Disp: 90 tablet, Rfl: 1    Misc   Devices (DIGITAL GLASS SCALE) MISC, by Does not apply route daily, Disp: 1 each, Rfl: 0    cinacalcet (SENSIPAR) 60 MG tablet, Take 1 tablet (60 mg total) by mouth daily, Disp: 30 tablet, Rfl: 5    diazepam (VALIUM) 5 mg tablet, Take 1 tab 60 min prior to MRI with a second dose 20 min prior to imaging if anxiety persists (Patient not taking: Reported on 4/5/2021), Disp: 2 tablet, Rfl: 0    fluticasone (FLONASE) 50 mcg/act nasal spray, 1 spray into each nostril daily (Patient not taking: Reported on 6/30/2021), Disp: 1 Bottle, Rfl: 1    hydrochlorothiazide (HYDRODIURIL) 25 mg tablet, Take 1 tablet (25 mg total) by mouth daily (Patient not taking: Reported on 8/11/2021), Disp: 90 tablet, Rfl: 1    Insulin Syringe-Needle U-100 (BD Insulin Syringe Ultrafine) 31G X 15/64" 0 3 ML MISC, by Does not apply route 2 (two) times a day (Patient not taking: Reported on 7/13/2021), Disp: 200 each, Rfl: 1    vitamin B-12 (VITAMIN B-12) 500 mcg tablet, Take 2 tablets (1,000 mcg total) by mouth daily (Patient not taking: Reported on 8/11/2021), Disp: 60 tablet, Rfl: 5    Review of Systems   Constitutional: Negative for activity change, appetite change, chills, diaphoresis, fatigue, fever and unexpected weight change  HENT: Negative for trouble swallowing and voice change  Eyes: Negative for visual disturbance  Respiratory: Negative for shortness of breath  Cardiovascular: Negative for chest pain and palpitations  Gastrointestinal: Negative for abdominal pain, constipation and diarrhea  Endocrine: Negative for cold intolerance, heat intolerance, polydipsia, polyphagia and polyuria  Genitourinary: Negative for frequency and menstrual problem  Musculoskeletal: Positive for gait problem  Negative for arthralgias and myalgias  Skin: Negative for rash  Allergic/Immunologic: Negative for food allergies  Neurological: Positive for headaches  Negative for dizziness and tremors  Hematological: Negative for adenopathy  Psychiatric/Behavioral: Negative for sleep disturbance  All other systems reviewed and are negative  Physical Exam:  Body mass index is 45 2 kg/m²    /90   Pulse 77   Ht 5' 7" (1 702 m)   Wt 131 kg (288 lb 9 6 oz)   LMP  (LMP Unknown)   BMI 45 20 kg/m² Wt Readings from Last 3 Encounters:   08/11/21 131 kg (288 lb 9 6 oz)   08/06/21 128 kg (282 lb)   07/13/21 128 kg (283 lb)       Physical Exam  Vitals reviewed  Constitutional:       General: She is not in acute distress  Appearance: She is well-developed  HENT:      Head: Normocephalic and atraumatic  Eyes:      Conjunctiva/sclera: Conjunctivae normal       Pupils: Pupils are equal, round, and reactive to light  Neck:      Thyroid: No thyromegaly  Cardiovascular:      Rate and Rhythm: Normal rate and regular rhythm  Heart sounds: Normal heart sounds  Pulmonary:      Effort: Pulmonary effort is normal  No respiratory distress  Breath sounds: Normal breath sounds  No wheezing or rales  Abdominal:      General: Bowel sounds are normal  There is no distension  Palpations: Abdomen is soft  Tenderness: There is no abdominal tenderness  Musculoskeletal:         General: Normal range of motion  Cervical back: Normal range of motion and neck supple  Skin:     General: Skin is warm and dry  Neurological:      Mental Status: She is alert and oriented to person, place, and time           Labs:     Component      Latest Ref Rng & Units 1/21/2021 3/3/2021 4/28/2021           8:15 AM  9:36 AM  9:24 AM   Sodium      136 - 145 mmol/L   141   Potassium      3 5 - 5 3 mmol/L   4 3   Chloride      100 - 108 mmol/L   110 (H)   CO2      21 - 32 mmol/L   27   Anion Gap      4 - 13 mmol/L   4   BUN      5 - 25 mg/dL   16   Creatinine      0 60 - 1 30 mg/dL   0 66   GLUCOSE FASTING      65 - 99 mg/dL   153 (H)   Calcium      8 3 - 10 1 mg/dL   10 7 (H)   CORRECTED CALCIUM      8 3 - 10 1 mg/dL      AST      5 - 45 U/L      ALT      12 - 78 U/L      Alkaline Phosphatase      46 - 116 U/L      Total Protein      6 4 - 8 2 g/dL      Albumin      3 5 - 5 0 g/dL      TOTAL BILIRUBIN      0 20 - 1 00 mg/dL      eGFR      ml/min/1 73sq m   97   Cholesterol      50 - 200 mg/dL      Triglycerides <=150 mg/dL      HDL      >=40 mg/dL      LDL Calculated      0 - 100 mg/dL      Hemoglobin A1C      Normal 3 8-5 6%; PreDiabetic 5 7-6 4%; Diabetic >=6 5%; Glycemic control for adults with diabetes <7 0% %      eAG, EST AVG Glucose      mg/dl      Vit D, 25-Hydroxy      30 0 - 100 0 ng/mL 30 6     PARATHYROID HORMONE      18 4 - 80 1 pg/mL  89 6 (H)    TSH 3RD GENERATON      0 358 - 3 740 uIU/mL      Free T4      0 76 - 1 46 ng/dL        Component      Latest Ref Rng & Units 8/10/2021 8/10/2021 8/10/2021           9:44 AM  9:44 AM  9:44 AM   Sodium      136 - 145 mmol/L  137    Potassium      3 5 - 5 3 mmol/L  4 2    Chloride      100 - 108 mmol/L  105    CO2      21 - 32 mmol/L  23    Anion Gap      4 - 13 mmol/L  9    BUN      5 - 25 mg/dL  15    Creatinine      0 60 - 1 30 mg/dL  0 56 (L)    GLUCOSE FASTING      65 - 99 mg/dL  143 (H)    Calcium      8 3 - 10 1 mg/dL  10 6 (H)    CORRECTED CALCIUM      8 3 - 10 1 mg/dL  11 2 (H)    AST      5 - 45 U/L  11    ALT      12 - 78 U/L  25    Alkaline Phosphatase      46 - 116 U/L  148 (H)    Total Protein      6 4 - 8 2 g/dL  7 4    Albumin      3 5 - 5 0 g/dL  3 3 (L)    TOTAL BILIRUBIN      0 20 - 1 00 mg/dL  1 09 (H)    eGFR      ml/min/1 73sq m  102    Cholesterol      50 - 200 mg/dL      Triglycerides      <=150 mg/dL      HDL      >=40 mg/dL      LDL Calculated      0 - 100 mg/dL      Hemoglobin A1C      Normal 3 8-5 6%; PreDiabetic 5 7-6 4%;  Diabetic >=6 5%; Glycemic control for adults with diabetes <7 0% %   7 1 (H)   eAG, EST AVG Glucose      mg/dl   157   Vit D, 25-Hydroxy      30 0 - 100 0 ng/mL      PARATHYROID HORMONE      18 4 - 80 1 pg/mL 93 0 (H)     TSH 3RD GENERATON      0 358 - 3 740 uIU/mL      Free T4      0 76 - 1 46 ng/dL        Component      Latest Ref Rng & Units 8/10/2021 8/10/2021 8/10/2021           9:44 AM  9:44 AM  9:44 AM   Sodium      136 - 145 mmol/L      Potassium      3 5 - 5 3 mmol/L      Chloride      100 - 108 mmol/L CO2      21 - 32 mmol/L      Anion Gap      4 - 13 mmol/L      BUN      5 - 25 mg/dL      Creatinine      0 60 - 1 30 mg/dL      GLUCOSE FASTING      65 - 99 mg/dL      Calcium      8 3 - 10 1 mg/dL      CORRECTED CALCIUM      8 3 - 10 1 mg/dL      AST      5 - 45 U/L      ALT      12 - 78 U/L      Alkaline Phosphatase      46 - 116 U/L      Total Protein      6 4 - 8 2 g/dL      Albumin      3 5 - 5 0 g/dL      TOTAL BILIRUBIN      0 20 - 1 00 mg/dL      eGFR      ml/min/1 73sq m      Cholesterol      50 - 200 mg/dL 124     Triglycerides      <=150 mg/dL 113     HDL      >=40 mg/dL 42     LDL Calculated      0 - 100 mg/dL 59     Hemoglobin A1C      Normal 3 8-5 6%; PreDiabetic 5 7-6 4%; Diabetic >=6 5%; Glycemic control for adults with diabetes <7 0% %      eAG, EST AVG Glucose      mg/dl      Vit D, 25-Hydroxy      30 0 - 100 0 ng/mL      PARATHYROID HORMONE      18 4 - 80 1 pg/mL      TSH 3RD GENERATON      0 358 - 3 740 uIU/mL  1 350    Free T4      0 76 - 1 46 ng/dL   0 94     Impression & Plan:    Problem List Items Addressed This Visit        Endocrine    Type 2 diabetes mellitus with hyperglycemia, with long-term current use of insulin (HCC)     Control is stable  Lab Results   Component Value Date    HGBA1C 7 1 (H) 08/10/2021            Hyperparathyroidism (HCC)     Calcium level remains high despite sensipar 30mg daily  Increase to 60mg daily  Keep well hydrated  Repeat lab testing in 1 month  She is now open to considering surgery  Ordered 24-hour urine calcium testing and sestamibi scan  After review of results and completion of FNA of thyroid nodules will refer to surgeon for evaluation  Orders/instructions were reviewed with patient in detail  She was given collection supplies/instructions for 24-hour urine  Thyroid nodule     Previous FNA was non-diagnostic and experience was traumatic for patient    At last visit in April we discussed options with her son including monitoring with ultrasound or repeat FNA with afirma testing  At the visit, she and her son decided on repeat FNA with Afirma under sedation  She still reports that she wants the FNA as long as she does not have to be awake  She was given another order at the time of visit and she was advised to call to schedule procedure  I called her son (POA) to discuss her care and need for testing and recommend that he come to all medical visits if possible  Other    Hypercalcemia - Primary    Relevant Medications    cinacalcet (SENSIPAR) 60 MG tablet    Other Relevant Orders    NM parathyroid scan w spect    Calcium, urine, 24 hour Lab Collect    Creatinine, urine, 24 hour Lab Collect    Calcium Lab Collect    Albumin Lab Collect          Orders Placed This Encounter   Procedures    NM parathyroid scan w spect     Standing Status:   Future     Standing Expiration Date:   8/11/2025     Scheduling Instructions:      Please bring your insurance cards, a form of photo ID and a list of your medications with you  Arrive 15 minutes prior to your appointment time in order to register  To schedule this appointment, please contact Central Scheduling at [phone]  Order Specific Question:   Reason for Exam:     Answer:   hyperparathyroidism     Order Specific Question:   Is the patient pregnant? Answer:   No    Calcium, urine, 24 hour Lab Collect     Standing Status:   Future     Standing Expiration Date:   8/11/2022    Creatinine, urine, 24 hour Lab Collect     Standing Status:   Future     Standing Expiration Date:   8/11/2022    Calcium Lab Collect     Standing Status:   Future     Standing Expiration Date:   8/11/2022    Albumin Lab Collect     Standing Status:   Future     Standing Expiration Date:   8/11/2022       Patient Instructions     **Please bring your son to medical visits**    Please increase sensipar (Cinacalcet) to 60mg daily  Do lab testing in 1 month to check calcium levels    Keep well hydrated  Do lab testing for 24-hour urine calcium  Complete sestamibi parathyroid scan to determine what parathyroid gland is overactive causing the high calcium-- then will refer for surgery  Please reschedule the thyroid biopsy so we can determine if thyroid nodule is cancerous prior to parathyroid surgery  Hyperparathyroidism   AMBULATORY CARE:   Hyperparathyroidism  is a condition that causes your parathyroid glands to make too much parathyroid hormone (PTH)  The parathyroid glands are 4 small glands located near the thyroid gland in your neck  PTH keeps the level of calcium balanced in your blood  High levels of PTH causes too much calcium to build up in your blood  High calcium levels can cause health problems such as osteoporosis (weak, brittle bones), high blood pressure, and kidney stones  Common signs and symptoms of hyperparathyroidism include the following: You may have no signs or symptoms or you may have any of the following:  · Muscle weakness    · Fatigue    · Depression or anxiety    · General body aches and pains    · Bone and joint pain    · Loss of appetite    · Nausea, vomiting, or abdominal pain    · Constipation    · Confusion or forgetfulness     · Increased thirst and urination    Seek care immediately if:   · You heart beats faster or slower than normal, or it feels like fluttering in your chest     · You have nausea, vomiting, and abdominal pain  · You cannot think clearly  Contact your healthcare provider if:   · You have bone and joint pain  · You have increased thirst or you are urinating more often than usual     · You have a loss of appetite  · You have questions or concerns about your condition or care  Treatment:  You may not need any treatment if you do not have symptoms  Your healthcare provider may monitor your condition through regular visits and blood tests  You may need medicines to control the amount of PTH your parathyroid glands make   You may also need medicine to keep your bones strong  Surgery may be done to remove an adenoma or your parathyroid glands  Manage hyperparathyroidism: You may need to do the following if you will not have surgery to treat your hyperparathyroidism  · Limit your calcium intake  Your healthcare provider may tell you to limit your intake to less than 1200 mg each day  You may also need to limit vitamin D to less than 600 IU each day  · Drink liquids as directed  Liquids can help prevent kidney stones  Ask how much liquid to drink each day and which liquids are best for you  · Exercise regularly  Ask your healthcare provider about the best exercise plan for you  Exercise can help build and strengthen your bones  Follow up with your healthcare provider as directed:  Write down your questions so you remember to ask them during your visits  © Copyright [employer] 2021 Information is for End User's use only and may not be sold, redistributed or otherwise used for commercial purposes  All illustrations and images included in CareNotes® are the copyrighted property of A D A M , Inc  or [address] [place] [address]  The above information is an  only  It is not intended as medical advice for individual conditions or treatments  Talk to your doctor, nurse or pharmacist before following any medical regimen to see if it is safe and effective for you  Discussed with the patient and all questioned fully answered  She will call me if any problems arise  Follow-up appointment in 6 weeks       Counseled patient on diagnostic results, prognosis, risk and benefit of treatment options, instruction for management, importance of treatment compliance, Risk  factor reduction and impressions      Chandler Hayes PA-C

## 2021-08-11 NOTE — PATIENT INSTRUCTIONS
**Please bring your son to medical visits**    Please increase sensipar (Cinacalcet) to 60mg daily  Do lab testing in 1 month to check calcium levels  Keep well hydrated  Do lab testing for 24-hour urine calcium  Complete sestamibi parathyroid scan to determine what parathyroid gland is overactive causing the high calcium-- then will refer for surgery  Please reschedule the thyroid biopsy so we can determine if thyroid nodule is cancerous prior to parathyroid surgery  Hyperparathyroidism   AMBULATORY CARE:   Hyperparathyroidism  is a condition that causes your parathyroid glands to make too much parathyroid hormone (PTH)  The parathyroid glands are 4 small glands located near the thyroid gland in your neck  PTH keeps the level of calcium balanced in your blood  High levels of PTH causes too much calcium to build up in your blood  High calcium levels can cause health problems such as osteoporosis (weak, brittle bones), high blood pressure, and kidney stones  Common signs and symptoms of hyperparathyroidism include the following: You may have no signs or symptoms or you may have any of the following:  · Muscle weakness    · Fatigue    · Depression or anxiety    · General body aches and pains    · Bone and joint pain    · Loss of appetite    · Nausea, vomiting, or abdominal pain    · Constipation    · Confusion or forgetfulness     · Increased thirst and urination    Seek care immediately if:   · You heart beats faster or slower than normal, or it feels like fluttering in your chest     · You have nausea, vomiting, and abdominal pain  · You cannot think clearly  Contact your healthcare provider if:   · You have bone and joint pain  · You have increased thirst or you are urinating more often than usual     · You have a loss of appetite  · You have questions or concerns about your condition or care  Treatment:  You may not need any treatment if you do not have symptoms   Your healthcare provider may monitor your condition through regular visits and blood tests  You may need medicines to control the amount of PTH your parathyroid glands make  You may also need medicine to keep your bones strong  Surgery may be done to remove an adenoma or your parathyroid glands  Manage hyperparathyroidism: You may need to do the following if you will not have surgery to treat your hyperparathyroidism  · Limit your calcium intake  Your healthcare provider may tell you to limit your intake to less than 1200 mg each day  You may also need to limit vitamin D to less than 600 IU each day  · Drink liquids as directed  Liquids can help prevent kidney stones  Ask how much liquid to drink each day and which liquids are best for you  · Exercise regularly  Ask your healthcare provider about the best exercise plan for you  Exercise can help build and strengthen your bones  Follow up with your healthcare provider as directed:  Write down your questions so you remember to ask them during your visits  © Copyright [employer] 2021 Information is for End User's use only and may not be sold, redistributed or otherwise used for commercial purposes  All illustrations and images included in CareNotes® are the copyrighted property of [employer] Inc  or [facility]   The above information is an  only  It is not intended as medical advice for individual conditions or treatments  Talk to your doctor, nurse or pharmacist before following any medical regimen to see if it is safe and effective for you

## 2021-08-12 ENCOUNTER — TELEPHONE (OUTPATIENT)
Dept: ENDOCRINOLOGY | Facility: CLINIC | Age: 61
End: 2021-08-12

## 2021-08-12 NOTE — TELEPHONE ENCOUNTER
----- Message from Jesse Nieto sent at 8/11/2021  3:30 PM EDT -----  Please see below regarding providing some new patients slots as follow ups--I would recommend only doing 1 new patient slot to start with a day for follow ups    Thanks  ----- Message -----  From: Luma Bunch MD  Sent: 8/11/2021  11:13 AM EDT  To: Rubia Rahman think that next f/u in jan 22 would work- may be we need to open up some new patient slots as this is going to keep coming up   ----- Message -----  From: Jeffrey Delaney  Sent: 8/11/2021  10:30 AM EDT  To: Luma Bunch MD    Your next follow up is 1/5/22  ----- Message -----  From: Luma Bunch MD  Sent: 8/11/2021  10:23 AM EDT  To: Jeffrey Delaney    Not sure why she needs a 6 week f/u as she was just seen today - what is my next available ?  ----- Message -----  From: Jeffrey Delaney  Sent: 8/11/2021   9:50 AM EDT  To: Luma Bunch MD    Patient needs to make a 6 week follow up with you  I was looking at the week of 9/23  She cannot do the 23rd but any other day is good  Can she be fit in at a noon spot?

## 2021-08-13 ENCOUNTER — PREP FOR PROCEDURE (OUTPATIENT)
Dept: INTERVENTIONAL RADIOLOGY/VASCULAR | Facility: CLINIC | Age: 61
End: 2021-08-13

## 2021-08-13 DIAGNOSIS — E04.1 THYROID NODULE: Primary | ICD-10-CM

## 2021-08-13 RX ORDER — SODIUM CHLORIDE 9 MG/ML
75 INJECTION, SOLUTION INTRAVENOUS CONTINUOUS
Status: CANCELLED | OUTPATIENT
Start: 2021-08-13

## 2021-08-17 ENCOUNTER — TELEPHONE (OUTPATIENT)
Dept: RADIOLOGY | Facility: HOSPITAL | Age: 61
End: 2021-08-17

## 2021-08-18 ENCOUNTER — APPOINTMENT (EMERGENCY)
Dept: RADIOLOGY | Facility: HOSPITAL | Age: 61
End: 2021-08-18
Payer: COMMERCIAL

## 2021-08-18 ENCOUNTER — HOSPITAL ENCOUNTER (EMERGENCY)
Facility: HOSPITAL | Age: 61
Discharge: HOME | End: 2021-08-18
Attending: EMERGENCY MEDICINE | Admitting: EMERGENCY MEDICINE
Payer: COMMERCIAL

## 2021-08-18 ENCOUNTER — TELEPHONE (OUTPATIENT)
Dept: RADIOLOGY | Facility: HOSPITAL | Age: 61
End: 2021-08-18

## 2021-08-18 ENCOUNTER — APPOINTMENT (EMERGENCY)
Dept: CT IMAGING | Facility: HOSPITAL | Age: 61
End: 2021-08-18
Payer: COMMERCIAL

## 2021-08-18 VITALS
DIASTOLIC BLOOD PRESSURE: 95 MMHG | SYSTOLIC BLOOD PRESSURE: 161 MMHG | RESPIRATION RATE: 16 BRPM | TEMPERATURE: 98.1 F | HEART RATE: 61 BPM | OXYGEN SATURATION: 99 %

## 2021-08-18 DIAGNOSIS — S09.90XA INJURY OF HEAD, INITIAL ENCOUNTER: Primary | ICD-10-CM

## 2021-08-18 DIAGNOSIS — M25.561 RIGHT KNEE PAIN: ICD-10-CM
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
## 2021-08-18 PROCEDURE — G1004 CDSM NDSC: HCPCS

## 2021-08-18 PROCEDURE — 70450 CT HEAD/BRAIN W/O DYE: CPT

## 2021-08-18 PROCEDURE — 73564 X-RAY EXAM KNEE 4 OR MORE: CPT

## 2021-08-18 PROCEDURE — 99284 EMERGENCY DEPT VISIT MOD MDM: CPT

## 2021-08-18 PROCEDURE — 99284 EMERGENCY DEPT VISIT MOD MDM: CPT | Performed by: EMERGENCY MEDICINE

## 2021-08-18 PROCEDURE — 73700 CT LOWER EXTREMITY W/O DYE: CPT

## 2021-08-18 RX ORDER — ACETAMINOPHEN 325 MG/1
650 TABLET ORAL ONCE
Status: COMPLETED | OUTPATIENT
Start: 2021-08-18 | End: 2021-08-18

## 2021-08-18 RX ORDER — KETOROLAC TROMETHAMINE 30 MG/ML
15 INJECTION, SOLUTION INTRAMUSCULAR; INTRAVENOUS ONCE
Status: DISCONTINUED | OUTPATIENT
Start: 2021-08-18 | End: 2021-08-18

## 2021-08-18 RX ORDER — METOCLOPRAMIDE HYDROCHLORIDE 5 MG/ML
10 INJECTION INTRAMUSCULAR; INTRAVENOUS ONCE
Status: COMPLETED | OUTPATIENT
Start: 2021-08-18 | End: 2021-08-18

## 2021-08-18 RX ADMIN — ACETAMINOPHEN 650 MG: 325 TABLET, FILM COATED ORAL at 19:00

## 2021-08-18 RX ADMIN — METOCLOPRAMIDE 10 MG: 5 INJECTION, SOLUTION INTRAMUSCULAR; INTRAVENOUS at 19:20

## 2021-08-18 NOTE — ED PROVIDER NOTES
History  Chief Complaint   Patient presents with    Head Injury     patient reports losing her balance and falling to her right side striking her head on cement blocks  Denies LOC, c/o headache and had nausea at the time of fall  A 61-year-old female with past medical history of MS, diabetes, hypertension and CVA; presents after a fall  Patient states she was walking her dog around 11 pm last night, when she tripped over her pants and fell to the ground  Patient did strike the right side of her head on the ground however denies LOC  Patient states she light of the ground for about 20 minutes until family found her and assisted her to her feet  Patient states she has been able to ambulate with her walker since the fall  Patient states throughout the course of today she has developed progressively worsening right head pain  Patient has been taking Tylenol without relief  Patient also complains of right knee pain  Patient otherwise denies fever, chills, dizziness, lightheadedness, visual disturbances, neck pain, back pain, chest pain, shortness of breath, abdominal pain, nausea, vomiting, diarrhea, peripheral edema and rashes  Patient does complain of generalized weakness and paresthesias to the bilateral upper and lower extremities, however states this is chronic due to her MS  She states the symptoms have not worsened since the fall  A/P:  Head injury s/p mechanical fall  Also with right knee pain  Patient appears uncomfortable, however is neurologically intact  Will obtain imaging to rule out traumatic injury  Will treat symptomatically  History provided by:  Patient and medical records      Prior to Admission Medications   Prescriptions Last Dose Informant Patient Reported? Taking?    Alcohol Swabs PADS  Self No No   Sig: by Does not apply route 4 (four) times a day *Latex Free*   Blood Pressure KIT  Self No No   Sig: by Does not apply route daily   DULoxetine (CYMBALTA) 60 mg delayed release capsule  Self No No   Sig: Take 1 capsule (60 mg total) by mouth daily   Insulin Syringe-Needle U-100 (BD Insulin Syringe Ultrafine) 31G X 15/64" 0 3 ML MISC  Self No No   Sig: by Does not apply route 2 (two) times a day   Patient not taking: Reported on 2021   Interferon Beta-1b 0 3 MG KIT   No No   Sig: Inject 1 ml (0 25 mg) subcutaneously every other day   Misc   Devices (DIGITAL GLASS SCALE) MISC  Self No No   Sig: by Does not apply route daily   acetaminophen (TYLENOL) 500 mg tablet  Self No No   Sig: Take 2 tablets (1,000 mg total) by mouth every 6 (six) hours as needed for moderate pain   aspirin 81 mg chewable tablet   No No   Sig: Chew 1 tablet (81 mg total) daily   atorvastatin (LIPITOR) 40 mg tablet   No No   Sig: Take 1 tablet (40 mg total) by mouth every evening   cinacalcet (SENSIPAR) 60 MG tablet   No No   Sig: Take 1 tablet (60 mg total) by mouth daily   diazepam (VALIUM) 5 mg tablet  Self No No   Sig: Take 1 tab 60 min prior to MRI with a second dose 20 min prior to imaging if anxiety persists   Patient not taking: Reported on 2021   fluticasone (FLONASE) 50 mcg/act nasal spray  Self No No   Si spray into each nostril daily   Patient not taking: Reported on 2021   gabapentin (NEURONTIN) 300 mg capsule   No No   Sig: TAKE 2 CAPSULES BY MOUTH IN THE MORNING AND 2 CAPSULES IN THE AFTERNOON AND 3 CAPSULES AT BEDTIME   glucose blood (ReliOn Prime Test) test strip  Self No No   Sig: Use 1 each 2 (two) times a day   glucose blood test strip  Self No No   Sig: Check blood glucose levels three times per day before meals   hydrochlorothiazide (HYDRODIURIL) 25 mg tablet   No No   Sig: Take 1 tablet (25 mg total) by mouth daily   Patient not taking: Reported on 2021   lisinopril (ZESTRIL) 40 mg tablet   No No   Sig: Take 1 tablet by mouth once daily   metFORMIN (GLUCOPHAGE-XR) 500 mg 24 hr tablet  Self No No   Sig: TAKE 2 TABLETS BY MOUTH TWICE DAILY WITH MEALS   metoprolol succinate (TOPROL-XL) 50 mg 24 hr tablet   No No   Sig: Take 1 tablet (50 mg total) by mouth daily   vitamin B-12 (VITAMIN B-12) 500 mcg tablet   No No   Sig: Take 2 tablets (1,000 mcg total) by mouth daily   Patient not taking: Reported on 2021      Facility-Administered Medications: None       Past Medical History:   Diagnosis Date    Diabetes mellitus ([place] [address] )     External hemorrhoids     last assessed 16, resolved 16    Hernia, ventral     last assessed 12/14/15, resolved 16    Hypertension     Incarcerated incisional hernia     last assessed 12/21/15, resolved 16    Insomnia     last assessed 16, resolved 10/9/17    Lyme disease     Morbid obesity with BMI of 45 0-49 9, adult ([place] [address] ) 5/3/2017    MS (multiple sclerosis) ([place] [address] )     Stroke (cerebrum) ([place] [address] ) 2020    Type 2 diabetes mellitus with hyperglycemia, without long-term current use of insulin ([facility])        Past Surgical History:   Procedure Laterality Date    ABCESS DRAINAGE       SECTION      x3    COLONOSCOPY N/A 2017    Procedure: COLONOSCOPY with biopsy;  Surgeon: Sanju Larkin DO;  Location: [facility]; Service: Complete    HYSTERECTOMY      IR LUMBAR PUNCTURE  3/13/2020    US GUIDED THYROID BIOPSY  2021       Family History   Problem Relation Age of Onset    Glaucoma Mother     Diabetes Father     Hypertension Father     Colon cancer Father     Breast cancer Maternal Grandmother     Breast cancer Maternal Aunt     Coronary artery disease Family     Diabetes Family     Hypertension Family     Cervical cancer Sister 61    Breast cancer Sister 50    Breast cancer Sister 45     I have reviewed and agree with the history as documented      E-Cigarette/Vaping    E-Cigarette Use Never User      E-Cigarette/Vaping Substances     Social History     Tobacco Use    Smoking status: Never Smoker    Smokeless tobacco: Never Used   Vaping Use    Vaping Use: Never used   Substance Use Topics    Alcohol use: Not Currently     Comment: Social    Drug use: Never       Review of Systems   Musculoskeletal: Positive for arthralgias (Right knee)  Neurological: Positive for headaches  All other systems reviewed and are negative  Physical Exam  Physical Exam  General Appearance: alert and oriented, nad, non toxic appearing  Skin:  Warm, dry, intact  HEENT: atraumatic, normocephalic  Neck: Supple, trachea midline  Cardiac: RRR; no murmurs, rub, gallops  Pulmonary: lungs CTAB; no wheezes, rales, rhonchi  Gastrointestinal: abdomen soft, nontender, nondistended; no guarding or rebound tenderness; good bowel sounds, no mass or bruits  Extremities:  Right knee nontender, increased pain on active/passive range of motion  Remainder of right lower extremity, left lower extremity and bilateral upper extremities nontender with full range of motion    No pedal edema, 2+ pulses; no calf tenderness, no clubbing, no cyanosis  Neuro:  no focal motor or sensory deficits, CN 2-12 grossly intact  Psych:  Normal mood and affect, normal judgement and insight      Vital Signs  ED Triage Vitals   Temperature Pulse Respirations Blood Pressure SpO2   08/18/21 1534 08/18/21 1534 08/18/21 1534 08/18/21 1534 08/18/21 1534   98 1 °F (36 7 °C) 70 16 (!) 178/82 97 %      Temp Source Heart Rate Source Patient Position - Orthostatic VS BP Location FiO2 (%)   08/18/21 1534 08/18/21 1534 08/18/21 1534 08/18/21 1534 --   Oral Monitor Sitting Left arm       Pain Score       08/18/21 1900       8           Vitals:    08/18/21 1534 08/18/21 1938   BP: (!) 178/82 161/95   Pulse: 70 61   Patient Position - Orthostatic VS: Sitting Sitting         Visual Acuity      ED Medications  Medications   metoclopramide (REGLAN) injection 10 mg (10 mg Intravenous Given 8/18/21 1920)   acetaminophen (TYLENOL) tablet 650 mg (650 mg Oral Given 8/18/21 1900)       Diagnostic Studies  Results Reviewed     None                 CT lower extremity wo contrast right Final Result by Julianne Hui DO (08/18 2038)      No fracture  Advanced tricompartmental degenerative changes  Small joint effusion  Workstation performed: [id number]         XR knee 4+ views Right injury   ED Interpretation by Lesli Ibarra DO (08/18 1958)   Questionable fracture of the intercondyle eminence      Final Result by Talha Conroy MD (08/19 1305)      No acute osseous abnormality  Degenerative changes as described  Workstation performed: [id number]         CT head without contrast   Final Result by Abran Flor MD (08/18 1935)      New high posterior right scalp subcutaneous edema with suspected small scalp hematoma  No findings of acute intracranial injury or acute intracranial abnormality  Workstation performed: [id number]                    Procedures  Procedures         ED Course  ED Course as of Aug 19 1611   Wed Aug 18, 2021   1954 New high posterior right scalp subcutaneous edema with suspected small scalp hematoma  No findings of acute intracranial injury or acute intracranial abnormality  CT head without contrast   1958 Questionable fracture of intercondyle eminence, will obtain CT for further evaluation  XR knee 4+ views Right injury   2002 Patient reports headache is much improved at this time  Updated on results  She is agreeable to CT scan of the right knee  Patient does not want any additional pain medication  2039 1 ) No fracture  2 ) Advanced tricompartmental degenerative changes  3 ) Small joint effusion  Will perform ambulation trial   CT lower extremity wo contrast right   2046 Pt able to ambulate with her walker, without difficulty  Reports headache is much better, only occurs when she touches the back side of her head  Recommend she continue the tylenol PRN, she is in agreement  Pt would like to return home tonight                                                MDM    Disposition  Final diagnoses:   Injury of head, initial encounter   Right knee pain   Fall, initial encounter     Time reflects when diagnosis was documented in both MDM as applicable and the Disposition within this note     Time User Action Codes Description Comment    8/18/2021  8:49 PM [facility] [[id number]] Injury of head, initial encounter     8/18/2021  8:49 PM [place] [facility] Right knee pain     8/18/2021  8:49 PM Matthew Liz Add [W19  Gm Lucas, initial encounter       ED Disposition     ED Disposition Condition Date/Time Comment    Discharge Stable Wed Aug 18, 2021  8:49 PM Charley Dunn discharge to home/self care              Follow-up Information     Follow up With Specialties Details Why Contact Info    Ely Dimas, [address], Nurse Practitioner Schedule an appointment as soon as possible for a visit in 2 days For re-evaluation [facility] [phone] [place] [phone]            Discharge Medication List as of 8/18/2021  8:50 PM      CONTINUE these medications which have NOT CHANGED    Details   acetaminophen (TYLENOL) 500 mg tablet Take 2 tablets (1,000 mg total) by mouth every 6 (six) hours as needed for moderate pain, Starting Wed 6/10/2020, Normal      Alcohol Swabs PADS by Does not apply route 4 (four) times a day *Latex Free*, Starting Thu 5/21/2020, Normal      aspirin 81 mg chewable tablet Chew 1 tablet (81 mg total) daily, Starting Wed 6/30/2021, Normal      atorvastatin (LIPITOR) 40 mg tablet Take 1 tablet (40 mg total) by mouth every evening, Starting Wed 6/30/2021, Normal      Blood Pressure KIT by Does not apply route daily, Starting Thu 5/21/2020, Normal      cinacalcet (SENSIPAR) 60 MG tablet Take 1 tablet (60 mg total) by mouth daily, Starting Wed 8/11/2021, Until Fri 9/10/2021, Normal      diazepam (VALIUM) 5 mg tablet Take 1 tab 60 min prior to MRI with a second dose 20 min prior to imaging if anxiety persists, Normal      DULoxetine (CYMBALTA) 60 mg delayed release capsule Take 1 capsule (60 mg total) by mouth daily, Starting Mon 4/12/2021, Normal      fluticasone (FLONASE) 50 mcg/act nasal spray 1 spray into each nostril daily, Starting Thu 5/13/2021, Normal      gabapentin (NEURONTIN) 300 mg capsule TAKE 2 CAPSULES BY MOUTH IN THE MORNING AND 2 CAPSULES IN THE AFTERNOON AND 3 CAPSULES AT BEDTIME, Normal      !! glucose blood (ReliOn Prime Test) test strip Use 1 each 2 (two) times a day, Starting Thu 5/27/2021, Normal      !! glucose blood test strip Check blood glucose levels three times per day before meals, Normal      hydrochlorothiazide (HYDRODIURIL) 25 mg tablet Take 1 tablet (25 mg total) by mouth daily, Starting Wed 6/30/2021, Normal      Insulin Syringe-Needle U-100 (BD Insulin Syringe Ultrafine) 31G X 15/64" 0 3 ML MISC by Does not apply route 2 (two) times a day, Starting Fri 7/31/2020, Normal      Interferon Beta-1b 0 3 MG KIT Inject 1 ml (0 25 mg) subcutaneously every other day, Normal      lisinopril (ZESTRIL) 40 mg tablet Take 1 tablet by mouth once daily, Normal      metFORMIN (GLUCOPHAGE-XR) 500 mg 24 hr tablet TAKE 2 TABLETS BY MOUTH TWICE DAILY WITH MEALS, Normal      metoprolol succinate (TOPROL-XL) 50 mg 24 hr tablet Take 1 tablet (50 mg total) by mouth daily, Starting Wed 6/30/2021, Normal      Misc  Devices (DIGITAL GLASS SCALE) MISC by Does not apply route daily, Starting Thu 5/21/2020, Normal      vitamin B-12 (VITAMIN B-12) 500 mcg tablet Take 2 tablets (1,000 mcg total) by mouth daily, Starting Wed 6/30/2021, Normal       !! - Potential duplicate medications found  Please discuss with provider  No discharge procedures on file      PDMP Review       Value Time User    PDMP Reviewed  Yes 10/11/2020  9:13 AM Juanito Gorman MD          ED Provider  Electronically Signed by           Samantha Dunham DO  08/19/21 9881

## 2021-08-18 NOTE — H&P (VIEW-ONLY)
History  Chief Complaint   Patient presents with    Head Injury     patient reports losing her balance and falling to her right side striking her head on cement blocks  Denies LOC, c/o headache and had nausea at the time of fall  A 27-year-old female with past medical history of MS, diabetes, hypertension and CVA; presents after a fall  Patient states she was walking her dog around 11 pm last night, when she tripped over her pants and fell to the ground  Patient did strike the right side of her head on the ground however denies LOC  Patient states she light of the ground for about 20 minutes until family found her and assisted her to her feet  Patient states she has been able to ambulate with her walker since the fall  Patient states throughout the course of today she has developed progressively worsening right head pain  Patient has been taking Tylenol without relief  Patient also complains of right knee pain  Patient otherwise denies fever, chills, dizziness, lightheadedness, visual disturbances, neck pain, back pain, chest pain, shortness of breath, abdominal pain, nausea, vomiting, diarrhea, peripheral edema and rashes  Patient does complain of generalized weakness and paresthesias to the bilateral upper and lower extremities, however states this is chronic due to her MS  She states the symptoms have not worsened since the fall  A/P:  Head injury s/p mechanical fall  Also with right knee pain  Patient appears uncomfortable, however is neurologically intact  Will obtain imaging to rule out traumatic injury  Will treat symptomatically  History provided by:  Patient and medical records      Prior to Admission Medications   Prescriptions Last Dose Informant Patient Reported? Taking?    Alcohol Swabs PADS  Self No No   Sig: by Does not apply route 4 (four) times a day *Latex Free*   Blood Pressure KIT  Self No No   Sig: by Does not apply route daily   DULoxetine (CYMBALTA) 60 mg delayed release capsule  Self No No   Sig: Take 1 capsule (60 mg total) by mouth daily   Insulin Syringe-Needle U-100 (BD Insulin Syringe Ultrafine) 31G X 15/64" 0 3 ML MISC  Self No No   Sig: by Does not apply route 2 (two) times a day   Patient not taking: Reported on 2021   Interferon Beta-1b 0 3 MG KIT   No No   Sig: Inject 1 ml (0 25 mg) subcutaneously every other day   Misc   Devices (DIGITAL GLASS SCALE) MISC  Self No No   Sig: by Does not apply route daily   acetaminophen (TYLENOL) 500 mg tablet  Self No No   Sig: Take 2 tablets (1,000 mg total) by mouth every 6 (six) hours as needed for moderate pain   aspirin 81 mg chewable tablet   No No   Sig: Chew 1 tablet (81 mg total) daily   atorvastatin (LIPITOR) 40 mg tablet   No No   Sig: Take 1 tablet (40 mg total) by mouth every evening   cinacalcet (SENSIPAR) 60 MG tablet   No No   Sig: Take 1 tablet (60 mg total) by mouth daily   diazepam (VALIUM) 5 mg tablet  Self No No   Sig: Take 1 tab 60 min prior to MRI with a second dose 20 min prior to imaging if anxiety persists   Patient not taking: Reported on 2021   fluticasone (FLONASE) 50 mcg/act nasal spray  Self No No   Si spray into each nostril daily   Patient not taking: Reported on 2021   gabapentin (NEURONTIN) 300 mg capsule   No No   Sig: TAKE 2 CAPSULES BY MOUTH IN THE MORNING AND 2 CAPSULES IN THE AFTERNOON AND 3 CAPSULES AT BEDTIME   glucose blood (ReliOn Prime Test) test strip  Self No No   Sig: Use 1 each 2 (two) times a day   glucose blood test strip  Self No No   Sig: Check blood glucose levels three times per day before meals   hydrochlorothiazide (HYDRODIURIL) 25 mg tablet   No No   Sig: Take 1 tablet (25 mg total) by mouth daily   Patient not taking: Reported on 2021   lisinopril (ZESTRIL) 40 mg tablet   No No   Sig: Take 1 tablet by mouth once daily   metFORMIN (GLUCOPHAGE-XR) 500 mg 24 hr tablet  Self No No   Sig: TAKE 2 TABLETS BY MOUTH TWICE DAILY WITH MEALS   metoprolol succinate (TOPROL-XL) 50 mg 24 hr tablet   No No   Sig: Take 1 tablet (50 mg total) by mouth daily   vitamin B-12 (VITAMIN B-12) 500 mcg tablet   No No   Sig: Take 2 tablets (1,000 mcg total) by mouth daily   Patient not taking: Reported on 2021      Facility-Administered Medications: None       Past Medical History:   Diagnosis Date    Diabetes mellitus ([place] [address] )     External hemorrhoids     last assessed 16, resolved 16    Hernia, ventral     last assessed 12/14/15, resolved 16    Hypertension     Incarcerated incisional hernia     last assessed 12/21/15, resolved 16    Insomnia     last assessed 16, resolved 10/9/17    Lyme disease     Morbid obesity with BMI of 45 0-49 9, adult ([place] [address] ) 5/3/2017    MS (multiple sclerosis) ([place] [address] )     Stroke (cerebrum) ([place] [address] ) 2020    Type 2 diabetes mellitus with hyperglycemia, without long-term current use of insulin (HCC)        Past Surgical History:   Procedure Laterality Date    ABCESS DRAINAGE       SECTION      x3    COLONOSCOPY N/A 2017    Procedure: COLONOSCOPY with biopsy;  Surgeon: Jenniffer Barron DO;  Location: [facility]; Service: Complete    HYSTERECTOMY      IR LUMBAR PUNCTURE  3/13/2020    US GUIDED THYROID BIOPSY  2021       Family History   Problem Relation Age of Onset    Glaucoma Mother     Diabetes Father     Hypertension Father     Colon cancer Father     Breast cancer Maternal Grandmother     Breast cancer Maternal Aunt     Coronary artery disease Family     Diabetes Family     Hypertension Family     Cervical cancer Sister 61    Breast cancer Sister 50    Breast cancer Sister 45     I have reviewed and agree with the history as documented      E-Cigarette/Vaping    E-Cigarette Use Never User      E-Cigarette/Vaping Substances     Social History     Tobacco Use    Smoking status: Never Smoker    Smokeless tobacco: Never Used   Vaping Use    Vaping Use: Never used   Substance Use Topics    Alcohol use: Not Currently     Comment: Social    Drug use: Never       Review of Systems   Musculoskeletal: Positive for arthralgias (Right knee)  Neurological: Positive for headaches  All other systems reviewed and are negative  Physical Exam  Physical Exam  General Appearance: alert and oriented, nad, non toxic appearing  Skin:  Warm, dry, intact  HEENT: atraumatic, normocephalic  Neck: Supple, trachea midline  Cardiac: RRR; no murmurs, rub, gallops  Pulmonary: lungs CTAB; no wheezes, rales, rhonchi  Gastrointestinal: abdomen soft, nontender, nondistended; no guarding or rebound tenderness; good bowel sounds, no mass or bruits  Extremities:  Right knee nontender, increased pain on active/passive range of motion  Remainder of right lower extremity, left lower extremity and bilateral upper extremities nontender with full range of motion    No pedal edema, 2+ pulses; no calf tenderness, no clubbing, no cyanosis  Neuro:  no focal motor or sensory deficits, CN 2-12 grossly intact  Psych:  Normal mood and affect, normal judgement and insight      Vital Signs  ED Triage Vitals   Temperature Pulse Respirations Blood Pressure SpO2   08/18/21 1534 08/18/21 1534 08/18/21 1534 08/18/21 1534 08/18/21 1534   98 1 °F (36 7 °C) 70 16 (!) 178/82 97 %      Temp Source Heart Rate Source Patient Position - Orthostatic VS BP Location FiO2 (%)   08/18/21 1534 08/18/21 1534 08/18/21 1534 08/18/21 1534 --   Oral Monitor Sitting Left arm       Pain Score       08/18/21 1900       8           Vitals:    08/18/21 1534 08/18/21 1938   BP: (!) 178/82 161/95   Pulse: 70 61   Patient Position - Orthostatic VS: Sitting Sitting         Visual Acuity      ED Medications  Medications   metoclopramide (REGLAN) injection 10 mg (10 mg Intravenous Given 8/18/21 1920)   acetaminophen (TYLENOL) tablet 650 mg (650 mg Oral Given 8/18/21 1900)       Diagnostic Studies  Results Reviewed     None                 CT lower extremity wo contrast right Final Result by Melvi De Anda DO (08/18 2038)      No fracture  Advanced tricompartmental degenerative changes  Small joint effusion  Workstation performed: [id number]         XR knee 4+ views Right injury   ED Interpretation by Lobo Ferreira DO (08/18 1958)   Questionable fracture of the intercondyle eminence      Final Result by Nathalia Sanchez MD (08/19 5244)      No acute osseous abnormality  Degenerative changes as described  Workstation performed: [id number]         CT head without contrast   Final Result by Wendy Morales MD (08/18 1935)      New high posterior right scalp subcutaneous edema with suspected small scalp hematoma  No findings of acute intracranial injury or acute intracranial abnormality  Workstation performed: [id number]                    Procedures  Procedures         ED Course  ED Course as of Aug 19 1611   Wed Aug 18, 2021   1954 New high posterior right scalp subcutaneous edema with suspected small scalp hematoma  No findings of acute intracranial injury or acute intracranial abnormality  CT head without contrast   1958 Questionable fracture of intercondyle eminence, will obtain CT for further evaluation  XR knee 4+ views Right injury   2002 Patient reports headache is much improved at this time  Updated on results  She is agreeable to CT scan of the right knee  Patient does not want any additional pain medication  2039 1 ) No fracture  2 ) Advanced tricompartmental degenerative changes  3 ) Small joint effusion  Will perform ambulation trial   CT lower extremity wo contrast right   2046 Pt able to ambulate with her walker, without difficulty  Reports headache is much better, only occurs when she touches the back side of her head  Recommend she continue the tylenol PRN, she is in agreement  Pt would like to return home tonight                                                MDM    Disposition  Final diagnoses:   Injury of head, initial encounter   Right knee pain   Fall, initial encounter     Time reflects when diagnosis was documented in both MDM as applicable and the Disposition within this note     Time User Action Codes Description Comment    8/18/2021  8:49 PM [address] [[id number]] Injury of head, initial encounter     8/18/2021  8:49 PM [place] [address] Right knee pain     8/18/2021  8:49 PM Saw Martins Add [W19  Bartholome Lucianant, initial encounter       ED Disposition     ED Disposition Condition Date/Time Comment    Discharge Stable Wed Aug 18, 2021  8:49 PM Gil Hilario discharge to home/self care              Follow-up Information     Follow up With Specialties Details Why Contact Info    Marily Basilio, [address], Nurse Practitioner Schedule an appointment as soon as possible for a visit in 2 days [facility]-evaluation [facility] [facility]             Discharge Medication List as of 8/18/2021  8:50 PM      CONTINUE these medications which have NOT CHANGED    Details   acetaminophen (TYLENOL) 500 mg tablet Take 2 tablets (1,000 mg total) by mouth every 6 (six) hours as needed for moderate pain, Starting Wed 6/10/2020, Normal      Alcohol Swabs PADS by Does not apply route 4 (four) times a day *Latex Free*, Starting Thu 5/21/2020, Normal      aspirin 81 mg chewable tablet Chew 1 tablet (81 mg total) daily, Starting Wed 6/30/2021, Normal      atorvastatin (LIPITOR) 40 mg tablet Take 1 tablet (40 mg total) by mouth every evening, Starting Wed 6/30/2021, Normal      Blood Pressure KIT by Does not apply route daily, Starting Thu 5/21/2020, Normal      cinacalcet (SENSIPAR) 60 MG tablet Take 1 tablet (60 mg total) by mouth daily, Starting Wed 8/11/2021, Until Fri 9/10/2021, Normal      diazepam (VALIUM) 5 mg tablet Take 1 tab 60 min prior to MRI with a second dose 20 min prior to imaging if anxiety persists, Normal      DULoxetine (CYMBALTA) 60 mg delayed release capsule Take 1 capsule (60 mg total) by mouth daily, Starting Mon 4/12/2021, Normal      fluticasone (FLONASE) 50 mcg/act nasal spray 1 spray into each nostril daily, Starting Thu 5/13/2021, Normal      gabapentin (NEURONTIN) 300 mg capsule TAKE 2 CAPSULES BY MOUTH IN THE MORNING AND 2 CAPSULES IN THE AFTERNOON AND 3 CAPSULES AT BEDTIME, Normal      !! glucose blood (ReliOn Prime Test) test strip Use 1 each 2 (two) times a day, Starting Thu 5/27/2021, Normal      !! glucose blood test strip Check blood glucose levels three times per day before meals, Normal      hydrochlorothiazide (HYDRODIURIL) 25 mg tablet Take 1 tablet (25 mg total) by mouth daily, Starting Wed 6/30/2021, Normal      Insulin Syringe-Needle U-100 (BD Insulin Syringe Ultrafine) 31G X 15/64" 0 3 ML MISC by Does not apply route 2 (two) times a day, Starting Fri 7/31/2020, Normal      Interferon Beta-1b 0 3 MG KIT Inject 1 ml (0 25 mg) subcutaneously every other day, Normal      lisinopril (ZESTRIL) 40 mg tablet Take 1 tablet by mouth once daily, Normal      metFORMIN (GLUCOPHAGE-XR) 500 mg 24 hr tablet TAKE 2 TABLETS BY MOUTH TWICE DAILY WITH MEALS, Normal      metoprolol succinate (TOPROL-XL) 50 mg 24 hr tablet Take 1 tablet (50 mg total) by mouth daily, Starting Wed 6/30/2021, Normal      Misc  Devices (DIGITAL GLASS SCALE) MISC by Does not apply route daily, Starting Thu 5/21/2020, Normal      vitamin B-12 (VITAMIN B-12) 500 mcg tablet Take 2 tablets (1,000 mcg total) by mouth daily, Starting Wed 6/30/2021, Normal       !! - Potential duplicate medications found  Please discuss with provider  No discharge procedures on file      PDMP Review       Value Time User    PDMP Reviewed  Yes 10/11/2020  9:13 AM Petey Godinez MD          ED Provider  Electronically Signed by           Ct Huerta DO  08/19/21 4641

## 2021-08-18 NOTE — PRE-PROCEDURE INSTRUCTIONS
Pre-procedure Instructions for Interventional Radiology  [facility] [address] [phone]    You are scheduled for a/an Thyroid Biopsy  On Weds 8/25/21  Your tentative arrival time is 0700  Short stay will notify you the day before your procedure with the exact arrival time and the location to arrive  To prepare for your procedure:  1  Please arrange for someone to drive you home after the procedure and stay with you until the next morning if you are instructed to do so  This is typically for patients receiving some type of sedative or anesthetic for the procedure  2  DO NOT EAT OR DRINK ANYTHING after midnight on the evening before your procedure including candy & gum   3  ONLY SIPS OF WATER with your medications are allowed on the morning of your procedure  4  TAKE ALL OF YOUR REGULAR MEDICATIONS THE MORNING OF YOUR PROCEDURE with sips of water! We may call you to stop some of your blood sugar, blood pressure and blood thinning medications depending on the procedure  Please take all of these medications unless we instruct you to stop them  5  If you have an allergy to x-ray dye, please contact Interventional Radiology for an x-ray dye preparation which usually consists of an oral steroid and Benadryl  The day of your procedure:  1  Bring a list of the medications you take at home  2  Bring medications you take for breathing problems (such as inhalers), medications for chest pain, or both  3  Bring a case for your glasses or contacts  4  Bring your insurance card and a form of photo ID   5  Please leave all valuables such as credit cards and jewelry at home  6  Report to the registration desk in the main lobby at the [facility]  Ask to be directed to [facility]  7  While your procedure is being performed, your family may wait in the Radiology Waiting Room on the 1st floor in Radiology    if they need to leave, they may provide a number to be called following the procedure  8  Be prepared to stay overnight just in case  Sometimes procedures will indicate the need for further observation or treatment  9  If you are scheduled for a follow-up visit with the Interventional Radiologist after your procedure, you will be called with a date and time     10  Covid vaccine completed 5/21/21    Special Instructions (Medications to stop taking before your procedure etc ): Holding Am Metformin AM

## 2021-08-19 ENCOUNTER — PATIENT OUTREACH (OUTPATIENT)
Dept: FAMILY MEDICINE CLINIC | Facility: CLINIC | Age: 61
End: 2021-08-19

## 2021-08-19 NOTE — PROGRESS NOTES
I called the patient to follow up after her ED visit from last night  She states she took her dog outside and her nightgown got caught on a pole, she lost her balance and fell  She notes she was outside for 1 5 hours until someone came to help her  Since this happened, she stated she plans on getting a pendant with an alarm  The patient reports she hit her head and knee on concrete  She had a CT of her head and xray of her right knee  CT showed a small hematoma and x-ray showed arthritis  She reports she has a headache today and is using Tylenol for breakthrough pain along with her usual gabapentin  She was told not to use ice on her head  The patient notes she has been falling more frequently lately  She states her  will be building a ramp for her  The patient states she saw Endo and her A1C was 7 1 with an average sugar of 134  The patient states she has thyroid/parathyroid biopsies scheduled for next week, 8/24  She will be sedated for the procedure and has someone to drive her  Chart reviewed  I will continue to follow

## 2021-08-19 NOTE — DISCHARGE INSTRUCTIONS
Tylenol (Acetaminophen) Dosing: Take EITHER three tablets of regular strength (325 mg) or two tablets of extra strength (500 mg) every 8 hours around the clock

## 2021-08-24 ENCOUNTER — HOSPITAL ENCOUNTER (OUTPATIENT)
Dept: NUCLEAR MEDICINE | Facility: HOSPITAL | Age: 61
Discharge: HOME | End: 2021-08-24
Payer: COMMERCIAL

## 2021-08-24 DIAGNOSIS — E83.52 HYPERCALCEMIA: ICD-10-CM

## 2021-08-24 PROCEDURE — 78071 PARATHYRD PLANAR W/WO SUBTRJ: CPT

## 2021-08-24 PROCEDURE — G1004 CDSM NDSC: HCPCS

## 2021-08-24 PROCEDURE — A9500 TC99M SESTAMIBI: HCPCS

## 2021-08-25 ENCOUNTER — TELEPHONE (OUTPATIENT)
Dept: ENDOCRINOLOGY | Facility: CLINIC | Age: 61
End: 2021-08-25

## 2021-08-25 ENCOUNTER — HOSPITAL ENCOUNTER (OUTPATIENT)
Dept: RADIOLOGY | Facility: HOSPITAL | Age: 61
Discharge: HOME | End: 2021-08-25
Attending: RADIOLOGY | Admitting: INTERNAL MEDICINE
Payer: COMMERCIAL

## 2021-08-25 VITALS
HEIGHT: 67 IN | TEMPERATURE: 97.7 F | WEIGHT: 285 LBS | DIASTOLIC BLOOD PRESSURE: 93 MMHG | SYSTOLIC BLOOD PRESSURE: 177 MMHG | OXYGEN SATURATION: 98 % | HEART RATE: 78 BPM | BODY MASS INDEX: 44.73 KG/M2 | RESPIRATION RATE: 18 BRPM

## 2021-08-25 DIAGNOSIS — E04.1 THYROID NODULE: ICD-10-CM

## 2021-08-25 DIAGNOSIS — E21.3 HYPERPARATHYROIDISM (HCC): ICD-10-CM

## 2021-08-25 PROCEDURE — 99152 MOD SED SAME PHYS/QHP 5/>YRS: CPT | Performed by: INTERNAL MEDICINE

## 2021-08-25 PROCEDURE — 99152 MOD SED SAME PHYS/QHP 5/>YRS: CPT

## 2021-08-25 PROCEDURE — 83970 ASSAY OF PARATHORMONE: CPT

## 2021-08-25 PROCEDURE — 88173 CYTOPATH EVAL FNA REPORT: CPT | Performed by: PATHOLOGY

## 2021-08-25 PROCEDURE — 88172 CYTP DX EVAL FNA 1ST EA SITE: CPT | Performed by: PATHOLOGY

## 2021-08-25 PROCEDURE — 99153 MOD SED SAME PHYS/QHP EA: CPT

## 2021-08-25 PROCEDURE — 10005 FNA BX W/US GDN 1ST LES: CPT | Performed by: INTERNAL MEDICINE

## 2021-08-25 PROCEDURE — 10005 FNA BX W/US GDN 1ST LES: CPT

## 2021-08-25 RX ORDER — MIDAZOLAM HYDROCHLORIDE 2 MG/2ML
INJECTION, SOLUTION INTRAMUSCULAR; INTRAVENOUS CODE/TRAUMA/SEDATION MEDICATION
Status: COMPLETED | OUTPATIENT
Start: 2021-08-25 | End: 2021-08-25

## 2021-08-25 RX ORDER — SODIUM CHLORIDE 9 MG/ML
75 INJECTION, SOLUTION INTRAVENOUS CONTINUOUS
Status: DISCONTINUED | OUTPATIENT
Start: 2021-08-25 | End: 2021-08-25 | Stop reason: HOSPADM

## 2021-08-25 RX ORDER — FENTANYL CITRATE 50 UG/ML
INJECTION, SOLUTION INTRAMUSCULAR; INTRAVENOUS CODE/TRAUMA/SEDATION MEDICATION
Status: COMPLETED | OUTPATIENT
Start: 2021-08-25 | End: 2021-08-25

## 2021-08-25 RX ADMIN — MIDAZOLAM 1 MG: 1 INJECTION INTRAMUSCULAR; INTRAVENOUS at 08:19

## 2021-08-25 RX ADMIN — FENTANYL CITRATE 50 MCG: 50 INJECTION INTRAMUSCULAR; INTRAVENOUS at 08:19

## 2021-08-25 RX ADMIN — MIDAZOLAM 1 MG: 1 INJECTION INTRAMUSCULAR; INTRAVENOUS at 09:14

## 2021-08-25 RX ADMIN — FENTANYL CITRATE 50 MCG: 50 INJECTION INTRAMUSCULAR; INTRAVENOUS at 09:14

## 2021-08-25 RX ADMIN — FENTANYL CITRATE 50 MCG: 50 INJECTION INTRAMUSCULAR; INTRAVENOUS at 08:57

## 2021-08-25 RX ADMIN — MIDAZOLAM 1 MG: 1 INJECTION INTRAMUSCULAR; INTRAVENOUS at 08:26

## 2021-08-25 RX ADMIN — MIDAZOLAM 1 MG: 1 INJECTION INTRAMUSCULAR; INTRAVENOUS at 08:57

## 2021-08-25 NOTE — RESULT ENCOUNTER NOTE
Possible parathyroid adenoma vs Thyroid nodule- Patient currently in IR for FNA  I am trying to call/text IR to see if we can get PTH wash added on to procedure

## 2021-08-25 NOTE — INTERVAL H&P NOTE
Update: (This section must be completed if the H&P was completed greater than 24 hrs to procedure or admission)    H&P reviewed  After examining the patient, I find no changed to the H&P since it had been written  BP (!) 198/92 (BP Location: Right arm)   Pulse 60   Temp 97 7 °F (36 5 °C) (Oral)   Resp 18   Ht 5' 7" (1 702 m)   Wt 129 kg (285 lb)   LMP  (LMP Unknown)   SpO2 99%   BMI 44 64 kg/m²      Patient presenting today for FNA of a left thyroid nodule  History of hypercalcemia  Prior inconclusive biopsy performed in January of 2021  Patient has requested sedation for today's biopsy  Patient re-evaluated   Accept as history and physical     Glenis Del Cid MD/August 25, 2021/8:10 AM

## 2021-08-25 NOTE — BRIEF OP NOTE (RAD/CATH)
INTERVENTIONAL RADIOLOGY PROCEDURE NOTE    Date: 8/25/2021    Procedure: IR BIOPSY THYROID WITH AFIRMA    Preoperative diagnosis:   1  Thyroid nodule         Postoperative diagnosis: Same  Surgeon: Shree Quintana MD     Assistant: None  No qualified resident was available  Blood loss: minimal    Specimens: Numerous(approx 10) FNA samples of a left posterior interpolar thyroid lesion  Findings: As above  Complications: None immediate      Anesthesia: conscious sedation

## 2021-08-25 NOTE — PROGRESS NOTES
Patient returns from IR at this time  No wound documented by IR however left anterior neck puncture dressing clean dry and intact  Pt lying comfortably in bed, offers no complaints

## 2021-08-25 NOTE — TELEPHONE ENCOUNTER
Called IR at [phone]--  I just got sestambi scan which was inconclusive Thyroid Adenoma vs Parathyroid  She is in procedure room right now  Spoke with luz to see if PTH Needle wash can be done to help determine if this is thyroid/parathyroid as previous testing was inconclusive  Spoke with robin in lab-- she has the order and collection materials and instructions for collection  They will try to get this test added on    Apologized for the last minute request  Thank You

## 2021-08-25 NOTE — DISCHARGE INSTRUCTIONS
POST BIOPSY    Care after your procedure:    1  Limit your activities for 24 hours after your biopsy  2  No driving day of biopsy  3  Return to your normal diet  Small sips of flat soda will help with mild nausea  4  Remove band-aid or dressing 24 hours after procedure  Contact Interventional Radiology at [phone] Alexandra PATIENTS: Contact Interventional Radiology at [phone]) Rick Urdu PATIENTS: Contact Interventional Radiology at [phone]) if:    1  Difficulty breathing, nausea or vomiting  2  Chills or fever above 101 degrees F      3  Pain at biopsy site not relieved by medication  4  Develop any redness, swelling, heat, unusual drainage, heavy bruising or bleeding from biopsy site  Procedural Sedation   WHAT YOU NEED TO KNOW:   Procedural sedation is medicine used during procedures to help you feel relaxed and calm  You will remember little to none of the procedure  After sedation you may feel tired, weak, or unsteady on your feet  You may also have trouble concentrating or short-term memory loss  These symptoms should go away in 24 hours or less  DISCHARGE INSTRUCTIONS:   Call 911 or have someone else call for any of the following:   · You have sudden trouble breathing      · You cannot be woken  ·    Contact Interventional Radiology at [phone] PATIENTS: Contact Interventional Radiology at [phone] [facility] PATIENTS: Contact Interventional Radiology at [phone]) if any of the following occur:      · You have a severe headache or dizziness      · Your heart is beating faster than usual     · You have a fever or chills      · Your skin is itchy, swollen, or you have a rash      · You have nausea or are vomiting for more than 8 hours after the procedure       · You have questions or concerns about your condition or care  Self-care:   · Have someone stay with you for 24 hours  This person can drive you to errands and help you do things around the house  This person can also watch for problems       · Rest and do quiet activities for 24 hours  Do not exercise, ride a bike, or play sports  Stand up slowly to prevent dizziness and falls  Take short walks around the house with another person  Slowly return to your usual activities the next day       · Do not drive or use dangerous machines or tools for 24 hours  You may injure yourself or others  Examples include a lawnmower, saw, or drill  Do not return to work for 24 hours if you use dangerous machines or tools for work       · Do not make important decisions for 24 hours  For example, do not sign important papers or invest money       · Drink liquids as directed  Liquids help flush the sedation medicine out of your body  Ask how much liquid to drink each day and which liquids are best for you       · Eat small, frequent meals to prevent nausea and vomiting  Start with clear liquids such as juice or broth  If you do not vomit after clear liquids, you can eat your usual foods       · Do not drink alcohol or take medicines that make you drowsy  This includes medicines that help you sleep and anxiety medicines  Ask your healthcare provider if it is safe for you to take pain medicine  Follow up with your healthcare provider as directed: Write down your questions so you remember to ask them during your visits

## 2021-08-25 NOTE — SEDATION DOCUMENTATION
Left thyroid biopsy completed by Dr Migdalia Ceballos without complications, pt is for bedrest time starts at 0930, report called to GINA PÉREZ [facility]

## 2021-08-26 ENCOUNTER — PATIENT OUTREACH (OUTPATIENT)
Dept: FAMILY MEDICINE CLINIC | Facility: CLINIC | Age: 61
End: 2021-08-26

## 2021-08-26 NOTE — PROGRESS NOTES
The patient returned my call  She states her procedure went well yesterday  She states her throat is "a little sore and black and blue"  She denies the incision site having any blood or drainage from the area and states "it looks good"  She notes this time the procedure went much better for her since she was given mild sedation  I wished the patient a Happy Birthday  She states she has no plans and will simply spend time with her family and dog  The call abruptly ended  I will continue to follow

## 2021-08-26 NOTE — PROGRESS NOTES
I called the patient but received voicemail  Message was left stating I will call her tomorrow  Chart reviewed

## 2021-08-27 LAB — MISCELLANEOUS LAB TEST RESULT: NORMAL

## 2021-09-03 DIAGNOSIS — E11.65 TYPE 2 DIABETES MELLITUS WITH HYPERGLYCEMIA, WITHOUT LONG-TERM CURRENT USE OF INSULIN (HCC): Chronic | ICD-10-CM

## 2021-09-03 RX ORDER — METFORMIN HYDROCHLORIDE 500 MG/1
TABLET, EXTENDED RELEASE ORAL
Qty: 360 TABLET | Refills: 0 | Status: SHIPPED | OUTPATIENT
Start: 2021-09-03 | End: 2021-11-30

## 2021-09-09 ENCOUNTER — TELEPHONE (OUTPATIENT)
Dept: FAMILY MEDICINE CLINIC | Facility: CLINIC | Age: 61
End: 2021-09-09

## 2021-09-13 ENCOUNTER — APPOINTMENT (OUTPATIENT)
Dept: LAB | Facility: CLINIC | Age: 61
End: 2021-09-13
Payer: COMMERCIAL

## 2021-09-13 ENCOUNTER — OFFICE VISIT (OUTPATIENT)
Dept: FAMILY MEDICINE CLINIC | Facility: CLINIC | Age: 61
End: 2021-09-13
Payer: COMMERCIAL

## 2021-09-13 VITALS
HEART RATE: 77 BPM | HEIGHT: 65 IN | RESPIRATION RATE: 19 BRPM | TEMPERATURE: 97.5 F | OXYGEN SATURATION: 94 % | BODY MASS INDEX: 46.95 KG/M2 | WEIGHT: 281.8 LBS | DIASTOLIC BLOOD PRESSURE: 98 MMHG | SYSTOLIC BLOOD PRESSURE: 146 MMHG

## 2021-09-13 DIAGNOSIS — E78.5 HYPERLIPIDEMIA, UNSPECIFIED HYPERLIPIDEMIA TYPE: ICD-10-CM

## 2021-09-13 DIAGNOSIS — R00.2 PALPITATIONS: ICD-10-CM

## 2021-09-13 DIAGNOSIS — G35 MS (MULTIPLE SCLEROSIS) (HCC): ICD-10-CM

## 2021-09-13 DIAGNOSIS — E21.3 HYPERPARATHYROIDISM (HCC): ICD-10-CM

## 2021-09-13 DIAGNOSIS — Z79.4 TYPE 2 DIABETES MELLITUS WITH HYPERGLYCEMIA, WITH LONG-TERM CURRENT USE OF INSULIN (HCC): Primary | ICD-10-CM

## 2021-09-13 DIAGNOSIS — E11.65 TYPE 2 DIABETES MELLITUS WITH HYPERGLYCEMIA, WITH LONG-TERM CURRENT USE OF INSULIN (HCC): Primary | ICD-10-CM

## 2021-09-13 DIAGNOSIS — I10 BENIGN ESSENTIAL HYPERTENSION: ICD-10-CM

## 2021-09-13 DIAGNOSIS — Z23 NEED FOR INFLUENZA VACCINATION: ICD-10-CM

## 2021-09-13 DIAGNOSIS — E83.52 HYPERCALCEMIA: ICD-10-CM

## 2021-09-13 DIAGNOSIS — K55.9 VASCULAR DISORDER OF INTESTINE, UNSPECIFIED (HCC): ICD-10-CM

## 2021-09-13 DIAGNOSIS — G47.33 OBSTRUCTIVE SLEEP APNEA SYNDROME: ICD-10-CM

## 2021-09-13 DIAGNOSIS — I63.439 CEREBROVASCULAR ACCIDENT (CVA) DUE TO EMBOLISM OF POSTERIOR CEREBRAL ARTERY, UNSPECIFIED BLOOD VESSEL LATERALITY (HCC): ICD-10-CM

## 2021-09-13 DIAGNOSIS — G57.93 NEUROPATHIC PAIN, LEG, BILATERAL: ICD-10-CM

## 2021-09-13 PROBLEM — R06.83 SNORING: Status: RESOLVED | Noted: 2020-08-31 | Resolved: 2021-09-13

## 2021-09-13 LAB
ALBUMIN SERPL BCP-MCNC: 3.3 G/DL (ref 3.5–5)
CALCIUM 24H UR-MCNC: 281.18 MG/24 HRS (ref 42–353)
CALCIUM SERPL-MCNC: 9.7 MG/DL (ref 8.3–10.1)
CREAT 24H UR-MRATE: 0.9 G/24HR (ref 0.6–1.8)
SPECIMEN VOL UR: 1725 ML
SPECIMEN VOL UR: 1725 ML

## 2021-09-13 PROCEDURE — 99215 OFFICE O/P EST HI 40 MIN: CPT | Performed by: FAMILY MEDICINE

## 2021-09-13 PROCEDURE — 82040 ASSAY OF SERUM ALBUMIN: CPT

## 2021-09-13 PROCEDURE — 3080F DIAST BP >= 90 MM HG: CPT | Performed by: FAMILY MEDICINE

## 2021-09-13 PROCEDURE — 36415 COLL VENOUS BLD VENIPUNCTURE: CPT

## 2021-09-13 PROCEDURE — 90682 RIV4 VACC RECOMBINANT DNA IM: CPT | Performed by: FAMILY MEDICINE

## 2021-09-13 PROCEDURE — 82570 ASSAY OF URINE CREATININE: CPT

## 2021-09-13 PROCEDURE — 3077F SYST BP >= 140 MM HG: CPT | Performed by: FAMILY MEDICINE

## 2021-09-13 PROCEDURE — 82310 ASSAY OF CALCIUM: CPT

## 2021-09-13 PROCEDURE — 90471 IMMUNIZATION ADMIN: CPT | Performed by: FAMILY MEDICINE

## 2021-09-13 PROCEDURE — 82340 ASSAY OF CALCIUM IN URINE: CPT

## 2021-09-13 NOTE — PROGRESS NOTES
Assessment/Plan:   1  Type 2 diabetes mellitus with hyperglycemia, with long-term current use of insulin ([facility])    Patient's most recent A1c was 7 1  At this time, she does follow with Endocrinology regularly  She is instructed to continue with a very strict diabetic diet  Continue with her current treatment of metformin  She is encouraged to continue with regular eye exams  Patient was given a referral for Podiatry today  - Ambulatory referral to Podiatry; Future    2  Benign essential hypertension    Patient's blood pressure was mildly elevated  Continue with routine home blood pressure monitoring  She must maintain a very strict diet and exercise plan  Continue with her current treatment of lisinopril, metoprolol as well as her hydrochlorothiazide  3  MS (multiple sclerosis) [facility])    Patient following with Neurology regularly  She currently has been on treatment with interferon beta  Will continue with her current treatment as well as her regular follow-up  4  Cerebrovascular accident (CVA) due to embolism of posterior cerebral artery, unspecified blood vessel laterality ([facility] [address] )    Patient had a CVA in the past   She does have a slight cognitive deficit  At this time, she was advised on importance of maintaining strict blood pressure control  Continue with her lisinopril, hydrochlorothiazide as well as her metoprolol  Continue as well with her Lipitor as well as her aspirin 81 mg daily  5  Neuropathic pain, leg, bilateral    Patient has been following up with Neurology for her neuropathic pain  Will continue with her current treatment of Cymbalta as well as her gabapentin  6  Hyperlipidemia, unspecified hyperlipidemia type     Previous fasting lipid panel appeared very well controlled  Continue with a strict low-fat/low-cholesterol diet as well as her current treatment with atorvastatin  7  Palpitations    Stable  Patient denies any recent palpitations    Will continue with her current treatment of metoprolol as well as her aspirin  8  Hyperparathyroidism ([facility] )    Continue with regular follow-up with endocrinology  9  Obstructive sleep apnea syndrome    Patient was advised to continue with her regular follow-up treatment with her cpap    10  Vascular disorder of intestine, unspecified ([facility] [address] )   patient has not had any recurrence of her hematochezia  At this time, she may highly benefit from revisiting Gastroenterology for further evaluation of her abdominal problems  Follow up patient in 3-4 months  - Ambulatory referral to Gastroenterology; Future    11  Need for influenza vaccination  - influenza vaccine, quadrivalent, recombinant, PF, 0 5 mL, for patients 18 yr+ ([facility])    BMI Counseling: Body mass index is 46 89 kg/m²  The BMI is above normal  Nutrition recommendations include decreasing portion sizes, encouraging healthy choices of fruits and vegetables, decreasing fast food intake, consuming healthier snacks and limiting drinks that contain sugar  Exercise recommendations include moderate physical activity 150 minutes/week and exercising 3-5 times per week  No pharmacotherapy was ordered  Patient referred to PCP due to patient being overweight  Depression Screening and Follow-up Plan: Patient advised to follow-up with PCP for further management  There are no diagnoses linked to this encounter  Subjective:       Chief Complaint   Patient presents with    [facility]     re [facility] care       Patient ID: Angelina Baltazar is a 62 y o  female   Presents today as a new patient to [facility]  She has type 2 diabetes, hypertension, MS, previous CVA, neuropathy, dyslipidemia, palpitations, hyperparathyroidism, SELENE as well as previous vascular disorders of her intestines  She states that she has been taking her medications regularly  She denies adverse reactions with medications    She does follow with Endocrinology as well as Neurology regularly  HPI    Review of Systems   Constitutional: Negative for activity change, chills, fatigue and fever  HENT: Negative for congestion, ear pain, sinus pressure and sore throat  Eyes: Negative for redness, itching and visual disturbance  Respiratory: Negative for cough and shortness of breath  Cardiovascular: Negative for chest pain and palpitations  Gastrointestinal: Negative for abdominal pain, diarrhea and nausea  Endocrine: Negative for cold intolerance and heat intolerance  Genitourinary: Negative for dysuria, flank pain and frequency  Musculoskeletal: Negative for arthralgias, back pain, gait problem and myalgias  Skin: Negative for color change  Allergic/Immunologic: Negative for environmental allergies  Neurological: Negative for dizziness, numbness and headaches  Psychiatric/Behavioral: Negative for behavioral problems and sleep disturbance  The following portions of the patient's history were reviewed and updated as appropriate : past family history, past medical history, past social history and past surgical history      Current Outpatient Medications:     acetaminophen (TYLENOL) 500 mg tablet, Take 2 tablets (1,000 mg total) by mouth every 6 (six) hours as needed for moderate pain, Disp: 60 tablet, Rfl: 1    Alcohol Swabs PADS, by Does not apply route 4 (four) times a day *Latex Free*, Disp: 120 each, Rfl: 3    aspirin 81 mg chewable tablet, Chew 1 tablet (81 mg total) daily, Disp: 90 tablet, Rfl: 1    atorvastatin (LIPITOR) 40 mg tablet, Take 1 tablet (40 mg total) by mouth every evening, Disp: 90 tablet, Rfl: 1    Blood Pressure KIT, by Does not apply route daily, Disp: 1 each, Rfl: 0    DULoxetine (CYMBALTA) 60 mg delayed release capsule, Take 1 capsule (60 mg total) by mouth daily, Disp: 30 capsule, Rfl: 3    gabapentin (NEURONTIN) 300 mg capsule, TAKE 2 CAPSULES BY MOUTH IN THE MORNING AND 2 CAPSULES IN THE AFTERNOON AND 3 CAPSULES AT BEDTIME, Disp: 210 capsule, Rfl: 3    glucose blood (ReliOn Prime Test) test strip, Use 1 each 2 (two) times a day, Disp: 200 each, Rfl: 1    glucose blood test strip, Check blood glucose levels three times per day before meals, Disp: 300 each, Rfl: 2    hydrochlorothiazide (HYDRODIURIL) 25 mg tablet, Take 1 tablet (25 mg total) by mouth daily, Disp: 90 tablet, Rfl: 1    Interferon Beta-1b 0 3 MG KIT, Inject 1 ml (0 25 mg) subcutaneously every other day, Disp: 14 kit, Rfl: 4    lisinopril (ZESTRIL) 40 mg tablet, Take 1 tablet by mouth once daily, Disp: 90 tablet, Rfl: 0    metFORMIN (GLUCOPHAGE-XR) 500 mg 24 hr tablet, TAKE 2 TABLETS BY MOUTH TWICE DAILY WITH MEALS, Disp: 360 tablet, Rfl: 0    metoprolol succinate (TOPROL-XL) 50 mg 24 hr tablet, Take 1 tablet (50 mg total) by mouth daily, Disp: 90 tablet, Rfl: 1    Misc   Devices (DIGITAL GLASS SCALE) MISC, by Does not apply route daily, Disp: 1 each, Rfl: 0    vitamin B-12 (VITAMIN B-12) 500 mcg tablet, Take 2 tablets (1,000 mcg total) by mouth daily, Disp: 60 tablet, Rfl: 5    cinacalcet (SENSIPAR) 60 MG tablet, Take 1 tablet (60 mg total) by mouth daily, Disp: 30 tablet, Rfl: 5    diazepam (VALIUM) 5 mg tablet, Take 1 tab 60 min prior to MRI with a second dose 20 min prior to imaging if anxiety persists (Patient not taking: Reported on 4/5/2021), Disp: 2 tablet, Rfl: 0    fluticasone (FLONASE) 50 mcg/act nasal spray, 1 spray into each nostril daily (Patient not taking: Reported on 6/30/2021), Disp: 1 Bottle, Rfl: 1    Insulin Syringe-Needle U-100 (BD Insulin Syringe Ultrafine) 31G X 15/64" 0 3 ML MISC, by Does not apply route 2 (two) times a day (Patient not taking: Reported on 7/13/2021), Disp: 200 each, Rfl: 1         Objective:         Vitals:    09/13/21 1014   BP: 146/98   BP Location: Right arm   Patient Position: Sitting   Cuff Size: Large   Pulse: 77   Resp: 19   Temp: 97 5 °F (36 4 °C)   TempSrc: Temporal   SpO2: 94%   Weight: 128 kg (281 lb 12 8 oz)   Height: 5' 5" (1 651 m)     Physical Exam  Vitals reviewed  Constitutional:       Appearance: She is well-developed  HENT:      Head: Normocephalic and atraumatic  Nose: Nose normal       Mouth/Throat:      Pharynx: No oropharyngeal exudate  Eyes:      General: No scleral icterus  Right eye: No discharge  Left eye: No discharge  Pupils: Pupils are equal, round, and reactive to light  Neck:      Trachea: No tracheal deviation  Cardiovascular:      Rate and Rhythm: Normal rate and regular rhythm  Pulses:           Dorsalis pedis pulses are 2+ on the right side and 2+ on the left side  Posterior tibial pulses are 2+ on the right side and 2+ on the left side  Heart sounds: Normal heart sounds  No murmur heard  No friction rub  No gallop  Pulmonary:      Effort: Pulmonary effort is normal  No respiratory distress  Breath sounds: Normal breath sounds  No wheezing or rales  Abdominal:      General: Bowel sounds are normal  There is no distension  Palpations: Abdomen is soft  Tenderness: There is no abdominal tenderness  There is no guarding or rebound  Musculoskeletal:         General: Normal range of motion  Cervical back: Normal range of motion and neck supple  Lymphadenopathy:      Head:      Right side of head: No submental or submandibular adenopathy  Left side of head: No submental or submandibular adenopathy  Cervical: No cervical adenopathy  Right cervical: No superficial, deep or posterior cervical adenopathy  Left cervical: No superficial, deep or posterior cervical adenopathy  Skin:     General: Skin is warm and dry  Findings: No erythema  Neurological:      Mental Status: She is alert and oriented to person, place, and time  Cranial Nerves: No cranial nerve deficit  Sensory: No sensory deficit  Psychiatric:         Mood and Affect: Mood is not anxious or depressed           Speech: Speech normal  Behavior: Behavior normal          Thought Content:  Thought content normal          Judgment: Judgment normal

## 2021-09-14 ENCOUNTER — TELEPHONE (OUTPATIENT)
Dept: ENDOCRINOLOGY | Facility: CLINIC | Age: 61
End: 2021-09-14

## 2021-09-14 NOTE — TELEPHONE ENCOUNTER
Biopsy and lab results were review with pt   Pt needs to see Dr Rafat Davila to review her Thyroid biopsy    Please call pt to schedule an appointment, see if Dr Rafat Davila has any appointment next week please

## 2021-09-15 NOTE — TELEPHONE ENCOUNTER
Patient is unable to come in Monday due to her  working     there is no other availablity in your scheduled  She could do a Thursday next week or she is asking if you could call her to discuss    [employer]

## 2021-09-16 NOTE — TELEPHONE ENCOUNTER
It is nothing urgent - I would rather discuss on visit though - what day works for her ?  Add to 12 pm any day

## 2021-09-17 ENCOUNTER — PATIENT OUTREACH (OUTPATIENT)
Dept: FAMILY MEDICINE CLINIC | Facility: CLINIC | Age: 61
End: 2021-09-17

## 2021-09-17 NOTE — PROGRESS NOTES
The patient returned my call  She stated she fell and had go to the ED  I asked which hospital since I did not receive an alert and she stated [facility]  She was repeating her story when she fell last month and did not remember that she told me this  The patient stated she did change PCPs, one that is closer to her home  She states she is "too nervous to go into the city"  She notes 3 of her doctors are in one building and it will be more convenient to schedule appointments  I wished her the best   Closing case

## 2021-09-17 NOTE — PROGRESS NOTES
I called the patient but received voicemail  Message was left asking if she changed PCPs  I will continue further outreach

## 2021-10-12 DIAGNOSIS — R07.89 OTHER CHEST PAIN: ICD-10-CM

## 2021-10-13 RX ORDER — ATORVASTATIN CALCIUM 40 MG/1
TABLET, FILM COATED ORAL
Qty: 90 TABLET | Refills: 0 | Status: SHIPPED | OUTPATIENT
Start: 2021-10-13 | End: 2022-05-13 | Stop reason: SDUPTHER

## 2021-10-22 ENCOUNTER — TELEPHONE (OUTPATIENT)
Dept: NEUROLOGY | Facility: CLINIC | Age: 61
End: 2021-10-22

## 2021-11-02 DIAGNOSIS — G35 MS (MULTIPLE SCLEROSIS) (HCC): ICD-10-CM

## 2021-11-02 RX ORDER — INTERFERON BETA-1B 0.3 MG
KIT SUBCUTANEOUS
Qty: 14 KIT | Refills: 10 | Status: SHIPPED | OUTPATIENT
Start: 2021-11-02

## 2021-11-03 ENCOUNTER — OFFICE VISIT (OUTPATIENT)
Dept: NEUROLOGY | Facility: CLINIC | Age: 61
End: 2021-11-03
Payer: COMMERCIAL

## 2021-11-03 VITALS
SYSTOLIC BLOOD PRESSURE: 179 MMHG | WEIGHT: 284.8 LBS | BODY MASS INDEX: 45.77 KG/M2 | HEART RATE: 99 BPM | HEIGHT: 66 IN | RESPIRATION RATE: 18 BRPM | DIASTOLIC BLOOD PRESSURE: 92 MMHG | TEMPERATURE: 97.3 F

## 2021-11-03 DIAGNOSIS — I10 BENIGN ESSENTIAL HYPERTENSION: ICD-10-CM

## 2021-11-03 DIAGNOSIS — G35 MS (MULTIPLE SCLEROSIS) (HCC): Primary | ICD-10-CM

## 2021-11-03 DIAGNOSIS — R51.9 CHRONIC DAILY HEADACHE: ICD-10-CM

## 2021-11-03 DIAGNOSIS — G47.33 OBSTRUCTIVE SLEEP APNEA SYNDROME: ICD-10-CM

## 2021-11-03 PROBLEM — I63.9 STROKE (CEREBRUM) (HCC): Status: RESOLVED | Noted: 2020-03-09 | Resolved: 2021-11-03

## 2021-11-03 PROCEDURE — 99214 OFFICE O/P EST MOD 30 MIN: CPT | Performed by: PHYSICIAN ASSISTANT

## 2021-11-04 RX ORDER — LISINOPRIL 40 MG/1
TABLET ORAL
Qty: 90 TABLET | Refills: 0 | Status: SHIPPED | OUTPATIENT
Start: 2021-11-04 | End: 2022-02-28 | Stop reason: SDUPTHER

## 2021-11-23 ENCOUNTER — OFFICE VISIT (OUTPATIENT)
Dept: ENDOCRINOLOGY | Facility: CLINIC | Age: 61
End: 2021-11-23
Payer: COMMERCIAL

## 2021-11-23 ENCOUNTER — TELEPHONE (OUTPATIENT)
Dept: ADMINISTRATIVE | Facility: OTHER | Age: 61
End: 2021-11-23

## 2021-11-23 VITALS
HEIGHT: 66 IN | DIASTOLIC BLOOD PRESSURE: 100 MMHG | WEIGHT: 291.13 LBS | HEART RATE: 70 BPM | SYSTOLIC BLOOD PRESSURE: 158 MMHG | BODY MASS INDEX: 46.79 KG/M2

## 2021-11-23 DIAGNOSIS — E21.3 HYPERPARATHYROIDISM (HCC): ICD-10-CM

## 2021-11-23 DIAGNOSIS — E11.65 TYPE 2 DIABETES MELLITUS WITH HYPERGLYCEMIA, WITHOUT LONG-TERM CURRENT USE OF INSULIN (HCC): Primary | ICD-10-CM

## 2021-11-23 DIAGNOSIS — E83.52 HYPERCALCEMIA: ICD-10-CM

## 2021-11-23 DIAGNOSIS — E04.1 THYROID NODULE: ICD-10-CM

## 2021-11-23 DIAGNOSIS — E55.9 VITAMIN D DEFICIENCY: ICD-10-CM

## 2021-11-23 LAB — SL AMB POCT HEMOGLOBIN AIC: 7.3 (ref ?–6.5)

## 2021-11-23 PROCEDURE — 83036 HEMOGLOBIN GLYCOSYLATED A1C: CPT | Performed by: INTERNAL MEDICINE

## 2021-11-23 PROCEDURE — 99214 OFFICE O/P EST MOD 30 MIN: CPT | Performed by: INTERNAL MEDICINE

## 2021-11-23 PROCEDURE — 3051F HG A1C>EQUAL 7.0%<8.0%: CPT | Performed by: INTERNAL MEDICINE

## 2021-11-23 PROCEDURE — 3080F DIAST BP >= 90 MM HG: CPT | Performed by: INTERNAL MEDICINE

## 2021-11-23 PROCEDURE — 3077F SYST BP >= 140 MM HG: CPT | Performed by: INTERNAL MEDICINE

## 2021-11-23 RX ORDER — CINACALCET 60 MG/1
60 TABLET, FILM COATED ORAL DAILY
Qty: 30 TABLET | Refills: 5 | Status: SHIPPED | OUTPATIENT
Start: 2021-11-23 | End: 2022-03-18 | Stop reason: SDUPTHER

## 2021-11-30 DIAGNOSIS — E11.65 TYPE 2 DIABETES MELLITUS WITH HYPERGLYCEMIA, WITHOUT LONG-TERM CURRENT USE OF INSULIN (HCC): Chronic | ICD-10-CM

## 2021-11-30 RX ORDER — METFORMIN HYDROCHLORIDE 500 MG/1
TABLET, EXTENDED RELEASE ORAL
Qty: 360 TABLET | Refills: 0 | Status: SHIPPED | OUTPATIENT
Start: 2021-11-30 | End: 2022-02-28 | Stop reason: SDUPTHER

## 2022-01-03 DIAGNOSIS — G57.93 NEUROPATHIC PAIN, LEG, BILATERAL: ICD-10-CM

## 2022-01-04 RX ORDER — GABAPENTIN 300 MG/1
CAPSULE ORAL
Qty: 210 CAPSULE | Refills: 0 | Status: SHIPPED | OUTPATIENT
Start: 2022-01-04 | End: 2022-02-21

## 2022-01-13 ENCOUNTER — OFFICE VISIT (OUTPATIENT)
Dept: FAMILY MEDICINE CLINIC | Facility: CLINIC | Age: 62
End: 2022-01-13
Payer: COMMERCIAL

## 2022-01-13 VITALS
HEIGHT: 66 IN | HEART RATE: 68 BPM | RESPIRATION RATE: 18 BRPM | DIASTOLIC BLOOD PRESSURE: 86 MMHG | WEIGHT: 284.4 LBS | BODY MASS INDEX: 45.71 KG/M2 | SYSTOLIC BLOOD PRESSURE: 120 MMHG | OXYGEN SATURATION: 98 % | TEMPERATURE: 97.5 F

## 2022-01-13 DIAGNOSIS — G47.33 OBSTRUCTIVE SLEEP APNEA SYNDROME: ICD-10-CM

## 2022-01-13 DIAGNOSIS — E21.3 HYPERPARATHYROIDISM (HCC): ICD-10-CM

## 2022-01-13 DIAGNOSIS — G57.93 NEUROPATHIC PAIN, LEG, BILATERAL: ICD-10-CM

## 2022-01-13 DIAGNOSIS — Z79.4 TYPE 2 DIABETES MELLITUS WITH HYPERGLYCEMIA, WITH LONG-TERM CURRENT USE OF INSULIN (HCC): Primary | ICD-10-CM

## 2022-01-13 DIAGNOSIS — E78.5 HYPERLIPIDEMIA, UNSPECIFIED HYPERLIPIDEMIA TYPE: ICD-10-CM

## 2022-01-13 DIAGNOSIS — I63.439 CEREBROVASCULAR ACCIDENT (CVA) DUE TO EMBOLISM OF POSTERIOR CEREBRAL ARTERY, UNSPECIFIED BLOOD VESSEL LATERALITY (HCC): ICD-10-CM

## 2022-01-13 DIAGNOSIS — E04.1 THYROID NODULE: ICD-10-CM

## 2022-01-13 DIAGNOSIS — I10 BENIGN ESSENTIAL HYPERTENSION: ICD-10-CM

## 2022-01-13 DIAGNOSIS — G35 MS (MULTIPLE SCLEROSIS) (HCC): ICD-10-CM

## 2022-01-13 DIAGNOSIS — R32 INCONTINENCE OF URINE IN FEMALE: ICD-10-CM

## 2022-01-13 DIAGNOSIS — E11.65 TYPE 2 DIABETES MELLITUS WITH HYPERGLYCEMIA, WITH LONG-TERM CURRENT USE OF INSULIN (HCC): Primary | ICD-10-CM

## 2022-01-13 PROCEDURE — 3079F DIAST BP 80-89 MM HG: CPT | Performed by: FAMILY MEDICINE

## 2022-01-13 PROCEDURE — 3074F SYST BP LT 130 MM HG: CPT | Performed by: FAMILY MEDICINE

## 2022-01-13 PROCEDURE — 99215 OFFICE O/P EST HI 40 MIN: CPT | Performed by: FAMILY MEDICINE

## 2022-01-13 RX ORDER — DIAPER,BRIEF,ADULT, DISPOSABLE
EACH MISCELLANEOUS EVERY 6 HOURS PRN
Qty: 120 EACH | Refills: 11 | Status: SHIPPED | OUTPATIENT
Start: 2022-01-13

## 2022-01-13 NOTE — PROGRESS NOTES
Assessment/Plan:   1  Type 2 diabetes mellitus with hyperglycemia, with long-term current use of insulin ([facility])   unclear precise stroke  Patient's A1c was greater than 7  At this time, she does follow-up with endocrinology  Continue with a strict diabetic diet and exercise plan  Continue as well with her current treatment of metformin  2  Benign essential hypertension    Stable to continue with routine home monitoring as well as her current treatment with hydrochlorothiazide, lisinopril as well as her metoprolol  3  Hyperlipidemia, unspecified hyperlipidemia type    Unclear precise control  Recheck fasting lipid panel  Continue with a strict low-fat/ low-cholesterol diet as well as her current treatment with atorvastatin  4  MS (multiple sclerosis) (HCC)/Cerebrovascular accident (CVA) due to embolism of posterior cerebral artery, unspecified blood vessel laterality (HCC)/Neuropathic pain, leg, bilateral    Patient appears clinically stable today  She does follow up Neurology regularly  At this time, continue with her current treatment of aspirin, atorvastatin, metoprolol, gabapentin as well as her Cymbalta  5  Hyperparathyroidism (HCC)/Thyroid nodule    Following with endocrinology  At this time, will recheck to endocrinology to see when patient needed to repeat her thyroid ultrasound  Her thyroid function testing has been stable  6  Obstructive sleep apnea syndrome    Follows with sleep medicine  Patient has not been using her CPAP over the past few nights  At this time, she was advised that she would highly benefit from restarting this treatment  Follow up with patient in 4 months                 Diagnoses and all orders for this visit:    Type 2 diabetes mellitus with hyperglycemia, with long-term current use of insulin ([facility]    Benign essential hypertension    Hyperlipidemia, unspecified hyperlipidemia type    MS (multiple sclerosis) ([facility] )    Cerebrovascular accident (CVA) due to embolism of posterior cerebral artery, unspecified blood vessel laterality (HCC)    Hyperparathyroidism ([facility] [address] )    Obstructive sleep apnea syndrome    Thyroid nodule    Neuropathic pain, leg, bilateral          Subjective:       Chief Complaint   Patient presents with    Follow-up     4 month       Patient ID: Kaylene Dave is a 62 y o  female  Presents today for a follow-up on her chronic conditions  She has type 2 diabetes, hypertension, dyslipidemia, multiple sclerosis, CVA, hyperparathyroidism, thyroid nodule, SELENE as well as neuropathic pain  She has been taking her medications regularly  She denies adverse reactions with medications  HPI    Review of Systems   Constitutional: Negative for activity change, chills, fatigue and fever  HENT: Negative for congestion, ear pain, sinus pressure and sore throat  Eyes: Negative for redness, itching and visual disturbance  Respiratory: Negative for cough and shortness of breath  Cardiovascular: Negative for chest pain and palpitations  Gastrointestinal: Negative for abdominal pain, diarrhea and nausea  Endocrine: Negative for cold intolerance and heat intolerance  Genitourinary: Negative for dysuria, flank pain and frequency  Musculoskeletal: Negative for arthralgias, back pain, gait problem and myalgias  Skin: Negative for color change  Allergic/Immunologic: Negative for environmental allergies  Neurological: Negative for dizziness, numbness and headaches  Psychiatric/Behavioral: Negative for behavioral problems and sleep disturbance  The following portions of the patient's history were reviewed and updated as appropriate : past family history, past medical history, past social history and past surgical history      Current Outpatient Medications:     acetaminophen (TYLENOL) 500 mg tablet, Take 2 tablets (1,000 mg total) by mouth every 6 (six) hours as needed for moderate pain, Disp: 60 tablet, Rfl: 1    Alcohol Swabs PADS, by Does not apply route 4 (four) times a day *Latex Free*, Disp: 120 each, Rfl: 3    aspirin 81 mg chewable tablet, Chew 1 tablet (81 mg total) daily, Disp: 90 tablet, Rfl: 1    atorvastatin (LIPITOR) 40 mg tablet, TAKE 1 TABLET BY MOUTH EVERY  EVENING, Disp: 90 tablet, Rfl: 0    Blood Pressure KIT, by Does not apply route daily, Disp: 1 each, Rfl: 0    DULoxetine (CYMBALTA) 60 mg delayed release capsule, Take 1 capsule by mouth once daily, Disp: 30 capsule, Rfl: 3    gabapentin (NEURONTIN) 300 mg capsule, TAKE 2 CAPSULES BY MOUTH IN THE MORNING AND 2 CAPSULES IN THE AFTERNOON AND 3 CAPSULES AT BEDTIME, Disp: 210 capsule, Rfl: 0    glucose blood (ReliOn Prime Test) test strip, Use 1 each 2 (two) times a day, Disp: 200 each, Rfl: 1    hydrochlorothiazide (HYDRODIURIL) 25 mg tablet, Take 1 tablet (25 mg total) by mouth daily, Disp: 90 tablet, Rfl: 1    Insulin Syringe-Needle U-100 (BD Insulin Syringe Ultrafine) 31G X 15/64" 0 3 ML MISC, by Does not apply route 2 (two) times a day, Disp: 200 each, Rfl: 1    Interferon Beta-1b (Betaseron) 0 3 MG KIT, Inject 1 ml (0 25 mg) subcutaneously every other day, Disp: 14 kit, Rfl: 10    lisinopril (ZESTRIL) 40 mg tablet, Take 1 tablet by mouth once daily, Disp: 90 tablet, Rfl: 0    metFORMIN (GLUCOPHAGE-XR) 500 mg 24 hr tablet, TAKE 2 TABLETS BY MOUTH TWICE DAILY WITH MEALS, Disp: 360 tablet, Rfl: 0    metoprolol succinate (TOPROL-XL) 50 mg 24 hr tablet, Take 1 tablet (50 mg total) by mouth daily, Disp: 90 tablet, Rfl: 1    vitamin B-12 (VITAMIN B-12) 500 mcg tablet, Take 2 tablets (1,000 mcg total) by mouth daily, Disp: 60 tablet, Rfl: 5    cinacalcet (SENSIPAR) 60 MG tablet, Take 1 tablet (60 mg total) by mouth daily, Disp: 30 tablet, Rfl: 5    glucose blood test strip, Check blood glucose levels three times per day before meals (Patient not taking: Reported on 11/23/2021 ), Disp: 300 each, Rfl: 2    Misc   Devices (DIGITAL GLASS SCALE) MISC, by Does not apply route daily (Patient not taking: Reported on 11/23/2021 ), Disp: 1 each, Rfl: 0         Objective:         Vitals:    01/13/22 0832   BP: 120/86   BP Location: Right arm   Patient Position: Sitting   Cuff Size: Large   Pulse: 68   Resp: 18   Temp: 97 5 °F (36 4 °C)   TempSrc: Tympanic   SpO2: 98%   Weight: 129 kg (284 lb 6 4 oz)   Height: 5' 5 5" (1 664 m)     Physical Exam  Vitals reviewed  Constitutional:       Appearance: She is well-developed  HENT:      Head: Normocephalic and atraumatic  Nose: Nose normal       Mouth/Throat:      Pharynx: No oropharyngeal exudate  Eyes:      General: No scleral icterus  Right eye: No discharge  Left eye: No discharge  Pupils: Pupils are equal, round, and reactive to light  Neck:      Trachea: No tracheal deviation  Cardiovascular:      Rate and Rhythm: Normal rate and regular rhythm  Pulses:           Dorsalis pedis pulses are 2+ on the right side and 2+ on the left side  Posterior tibial pulses are 2+ on the right side and 2+ on the left side  Heart sounds: Normal heart sounds  No murmur heard  No friction rub  No gallop  Pulmonary:      Effort: Pulmonary effort is normal  No respiratory distress  Breath sounds: Normal breath sounds  No wheezing or rales  Abdominal:      General: Bowel sounds are normal  There is no distension  Palpations: Abdomen is soft  Tenderness: There is no abdominal tenderness  There is no guarding or rebound  Musculoskeletal:         General: Normal range of motion  Cervical back: Normal range of motion and neck supple  Lymphadenopathy:      Head:      Right side of head: No submental or submandibular adenopathy  Left side of head: No submental or submandibular adenopathy  Cervical: No cervical adenopathy  Right cervical: No superficial, deep or posterior cervical adenopathy  Left cervical: No superficial, deep or posterior cervical adenopathy     Skin: General: Skin is warm and dry  Findings: No erythema  Neurological:      Mental Status: She is alert and oriented to person, place, and time  Cranial Nerves: No cranial nerve deficit  Sensory: No sensory deficit  Psychiatric:         Mood and Affect: Mood is not anxious or depressed  Speech: Speech normal          Behavior: Behavior normal          Thought Content:  Thought content normal          Judgment: Judgment normal

## 2022-02-21 ENCOUNTER — TELEPHONE (OUTPATIENT)
Dept: NEUROLOGY | Facility: CLINIC | Age: 62
End: 2022-02-21

## 2022-02-21 DIAGNOSIS — G57.93 NEUROPATHIC PAIN, LEG, BILATERAL: ICD-10-CM

## 2022-02-21 RX ORDER — GABAPENTIN 300 MG/1
CAPSULE ORAL
Qty: 210 CAPSULE | Refills: 0 | Status: SHIPPED | OUTPATIENT
Start: 2022-02-21 | End: 2022-05-13 | Stop reason: ALTCHOICE

## 2022-02-28 DIAGNOSIS — E11.65 TYPE 2 DIABETES MELLITUS WITH HYPERGLYCEMIA, WITHOUT LONG-TERM CURRENT USE OF INSULIN (HCC): Chronic | ICD-10-CM

## 2022-02-28 DIAGNOSIS — I10 BENIGN ESSENTIAL HYPERTENSION: ICD-10-CM

## 2022-02-28 DIAGNOSIS — R00.2 PALPITATIONS: ICD-10-CM

## 2022-02-28 DIAGNOSIS — R07.89 OTHER CHEST PAIN: ICD-10-CM

## 2022-02-28 RX ORDER — METFORMIN HYDROCHLORIDE 500 MG/1
1000 TABLET, EXTENDED RELEASE ORAL 2 TIMES DAILY WITH MEALS
Qty: 360 TABLET | Refills: 0 | Status: SHIPPED | OUTPATIENT
Start: 2022-02-28 | End: 2022-03-18 | Stop reason: SDUPTHER

## 2022-02-28 RX ORDER — METOPROLOL SUCCINATE 50 MG/1
50 TABLET, EXTENDED RELEASE ORAL DAILY
Qty: 90 TABLET | Refills: 0 | Status: SHIPPED | OUTPATIENT
Start: 2022-02-28 | End: 2022-05-23

## 2022-02-28 RX ORDER — LISINOPRIL 40 MG/1
40 TABLET ORAL DAILY
Qty: 90 TABLET | Refills: 0 | Status: SHIPPED | OUTPATIENT
Start: 2022-02-28 | End: 2022-05-23

## 2022-02-28 NOTE — TELEPHONE ENCOUNTER
Patient called to confirm her upcoming appointment 03/09/2022 at 7:30 with Anne Malik in [facility]

## 2022-02-28 NOTE — TELEPHONE ENCOUNTER
Second attempt: LMOM to remind pt of 03/09/22 appt  Left the office number for any questions or concerns

## 2022-02-28 NOTE — TELEPHONE ENCOUNTER
Pt called requesting new scripts (medication from another provider):    1)  Lisinopril 40 mg  2)  Toprol 50 mg  3)  Metformin 500 mg    She said she it out of the 1st 2 medications  Pt said she normally gets these through a mail-order through [employer] but since she is out of them she would like them sent to [facility] in [place]

## 2022-03-09 ENCOUNTER — OFFICE VISIT (OUTPATIENT)
Dept: NEUROLOGY | Facility: CLINIC | Age: 62
End: 2022-03-09
Payer: COMMERCIAL

## 2022-03-09 VITALS
SYSTOLIC BLOOD PRESSURE: 122 MMHG | RESPIRATION RATE: 18 BRPM | BODY MASS INDEX: 48.78 KG/M2 | HEART RATE: 69 BPM | DIASTOLIC BLOOD PRESSURE: 78 MMHG | TEMPERATURE: 97.8 F | WEIGHT: 292.8 LBS | HEIGHT: 65 IN

## 2022-03-09 DIAGNOSIS — G35 MS (MULTIPLE SCLEROSIS) (HCC): Primary | ICD-10-CM

## 2022-03-09 DIAGNOSIS — G47.33 OBSTRUCTIVE SLEEP APNEA SYNDROME: ICD-10-CM

## 2022-03-09 PROBLEM — S14.159S: Status: RESOLVED | Noted: 2020-05-15 | Resolved: 2022-03-09

## 2022-03-09 PROCEDURE — 99214 OFFICE O/P EST MOD 30 MIN: CPT | Performed by: PHYSICIAN ASSISTANT

## 2022-03-09 RX ORDER — MELATONIN
1000 DAILY
COMMUNITY

## 2022-03-09 RX ORDER — DIAZEPAM 5 MG/1
TABLET ORAL
Qty: 2 TABLET | Refills: 0 | Status: SHIPPED | OUTPATIENT
Start: 2022-03-09 | End: 2022-07-11 | Stop reason: SDUPTHER

## 2022-03-09 NOTE — ASSESSMENT & PLAN NOTE
Encouraged the patient to wear her CPAP regularly  She admits to fatigue during the day  Fortunately, the headaches she was having have subsided

## 2022-03-09 NOTE — ASSESSMENT & PLAN NOTE
Patient continues on Betaseron, which she tolerates well overall  She still occasionally gets flu-like symptoms intermittently after an injection, but they are mild  She denies any new, focal symptoms, but reports in general she feels she is weaker and has less endurance/stamina while ambulating  She reports hip pain with ambulation, as well as feeling like her balance is off   --we discussed trial of physical and occupational therapy to work on her core strengthening, balance, as well as arm strengthening and fine motor work  She is agreeable and orders were placed  --we also discussed weight loss would be beneficial, as she carries a lot of her weight in her mid section and says she feels this causes her to lean forward when she is walking  She has been advised by her PCP and endocrinologist to work on diet and exercise in terms of her diabetes and other health conditions as well  --I encouraged her to stay active, but fall precautions were discussed  She is currently using her walker  I am also going to update her MRI cervical and thoracic spine due to her complaints of increased weakness and ambulatory dysfunction  MRI brain 3/2021 was stable  She is due for updated labs at this time, which were ordered today  She continues on gabapentin and Cymbalta for neuropathic pain, which is currently well controlled  Her neurologic exam is stable today, no new focal deficits appreciated

## 2022-03-09 NOTE — PROGRESS NOTES
Patient ID: Brandy Yang is a 62 y o  female  Assessment/Plan:    MS (multiple sclerosis) ([facility] )  Patient continues on Betaseron, which she tolerates well overall  She still occasionally gets flu-like symptoms intermittently after an injection, but they are mild  She denies any new, focal symptoms, but reports in general she feels she is weaker and has less endurance/stamina while ambulating  She reports hip pain with ambulation, as well as feeling like her balance is off   --we discussed trial of physical and occupational therapy to work on her core strengthening, balance, as well as arm strengthening and fine motor work  She is agreeable and orders were placed  --we also discussed weight loss would be beneficial, as she carries a lot of her weight in her mid section and says she feels this causes her to lean forward when she is walking  She has been advised by her PCP and endocrinologist to work on diet and exercise in terms of her diabetes and other health conditions as well  --I encouraged her to stay active, but fall precautions were discussed  She is currently using her walker  I am also going to update her MRI cervical and thoracic spine due to her complaints of increased weakness and ambulatory dysfunction  MRI brain 3/2021 was stable  She is due for updated labs at this time, which were ordered today  She continues on gabapentin and Cymbalta for neuropathic pain, which is currently well controlled  Her neurologic exam is stable today, no new focal deficits appreciated  Obstructive sleep apnea syndrome  Encouraged the patient to wear her CPAP regularly  She admits to fatigue during the day  Fortunately, the headaches she was having have subsided  Patient will follow-up in 4 months or sooner if needed  She was advised to call the office for any new or worsening symptoms in the meantime       Diagnoses and all orders for this visit:    MS (multiple sclerosis) ([facility] )  -     CBC and differential; Future  -     Comprehensive metabolic panel; Future  -     Ambulatory Referral to Physical Therapy; Future  -     Ambulatory Referral to Occupational Therapy; Future  -     MRI cervical spine with and without contrast; Future  -     MRI thoracic spine with and without contrast; Future  -     diazepam (VALIUM) 5 mg tablet; Take 1 tablet 30 minutes prior to MRI  May take 1 tablet just immediately before MRI if needed    Obstructive sleep apnea syndrome    Other orders  -     cholecalciferol (VITAMIN D3) 1,000 units tablet; Take 1,000 Units by mouth daily           Subjective:    HPI    Patient is a 61 yo female who presents to the [place] [id number] for follow up regarding her MS  Patient with PMH of HTN, DM type 2, HLD, hyperparathyroidism  She was last seen in November 2021  Patient was initially seen in April 2020 following a hospitalization  Patient developed acute onset of right-sided numbness and weakness on March 9, 2020 in the morning before leaving for work  She was assessed by neurology while in the hospital, initial concern was for stroke  MRI C-spine WO contrast 3/10/2020: There is focal intramedullary T2 hyperintense signal epicentered at the C4 vertebral level  This is more pronounced in the right and central hemicord  The remaining mid to lower cervical cord is more difficult to assess due to the image degradation  There may be additional right hemicord signal abnormality at C5 and C6  MRI c-spine W contrast 3/11/2020: Postcontrast imaging demonstrates enhancement associated with multiple previously described foci of T2 prolongation within the cervical and upper thoracic CORD  MRI brain WO contrast 3/10/2020: There is no discrete mass, mass effect or midline shift  There is no intracranial hemorrhage  There is no evidence of acute infarction and diffusion imaging is unremarkable   Multiple foci of T2 and FLAIR hyperintensity are present within the supratentorial white matter, most pronounced in the anterolateral left frontal lobe, anterior right temporal lobe and right cerebellum/middle cerebellar peduncle  MRI brain W contrast 3/11/2020: Redemonstration of multiple supratentorial and infratentorial scattered areas of white matter FLAIR signal hyperintensity, as described above  Many of the lesions demonstrate a perpendicular configuration of the ventricles and lesions are seen involving the callosal septal interface  Imaging appearance is most suggestive of demyelinating disease/multiple sclerosis with areas of active demyelination as correlated with dedicated MRI of the cervical spine  Concern for MS vs neoplasm vs sarcoid  CT chest abdomen pelvis was unremarkable for any solid tumor, although did demonstrate some calcified lymph nodes and granulomata in the lung fields  Patient had completed CSF analysis was for increased cells: CSF protein 80, CSF white blood cell 37, lymphocyte predominant, JCV and ACE negative, meningitis panel negative, MS panel high IgG index, 3 OCB, flow cytometry hypercellular  Serum NMO/MOG negative, ANCA/DNA/Sjogren's negative, ESR 32, CRP 9 1  She was given IV steroids and oral steroid taper  Patient had repeat imaging in June 2020 and there were several new, enhancing demyelinating lesions in the brain, as well as improvement in the c-spine noted  At timing of visit on 7/7/2020, diagnosis of MS was made  Betaseron was initiated, with first injection 7/24/2020  At first, she was having flu-like symptoms with the titration, but patient was advised to pre-med with NSAIDs/Tylenol and warm showers, and this improved her symptoms  She continued to have issues with the Betaseron  She called in mid October reporting ongoing issues, so Betaseron was held for 2-3 weeks  in November 2020, she was doing better with the Betaseron       Last imaging was done without contrast due to patient declined contrast (she tells me she was there for "too long" and was tired and wanted to go home)  MRI brain 3/5/2021:  Mild plaque burden, at least 2 of the previously seen enhancing lesions are less conspicuous on FLAIR imaging, indicative of at least partial treatment response  MRI c-spine 3/5/2021: a few, scattered cord lesions redemonstrated  The previously seen enhancing lesion at T1 demonstrates a small amount of myelomalacia  No progressive cord signal abnormality on noncontrast imaging  Today, patient reports she feels generally worse  She notes difficulty with stamina and endurance with ambulation  She also says her hips hurt when she ambulates  She denies new, focal symptoms, just a decline in general   She is somewhat active with her dog, but does not do any formal exercises  She has actually gained weight  She is tolerating Betaseron well  She has not been using her CPAP regularly, previously not wearing because of an issue with the strap, but now has a new setup and is going to start using it regularly  Denies vision changes  No speech or swallowing changes  No bowel or bladder changes  The following portions of the patient's history were reviewed and updated as appropriate: current medications, past family history, past medical history, past social history, past surgical history and problem list          Objective:    Blood pressure 122/78, pulse 69, temperature 97 8 °F (36 6 °C), temperature source Tympanic, resp  rate 18, height 5' 5" (1 651 m), weight 133 kg (292 lb 12 8 oz)    Physical Exam  Constitutional:       Appearance: Normal appearance  HENT:      Head: Normocephalic and atraumatic  Eyes:      Extraocular Movements: EOM normal       Pupils: Pupils are equal, round, and reactive to light  Neurological:      Mental Status: She is alert  Deep Tendon Reflexes:      Reflex Scores:       Bicep reflexes are 2+ on the right side and 2+ on the left side         Brachioradialis reflexes are 2+ on the right side and 2+ on the left side  Patellar reflexes are 1+ on the right side and 1+ on the left side  Achilles reflexes are 1+ on the right side and 1+ on the left side  Psychiatric:         Mood and Affect: Mood normal          Speech: Speech normal          Behavior: Behavior normal          Neurological Exam  Mental Status  Alert  Oriented to person, place, time and situation  Speech is normal  Language is fluent with no aphasia  Attention and concentration are normal     Cranial Nerves  CN II: Visual fields full to confrontation  CN III, IV, VI: Extraocular movements intact bilaterally  Pupils equal round and reactive to light bilaterally  CN V: Facial sensation is normal   CN VII: Full and symmetric facial movement  CN VIII: Hearing is normal   CN IX, X: Palate elevates symmetrically  CN XI: Shoulder shrug strength is normal   CN XII: Tongue midline without atrophy or fasciculations  Motor   Normal muscle tone  Right                     Left   Shoulder abduction               5                          5  Elbow flexion                         5                          5  Elbow extension                    5                          5  Hip flexion                              4+                          5  Dorsiflexion                            5-                          5    Sensory  Light touch is normal in upper and lower extremities  Reflexes                                           Right                      Left  Brachioradialis                    2+                         2+  Biceps                                 2+                         2+  Patellar                                1+                         1+  Achilles                                1+                         1+    Coordination  Right: Finger-to-nose normal   Left: Finger-to-nose normal     Gait  Using a rolling walker, narrow based stance, no ataxia           ROS:    Review of Systems   Constitutional: Negative for chills and fever  HENT: Negative for ear pain and sore throat  Eyes: Negative for pain and visual disturbance  Respiratory: Negative for cough and shortness of breath  Cardiovascular: Positive for palpitations  Negative for chest pain  Gastrointestinal: Negative for abdominal pain and vomiting  Genitourinary: Positive for frequency and urgency  Negative for dysuria and hematuria  Musculoskeletal: Positive for back pain and gait problem (trouble standing up straight when she is tired  pt states cannot stand more than 15 mins)  Negative for arthralgias  Skin: Negative for color change and rash  Neurological: Positive for tremors (left hand), speech difficulty (trouble finding words at times), weakness (whole body) and numbness (bilateral arms and legs )  Negative for seizures and syncope  All other systems reviewed and are negative      I personally reviewed and updated the ROS as appropriate

## 2022-03-15 ENCOUNTER — APPOINTMENT (OUTPATIENT)
Dept: LAB | Facility: CLINIC | Age: 62
End: 2022-03-15
Payer: COMMERCIAL

## 2022-03-15 DIAGNOSIS — G35 MS (MULTIPLE SCLEROSIS) (HCC): ICD-10-CM

## 2022-03-15 LAB
BASOPHILS # BLD AUTO: 0.04 THOUSANDS/ΜL (ref 0–0.1)
BASOPHILS NFR BLD AUTO: 0 % (ref 0–1)
EOSINOPHIL # BLD AUTO: 0.5 THOUSAND/ΜL (ref 0–0.61)
EOSINOPHIL NFR BLD AUTO: 5 % (ref 0–6)
ERYTHROCYTE [DISTWIDTH] IN BLOOD BY AUTOMATED COUNT: 13.2 % (ref 11.6–15.1)
HCT VFR BLD AUTO: 38.4 % (ref 34.8–46.1)
HGB BLD-MCNC: 12.1 G/DL (ref 11.5–15.4)
IMM GRANULOCYTES # BLD AUTO: 0.05 THOUSAND/UL (ref 0–0.2)
IMM GRANULOCYTES NFR BLD AUTO: 1 % (ref 0–2)
LYMPHOCYTES # BLD AUTO: 1.85 THOUSANDS/ΜL (ref 0.6–4.47)
LYMPHOCYTES NFR BLD AUTO: 18 % (ref 14–44)
MCH RBC QN AUTO: 28.5 PG (ref 26.8–34.3)
MCHC RBC AUTO-ENTMCNC: 31.5 G/DL (ref 31.4–37.4)
MCV RBC AUTO: 91 FL (ref 82–98)
MONOCYTES # BLD AUTO: 0.66 THOUSAND/ΜL (ref 0.17–1.22)
MONOCYTES NFR BLD AUTO: 6 % (ref 4–12)
NEUTROPHILS # BLD AUTO: 7.5 THOUSANDS/ΜL (ref 1.85–7.62)
NEUTS SEG NFR BLD AUTO: 70 % (ref 43–75)
NRBC BLD AUTO-RTO: 0 /100 WBCS
PLATELET # BLD AUTO: 309 THOUSANDS/UL (ref 149–390)
PMV BLD AUTO: 9.9 FL (ref 8.9–12.7)
RBC # BLD AUTO: 4.24 MILLION/UL (ref 3.81–5.12)
WBC # BLD AUTO: 10.6 THOUSAND/UL (ref 4.31–10.16)

## 2022-03-15 PROCEDURE — 85025 COMPLETE CBC W/AUTO DIFF WBC: CPT

## 2022-03-18 ENCOUNTER — OFFICE VISIT (OUTPATIENT)
Dept: ENDOCRINOLOGY | Facility: CLINIC | Age: 62
End: 2022-03-18
Payer: COMMERCIAL

## 2022-03-18 VITALS
HEIGHT: 65 IN | BODY MASS INDEX: 48.82 KG/M2 | HEART RATE: 62 BPM | WEIGHT: 293 LBS | SYSTOLIC BLOOD PRESSURE: 142 MMHG | DIASTOLIC BLOOD PRESSURE: 100 MMHG

## 2022-03-18 DIAGNOSIS — Z79.4 TYPE 2 DIABETES MELLITUS WITH HYPERGLYCEMIA, WITH LONG-TERM CURRENT USE OF INSULIN (HCC): ICD-10-CM

## 2022-03-18 DIAGNOSIS — E83.52 HYPERCALCEMIA: ICD-10-CM

## 2022-03-18 DIAGNOSIS — E11.65 TYPE 2 DIABETES MELLITUS WITH HYPERGLYCEMIA, WITHOUT LONG-TERM CURRENT USE OF INSULIN (HCC): Primary | ICD-10-CM

## 2022-03-18 DIAGNOSIS — E04.1 THYROID NODULE: ICD-10-CM

## 2022-03-18 DIAGNOSIS — E11.65 TYPE 2 DIABETES MELLITUS WITH HYPERGLYCEMIA, WITH LONG-TERM CURRENT USE OF INSULIN (HCC): ICD-10-CM

## 2022-03-18 DIAGNOSIS — E55.9 VITAMIN D DEFICIENCY: ICD-10-CM

## 2022-03-18 DIAGNOSIS — E21.3 HYPERPARATHYROIDISM (HCC): ICD-10-CM

## 2022-03-18 DIAGNOSIS — E66.01 CLASS 3 SEVERE OBESITY DUE TO EXCESS CALORIES WITH SERIOUS COMORBIDITY AND BODY MASS INDEX (BMI) OF 45.0 TO 49.9 IN ADULT (HCC): ICD-10-CM

## 2022-03-18 DIAGNOSIS — I10 BENIGN ESSENTIAL HYPERTENSION: ICD-10-CM

## 2022-03-18 PROBLEM — E66.813 CLASS 3 SEVERE OBESITY DUE TO EXCESS CALORIES WITH SERIOUS COMORBIDITY AND BODY MASS INDEX (BMI) OF 45.0 TO 49.9 IN ADULT (HCC): Status: ACTIVE | Noted: 2020-08-31

## 2022-03-18 PROCEDURE — 99214 OFFICE O/P EST MOD 30 MIN: CPT | Performed by: INTERNAL MEDICINE

## 2022-03-18 PROCEDURE — 3080F DIAST BP >= 90 MM HG: CPT | Performed by: INTERNAL MEDICINE

## 2022-03-18 PROCEDURE — 3077F SYST BP >= 140 MM HG: CPT | Performed by: INTERNAL MEDICINE

## 2022-03-18 RX ORDER — BLOOD SUGAR DIAGNOSTIC
1 STRIP MISCELLANEOUS 2 TIMES DAILY
Qty: 200 EACH | Refills: 1 | Status: SHIPPED | OUTPATIENT
Start: 2022-03-18

## 2022-03-18 RX ORDER — METFORMIN HYDROCHLORIDE 500 MG/1
1000 TABLET, EXTENDED RELEASE ORAL 2 TIMES DAILY WITH MEALS
Qty: 360 TABLET | Refills: 0 | Status: SHIPPED | OUTPATIENT
Start: 2022-03-18

## 2022-03-18 RX ORDER — CINACALCET 60 MG/1
60 TABLET, FILM COATED ORAL DAILY
Qty: 30 TABLET | Refills: 5 | Status: SHIPPED | OUTPATIENT
Start: 2022-03-18 | End: 2022-07-14

## 2022-03-18 NOTE — PATIENT INSTRUCTIONS
Please schedule thyroid ultrasound   Set up appointment with nutritionist and diabetes educator for teaching for ozempic   Check fingerstick's at least once a day at different times and send over log in 2 weeks     Start vitamin D3 2000 iu daily                    Semaglutide (By injection)   Semaglutide ([id number]-tide)  Treats type 2 diabetes  Lowers the risk of heart attack, stroke, or death in patients with type 2 diabetes and heart or blood vessel disease  Also used to help lose weight and keep the weight off in patients with obesity caused by certain conditions  Brand Name(s): Ozempic 0 25 MG or 0 5 MG Doses, Ozempic 1 MG Doses, Wegovy   There may be other brand names for this medicine  When This Medicine Should Not Be Used: This medicine is not right for everyone  Do not use it if you had an allergic reaction to semaglutide, or if you have multiple endocrine neoplasia syndrome type 2 (MEN 2) or if you or anyone in your family has had medullary thyroid cancer  How to Use This Medicine:   Injectable  · Your doctor will prescribe your exact dose and tell you how often it should be given  This medicine is given as a shot under your skin  It is given into your stomach, thigh, or upper arm  · You may be taught how to give your medicine at home  Make sure you understand all instructions before giving yourself an injection  Do not use more medicine or use it more often than your doctor tells you to  · If you use insulin in addition to this medicine, do not mix them into the same syringe  You may give the shots in the same area (including your stomach), but do not give the shots right next to each other  · Check the liquid in the pen  It should be clear and colorless  Do not use it if it is cloudy, discolored, or has particles in it  · You will be shown the body areas where this shot can be given  Use a different body area each time you give yourself a shot   Keep track of where you give each shot to make sure you rotate body areas  · Use a new needle and syringe each time you inject your medicine  · Never share medicine pens with others under any circumstances  Sharing needles or pens can result in transmission of infection  · This medicine should come with a Medication Guide  Ask your pharmacist for a copy if you do not have one  · Missed dose:   ? Ozempic®: If you miss a dose of this medicine, use it as soon as possible within 5 days after the missed dose  If you miss a dose for more than 5 days, skip the missed dose and go back to your regular dosing schedule  ? Wegovy: If you miss a dose, and the next scheduled dose is more than 2 days away, use it as soon as possible  If you miss a dos, and the next scheduled dose is less than 2 days away, skip the missed dose and go back to your regular dosing schedule  If you miss a dose of this medicine for more than 2 weeks, use it on the next scheduled dose  Ask your doctor about how to restart your treatment  · Store your new, unused medicine pen in its original carton in the refrigerator  Do not freeze  You may store the opened Ozempic® pen in the refrigerator or at room temperature for 56 days or the opened [facility] pen in the refrigerator or at room temperature for 28 days  Throw away the pen after you use it for 56 days for Ozempic® or 28 days for [facility], even if it still has medicine in it  Drugs and Foods to Avoid:   Ask your doctor or pharmacist before using any other medicine, including over-the-counter medicines, vitamins, and herbal products  · Some medicines may affect how semaglutide works  Tell your doctor if you are using other diabetes medicine (including glimepiride, glipizide, glyburide, or insulin) or any oral medicine  Warnings While Using This Medicine:   · Tell your doctor if you are pregnant or planning to become pregnant  Do not use this medicine for at least 2 months before you plan to become pregnant    · Tell your doctor if you are breastfeeding, or if you have kidney disease, pancreas problems, diabetes, digestion problems, or a history of diabetic retinopathy or depression  · This medicine may cause the following problems:  ? Increased risk of thyroid tumor  ? Pancreatitis (swelling of the pancreas)  ? Gallbladder problems  ? Low blood sugar (when used with other diabetes medicine)  ? Kidney problems  ? Eye or vision problems, including diabetic retinopathy  ? Increased heart rate  ? Increased risk for depression or thoughts of suicide  · Your doctor will do lab tests at regular visits to check on the effects of this medicine  Keep all appointments  · Keep all medicine out of the reach of children  Never share your medicine with anyone  Possible Side Effects While Using This Medicine:   Call your doctor right away if you notice any of these side effects:  · Allergic reaction: Itching or hives, swelling in your face or hands, swelling or tingling in your mouth or throat, chest tightness, trouble breathing  · Blurred vision or any other change in vision  · Change in how much or how often you urinate, lower back or side pain, blood in your urine  · Shaking, trembling, sweating, fast or pounding heartbeat, lightheadedness, hunger, confusion  · Sudden and severe stomach pain, nausea, vomiting, fever, yellow eyes or skin  · Unusual moods or behaviors, depression, thoughts of hurting yourself or others  If you notice these less serious side effects, talk with your doctor:   · Constipation, diarrhea, passing gas, stomach upset or bloating  · Headache, dizziness  · Redness, itching, bump, swelling, or any changes in your skin where the shot was given  · Tiredness  If you notice other side effects that you think are caused by this medicine, tell your doctor  Call your doctor for medical advice about side effects   You may report side effects to FDA at 3-209-FDA-3627    © Copyright [employer] 2022 Information is for End User's use only and may not be sold, redistributed or otherwise used for commercial purposes  The above information is an  only  It is not intended as medical advice for individual conditions or treatments  Talk to your doctor, nurse or pharmacist before following any medical regimen to see if it is safe and effective for you

## 2022-03-18 NOTE — PROGRESS NOTES
Monica Talbot 62 y o  female MRN: [id number]    Encounter: [id number]      Assessment/Plan     Problem List Items Addressed This Visit        Endocrine    Hyperparathyroidism [facility])     Continue Sensipar         Thyroid nodule     Biopsy was non diagnostic , she has done the repeat thyroid ultrasound  Advised to have done soon         Relevant Orders    US thyroid    T4, free Lab Collect    TSH, 3rd generation Lab Collect    Type 2 diabetes mellitus with hyperglycemia, without long-term current use of insulin (HCC) - Primary     A  Lab Results   Component Value Date    HGBA1C 8 6 (H) 03/15/2022   Counseled on complications of uncontrolled type 2 diabetes-continue metformin-discussed starting Ozempic once a week  Side effects discussed  She is agreeable  She denies any history of pancreatitis on personal family history of medullary thyroid cancer    Will refer for medical nutrition therapy as well as pen teaching  Advised to check fingerstick once a day different times and send over log in 2 weeks         Relevant Medications    Semaglutide,0 25 or 0 5MG/DOS, 2 MG/1 5ML SOPN    metFORMIN (GLUCOPHAGE-XR) 500 mg 24 hr tablet    glucose blood (ReliOn Prime Test) test strip    Other Relevant Orders    Ambulatory referral to Diabetic Education    HEMOGLOBIN A1C W/ EAG ESTIMATION Lab Collect    Microalbumin / creatinine urine ratio Lab Collect       Cardiovascular and Mediastinum    Benign essential hypertension    Relevant Orders    Ambulatory referral to Diabetic Education    Comprehensive metabolic panel Lab Collect       Other    Class 3 severe obesity due to excess calories with serious comorbidity and body mass index (BMI) of 45 0 to 49 9 in adult [facility])    Relevant Orders    Ambulatory referral to Diabetic Education    Hypercalcemia    Relevant Medications    cinacalcet (SENSIPAR) 60 MG tablet    Vitamin D deficiency    Relevant Orders    Phosphorus Lab Collect    Vitamin D 25 hydroxy Lab Collect      Other Visit Diagnoses     Type 2 diabetes mellitus with hyperglycemia, with long-term current use of insulin (HCC)        Relevant Medications    Semaglutide,0 25 or 0 5MG/DOS, 2 MG/1 5ML SOPN    metFORMIN (GLUCOPHAGE-XR) 500 mg 24 hr tablet    glucose blood (ReliOn Prime Test) test strip        CC: diabetes      History of Present Illness     HPI:  36-year-old female with type 2 diabetes, primary hyperparathyroidism, thyroid nodules and vitamin-D deficiency seen in follow-up  Type 2 diabetes she is currently on Metformin XR twice daily with meals   Doesn't check f s     C/o polyuria , no polydipsia , no blurry vision ,  Has numbness and tingling hands and feet   Occasional constipation     Using a walker , no recent falls    On sensipar daily     Not taking vitamin D supplementations     Did not have thyroid ultrasound done - denies any difficulty swallowing   C/o anxiety , under a lot of stress in personal life  Review of Systems    Historical Information   Past Medical History:   Diagnosis Date    Diabetes mellitus ([place] [address] )     External hemorrhoids     last assessed 16, resolved 16    Hernia, ventral     last assessed 12/14/15, resolved 16    Hypertension     Incarcerated incisional hernia     last assessed 12/21/15, resolved 16    Insomnia     last assessed 16, resolved 10/9/17    Lyme disease     Morbid obesity with BMI of 45 0-49 9, adult ([place] [address] ) 5/3/2017    MS (multiple sclerosis) ([place] [address] )     Stroke (cerebrum) ([place] [address] ) 2020    Type 2 diabetes mellitus with hyperglycemia, without long-term current use of insulin (HCC)      Past Surgical History:   Procedure Laterality Date    ABCESS DRAINAGE       SECTION      x3    COLONOSCOPY N/A 2017    Procedure: COLONOSCOPY with biopsy;  Surgeon: Nathalie Walter DO;  Location: [facility];   Service: Complete    HYSTERECTOMY      IR BIOPSY ABDOMEN  2021    IR LUMBAR PUNCTURE  3/13/2020    US GUIDED THYROID BIOPSY  2021 Social History   Social History     Substance and Sexual Activity   Alcohol Use Not Currently    Comment: Social     Social History     Substance and Sexual Activity   Drug Use Never     Social History     Tobacco Use   Smoking Status Never Smoker   Smokeless Tobacco Never Used     Family History:   Family History   Problem Relation Age of Onset    Glaucoma Mother     Diabetes Father     Hypertension Father     Colon cancer Father     Alzheimer's disease Father     Breast cancer Maternal Grandmother     Breast cancer Maternal Aunt     Coronary artery disease Family     Diabetes Family     Hypertension Family     Cervical cancer Sister 61    Breast cancer Sister 50    Breast cancer Sister 45       Meds/Allergies   Current Outpatient Medications   Medication Sig Dispense Refill    acetaminophen (TYLENOL) 500 mg tablet Take 2 tablets (1,000 mg total) by mouth every 6 (six) hours as needed for moderate pain 60 tablet 1    Alcohol Swabs PADS by Does not apply route 4 (four) times a day *Latex Free* 120 each 3    aspirin 81 mg chewable tablet Chew 1 tablet (81 mg total) daily 90 tablet 1    atorvastatin (LIPITOR) 40 mg tablet TAKE 1 TABLET BY MOUTH EVERY  EVENING 90 tablet 0    Blood Pressure KIT by Does not apply route daily 1 each 0    cholecalciferol (VITAMIN D3) 1,000 units tablet Take 1,000 Units by mouth daily      cinacalcet (SENSIPAR) 60 MG tablet Take 1 tablet (60 mg total) by mouth daily 30 tablet 5    DULoxetine (CYMBALTA) 60 mg delayed release capsule Take 1 capsule by mouth once daily 30 capsule 3    gabapentin (NEURONTIN) 300 mg capsule TAKE 2 CAPSULES BY MOUTH IN THE MORNING, 2 CAPSULES IN THE AFTERNOON AND 3 CAPSULES AT BEDTIME 210 capsule 0    glucose blood (ReliOn Prime Test) test strip Use 1 each 2 (two) times a day 200 each 1    hydrochlorothiazide (HYDRODIURIL) 25 mg tablet Take 1 tablet (25 mg total) by mouth daily 90 tablet 1    Incontinence Supply Disposable (Prevail Women Underwear XL) MISC Use every 6 (six) hours as needed (incontinence) 120 each 11    Interferon Beta-1b (Betaseron) 0 3 MG KIT Inject 1 ml (0 25 mg) subcutaneously every other day 14 kit 10    lisinopril (ZESTRIL) 40 mg tablet Take 1 tablet (40 mg total) by mouth daily 90 tablet 0    metFORMIN (GLUCOPHAGE-XR) 500 mg 24 hr tablet Take 2 tablets (1,000 mg total) by mouth 2 (two) times a day with meals 360 tablet 0    metoprolol succinate (TOPROL-XL) 50 mg 24 hr tablet Take 1 tablet (50 mg total) by mouth daily 90 tablet 0    vitamin B-12 (VITAMIN B-12) 500 mcg tablet Take 2 tablets (1,000 mcg total) by mouth daily 60 tablet 5    diazepam (VALIUM) 5 mg tablet Take 1 tablet 30 minutes prior to MRI  May take 1 tablet just immediately before MRI if needed (Patient not taking: Reported on 3/18/2022 ) 2 tablet 0    glucose blood test strip Check blood glucose levels three times per day before meals (Patient not taking: Reported on 3/18/2022 ) 300 each 2    Misc  Devices (DIGITAL GLASS SCALE) MISC by Does not apply route daily (Patient not taking: Reported on 11/23/2021 ) 1 each 0    Semaglutide,0 25 or 0 5MG/DOS, 2 MG/1 5ML SOPN 0 25 mg once a week 1 5 mL 3     No current facility-administered medications for this visit  Allergies   Allergen Reactions    Sorbitan Diarrhea, Vomiting and Abdominal Pain    Ambien [Zolpidem Tartrate]     Demerol [Meperidine]     Insulin     Insulin Glargine      Annotation - [id number]: memory loss    Latex     Oxycodone-Acetaminophen Vomiting    Zolpidem Hallucinations and Irritability       Objective   Vitals: Blood pressure 142/100, pulse 62, height 5' 5" (1 651 m), weight 136 kg (299 lb 4 oz), not currently breastfeeding  Physical Exam  Vitals reviewed  Constitutional:       Appearance: Normal appearance  She is obese  She is not ill-appearing or diaphoretic  HENT:      Head: Normocephalic and atraumatic  Eyes:      General: No scleral icterus  Extraocular Movements: Extraocular movements intact  Cardiovascular:      Rate and Rhythm: Normal rate and regular rhythm  Heart sounds: Normal heart sounds  No murmur heard  Pulmonary:      Effort: Pulmonary effort is normal  No respiratory distress  Breath sounds: Normal breath sounds  Musculoskeletal:      Cervical back: Neck supple  Lymphadenopathy:      Cervical: No cervical adenopathy  Neurological:      Mental Status: She is alert  The history was obtained from the review of the chart, patient  Lab Results:   Lab Results   Component Value Date/Time    Potassium 4 1 03/15/2022 09:42 AM    Potassium 4 2 08/10/2021 09:44 AM    Potassium 4 3 04/28/2021 09:24 AM    Chloride 105 03/15/2022 09:42 AM    Chloride 105 08/10/2021 09:44 AM    Chloride 110 (H) 04/28/2021 09:24 AM    CO2 27 03/15/2022 09:42 AM    CO2 23 08/10/2021 09:44 AM    CO2 27 04/28/2021 09:24 AM    BUN 22 03/15/2022 09:42 AM    BUN 15 08/10/2021 09:44 AM    BUN 16 04/28/2021 09:24 AM    Creatinine 0 81 03/15/2022 09:42 AM    Creatinine 0 56 (L) 08/10/2021 09:44 AM    Creatinine 0 66 04/28/2021 09:24 AM    Glucose, Fasting 212 (H) 03/15/2022 09:42 AM    Glucose, Fasting 143 (H) 08/10/2021 09:44 AM    Glucose, Fasting 153 (H) 04/28/2021 09:24 AM    Calcium 9 9 03/15/2022 09:42 AM    Calcium 9 7 09/13/2021 10:00 AM    Calcium 10 6 (H) 08/10/2021 09:44 AM    eGFR 79 03/15/2022 09:42 AM    eGFR 102 08/10/2021 09:44 AM    eGFR 97 04/28/2021 09:24 AM    TSH 3RD GENERATON 2 380 03/15/2022 09:42 AM    TSH 3RD GENERATON 1 350 08/10/2021 09:44 AM    Free T4 0 90 03/15/2022 09:42 AM    Free T4 0 94 08/10/2021 09:44 AM    PTH 77 9 03/15/2022 09:42 AM    PTH 93 0 (H) 08/10/2021 09:44 AM    Vit D, 25-Hydroxy 18 5 (L) 03/15/2022 09:42 AM         Imaging Studies:         Results for orders placed during the hospital encounter of 10/06/20    DXA bone density spine hip and pelvis    Impression  1  Normal bone density      2   The 10 year risk of hip fracture is 0 4% with the 10 year risk of major osteoporotic fracture being 18% as calculated by the [facility]/WHO fracture risk assessment tool (FRAX)  Note: The patient's weight exceeds the upper limits in the  [employer] [facility] database  The reported fracture risks are based on a weight of 125 kg, which is the upper limit in the [facility] database  3   The current NOF guidelines recommend treating patients with a T-score of -2 5 or less in the lumbar spine or hips, or in post-menopausal women and men over the age of 48 with low bone mass (osteopenia) and a FRAX 10 year risk score of >3% for hip  fracture and/or >20% for major osteoporotic fracture  4   The NOF recommends follow-up DXA in 1-2 years after initiating therapy for osteoporosis and every 2 years thereafter  More frequent evaluation is appropriate for patients with conditions associated with rapid bone loss, such as glucocorticoid  therapy  The interval between DXA screenings may be longer for individuals without major risk factors and initial T-score in the normal or upper low bone mass range  The FRAX algorithm has certain limitations:  -FRAX has not been validated in patients currently or previously treated with pharmacotherapy for osteoporosis  In such patients, clinical judgment must be exercised in interpreting FRAX scores  -Prior hip, vertebral and humeral fragility fractures appear to confer greater risk of subsequent fracture than fractures at other sites (this is especially true for individuals with severe vertebral fractures), but quantification of this incremental  risk is not possible with FRAX  -FRAX underestimates fracture risk in patients with history of multiple fragility fractures  -FRAX may underestimate fracture risk in patients with history of frequent falls   -It is not appropriate to use FRAX to monitor treatment response        WHO CLASSIFICATION:  Normal (a T-score of -1 0 or higher)  Low bone mineral density (a T-score of less than -1 0 but higher than -2 5)  Osteoporosis (a T-score of -2 5 or less)  Severe osteoporosis (a T-score of -2 5 or less with a fragility fracture)                Workstation performed: [id number]            I have personally reviewed pertinent reports  Portions of the record may have been created with voice recognition software  Occasional wrong word or "sound a like" substitutions may have occurred due to the inherent limitations of voice recognition software  Read the chart carefully and recognize, using context, where substitutions have occurred

## 2022-03-18 NOTE — ASSESSMENT & PLAN NOTE
A  Lab Results   Component Value Date    HGBA1C 8 6 (H) 03/15/2022   Counseled on complications of uncontrolled type 2 diabetes-continue metformin-discussed starting Ozempic once a week  Side effects discussed  She is agreeable  She denies any history of pancreatitis on personal family history of medullary thyroid cancer    Will refer for medical nutrition therapy as well as pen teaching  Advised to check fingerstick once a day different times and send over log in 2 weeks
Biopsy was non diagnostic , she has done the repeat thyroid ultrasound    Advised to have done soon
Continue Sensipar
Detail Level: Detailed
Quality 110: Preventive Care And Screening: Influenza Immunization: Influenza Immunization Administered during Influenza season

## 2022-03-21 ENCOUNTER — TELEPHONE (OUTPATIENT)
Dept: ENDOCRINOLOGY | Facility: CLINIC | Age: 62
End: 2022-03-21

## 2022-03-21 NOTE — TELEPHONE ENCOUNTER
Elham Schmitt (Key: [id number]) - [id number]    Need help? Call us at [phone]    Status   Sent to [facility]  Next Steps   The plan will fax you a determination, typically within 1 to 5 business days  How do I follow up?     Drug    ReliOn Prime Test strips   Form    [address] for Oral Requests      [phone]  phone      [phone]  fax

## 2022-03-21 NOTE — TELEPHONE ENCOUNTER
Deshaun Hope [facility])  Rx #: [id number]  Ozempic (0 25 or 0 5 MG/DOSE) 2MG/1 5ML pen-injectors       Form  [facility] Oral Form     Plan Contact  [phone] phone  [phone] fax

## 2022-03-22 DIAGNOSIS — E11.65 TYPE 2 DIABETES MELLITUS WITH HYPERGLYCEMIA, WITHOUT LONG-TERM CURRENT USE OF INSULIN (HCC): Primary | ICD-10-CM

## 2022-03-22 RX ORDER — DULAGLUTIDE 0.75 MG/.5ML
0.75 INJECTION, SOLUTION SUBCUTANEOUS WEEKLY
Qty: 2 ML | Refills: 2 | OUTPATIENT
Start: 2022-03-22 | End: 2022-06-20

## 2022-03-28 DIAGNOSIS — R07.89 OTHER CHEST PAIN: ICD-10-CM

## 2022-03-28 DIAGNOSIS — E11.65 TYPE 2 DIABETES MELLITUS WITH HYPERGLYCEMIA, WITHOUT LONG-TERM CURRENT USE OF INSULIN (HCC): Primary | ICD-10-CM

## 2022-03-28 RX ORDER — PEN NEEDLE, DIABETIC 32GX 5/32"
NEEDLE, DISPOSABLE MISCELLANEOUS DAILY
Qty: 100 EACH | Refills: 1 | Status: SHIPPED | OUTPATIENT
Start: 2022-03-28

## 2022-03-28 RX ORDER — ASPIRIN 81 MG/1
81 TABLET, CHEWABLE ORAL DAILY
Qty: 90 TABLET | Refills: 1 | Status: SHIPPED | OUTPATIENT
Start: 2022-03-28

## 2022-03-29 ENCOUNTER — TELEPHONE (OUTPATIENT)
Dept: NEUROLOGY | Facility: CLINIC | Age: 62
End: 2022-03-29

## 2022-03-29 NOTE — TELEPHONE ENCOUNTER
Received vm requesting a script for cannabis  Called patient  Received vm  Left detailed msg (per consent in chart) advising patient we do not prescribe Cannabis  Advised her to contact PCP or visit www  [place] gov website to locate list of medical providers who do prescribe it

## 2022-04-12 ENCOUNTER — TELEMEDICINE (OUTPATIENT)
Dept: FAMILY MEDICINE CLINIC | Facility: CLINIC | Age: 62
End: 2022-04-12
Payer: COMMERCIAL

## 2022-04-12 DIAGNOSIS — J98.8 RESPIRATORY INFECTION: Primary | ICD-10-CM

## 2022-04-12 DIAGNOSIS — R05.9 COUGH: ICD-10-CM

## 2022-04-12 PROCEDURE — 99213 OFFICE O/P EST LOW 20 MIN: CPT | Performed by: FAMILY MEDICINE

## 2022-04-12 RX ORDER — AZITHROMYCIN 250 MG/1
TABLET, FILM COATED ORAL
Qty: 6 TABLET | Refills: 0 | Status: SHIPPED | OUTPATIENT
Start: 2022-04-12 | End: 2022-04-17

## 2022-04-12 RX ORDER — BENZONATATE 100 MG/1
100 CAPSULE ORAL 3 TIMES DAILY PRN
Qty: 20 CAPSULE | Refills: 0 | Status: SHIPPED | OUTPATIENT
Start: 2022-04-12 | End: 2022-05-13 | Stop reason: ALTCHOICE

## 2022-04-12 NOTE — ASSESSMENT & PLAN NOTE
Advised with fever to check for flu and COVID19 but patient refused; given risk factors and sx will cover for PNA and start abx; advised needs office evaluation if no improvement and advised face to face evaluation would be best to see what is causing her symptoms; ER guidance given should sx worsen

## 2022-04-20 ENCOUNTER — OFFICE VISIT (OUTPATIENT)
Dept: FAMILY MEDICINE CLINIC | Facility: CLINIC | Age: 62
End: 2022-04-20
Payer: COMMERCIAL

## 2022-04-20 VITALS
WEIGHT: 286 LBS | SYSTOLIC BLOOD PRESSURE: 156 MMHG | DIASTOLIC BLOOD PRESSURE: 94 MMHG | HEART RATE: 79 BPM | BODY MASS INDEX: 47.65 KG/M2 | HEIGHT: 65 IN | OXYGEN SATURATION: 98 % | TEMPERATURE: 97.8 F

## 2022-04-20 DIAGNOSIS — J32.9 SINUSITIS, UNSPECIFIED CHRONICITY, UNSPECIFIED LOCATION: Primary | ICD-10-CM

## 2022-04-20 PROCEDURE — 3080F DIAST BP >= 90 MM HG: CPT | Performed by: FAMILY MEDICINE

## 2022-04-20 PROCEDURE — 99213 OFFICE O/P EST LOW 20 MIN: CPT | Performed by: FAMILY MEDICINE

## 2022-04-20 PROCEDURE — 3077F SYST BP >= 140 MM HG: CPT | Performed by: FAMILY MEDICINE

## 2022-04-20 RX ORDER — METHYLPREDNISOLONE 4 MG/1
TABLET ORAL
Qty: 21 EACH | Refills: 0 | Status: SHIPPED | OUTPATIENT
Start: 2022-04-20 | End: 2022-05-13 | Stop reason: ALTCHOICE

## 2022-04-20 RX ORDER — CEFUROXIME AXETIL 500 MG/1
500 TABLET ORAL EVERY 12 HOURS SCHEDULED
Qty: 14 TABLET | Refills: 0 | Status: SHIPPED | OUTPATIENT
Start: 2022-04-20 | End: 2022-04-27

## 2022-04-20 NOTE — PROGRESS NOTES
[facility]      NAME: Aleks Sena  AGE: 62 y o  SEX: female  : 1960   MRN: [id number]    DATE: 2022  TIME: 12:13 PM    Assessment and Plan     Problem List Items Addressed This Visit     None      Visit Diagnoses     Sinusitis, unspecified chronicity, unspecified location    -  Primary    Relevant Medications    cefuroxime (CEFTIN) 500 mg tablet    methylPREDNISolone 4 MG tablet therapy pack              Return to office in:  P r n  Chief Complaint     Chief Complaint   Patient presents with    Cold Like Symptoms       History of Present Illness     Patient complains of congestion postnasal drip productive cough  Symptoms have been present over 1 week  She was seen by tele visit last week and placed on Zithromax  She finished that but does not feel that she is much better  She denies fever chills and shortness of breath  The following portions of the patient's history were reviewed and updated as appropriate: allergies, current medications, past family history, past medical history, past social history, past surgical history and problem list     Review of Systems   Review of Systems   Constitutional: Negative  HENT: Positive for congestion, postnasal drip and rhinorrhea  Respiratory: Positive for cough  Cardiovascular: Negative  Gastrointestinal: Negative  Genitourinary: Negative  Musculoskeletal: Negative  Psychiatric/Behavioral: Negative          Active Problem List     Patient Active Problem List   Diagnosis    Hernia, ventral    Type 2 diabetes mellitus with hyperglycemia, without long-term current use of insulin ([facility])    GI bleed    Morbid obesity with BMI of 45 0-49 9, adult ([address] )    Ischemic colitis ([facility] [address] )    Unintentional weight loss    Hypercalcemia    Hyperparathyroidism ([facility] [address] )    Vitamin D deficiency    Depression with anxiety    Benign essential hypertension    Falls frequently    Numbness and tingling of right arm and leg    Ambulatory dysfunction    Acute right hemiparesis (HCC)    Neuropathic pain, leg, bilateral    MS (multiple sclerosis) (HCC)    Cognitive decline    Left sided numbness    Right hemiparesis (HCC)    Chronic daily headache    Class 3 severe obesity due to excess calories with serious comorbidity and body mass index (BMI) of 45 0 to 49 9 in adult [facility])    Thyroid nodule    B12 deficiency    Hyperlipidemia    Obstructive sleep apnea syndrome    Respiratory infection       Objective   /94 (BP Location: Right arm, Patient Position: Sitting, Cuff Size: Large)   Pulse 79   Temp 97 8 °F (36 6 °C) (Tympanic)   Ht 5' 5" (1 651 m)   Wt 130 kg (286 lb)   LMP  (LMP Unknown)   SpO2 98%   BMI 47 59 kg/m²     Physical Exam  Vitals and nursing note reviewed  Constitutional:       General: She is not in acute distress  Appearance: She is well-developed  She is not diaphoretic  HENT:      Head: Normocephalic and atraumatic  Nose: Congestion and rhinorrhea present  Mouth/Throat:      Comments: Postnasal drainage with injected pharynx  Eyes:      General:         Right eye: No discharge  Conjunctiva/sclera: Conjunctivae normal       Pupils: Pupils are equal, round, and reactive to light  Neck:      Thyroid: No thyromegaly  Cardiovascular:      Rate and Rhythm: Normal rate and regular rhythm  Pulmonary:      Effort: Pulmonary effort is normal  No respiratory distress  Breath sounds: Rhonchi present  Musculoskeletal:      Cervical back: Normal range of motion  Lymphadenopathy:      Cervical: No cervical adenopathy  Skin:     General: Skin is warm and dry  Neurological:      Mental Status: She is alert and oriented to person, place, and time  Psychiatric:         Behavior: Behavior normal          Thought Content:  Thought content normal          Judgment: Judgment normal            Current Medications     Current Outpatient Medications:     acetaminophen (TYLENOL) 500 mg tablet, Take 2 tablets (1,000 mg total) by mouth every 6 (six) hours as needed for moderate pain, Disp: 60 tablet, Rfl: 1    Alcohol Swabs PADS, by Does not apply route 4 (four) times a day *Latex Free*, Disp: 120 each, Rfl: 3    aspirin 81 mg chewable tablet, Chew 1 tablet (81 mg total) daily, Disp: 90 tablet, Rfl: 1    atorvastatin (LIPITOR) 40 mg tablet, TAKE 1 TABLET BY MOUTH EVERY  EVENING, Disp: 90 tablet, Rfl: 0    benzonatate (TESSALON PERLES) 100 mg capsule, Take 1 capsule (100 mg total) by mouth 3 (three) times a day as needed for cough, Disp: 20 capsule, Rfl: 0    Blood Pressure KIT, by Does not apply route daily, Disp: 1 each, Rfl: 0    cholecalciferol (VITAMIN D3) 1,000 units tablet, Take 1,000 Units by mouth daily, Disp: , Rfl:     diazepam (VALIUM) 5 mg tablet, Take 1 tablet 30 minutes prior to MRI    May take 1 tablet just immediately before MRI if needed, Disp: 2 tablet, Rfl: 0    DULoxetine (CYMBALTA) 60 mg delayed release capsule, Take 1 capsule by mouth once daily, Disp: 30 capsule, Rfl: 3    glucose blood (ReliOn Prime Test) test strip, Use 1 each 2 (two) times a day, Disp: 200 each, Rfl: 1    hydrochlorothiazide (HYDRODIURIL) 25 mg tablet, Take 1 tablet (25 mg total) by mouth daily, Disp: 90 tablet, Rfl: 1    Incontinence Supply Disposable (Prevail Women Underwear XL) MISC, Use every 6 (six) hours as needed (incontinence), Disp: 120 each, Rfl: 11    Insulin Pen Needle (BD Pen Needle Izzy U/F) 32G X 4 MM MISC, Use in the morning, Disp: 100 each, Rfl: 1    Interferon Beta-1b (Betaseron) 0 3 MG KIT, Inject 1 ml (0 25 mg) subcutaneously every other day, Disp: 14 kit, Rfl: 10    liraglutide (VICTOZA) injection, Inject 0 1 mL (0 6 mg total) under the skin daily, Disp: 3 mL, Rfl: 2    lisinopril (ZESTRIL) 40 mg tablet, Take 1 tablet (40 mg total) by mouth daily, Disp: 90 tablet, Rfl: 0    metFORMIN (GLUCOPHAGE-XR) 500 mg 24 hr tablet, Take 2 tablets (1,000 mg total) by mouth 2 (two) times a day with meals, Disp: 360 tablet, Rfl: 0    metoprolol succinate (TOPROL-XL) 50 mg 24 hr tablet, Take 1 tablet (50 mg total) by mouth daily, Disp: 90 tablet, Rfl: 0    vitamin B-12 (VITAMIN B-12) 500 mcg tablet, Take 2 tablets (1,000 mcg total) by mouth daily, Disp: 60 tablet, Rfl: 5    cefuroxime (CEFTIN) 500 mg tablet, Take 1 tablet (500 mg total) by mouth every 12 (twelve) hours for 7 days, Disp: 14 tablet, Rfl: 0    cinacalcet (SENSIPAR) 60 MG tablet, Take 1 tablet (60 mg total) by mouth daily, Disp: 30 tablet, Rfl: 5    gabapentin (NEURONTIN) 300 mg capsule, TAKE 2 CAPSULES BY MOUTH IN THE MORNING, 2 CAPSULES IN THE AFTERNOON AND 3 CAPSULES AT BEDTIME (Patient not taking: Reported on 4/20/2022 ), Disp: 210 capsule, Rfl: 0    glucose blood test strip, Check blood glucose levels three times per day before meals (Patient not taking: Reported on 3/18/2022 ), Disp: 300 each, Rfl: 2    methylPREDNISolone 4 MG tablet therapy pack, Use as directed on package, Disp: 21 each, Rfl: 0    Misc   Devices (DIGITAL GLASS SCALE) MISC, by Does not apply route daily (Patient not taking: Reported on 11/23/2021 ), Disp: 1 each, Rfl: 0    Health Maintenance     Health Maintenance   Topic Date Due    Pneumococcal Vaccine: Pediatrics (0 to 5 Years) and At-Risk Patients (6 to 59 Years) (1 of 2 - PPSV23) Never done    Annual Physical  10/06/2021    COVID-19 Vaccine (3 - Booster for Moderna series) 10/21/2021    Breast Cancer Screening: Mammogram  05/14/2022    HEMOGLOBIN A1C  06/15/2022    BMI: Followup Plan  09/13/2022    Diabetic Foot Exam  10/14/2022    DM Eye Exam  04/05/2023    BMI: Adult  04/20/2023    DTaP,Tdap,and Td Vaccines (2 - Td or Tdap) 12/14/2025    Colorectal Cancer Screening  04/28/2027    HIV Screening  Completed    Hepatitis C Screening  Completed    Osteoporosis Screening  Completed    Influenza Vaccine  Completed    HIB Vaccine  Aged Out    Hepatitis B Vaccine  Aged Out    IPV Vaccine  Aged Out    Hepatitis A Vaccine  Aged Out    Meningococcal ACWY Vaccine  Aged Out    HPV Vaccine  Aged Out     Immunization History   Administered Date(s) Administered    COVID-19 MODERNA VACC 0 5 ML IM 04/22/2021, 05/21/2021    Influenza Quadrivalent, 6-35 Months IM 10/01/2015, 11/17/2016, 08/28/2017    Influenza, injectable, quadrivalent, preservative free 0 5 mL 12/05/2018    Influenza, recombinant, quadrivalent,injectable, preservative free 09/27/2019, 10/06/2020, 09/13/2021    MMR 10/09/2017    Tdap 12/14/2015    Tuberculin Skin Test-PPD Intradermal 12/07/2012, 12/05/2014, 12/14/2015, 03/22/2017, 08/28/2017, 09/28/2019       Lucio Urias DO  [facility]

## 2022-04-25 ENCOUNTER — TELEPHONE (OUTPATIENT)
Dept: DIABETES SERVICES | Facility: CLINIC | Age: 62
End: 2022-04-25

## 2022-05-13 ENCOUNTER — OFFICE VISIT (OUTPATIENT)
Dept: FAMILY MEDICINE CLINIC | Facility: CLINIC | Age: 62
End: 2022-05-13
Payer: COMMERCIAL

## 2022-05-13 VITALS
HEIGHT: 65 IN | RESPIRATION RATE: 17 BRPM | SYSTOLIC BLOOD PRESSURE: 118 MMHG | DIASTOLIC BLOOD PRESSURE: 86 MMHG | BODY MASS INDEX: 48.32 KG/M2 | OXYGEN SATURATION: 96 % | TEMPERATURE: 97.6 F | HEART RATE: 75 BPM | WEIGHT: 290 LBS

## 2022-05-13 DIAGNOSIS — E21.3 HYPERPARATHYROIDISM (HCC): ICD-10-CM

## 2022-05-13 DIAGNOSIS — I63.439 CEREBROVASCULAR ACCIDENT (CVA) DUE TO EMBOLISM OF POSTERIOR CEREBRAL ARTERY, UNSPECIFIED BLOOD VESSEL LATERALITY (HCC): ICD-10-CM

## 2022-05-13 DIAGNOSIS — G35 MS (MULTIPLE SCLEROSIS) (HCC): ICD-10-CM

## 2022-05-13 DIAGNOSIS — E78.5 HYPERLIPIDEMIA, UNSPECIFIED HYPERLIPIDEMIA TYPE: ICD-10-CM

## 2022-05-13 DIAGNOSIS — K55.9 ISCHEMIC COLITIS (HCC): ICD-10-CM

## 2022-05-13 DIAGNOSIS — E11.65 TYPE 2 DIABETES MELLITUS WITH HYPERGLYCEMIA, WITH LONG-TERM CURRENT USE OF INSULIN (HCC): Primary | ICD-10-CM

## 2022-05-13 DIAGNOSIS — R11.15 CYCLICAL VOMITING SYNDROME NOT ASSOCIATED WITH MIGRAINE: ICD-10-CM

## 2022-05-13 DIAGNOSIS — I10 ESSENTIAL HYPERTENSION: ICD-10-CM

## 2022-05-13 DIAGNOSIS — E04.1 THYROID NODULE: ICD-10-CM

## 2022-05-13 DIAGNOSIS — Z79.4 TYPE 2 DIABETES MELLITUS WITH HYPERGLYCEMIA, WITH LONG-TERM CURRENT USE OF INSULIN (HCC): Primary | ICD-10-CM

## 2022-05-13 PROCEDURE — 3074F SYST BP LT 130 MM HG: CPT | Performed by: FAMILY MEDICINE

## 2022-05-13 PROCEDURE — 99215 OFFICE O/P EST HI 40 MIN: CPT | Performed by: FAMILY MEDICINE

## 2022-05-13 PROCEDURE — 3079F DIAST BP 80-89 MM HG: CPT | Performed by: FAMILY MEDICINE

## 2022-05-13 RX ORDER — ATORVASTATIN CALCIUM 40 MG/1
40 TABLET, FILM COATED ORAL EVERY EVENING
Qty: 90 TABLET | Refills: 1 | Status: SHIPPED | OUTPATIENT
Start: 2022-05-13

## 2022-05-13 RX ORDER — DULOXETIN HYDROCHLORIDE 30 MG/1
30 CAPSULE, DELAYED RELEASE ORAL DAILY
Qty: 30 CAPSULE | Refills: 1 | Status: SHIPPED | OUTPATIENT
Start: 2022-05-13

## 2022-05-13 NOTE — PROGRESS NOTES
Assessment/Plan:   1  Type 2 diabetes mellitus with hyperglycemia, with long-term current use of insulin (HCC)    Unclear precise control  Patient's A1c was elevated in March  At this time, she is scheduled for completion of blood work next month  She will be following up with endocrinology at that time  She must continue with a strict diabetic diet and exercise plan  Continue with her current treatment of metformin as well as her Victoza  2  Essential hypertension    Blood pressure appears stable today  At this time continue with routine home monitoring as well as her current treatment with metoprolol as well as her lisinopril  She has been having problems with her urinary frequency  Will decrease her hydrochlorothiazide to half a tablet once a day  She was advised that if she notices any review significant elevation in her blood pressure that she must call immediately  3  Hyperlipidemia, unspecified hyperlipidemia type    Stable, continue with a strict low-fat/low-cholesterol diet as well as her current treatment with atorvastatin  - atorvastatin (LIPITOR) 40 mg tablet; Take 1 tablet (40 mg total) by mouth every evening  Dispense: 90 tablet; Refill: 1    4  Ischemic colitis (HCC)/Cyclical vomiting syndrome not associated with migraine    Reviewed patient's previous  Colonoscopy     At this time, she has been having problems with her upper and lower GI symptoms  She was scheduled for an upper endoscopy however she never proceed with this  At this time, will refer patient to Gastroenterology  She may likely need upper endoscopy to further evaluate any specific cause for her vomiting episodes  - Ambulatory Referral to Gastroenterology; Future    5  MS (multiple sclerosis) ([facility])/Cerebrovascular accident (CVA) due to embolism of posterior cerebral artery, unspecified blood vessel laterality [facility])    Following with Neurology regularly  At this time, patient has been taking her Betaseron    She has not noticed any significant improvement  At this time, she was advised to reschedule her MRI of her cervical and thoracic spine  She states that she was able to successfully discontinue her gabapentin  She would like to work on weaning off her Cymbalta as well  She was advised that she may decrease dose to 30 mg daily  Follow-up if any symptoms worsen  - DULoxetine (CYMBALTA) 30 mg delayed release capsule; Take 1 capsule (30 mg total) by mouth in the morning  Dispense: 30 capsule; Refill: 1    6  Thyroid nodule/Hyperparathyroidism (HCC)    Clinically asymptomatic  Will recheck thyroid ultrasound that was ordered by endocrinology  She was advised to schedule this  Continue with regular follow-up with [facility]  Thyroid function testing as well as her parathyroid hormone levels appears stable  Diagnoses and all orders for this visit:    Type 2 diabetes mellitus with hyperglycemia, with long-term current use of insulin ([address] )    Essential hypertension    Hyperlipidemia, unspecified hyperlipidemia type  -     atorvastatin (LIPITOR) 40 mg tablet; Take 1 tablet (40 mg total) by mouth every evening    Ischemic colitis [facility])  -     Ambulatory Referral to Gastroenterology; Future    MS (multiple sclerosis) (HCC)  -     DULoxetine (CYMBALTA) 30 mg delayed release capsule; Take 1 capsule (30 mg total) by mouth in the morning  Thyroid nodule    Hyperparathyroidism ([address] )    Cerebrovascular accident (CVA) due to embolism of posterior cerebral artery, unspecified blood vessel laterality (HCC)    Cyclical vomiting syndrome not associated with migraine  -     Ambulatory Referral to Gastroenterology; Future        Subjective:       Chief Complaint   Patient presents with    Follow-up     4 month       Patient ID: Pricilla Bar is a 62 y o  female   Presents today for follow-up on her chronic conditions    She has type 2 diabetes hypertension, dyslipidemia, ischemic colitis, MS, prior CVA, hyper parathyroidism as well as previous thyroid nodules  She has been taking her medications regularly  She denies adverse reactions with medications  She states that she has been having problems with lying flat and was any complete her MRI of her cervical and thoracic spine  She also has unable to obtain her thyroid ultrasound  She believes that she has been having worsening symptoms with her MS  She is not sure if her current treatment with Betaseron has been effective  HPI    Review of Systems   Constitutional: Negative for activity change, chills, fatigue and fever  HENT: Negative for congestion, ear pain, sinus pressure and sore throat  Eyes: Negative for redness, itching and visual disturbance  Respiratory: Negative for cough and shortness of breath  Cardiovascular: Negative for chest pain and palpitations  Gastrointestinal: Positive for vomiting  Negative for abdominal pain, diarrhea and nausea  Endocrine: Negative for cold intolerance and heat intolerance  Genitourinary: Negative for dysuria, flank pain and frequency  Musculoskeletal: Negative for arthralgias, back pain, gait problem and myalgias  Skin: Negative for color change  Allergic/Immunologic: Negative for environmental allergies  Neurological: Positive for weakness and numbness  Negative for dizziness and headaches  Psychiatric/Behavioral: Negative for behavioral problems and sleep disturbance  The following portions of the patient's history were reviewed and updated as appropriate : past family history, past medical history, past social history and past surgical history      Current Outpatient Medications:     acetaminophen (TYLENOL) 500 mg tablet, Take 2 tablets (1,000 mg total) by mouth every 6 (six) hours as needed for moderate pain, Disp: 60 tablet, Rfl: 1    Alcohol Swabs PADS, by Does not apply route 4 (four) times a day *Latex Free*, Disp: 120 each, Rfl: 3    aspirin 81 mg chewable tablet, Chew 1 tablet (81 mg total) daily, Disp: 90 tablet, Rfl: 1    atorvastatin (LIPITOR) 40 mg tablet, Take 1 tablet (40 mg total) by mouth every evening, Disp: 90 tablet, Rfl: 1    Blood Pressure KIT, by Does not apply route daily, Disp: 1 each, Rfl: 0    cholecalciferol (VITAMIN D3) 1,000 units tablet, Take 1,000 Units by mouth daily, Disp: , Rfl:     diazepam (VALIUM) 5 mg tablet, Take 1 tablet 30 minutes prior to MRI    May take 1 tablet just immediately before MRI if needed, Disp: 2 tablet, Rfl: 0    DULoxetine (CYMBALTA) 30 mg delayed release capsule, Take 1 capsule (30 mg total) by mouth in the morning , Disp: 30 capsule, Rfl: 1    glucose blood (ReliOn Prime Test) test strip, Use 1 each 2 (two) times a day, Disp: 200 each, Rfl: 1    hydrochlorothiazide (HYDRODIURIL) 25 mg tablet, Take 1 tablet (25 mg total) by mouth daily, Disp: 90 tablet, Rfl: 1    Incontinence Supply Disposable (Prevail Women Underwear XL) MISC, Use every 6 (six) hours as needed (incontinence), Disp: 120 each, Rfl: 11    Insulin Pen Needle (BD Pen Needle Izzy U/F) 32G X 4 MM MISC, Use in the morning, Disp: 100 each, Rfl: 1    Interferon Beta-1b (Betaseron) 0 3 MG KIT, Inject 1 ml (0 25 mg) subcutaneously every other day, Disp: 14 kit, Rfl: 10    liraglutide (VICTOZA) injection, Inject 0 1 mL (0 6 mg total) under the skin daily, Disp: 3 mL, Rfl: 2    lisinopril (ZESTRIL) 40 mg tablet, Take 1 tablet (40 mg total) by mouth daily, Disp: 90 tablet, Rfl: 0    metFORMIN (GLUCOPHAGE-XR) 500 mg 24 hr tablet, Take 2 tablets (1,000 mg total) by mouth 2 (two) times a day with meals, Disp: 360 tablet, Rfl: 0    metoprolol succinate (TOPROL-XL) 50 mg 24 hr tablet, Take 1 tablet (50 mg total) by mouth daily, Disp: 90 tablet, Rfl: 0    vitamin B-12 (VITAMIN B-12) 500 mcg tablet, Take 2 tablets (1,000 mcg total) by mouth daily, Disp: 60 tablet, Rfl: 5    cinacalcet (SENSIPAR) 60 MG tablet, Take 1 tablet (60 mg total) by mouth daily, Disp: 30 tablet, Rfl: 5    glucose blood test strip, Check blood glucose levels three times per day before meals (Patient not taking: No sig reported), Disp: 300 each, Rfl: 2         Objective:         Vitals:    05/13/22 0829   BP: 118/86   BP Location: Right arm   Patient Position: Sitting   Cuff Size: Large   Pulse: 75   Resp: 17   Temp: 97 6 °F (36 4 °C)   TempSrc: Tympanic   SpO2: 96%   Weight: 132 kg (290 lb)   Height: 5' 5" (1 651 m)     Physical Exam  Vitals reviewed  Constitutional:       Appearance: She is well-developed  HENT:      Head: Normocephalic and atraumatic  Nose: Nose normal       Mouth/Throat:      Pharynx: No oropharyngeal exudate  Eyes:      General: No scleral icterus  Right eye: No discharge  Left eye: No discharge  Pupils: Pupils are equal, round, and reactive to light  Neck:      Trachea: No tracheal deviation  Cardiovascular:      Rate and Rhythm: Normal rate and regular rhythm  Pulses:           Dorsalis pedis pulses are 2+ on the right side and 2+ on the left side  Posterior tibial pulses are 2+ on the right side and 2+ on the left side  Heart sounds: Normal heart sounds  No murmur heard  No friction rub  No gallop  Pulmonary:      Effort: Pulmonary effort is normal  No respiratory distress  Breath sounds: Normal breath sounds  No wheezing or rales  Abdominal:      General: Bowel sounds are normal  There is no distension  Palpations: Abdomen is soft  Tenderness: There is no abdominal tenderness  There is no guarding or rebound  Musculoskeletal:         General: Normal range of motion  Cervical back: Normal range of motion and neck supple  Lymphadenopathy:      Head:      Right side of head: No submental or submandibular adenopathy  Left side of head: No submental or submandibular adenopathy  Cervical: No cervical adenopathy  Right cervical: No superficial, deep or posterior cervical adenopathy       Left cervical: No superficial, deep or posterior cervical adenopathy  Skin:     General: Skin is warm and dry  Findings: No erythema  Neurological:      Mental Status: She is alert and oriented to person, place, and time  Cranial Nerves: No cranial nerve deficit  Sensory: No sensory deficit  Psychiatric:         Mood and Affect: Mood is not anxious or depressed  Speech: Speech normal          Behavior: Behavior normal          Thought Content:  Thought content normal          Judgment: Judgment normal

## 2022-05-16 ENCOUNTER — HOSPITAL ENCOUNTER (OUTPATIENT)
Dept: ULTRASOUND IMAGING | Facility: HOSPITAL | Age: 62
Discharge: HOME | End: 2022-05-16
Attending: INTERNAL MEDICINE
Payer: COMMERCIAL

## 2022-05-16 DIAGNOSIS — E04.1 THYROID NODULE: ICD-10-CM

## 2022-05-16 PROCEDURE — 76536 US EXAM OF HEAD AND NECK: CPT

## 2022-05-21 DIAGNOSIS — R00.2 PALPITATIONS: ICD-10-CM

## 2022-05-21 DIAGNOSIS — I10 BENIGN ESSENTIAL HYPERTENSION: ICD-10-CM

## 2022-05-21 DIAGNOSIS — R07.89 OTHER CHEST PAIN: ICD-10-CM

## 2022-05-21 DIAGNOSIS — I10 ESSENTIAL HYPERTENSION: Chronic | ICD-10-CM

## 2022-05-23 RX ORDER — HYDROCHLOROTHIAZIDE 25 MG/1
TABLET ORAL
Qty: 90 TABLET | Refills: 0 | OUTPATIENT
Start: 2022-05-23

## 2022-05-23 RX ORDER — METOPROLOL SUCCINATE 50 MG/1
TABLET, EXTENDED RELEASE ORAL
Qty: 90 TABLET | Refills: 1 | Status: SHIPPED | OUTPATIENT
Start: 2022-05-23

## 2022-05-23 RX ORDER — LISINOPRIL 40 MG/1
TABLET ORAL
Qty: 90 TABLET | Refills: 1 | Status: SHIPPED | OUTPATIENT
Start: 2022-05-23

## 2022-06-15 ENCOUNTER — OFFICE VISIT (OUTPATIENT)
Dept: DIABETES SERVICES | Facility: CLINIC | Age: 62
End: 2022-06-15
Payer: COMMERCIAL

## 2022-06-15 VITALS — BODY MASS INDEX: 47.86 KG/M2 | WEIGHT: 287.6 LBS

## 2022-06-15 DIAGNOSIS — E11.65 TYPE 2 DIABETES MELLITUS WITH HYPERGLYCEMIA, WITHOUT LONG-TERM CURRENT USE OF INSULIN (HCC): Primary | ICD-10-CM

## 2022-06-15 PROCEDURE — 97802 MEDICAL NUTRITION INDIV IN: CPT

## 2022-06-15 NOTE — PROGRESS NOTES
Medical Nutrition Therapy        Assessment    Visit Type: Initial visit  Chief complaint/Medical Diagnosis/reason for visit E11 66 T2DM with hyperglycemia, without long-term current use of insulin  HPI Elizabeth Alvarez was seen today for her initial MNT visit  Her son accompanied her today for the visit  She told that she was diagnosed with T2DM in 1998  Patient informed that she is checking her blood glucose levels three times a week at different times and BG levels usually ranges in 180-210 mg/dl  Patient and her son told that they would like to gather more knowledge about her disease and diet education so that she can deal diabetes more effectively  Problems identified in food recall include inconsistent carbohydrates and poorly timed meals  Patient admits that she sometimes skips her meals too  Provided patient with a 1700 calorie meal plan to assist with consistency, balance and portion control  Encouraged the consumption of regular meals at regular times  Advised patient to keep carbohydrate intake to 30-45 grams per meal and 15 per snack to assist with glycemic control  Suggested keeping protein intake to 8 ounces a day and fat to 5 servings daily to assist with lipid management and calorie control  Portion booklet and food labels were used to teach basic carbohydrate counting  Patient agreed to keep daily food logs and return them in her 6 weeks follow-up visit  RD will remain available for further dietary questions/concerns  Ht Readings from Last 1 Encounters:   05/13/22 5' 5" (1 651 m)     Wt Readings from Last 3 Encounters:   06/15/22 130 kg (287 lb 9 6 oz)   05/13/22 132 kg (290 lb)   04/20/22 130 kg (286 lb)     Weight Change: No, weight usually varies between 280-290 lbs      Barriers to Learning: no barriers    Do you follow any special diet presently?: No  Who shops: children  Who cooks: children    Food Log: Completed via the method of food recall    Breakfast varies 6:30-7:00 or some days 9:00-10:00 : 1 cup cooked oatmeal, 2 tbsp raisins, 1 banana/ 1 orange, flavored mint/ raspberry tea  Morning Snack: none  Lunch 12:00-2:00: chicken salad (4-6 oz chicken breast, spring mix, lettuce, cucumber, beets, 1/4 cup shredded cheese, water  Afternoon Snack: none  Dinner 5:00-7:00: 2 cups cooked spaghetti, 3-4 oz meat - turkey/ chicken, lettuce and tomato on the side OR 1 tuna steak OR 1/2 cup russet potato with some meat and 1/2 cup peas, salad 1 5 cup with 2 tbsp salad dressing, water  Evening Snack: 1 5-2 cups of frozen fruit   Beverages: water, flavored tea  Eating out/Take out:2 times/ month prefers chicken salad  Exercise: nothing beyond daily activities, uses walker support    Calorie needs 1700 kcals/day Carbs: 30-45 g/meal, 15 g/snack     Fat: 5 servings/day    Protein:8 ounces/day    Nutrition Diagnosis:  Food and nutrition related knowledge deficit  related to Lack of prior exposure to accurate nutrition related information as evidenced by No prior knowledge of need for food and nutrition related recommendations    Intervention: behavior modification strategies, carbohydrate counting, meal timing, individualized meal plan and monitoring portion control     Treatment Goals: Patient will consume 3 meals a day, Patient will monitor portion control, Patient will consume 25-35 grams of fiber a day, Patient will count carbohydrates and Patient will monitor blood glucose    Monitoring and evaluation:    Term code indicator  FH 1 3 2 Food Intake Criteria: Consume 3 meals a day, 4-5 hours apart  Try not to skip any meals  Term code indicator  FH 1 3 2 Food Intake and FH 1 6 3 Carbohydrate Intake Criteria: Take 30-45 grams of carbohydrates at each meal and 15 grams of carbohydrates during snack time  Term code indicator  FH 1 6 3 Carbohydrate Intake and FH 1 6 4 Fiber Intake Criteria: Include non starchy vegetables and 2 whole fruits in your diet a day      Materials Provided: portion booklet, 3 days food record    Patients Response to Instruction:  Vivi Hardin  Expected Compliancegood    Start- Stop: 3:40-4:40  Total Minutes: 60 Minutes  Group or Individual Instruction: MNT-I  Other: Lizeth Cerda      Thank you for coming to the [facility] for education today  Please feel free to call with any questions or concerns      Svetlana [address] [id number]

## 2022-06-15 NOTE — PATIENT INSTRUCTIONS
Consume 3 meals a day, 4-5 hours apart  Try not to skip any meals  Take 30-45 grams of carbohydrates at each meal and 15 grams of carbohydrates during snack time  Include non starchy vegetables and 2 whole fruits in your diet a day

## 2022-06-24 ENCOUNTER — TELEPHONE (OUTPATIENT)
Dept: NEUROLOGY | Facility: CLINIC | Age: 62
End: 2022-06-24

## 2022-06-24 DIAGNOSIS — G35 MS (MULTIPLE SCLEROSIS) (HCC): Primary | ICD-10-CM

## 2022-06-24 NOTE — TELEPHONE ENCOUNTER
Patient is having an MRI on July 10 and she needs updated BUN and Creatinine   Please enter Lab orders and we will reach out to the patient

## 2022-06-27 ENCOUNTER — TELEPHONE (OUTPATIENT)
Dept: NEUROLOGY | Facility: CLINIC | Age: 62
End: 2022-06-27

## 2022-06-27 NOTE — TELEPHONE ENCOUNTER
LMOM to remind pt of upcoming appt on 07/11/22  Left the office number for any questions or concerns

## 2022-06-27 NOTE — TELEPHONE ENCOUNTER
Left message on the telephone that Lab orders are completed and that patient need to have them done before the MRI

## 2022-06-27 NOTE — TELEPHONE ENCOUNTER
Patient called back after receiving Anastasia's voicemail to confirm her appt  Confirmed date/time/location with her

## 2022-06-29 ENCOUNTER — LAB (OUTPATIENT)
Dept: LAB | Facility: CLINIC | Age: 62
End: 2022-06-29
Payer: COMMERCIAL

## 2022-06-29 ENCOUNTER — OFFICE VISIT (OUTPATIENT)
Dept: FAMILY MEDICINE CLINIC | Facility: CLINIC | Age: 62
End: 2022-06-29
Payer: COMMERCIAL

## 2022-06-29 ENCOUNTER — APPOINTMENT (OUTPATIENT)
Dept: RADIOLOGY | Facility: CLINIC | Age: 62
End: 2022-06-29
Payer: COMMERCIAL

## 2022-06-29 VITALS
HEART RATE: 72 BPM | HEIGHT: 65 IN | DIASTOLIC BLOOD PRESSURE: 86 MMHG | TEMPERATURE: 97.3 F | WEIGHT: 290.2 LBS | SYSTOLIC BLOOD PRESSURE: 156 MMHG | BODY MASS INDEX: 48.35 KG/M2 | OXYGEN SATURATION: 98 %

## 2022-06-29 DIAGNOSIS — E55.9 VITAMIN D DEFICIENCY: ICD-10-CM

## 2022-06-29 DIAGNOSIS — M25.551 HIP PAIN, RIGHT: Primary | ICD-10-CM

## 2022-06-29 DIAGNOSIS — I10 BENIGN ESSENTIAL HYPERTENSION: ICD-10-CM

## 2022-06-29 DIAGNOSIS — M25.551 HIP PAIN, RIGHT: ICD-10-CM

## 2022-06-29 DIAGNOSIS — G35 MS (MULTIPLE SCLEROSIS) (HCC): ICD-10-CM

## 2022-06-29 DIAGNOSIS — E11.65 TYPE 2 DIABETES MELLITUS WITH HYPERGLYCEMIA, WITHOUT LONG-TERM CURRENT USE OF INSULIN (HCC): ICD-10-CM

## 2022-06-29 DIAGNOSIS — E04.1 THYROID NODULE: ICD-10-CM

## 2022-06-29 LAB
25(OH)D3 SERPL-MCNC: 23.7 NG/ML (ref 30–100)
ALBUMIN SERPL BCP-MCNC: 3.4 G/DL (ref 3.5–5)
ALP SERPL-CCNC: 99 U/L (ref 46–116)
ALT SERPL W P-5'-P-CCNC: 27 U/L (ref 12–78)
ANION GAP SERPL CALCULATED.3IONS-SCNC: 8 MMOL/L (ref 4–13)
AST SERPL W P-5'-P-CCNC: 14 U/L (ref 5–45)
BILIRUB SERPL-MCNC: 1.07 MG/DL (ref 0.2–1)
BUN SERPL-MCNC: 19 MG/DL (ref 5–25)
CALCIUM ALBUM COR SERPL-MCNC: 10.5 MG/DL (ref 8.3–10.1)
CALCIUM SERPL-MCNC: 10 MG/DL (ref 8.3–10.1)
CHLORIDE SERPL-SCNC: 106 MMOL/L (ref 100–108)
CO2 SERPL-SCNC: 24 MMOL/L (ref 21–32)
CREAT SERPL-MCNC: 0.8 MG/DL (ref 0.6–1.3)
CREAT UR-MCNC: 160 MG/DL
EST. AVERAGE GLUCOSE BLD GHB EST-MCNC: 189 MG/DL
GFR SERPL CREATININE-BSD FRML MDRD: 80 ML/MIN/1.73SQ M
GLUCOSE P FAST SERPL-MCNC: 178 MG/DL (ref 65–99)
HBA1C MFR BLD: 8.2 %
MICROALBUMIN UR-MCNC: 1060 MG/L (ref 0–20)
MICROALBUMIN/CREAT 24H UR: 663 MG/G CREATININE (ref 0–30)
PHOSPHATE SERPL-MCNC: 3.1 MG/DL (ref 2.3–4.1)
POTASSIUM SERPL-SCNC: 4.1 MMOL/L (ref 3.5–5.3)
PROT SERPL-MCNC: 7.1 G/DL (ref 6.4–8.2)
SODIUM SERPL-SCNC: 138 MMOL/L (ref 136–145)
T4 FREE SERPL-MCNC: 0.93 NG/DL (ref 0.76–1.46)
TSH SERPL DL<=0.05 MIU/L-ACNC: 2.21 UIU/ML (ref 0.45–4.5)

## 2022-06-29 PROCEDURE — 84439 ASSAY OF FREE THYROXINE: CPT

## 2022-06-29 PROCEDURE — 84100 ASSAY OF PHOSPHORUS: CPT

## 2022-06-29 PROCEDURE — 3052F HG A1C>EQUAL 8.0%<EQUAL 9.0%: CPT | Performed by: FAMILY MEDICINE

## 2022-06-29 PROCEDURE — 82043 UR ALBUMIN QUANTITATIVE: CPT

## 2022-06-29 PROCEDURE — 80053 COMPREHEN METABOLIC PANEL: CPT

## 2022-06-29 PROCEDURE — 82306 VITAMIN D 25 HYDROXY: CPT

## 2022-06-29 PROCEDURE — 36415 COLL VENOUS BLD VENIPUNCTURE: CPT

## 2022-06-29 PROCEDURE — 84443 ASSAY THYROID STIM HORMONE: CPT

## 2022-06-29 PROCEDURE — 82570 ASSAY OF URINE CREATININE: CPT

## 2022-06-29 PROCEDURE — 99213 OFFICE O/P EST LOW 20 MIN: CPT | Performed by: NURSE PRACTITIONER

## 2022-06-29 PROCEDURE — 83036 HEMOGLOBIN GLYCOSYLATED A1C: CPT

## 2022-06-29 PROCEDURE — 73502 X-RAY EXAM HIP UNI 2-3 VIEWS: CPT

## 2022-06-29 RX ORDER — METHYLPREDNISOLONE 4 MG/1
TABLET ORAL
Qty: 21 EACH | Refills: 0 | Status: SHIPPED | OUTPATIENT
Start: 2022-06-29

## 2022-06-29 NOTE — PROGRESS NOTES
[facility]    ASSESSMENT AND PLAN     1  Hip pain, right  Assessment & Plan:  Differentials discussed  Get x-ray today:  Likely will note degenerative changes  Recommend PT  Medrol pack given for symptom management  OTC Voltaren gel  If persistent,  recommend MRI:  Consider labral injury  Possible ortho referral    Orders:  -     XR hip/pelv 2-3 vws right if performed; Future; Expected date: 06/29/2022  -     methylPREDNISolone 4 MG tablet therapy pack; Use as directed on package  -     Diclofenac Sodium (VOLTAREN) 1 %; Apply 2 g topically 4 (four) times a day         SUBJECTIVE       Patient ID: Courtney Strickland is a 62 y o  female  Chief Complaint   Patient presents with    Hip Pain     R hip pain started        HISTORY OF PRESENT ILLNESS    Presents with right hip pain  Present for 3 months  On/off  Notes worsening over last few weeks  Wakes her at night  Cannot lay on her side (usually a side sleeper)  Started swimming 4 weeks ago  Averaging swimming 2 times a week, for 2 hours:  when [place] is [place]  Heated pool  Feels the swimming aggravates it  Describes hip pain as sharp, stab like  Same spot always  Does not radiate  Hard time getting out of bed in am due to stiffness in the hip  Pain with forward movement of leg with walking  And with steps  Painful  when lifting leg up for steps and then with the pressure when stepping down          The following portions of the patient's history were reviewed and updated as appropriate: allergies, current medications, past family history, past medical history, past social history, past surgical history, and problem list     REVIEW OF SYSTEMS  Review of Systems   Musculoskeletal: Positive for arthralgias (right hip as in HPI) and gait problem (due to hip pain  Uses rollator at baseline)         OBJECTIVE      VITAL SIGNS  /86 (BP Location: Right arm, Patient Position: Sitting, Cuff Size: Large)   Pulse 72   Temp (!) 97 3 °F (36 3 °C) (Temporal)   Ht 5' 5" (1 651 m)   Wt 132 kg (290 lb 3 2 oz)   LMP  (LMP Unknown)   SpO2 98%   Breastfeeding No   BMI 48 29 kg/m²     CURRENT MEDICATIONS    Current Outpatient Medications:     Alcohol Swabs PADS, by Does not apply route 4 (four) times a day *Latex Free*, Disp: 120 each, Rfl: 3    aspirin 81 mg chewable tablet, Chew 1 tablet (81 mg total) daily, Disp: 90 tablet, Rfl: 1    atorvastatin (LIPITOR) 40 mg tablet, Take 1 tablet (40 mg total) by mouth every evening, Disp: 90 tablet, Rfl: 1    Blood Pressure KIT, by Does not apply route daily, Disp: 1 each, Rfl: 0    cinacalcet (SENSIPAR) 60 MG tablet, Take 1 tablet (60 mg total) by mouth daily, Disp: 30 tablet, Rfl: 5    diazepam (VALIUM) 5 mg tablet, Take 1 tablet 30 minutes prior to MRI    May take 1 tablet just immediately before MRI if needed, Disp: 2 tablet, Rfl: 0    Diclofenac Sodium (VOLTAREN) 1 %, Apply 2 g topically 4 (four) times a day, Disp: 150 g, Rfl: 1    glucose blood (ReliOn Prime Test) test strip, Use 1 each 2 (two) times a day, Disp: 200 each, Rfl: 1    glucose blood test strip, Check blood glucose levels three times per day before meals, Disp: 300 each, Rfl: 2    hydrochlorothiazide (HYDRODIURIL) 25 mg tablet, Take 1 tablet (25 mg total) by mouth daily, Disp: 90 tablet, Rfl: 1    Incontinence Supply Disposable (Prevail Women Underwear XL) MISC, Use every 6 (six) hours as needed (incontinence), Disp: 120 each, Rfl: 11    lisinopril (ZESTRIL) 40 mg tablet, Take 1 tablet by mouth once daily, Disp: 90 tablet, Rfl: 1    metFORMIN (GLUCOPHAGE-XR) 500 mg 24 hr tablet, Take 2 tablets (1,000 mg total) by mouth 2 (two) times a day with meals, Disp: 360 tablet, Rfl: 0    methylPREDNISolone 4 MG tablet therapy pack, Use as directed on package, Disp: 21 each, Rfl: 0    metoprolol succinate (TOPROL-XL) 50 mg 24 hr tablet, Take 1 tablet by mouth once daily, Disp: 90 tablet, Rfl: 1    acetaminophen (TYLENOL) 500 mg tablet, Take 2 tablets (1,000 mg total) by mouth every 6 (six) hours as needed for moderate pain (Patient not taking: Reported on 6/29/2022), Disp: 60 tablet, Rfl: 1    cholecalciferol (VITAMIN D3) 1,000 units tablet, Take 1,000 Units by mouth daily (Patient not taking: Reported on 6/29/2022), Disp: , Rfl:     DULoxetine (CYMBALTA) 30 mg delayed release capsule, Take 1 capsule (30 mg total) by mouth in the morning  (Patient not taking: Reported on 6/29/2022), Disp: 30 capsule, Rfl: 1    Insulin Pen Needle (BD Pen Needle Izzy U/F) 32G X 4 MM MISC, Use in the morning (Patient not taking: Reported on 6/29/2022), Disp: 100 each, Rfl: 1    Interferon Beta-1b (Betaseron) 0 3 MG KIT, Inject 1 ml (0 25 mg) subcutaneously every other day (Patient not taking: Reported on 6/29/2022), Disp: 14 kit, Rfl: 10    liraglutide (VICTOZA) injection, Inject 0 1 mL (0 6 mg total) under the skin daily (Patient not taking: Reported on 6/29/2022), Disp: 3 mL, Rfl: 2    vitamin B-12 (VITAMIN B-12) 500 mcg tablet, Take 2 tablets (1,000 mcg total) by mouth daily (Patient not taking: Reported on 6/29/2022), Disp: 60 tablet, Rfl: 5      PHYSICAL EXAMINATION   Physical Exam  Vitals and nursing note reviewed  Constitutional:       General: She is not in acute distress  Appearance: Normal appearance  She is not ill-appearing  HENT:      Head: Normocephalic  Pulmonary:      Effort: Pulmonary effort is normal  No respiratory distress  Musculoskeletal:      Right hip: Tenderness and bony tenderness present  Decreased range of motion  Normal strength  Skin:     General: Skin is warm and dry  Neurological:      General: No focal deficit present  Mental Status: She is alert and oriented to person, place, and time  Psychiatric:         Attention and Perception: Attention normal          Mood and Affect: Mood normal          Speech: Speech normal          Behavior: Behavior normal          Thought Content:  Thought content normal  Cognition and Memory: Cognition normal          Judgment: Judgment normal

## 2022-06-30 PROBLEM — M25.551 HIP PAIN, RIGHT: Status: ACTIVE | Noted: 2022-06-30

## 2022-07-06 DIAGNOSIS — M25.551 HIP PAIN, RIGHT: Primary | ICD-10-CM

## 2022-07-08 ENCOUNTER — TELEPHONE (OUTPATIENT)
Dept: FAMILY MEDICINE CLINIC | Facility: CLINIC | Age: 62
End: 2022-07-08

## 2022-07-08 DIAGNOSIS — G35 MULTIPLE SCLEROSIS (HCC): Primary | ICD-10-CM

## 2022-07-08 NOTE — TELEPHONE ENCOUNTER
Received voicemail from [facility] requesting a new referral  Referral on file has   Patient has an appointment scheduled for 2022  Neurology is requesting new referral with ICD code: Emmy Moreland  Will forward to covering provider to advise referral request      According to chart Neurology referral was placed by [facility]  Neurology can be reached at [phone], if needed

## 2022-07-10 ENCOUNTER — HOSPITAL ENCOUNTER (OUTPATIENT)
Dept: MRI IMAGING | Facility: HOSPITAL | Age: 62
Discharge: HOME | End: 2022-07-10

## 2022-07-10 DIAGNOSIS — G35 MS (MULTIPLE SCLEROSIS) (HCC): ICD-10-CM

## 2022-07-11 ENCOUNTER — OFFICE VISIT (OUTPATIENT)
Dept: NEUROLOGY | Facility: CLINIC | Age: 62
End: 2022-07-11
Payer: COMMERCIAL

## 2022-07-11 VITALS
SYSTOLIC BLOOD PRESSURE: 169 MMHG | DIASTOLIC BLOOD PRESSURE: 79 MMHG | TEMPERATURE: 96.8 F | BODY MASS INDEX: 47.75 KG/M2 | HEART RATE: 62 BPM | OXYGEN SATURATION: 98 % | RESPIRATION RATE: 18 BRPM | WEIGHT: 286.6 LBS | HEIGHT: 65 IN

## 2022-07-11 DIAGNOSIS — G35 MULTIPLE SCLEROSIS (HCC): ICD-10-CM

## 2022-07-11 DIAGNOSIS — G35 MS (MULTIPLE SCLEROSIS) (HCC): Primary | ICD-10-CM

## 2022-07-11 PROCEDURE — 99214 OFFICE O/P EST MOD 30 MIN: CPT | Performed by: PHYSICIAN ASSISTANT

## 2022-07-11 RX ORDER — DIAZEPAM 5 MG/1
TABLET ORAL
Qty: 2 TABLET | Refills: 0 | Status: SHIPPED | OUTPATIENT
Start: 2022-07-11

## 2022-07-11 NOTE — PATIENT INSTRUCTIONS
Continue Betaseron  Will reschedule MRI cervical and thoracic spine    Will send diazepam to be taken prior to the MRIs  Re-start vitamin D and B12  Discuss right hip pain with your PCP  Follow up in 4 months or sooner if needed

## 2022-07-11 NOTE — PROGRESS NOTES
Patient ID: Ryan Castro is a 62 y o  female  Assessment/Plan:    MS (multiple sclerosis) ([facility] )  Patient continues on Betaseron  She denies any new, focal symptoms at this time  Many of her ongoing symptoms are generalized and also likely related to other medical issues  She c/o fatigue and weaned off her gabapentin and Cymbalta because of this, but also does not use her CPAP  We discussed how untreated SELENE can cause fatigue, as well as other negative health implications such as increased risk of stroke, MI, arrhythmias  She c/o ambulatory dysfunction but currently has significant pain in the right hip--being managed by PCP currently  She was supposed to have updated cord imaging but had to reschedule twice, including cancelling her appts yesterday because she realized she did not have the Diazepam that had been prescribed for her imaging   --will reschedule MRI c-spine and t-spine    Reminded patient that Betaseron is to prevent new disease, but will not improve her already known symptoms  We again discussed PT and OT (was referred last time), and the importance of therapies to improve strength and endurance  I encouraged a regular exercise routine, working on weight loss  Labs just completed 6/29/22, normal LFTs  Vitamin D is low, has not been taking her supplements  Encouraged her to re-start  Her exam is stable today, although right hip pain limited my testing of her RLE strength  She will return in 4 months or sooner if needed  Diagnoses and all orders for this visit:    MS (multiple sclerosis) ([facility] )  -     diazepam (VALIUM) 5 mg tablet; Take 1 tablet 30 minutes prior to MRI  May take 1 tablet just immediately before MRI if needed    Multiple sclerosis [facility])  -     Ambulatory Referral to Neurology           Subjective:    HPI    Patient is a 61 yo female who presents to the [place] [address] for follow up regarding her MS   Patient with PMH of HTN, DM type 2, HLD, hyperparathyroidism  She was last seen in March 2022  Patient was initially seen in April 2020 following a hospitalization  Patient developed acute onset of right-sided numbness and weakness on March 9, 2020 in the morning before leaving for work  She was assessed by neurology while in the hospital, initial concern was for stroke  MRI C-spine WO contrast 3/10/2020: There is focal intramedullary T2 hyperintense signal epicentered at the C4 vertebral level  This is more pronounced in the right and central hemicord  The remaining mid to lower cervical cord is more difficult to assess due to the image degradation  There may be additional right hemicord signal abnormality at C5 and C6  MRI c-spine W contrast 3/11/2020: Postcontrast imaging demonstrates enhancement associated with multiple previously described foci of T2 prolongation within the cervical and upper thoracic CORD  MRI brain WO contrast 3/10/2020: There is no discrete mass, mass effect or midline shift  There is no intracranial hemorrhage  There is no evidence of acute infarction and diffusion imaging is unremarkable  Multiple foci of T2 and FLAIR hyperintensity are present within the supratentorial white matter, most pronounced in the anterolateral left frontal lobe, anterior right temporal lobe and right cerebellum/middle cerebellar peduncle  MRI brain W contrast 3/11/2020: Redemonstration of multiple supratentorial and infratentorial scattered areas of white matter FLAIR signal hyperintensity, as described above  Many of the lesions demonstrate a perpendicular configuration of the ventricles and lesions are seen involving the callosal septal interface  Imaging appearance is most suggestive of demyelinating disease/multiple sclerosis with areas of active demyelination as correlated with dedicated MRI of the cervical spine  Concern for MS vs neoplasm vs sarcoid   CT chest abdomen pelvis was unremarkable for any solid tumor, although did demonstrate some calcified lymph nodes and granulomata in the lung fields  Patient had completed CSF analysis was for increased cells: CSF protein 80, CSF white blood cell 37, lymphocyte predominant, JCV and ACE negative, meningitis panel negative, MS panel high IgG index, 3 OCB, flow cytometry hypercellular  Serum NMO/MOG negative, ANCA/DNA/Sjogren's negative, ESR 32, CRP 9 1  She was given IV steroids and oral steroid taper  Patient had repeat imaging in June 2020 and there were several new, enhancing demyelinating lesions in the brain, as well as improvement in the c-spine noted  At timing of visit on 7/7/2020, diagnosis of MS was made  Betaseron was initiated, with first injection 7/24/2020  At first, she was having flu-like symptoms with the titration, but patient was advised to pre-med with NSAIDs/Tylenol and warm showers, and this improved her symptoms  She continued to have issues with the Betaseron  She called in mid October reporting ongoing issues, so Betaseron was held for 2-3 weeks  in November 2020, she was doing better with the Betaseron  Last imaging was done without contrast due to patient declined contrast (she tells me she was there for "too long" and was tired and wanted to go home)  MRI brain 3/5/2021:  Mild plaque burden, at least 2 of the previously seen enhancing lesions are less conspicuous on FLAIR imaging, indicative of at least partial treatment response  MRI c-spine 3/5/2021: a few, scattered cord lesions redemonstrated  The previously seen enhancing lesion at T1 demonstrates a small amount of myelomalacia  No progressive cord signal abnormality on noncontrast imaging  At timing of last visit, patient reported feeling generally worse, especially difficulty with stamina and endurance with ambulation  She denied new, focal symptoms, just a decline in general  She also admitted to not using her CPAP, feeling tired    MRI c-spine and t-spine were ordered due to worsening ambulatory dysfunction  Today, patient reports she is about the same  She did not complete her MRIs, had to reschedule once because she was sick, and was supposed to go yesterday but apparently realized she did not get her Diazepam for her MRIs, so she cancelled  She reports ongoing issues with right hip pain, for which she has been seeing her PCP  She has so much pain in the hip, hurts all the time  She had an xray and was given some oral steroids and anti-inflammatory topical, but nothing helps  She had labs completed 6/29/22 with normal LFTs, CBC not completed  Vit D was low, reports she has not been taking vit D as she should be  She continues on Betaseron, no major issues with her injections  She tells me she weaned off Cymbalta and gabapentin due to fatigue  The following portions of the patient's history were reviewed and updated as appropriate: current medications, past family history, past medical history, past social history, past surgical history and problem list          Objective:    Blood pressure 169/79, pulse 62, temperature (!) 96 8 °F (36 °C), temperature source Tympanic, resp  rate 18, height 5' 5" (1 651 m), weight 130 kg (286 lb 9 6 oz), SpO2 98 %    Physical Exam  Constitutional:       Appearance: Normal appearance  HENT:      Head: Normocephalic and atraumatic  Eyes:      Extraocular Movements: EOM normal       Pupils: Pupils are equal, round, and reactive to light  Musculoskeletal:      Comments: Pain in the right hip--would not let me manual muscle test the right hip due to pain  Neurological:      Mental Status: She is alert  Deep Tendon Reflexes:      Reflex Scores:       Bicep reflexes are 1+ on the right side and 1+ on the left side  Brachioradialis reflexes are 1+ on the right side and 1+ on the left side  Patellar reflexes are 1+ on the right side and 1+ on the left side         Achilles reflexes are 0 on the right side and 0 on the left side  Psychiatric:         Mood and Affect: Mood normal          Speech: Speech normal          Behavior: Behavior normal          Neurological Exam  Mental Status  Alert  Oriented to person, place, time and situation  Speech is normal  Language is fluent with no aphasia  Attention and concentration are normal     Cranial Nerves  CN II: Visual fields full to confrontation  CN III, IV, VI: Extraocular movements intact bilaterally  Pupils equal round and reactive to light bilaterally  CN V: Facial sensation is normal   CN VII: Full and symmetric facial movement  CN VIII: Hearing is normal   CN IX, X: Palate elevates symmetrically  CN XI: Shoulder shrug strength is normal   CN XII: Tongue midline without atrophy or fasciculations  Motor   Normal muscle tone  Strength is 5/5 in all four extremities except as noted  Testing of the right hip is limited due to pain  Right foot DF 5-/5  Sensory  Light touch is normal in upper and lower extremities  Reflexes                                            Right                      Left  Brachioradialis                    1+                         1+  Biceps                                 1+                         1+  Patellar                                1+                         1+  Achilles                                0                         0    Coordination  Right: Finger-to-nose normal Left: Finger-to-nose normal     Gait    Slow, slightly antalgic gait, using rolling walker         Current Outpatient Medications   Medication Sig Dispense Refill    Alcohol Swabs PADS by Does not apply route 4 (four) times a day *Latex Free* 120 each 3    aspirin 81 mg chewable tablet Chew 1 tablet (81 mg total) daily 90 tablet 1    atorvastatin (LIPITOR) 40 mg tablet Take 1 tablet (40 mg total) by mouth every evening 90 tablet 1    Blood Pressure KIT by Does not apply route daily 1 each 0    diazepam (VALIUM) 5 mg tablet Take 1 tablet 30 minutes prior to MRI   May take 1 tablet just immediately before MRI if needed 2 tablet 0    Diclofenac Sodium (VOLTAREN) 1 % Apply 2 g topically 4 (four) times a day 150 g 1    glucose blood (ReliOn Prime Test) test strip Use 1 each 2 (two) times a day 200 each 1    glucose blood test strip Check blood glucose levels three times per day before meals 300 each 2    hydrochlorothiazide (HYDRODIURIL) 25 mg tablet Take 1 tablet (25 mg total) by mouth daily 90 tablet 1    Incontinence Supply Disposable (Prevail Women Underwear XL) MISC Use every 6 (six) hours as needed (incontinence) 120 each 11    lisinopril (ZESTRIL) 40 mg tablet Take 1 tablet by mouth once daily 90 tablet 1    metFORMIN (GLUCOPHAGE-XR) 500 mg 24 hr tablet Take 2 tablets (1,000 mg total) by mouth 2 (two) times a day with meals 360 tablet 0    metoprolol succinate (TOPROL-XL) 50 mg 24 hr tablet Take 1 tablet by mouth once daily 90 tablet 1    acetaminophen (TYLENOL) 500 mg tablet Take 2 tablets (1,000 mg total) by mouth every 6 (six) hours as needed for moderate pain (Patient not taking: No sig reported) 60 tablet 1    cholecalciferol (VITAMIN D3) 1,000 units tablet Take 1,000 Units by mouth daily (Patient not taking: No sig reported)      cinacalcet (SENSIPAR) 60 MG tablet Take 1 tablet (60 mg total) by mouth daily 30 tablet 5    DULoxetine (CYMBALTA) 30 mg delayed release capsule Take 1 capsule (30 mg total) by mouth in the morning   (Patient not taking: No sig reported) 30 capsule 1    Insulin Pen Needle (BD Pen Needle Izzy U/F) 32G X 4 MM MISC Use in the morning (Patient not taking: No sig reported) 100 each 1    Interferon Beta-1b (Betaseron) 0 3 MG KIT Inject 1 ml (0 25 mg) subcutaneously every other day (Patient not taking: No sig reported) 14 kit 10    liraglutide (VICTOZA) injection Inject 0 1 mL (0 6 mg total) under the skin daily (Patient not taking: Reported on 6/29/2022) 3 mL 2    methylPREDNISolone 4 MG tablet therapy pack Use as directed on package (Patient not taking: Reported on 7/11/2022) 21 each 0    vitamin B-12 (VITAMIN B-12) 500 mcg tablet Take 2 tablets (1,000 mcg total) by mouth daily (Patient not taking: No sig reported) 60 tablet 5     No current facility-administered medications for this visit  ROS:    Review of Systems   Constitutional: Negative  Negative for appetite change and fever  HENT: Negative  Negative for hearing loss, tinnitus, trouble swallowing and voice change  Eyes: Negative  Negative for photophobia and pain  Respiratory: Negative  Negative for shortness of breath  Cardiovascular: Negative  Negative for palpitations  Gastrointestinal: Positive for vomiting  Negative for nausea  Endocrine: Negative  Negative for cold intolerance  Genitourinary: Positive for frequency and urgency  Negative for dysuria  Musculoskeletal: Positive for gait problem (walks with walker- right hip pain- balance issues) and joint swelling (right hip)  Negative for myalgias and neck pain  Skin: Negative  Negative for rash  Allergic/Immunologic: Negative  Neurological: Positive for numbness (whole body)  Negative for dizziness, tremors, seizures, syncope, facial asymmetry, speech difficulty, weakness, light-headedness and headaches  Hematological: Negative  Does not bruise/bleed easily  Psychiatric/Behavioral: Positive for confusion and sleep disturbance  Negative for hallucinations  The patient is nervous/anxious           Depression, anxiety and mood swings, memory issues     I personally reviewed and updated the ROS as appropriate

## 2022-07-14 ENCOUNTER — TELEPHONE (OUTPATIENT)
Dept: ENDOCRINOLOGY | Facility: CLINIC | Age: 62
End: 2022-07-14

## 2022-07-14 ENCOUNTER — OFFICE VISIT (OUTPATIENT)
Dept: ENDOCRINOLOGY | Facility: CLINIC | Age: 62
End: 2022-07-14
Payer: COMMERCIAL

## 2022-07-14 VITALS
BODY MASS INDEX: 48.53 KG/M2 | WEIGHT: 291.3 LBS | DIASTOLIC BLOOD PRESSURE: 88 MMHG | HEIGHT: 65 IN | SYSTOLIC BLOOD PRESSURE: 148 MMHG | HEART RATE: 70 BPM

## 2022-07-14 DIAGNOSIS — E55.9 VITAMIN D DEFICIENCY: ICD-10-CM

## 2022-07-14 DIAGNOSIS — E83.52 HYPERCALCEMIA: ICD-10-CM

## 2022-07-14 DIAGNOSIS — E21.3 HYPERPARATHYROIDISM (HCC): Primary | ICD-10-CM

## 2022-07-14 DIAGNOSIS — I10 BENIGN ESSENTIAL HYPERTENSION: ICD-10-CM

## 2022-07-14 DIAGNOSIS — E11.65 TYPE 2 DIABETES MELLITUS WITH HYPERGLYCEMIA, WITHOUT LONG-TERM CURRENT USE OF INSULIN (HCC): ICD-10-CM

## 2022-07-14 DIAGNOSIS — E04.1 THYROID NODULE: ICD-10-CM

## 2022-07-14 DIAGNOSIS — G89.29 OTHER CHRONIC PAIN: ICD-10-CM

## 2022-07-14 DIAGNOSIS — R80.9 POSITIVE FOR MACROALBUMINURIA: ICD-10-CM

## 2022-07-14 DIAGNOSIS — E78.5 HYPERLIPIDEMIA, UNSPECIFIED HYPERLIPIDEMIA TYPE: ICD-10-CM

## 2022-07-14 PROCEDURE — 3079F DIAST BP 80-89 MM HG: CPT

## 2022-07-14 PROCEDURE — 99214 OFFICE O/P EST MOD 30 MIN: CPT

## 2022-07-14 PROCEDURE — 3077F SYST BP >= 140 MM HG: CPT

## 2022-07-14 NOTE — TELEPHONE ENCOUNTER
Pt was here today and she said she forgot to give you her eye doctor's info  [facility], Dr Papo Taylor, Ph #[phone]

## 2022-07-14 NOTE — PATIENT INSTRUCTIONS
Take 1,000 IU of vitamin D daily  Increase Victoza to 1 2 mg/day  Check your blood sugars at least once/day  DXA scan due in October   Continue with Sensipar  Limit calcium intake   Please check your BP when you get home  Schedule appointment with pain management

## 2022-07-15 PROBLEM — G89.29 OTHER CHRONIC PAIN: Status: ACTIVE | Noted: 2022-07-15

## 2022-07-15 PROBLEM — R80.9 POSITIVE FOR MACROALBUMINURIA: Status: ACTIVE | Noted: 2022-07-15

## 2022-07-15 NOTE — ASSESSMENT & PLAN NOTE
Referral to nephrology sent for optimization of management  Patient is also concerned about her kidney function despite reassurance  Needs better control of blood sugars for improvement  She is also already taking lisinopril which will protect her kidneys as well

## 2022-07-15 NOTE — ASSESSMENT & PLAN NOTE
BP high in office  I advised patient to check her BP when she gets home  She agrees   She will notify us for persistently elevated BP > 140/90

## 2022-07-15 NOTE — ASSESSMENT & PLAN NOTE
Avoid increased intake of calcium in diet > 1 g/day  Maintain adequate hydration to prevent kidney stones  Continue with sensipar   Most recent PTH from 03/2022 was normal

## 2022-07-15 NOTE — ASSESSMENT & PLAN NOTE
HgbA1C is not at goal  Increase Victoza to 1 2 mg/day  Will help with glycemic control and weight loss        Lab Results   Component Value Date    HGBA1C 8 2 (H) 06/29/2022

## 2022-07-19 ENCOUNTER — TELEPHONE (OUTPATIENT)
Dept: NEPHROLOGY | Facility: CLINIC | Age: 62
End: 2022-07-19

## 2022-07-19 NOTE — TELEPHONE ENCOUNTER
New Patient Intake Form   Patient Details   Astrid Ulrich     1960     [id number]     Appointment Information   Who is calling to schedule? If not patient, what is callers name? Patient     Referring Provider  Rafia Carrington    Referring Provider Number [id number]   Reason for Appt (Diagnosis) Type 2 diabetes mellitus with hyperglycemia, without long-term current use of insulin ([address] ) (E11 65 [ICD-10-CM])  Benign essential hypertension (I10 [ICD-10-CM])  Positive for macroalbuminuria (R80 9 [ICD-10-CM])      Is patient aware of why they are being referred? yes   Does Patient have labs done at [place] [address]? If not, where do they go? yes   Has patient had labs / urine work done? List date of most recent lab / urine work yes   Has patient had a BMP & CBC done in the past 2 years? If so, list the date yes   Has patient been hospitalized recently? If yes, list name and location of hospital they were In no   Has patient been seen by a Nephrologist before? If yes, list name, location and phone number no   Has patient been see by another Specialty before (ex  Neurology, urology, cardiology)? If yes, please list name, and specialty Neurology, Endo, Sleep Meds, OBGYN,    Has the patient had imaging done? If so, list the most recent date and type of imaging yes   Does the patient has a stone analysis report if history of kidney stones? no   Appointment Details   Is there a referral on file? yes   Appointment Date 11/01   Location [place]    Miscellaneous  When I told patient the first opening was 11/01 patient stated that she is going down hill and could be dead by then   I advised to call PCP

## 2022-08-09 ENCOUNTER — VBI (OUTPATIENT)
Dept: ADMINISTRATIVE | Facility: OTHER | Age: 62
End: 2022-08-09

## 2022-08-11 ENCOUNTER — TELEPHONE (OUTPATIENT)
Dept: FAMILY MEDICINE CLINIC | Facility: CLINIC | Age: 62
End: 2022-08-11

## 2022-08-11 DIAGNOSIS — G47.33 OBSTRUCTIVE SLEEP APNEA: Primary | ICD-10-CM

## 2022-08-11 DIAGNOSIS — G25.81 RESTLESS LEGS: ICD-10-CM

## 2022-08-11 DIAGNOSIS — R40.0 SLEEPINESS: ICD-10-CM

## 2022-08-11 DIAGNOSIS — E66.01 MORBID OBESITY (HCC): ICD-10-CM

## 2022-08-11 NOTE — TELEPHONE ENCOUNTER
hSivani oPrter from Neurology called requesting a physician to physician ambulatory referral for sleep (not a study) with dx codes g 47 33, g 25 81, r40 0 and e 66 01  Pt has appt on 8/19/22

## 2022-08-23 ENCOUNTER — HOSPITAL ENCOUNTER (OUTPATIENT)
Dept: RADIOLOGY | Age: 62
Discharge: HOME | End: 2022-08-23
Payer: COMMERCIAL

## 2022-08-23 DIAGNOSIS — G35 MS (MULTIPLE SCLEROSIS) (HCC): ICD-10-CM

## 2022-08-23 PROCEDURE — G1004 CDSM NDSC: HCPCS

## 2022-08-23 PROCEDURE — 72146 MRI CHEST SPINE W/O DYE: CPT

## 2022-08-23 PROCEDURE — 72141 MRI NECK SPINE W/O DYE: CPT

## 2022-08-31 DIAGNOSIS — I10 ESSENTIAL HYPERTENSION: Chronic | ICD-10-CM

## 2022-08-31 RX ORDER — HYDROCHLOROTHIAZIDE 25 MG/1
25 TABLET ORAL DAILY
Qty: 90 TABLET | Refills: 1 | Status: SHIPPED | OUTPATIENT
Start: 2022-08-31

## 2022-09-01 ENCOUNTER — OFFICE VISIT (OUTPATIENT)
Dept: GASTROENTEROLOGY | Facility: MEDICAL CENTER | Age: 62
End: 2022-09-01
Payer: COMMERCIAL

## 2022-09-01 ENCOUNTER — APPOINTMENT (OUTPATIENT)
Dept: LAB | Facility: MEDICAL CENTER | Age: 62
End: 2022-09-01
Payer: MEDICARE

## 2022-09-01 VITALS
HEART RATE: 62 BPM | BODY MASS INDEX: 48.36 KG/M2 | DIASTOLIC BLOOD PRESSURE: 62 MMHG | TEMPERATURE: 97.1 F | WEIGHT: 290.6 LBS | SYSTOLIC BLOOD PRESSURE: 151 MMHG

## 2022-09-01 DIAGNOSIS — E11.65 TYPE 2 DIABETES MELLITUS WITH HYPERGLYCEMIA, WITHOUT LONG-TERM CURRENT USE OF INSULIN (HCC): ICD-10-CM

## 2022-09-01 DIAGNOSIS — R14.0 BLOATING: ICD-10-CM

## 2022-09-01 DIAGNOSIS — R11.15 CYCLICAL VOMITING SYNDROME NOT ASSOCIATED WITH MIGRAINE: ICD-10-CM

## 2022-09-01 DIAGNOSIS — R11.2 NAUSEA AND VOMITING, UNSPECIFIED VOMITING TYPE: Primary | ICD-10-CM

## 2022-09-01 DIAGNOSIS — E78.5 HYPERLIPIDEMIA, UNSPECIFIED HYPERLIPIDEMIA TYPE: ICD-10-CM

## 2022-09-01 DIAGNOSIS — K55.9 ISCHEMIC COLITIS (HCC): ICD-10-CM

## 2022-09-01 LAB
CHOLEST SERPL-MCNC: 135 MG/DL
HDLC SERPL-MCNC: 42 MG/DL
LDLC SERPL CALC-MCNC: 56 MG/DL (ref 0–100)
NONHDLC SERPL-MCNC: 93 MG/DL
TRIGL SERPL-MCNC: 186 MG/DL

## 2022-09-01 PROCEDURE — 99244 OFF/OP CNSLTJ NEW/EST MOD 40: CPT | Performed by: INTERNAL MEDICINE

## 2022-09-01 PROCEDURE — 80061 LIPID PANEL: CPT

## 2022-09-01 PROCEDURE — 99204 OFFICE O/P NEW MOD 45 MIN: CPT | Performed by: INTERNAL MEDICINE

## 2022-09-01 PROCEDURE — 36415 COLL VENOUS BLD VENIPUNCTURE: CPT

## 2022-09-01 RX ORDER — SODIUM PICOSULFATE, MAGNESIUM OXIDE, AND ANHYDROUS CITRIC ACID 10; 3.5; 12 MG/160ML; G/160ML; G/160ML
1 LIQUID ORAL ONCE
Qty: 160 ML | Refills: 0 | Status: SHIPPED | OUTPATIENT
Start: 2022-09-01 | End: 2022-09-01

## 2022-09-01 RX ORDER — SIMETHICONE 180 MG
180 CAPSULE ORAL EVERY 6 HOURS PRN
Qty: 90 CAPSULE | Refills: 0 | Status: SHIPPED | OUTPATIENT
Start: 2022-09-01 | End: 2022-10-01

## 2022-09-01 NOTE — PROGRESS NOTES
Outpatient Follow up  [address]  [address]  [facility] [address]  Noelle Horowitz MD  Ph : [phone]  Fax : [phone]  Mobile : [phone]  Email : [email or web address]  Also available on Tiger Text    Anne Hartley 65 y o  female MRN: [id number]    PCP: Dm Mackay DO  Referring: Dm Mackay DO  [address]  [facility],  [address]    Anne Hartley presented for a follow up visit  My recommendations are included  Please do not hesitate to contact me with any questions you may have  ASSESSMENT AND PLAN:      No problem-specific Assessment & Plan notes found for this encounter  Diagnoses and all orders for this visit:    Nausea and vomiting, unspecified vomiting type  -     EGD; Future    Ischemic colitis [facility])  -     Ambulatory Referral to Gastroenterology  -     Colonoscopy; Future  -     Sod Picosulfate-Mag Ox-Cit Acd (Clenpiq) 10-3 5-12 MG-GM -GM/160ML SOLN; Take 1 kit by mouth 1 (one) time for 1 dose    Cyclical vomiting syndrome not associated with migraine  -     Ambulatory Referral to Gastroenterology  -     EGD; Future    Bloating  -     simethicone (MYLICON,GAS-X) 362 MG capsule; Take 1 capsule (180 mg total) by mouth every 6 (six) hours as needed for flatulence    Other orders  -     Diet NPO; Sips with meds; Standing  -     Void on call to OR; Standing  -     Insert peripheral IV; Standing      64 y/o lady who presents with h/o ischemic colitis in 2017  At that time she was recommended to have a repeat colonoscopy in a few months but that not done  She however, improved symptomatically  She does have intermittent nausea/ vomiting and episodes last for a day or two  She has bloating with eating  No heartburn or reflux  I would recommend   1  EGD for n/v  2   Colonoscopy for h/o ischemic colitis      ______________________________________________________________________    SUBJECTIVE:    She had a h/o ischemic colitis in 2017  Now better  She had been throwing up intermittently  She stopped red meat and has been doing better  She has no heartburn or reflux  No dysphagia  Her weight has been going down  She has no diarrhea or bleeding  She does have constipation   She was recently diagnosed with MS  She does have bloating  She is on metformin but has been on it for a long time  She reports having a colonoscopy in 2012 which was incomplete  Denies family history of colon cancer  REVIEW OF SYSTEMS IS OTHERWISE NEGATIVE  Historical Information   Past Medical History:   Diagnosis Date    Diabetes mellitus ([facility] )     External hemorrhoids     last assessed 16, resolved 16    Hernia, ventral     last assessed 12/14/15, resolved 16    Hypertension     Incarcerated incisional hernia     last assessed 12/21/15, resolved 16    Insomnia     last assessed 16, resolved 10/9/17    Lyme disease     Morbid obesity with BMI of 45 0-49 9, adult ([place] [address] ) 5/3/2017    MS (multiple sclerosis) ([place] [address] )     Stroke (cerebrum) ([place] [address] ) 2020    Type 2 diabetes mellitus with hyperglycemia, without long-term current use of insulin (HCC)      Past Surgical History:   Procedure Laterality Date    ABCESS DRAINAGE       SECTION      x3    COLONOSCOPY N/A 2017    Procedure: COLONOSCOPY with biopsy;  Surgeon: Jet Barton DO;  [facility]: [facility];   Service: Complete    HYSTERECTOMY      IR BIOPSY ABDOMEN  2021    IR LUMBAR PUNCTURE  3/13/2020    US GUIDED THYROID BIOPSY  2021     Social History   Social History     Substance and Sexual Activity   Alcohol Use Not Currently    Comment: Social     Social History     Substance and Sexual Activity   Drug Use Never     Social History     Tobacco Use   Smoking Status Never Smoker   Smokeless Tobacco Never Used     Family History   Problem Relation Age of Onset    Glaucoma Mother     Diabetes Father    [facility] Hypertension Father     Colon cancer Father     Alzheimer's disease Father     Breast cancer Maternal Grandmother     Breast cancer Maternal Aunt     Coronary artery disease Family     Diabetes Family     Hypertension Family     Cervical cancer Sister 61    Breast cancer Sister 50    Breast cancer Sister 45       Meds/Allergies       Current Outpatient Medications:     Alcohol Swabs PADS    aspirin 81 mg chewable tablet    atorvastatin (LIPITOR) 40 mg tablet    Blood Pressure KIT    glucose blood (ReliOn Prime Test) test strip    glucose blood test strip    hydrochlorothiazide (HYDRODIURIL) 25 mg tablet    Incontinence Supply Disposable (Prevail Women Underwear XL) MISC    Insulin Pen Needle (BD Pen Needle Izzy U/F) 32G X 4 MM MISC    Interferon Beta-1b (Betaseron) 0 3 MG KIT    liraglutide (VICTOZA) injection    lisinopril (ZESTRIL) 40 mg tablet    metFORMIN (GLUCOPHAGE-XR) 500 mg 24 hr tablet    methylPREDNISolone 4 MG tablet therapy pack    metoprolol succinate (TOPROL-XL) 50 mg 24 hr tablet    simethicone (MYLICON,GAS-X) 026 MG capsule    vitamin B-12 (VITAMIN B-12) 500 mcg tablet    acetaminophen (TYLENOL) 500 mg tablet    cholecalciferol (VITAMIN D3) 1,000 units tablet    cinacalcet (SENSIPAR) 60 MG tablet    diazepam (VALIUM) 5 mg tablet    Diclofenac Sodium (VOLTAREN) 1 %    DULoxetine (CYMBALTA) 30 mg delayed release capsule    Allergies   Allergen Reactions    Sorbitan Diarrhea, Vomiting and Abdominal Pain    Ambien [Zolpidem Tartrate]     Demerol [Meperidine]     Insulin     Insulin Glargine      Annotation - [id number]: memory loss    Latex     Oxycodone-Acetaminophen Vomiting    Zolpidem Hallucinations and Irritability           Objective     Blood pressure 151/62, pulse 62, temperature (!) 97 1 °F (36 2 °C), temperature source Tympanic, weight 132 kg (290 lb 9 6 oz), not currently breastfeeding  Body mass index is 48 36 kg/m²        PHYSICAL EXAM:      Physical Exam  Constitutional:       Appearance: Normal appearance  She is well-developed  HENT:      Head: Normocephalic and atraumatic  Eyes:      General: No scleral icterus  Conjunctiva/sclera: Conjunctivae normal       Pupils: Pupils are equal, round, and reactive to light  Cardiovascular:      Rate and Rhythm: Normal rate and regular rhythm  Heart sounds: Normal heart sounds  Pulmonary:      Effort: Pulmonary effort is normal  No respiratory distress  Breath sounds: Normal breath sounds  Abdominal:      General: Bowel sounds are normal  There is no distension  Palpations: Abdomen is soft  There is no mass  Tenderness: There is no abdominal tenderness  Hernia: No hernia is present  Musculoskeletal:         General: Normal range of motion  Cervical back: Normal range of motion  Lymphadenopathy:      Cervical: No cervical adenopathy  Skin:     General: Skin is warm  Neurological:      Mental Status: She is alert and oriented to person, place, and time  Psychiatric:         Behavior: Behavior normal          Thought Content: Thought content normal          Lab Results:   Appointment on 09/01/2022   Component Date Value    Cholesterol 09/01/2022 135     Triglycerides 09/01/2022 186 (A)    HDL, Direct 09/01/2022 42 (A)    LDL Calculated 09/01/2022 56     Non-HDL-Chol (CHOL-HDL) 09/01/2022 93          Radiology Results:   MRI cervical spine wo contrast    Result Date: 8/30/2022  Narrative: MRI CERVICAL SPINE WITHOUT CONTRAST INDICATION: G35: Multiple sclerosis  COMPARISON:  Same day MRI thoracic spine without contrast  MRI brain MS and MRI cervical spine without contrast March 5, 2021  TECHNIQUE:  Sagittal T1, sagittal T2, sagittal inversion recovery, axial T2, axial  2D merge IMAGE QUALITY:  Diagnostic  Mild motion artifact is present  FINDINGS: ALIGNMENT:  Normal alignment of the cervical spine  No compression fracture  No subluxation  No scoliosis   MARROW SIGNAL:  Type II Modic endplate change L6-C6  Otherwise, normal bone marrow signal  CERVICAL AND VISUALIZED THORACIC CORD:  Small multifocal short segment cord lesions most pronounced at C2, C4, and T1 spinal cord levels appears similar to prior exam given motion degradation  Please see same-day MRI thoracic spine without contrast for further evaluation of the thoracic cord lesions  PREVERTEBRAL AND PARASPINAL SOFT TISSUES:  Normal  VISUALIZED POSTERIOR FOSSA:  The visualized posterior fossa demonstrates no abnormal signal  CERVICAL DISC SPACES:  C1-C2: Normal  C2-C3:  Normal  C3-C4: Small central disc protrusion  Mild canal stenosis, unchanged  No significant foraminal narrowing and unchanged  C4-C5: Diffuse disc bulge, small disc protrusion in the central and left subarticular zone  Facet arthropathy  Mild canal stenosis, unchanged  Mild left foraminal narrowing, unchanged  C5-C6: Diffuse disc bulge, disc protrusion involving centrum bilateral subarticular zones  Uncovertebral hypertrophy and facet arthropathy  Mild canal stenosis, unchanged  Mild right and mild-to-moderate left foraminal narrowing, unchanged  C6-C7: Diffuse disc bulge, small disc protrusion in left paracentral zone  Uncovertebral hypertrophy and facet arthropathy  Mild canal stenosis, unchanged  Mild left foraminal narrowing, unchanged  C7-T1: Normal  UPPER THORACIC DISC SPACES:  Normal      Impression: Similar multifocal demyelinating lesions throughout the cervical and upper thoracic spinal cord given mild motion degradation  Similar multilevel degenerative changes of cervical spine with varying degrees of canal stenosis (multilevel mild - worse at C5-C6) and foraminal narrowing (mild-to-moderate left C5-C6), as detailed above  Please see same-day MRI thoracic spine without contrast for further evaluation   Workstation performed: [id number]     MRI thoracic spine wo contrast    Result Date: 8/30/2022  Narrative: MRI THORACIC SPINE WITHOUT CONTRAST INDICATION: G35: Multiple sclerosis  COMPARISON:  Same day MRI cervical spine without contrast   MRI brain MS and cervical spine without without contrast March 5, 2021  Ultrasound thyroid May 16, 2022  IR biopsy thyroid with [facility] August 25, 2021  TECHNIQUE:  Sagittal T1, sagittal T2, sagittal inversion recovery, axial T2,  axial 2D MERGE  IMAGE QUALITY: Diagnostic  FINDINGS: ALIGNMENT: Normal alignment of the thoracic spine  No compression fracture  No subluxation  No scoliosis  MARROW SIGNAL:  Small T7 intraosseous vertebral body hemangioma  Otherwise, normal bone marrow signal  THORACIC CORD: Multifocal short segment white matter lesions are scattered throughout the thoracic spinal cord, most pronounced at T1, T6, T9, and T11  PARAVERTEBRAL SOFT TISSUES:  Normal  THORACIC DEGENERATIVE CHANGE: Multilevel degenerative changes consists of osteophytes, degenerative endplate changes, and mild disc height loss        Impression: Multifocal demyelinating lesions throughout thoracic spinal cord given mild motion degradation  Mild multilevel degenerative changes of thoracic spine  No significant canal stenosis or foraminal narrowing   Workstation performed: [id number]

## 2022-09-01 NOTE — PATIENT INSTRUCTIONS
Simethicone (Gas-X) two to three times daily for bloating  Probiotic  ( John)  Endoscopy and colonoscopy         Scheduled date of 9/13/22 (as of today) 9/1/22  Physician performing Dr Sima Reynolds:  Location of procedure  [facility] End:  Bowel prep reviewed with patient: clenpiq  Instructions reviewed with patient by: MA  Clearances: diabetic

## 2022-09-06 ENCOUNTER — OFFICE VISIT (OUTPATIENT)
Dept: URGENT CARE | Facility: CLINIC | Age: 62
End: 2022-09-06
Payer: MEDICARE

## 2022-09-06 VITALS
HEART RATE: 76 BPM | RESPIRATION RATE: 20 BRPM | OXYGEN SATURATION: 98 % | TEMPERATURE: 96.2 F | SYSTOLIC BLOOD PRESSURE: 146 MMHG | DIASTOLIC BLOOD PRESSURE: 88 MMHG

## 2022-09-06 DIAGNOSIS — S00.83XA TRAUMATIC HEMATOMA OF FOREHEAD, INITIAL ENCOUNTER: Primary | ICD-10-CM
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
## 2022-09-06 PROCEDURE — 99213 OFFICE O/P EST LOW 20 MIN: CPT | Performed by: PHYSICIAN ASSISTANT

## 2022-09-06 PROCEDURE — G0463 HOSPITAL OUTPT CLINIC VISIT: HCPCS | Performed by: PHYSICIAN ASSISTANT
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
## 2022-09-07 ENCOUNTER — HOSPITAL ENCOUNTER (OUTPATIENT)
Dept: MAMMOGRAPHY | Facility: MEDICAL CENTER | Age: 62
Discharge: HOME | End: 2022-09-07
Payer: MEDICARE

## 2022-09-07 ENCOUNTER — HOSPITAL ENCOUNTER (EMERGENCY)
Facility: HOSPITAL | Age: 62
Discharge: HOME | End: 2022-09-07
Attending: EMERGENCY MEDICINE
Payer: MEDICARE

## 2022-09-07 ENCOUNTER — APPOINTMENT (EMERGENCY)
Dept: CT IMAGING | Facility: HOSPITAL | Age: 62
End: 2022-09-07
Payer: MEDICARE

## 2022-09-07 VITALS
DIASTOLIC BLOOD PRESSURE: 102 MMHG | TEMPERATURE: 98 F | SYSTOLIC BLOOD PRESSURE: 215 MMHG | OXYGEN SATURATION: 100 % | HEART RATE: 66 BPM | RESPIRATION RATE: 20 BRPM

## 2022-09-07 VITALS — BODY MASS INDEX: 48.32 KG/M2 | WEIGHT: 290 LBS | HEIGHT: 65 IN

## 2022-09-07 DIAGNOSIS — S06.0XAA CONCUSSION: ICD-10-CM

## 2022-09-07 DIAGNOSIS — S09.90XA CLOSED HEAD INJURY, INITIAL ENCOUNTER: Primary | ICD-10-CM

## 2022-09-07 DIAGNOSIS — S00.12XA BLACK EYE OF LEFT SIDE, INITIAL ENCOUNTER: ICD-10-CM

## 2022-09-07 DIAGNOSIS — Z12.31 ENCOUNTER FOR SCREENING MAMMOGRAM FOR MALIGNANT NEOPLASM OF BREAST: ICD-10-CM

## 2022-09-07 DIAGNOSIS — E83.52 HYPERCALCEMIA: ICD-10-CM

## 2022-09-07 PROCEDURE — 99284 EMERGENCY DEPT VISIT MOD MDM: CPT | Performed by: EMERGENCY MEDICINE

## 2022-09-07 PROCEDURE — 96374 THER/PROPH/DIAG INJ IV PUSH: CPT

## 2022-09-07 PROCEDURE — 77067 SCR MAMMO BI INCL CAD: CPT

## 2022-09-07 PROCEDURE — 77063 BREAST TOMOSYNTHESIS BI: CPT

## 2022-09-07 PROCEDURE — 70486 CT MAXILLOFACIAL W/O DYE: CPT

## 2022-09-07 PROCEDURE — G1004 CDSM NDSC: HCPCS

## 2022-09-07 PROCEDURE — 70450 CT HEAD/BRAIN W/O DYE: CPT

## 2022-09-07 PROCEDURE — 99283 EMERGENCY DEPT VISIT LOW MDM: CPT

## 2022-09-07 RX ORDER — METOCLOPRAMIDE 10 MG/1
10 TABLET ORAL EVERY 6 HOURS PRN
Qty: 8 TABLET | Refills: 0 | Status: SHIPPED | OUTPATIENT
Start: 2022-09-07 | End: 2022-10-20

## 2022-09-07 RX ORDER — CINACALCET 60 MG/1
TABLET, FILM COATED ORAL
Qty: 30 TABLET | Refills: 0 | Status: SHIPPED | OUTPATIENT
Start: 2022-09-07

## 2022-09-07 RX ORDER — METOCLOPRAMIDE HYDROCHLORIDE 5 MG/ML
10 INJECTION INTRAMUSCULAR; INTRAVENOUS ONCE
Status: COMPLETED | OUTPATIENT
Start: 2022-09-07 | End: 2022-09-07

## 2022-09-07 RX ADMIN — METOCLOPRAMIDE 10 MG: 5 INJECTION, SOLUTION INTRAMUSCULAR; INTRAVENOUS at 20:15

## 2022-09-08 ENCOUNTER — TELEPHONE (OUTPATIENT)
Dept: ENDOCRINOLOGY | Facility: CLINIC | Age: 62
End: 2022-09-08
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
## 2022-09-08 NOTE — TELEPHONE ENCOUNTER
Lety More ([place]: [id number]) - [id number]    Need help? Call us at [phone]    Status   Sent to [employer]   The plan will fax you a determination, typically within 1 to 5 business days  How do I follow up?     Drug    Cinacalcet HCl 60MG tablets   Form    [address]          [phone]  phone      [phone]  fax

## 2022-09-11 RX ORDER — SODIUM CHLORIDE 9 MG/ML
125 INJECTION, SOLUTION INTRAVENOUS CONTINUOUS
Status: CANCELLED | OUTPATIENT
Start: 2022-09-11

## 2022-09-12 ENCOUNTER — TELEPHONE (OUTPATIENT)
Dept: GASTROENTEROLOGY | Facility: MEDICAL CENTER | Age: 62
End: 2022-09-12

## 2022-09-12 ENCOUNTER — TELEPHONE (OUTPATIENT)
Dept: FAMILY MEDICINE CLINIC | Facility: CLINIC | Age: 62
End: 2022-09-12

## 2022-09-12 NOTE — TELEPHONE ENCOUNTER
Juventino canela of [facility] she asked if you could fill out what you can to the best of your knowledge along with med list and diagnosis and she'll fax form to neurology also so I put form back on your desk in folder

## 2022-09-12 NOTE — TELEPHONE ENCOUNTER
Pt called and states she was unable to obtain the prep  She for now just wishes to cancel   She will call back to reschedule when she has her sons [email or web address]

## 2022-09-12 NOTE — TELEPHONE ENCOUNTER
[address] [facility] left msg on [place] for [place]aque[place] about paperwork we received about pt Dr Juani Hood feels neurology would be better filling form out

## 2022-09-14 NOTE — TELEPHONE ENCOUNTER
Form was completed and left in my blue folder at my desk  Please attach medication list as well as patient's problem list   Please fax once you complete this    Thank you

## 2022-09-16 ENCOUNTER — OFFICE VISIT (OUTPATIENT)
Dept: OBGYN CLINIC | Facility: MEDICAL CENTER | Age: 62
End: 2022-09-16
Payer: MEDICARE

## 2022-09-16 VITALS
SYSTOLIC BLOOD PRESSURE: 140 MMHG | HEIGHT: 65 IN | WEIGHT: 286 LBS | DIASTOLIC BLOOD PRESSURE: 78 MMHG | BODY MASS INDEX: 47.65 KG/M2

## 2022-09-16 DIAGNOSIS — Z01.419 ENCOUNTER FOR GYNECOLOGICAL EXAMINATION: Primary | ICD-10-CM

## 2022-09-16 DIAGNOSIS — Z12.31 ENCOUNTER FOR SCREENING MAMMOGRAM FOR MALIGNANT NEOPLASM OF BREAST: ICD-10-CM

## 2022-09-16 PROCEDURE — G0101 CA SCREEN;PELVIC/BREAST EXAM: HCPCS | Performed by: STUDENT IN AN ORGANIZED HEALTH CARE EDUCATION/TRAINING PROGRAM

## 2022-09-16 NOTE — PROGRESS NOTES
[facility] [address] [place] [address]    ASSESSMENT/PLAN: Teagan Herrmann is a 65 y o  [id number] who presents for annual gynecologic exam     Encounter for routine gynecologic examination  - Routine well woman exam completed today  - Cervical Cancer Screening: Current ASCCP Guidelines reviewed  Reviewed history of total hysterectomy for benign indication  No history of dysplasia  Cervical cancer screening complete   - STI screening offered including HIV: Declines  - Breast Cancer Screening: Last Mammogram 09/07/2022, ordered  - Colorectal cancer screening was ordered  Additional problems addressed at this visit:  1  Encounter for gynecological examination    2  Encounter for screening mammogram for malignant neoplasm of breast  -     Mammo screening bilateral w 3d & cad; Future          CC:  Annual Gynecologic Examination    HPI: Teagan Herrmann is a 65 y o  [id number] who presents for annual gynecologic examination  She presents for annual GYN exam  She is status post total abdominal hysterectomy for abnormal uterine bleeding many years ago  She reports no breast concerns  Her last mammogram was 2022  She is postmenopausal and reports  no postmenopausal bleeding  She has no vaginal discharge, vulvar or vaginal lesions, pelvic pain  She has no sexual health concerns and is currently sexually active  She has no symptoms of pelvic organ prolapse, urinary, or fecal incontinence  She denies intimate partner violence  The following portions of the patient's history were reviewed and updated as appropriate: allergies, current medications, past family history, past medical history, obstetric history, gynecologic history, past social history, past surgical history and problem list     Review of Systems   Constitutional: Negative for chills and fever  Respiratory: Negative for cough and shortness of breath  Cardiovascular: Negative for chest pain and leg swelling  Gastrointestinal: Negative for abdominal pain, nausea and vomiting  Genitourinary: Negative for dysuria, frequency and urgency  Neurological: Negative for dizziness, light-headedness and headaches  Objective:  /78 (BP Location: Left arm, Patient Position: Sitting, Cuff Size: Adult)   Ht 5' 5" (1 651 m)   Wt 130 kg (286 lb)   LMP  (LMP Unknown)   BMI 47 59 kg/m²    Physical Exam  Constitutional:       Appearance: Normal appearance  HENT:      Head: Normocephalic and atraumatic  Mouth/Throat:      Mouth: Mucous membranes are moist       Pharynx: Oropharynx is clear  No oropharyngeal exudate  Cardiovascular:      Rate and Rhythm: Normal rate and regular rhythm  Pulses: Normal pulses  Heart sounds: Normal heart sounds  No murmur heard  No friction rub  No gallop  Pulmonary:      Effort: Pulmonary effort is normal  No respiratory distress  Breath sounds: Normal breath sounds  No wheezing, rhonchi or rales  Abdominal:      General: Abdomen is flat  There is no distension  Palpations: Abdomen is soft  There is no mass  Tenderness: There is no abdominal tenderness  Hernia: No hernia is present  Genitourinary:     Comments: Refused breast and pelvic examination  Musculoskeletal:         General: No deformity or signs of injury  Normal range of motion  Right lower leg: No edema  Left lower leg: No edema  Skin:     General: Skin is warm and dry  Neurological:      General: No focal deficit present  Mental Status: She is alert and oriented to person, place, and time     Psychiatric:         Mood and Affect: Mood normal          Behavior: Behavior normal              Wilder Gan MD  [facility]  09/16/22 9:08 AM

## 2022-09-30 DIAGNOSIS — E11.65 TYPE 2 DIABETES MELLITUS WITH HYPERGLYCEMIA, WITHOUT LONG-TERM CURRENT USE OF INSULIN (HCC): ICD-10-CM

## 2022-10-03 RX ORDER — METFORMIN HYDROCHLORIDE 500 MG/1
TABLET, EXTENDED RELEASE ORAL
Qty: 360 TABLET | Refills: 0 | Status: SHIPPED | OUTPATIENT
Start: 2022-10-03

## 2022-10-13 ENCOUNTER — HOSPITAL ENCOUNTER (EMERGENCY)
Facility: HOSPITAL | Age: 62
Discharge: HOME | End: 2022-10-14
Attending: EMERGENCY MEDICINE
Payer: COMMERCIAL

## 2022-10-13 ENCOUNTER — NURSE TRIAGE (OUTPATIENT)
Dept: OTHER | Facility: OTHER | Age: 62
End: 2022-10-13

## 2022-10-13 DIAGNOSIS — M25.551 RIGHT HIP PAIN: Primary | ICD-10-CM

## 2022-10-13 LAB — GLUCOSE SERPL-MCNC: 188 MG/DL (ref 65–140)

## 2022-10-13 PROCEDURE — 99284 EMERGENCY DEPT VISIT MOD MDM: CPT | Performed by: EMERGENCY MEDICINE

## 2022-10-13 PROCEDURE — 99284 EMERGENCY DEPT VISIT MOD MDM: CPT

## 2022-10-13 PROCEDURE — 96372 THER/PROPH/DIAG INJ SC/IM: CPT

## 2022-10-13 PROCEDURE — 82948 REAGENT STRIP/BLOOD GLUCOSE: CPT

## 2022-10-13 RX ORDER — METHOCARBAMOL 500 MG/1
500 TABLET, FILM COATED ORAL 2 TIMES DAILY
Qty: 20 TABLET | Refills: 0 | Status: SHIPPED | OUTPATIENT
Start: 2022-10-13

## 2022-10-13 RX ORDER — KETOROLAC TROMETHAMINE 30 MG/ML
30 INJECTION, SOLUTION INTRAMUSCULAR; INTRAVENOUS ONCE
Status: COMPLETED | OUTPATIENT
Start: 2022-10-13 | End: 2022-10-13

## 2022-10-13 RX ORDER — LIDOCAINE 50 MG/G
1 PATCH TOPICAL ONCE
Status: DISCONTINUED | OUTPATIENT
Start: 2022-10-13 | End: 2022-10-14 | Stop reason: HOSPADM

## 2022-10-13 RX ORDER — FENTANYL CITRATE 50 UG/ML
1 INJECTION, SOLUTION INTRAMUSCULAR; INTRAVENOUS ONCE
Status: COMPLETED | OUTPATIENT
Start: 2022-10-13 | End: 2022-10-13

## 2022-10-13 RX ORDER — METHOCARBAMOL 500 MG/1
500 TABLET, FILM COATED ORAL ONCE
Status: COMPLETED | OUTPATIENT
Start: 2022-10-13 | End: 2022-10-13

## 2022-10-13 RX ORDER — NAPROXEN 500 MG/1
500 TABLET ORAL 2 TIMES DAILY WITH MEALS
Qty: 30 TABLET | Refills: 0 | Status: SHIPPED | OUTPATIENT
Start: 2022-10-13

## 2022-10-13 RX ADMIN — METHOCARBAMOL 500 MG: 500 TABLET ORAL at 22:40

## 2022-10-13 RX ADMIN — KETOROLAC TROMETHAMINE 30 MG: 30 INJECTION, SOLUTION INTRAMUSCULAR; INTRAVENOUS at 22:40

## 2022-10-13 RX ADMIN — LIDOCAINE 1 PATCH: 50 PATCH TOPICAL at 22:40

## 2022-10-14 VITALS
DIASTOLIC BLOOD PRESSURE: 74 MMHG | RESPIRATION RATE: 20 BRPM | OXYGEN SATURATION: 95 % | SYSTOLIC BLOOD PRESSURE: 156 MMHG | TEMPERATURE: 99.5 F | HEART RATE: 90 BPM

## 2022-10-14 NOTE — ED PROVIDER NOTES
Pt Name: Rick Mcdaniels  MRN: [id number]  [place] 1960  Age/Sex: 65 y o  female  Date of evaluation: 10/13/2022  PCP: Naomi Bolton DO    CHIEF COMPLAINT    Chief Complaint   Patient presents with   • Hip Pain     Pt states she has chronic hip pain that she has been treated for previously  Pt states that the hip pain was severe and she was not able to move it  Pt states she has not had any recent falls  HPI    Ry Khan presents to the Emergency Department complaining of right lateral hip pain  She has been diagnosed with trochanteric bursitis and it seems to have gotten worse to the point that she felt that she could not get up and walk around today  She has had no falls/ injury/trauma to the area  She has no systemic symptoms  She has been using voltaren gel and it is not helping  HPI      Past Medical and Surgical History    Past Medical History:   Diagnosis Date   • Diabetes mellitus ([facility] )    • External hemorrhoids     last assessed 16, resolved 16   • Hernia, ventral     last assessed 12/14/15, resolved 16   • Hypertension    • Incarcerated incisional hernia     last assessed 12/21/15, resolved 16   • Insomnia     last assessed 16, resolved 10/9/17   • Lyme disease    • Morbid obesity with BMI of 45 0-49 9, adult ([facility] ) 5/3/2017   • MS (multiple sclerosis) ([facility] )    • Stroke (cerebrum) ([facility] ) 2020   • Type 2 diabetes mellitus with hyperglycemia, without long-term current use of insulin ([facility] )        Past Surgical History:   Procedure Laterality Date   • ABCESS DRAINAGE     •  SECTION      x3   • COLONOSCOPY N/A 2017    Procedure: COLONOSCOPY with biopsy;  Surgeon: Hiram Bae DO;  Location: [facility];   Service: Complete   • HYSTERECTOMY     • IR BIOPSY ABDOMEN  2021   • IR LUMBAR PUNCTURE  3/13/2020   • US GUIDED THYROID BIOPSY  2021       Family History   Problem Relation Age of Onset   • Glaucoma Mother    • Diabetes Father    • Hypertension Father    • Colon cancer Father    • Alzheimer's disease Father    • Cervical cancer Sister 61   • Breast cancer Sister 50   • Breast cancer Sister 45   • Breast cancer Maternal Grandmother    • Breast cancer Maternal Aunt    • Breast cancer Maternal Aunt    • Breast cancer Maternal Aunt    • Coronary artery disease Family    • Diabetes Family    • Hypertension Family        Social History     Tobacco Use   • Smoking status: Never Smoker   • Smokeless tobacco: Never Used   Vaping Use   • Vaping Use: Never used   Substance Use Topics   • Alcohol use: Not Currently     Comment: Social   • Drug use: Yes     Types: Marijuana     Comment: medical marijuana           Allergies    Allergies   Allergen Reactions   • Sorbitan Diarrhea, Vomiting and Abdominal Pain   • Ambien [Zolpidem Tartrate]    • Demerol [Meperidine]    • Insulin    • Insulin Glargine      Annotation - [id number]: memory loss   • Latex    • Oxycodone Hives and Rash   • Oxycodone-Acetaminophen Vomiting   • Zolpidem Hallucinations and Irritability       Home Medications    Prior to Admission medications    Medication Sig Start Date End Date Taking?  Authorizing Provider   acetaminophen (TYLENOL) 500 mg tablet Take 2 tablets (1,000 mg total) by mouth every 6 (six) hours as needed for moderate pain  Patient not taking: No sig reported 6/10/20   Belkys Andrade PA-C   Alcohol Swabs PADS by Does not apply route 4 (four) times a day *Latex Free* 5/21/20   DEANA Rosas   aspirin 81 mg chewable tablet Chew 1 tablet (81 mg total) daily 3/28/22   Erika Cuenca DO   atorvastatin (LIPITOR) 40 mg tablet Take 1 tablet (40 mg total) by mouth every evening 5/13/22   Erika Cuenca DO   Blood Pressure KIT by Does not apply route daily 5/21/20   DEANA Rosas   cholecalciferol (VITAMIN D3) 1,000 units tablet Take 1,000 Units by mouth daily  Patient not taking: No sig reported    Historical Provider, MD   cinacalcet (SENSIPAR) 60 MG tablet Take 1 tablet by mouth once daily 9/7/22   Maegan Oakley MD   diazepam (VALIUM) 5 mg tablet Take 1 tablet 30 minutes prior to MRI  May take 1 tablet just immediately before MRI if needed  Patient not taking: No sig reported 7/11/22   Otto Jordan PA-C   Diclofenac Sodium (VOLTAREN) 1 % Apply 2 g topically 4 (four) times a day  Patient not taking: No sig reported 6/29/22   DEANA Armando   DULoxetine (CYMBALTA) 30 mg delayed release capsule Take 1 capsule (30 mg total) by mouth in the morning    Patient not taking: No sig reported 5/13/22   Erika Cuenca DO   glucose blood (ReliOn Prime Test) test strip Use 1 each 2 (two) times a day 3/18/22   Maegan Oakley MD   glucose blood test strip Check blood glucose levels three times per day before meals 3/23/20   DEANA Forbes   hydrochlorothiazide (HYDRODIURIL) 25 mg tablet Take 1 tablet (25 mg total) by mouth daily 8/31/22   Erika Cuenca DO   Incontinence Supply Disposable (Prevail Women Underwear XL) MISC Use every 6 (six) hours as needed (incontinence) 1/13/22   Erika Cuenca DO   Insulin Pen Needle (BD Pen Needle Izzy U/F) 32G X 4 MM MISC Use in the morning 3/28/22   Maegan Oakley MD   Interferon Beta-1b (Betaseron) 0 3 MG KIT Inject 1 ml (0 25 mg) subcutaneously every other day 11/2/21   Otto Jordan PA-C   liraglutide (VICTOZA) injection Inject 0 2 mL (1 2 mg total) under the skin daily 7/14/22   DEANA Roach   lisinopril (ZESTRIL) 40 mg tablet Take 1 tablet by mouth once daily 5/23/22   Erika Cuenca DO   metFORMIN (GLUCOPHAGE-XR) 500 mg 24 hr tablet TAKE 2 TABLETS BY MOUTH TWICE DAILY WITH MEALS 10/3/22   Maegan Oakley MD   methylPREDNISolone 4 MG tablet therapy pack Use as directed on package 6/29/22   DEANA Armando   metoclopramide (REGLAN) 10 mg tablet Take 1 tablet (10 mg total) by mouth every 6 (six) hours as needed (headache) 9/7/22   Salomón Huertas MD   metoprolol succinate (TOPROL-XL) 50 mg 24 hr tablet Take 1 tablet by mouth once daily 5/23/22   Ihab Bang DO   vitamin B-12 (VITAMIN B-12) 500 mcg tablet Take 2 tablets (1,000 mcg total) by mouth daily 6/30/21   DEANA Lozada           Review of Systems    Review of Systems   Constitutional: Negative for chills and fever  HENT: Negative for ear pain and sore throat  Eyes: Negative for pain and visual disturbance  Respiratory: Negative for cough and shortness of breath  Cardiovascular: Negative for chest pain and palpitations  Gastrointestinal: Negative for abdominal pain and vomiting  Genitourinary: Negative for dysuria and hematuria  Musculoskeletal: Positive for gait problem  Negative for arthralgias and back pain  Skin: Negative for color change and rash  Neurological: Negative for seizures and syncope  All other systems reviewed and are negative  Physical Exam      ED Triage Vitals   Temperature Pulse Respirations Blood Pressure SpO2   10/13/22 2132 10/13/22 2132 10/13/22 2132 10/13/22 2132 10/13/22 2132   99 5 °F (37 5 °C) (!) 108 20 170/83 94 %      Temp Source Heart Rate Source Patient Position - Orthostatic VS BP Location FiO2 (%)   10/13/22 2132 10/13/22 2132 10/13/22 2132 10/13/22 2132 --   Oral Monitor Lying Right arm       Pain Score       10/13/22 2240       10 - Worst Possible Pain               Physical Exam  Vitals and nursing note reviewed  Constitutional:       General: She is not in acute distress  Appearance: She is well-developed  HENT:      Head: Normocephalic and atraumatic  Eyes:      Conjunctiva/sclera: Conjunctivae normal    Cardiovascular:      Rate and Rhythm: Normal rate and regular rhythm  Heart sounds: No murmur heard  Pulmonary:      Effort: Pulmonary effort is normal  No respiratory distress  Breath sounds: Normal breath sounds  Abdominal:      Palpations: Abdomen is soft  Tenderness: There is no abdominal tenderness  Musculoskeletal:      Cervical back: Neck supple  Legs:    Skin:     General: Skin is warm and dry  Neurological:      Mental Status: She is alert  Assessment and Plan    Jody Morales is a 65 y o  female who presents with right lateral hip pain  Physical examination remarkable for same  Differential diagnosis (not completely inclusive) includes bursitis vs other  Plan will be to treat symptomatically  MDM    Diagnostic Results    EKG reviewed and interpreted independently  Labs:    Results for orders placed or performed during the hospital encounter of 10/13/22   Fingerstick Glucose (POCT)   Result Value Ref Range    POC Glucose 188 (H) 65 - 140 mg/dl       All labs reviewed and utilized in the medical decision making process    Radiology:    No orders to display       All radiology studies independently viewed by me and interpreted by the radiologist     Procedure    Procedures      ED Course of Care and Re-Assessments    Patient was feeling better after meds and was able to ambulate without a problem  Medications   lidocaine (LIDODERM) 5 % patch 1 patch (1 patch Topical Medication Applied 10/13/22 2240)   fentanyl citrate (PF) (FOR EMS ONLY) 100 mcg/2 mL injection 100 mcg (0 mcg Does not apply Given to EMS 10/13/22 2132)   ketorolac (TORADOL) injection 30 mg (30 mg Intramuscular Given 10/13/22 2240)   methocarbamol (ROBAXIN) tablet 500 mg (500 mg Oral Given 10/13/22 2240)           FINAL IMPRESSION    Final diagnoses:   Right hip pain         DISPOSITION/PLAN    Time reflects when diagnosis was documented in both MDM as applicable and the Disposition within this note     Time User Action Codes Description Comment    10/13/2022 11:53 PM Savita Lan Add [M25 551] Right hip pain       ED Disposition     ED Disposition   Discharge    Condition   Stable    Date/Time   u Oct 13, 2022 11:53 PM    Comment   Jody Morales discharge to home/self care                 Follow-up Information     Follow up With Specialties Details Why Contact Info Additional Information    Lux Stanton DO Family Medicine Schedule an appointment as soon as possible for a visit   [address]  [address]        [address] Orthopedic Surgery   [address]  [facility] [address] [id number]  [address] [place] [address], [address], [address] [place], [place], [address] [phone]            PATIENT REFERRED TO:    Lux Stanton DO  [facility]  [address] [facility]  [phone]    Schedule an appointment as soon as possible for a visit       [address]  [address]  [facility] [employer] [id number]  [place] [address]:    Discharge Medication List as of 10/13/2022 11:56 PM      START taking these medications    Details   methocarbamol (ROBAXIN) 500 mg tablet Take 1 tablet (500 mg total) by mouth 2 (two) times a day, Starting Thu 10/13/2022, Normal      naproxen (Naprosyn) 500 mg tablet Take 1 tablet (500 mg total) by mouth 2 (two) times a day with meals, Starting Thu 10/13/2022, Normal         CONTINUE these medications which have NOT CHANGED    Details   aspirin 81 mg chewable tablet Chew 1 tablet (81 mg total) daily, Starting Mon 3/28/2022, Normal      atorvastatin (LIPITOR) 40 mg tablet Take 1 tablet (40 mg total) by mouth every evening, Starting Fri 5/13/2022, Normal      Blood Pressure KIT by Does not apply route daily, Starting Thu 5/21/2020, Normal      cinacalcet (SENSIPAR) 60 MG tablet Take 1 tablet by mouth once daily, Normal      DULoxetine (CYMBALTA) 30 mg delayed release capsule Take 1 capsule (30 mg total) by mouth in the morning , Starting Fri 5/13/2022, Normal      !! glucose blood (ReliOn Prime Test) test strip Use 1 each 2 (two) times a day, Starting Fri 3/18/2022, Normal      !! glucose blood test strip Check blood glucose levels three times per day before meals, Normal      hydrochlorothiazide (HYDRODIURIL) 25 mg tablet Take 1 tablet (25 mg total) by mouth daily, Starting Wed 8/31/2022, Normal      Incontinence Supply Disposable (Prevail Women Underwear XL) MISC Use every 6 (six) hours as needed (incontinence), Starting Thu 1/13/2022, Print      Insulin Pen Needle (BD Pen Needle Izzy U/F) 32G X 4 MM MISC Use in the morning, Starting Mon 3/28/2022, Normal      Interferon Beta-1b (Betaseron) 0 3 MG KIT Inject 1 ml (0 25 mg) subcutaneously every other day, Normal      liraglutide (VICTOZA) injection Inject 0 2 mL (1 2 mg total) under the skin daily, Starting Thu 7/14/2022, Normal      lisinopril (ZESTRIL) 40 mg tablet Take 1 tablet by mouth once daily, Normal      metFORMIN (GLUCOPHAGE-XR) 500 mg 24 hr tablet TAKE 2 TABLETS BY MOUTH TWICE DAILY WITH MEALS, Normal      metoprolol succinate (TOPROL-XL) 50 mg 24 hr tablet Take 1 tablet by mouth once daily, Normal      vitamin B-12 (VITAMIN B-12) 500 mcg tablet Take 2 tablets (1,000 mcg total) by mouth daily, Starting Wed 6/30/2021, Normal      acetaminophen (TYLENOL) 500 mg tablet Take 2 tablets (1,000 mg total) by mouth every 6 (six) hours as needed for moderate pain, Starting Wed 6/10/2020, Normal      Alcohol Swabs PADS by Does not apply route 4 (four) times a day *Latex Free*, Starting Thu 5/21/2020, Normal      cholecalciferol (VITAMIN D3) 1,000 units tablet Take 1,000 Units by mouth daily, Historical Med      diazepam (VALIUM) 5 mg tablet Take 1 tablet 30 minutes prior to MRI    May take 1 tablet just immediately before MRI if needed, Normal      Diclofenac Sodium (VOLTAREN) 1 % Apply 2 g topically 4 (four) times a day, Starting Wed 6/29/2022, Normal      methylPREDNISolone 4 MG tablet therapy pack Use as directed on package, Normal      metoclopramide (REGLAN) 10 mg tablet Take 1 tablet (10 mg total) by mouth every 6 (six) hours as needed (headache), Starting Wed 9/7/2022, Normal !! - Potential duplicate medications found  Please discuss with provider  No discharge procedures on file           DO Sasha Jensen DO  10/14/22 0007

## 2022-10-14 NOTE — TELEPHONE ENCOUNTER
Patient stated she was unable to stand; had been in severe pain 10/10 when trying to stand up  She said that her right leg is also numb  Patient stated that she had soiled herself since she cannot walk  Advised patient to go to the [facility] for evaluation; patient unable to walk so advised to call EMS  Called EMS for patient on her behalf  Spoke with patient; she said she connected with EMS and they were on route to her home  Advised patient message would be sent to PCP office for follow up tomorrow

## 2022-10-14 NOTE — TELEPHONE ENCOUNTER
Regarding: unable to walk, cannot get out of chair / pt is scared  ----- Message from Hansa Rai sent at 10/13/2022  8:04 PM EDT -----  "About an hour ago, I noticed I couldn't get up ,I cannot walk, I have bursitis in my right hip "

## 2022-10-14 NOTE — TELEPHONE ENCOUNTER
Reason for Disposition  • Can't stand (bear weight) or walk    Answer Assessment - Initial Assessment Questions  1  LOCATION and RADIATION: "Where is the pain located?"       Patient tried to go to the bathroom and was unable to get to the bathroom due to the pain  2  QUALITY: "What does the pain feel like?"  (e g , sharp, dull, aching, burning)      Has hx of bursitis, pain is sharp when she trys to get up  3  SEVERITY: "How bad is the pain?" "What does it keep you from doing?"   (Scale 1-10; or mild, moderate, severe)    -  MILD (1-3): doesn't interfere with normal activities     -  MODERATE (4-7): interferes with normal activities (e g , work or school) or awakens from sleep, limping     -  SEVERE (8-10): excruciating pain, unable to do any normal activities, unable to walk      10/10 when trying to get up  4  ONSET: "When did the pain start?" "Does it come and go, or is it there all the time?"      Started about an hour ago  5  WORK OR EXERCISE: "Has there been any recent work or exercise that involved this part of the body?"       *No Answer*  6  CAUSE: "What do you think is causing the hip pain?"       *No Answer*  7   AGGRAVATING FACTORS: "What makes the hip pain worse?" (e g , walking, climbing stairs, running)      *No Answer*  8  OTHER SYMPTOMS: "Do you have any other symptoms?" (e g , back pain, pain shooting down leg,  fever, rash)      Feeling numbness in right leg    Protocols used: HIP PAIN-ADULT-

## 2022-10-20 ENCOUNTER — OFFICE VISIT (OUTPATIENT)
Dept: ENDOCRINOLOGY | Facility: CLINIC | Age: 62
End: 2022-10-20
Payer: COMMERCIAL

## 2022-10-20 VITALS
BODY MASS INDEX: 48.82 KG/M2 | OXYGEN SATURATION: 98 % | DIASTOLIC BLOOD PRESSURE: 80 MMHG | HEART RATE: 82 BPM | WEIGHT: 293 LBS | SYSTOLIC BLOOD PRESSURE: 142 MMHG | HEIGHT: 65 IN

## 2022-10-20 DIAGNOSIS — E04.1 THYROID NODULE: ICD-10-CM

## 2022-10-20 DIAGNOSIS — E21.3 HYPERPARATHYROIDISM (HCC): ICD-10-CM

## 2022-10-20 DIAGNOSIS — E11.65 TYPE 2 DIABETES MELLITUS WITH HYPERGLYCEMIA, WITHOUT LONG-TERM CURRENT USE OF INSULIN (HCC): Primary | ICD-10-CM

## 2022-10-20 DIAGNOSIS — E55.9 VITAMIN D DEFICIENCY: ICD-10-CM

## 2022-10-20 PROCEDURE — 99214 OFFICE O/P EST MOD 30 MIN: CPT | Performed by: INTERNAL MEDICINE

## 2022-10-20 NOTE — PROGRESS NOTES
Teressa Grady 65 y o  female MRN: [id number]    Encounter: [id number]      Assessment/Plan     Problem List Items Addressed This Visit        Endocrine    Hyperparathyroidism [facility])     Has declined surgical intervention in the past-continue Sensipar         Relevant Orders    Comprehensive metabolic panel Lab Collect    PTH, intact Lab Collect Lab Collect    Phosphorus Lab Collect    Thyroid nodule     Stable-will continue to monitor         Relevant Orders    TSH, 3rd generation Lab Collect    T4, free Lab Collect    Type 2 diabetes mellitus with hyperglycemia, without long-term current use of insulin ([facility]) - Primary       Lab Results   Component Value Date    HGBA1C 8 2 (H) 06/29/2022   Diabetes is uncontrolled-she has not been monitoring fingersticks    Has not been able to tolerate GLP 1 agonist due to GI side effects  However at this point in time does not want to start any new medications until her pain is addressed         Relevant Orders    Glucometer    Glucometer test strips    Comprehensive metabolic panel Lab Collect    HEMOGLOBIN A1C W/ EAG ESTIMATION Lab Collect       Other    Vitamin D deficiency    Relevant Orders    Vitamin D 25 hydroxy Lab Collect        CC: Diabetes    History of Present Illness     HPI:  45-year-old female with type 2 diabetes, primary hyperparathyroidism, thyroid nodules and vitamin-D deficiency seen in follow-up     C/o hip pain     Stopped victoza due to nausea   Has not been checking f s as meter not working - before that was checking once a day at different times and most sugars 180-200s  No polyuria , polydipsia , no blurry vision ,   + numbness and tingling in feet   Weight gain     States that she did not want to come to the visit but her son insisted that she come    Review of Systems    Historical Information   Past Medical History:   Diagnosis Date   • Diabetes mellitus ([facility] )    • External hemorrhoids     last assessed 4/7/16, resolved 7/21/16   • Hernia, ventral last assessed 12/14/15, resolved 16   • Hypertension    • Incarcerated incisional hernia     last assessed 12/21/15, resolved 16   • Insomnia     last assessed 16, resolved 10/9/17   • Lyme disease    • Morbid obesity with BMI of 45 0-49 9, adult ([facility] ) 5/3/2017   • MS (multiple sclerosis) ([place] [address] )    • Stroke (cerebrum) ([place] [address] ) 2020   • Type 2 diabetes mellitus with hyperglycemia, without long-term current use of insulin ([place] [address] )      Past Surgical History:   Procedure Laterality Date   • ABCESS DRAINAGE     •  SECTION      x3   • COLONOSCOPY N/A 2017    Procedure: COLONOSCOPY with biopsy;  Surgeon: María Yen DO;  Location: [facility];   Service: Complete   • HYSTERECTOMY     • IR BIOPSY ABDOMEN  2021   • IR LUMBAR PUNCTURE  3/13/2020   • US GUIDED THYROID BIOPSY  2021     Social History   Social History     Substance and Sexual Activity   Alcohol Use Not Currently    Comment: Social     Social History     Substance and Sexual Activity   Drug Use Yes   • Types: Marijuana    Comment: medical marijuana     Social History     Tobacco Use   Smoking Status Never Smoker   Smokeless Tobacco Never Used     Family History:   Family History   Problem Relation Age of Onset   • Glaucoma Mother    • Diabetes Father    • Hypertension Father    • Colon cancer Father    • Alzheimer's disease Father    • Cervical cancer Sister 61   • Breast cancer Sister 50   • Breast cancer Sister 45   • Breast cancer Maternal Grandmother    • Breast cancer Maternal Aunt    • Breast cancer Maternal Aunt    • Breast cancer Maternal Aunt    • Coronary artery disease Family    • Diabetes Family    • Hypertension Family        Meds/Allergies   Current Outpatient Medications   Medication Sig Dispense Refill   • Alcohol Swabs PADS by Does not apply route 4 (four) times a day *Latex Free* 120 each 3   • aspirin 81 mg chewable tablet Chew 1 tablet (81 mg total) daily 90 tablet 1   • atorvastatin (LIPITOR) 40 mg tablet Take 1 tablet (40 mg total) by mouth every evening 90 tablet 1   • Blood Pressure KIT by Does not apply route daily 1 each 0   • cholecalciferol (VITAMIN D3) 1,000 units tablet Take 1,000 Units by mouth daily     • cinacalcet (SENSIPAR) 60 MG tablet Take 1 tablet by mouth once daily 30 tablet 0   • Diclofenac Sodium (VOLTAREN) 1 % Apply 2 g topically 4 (four) times a day 150 g 1   • DULoxetine (CYMBALTA) 30 mg delayed release capsule Take 1 capsule (30 mg total) by mouth in the morning  30 capsule 1   • glucose blood (ReliOn Prime Test) test strip Use 1 each 2 (two) times a day 200 each 1   • glucose blood test strip Check blood glucose levels three times per day before meals 300 each 2   • hydrochlorothiazide (HYDRODIURIL) 25 mg tablet Take 1 tablet (25 mg total) by mouth daily 90 tablet 1   • Incontinence Supply Disposable (Prevail Women Underwear XL) MISC Use every 6 (six) hours as needed (incontinence) 120 each 11   • Insulin Pen Needle (BD Pen Needle Izzy U/F) 32G X 4 MM MISC Use in the morning 100 each 1   • lisinopril (ZESTRIL) 40 mg tablet Take 1 tablet by mouth once daily 90 tablet 1   • metFORMIN (GLUCOPHAGE-XR) 500 mg 24 hr tablet TAKE 2 TABLETS BY MOUTH TWICE DAILY WITH MEALS 360 tablet 0   • methocarbamol (ROBAXIN) 500 mg tablet Take 1 tablet (500 mg total) by mouth 2 (two) times a day 20 tablet 0   • metoprolol succinate (TOPROL-XL) 50 mg 24 hr tablet Take 1 tablet by mouth once daily 90 tablet 1   • naproxen (Naprosyn) 500 mg tablet Take 1 tablet (500 mg total) by mouth 2 (two) times a day with meals 30 tablet 0   • vitamin B-12 (VITAMIN B-12) 500 mcg tablet Take 2 tablets (1,000 mcg total) by mouth daily 60 tablet 5   • acetaminophen (TYLENOL) 500 mg tablet Take 2 tablets (1,000 mg total) by mouth every 6 (six) hours as needed for moderate pain (Patient not taking: No sig reported) 60 tablet 1   • diazepam (VALIUM) 5 mg tablet Take 1 tablet 30 minutes prior to MRI    May take 1 tablet just immediately before MRI if needed (Patient not taking: No sig reported) 2 tablet 0   • Interferon Beta-1b (Betaseron) 0 3 MG KIT Inject 1 ml (0 25 mg) subcutaneously every other day (Patient not taking: Reported on 10/20/2022) 14 kit 10     No current facility-administered medications for this visit  Allergies   Allergen Reactions   • Sorbitan Diarrhea, Vomiting and Abdominal Pain   • Ambien [Zolpidem Tartrate]    • Demerol [Meperidine]    • Insulin    • Insulin Glargine      [facility] [id number]: memory loss   • Latex    • Oxycodone Hives and Rash   • Oxycodone-Acetaminophen Vomiting   • Zolpidem Hallucinations and Irritability       Objective   Vitals: Blood pressure 142/80, pulse 82, height 5' 5" (1 651 m), weight 135 kg (297 lb 12 8 oz), SpO2 98 %, not currently breastfeeding  Physical Exam  Vitals reviewed  Constitutional:       Appearance: Normal appearance  She is obese  She is not ill-appearing or diaphoretic  HENT:      Head: Normocephalic and atraumatic  Eyes:      General: No scleral icterus  Extraocular Movements: Extraocular movements intact  Cardiovascular:      Rate and Rhythm: Normal rate and regular rhythm  Heart sounds: Normal heart sounds  No murmur heard  Pulmonary:      Effort: Pulmonary effort is normal  No respiratory distress  Breath sounds: Normal breath sounds  No wheezing or rales  Abdominal:      General: There is no distension  Palpations: Abdomen is soft  Tenderness: There is no abdominal tenderness  There is no guarding  Musculoskeletal:      Cervical back: Neck supple  Right lower leg: Edema present  Left lower leg: Edema present  Lymphadenopathy:      Cervical: No cervical adenopathy  Skin:     General: Skin is warm and dry  Neurological:      General: No focal deficit present  Mental Status: She is alert and oriented to person, place, and time     Psychiatric:         Mood and Affect: Mood normal          Behavior: Behavior normal          Thought Content: Thought content normal          Judgment: Judgment normal          The history was obtained from the review of the chart, patient  Lab Results:   Lab Results   Component Value Date/Time    Hemoglobin A1C 8 2 (H) 06/29/2022 10:33 AM    Hemoglobin A1C 8 6 (H) 03/15/2022 09:42 AM    Hemoglobin A1C 7 3 (A) 11/23/2021 10:45 AM    WBC 10 60 (H) 03/15/2022 09:42 AM    Hemoglobin 12 1 03/15/2022 09:42 AM    Hematocrit 38 4 03/15/2022 09:42 AM    MCV 91 03/15/2022 09:42 AM    Platelets 312 44/04/1860 09:42 AM    BUN 19 06/29/2022 10:33 AM    BUN 22 03/15/2022 09:42 AM    Potassium 4 1 06/29/2022 10:33 AM    Potassium 4 1 03/15/2022 09:42 AM    Chloride 106 06/29/2022 10:33 AM    Chloride 105 03/15/2022 09:42 AM    CO2 24 06/29/2022 10:33 AM    CO2 27 03/15/2022 09:42 AM    Creatinine 0 80 06/29/2022 10:33 AM    Creatinine 0 81 03/15/2022 09:42 AM    AST 14 06/29/2022 10:33 AM    AST 13 03/15/2022 09:42 AM    ALT 27 06/29/2022 10:33 AM    ALT 24 03/15/2022 09:42 AM    Albumin 3 4 (L) 06/29/2022 10:33 AM    Albumin 3 4 (L) 03/15/2022 09:42 AM    HDL, Direct 42 (L) 09/01/2022 12:09 PM    Triglycerides 186 (H) 09/01/2022 12:09 PM           Imaging Studies: I have personally reviewed pertinent reports  Study Result    Narrative & Impression   THYROID ULTRASOUND     INDICATION:    E04 1: Nontoxic single thyroid nodule      COMPARISON:  Parathyroid scan 8/24/2021, ultrasound 10/6/2020     TECHNIQUE:   Ultrasound of the thyroid was performed with a high frequency linear transducer in transverse and sagittal planes including volumetric imaging sweeps as well as traditional still imaging technique      FINDINGS:  Normal homogeneous smooth echotexture      Right lobe: 5 8 x 1 7 x 1 6 cm  Volume 7 5 mL  Left lobe:  5 0 x 2 3 x 1 7 cm  Volume 9 4 mL  Isthmus: 0 2  cm      Nodule #1  Image 9  RIGHT lower pole nodule measuring 0 8 x 0 7 cm    Given differences in measuring technique, no significant change from prior  Prior measurement 0 7 x 0 6 cm    COMPOSITION:  2 points, could not be determined do to calcification  ECHOGENICITY:  1 point, echogenicity cannot be determined  SHAPE:  0 points, wider-than-tall  MARGIN: 0 points, cannot be determined  ECHOGENIC FOCI:  1 point, macrocalcifications  TI-RADS Classification: TR 4 (4-6 points), FNA if > 1 5 cm  Follow if > 1cm      Nodule #2  Image 31  LEFT midgland posterior nodule measuring 1 9 x 1 5 x 1 7 cm  Stable to minimally increased size  Prior measurement 1 9 x 1 3 x 1 7 cm  COMPOSITION:  2 points, solid or almost completely solid   ECHOGENICITY:  2 points, hypoechoic  SHAPE:  0 points, wider-than-tall  MARGIN: 0 points, smooth  ECHOGENIC FOCI:  0 points, none or large comet-tail artifacts  TI-RADS Classification: TR 4 (4-6 points), FNA if > 1 5 cm  Follow if > 1cm  Prior nondiagnostic biopsy      No new thyroid nodules      Inferior to the left thyroid is a hypoechoic well-circumscribed nodule measuring 8 x 6 x 9 mm, possibly a small node though an echogenic hilum is not visualized  This was not clearly visualized on the prior exam      IMPRESSION:  1  Minimal change in left mid posterior nodule  No new thyroid nodules  2  Subcentimeter hypoechoic nodular focus inferior to the left thyroid attention on follow-up recommended               Portions of the record may have been created with voice recognition software  Occasional wrong word or "sound a like" substitutions may have occurred due to the inherent limitations of voice recognition software  Read the chart carefully and recognize, using context, where substitutions have occurred

## 2022-10-23 NOTE — ASSESSMENT & PLAN NOTE
Lab Results   Component Value Date    HGBA1C 8 2 (H) 06/29/2022   Diabetes is uncontrolled-she has not been monitoring fingersticks    Has not been able to tolerate GLP 1 agonist due to GI side effects  However at this point in time does not want to start any new medications until her pain is addressed

## 2022-10-24 ENCOUNTER — OFFICE VISIT (OUTPATIENT)
Dept: FAMILY MEDICINE CLINIC | Facility: CLINIC | Age: 62
End: 2022-10-24
Payer: COMMERCIAL

## 2022-10-24 VITALS
RESPIRATION RATE: 20 BRPM | HEART RATE: 60 BPM | HEIGHT: 65 IN | SYSTOLIC BLOOD PRESSURE: 148 MMHG | BODY MASS INDEX: 48.82 KG/M2 | WEIGHT: 293 LBS | DIASTOLIC BLOOD PRESSURE: 98 MMHG | OXYGEN SATURATION: 97 % | TEMPERATURE: 98.6 F

## 2022-10-24 DIAGNOSIS — M70.61 GREATER TROCHANTERIC BURSITIS OF RIGHT HIP: Primary | ICD-10-CM

## 2022-10-24 DIAGNOSIS — E11.65 TYPE 2 DIABETES MELLITUS WITH HYPERGLYCEMIA, WITHOUT LONG-TERM CURRENT USE OF INSULIN (HCC): ICD-10-CM

## 2022-10-24 PROCEDURE — 99214 OFFICE O/P EST MOD 30 MIN: CPT | Performed by: FAMILY MEDICINE

## 2022-10-24 NOTE — PROGRESS NOTES
Assessment/Plan:   1  Greater trochanteric bursitis of right hip  Symptoms appear concerning for persistent greater trochanteric bursitis  It appears that she has seen orthopedics already for this problem  She was advised that therapeutic exercise may be beneficial   Will place referral for physical therapy for patient today  She was advised on the different treatment options  She may possibly benefit from a CS injection  She states that she will be seeing orthopedics for a new patient consultation with [facility] orthopedics tomorrow  - Ambulatory Referral to Physical Therapy; Future               There are no diagnoses linked to this encounter  Subjective:       Chief Complaint   Patient presents with   • Follow-up     R hip pain       Patient ID: Mansi Hoffman is a 65 y o  female  Hip Pain   Incident onset: 2 wks ago  The incident occurred at home  There was no injury mechanism  The pain is present in the right hip  The quality of the pain is described as aching  The pain is at a severity of 8/10  The pain is moderate  The pain has been constant since onset  Pertinent negatives include no numbness or tingling  The symptoms are aggravated by movement  Treatments tried: voltaren gel  The treatment provided no relief  Review of Systems   Constitutional: Negative for activity change, chills, fatigue and fever  HENT: Negative for congestion, ear pain, sinus pressure and sore throat  Eyes: Negative for redness, itching and visual disturbance  Respiratory: Negative for cough and shortness of breath  Cardiovascular: Negative for chest pain and palpitations  Gastrointestinal: Negative for abdominal pain, diarrhea and nausea  Endocrine: Negative for cold intolerance and heat intolerance  Genitourinary: Negative for dysuria, flank pain and frequency  Musculoskeletal: Negative for arthralgias, back pain, gait problem and myalgias  Skin: Negative for color change  Allergic/Immunologic: Negative for environmental allergies  Neurological: Negative for dizziness, tingling, numbness and headaches  Psychiatric/Behavioral: Negative for behavioral problems and sleep disturbance  The following portions of the patient's history were reviewed and updated as appropriate : past family history, past medical history, past social history and past surgical history      Current Outpatient Medications:   •  acetaminophen (TYLENOL) 500 mg tablet, Take 2 tablets (1,000 mg total) by mouth every 6 (six) hours as needed for moderate pain, Disp: 60 tablet, Rfl: 1  •  Alcohol Swabs PADS, by Does not apply route 4 (four) times a day *Latex Free*, Disp: 120 each, Rfl: 3  •  aspirin 81 mg chewable tablet, Chew 1 tablet (81 mg total) daily, Disp: 90 tablet, Rfl: 1  •  atorvastatin (LIPITOR) 40 mg tablet, Take 1 tablet (40 mg total) by mouth every evening, Disp: 90 tablet, Rfl: 1  •  Blood Pressure KIT, by Does not apply route daily, Disp: 1 each, Rfl: 0  •  cholecalciferol (VITAMIN D3) 1,000 units tablet, Take 1,000 Units by mouth daily, Disp: , Rfl:   •  cinacalcet (SENSIPAR) 60 MG tablet, Take 1 tablet by mouth once daily, Disp: 30 tablet, Rfl: 0  •  Diclofenac Sodium (VOLTAREN) 1 %, Apply 2 g topically 4 (four) times a day, Disp: 150 g, Rfl: 1  •  DULoxetine (CYMBALTA) 30 mg delayed release capsule, Take 1 capsule (30 mg total) by mouth in the morning , Disp: 30 capsule, Rfl: 1  •  glucose blood (ReliOn Prime Test) test strip, Use 1 each 2 (two) times a day, Disp: 200 each, Rfl: 1  •  glucose blood test strip, Check blood glucose levels three times per day before meals, Disp: 300 each, Rfl: 2  •  hydrochlorothiazide (HYDRODIURIL) 25 mg tablet, Take 1 tablet (25 mg total) by mouth daily, Disp: 90 tablet, Rfl: 1  •  Incontinence Supply Disposable (Prevail Women Underwear XL) MISC, Use every 6 (six) hours as needed (incontinence), Disp: 120 each, Rfl: 11  •  lisinopril (ZESTRIL) 40 mg tablet, Take 1 tablet by mouth once daily, Disp: 90 tablet, Rfl: 1  •  metFORMIN (GLUCOPHAGE-XR) 500 mg 24 hr tablet, TAKE 2 TABLETS BY MOUTH TWICE DAILY WITH MEALS, Disp: 360 tablet, Rfl: 0  •  methocarbamol (ROBAXIN) 500 mg tablet, Take 1 tablet (500 mg total) by mouth 2 (two) times a day, Disp: 20 tablet, Rfl: 0  •  metoprolol succinate (TOPROL-XL) 50 mg 24 hr tablet, Take 1 tablet by mouth once daily, Disp: 90 tablet, Rfl: 1  •  naproxen (Naprosyn) 500 mg tablet, Take 1 tablet (500 mg total) by mouth 2 (two) times a day with meals, Disp: 30 tablet, Rfl: 0  •  vitamin B-12 (VITAMIN B-12) 500 mcg tablet, Take 2 tablets (1,000 mcg total) by mouth daily, Disp: 60 tablet, Rfl: 5  •  diazepam (VALIUM) 5 mg tablet, Take 1 tablet 30 minutes prior to MRI  May take 1 tablet just immediately before MRI if needed (Patient not taking: No sig reported), Disp: 2 tablet, Rfl: 0  •  Insulin Pen Needle (BD Pen Needle Izzy U/F) 32G X 4 MM MISC, Use in the morning (Patient not taking: Reported on 10/24/2022), Disp: 100 each, Rfl: 1  •  Interferon Beta-1b (Betaseron) 0 3 MG KIT, Inject 1 ml (0 25 mg) subcutaneously every other day (Patient not taking: No sig reported), Disp: 14 kit, Rfl: 10         Objective:         Vitals:    10/24/22 1353   BP: 148/98   Pulse: 60   Resp: 20   Temp: 98 6 °F (37 °C)   TempSrc: Tympanic   SpO2: 97%   Weight: 134 kg (296 lb)   Height: 5' 5" (1 651 m)     Physical Exam  Vitals reviewed  Constitutional:       General: She is not in acute distress  Appearance: Normal appearance  She is well-developed  She is not ill-appearing  HENT:      Head: Normocephalic and atraumatic  Right Ear: Hearing and external ear normal       Left Ear: Hearing and external ear normal       Nose: Nose normal  No nasal deformity or septal deviation  Eyes:      General: Lids are normal       Pupils: Pupils are equal, round, and reactive to light  Neck:      Thyroid: No thyromegaly        Trachea: Trachea normal  Cardiovascular:      Rate and Rhythm: Normal rate  Pulmonary:      Effort: Pulmonary effort is normal  No respiratory distress  Abdominal:      Tenderness: There is no guarding  Musculoskeletal:         General: Normal range of motion  Cervical back: Normal range of motion and neck supple  No edema or erythema  Right hip: Tenderness present  Normal range of motion  Legs:    Skin:     General: Skin is warm and dry  Neurological:      Mental Status: She is alert  Cranial Nerves: No cranial nerve deficit  Sensory: No sensory deficit  Psychiatric:         Speech: Speech normal          Behavior: Behavior normal  Behavior is cooperative  Thought Content:  Thought content normal          Judgment: Judgment normal

## 2022-10-25 ENCOUNTER — APPOINTMENT (OUTPATIENT)
Dept: RADIOLOGY | Facility: MEDICAL CENTER | Age: 62
End: 2022-10-25
Payer: COMMERCIAL

## 2022-10-25 ENCOUNTER — OFFICE VISIT (OUTPATIENT)
Dept: OBGYN CLINIC | Facility: MEDICAL CENTER | Age: 62
End: 2022-10-25
Payer: COMMERCIAL

## 2022-10-25 VITALS
WEIGHT: 293 LBS | HEIGHT: 65 IN | SYSTOLIC BLOOD PRESSURE: 164 MMHG | DIASTOLIC BLOOD PRESSURE: 90 MMHG | BODY MASS INDEX: 48.82 KG/M2 | HEART RATE: 69 BPM

## 2022-10-25 DIAGNOSIS — M25.551 PAIN IN RIGHT HIP: Primary | ICD-10-CM

## 2022-10-25 DIAGNOSIS — M25.551 PAIN IN RIGHT HIP: ICD-10-CM

## 2022-10-25 PROCEDURE — 73502 X-RAY EXAM HIP UNI 2-3 VIEWS: CPT

## 2022-10-25 PROCEDURE — 99203 OFFICE O/P NEW LOW 30 MIN: CPT | Performed by: STUDENT IN AN ORGANIZED HEALTH CARE EDUCATION/TRAINING PROGRAM

## 2022-10-25 NOTE — PROGRESS NOTES
Hip New Office Note    Assessment:     1  Pain in right hip        Plan:     Problem List Items Addressed This Visit    None     Visit Diagnoses     Pain in right hip    -  Primary    Relevant Orders    XR hip/pelv 2-3 vws right if performed          66 y/o female with right hip pain secondary to hip OA  Xrays of the right hip reviewed today showing degenerative changes consistent with arthritis  She does not have significant loss of motion, but she does have pain at end ranges of motion  Discussed conservative treatment options including Tylenol, topical creams, physical therapy, activity modification, low impact strengthening and weight loss  Also offered IA right hip cortisone injection, however she would like to hold off on this  She can follow up on an as needed basis  Subjective:     Patient ID: Sofia Mccoy is a 65 y o  female  Patient seen in consultation at the request of Dr Leanne Marc DO    Chief Complaint:  HPI:  Patient is a 66 y/o female who presents today for an evaluation of her RIGHT hip  She states that she has had chronic right hip pain which she has tried topical treatments and physical therapy without improvement  She localizes her pain to the posterior hip/buttock  She ambulates with a rolling walker  She does have MS which causes numbness in her LE  She has been on a lot of steroids in the past due to her MS      Allergy:  Allergies   Allergen Reactions   • Sorbitan Diarrhea, Vomiting and Abdominal Pain   • Ambien [Zolpidem Tartrate]    • Demerol [Meperidine]    • Insulin    • Insulin Glargine      Annotation - [id number]: memory loss   • Latex    • Oxycodone Hives and Rash   • Oxycodone-Acetaminophen Vomiting   • Zolpidem Hallucinations and Irritability     Medications:  all current active meds have been reviewed  Past Medical History:  Past Medical History:   Diagnosis Date   • Diabetes mellitus ([facility] )    • External hemorrhoids     last assessed 4/7/16, resolved 7/21/16 • Hernia, ventral     last assessed 12/14/15, resolved 16   • Hypertension    • Incarcerated incisional hernia     last assessed 12/21/15, resolved 16   • Insomnia     last assessed 16, resolved 10/9/17   • Lyme disease    • Morbid obesity with BMI of 45 0-49 9, adult ([facility] ) 5/3/2017   • MS (multiple sclerosis) ([place] [address] )    • Stroke (cerebrum) ([facility] ) 2020   • Type 2 diabetes mellitus with hyperglycemia, without long-term current use of insulin ([place] [address] )      Past Surgical History:  Past Surgical History:   Procedure Laterality Date   • ABCESS DRAINAGE     •  SECTION      x3   • COLONOSCOPY N/A 2017    Procedure: COLONOSCOPY with biopsy;  Surgeon: Kelly Valencia DO;  Location: [facility];   Service: Complete   • HYSTERECTOMY     • IR BIOPSY ABDOMEN  2021   • IR LUMBAR PUNCTURE  3/13/2020   • US GUIDED THYROID BIOPSY  2021     Family History:  Family History   Problem Relation Age of Onset   • Glaucoma Mother    • Diabetes Father    • Hypertension Father    • Colon cancer Father    • Alzheimer's disease Father    • Cervical cancer Sister 61   • Breast cancer Sister 50   • Breast cancer Sister 45   • Breast cancer Maternal Grandmother    • Breast cancer Maternal Aunt    • Breast cancer Maternal Aunt    • Breast cancer Maternal Aunt    • Coronary artery disease Family    • Diabetes Family    • Hypertension Family      Social History:  Social History     Substance and Sexual Activity   Alcohol Use Not Currently    Comment: Social     Social History     Substance and Sexual Activity   Drug Use Yes   • Types: Marijuana    Comment: medical marijuana     Social History     Tobacco Use   Smoking Status Never Smoker   Smokeless Tobacco Never Used           ROS:  General: Per HPI  Skin: Negative, except if noted below  HEENT: Negative  Respiratory: Negative  Cardiovascular: Negative  Gastrointestinal: Negative  Urinary: Negative  Vascular: Negative  Musculoskeletal: Positive per HPI Neurologic: Positive per HPI  Endocrine: Negative    Objective:  BP Readings from Last 1 Encounters:   10/25/22 164/90      Wt Readings from Last 1 Encounters:   10/25/22 133 kg (293 lb)        Respiratory:   non-labored respirations    Lymphatics:  no palpable lymph nodes    Gait and Station:   altered    Neurologic:   Alert and oriented times 3  Patient with normal sensation except as noted below  Deep tendon reflexes 2+ except as noted in MSK exam    Bilateral Lower Extremity:    Right Hip     Inspection: skin intact    Range of Motion: Pain at end ranges of motion    +log roll    - SLR    Motor: 5/5 IP/Q/HS/TA/GS    Pulses: 2+ DP / 2+ PT    Imaging:  My interpretation XR AP pelvis/ right hip: mild-moderate joint space narrowing, subchondral sclerosis, subchondral cysts, osteophyte formation  No fracture or dislocation  BMI:   Estimated body mass index is 48 76 kg/m² as calculated from the following:    Height as of this encounter: 5' 5" (1 651 m)  Weight as of this encounter: 133 kg (293 lb)  BSA:   Estimated body surface area is 2 33 meters squared as calculated from the following:    Height as of this encounter: 5' 5" (1 651 m)  Weight as of this encounter: 133 kg (293 lb)             Scribe Attestation    I,:  Jeffy Ireland PA-C am acting as a scribe while in the presence of the attending physician :       I,:  Arturo Jackson DO personally performed the services described in this documentation    as scribed in my presence :

## 2022-10-27 ENCOUNTER — TELEPHONE (OUTPATIENT)
Dept: NEPHROLOGY | Facility: CLINIC | Age: 62
End: 2022-10-27

## 2022-10-31 ENCOUNTER — EVALUATION (OUTPATIENT)
Dept: PHYSICAL THERAPY | Facility: CLINIC | Age: 62
End: 2022-10-31

## 2022-10-31 ENCOUNTER — LAB (OUTPATIENT)
Dept: LAB | Facility: CLINIC | Age: 62
End: 2022-10-31

## 2022-10-31 DIAGNOSIS — E11.65 TYPE 2 DIABETES MELLITUS WITH HYPERGLYCEMIA, WITHOUT LONG-TERM CURRENT USE OF INSULIN (HCC): ICD-10-CM

## 2022-10-31 DIAGNOSIS — G89.29 CHRONIC RIGHT HIP PAIN: ICD-10-CM

## 2022-10-31 DIAGNOSIS — E21.3 HYPERPARATHYROIDISM (HCC): ICD-10-CM

## 2022-10-31 DIAGNOSIS — E55.9 VITAMIN D DEFICIENCY: ICD-10-CM

## 2022-10-31 DIAGNOSIS — M25.551 CHRONIC RIGHT HIP PAIN: ICD-10-CM

## 2022-10-31 DIAGNOSIS — E04.1 THYROID NODULE: ICD-10-CM

## 2022-10-31 DIAGNOSIS — M70.61 GREATER TROCHANTERIC BURSITIS OF RIGHT HIP: Primary | ICD-10-CM

## 2022-10-31 LAB
25(OH)D3 SERPL-MCNC: 38.3 NG/ML (ref 30–100)
ALBUMIN SERPL BCP-MCNC: 3.5 G/DL (ref 3.5–5)
ALP SERPL-CCNC: 114 U/L (ref 46–116)
ALT SERPL W P-5'-P-CCNC: 21 U/L (ref 12–78)
ANION GAP SERPL CALCULATED.3IONS-SCNC: 4 MMOL/L (ref 4–13)
AST SERPL W P-5'-P-CCNC: 8 U/L (ref 5–45)
BILIRUB SERPL-MCNC: 0.81 MG/DL (ref 0.2–1)
BUN SERPL-MCNC: 19 MG/DL (ref 5–25)
CALCIUM SERPL-MCNC: 11.2 MG/DL (ref 8.3–10.1)
CHLORIDE SERPL-SCNC: 108 MMOL/L (ref 96–108)
CO2 SERPL-SCNC: 27 MMOL/L (ref 21–32)
CREAT SERPL-MCNC: 0.71 MG/DL (ref 0.6–1.3)
EST. AVERAGE GLUCOSE BLD GHB EST-MCNC: 200 MG/DL
GFR SERPL CREATININE-BSD FRML MDRD: 92 ML/MIN/1.73SQ M
GLUCOSE P FAST SERPL-MCNC: 212 MG/DL (ref 65–99)
HBA1C MFR BLD: 8.6 %
PHOSPHATE SERPL-MCNC: 3 MG/DL (ref 2.3–4.1)
POTASSIUM SERPL-SCNC: 4.3 MMOL/L (ref 3.5–5.3)
PROT SERPL-MCNC: 7.5 G/DL (ref 6.4–8.4)
PTH-INTACT SERPL-MCNC: 66.7 PG/ML (ref 18.4–80.1)
SODIUM SERPL-SCNC: 139 MMOL/L (ref 135–147)
T4 FREE SERPL-MCNC: 1.04 NG/DL (ref 0.76–1.46)
TSH SERPL DL<=0.05 MIU/L-ACNC: 2.03 UIU/ML (ref 0.45–4.5)

## 2022-10-31 NOTE — PROGRESS NOTES
PT Evaluation     Today's date: 10/31/2022  Patient name: Sofia Mccoy  : 1960  MRN: [id number]  Referring provider: Stefany Inman DO  Dx:   Encounter Diagnosis     ICD-10-CM    1  Greater trochanteric bursitis of right hip  M70 61 Ambulatory Referral to Physical Therapy   2  Chronic right hip pain  M25 551     G89 29        Start Time: 810  Stop Time: 910  Total time in clinic (min): 60 minutes    Assessment/Plan     Sofia Mccoy is a 65 y o  female who was referred to physical therapy for management of R hip pain secondary to GT bursitis  Primary impairments include decreased strength, poor balance, and report of severe pain  Consequently, patient has difficulty completing ADLs including all WBing activity, bed mobility  Valdene Gone would benefit from skilled intervention to address all deficits and improve functional capability  Patient is a good candidate for therapy, pending compliance with HEP and 2x weekly participation  Thank you for the referral and please do not hesitate to contact me with any questions or concerns regarding Joanne’s care! Plan  Frequency: 2x per week   Duration in weeks: 5  POC start date: 10/31/22  POC end date: 22   Therapeutic exercise/activity, neuromuscular reeducation, manual therapy, and modalities  Patient understands and agrees to plan of care  Goals  Short Term--4 weeks  1  2 point decrease in pain levels  2  1/2 point increase in deficient MMT scores  3  30 sec NBOS on firm surface, EO    Long Term--By Discharge  1  Patient will achieve expected FOTO score  2  Patient will be able to sleep in bed for entirety of night with minimal disturbance secondary to R hip pain  3  Patient will be able to ambulate around her home with minimal to no onset of R hip pain  Patient's Goal: Be able to garden/do yardwork  Subjective     History   Date of Onset: Approx 6 months ago  Description: Insidious, gradual onset of lateral R hip pain   Patient diagnosed with GT bursitis in June 2022 and was provided Voltaren gel for management  Pain did not improve, and patient was taken to ER by ambulance last week because pain was so severe that she could not stand/walk  She was treated symptomatically and referred to orthopedics  At follow-up ortho appointment, patient was referred to physical therapy  She was informed that CSI would need to be scheduled at the hospital in order to have it US-guided  Symptoms  Constant R lateral hip pain that is worse primarily worse with standing/ambulation, especially first few steps out of bed  Pain is also worse with pressure onto hip (I e  sitting, sleeping on R side)  She is unable to lie in bed all night secondary to pain and ends up in her recliner for approximately half of the night  She requires rollator-assistance for all household/community ambulation since the pain started  She reports that prior to pain she was using AD for majority of ambulation secondary to MS-related balance issues  She experiences bilateral LE numbness (can only sense heavy pressure), which is also MS-related  She denies any numbness/paresthesia associated with hip pain or radiating pain into the leg  She also experiences bladder incontinence secondary to numbness issues, which she manages with a consistent bathroom schedule  This has not worsened with hip pain  Patient reports that she is on a weight loss journey and has already lost > 100 lbs in the past 1 5 years  Her DMII is currently not well managed but she recently established care for this  Pain at best: 6  Pain at worst: [address] lives with her , who also has multiple health issues, and her adult son who assists her  Living area is all on one floor with proper accommodations to improve independence  Patient is a retired nurse  Objective--minimal measures captured at initial evaluation secondary to pain   However, patient responded well to manual techniques with decreased pain reported with ambulation leaving clinic  Posture: Excessive Q angle, bilaterally, that is present in both standing and supine positions     GAIT: slowed with decreased hip/knee flex through R swing  Requires rollator  SLS: RLE: unable, LLE: unable  NBOS on firm surface, EO: 9 seconds    Lower Extremity Strength  MMT Right Left   Hip flexion  4- P! 4   Hip extension (standing) 3 P! 4   Hip Abduction (standing) 4 P! 4   Knee Flexion 5 5   Knee Extension 5 5   Ankle Dorsiflexion 5 5   Ankle Plantarflexion 5 5     Hip PROM: R Full with pain reported at end range ER  Palpation: Patient reports that she is only able to sense "pressure" with palpation of the hip secondary to sensation deficits    Hip:  scour=  NEG   renu = POS R    Fadir= NEG           Precautions: Extensive PMHx including MS, DMII, kidney dysfunction, stroke (3/9/20 affecting R side)  EXERCISE CAUTION WITH MANUALS AND USE OF MHP SECONDARY TO SENSATION LOSS OF BILATERAL LES  Memory/attention deficits  Needs Hi-lo table with trunk elevated!       Manuals 10/31            Gentle LAD JAB            Gentle glute/piriformis STM in L SL JAB                                      Neuro Re-Ed             TVA             TVA+ glute set             NBOS firm EO             WBOS foam EO             March on foam                                       Ther Ex             RB             Seated ADD  Give HEP           Seated ABD  Give HEP           Seated march  Give HEP           Seated LAQ  Give HEP           Side step                                       Ther Activity                                       Gait Training                                       Modalities

## 2022-11-01 DIAGNOSIS — G35 MS (MULTIPLE SCLEROSIS) (HCC): ICD-10-CM

## 2022-11-01 NOTE — TELEPHONE ENCOUNTER
Received VM transcription:    Calling from [address] in regards [address]  Pt was calling in today to get her Betaseron kit 0 3 mg filled  A patient does have any refills left  We do have a fax number  That number is [phone]  And thank you all so much for choosing [facility]  My pleasure

## 2022-11-03 RX ORDER — INTERFERON BETA-1B 0.3 MG
KIT SUBCUTANEOUS
Qty: 14 KIT | Refills: 11 | Status: SHIPPED | OUTPATIENT
Start: 2022-11-03

## 2022-11-04 ENCOUNTER — OFFICE VISIT (OUTPATIENT)
Dept: PHYSICAL THERAPY | Facility: CLINIC | Age: 62
End: 2022-11-04

## 2022-11-04 DIAGNOSIS — M25.551 CHRONIC RIGHT HIP PAIN: ICD-10-CM

## 2022-11-04 DIAGNOSIS — E11.65 TYPE 2 DIABETES MELLITUS WITH HYPERGLYCEMIA, WITHOUT LONG-TERM CURRENT USE OF INSULIN (HCC): Primary | ICD-10-CM

## 2022-11-04 DIAGNOSIS — G89.29 CHRONIC RIGHT HIP PAIN: ICD-10-CM

## 2022-11-04 DIAGNOSIS — M70.61 GREATER TROCHANTERIC BURSITIS OF RIGHT HIP: Primary | ICD-10-CM

## 2022-11-04 RX ORDER — BLOOD SUGAR DIAGNOSTIC
1 STRIP MISCELLANEOUS DAILY
Qty: 100 EACH | Refills: 1 | Status: SHIPPED | OUTPATIENT
Start: 2022-11-04

## 2022-11-04 RX ORDER — LANCETS 33 GAUGE
EACH MISCELLANEOUS
Qty: 100 EACH | Refills: 1 | Status: SHIPPED | OUTPATIENT
Start: 2022-11-04

## 2022-11-04 RX ORDER — LANCETS
EACH MISCELLANEOUS DAILY
Qty: 100 EACH | Refills: 1 | Status: SHIPPED | OUTPATIENT
Start: 2022-11-04 | End: 2022-11-04 | Stop reason: CLARIF

## 2022-11-04 RX ORDER — BLOOD-GLUCOSE METER
EACH MISCELLANEOUS DAILY
Qty: 1 KIT | Refills: 0 | Status: SHIPPED | OUTPATIENT
Start: 2022-11-04

## 2022-11-04 NOTE — PROGRESS NOTES
Daily Note     Today's date: 2022  Patient name: Oumou Aponte  : 1960  MRN: [id number]  Referring provider: Valentino Deed, DO  Dx:   Encounter Diagnosis     ICD-10-CM    1  Greater trochanteric bursitis of right hip  M70 61    2  Chronic right hip pain  M25 551     G89 29                   Subjective: Patient states R hip pain is a 6/10 at beginning of session  Objective: See treatment diary below      Assessment: Tolerated treatment fair with some increased R hip discomfort throughout- especially with seated marches  Poor bed mobility with significant R hip pain in changing positions from sit to supine and sit to SL  Unable to turn supine to SL  Reviewed HEP and discussed the possibility of muscle fatigue and DOMS following PT and how this is a necessary aspect of the rehab process- she verbalized understanding  Patient would benefit from continued PT to increase R hip mobility and strength for improved function in ADLs  Plan: Continue per plan of care  Precautions: Extensive PMHx including MS, DMII, kidney dysfunction, stroke (3/9/20 affecting R side)  EXERCISE CAUTION WITH MANUALS AND USE OF MHP SECONDARY TO SENSATION LOSS OF BILATERAL LES  Memory/attention deficits  Needs Hi-lo table with trunk elevated!       Manuals 10/31 11/4           Gentle LAD JAB attempted AS- intolerable            Gentle glute/piriformis STM in L SL JAB AS                                     Neuro Re-Ed             TVA  5"x20           TVA+ glute set  5"x20           NBOS firm EO             WBOS foam EO             March on foam                                       Ther Ex             RB  L1 8'           Seated ADD  5"x20           Seated ABD  Pink 5"x20           Seated march  1 5# 2x10           Seated LAQ  1 5# 2x10           Side step                                       Ther Activity                                       Gait Training                                       Modalities

## 2022-11-04 NOTE — TELEPHONE ENCOUNTER
Pharmacy called back stating the wrong lancets were ordered  They're asking for a new Rx to be sent for the [facility] lancets

## 2022-11-07 ENCOUNTER — OFFICE VISIT (OUTPATIENT)
Dept: PHYSICAL THERAPY | Facility: CLINIC | Age: 62
End: 2022-11-07

## 2022-11-07 DIAGNOSIS — M25.551 CHRONIC RIGHT HIP PAIN: ICD-10-CM

## 2022-11-07 DIAGNOSIS — M70.61 GREATER TROCHANTERIC BURSITIS OF RIGHT HIP: Primary | ICD-10-CM

## 2022-11-07 DIAGNOSIS — G89.29 CHRONIC RIGHT HIP PAIN: ICD-10-CM

## 2022-11-07 NOTE — PROGRESS NOTES
Daily Note     Today's date: 2022  Patient name: Jadon Marrero  : 1960  MRN: [id number]  Referring provider: Alie Mckenzie DO  Dx:   Encounter Diagnosis     ICD-10-CM    1  Greater trochanteric bursitis of right hip  M70 61    2  Chronic right hip pain  M25 551     G89 29                   Subjective: Patient states that her right hip pain remains significant  Notes that it has been present for > 3 months without onset  Objective: See treatment diary below      Assessment: Treatment session significantly limited by pain and decreased functional mobility  Patient requires mod-max assist of one with some table transfers but was unable to perform side lying for manual benefit  Discomfort present with all TE and was unable to find an exercises that was pain free in the hips but had fair tolerance to those noted below  Plan: Continue per plan of care  Precautions: Extensive PMHx including MS, DMII, kidney dysfunction, stroke (3/9/20 affecting R side)  EXERCISE CAUTION WITH MANUALS AND USE OF MHP SECONDARY TO SENSATION LOSS OF BILATERAL LES  Memory/attention deficits  Needs Hi-lo table with trunk elevated!       Manuals 10/31 11/4 11/7          Gentle LAD - above knee JAB attempted AS- intolerable  G4          Gentle glute/piriformis STM in L SL JAB AS           R hip PROM w/ piriformis stretching   5'                       Neuro Re-Ed             TVA  5"x20           TVA+ glute set  5"x20                                                                            Ther Ex             RB  L1 8' L1  8'          Seated ADD  5"x20           Standing march             Standing hip extension   2x10 B/L          Standing hip abduction   2x10 B/L          Side step   2x10 B/L          Bridge w/ abd belt   2x10          Supine clamshells - pink   2x10          Standing hip abduction   2x10 B/L                                    Ther Activity                                       Gait Training Modalities

## 2022-11-09 ENCOUNTER — TELEPHONE (OUTPATIENT)
Dept: NEUROLOGY | Facility: CLINIC | Age: 62
End: 2022-11-09

## 2022-11-09 NOTE — TELEPHONE ENCOUNTER
Reminder appt call     Patient is scheduled on 11/11/2022 @ 930 am with an arrival time  915 am in the [facility] location with Dr Allen Welch  Patient confirmed appt

## 2022-11-10 DIAGNOSIS — E78.5 HYPERLIPIDEMIA, UNSPECIFIED HYPERLIPIDEMIA TYPE: ICD-10-CM

## 2022-11-10 RX ORDER — ATORVASTATIN CALCIUM 40 MG/1
TABLET, FILM COATED ORAL
Qty: 90 TABLET | Refills: 0 | Status: SHIPPED | OUTPATIENT
Start: 2022-11-10

## 2022-11-11 ENCOUNTER — HOSPITAL ENCOUNTER (EMERGENCY)
Facility: HOSPITAL | Age: 62
Discharge: HOME | End: 2022-11-11
Attending: EMERGENCY MEDICINE

## 2022-11-11 ENCOUNTER — APPOINTMENT (EMERGENCY)
Dept: CT IMAGING | Facility: HOSPITAL | Age: 62
End: 2022-11-11

## 2022-11-11 ENCOUNTER — OFFICE VISIT (OUTPATIENT)
Dept: NEUROLOGY | Facility: CLINIC | Age: 62
End: 2022-11-11

## 2022-11-11 ENCOUNTER — APPOINTMENT (OUTPATIENT)
Dept: PHYSICAL THERAPY | Facility: CLINIC | Age: 62
End: 2022-11-11

## 2022-11-11 VITALS
DIASTOLIC BLOOD PRESSURE: 98 MMHG | WEIGHT: 291.2 LBS | TEMPERATURE: 97.1 F | HEART RATE: 66 BPM | SYSTOLIC BLOOD PRESSURE: 202 MMHG | HEIGHT: 65 IN | BODY MASS INDEX: 48.52 KG/M2

## 2022-11-11 VITALS
HEART RATE: 76 BPM | OXYGEN SATURATION: 98 % | SYSTOLIC BLOOD PRESSURE: 188 MMHG | DIASTOLIC BLOOD PRESSURE: 90 MMHG | TEMPERATURE: 97.5 F | RESPIRATION RATE: 18 BRPM

## 2022-11-11 DIAGNOSIS — G44.52 NEW DAILY PERSISTENT HEADACHE: ICD-10-CM

## 2022-11-11 DIAGNOSIS — I16.0 HYPERTENSIVE URGENCY: Primary | ICD-10-CM

## 2022-11-11 DIAGNOSIS — G44.40 MEDICATION OVERUSE HEADACHE: ICD-10-CM

## 2022-11-11 DIAGNOSIS — H53.9 VISUAL DISTURBANCES: ICD-10-CM

## 2022-11-11 DIAGNOSIS — R26.2 AMBULATORY DYSFUNCTION: Chronic | ICD-10-CM

## 2022-11-11 DIAGNOSIS — I10 UNCONTROLLED HYPERTENSION: ICD-10-CM

## 2022-11-11 DIAGNOSIS — G81.91 RIGHT HEMIPARESIS (HCC): ICD-10-CM

## 2022-11-11 DIAGNOSIS — E11.65 TYPE 2 DIABETES MELLITUS WITH HYPERGLYCEMIA, WITHOUT LONG-TERM CURRENT USE OF INSULIN (HCC): ICD-10-CM

## 2022-11-11 DIAGNOSIS — S06.0X0S CONCUSSION WITHOUT LOSS OF CONSCIOUSNESS, SEQUELA (HCC): ICD-10-CM

## 2022-11-11 DIAGNOSIS — G35 MS (MULTIPLE SCLEROSIS) (HCC): ICD-10-CM

## 2022-11-11 LAB
ALBUMIN SERPL BCP-MCNC: 3.4 G/DL (ref 3.5–5)
ALP SERPL-CCNC: 119 U/L (ref 46–116)
ALT SERPL W P-5'-P-CCNC: 24 U/L (ref 12–78)
ANION GAP SERPL CALCULATED.3IONS-SCNC: 7 MMOL/L (ref 4–13)
ATRIAL RATE: 54 BPM
BASOPHILS # BLD AUTO: 0.03 THOUSANDS/ÂΜL (ref 0–0.1)
BASOPHILS NFR BLD AUTO: 1 % (ref 0–1)
BILIRUB SERPL-MCNC: 0.82 MG/DL (ref 0.2–1)
BUN SERPL-MCNC: 18 MG/DL (ref 5–25)
CALCIUM ALBUM COR SERPL-MCNC: 11.2 MG/DL (ref 8.3–10.1)
CALCIUM SERPL-MCNC: 10.7 MG/DL (ref 8.3–10.1)
CARDIAC TROPONIN I PNL SERPL HS: 2 NG/L
CHLORIDE SERPL-SCNC: 103 MMOL/L (ref 96–108)
CO2 SERPL-SCNC: 30 MMOL/L (ref 21–32)
CREAT SERPL-MCNC: 0.67 MG/DL (ref 0.6–1.3)
EOSINOPHIL # BLD AUTO: 0.36 THOUSAND/ÂΜL (ref 0–0.61)
EOSINOPHIL NFR BLD AUTO: 7 % (ref 0–6)
ERYTHROCYTE [DISTWIDTH] IN BLOOD BY AUTOMATED COUNT: 13.3 % (ref 11.6–15.1)
GFR SERPL CREATININE-BSD FRML MDRD: 95 ML/MIN/1.73SQ M
GLUCOSE SERPL-MCNC: 210 MG/DL (ref 65–140)
HCT VFR BLD AUTO: 36.5 % (ref 34.8–46.1)
HGB BLD-MCNC: 11.8 G/DL (ref 11.5–15.4)
IMM GRANULOCYTES # BLD AUTO: 0.01 THOUSAND/UL (ref 0–0.2)
IMM GRANULOCYTES NFR BLD AUTO: 0 % (ref 0–2)
LYMPHOCYTES # BLD AUTO: 1.21 THOUSANDS/ÂΜL (ref 0.6–4.47)
LYMPHOCYTES NFR BLD AUTO: 22 % (ref 14–44)
MCH RBC QN AUTO: 28.5 PG (ref 26.8–34.3)
MCHC RBC AUTO-ENTMCNC: 32.3 G/DL (ref 31.4–37.4)
MCV RBC AUTO: 88 FL (ref 82–98)
MONOCYTES # BLD AUTO: 0.5 THOUSAND/ÂΜL (ref 0.17–1.22)
MONOCYTES NFR BLD AUTO: 9 % (ref 4–12)
NEUTROPHILS # BLD AUTO: 3.34 THOUSANDS/ÂΜL (ref 1.85–7.62)
NEUTS SEG NFR BLD AUTO: 61 % (ref 43–75)
NRBC BLD AUTO-RTO: 0 /100 WBCS
P AXIS: 46 DEGREES
PLATELET # BLD AUTO: 260 THOUSANDS/UL (ref 149–390)
PMV BLD AUTO: 9.6 FL (ref 8.9–12.7)
POTASSIUM SERPL-SCNC: 3.9 MMOL/L (ref 3.5–5.3)
PR INTERVAL: 164 MS
PROT SERPL-MCNC: 7.8 G/DL (ref 6.4–8.4)
QRS AXIS: 15 DEGREES
QRSD INTERVAL: 78 MS
QT INTERVAL: 432 MS
QTC INTERVAL: 409 MS
RBC # BLD AUTO: 4.14 MILLION/UL (ref 3.81–5.12)
SODIUM SERPL-SCNC: 140 MMOL/L (ref 135–147)
T WAVE AXIS: 47 DEGREES
VENTRICULAR RATE: 54 BPM
WBC # BLD AUTO: 5.45 THOUSAND/UL (ref 4.31–10.16)

## 2022-11-11 RX ORDER — METOCLOPRAMIDE 10 MG/1
10 TABLET ORAL EVERY 6 HOURS
Qty: 30 TABLET | Refills: 0 | Status: SHIPPED | OUTPATIENT
Start: 2022-11-11

## 2022-11-11 RX ORDER — DEXAMETHASONE SODIUM PHOSPHATE 10 MG/ML
10 INJECTION, SOLUTION INTRAMUSCULAR; INTRAVENOUS ONCE
Status: COMPLETED | OUTPATIENT
Start: 2022-11-11 | End: 2022-11-11

## 2022-11-11 RX ORDER — MAGNESIUM SULFATE HEPTAHYDRATE 40 MG/ML
2 INJECTION, SOLUTION INTRAVENOUS ONCE
Status: COMPLETED | OUTPATIENT
Start: 2022-11-11 | End: 2022-11-11

## 2022-11-11 RX ORDER — CLONIDINE HYDROCHLORIDE 0.1 MG/1
0.1 TABLET ORAL ONCE
Status: COMPLETED | OUTPATIENT
Start: 2022-11-11 | End: 2022-11-11

## 2022-11-11 RX ORDER — PROCHLORPERAZINE EDISYLATE 5 MG/ML
5 INJECTION INTRAMUSCULAR; INTRAVENOUS EVERY 4 HOURS PRN
Status: SHIPPED | OUTPATIENT
Start: 2022-11-11

## 2022-11-11 RX ORDER — DIPHENHYDRAMINE HYDROCHLORIDE 50 MG/ML
25 INJECTION INTRAMUSCULAR; INTRAVENOUS ONCE
Status: COMPLETED | OUTPATIENT
Start: 2022-11-11 | End: 2022-11-11

## 2022-11-11 RX ORDER — DIVALPROEX SODIUM 250 MG/1
TABLET, EXTENDED RELEASE ORAL
Qty: 8 TABLET | Refills: 4 | Status: SHIPPED | OUTPATIENT
Start: 2022-11-11

## 2022-11-11 RX ORDER — METOCLOPRAMIDE HYDROCHLORIDE 5 MG/ML
10 INJECTION INTRAMUSCULAR; INTRAVENOUS ONCE
Status: COMPLETED | OUTPATIENT
Start: 2022-11-11 | End: 2022-11-11

## 2022-11-11 RX ORDER — KETOROLAC TROMETHAMINE 30 MG/ML
30 INJECTION, SOLUTION INTRAMUSCULAR; INTRAVENOUS ONCE
Status: COMPLETED | OUTPATIENT
Start: 2022-11-11 | End: 2022-11-11

## 2022-11-11 RX ORDER — METHYLPREDNISOLONE 4 MG/1
TABLET ORAL
Qty: 21 TABLET | Refills: 0 | Status: SHIPPED | OUTPATIENT
Start: 2022-11-11 | End: 2022-11-23

## 2022-11-11 RX ORDER — HYDRALAZINE HYDROCHLORIDE 20 MG/ML
10 INJECTION INTRAMUSCULAR; INTRAVENOUS ONCE
Status: COMPLETED | OUTPATIENT
Start: 2022-11-11 | End: 2022-11-11

## 2022-11-11 RX ORDER — DIPHENHYDRAMINE HCL 25 MG
12.5 TABLET ORAL EVERY 6 HOURS
Qty: 20 TABLET | Refills: 0 | Status: SHIPPED | OUTPATIENT
Start: 2022-11-11

## 2022-11-11 RX ADMIN — DEXAMETHASONE SODIUM PHOSPHATE 10 MG: 10 INJECTION, SOLUTION INTRAMUSCULAR; INTRAVENOUS at 14:09

## 2022-11-11 RX ADMIN — HYDRALAZINE HYDROCHLORIDE 10 MG: 20 INJECTION, SOLUTION INTRAMUSCULAR; INTRAVENOUS at 15:40

## 2022-11-11 RX ADMIN — MAGNESIUM SULFATE HEPTAHYDRATE 2 G: 40 INJECTION, SOLUTION INTRAVENOUS at 14:33

## 2022-11-11 RX ADMIN — PROCHLORPERAZINE EDISYLATE 5 MG: 5 INJECTION INTRAMUSCULAR; INTRAVENOUS at 10:40

## 2022-11-11 RX ADMIN — DIPHENHYDRAMINE HYDROCHLORIDE 25 MG: 50 INJECTION, SOLUTION INTRAMUSCULAR; INTRAVENOUS at 14:03

## 2022-11-11 RX ADMIN — METOCLOPRAMIDE 10 MG: 5 INJECTION, SOLUTION INTRAMUSCULAR; INTRAVENOUS at 14:05

## 2022-11-11 RX ADMIN — KETOROLAC TROMETHAMINE 30 MG: 30 INJECTION, SOLUTION INTRAMUSCULAR; INTRAVENOUS at 10:38

## 2022-11-11 RX ADMIN — CLONIDINE HYDROCHLORIDE 0.1 MG: 0.1 TABLET ORAL at 14:00

## 2022-11-11 NOTE — DISCHARGE INSTRUCTIONS
Stop Tylenol at home  Take prescribed medications for medication over use headache  Continue home blood pressure medications  Make appointment with PCP, cardiologist and neurology
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
## 2022-11-11 NOTE — ED PROVIDER NOTES
History  Chief Complaint   Patient presents with   • Hypertension     At [facility], brought in by EMS, in office bp systolic 427, hx of TBI Sept 2022     A 65 year female the the the has PMH MS, hypertension and medication overuse headache  She states while at her appointment she had elevated blood pressure and was sent to the ER  She states that her headache started 5:00 a m  and is not relieved by Tylenol  She usually takes Tylenol 8-10 tablets daily for chronic headache  While being evaluated by the nurse, she complained of having double vision  She has not started any new medication recently  She has chronic headaches since September due to a head trauma with negative CT  She was given Compazine 5 mg IM and Ketorolac 30 mg IM at neurology office  She denies confusion, chest pain, shortness of breath or abdominal pain  History provided by:  Patient  Hypertension  Associated symptoms: headaches    Associated symptoms: no abdominal pain, no chest pain, no confusion, no dizziness, no fever, no hematuria, no nausea, no palpitations, no shortness of breath and not vomiting    Risk factors: diabetes and obesity        Prior to Admission Medications   Prescriptions Last Dose Informant Patient Reported? Taking? Alcohol Swabs PADS 11/11/2022 at Unknown time Self No Yes   Sig: by Does not apply route 4 (four) times a day *Latex Free*   Blood Glucose Monitoring Suppl (OneTouch Verio) w/Device KIT Unknown at Unknown time  No No   Sig: Use in the morning   Patient not taking: No sig reported   Blood Pressure KIT Unknown at Unknown time  No No   Sig: by Does not apply route daily   Patient not taking: No sig reported   DULoxetine (CYMBALTA) 30 mg delayed release capsule 11/11/2022 at Unknown time Self No Yes   Sig: Take 1 capsule (30 mg total) by mouth in the morning     Diclofenac Sodium (VOLTAREN) 1 % Past Week at Unknown time Self No Yes   Sig: Apply 2 g topically 4 (four) times a day   Incontinence Supply Disposable (Prevail Women Underwear XL) MISC 11/11/2022 at Unknown time Self No Yes   Sig: Use every 6 (six) hours as needed (incontinence)   Insulin Pen Needle (BD Pen Needle Izzy U/F) 32G X 4 MM MISC Unknown at Unknown time  No No   Sig: Use in the morning   Patient not taking: No sig reported   Interferon Beta-1b (Betaseron) 0 3 MG KIT 11/11/2022 at Unknown time  No Yes   Sig: Inject 1 ml (0 25 mg) subcutaneously every other day   Lancets (OneTouch Delica Plus [id number]) MISC 11/11/2022 at Unknown time  No Yes   Sig: Test BG up to 1x daily as directed   acetaminophen (TYLENOL) 500 mg tablet 11/11/2022 at Unknown time  No Yes   Sig: Take 2 tablets (1,000 mg total) by mouth every 6 (six) hours as needed for moderate pain   aspirin 81 mg chewable tablet 11/11/2022 at Unknown time Self No Yes   Sig: Chew 1 tablet (81 mg total) daily   atorvastatin (LIPITOR) 40 mg tablet 11/10/2022 at Unknown time  No Yes   Sig: TAKE 1 TABLET BY MOUTH ONCE DAILY IN THE EVENING   cholecalciferol (VITAMIN D3) 1,000 units tablet 11/11/2022 at Unknown time Self Yes Yes   Sig: Take 1,000 Units by mouth daily   cinacalcet (SENSIPAR) 60 MG tablet 11/10/2022 at Unknown time Self No Yes   Sig: Take 1 tablet by mouth once daily   diazepam (VALIUM) 5 mg tablet Unknown at Unknown time  No No   Sig: Take 1 tablet 30 minutes prior to MRI  May take 1 tablet just immediately before MRI if needed   Patient taking differently: Take 1 tablet 30 minutes prior to MRI    May take 1 tablet just immediately before MRI if needed   divalproex sodium (Depakote ER) 250 mg 24 hr tablet Unknown at Unknown time  No No   Sig: Take 2 tabs for 3 days then 1 tab for 2 days   glucose blood (OneTouch Verio) test strip Unknown at Unknown time  No No   Sig: Use 1 each in the morning   Patient not taking: No sig reported   hydrochlorothiazide (HYDRODIURIL) 25 mg tablet 11/11/2022 at Unknown time Self No Yes   Sig: Take 1 tablet (25 mg total) by mouth daily   lisinopril (ZESTRIL) 40 mg tablet 2022 at Unknown time Self No Yes   Sig: Take 1 tablet by mouth once daily   metFORMIN (GLUCOPHAGE-XR) 500 mg 24 hr tablet 2022 at Unknown time Self No Yes   Sig: TAKE 2 TABLETS BY MOUTH TWICE DAILY WITH MEALS   methocarbamol (ROBAXIN) 500 mg tablet Unknown at Unknown time  No No   Sig: Take 1 tablet (500 mg total) by mouth 2 (two) times a day   Patient not taking: No sig reported   metoprolol succinate (TOPROL-XL) 50 mg 24 hr tablet 11/10/2022 at Unknown time Self No Yes   Sig: Take 1 tablet by mouth once daily   naproxen (Naprosyn) 500 mg tablet Past Month at Unknown time Self No Yes   Sig: Take 1 tablet (500 mg total) by mouth 2 (two) times a day with meals   vitamin B-12 (VITAMIN B-12) 500 mcg tablet 2022 at Unknown time Self No Yes   Sig: Take 2 tablets (1,000 mcg total) by mouth daily      Facility-Administered Medications Last Administration Doses Remaining   ketorolac (TORADOL) injection 30 mg 2022 10:38 AM 0   prochlorperazine (COMPAZINE) injection 5 mg 2022 10:40 AM           Past Medical History:   Diagnosis Date   • Diabetes mellitus ([facility] )    • External hemorrhoids     last assessed 16, resolved 16   • Hernia, ventral     last assessed 12/14/15, resolved 16   • Hypertension    • Incarcerated incisional hernia     last assessed 12/21/15, resolved 16   • Insomnia     last assessed 16, resolved 10/9/17   • Lyme disease    • Morbid obesity with BMI of 45 0-49 9, adult ([facility] ) 5/3/2017   • MS (multiple sclerosis) ([facility] )    • Stroke (cerebrum) ([facility] ) 2020   • Type 2 diabetes mellitus with hyperglycemia, without long-term current use of insulin ([facility] )        Past Surgical History:   Procedure Laterality Date   • ABCESS DRAINAGE     •  SECTION      x3   • COLONOSCOPY N/A 2017    Procedure: COLONOSCOPY with biopsy;  Surgeon: Ciara Hill DO;  Location: [facility];   Service: Complete   • HYSTERECTOMY     • IR BIOPSY ABDOMEN  8/25/2021   • IR LUMBAR PUNCTURE  3/13/2020   • US GUIDED THYROID BIOPSY  1/29/2021       Family History   Problem Relation Age of Onset   • Glaucoma Mother    • Diabetes Father    • Hypertension Father    • Colon cancer Father    • Alzheimer's disease Father    • Cervical cancer Sister 61   • Breast cancer Sister 50   • Breast cancer Sister 45   • Breast cancer Maternal Grandmother    • Breast cancer Maternal Aunt    • Breast cancer Maternal Aunt    • Breast cancer Maternal Aunt    • Coronary artery disease Family    • Diabetes Family    • Hypertension Family      I have reviewed and agree with the history as documented  E-Cigarette/Vaping   • E-Cigarette Use Never User      E-Cigarette/Vaping Substances     Social History     Tobacco Use   • Smoking status: Never Smoker   • Smokeless tobacco: Never Used   Vaping Use   • Vaping Use: Never used   Substance Use Topics   • Alcohol use: Not Currently     Comment: Social   • Drug use: Yes     Types: Marijuana     Comment: medical marijuana       Review of Systems   Constitutional: Negative for chills and fever  HENT: Negative for rhinorrhea and sore throat  Eyes: Positive for visual disturbance  Negative for pain  Respiratory: Negative for cough, chest tightness and shortness of breath  Cardiovascular: Negative for chest pain and palpitations  Gastrointestinal: Negative for abdominal pain, nausea and vomiting  Genitourinary: Negative for dysuria and hematuria  Musculoskeletal: Negative for arthralgias and back pain  Skin: Negative for color change and rash  Neurological: Positive for headaches  Negative for dizziness, seizures, syncope, speech difficulty and light-headedness  Psychiatric/Behavioral: Negative for confusion  All other systems reviewed and are negative  Physical Exam  Physical Exam  Vitals and nursing note reviewed  Constitutional:       General: She is not in acute distress  Appearance: She is well-developed  She is obese  HENT:      Head: Normocephalic and atraumatic  Mouth/Throat:      Mouth: Mucous membranes are moist    Eyes:      General: Gaze aligned appropriately  Extraocular Movements: Extraocular movements intact  Conjunctiva/sclera: Conjunctivae normal       Comments: Double vision   Cardiovascular:      Rate and Rhythm: Normal rate and regular rhythm  Heart sounds: Normal heart sounds  No murmur heard  Pulmonary:      Effort: Pulmonary effort is normal  No respiratory distress  Breath sounds: Normal breath sounds  Abdominal:      Palpations: Abdomen is soft  Tenderness: There is no abdominal tenderness  Musculoskeletal:      Cervical back: Neck supple  Skin:     General: Skin is warm and dry  Capillary Refill: Capillary refill takes less than 2 seconds  Neurological:      General: No focal deficit present  Mental Status: She is alert and oriented to person, place, and time  GCS: GCS eye subscore is 4  GCS verbal subscore is 5  GCS motor subscore is 6  Sensory: Sensory deficit present  Coordination: Coordination normal  Finger-Nose-Finger Test normal       Comments: Decreased sensation in bilateral lower extremities     Psychiatric:         Attention and Perception: Attention normal          Mood and Affect: Mood normal          Speech: Speech normal          Behavior: Behavior normal          Vital Signs  ED Triage Vitals   Temperature Pulse Respirations Blood Pressure SpO2   11/11/22 1149 11/11/22 1149 11/11/22 1150 11/11/22 1149 11/11/22 1149   97 5 °F (36 4 °C) 64 20 (!) 221/99 97 %      Temp Source Heart Rate Source Patient Position - Orthostatic VS BP Location FiO2 (%)   11/11/22 1149 11/11/22 1149 11/11/22 1149 11/11/22 1149 --   Oral Monitor Sitting Left arm       Pain Score       11/11/22 1153       7           Vitals:    11/11/22 1515 11/11/22 1520 11/11/22 1639 11/11/22 1640   BP: (!) 196/92 (!) 208/94 (!) 191/86 (!) 188/90   Pulse:  63 76    Patient Position - Orthostatic VS: Sitting Lying Sitting          Visual Acuity  Visual Acuity    Flowsheet Row Most Recent Value   L Pupil Size (mm) 3   R Pupil Size (mm) 3          ED Medications  Medications   diphenhydrAMINE (BENADRYL) injection 25 mg (25 mg Intravenous Given 11/11/22 1403)   metoclopramide (REGLAN) injection 10 mg (10 mg Intravenous Given 11/11/22 1405)   dexamethasone (PF) (DECADRON) injection 10 mg (10 mg Intravenous Given 11/11/22 1409)   magnesium sulfate 2 g/50 mL IVPB (premix) 2 g (0 g Intravenous Stopped 11/11/22 1533)   cloNIDine (CATAPRES) tablet 0 1 mg (0 1 mg Oral Given 11/11/22 1400)   hydrALAZINE (APRESOLINE) injection 10 mg (10 mg Intravenous Given 11/11/22 1540)       Diagnostic Studies  Results Reviewed     Procedure Component Value Units Date/Time    Comprehensive metabolic panel [[id number]]  (Abnormal) Collected: 11/11/22 1201    Lab Status: Final result Specimen: Blood from Arm, Right Updated: 11/11/22 1246     Sodium 140 mmol/L      Potassium 3 9 mmol/L      Chloride 103 mmol/L      CO2 30 mmol/L      ANION GAP 7 mmol/L      BUN 18 mg/dL      Creatinine 0 67 mg/dL      Glucose 210 mg/dL      Calcium 10 7 mg/dL      Corrected Calcium 11 2 mg/dL      AST --     ALT 24 U/L      Alkaline Phosphatase 119 U/L      Total Protein 7 8 g/dL      Albumin 3 4 g/dL      Total Bilirubin 0 82 mg/dL      eGFR 95 ml/min/1 73sq m     Narrative:      Ashia guidelines for Chronic Kidney Disease (CKD):   •  Stage 1 with normal or high GFR (GFR > 90 mL/min/1 73 square meters)  •  Stage 2 Mild CKD (GFR = 60-89 mL/min/1 73 square meters)  •  Stage 3A Moderate CKD (GFR = 45-59 mL/min/1 73 square meters)  •  Stage 3B Moderate CKD (GFR = 30-44 mL/min/1 73 square meters)  •  Stage 4 Severe CKD (GFR = 15-29 mL/min/1 73 square meters)  •  Stage 5 End Stage CKD (GFR <15 mL/min/1 73 square meters)  Note: GFR calculation is accurate only with a steady state creatinine HS Troponin 0hr (reflex protocol) [[id number]]  (Normal) Collected: 11/11/22 1201    Lab Status: Final result Specimen: Blood from Arm, Right Updated: 11/11/22 1234     hs TnI 0hr 2 ng/L     CBC and differential [[id number]]  (Abnormal) Collected: 11/11/22 1201    Lab Status: Final result Specimen: Blood from Arm, Right Updated: 11/11/22 1209     WBC 5 45 Thousand/uL      RBC 4 14 Million/uL      Hemoglobin 11 8 g/dL      Hematocrit 36 5 %      MCV 88 fL      MCH 28 5 pg      MCHC 32 3 g/dL      RDW 13 3 %      MPV 9 6 fL      Platelets 936 Thousands/uL      nRBC 0 /100 WBCs      Neutrophils Relative 61 %      Immat GRANS % 0 %      Lymphocytes Relative 22 %      Monocytes Relative 9 %      Eosinophils Relative 7 %      Basophils Relative 1 %      Neutrophils Absolute 3 34 Thousands/µL      Immature Grans Absolute 0 01 Thousand/uL      Lymphocytes Absolute 1 21 Thousands/µL      Monocytes Absolute 0 50 Thousand/µL      Eosinophils Absolute 0 36 Thousand/µL      Basophils Absolute 0 03 Thousands/µL                  CT head without contrast   Final Result by Jorge Todd MD (11/11 1410)      No acute intracranial abnormality  Small scalp hematoma in left frontal region, decreased in size and density since 9/7/2022  Workstation performed: [id number]                    Procedures  Procedures         ED Course  ED Course as of 11/11/22 1711   Fri Nov 11, 2022   1340 Patient complains of headache and  blood pressure remains elevated  Ordered migraine cocktail and clonidine for blood pressure  1523 Patient's blood pressure remains elevated after clonidine, Ordered hydralazine 10 mg IV  Headache has resolved  1537 Patient manual blood pressure 180/90  Patient states that she does not a headache or any other symptoms and wishes to go home                                               MDM  Number of Diagnoses or Management Options  Hypertensive urgency  Medication overuse headache  Uncontrolled hypertension  Diagnosis management comments: Patient is a 20-year-old presents to the ED are with hypertension and headache  Headache resolved with medications given  Basic labs were normal   CT head with no acute intracranial abnormality  Small scalp hematoma in left frontal region, decreased in size and density since 9/7/2022  No end-organ damage  Patient denies any symptoms and wishes to go home  Patient advised to continue her home blood pressure medications and to follow up with her primary care provider for further blood pressure management  Patient should stop Tylenol at home and take prescribed medications for any further headaches and follow up with neurology and PCP  Patient and her son are given return precautions  Amount and/or Complexity of Data Reviewed  Clinical lab tests: ordered  Tests in the radiology section of CPT®: ordered  Tests in the medicine section of CPT®: ordered    Risk of Complications, Morbidity, and/or Mortality  Presenting problems: moderate  Diagnostic procedures: low  Management options: low    Patient Progress  Patient progress: improved      Disposition  Final diagnoses:   Hypertensive urgency   Medication overuse headache   Uncontrolled hypertension     Time reflects when diagnosis was documented in both MDM as applicable and the Disposition within this note     Time User Action Codes Description Comment    11/11/2022  4:43 PM Raji Contreras Add [I16 0] Hypertensive urgency     11/11/2022  4:43 PM Raji Contreras Add [G44 40] Medication overuse headache     11/11/2022  4:44 PM Robbin, [address] Uncontrolled hypertension       ED Disposition     ED Disposition   Discharge    Condition   Stable    Date/Time   Fri Nov 11, 2022  4:43 PM    Tiff Garcia discharge to home/self care                 Follow-up Information     Follow up With Specialties Details Why [address], DO Family Medicine In 2 days  [address]  [address] [facility], [address]              Patient's Medications   Discharge Prescriptions    DIPHENHYDRAMINE (BENADRYL) 25 MG TABLET    Take 0 5 tablets (12 5 mg total) by mouth every 6 (six) hours       Start Date: 11/11/2022End Date: --       Order Dose: 12 5 mg       Quantity: 20 tablet    Refills: 0    METHYLPREDNISOLONE 4 MG TABLET THERAPY PACK    Use as directed on package       Start Date: 11/11/2022End Date: --       Order Dose: --       Quantity: 21 tablet    Refills: 0    METOCLOPRAMIDE (REGLAN) 10 MG TABLET    Take 1 tablet (10 mg total) by mouth every 6 (six) hours       Start Date: 11/11/2022End Date: --       Order Dose: 10 mg       Quantity: 30 tablet    Refills: 0       No discharge procedures on file      PDMP Review       Value Time User    PDMP Reviewed  Yes 10/11/2020  9:13 AM Margarita Astudillo MD          ED Provider  Electronically Signed by           Abdirashid Zuniga PA-C  11/11/22 8696

## 2022-11-11 NOTE — PROGRESS NOTES
Patient ID: Tan Esquivel is a 65 y o  female  Assessment/Plan:    No problem-specific Assessment & Plan notes found for this encounter  {Assess/PlanSmartLinks:96804}       Subjective:    HPI    {[facility] HPI texts:13458}    {Common ambulatory SmartLinks:94560}         Objective:    not currently breastfeeding  Physical Exam    Neurological Exam      ROS:    Review of Systems   Constitutional: Negative  Negative for appetite change and fever  HENT: Negative  Negative for hearing loss, tinnitus, trouble swallowing and voice change  Eyes: Negative  Negative for photophobia, pain and visual disturbance  Respiratory: Negative  Negative for shortness of breath  Cardiovascular: Negative  Negative for palpitations  Gastrointestinal: Negative  Negative for nausea and vomiting  Endocrine: Negative  Negative for cold intolerance  Genitourinary: Negative  Negative for dysuria, frequency and urgency  Musculoskeletal: Positive for gait problem  Negative for myalgias and neck pain  Skin: Negative  Negative for rash  Allergic/Immunologic: Negative  Neurological: Positive for headaches  Negative for dizziness, tremors, seizures, syncope, facial asymmetry, speech difficulty, weakness, light-headedness and numbness  Hematological: Negative  Does not bruise/bleed easily  Psychiatric/Behavioral: Negative  Negative for confusion, hallucinations and sleep disturbance

## 2022-11-11 NOTE — ED ATTENDING ATTESTATION
11/11/2022  IGerardo MD, saw and evaluated the patient  I have discussed the patient with the resident/non-physician practitioner and agree with the resident's/non-physician practitioner's findings, Plan of Care, and MDM as documented in the resident's/non-physician practitioner's note, except where noted  All available labs and Radiology studies were reviewed  I was present for key portions of any procedure(s) performed by the resident/non-physician practitioner and I was immediately available to provide assistance  At this point I agree with the current assessment done in the Emergency Department  I have conducted an independent evaluation of this patient a history and physical is as follows:  66 yo F with MS, HTN, headaches, referred to ED from neurology for elevated blood pressure, where she was being seen for MS, and c/o headache  No focal deficits were documented, and she was sent over with concern for hypertensive emergency  BP in ED significantly elevated  She is alert, with no focal deficits  Chest clear   Abd S/NT  EKG nonishcemic    ED workup for end organ decompensation, treat HA and treat blood pressure, and consider admission for persistent symptoms and if she is requiring repeated doses of antihypertensive    ED Course         Critical Care Time  Procedures

## 2022-11-14 ENCOUNTER — APPOINTMENT (OUTPATIENT)
Dept: PHYSICAL THERAPY | Facility: CLINIC | Age: 62
End: 2022-11-14

## 2022-11-14 ENCOUNTER — OFFICE VISIT (OUTPATIENT)
Dept: PHYSICAL THERAPY | Facility: CLINIC | Age: 62
End: 2022-11-14

## 2022-11-14 DIAGNOSIS — M25.551 CHRONIC RIGHT HIP PAIN: ICD-10-CM

## 2022-11-14 DIAGNOSIS — G89.29 CHRONIC RIGHT HIP PAIN: ICD-10-CM

## 2022-11-14 DIAGNOSIS — M70.61 GREATER TROCHANTERIC BURSITIS OF RIGHT HIP: Primary | ICD-10-CM

## 2022-11-14 NOTE — PROGRESS NOTES
Daily Note     Today's date: 2022  Patient name: Tobias Dickerson  : 1960  MRN: [id number]  Referring provider: Parish Coyle DO  Dx:   Encounter Diagnosis     ICD-10-CM    1  Greater trochanteric bursitis of right hip  M70 61    2  Chronic right hip pain  M25 551     G89 29                   Subjective: Pt states that she is doing well today  Reports that she has not been getting the pain that she has been having and is feeling better  Indicates that she has been performing the HEP  Reports that she went to the ED on Friday due to a hypertensive episode  States that her BP has been elevated the last few days  180/110 this morning  Objective: See treatment diary below    BP post treatment: 188/98      Assessment: Tolerated treatment fair  Continued to progressed core and LE strengthening activities to increase activiity tolerance  Reported mild pain with reclined manual HS and piriformis stretch  Improved bed mobility without assistance today  Pt reported that her symptoms related to her BP did not change throughout entire session  Recommended to continue HEP and to continue to monitor BP regularly  Patient demonstrated fatigue post treatment and would benefit from continued PT      Plan: Continue per plan of care  Precautions: Extensive PMHx including MS, DMII, kidney dysfunction, stroke (3/9/20 affecting R side)  EXERCISE CAUTION WITH MANUALS AND USE OF MHP SECONDARY TO SENSATION LOSS OF BILATERAL LES  Memory/attention deficits  Needs Hi-lo table with trunk elevated!       Manuals 10/31 11/4 11/7 11/14         Gentle LAD - above knee JAB attempted AS- intolerable  G4 JM         Gentle glute/piriformis STM in L SL JAB AS           R hip PROM w/ piriformis stretching   5' 10' - JM                      Neuro Re-Ed             TVA  5"x20           TVA+ glute set  5"x20                                                                            Ther Ex             RB  L1 8' L1  8' L1  8' Seated ADD  5"x20  5"x20         Standing march    Seated 2x10 ea         Standing hip extension   2x10 B/L 2x10 B/L         Standing hip abduction   2x10 B/L 2x10 B/L         Side step   2x10 B/L 2x10 B/L         Bridge w/ abd belt   2x10          Supine clamshells - pink   2x10          Mini squats   2x10 B/L 2x10          LAQ     2x10 B/L         Gait with Rollator    5 min                      Ther Activity                                       Gait Training                                       Modalities

## 2022-11-17 ENCOUNTER — TELEPHONE (OUTPATIENT)
Dept: NEUROLOGY | Facility: CLINIC | Age: 62
End: 2022-11-17

## 2022-11-17 ENCOUNTER — OFFICE VISIT (OUTPATIENT)
Dept: FAMILY MEDICINE CLINIC | Facility: CLINIC | Age: 62
End: 2022-11-17

## 2022-11-17 VITALS
SYSTOLIC BLOOD PRESSURE: 158 MMHG | OXYGEN SATURATION: 98 % | BODY MASS INDEX: 46.85 KG/M2 | HEIGHT: 65 IN | HEART RATE: 78 BPM | TEMPERATURE: 93.8 F | DIASTOLIC BLOOD PRESSURE: 98 MMHG | WEIGHT: 281.2 LBS

## 2022-11-17 DIAGNOSIS — I16.0 HYPERTENSIVE URGENCY: Primary | ICD-10-CM

## 2022-11-17 RX ORDER — HYDRALAZINE HYDROCHLORIDE 10 MG/1
10 TABLET, FILM COATED ORAL 3 TIMES DAILY
Qty: 90 TABLET | Refills: 0 | Status: SHIPPED | OUTPATIENT
Start: 2022-11-17

## 2022-11-17 NOTE — PROGRESS NOTES
Name: Carol Epperson      : 1960      MRN: [id number]  Encounter Provider: Juan J Roca DO  Encounter Date: 2022   Encounter department: [address] [place] [address]   Patient is a 58-year-old female with type 2 diabetes, MS, history of stroke  who has had headache for at least a week  Patient's BP continues to be elevated  Was seen in ER and given meds to bring down at the time  She is compliant with Lisinopril and Metoprolol  I have added Hydralazine since she had this in the ER and tolerated  She felt the name of the medication was familiar ut could not remember why  I spoke to her son and he was going to  ed at [facility] - we had called the pharmacy and they said they would have med ready  1  Hypertensive urgency  -     hydrALAZINE (APRESOLINE) 10 mg tablet; Take 1 tablet (10 mg total) by mouth 3 (three) times a day     I asked patient to return next week for BP check by nurse  Subjective      Chief Complaint   Patient presents with   • Headache     Had for a week  Patient is here with headache - she has had headaches whenever BP is up  She ha a recent neurology appointment and BP was elevated  Patient was in ER and BP was elevated - given hydralazine and BP went down - relieved headache  Patient also under a lot of stress  Mom passed away and evidently she had more money than family realized  Fammily memebers arguing    Headache    Review of Systems   Constitutional: Negative for chills and fever  Respiratory: Negative for shortness of breath  Cardiovascular: Negative for chest pain and palpitations  Neurological: Positive for headaches  Negative for dizziness         Current Outpatient Medications on File Prior to Visit   Medication Sig   • Alcohol Swabs PADS by Does not apply route 4 (four) times a day *Latex Free*   • aspirin 81 mg chewable tablet Chew 1 tablet (81 mg total) daily   • atorvastatin (LIPITOR) 40 mg tablet TAKE 1 TABLET BY MOUTH ONCE DAILY IN THE EVENING   • cholecalciferol (VITAMIN D3) 1,000 units tablet Take 1,000 Units by mouth daily   • cinacalcet (SENSIPAR) 60 MG tablet Take 1 tablet by mouth once daily   • diazepam (VALIUM) 5 mg tablet Take 1 tablet 30 minutes prior to MRI  May take 1 tablet just immediately before MRI if needed (Patient taking differently: Take 1 tablet 30 minutes prior to MRI  May take 1 tablet just immediately before MRI if needed)   • Diclofenac Sodium (VOLTAREN) 1 % Apply 2 g topically 4 (four) times a day   • diphenhydrAMINE (BENADRYL) 25 mg tablet Take 0 5 tablets (12 5 mg total) by mouth every 6 (six) hours   • divalproex sodium (Depakote ER) 250 mg 24 hr tablet Take 2 tabs for 3 days then 1 tab for 2 days   • DULoxetine (CYMBALTA) 30 mg delayed release capsule Take 1 capsule (30 mg total) by mouth in the morning     • hydrochlorothiazide (HYDRODIURIL) 25 mg tablet Take 1 tablet (25 mg total) by mouth daily   • Incontinence Supply Disposable (Prevail Women Underwear XL) MISC Use every 6 (six) hours as needed (incontinence)   • Interferon Beta-1b (Betaseron) 0 3 MG KIT Inject 1 ml (0 25 mg) subcutaneously every other day   • Lancets (OneTouch Delica Plus [id number]) MISC Test BG up to 1x daily as directed   • lisinopril (ZESTRIL) 40 mg tablet Take 1 tablet by mouth once daily   • metFORMIN (GLUCOPHAGE-XR) 500 mg 24 hr tablet TAKE 2 TABLETS BY MOUTH TWICE DAILY WITH MEALS   • methylPREDNISolone 4 MG tablet therapy pack Use as directed on package   • metoclopramide (Reglan) 10 mg tablet Take 1 tablet (10 mg total) by mouth every 6 (six) hours   • metoprolol succinate (TOPROL-XL) 50 mg 24 hr tablet Take 1 tablet by mouth once daily   • naproxen (Naprosyn) 500 mg tablet Take 1 tablet (500 mg total) by mouth 2 (two) times a day with meals   • vitamin B-12 (VITAMIN B-12) 500 mcg tablet Take 2 tablets (1,000 mcg total) by mouth daily   • Blood Glucose Monitoring Suppl (OneTouch Verio) w/Device KIT Use in the morning (Patient not taking: Reported on 11/11/2022)   • Blood Pressure KIT by Does not apply route daily (Patient not taking: Reported on 11/11/2022)   • glucose blood (OneTouch Verio) test strip Use 1 each in the morning (Patient not taking: Reported on 11/11/2022)   • Insulin Pen Needle (BD Pen Needle Izzy U/F) 32G X 4 MM MISC Use in the morning (Patient not taking: No sig reported)   • methocarbamol (ROBAXIN) 500 mg tablet Take 1 tablet (500 mg total) by mouth 2 (two) times a day (Patient not taking: Reported on 11/11/2022)       Objective     /98   Pulse 78   Temp (!) 93 8 °F (34 3 °C) (Tympanic)   Ht 5' 5" (1 651 m)   Wt 128 kg (281 lb 3 2 oz)   LMP  (LMP Unknown)   SpO2 98%   BMI 46 79 kg/m²     Physical Exam  Vitals and nursing note reviewed  Constitutional:       General: She is not in acute distress  HENT:      Head: Normocephalic  Neck:      Thyroid: No thyromegaly  Cardiovascular:      Rate and Rhythm: Normal rate and regular rhythm  Heart sounds: Normal heart sounds  Pulmonary:      Effort: Pulmonary effort is normal       Breath sounds: Normal breath sounds  Musculoskeletal:         General: No edema  Right lower leg: No edema  Left lower leg: No edema  Lymphadenopathy:      Cervical: No cervical adenopathy  Skin:     General: Skin is warm and dry  Neurological:      Mental Status: She is alert and oriented to person, place, and time     Psychiatric:         Mood and Affect: Mood and affect normal       Comments: Anxious and down at the same time       Fahad Galloway,

## 2022-11-17 NOTE — TELEPHONE ENCOUNTER
Received VM transcription from [phone]: This is Aminah Mondragon  I'm still having terrible headaches  Will you call me back  Thank you

## 2022-11-17 NOTE — TELEPHONE ENCOUNTER
Spoke with pt  She reports she still has the same headache that she reported at 11/11/22 office visit  States pain has gotten progressively worse  Pt reports sharp stabbing pain in the middle of her head to the front of her head  Rates pain 8-9/10  Reports nausea and vomiting daily  Sensitivity to sounds  Feels dizzy if she looks up and down  Denies visual changes  Pt reports she has not taken tylenol since she was instructed not to at the last visit  Did try depakote, methylprednisolone, and reglan as prescribed by our office and ED  Completed methylprednisolone and depakote regimens with no relief  Reglan did not help  Reports she has not tried decadron or olanzapine in the past      Questioned pt about blood pressure as she was found to have hypertensive urgency while in office  Pt reports her BP is currently 189/115, pulse 79  Does have headache at this time  Pt then reports her BP was lower earlier today  When BP is lower, pt reports she does not have a headache  Pt was given hydralazine and clonidine in ER for BP  Sent home with BP of 180/90  See note  Pt is scheduled to see her PCP today at 6:15pm  I advised pt to bring all BP readings with correlating symptoms to discuss with PCP  Advised she must discuss BP with PCP  Pt agreeable  Please advise if any additional recommendations at this time

## 2022-11-17 NOTE — TELEPHONE ENCOUNTER
Pt made aware, she verbalizes understanding  She has f/u with Fredda Burn scheduled in February 2023  Pt will f/u with PCP and reach our office if sooner appt is needed

## 2022-11-17 NOTE — TELEPHONE ENCOUNTER
Patient is to reach tight control of her blood pressure and she may follow with Parish/Mary Jane/Patience/Randi ALBA for re-evaluation of her headaches  Considering abortive regimen for HA is not beneficial - problem is not headache but headache is a symptom of underlying medical condition

## 2022-11-18 ENCOUNTER — OFFICE VISIT (OUTPATIENT)
Dept: PHYSICAL THERAPY | Facility: CLINIC | Age: 62
End: 2022-11-18

## 2022-11-18 DIAGNOSIS — M25.551 CHRONIC RIGHT HIP PAIN: ICD-10-CM

## 2022-11-18 DIAGNOSIS — M70.61 GREATER TROCHANTERIC BURSITIS OF RIGHT HIP: Primary | ICD-10-CM

## 2022-11-18 DIAGNOSIS — G89.29 CHRONIC RIGHT HIP PAIN: ICD-10-CM

## 2022-11-18 NOTE — PROGRESS NOTES
Daily Note     Today's date: 2022  Patient name: Shanae Curry  : 1960  MRN: [id number]  Referring provider: Windy Hudson DO  Dx:   Encounter Diagnosis     ICD-10-CM    1  Greater trochanteric bursitis of right hip  M70 61       2  Chronic right hip pain  M25 551     G89 29                      Subjective: Patient reports that her hip pain is decreasing allowing for improved functional mobility and ability to resume her walking routine  Objective: See treatment diary below    BP pre treatment: 154/90    Assessment: Patient demonstrates continued poor tolerance for supine lying but was able to manage manuals with some relief in the hip noted with PROM and LAD  Patients gait remains slow and less weight bearing on the right hip noted overall  Overall TE remains slow with frequent rest but fair tolerance noted overall today  Plan: Continue per plan of care  Precautions: Extensive PMHx including MS, DMII, kidney dysfunction, stroke (3/9/20 affecting R side)  EXERCISE CAUTION WITH MANUALS AND USE OF MHP SECONDARY TO SENSATION LOSS OF BILATERAL LES  Memory/attention deficits  Needs Hi-lo table with trunk elevated!       Manuals 10/31 11/4 11/7 11/14 11/18        Gentle LAD - above knee JAB attempted AS- intolerable  G4 JM G4        Gentle glute/piriformis STM in L SL JAB AS           R hip PROM w/ piriformis stretching   5' 10' - JM 10'                     Neuro Re-Ed             TVA  5"x20           TVA+ glute set  5"x20                                                                            Ther Ex             RB  L1 8' L1  8' L1  8' L1  12'        Seated ADD  5"x20  5"x20         Standing march    Seated 2x10 ea         Standing hip extension   2x10 B/L 2x10 B/L 2x10 B/L        Standing hip abduction   2x10 B/L 2x10 B/L 2x10 B/L        Side step @ railing   2x10 B/L 2x10 B/L 15' x10        Bridge w/ abd belt   2x10          Supine clamshells - pink   2x10          Mini squats 2x10 B/L 2x10  2x10        LAQ     2x10 B/L         Gait with Rollator    5 min                      Ther Activity                                       Gait Training                                       Modalities

## 2022-11-21 ENCOUNTER — OFFICE VISIT (OUTPATIENT)
Dept: PHYSICAL THERAPY | Facility: CLINIC | Age: 62
End: 2022-11-21

## 2022-11-21 ENCOUNTER — IMMUNIZATIONS (OUTPATIENT)
Dept: FAMILY MEDICINE CLINIC | Facility: CLINIC | Age: 62
End: 2022-11-21

## 2022-11-21 VITALS — SYSTOLIC BLOOD PRESSURE: 144 MMHG | DIASTOLIC BLOOD PRESSURE: 80 MMHG

## 2022-11-21 DIAGNOSIS — G89.29 CHRONIC RIGHT HIP PAIN: ICD-10-CM

## 2022-11-21 DIAGNOSIS — Z23 NEED FOR VACCINATION: Primary | ICD-10-CM

## 2022-11-21 DIAGNOSIS — M70.61 GREATER TROCHANTERIC BURSITIS OF RIGHT HIP: Primary | ICD-10-CM

## 2022-11-21 DIAGNOSIS — M25.551 CHRONIC RIGHT HIP PAIN: ICD-10-CM

## 2022-11-21 NOTE — PROGRESS NOTES
Daily Note     Today's date: 2022  Patient name: Khalif Hopper  : 1960  MRN: [id number]  Referring provider: Donita Wheatley DO  Dx:   Encounter Diagnosis     ICD-10-CM    1  Greater trochanteric bursitis of right hip  M70 61       2  Chronic right hip pain  M25 551     G89 29                      Subjective: Patient reports fatigue pre-tx from walking around [facility] yesterday and falling out of bed this morning  Patient reports no injury from the fall and reports not taking any blood thinning medications  Patient also denies hitting her head  Objective: See treatment diary below    BP pre treatment: 152/90    Assessment: Patient demonstrates fair tolerance to supine bridges today and was able to complete both sets  Significant proximal weakness remains as she requires cues to prevent hip flexor compensations for gluteus medius weakness  Hip PROM slowly improving but remains painful into adduction and IR  Plan: Continue per plan of care  Precautions: Extensive PMHx including MS, DMII, kidney dysfunction, stroke (3/9/20 affecting R side)  EXERCISE CAUTION WITH MANUALS AND USE OF MHP SECONDARY TO SENSATION LOSS OF BILATERAL LES  Memory/attention deficits  Needs Hi-lo table with trunk elevated!       Manuals 10/31 11/4 11/7 11/14 11/18 11/21       Gentle LAD - above knee JAB attempted AS- intolerable  G4 JM G4 G4       Gentle glute/piriformis STM in L SL JAB AS           R hip PROM w/ piriformis stretching   5' 10' - JM 10' 10'                    Neuro Re-Ed             TVA  5"x20           TVA+ glute set  5"x20                                                                            Ther Ex             RB  L1 8' L1  8' L1  8' L1  12' L1  12'       Seated ADD  5"x20  5"x20         Standing march    Seated 2x10 ea         Standing hip extension   2x10 B/L 2x10 B/L 2x10 B/L 2x10 B/L       Standing hip abduction   2x10 B/L 2x10 B/L 2x10 B/L 2x10 B/L       Side step @ railing   2x10 B/L 2x10 B/L 15' x10 15' x10       Bridge w/ abd belt   2x10   2x10       Supine clamshells - pink   2x10          Mini squats   2x10 B/L 2x10  2x10 2x10       LAQ     2x10 B/L         Gait with Rollator    5 min         Step ups - 4"      x10 ea  leg       Ther Activity                                       Gait Training                                       Modalities

## 2022-11-23 ENCOUNTER — OFFICE VISIT (OUTPATIENT)
Dept: FAMILY MEDICINE CLINIC | Facility: CLINIC | Age: 62
End: 2022-11-23

## 2022-11-23 ENCOUNTER — OFFICE VISIT (OUTPATIENT)
Dept: PODIATRY | Facility: CLINIC | Age: 62
End: 2022-11-23

## 2022-11-23 VITALS
HEIGHT: 65 IN | BODY MASS INDEX: 46.82 KG/M2 | HEART RATE: 71 BPM | DIASTOLIC BLOOD PRESSURE: 80 MMHG | WEIGHT: 281 LBS | TEMPERATURE: 97.1 F | OXYGEN SATURATION: 96 % | SYSTOLIC BLOOD PRESSURE: 122 MMHG | RESPIRATION RATE: 17 BRPM

## 2022-11-23 VITALS
HEIGHT: 65 IN | SYSTOLIC BLOOD PRESSURE: 124 MMHG | BODY MASS INDEX: 46.82 KG/M2 | DIASTOLIC BLOOD PRESSURE: 70 MMHG | WEIGHT: 281 LBS

## 2022-11-23 DIAGNOSIS — G44.329 CHRONIC POST-TRAUMATIC HEADACHE, NOT INTRACTABLE: Primary | ICD-10-CM

## 2022-11-23 DIAGNOSIS — E11.65 TYPE 2 DIABETES MELLITUS WITH HYPERGLYCEMIA, WITHOUT LONG-TERM CURRENT USE OF INSULIN (HCC): ICD-10-CM

## 2022-11-23 DIAGNOSIS — I10 BENIGN ESSENTIAL HYPERTENSION: ICD-10-CM

## 2022-11-23 DIAGNOSIS — E11.42 DIABETIC POLYNEUROPATHY ASSOCIATED WITH TYPE 2 DIABETES MELLITUS (HCC): Primary | ICD-10-CM

## 2022-11-23 PROBLEM — I16.0 HYPERTENSIVE URGENCY: Status: RESOLVED | Noted: 2022-11-11 | Resolved: 2022-11-23

## 2022-11-23 RX ORDER — TOPIRAMATE 25 MG/1
TABLET ORAL
Qty: 60 TABLET | Refills: 0 | Status: SHIPPED | OUTPATIENT
Start: 2022-11-23 | End: 2023-01-06

## 2022-11-23 RX ORDER — KETOROLAC TROMETHAMINE 30 MG/ML
30 INJECTION, SOLUTION INTRAMUSCULAR; INTRAVENOUS ONCE
Status: COMPLETED | OUTPATIENT
Start: 2022-11-23 | End: 2022-11-23

## 2022-11-23 RX ADMIN — KETOROLAC TROMETHAMINE 30 MG: 30 INJECTION, SOLUTION INTRAMUSCULAR; INTRAVENOUS at 13:44

## 2022-11-23 NOTE — PROGRESS NOTES
Assessment/Plan:    1  Chronic post-traumatic headache, not intractable  Assessment & Plan:  Stable, exam and symptoms suggestive of concussion  Patient's headache has been persistent and not responding to over the counter medications  She was given Toradol injection in office which she tolerated well  She should start Topamax and follow up in 1 month  Advised patient of the importance to rest the brain as much as possible  Advised to minimize tv watching, loud noises, bright lights, heavy physical activity etc  to allow proper rehab and recovery  Patient was instructed to go to the ED immediately if she experiences weakness, change in vision, slurred speech, numbness, tingling, syncope, tremor, or seizure  Patient verbalized understanding of treatment plan  If symptoms worsen or do not improve to call or return to office  Orders:  -     topiramate (TOPAMAX) 25 mg tablet; Take 1 tablet (25 mg total) by mouth daily for 7 days, THEN 2 tablets (50 mg total) daily for 7 days, THEN 2 tablets (50 mg total) 2 (two) times a day  -     ketorolac (TORADOL) 60 mg/2 mL IM injection 30 mg    2  Benign essential hypertension  Assessment & Plan:  Patient's blood pressure has improved with addition of Hydralazine 10 mg TID  She should continue Lisinopril 40 mg daily and Toprol 50 mg daily  Patient's blood pressure cuff is giving higher reading today in office versus manual cuff  She was instructed to purchase new cuff since hers is several years old  Continue to check regularly and follow up in the office if blood pressure persistently elevated  Follow up in 3-6 months for blood pressure check  Subjective:      Patient ID: Radha Pinedo is a 65 y o  female  HPI    Patient with type 2 diabetes, MS, history of stroke, and with history of concussion 9/2022  Patient is here for follow up of her blood pressure  Patient's BP continues to be elevated   She was seen in ER last week and given meds to bring down her blood pressure    She is compliant with Lisinopril and Metoprolol  She had Hydralazine added on to her medications last week because she tolerated the medication in the ER  Patient brought her BP cuff with her and reports that her BP has been elevated at home  Patient has had CT scan of her head after her concussion on 9/7  Her headaches have been persistent since then  They are bilateral and constant  She also experiences intermittent double vision when her headaches are severe  The following portions of the patient's history were reviewed and updated as appropriate: allergies, current medications, past family history, past medical history, past social history, past surgical history, and problem list       Current Outpatient Medications:   •  Alcohol Swabs PADS, by Does not apply route 4 (four) times a day *Latex Free*, Disp: 120 each, Rfl: 3  •  aspirin 81 mg chewable tablet, Chew 1 tablet (81 mg total) daily, Disp: 90 tablet, Rfl: 1  •  atorvastatin (LIPITOR) 40 mg tablet, TAKE 1 TABLET BY MOUTH ONCE DAILY IN THE EVENING, Disp: 90 tablet, Rfl: 0  •  Blood Glucose Monitoring Suppl (OneTouch Verio) w/Device KIT, Use in the morning, Disp: 1 kit, Rfl: 0  •  Blood Pressure KIT, by Does not apply route daily, Disp: 1 each, Rfl: 0  •  cholecalciferol (VITAMIN D3) 1,000 units tablet, Take 1,000 Units by mouth daily, Disp: , Rfl:   •  cinacalcet (SENSIPAR) 60 MG tablet, Take 1 tablet by mouth once daily, Disp: 30 tablet, Rfl: 0  •  diazepam (VALIUM) 5 mg tablet, Take 1 tablet 30 minutes prior to MRI  May take 1 tablet just immediately before MRI if needed (Patient taking differently: Take 1 tablet 30 minutes prior to MRI    May take 1 tablet just immediately before MRI if needed), Disp: 2 tablet, Rfl: 0  •  Diclofenac Sodium (VOLTAREN) 1 %, Apply 2 g topically 4 (four) times a day, Disp: 150 g, Rfl: 1  •  diphenhydrAMINE (BENADRYL) 25 mg tablet, Take 0 5 tablets (12 5 mg total) by mouth every 6 (six) hours, Disp: 20 tablet, Rfl: 0  •  divalproex sodium (Depakote ER) 250 mg 24 hr tablet, Take 2 tabs for 3 days then 1 tab for 2 days, Disp: 8 tablet, Rfl: 4  •  DULoxetine (CYMBALTA) 30 mg delayed release capsule, Take 1 capsule (30 mg total) by mouth in the morning , Disp: 30 capsule, Rfl: 1  •  glucose blood (OneTouch Verio) test strip, Use 1 each in the morning, Disp: 100 each, Rfl: 1  •  hydrALAZINE (APRESOLINE) 10 mg tablet, Take 1 tablet (10 mg total) by mouth 3 (three) times a day, Disp: 90 tablet, Rfl: 0  •  hydrochlorothiazide (HYDRODIURIL) 25 mg tablet, Take 1 tablet (25 mg total) by mouth daily, Disp: 90 tablet, Rfl: 1  •  Incontinence Supply Disposable (Prevail Women Underwear XL) MISC, Use every 6 (six) hours as needed (incontinence), Disp: 120 each, Rfl: 11  •  Insulin Pen Needle (BD Pen Needle Izzy U/F) 32G X 4 MM MISC, Use in the morning, Disp: 100 each, Rfl: 1  •  Interferon Beta-1b (Betaseron) 0 3 MG KIT, Inject 1 ml (0 25 mg) subcutaneously every other day, Disp: 14 kit, Rfl: 11  •  Lancets (OneTouch Delica Plus [id number]) MISC, Test BG up to 1x daily as directed, Disp: 100 each, Rfl: 1  •  lisinopril (ZESTRIL) 40 mg tablet, Take 1 tablet by mouth once daily, Disp: 90 tablet, Rfl: 1  •  metFORMIN (GLUCOPHAGE-XR) 500 mg 24 hr tablet, TAKE 2 TABLETS BY MOUTH TWICE DAILY WITH MEALS, Disp: 360 tablet, Rfl: 0  •  methocarbamol (ROBAXIN) 500 mg tablet, Take 1 tablet (500 mg total) by mouth 2 (two) times a day, Disp: 20 tablet, Rfl: 0  •  metoclopramide (Reglan) 10 mg tablet, Take 1 tablet (10 mg total) by mouth every 6 (six) hours, Disp: 30 tablet, Rfl: 0  •  metoprolol succinate (TOPROL-XL) 50 mg 24 hr tablet, Take 1 tablet by mouth once daily, Disp: 90 tablet, Rfl: 1  •  naproxen (Naprosyn) 500 mg tablet, Take 1 tablet (500 mg total) by mouth 2 (two) times a day with meals, Disp: 30 tablet, Rfl: 0  •  topiramate (TOPAMAX) 25 mg tablet, Take 1 tablet (25 mg total) by mouth daily for 7 days, THEN 2 tablets (50 mg total) daily for 7 days, THEN 2 tablets (50 mg total) 2 (two) times a day , Disp: 60 tablet, Rfl: 0  •  vitamin B-12 (VITAMIN B-12) 500 mcg tablet, Take 2 tablets (1,000 mcg total) by mouth daily, Disp: 60 tablet, Rfl: 5    Current Facility-Administered Medications:   •  prochlorperazine (COMPAZINE) injection 5 mg, 5 mg, Intramuscular, Q4H PRN, Emeka Liu MD, 5 mg at 11/11/22 1040      Review of Systems   Constitutional: Negative for chills and fever  HENT: Negative for ear pain and sore throat  Eyes: Positive for visual disturbance  Negative for pain  Respiratory: Negative for cough and shortness of breath  Cardiovascular: Negative for chest pain and palpitations  Gastrointestinal: Negative for abdominal pain and vomiting  Genitourinary: Negative for dysuria and hematuria  Musculoskeletal: Negative for arthralgias and back pain  Skin: Negative for color change and rash  Neurological: Positive for headaches  Negative for seizures, syncope and light-headedness  Psychiatric/Behavioral: Negative  All other systems reviewed and are negative  Objective:      /80 (BP Location: Left arm)   Pulse 71   Temp (!) 97 1 °F (36 2 °C) (Tympanic)   Resp 17   Ht 5' 5" (1 651 m)   Wt 127 kg (281 lb)   LMP  (LMP Unknown)   SpO2 96%   BMI 46 76 kg/m²          Physical Exam  Vitals and nursing note reviewed  Constitutional:       General: She is not in acute distress  Appearance: Normal appearance  She is obese  She is not toxic-appearing  HENT:      Head: Normocephalic and atraumatic  Mouth/Throat:      Mouth: Mucous membranes are moist       Pharynx: Oropharynx is clear  No oropharyngeal exudate  Eyes:      Extraocular Movements: Extraocular movements intact  Conjunctiva/sclera: Conjunctivae normal       Pupils: Pupils are equal, round, and reactive to light  Neck:      Vascular: No carotid bruit     Cardiovascular:      Rate and Rhythm: Normal rate and regular rhythm  Heart sounds: Normal heart sounds  No murmur heard  Pulmonary:      Effort: Pulmonary effort is normal  No respiratory distress  Breath sounds: Normal breath sounds  No wheezing  Musculoskeletal:      Cervical back: Normal range of motion  Skin:     General: Skin is warm  Neurological:      General: No focal deficit present  Mental Status: She is alert and oriented to person, place, and time  Mental status is at baseline  Cranial Nerves: No cranial nerve deficit  Psychiatric:         Mood and Affect: Mood normal          Behavior: Behavior normal          Thought Content:  Thought content normal

## 2022-11-23 NOTE — PROGRESS NOTES
Assessment/Plan:    Discussed principles of diabetic foot care  Patient has profound numbness in both feet secondary to multiple disorders  Urged patient to refrain from walking barefoot  Vascular status is WNL  Trimmed multiple elongated toenails  RA 3 months    No problem-specific Assessment & Plan notes found for this encounter  Diagnoses and all orders for this visit:    Diabetic polyneuropathy associated with type 2 diabetes mellitus ([facility] [address] )    Type 2 diabetes mellitus with hyperglycemia, without long-term current use of insulin ([facility] )  -     Ambulatory Referral to Podiatry          Subjective:      Patient ID: Christopher Gibson is a 65 y o  female  HPI     Patient, a 65year old type 2 diabetic female presents for examination and care  Patient has been seeing a podiatrist, Dr Maureen Cervantes for years for toenail care  She is changing to my practice due to its location  Patient relates that her feet feel numb  They've been numb for years  She attributes the numbness to both MS and a recent stroke  I personally reviewed A1C dated 10/31/22  It was 8 6  I personally reviewed an A1C dated 6/29/22  It was 8 2  The following portions of the patient's history were reviewed and updated as appropriate: allergies, current medications, past family history, past medical history, past social history, past surgical history and problem list     Review of Systems   Respiratory: Positive for shortness of breath  Gastrointestinal:        HX of GI bleed   Neurological: Positive for numbness  Right side rio-paresis             Objective:      /70   Ht 5' 5" (1 651 m)   Wt 127 kg (281 lb)   LMP  (LMP Unknown)   BMI 46 76 kg/m²          Physical Exam  Cardiovascular:      Pulses: no weak pulses          Dorsalis pedis pulses are 1+ on the right side and 1+ on the left side  Posterior tibial pulses are 1+ on the right side and 1+ on the left side     Feet:      Right foot:      Skin integrity: No ulcer, skin breakdown, erythema, warmth, callus or dry skin  Left foot:      Skin integrity: No ulcer, skin breakdown, erythema, warmth, callus or dry skin  Diabetic Foot Exam    Patient's shoes and socks were not removed  Right Foot/Ankle   Right Foot Inspection  Skin Exam: skin normal and skin intact  No dry skin, no warmth, no callus, no erythema, no maceration, no abnormal color, no pre-ulcer, no ulcer and no callus  Toe Exam: ROM and strength within normal limits  Sensory   Vibration: absent  Proprioception: absent  Monofilament testing: absent    Vascular  Capillary refills: < 3 seconds  The right DP pulse is 1+  The right PT pulse is 1+  Right Toe  - Comprehensive Exam  Ecchymosis: none  Arch: normal  Hammertoes: absent  Claw Toes: absent  Swelling: none   Tenderness: none         Left Foot/Ankle  Left Foot Inspection  Skin Exam: skin normal and skin intact  No dry skin, no warmth, no erythema, no maceration, normal color, no pre-ulcer, no ulcer and no callus  Toe Exam: ROM and strength within normal limits  Sensory   Vibration: diminished  Proprioception: absent  Monofilament testing: absent    Vascular  The left DP pulse is 1+  The left PT pulse is 1+       Left Toe  - Comprehensive Exam  Ecchymosis: none  Hammertoes: absent  Claw toes: absent  Swelling: none   Tenderness: none           Assign Risk Category  No deformity present  Loss of protective sensation  No weak pulses  Risk: 1

## 2022-11-23 NOTE — ASSESSMENT & PLAN NOTE
Stable, exam and symptoms suggestive of concussion  Patient's headache has been persistent and not responding to over the counter medications  She was given Toradol injection in office which she tolerated well  She should start Topamax and follow up in 1 month  Advised patient of the importance to rest the brain as much as possible  Advised to minimize tv watching, loud noises, bright lights, heavy physical activity etc  to allow proper rehab and recovery  Patient was instructed to go to the ED immediately if she experiences weakness, change in vision, slurred speech, numbness, tingling, syncope, tremor, or seizure  Patient verbalized understanding of treatment plan  If symptoms worsen or do not improve to call or return to office

## 2022-11-23 NOTE — ASSESSMENT & PLAN NOTE
Patient's blood pressure has improved with addition of Hydralazine 10 mg TID  She should continue Lisinopril 40 mg daily and Toprol 50 mg daily  Patient's blood pressure cuff is giving higher reading today in office versus manual cuff  She was instructed to purchase new cuff since hers is several years old  Continue to check regularly and follow up in the office if blood pressure persistently elevated  Follow up in 3-6 months for blood pressure check

## 2022-11-25 ENCOUNTER — OFFICE VISIT (OUTPATIENT)
Dept: PHYSICAL THERAPY | Facility: CLINIC | Age: 62
End: 2022-11-25

## 2022-11-25 DIAGNOSIS — G89.29 CHRONIC RIGHT HIP PAIN: Primary | ICD-10-CM

## 2022-11-25 DIAGNOSIS — M70.61 GREATER TROCHANTERIC BURSITIS OF RIGHT HIP: ICD-10-CM

## 2022-11-25 DIAGNOSIS — M25.551 CHRONIC RIGHT HIP PAIN: Primary | ICD-10-CM

## 2022-11-25 NOTE — PROGRESS NOTES
Daily Note     Today's date: 2022  Patient name: Machelle Richard  : 1960  MRN: [id number]  Referring provider: Noah France DO  Dx:   Encounter Diagnosis     ICD-10-CM    1  Chronic right hip pain  M25 551     G89 29       2  Greater trochanteric bursitis of right hip  M70 61                      Subjective: " I didn't exercise yesterday  " Significant fatigue after needing to navigate 18 steps into her [facility]  Objective: See treatment diary below    BP pre treatment: 162/90      Assessment: Patient feeling shaky after the bike stating her sugar was high this morning ( fasting 202 )  Denies other symptoms  Able to perform full POC without experiencing other symptoms other than general fatigue  Plan: Continue per plan of care  Precautions: Extensive PMHx including MS, DMII, kidney dysfunction, stroke (3/9/20 affecting R side)  EXERCISE CAUTION WITH MANUALS AND USE OF MHP SECONDARY TO SENSATION LOSS OF BILATERAL LES  Memory/attention deficits  Needs Hi-lo table with trunk elevated!       Manuals 10/31 11/4 11/7 11/14 11/18 11/21 11/25      Gentle LAD - above knee JAB attempted AS- intolerable  G4 JM G4 G4 resume NV      Gentle glute/piriformis STM in L SL JAB AS           R hip PROM w/ piriformis stretching   5' 10' - JM 10' 10' CM  10'                   Neuro Re-Ed             TVA  5"x20           TVA+ glute set  5"x20                                                                            Ther Ex             RB  L1 8' L1  8' L1  8' L1  12' L1  12' L1  12'      Seated ADD  5"x20  5"x20         Standing march    Seated 2x10 ea         Standing hip extension   2x10 B/L 2x10 B/L 2x10 B/L 2x10 B/L 2x10  B/L      Standing hip abduction   2x10 B/L 2x10 B/L 2x10 B/L 2x10 B/L 2x10  B/L      Side step @ railing   2x10 B/L 2x10 B/L 15' x10 15' x10 resume  NV      Bridge w/ abd belt   2x10   2x10 2x10      Supine clamshells - pink   2x10          Mini squats   2x10 B/L 2x10 2x10 2x10 2x10      LAQ     2x10 B/L         Gait with Rollator    5 min         Step ups - 4"      x10 ea  leg x10 ea   leg      Ther Activity                                       Gait Training                                       Modalities

## 2022-11-26 DIAGNOSIS — E83.52 HYPERCALCEMIA: ICD-10-CM

## 2022-11-28 ENCOUNTER — OFFICE VISIT (OUTPATIENT)
Dept: PHYSICAL THERAPY | Facility: CLINIC | Age: 62
End: 2022-11-28

## 2022-11-28 DIAGNOSIS — G89.29 CHRONIC RIGHT HIP PAIN: Primary | ICD-10-CM

## 2022-11-28 DIAGNOSIS — M25.551 CHRONIC RIGHT HIP PAIN: Primary | ICD-10-CM

## 2022-11-28 DIAGNOSIS — M70.61 GREATER TROCHANTERIC BURSITIS OF RIGHT HIP: ICD-10-CM

## 2022-11-28 RX ORDER — CINACALCET 60 MG/1
TABLET, FILM COATED ORAL
Qty: 30 TABLET | Refills: 0 | Status: SHIPPED | OUTPATIENT
Start: 2022-11-28

## 2022-11-28 NOTE — PROGRESS NOTES
Daily Note     Today's date: 2022  Patient name: Shante Hood  : 1960  MRN: [id number]  Referring provider: Sukhi Cheung DO  Dx:   Encounter Diagnosis     ICD-10-CM    1  Chronic right hip pain  M25 551     G89 29       2  Greater trochanteric bursitis of right hip  M70 61                      Subjective: Patient reports HEP compliance and states that her hip is not painful today  Notes that she has been more active and navigating steps with greater frequency  Objective: See treatment diary below    BP pre treatment: 162/90      Assessment: Patient demonstrates steady improvement in activity tolerance with greater RPM's on recumbent bike and improving gait/ambulatory speed  Patient remains challenged by standing TE, but secondary to improving activity tolerance and proximal LE strength, resistance was added to standing TE  Good relief and decreased pain noted with manuals  Plan: Continue per plan of care  Precautions: Extensive PMHx including MS, DMII, kidney dysfunction, stroke (3/9/20 affecting R side)  EXERCISE CAUTION WITH MANUALS AND USE OF MHP SECONDARY TO SENSATION LOSS OF BILATERAL LES  Memory/attention deficits  Needs Hi-lo table with trunk elevated!       Manuals 10/31 11/4 11/7 11/14 11/18 11/21 11/25 11/28     Gentle R hip LAD - above knee JAB attempted AS- intolerable  G4 JM G4 G4 resume NV G4     Gentle glute/piriformis STM in L SL JAB AS           R hip PROM w/ piriformis stretching   5' 10' - JM 10' 10' CM  10' 10'                  Neuro Re-Ed             TVA  5"x20           TVA+ glute set  5"x20                                                                            Ther Ex             RB  L1 8' L1  8' L1  8' L1  12' L1  12' L1  12' L1  12'     Seated ADD  5"x20  5"x20         Standing march    Seated 2x10 ea         Standing hip extension - Pink TB   2x10 B/L 2x10 B/L 2x10 B/L 2x10 B/L 2x10  B/L 2x10 B/L     Standing hip abduction - [place] TB 2x10 B/L 2x10 B/L 2x10 B/L 2x10 B/L 2x10  B/L 2x10 B/L     Side step @ railing   2x10 B/L 2x10 B/L 15' x10 15' x10 resume  NV 15' x10     Bridge w/ abd belt   2x10   2x10 2x10 2x10     Supine clamshells - pink   2x10          Mini squats   2x10 B/L 2x10  2x10 2x10 2x10 2x10     LAQ     2x10 B/L         Gait with Rollator    5 min         Step ups - 6"      x10 ea  leg x10 ea    leg 10 ea  leg     Ther Activity                                       Gait Training                                       Modalities

## 2022-11-30 DIAGNOSIS — R00.2 PALPITATIONS: ICD-10-CM

## 2022-11-30 DIAGNOSIS — R07.89 OTHER CHEST PAIN: ICD-10-CM

## 2022-11-30 DIAGNOSIS — I10 BENIGN ESSENTIAL HYPERTENSION: ICD-10-CM

## 2022-11-30 RX ORDER — METOPROLOL SUCCINATE 50 MG/1
TABLET, EXTENDED RELEASE ORAL
Qty: 90 TABLET | Refills: 0 | Status: SHIPPED | OUTPATIENT
Start: 2022-11-30

## 2022-11-30 RX ORDER — LISINOPRIL 40 MG/1
TABLET ORAL
Qty: 90 TABLET | Refills: 0 | Status: SHIPPED | OUTPATIENT
Start: 2022-11-30

## 2022-12-01 ENCOUNTER — OFFICE VISIT (OUTPATIENT)
Dept: PHYSICAL THERAPY | Facility: CLINIC | Age: 62
End: 2022-12-01

## 2022-12-01 DIAGNOSIS — M25.551 CHRONIC RIGHT HIP PAIN: Primary | ICD-10-CM

## 2022-12-01 DIAGNOSIS — M70.61 GREATER TROCHANTERIC BURSITIS OF RIGHT HIP: ICD-10-CM

## 2022-12-01 DIAGNOSIS — G89.29 CHRONIC RIGHT HIP PAIN: Primary | ICD-10-CM

## 2022-12-05 ENCOUNTER — OFFICE VISIT (OUTPATIENT)
Dept: PHYSICAL THERAPY | Facility: CLINIC | Age: 62
End: 2022-12-05

## 2022-12-05 DIAGNOSIS — M25.551 CHRONIC RIGHT HIP PAIN: Primary | ICD-10-CM

## 2022-12-05 DIAGNOSIS — M70.61 GREATER TROCHANTERIC BURSITIS OF RIGHT HIP: ICD-10-CM

## 2022-12-05 DIAGNOSIS — G89.29 CHRONIC RIGHT HIP PAIN: Primary | ICD-10-CM

## 2022-12-05 NOTE — PROGRESS NOTES
Daily Note     Today's date: 2022  Patient name: Demario Moore  : 1960  MRN: [id number]  Referring provider: Ghada Jones DO  Dx:   Encounter Diagnosis     ICD-10-CM    1  Chronic right hip pain  M25 551     G89 29       2  Greater trochanteric bursitis of right hip  M70 61                      Subjective: Patient reports continued HEP compliance and states that she has 7/10 pain pre-tx  Notes that her pain is increased for no particular reason and has maintained HEP compliance  Objective: See treatment diary below    BP pre treatment: 160/89      Assessment: Patient demonstrates relief of symptoms that remain with manuals (most noted with LAD and piriformis stretching) during PROM  Patients tolerance for activity, ROM during exercises and overall strength continue to demonstrate improvement despite noted pain today  Cues only required to prevent TFL/hip flexor compensation with hip abduction strengthening today  Plan: Continue per plan of care  Precautions: Extensive PMHx including MS, DMII, kidney dysfunction, stroke (3/9/20 affecting R side)  EXERCISE CAUTION WITH MANUALS AND USE OF MHP SECONDARY TO SENSATION LOSS OF BILATERAL LES  Memory/attention deficits  Needs Hi-lo table with trunk elevated!       Manuals 10/31 11/4 11/7 11/14 11/18 11/21 11/25 11/28 12/1 12/5   Gentle R hip LAD - above knee JAB attempted AS- intolerable  G4 JM G4 G4 resume NV G4 G4 G4   Gentle glute/piriformis STM in L SL JAB AS           R hip PROM w/ piriformis stretching   5' 10' - JM 10' 10' CM  10' 10' 10' 10'                Neuro Re-Ed             TVA  5"x20           TVA+ glute set  5"x20                                                                            Ther Ex             RB  L1 8' L1  8' L1  8' L1  12' L1  12' L1  12' L1  12' L1  12' L1  12'   Seated ADD  5"x20  5"x20         Standing march    Seated 2x10 ea         Standing hip extension - Pink TB   2x10 B/L 2x10 B/L 2x10 B/L 2x10 B/L 2x10  B/L 2x10 B/L 2x10 B/L 2x10 B/L   Standing hip abduction - Pink TB   2x10 B/L 2x10 B/L 2x10 B/L 2x10 B/L 2x10  B/L 2x10 B/L 2x10 B/L 2x10 B/L   Side step @ railing   2x10 B/L 2x10 B/L 15' x10 15' x10 resume  NV 15' x10 15' x10 15' x10   Bridge w/ abd belt   2x10   2x10 2x10 2x10 2x10 3x10   Supine clamshells - pink   2x10          Mini squats   2x10 B/L 2x10  2x10 2x10 2x10 2x10 2x10 3x10   LAQ     2x10 B/L         Gait with Rollator    5 min         Step ups - 6"      x10 ea  leg x10 ea  leg 10 ea  leg 10 ea   leg 10 ea  leg   Ther Activity                                       Gait Training                                       Modalities

## 2022-12-08 ENCOUNTER — APPOINTMENT (OUTPATIENT)
Dept: PHYSICAL THERAPY | Facility: CLINIC | Age: 62
End: 2022-12-08

## 2022-12-12 ENCOUNTER — APPOINTMENT (OUTPATIENT)
Dept: PHYSICAL THERAPY | Facility: CLINIC | Age: 62
End: 2022-12-12

## 2022-12-14 ENCOUNTER — OFFICE VISIT (OUTPATIENT)
Dept: CARDIOLOGY CLINIC | Facility: CLINIC | Age: 62
End: 2022-12-14

## 2022-12-14 VITALS
HEIGHT: 65 IN | SYSTOLIC BLOOD PRESSURE: 150 MMHG | OXYGEN SATURATION: 97 % | WEIGHT: 289.2 LBS | DIASTOLIC BLOOD PRESSURE: 90 MMHG | BODY MASS INDEX: 48.18 KG/M2 | HEART RATE: 70 BPM

## 2022-12-14 DIAGNOSIS — G35 MS (MULTIPLE SCLEROSIS) (HCC): ICD-10-CM

## 2022-12-14 DIAGNOSIS — E66.01 CLASS 3 SEVERE OBESITY DUE TO EXCESS CALORIES WITH SERIOUS COMORBIDITY AND BODY MASS INDEX (BMI) OF 45.0 TO 49.9 IN ADULT (HCC): ICD-10-CM

## 2022-12-14 DIAGNOSIS — E66.01 MORBID OBESITY WITH BMI OF 45.0-49.9, ADULT (HCC): Chronic | ICD-10-CM

## 2022-12-14 DIAGNOSIS — E78.5 HYPERLIPIDEMIA, UNSPECIFIED HYPERLIPIDEMIA TYPE: ICD-10-CM

## 2022-12-14 DIAGNOSIS — R00.2 PALPITATIONS: ICD-10-CM

## 2022-12-14 DIAGNOSIS — G81.91 ACUTE RIGHT HEMIPARESIS (HCC): ICD-10-CM

## 2022-12-14 DIAGNOSIS — R07.89 OTHER CHEST PAIN: ICD-10-CM

## 2022-12-14 DIAGNOSIS — E11.65 TYPE 2 DIABETES MELLITUS WITH HYPERGLYCEMIA, WITHOUT LONG-TERM CURRENT USE OF INSULIN (HCC): ICD-10-CM

## 2022-12-14 DIAGNOSIS — I10 ESSENTIAL HYPERTENSION: Chronic | ICD-10-CM

## 2022-12-14 DIAGNOSIS — I10 BENIGN ESSENTIAL HYPERTENSION: Primary | ICD-10-CM

## 2022-12-14 RX ORDER — HYDROCHLOROTHIAZIDE 50 MG/1
50 TABLET ORAL DAILY
Qty: 90 TABLET | Refills: 3 | Status: SHIPPED | OUTPATIENT
Start: 2022-12-14

## 2022-12-14 RX ORDER — METOPROLOL SUCCINATE 50 MG/1
50 TABLET, EXTENDED RELEASE ORAL DAILY
Qty: 90 TABLET | Refills: 3 | Status: SHIPPED | OUTPATIENT
Start: 2022-12-14

## 2022-12-14 NOTE — PROGRESS NOTES
Cardiology   MD Daya Batista MD Orvan Alley, DO, ELEUTERIO Smallwood MD Harrison Berry, DO, Jennie Talavera DO, [facility]  -------------------------------------------------------------------  [facility]  [facility], [address] [place] [address] [id number] [phone]  [phone]  12/14/22  Zeinab Myers  YOB: 1960   MRN: [id number]      Referring Physician: Juanis Giron DO  [address]  [facility],  [address]     HPI: Zeinab Myers is a 65 y o  female with:   HTN - difficult to control  Morbid obesity  DM  Multiple sclerosis  Ambulatory dysfunction  Daily headaches    She is out of metoprolol  173/113 this am at home   But her BP diary indicated BP still uncontrolled when she did have her metoprolol as well  Renal function normal  Notes palpitations with being out of her metoprolol    Review of Systems   Constitutional: Negative for chills and fever  HENT: Negative for facial swelling and sore throat  Eyes: Negative for visual disturbance  Respiratory: Negative for cough, chest tightness, shortness of breath and wheezing  Cardiovascular: Positive for palpitations  Negative for chest pain and leg swelling  Gastrointestinal: Negative for abdominal pain, blood in stool, constipation, diarrhea, nausea and vomiting  Endocrine: Negative for cold intolerance and heat intolerance  Genitourinary: Negative for decreased urine volume, difficulty urinating, dysuria and hematuria  Musculoskeletal: Positive for gait problem  Negative for arthralgias, back pain and myalgias  Skin: Negative for rash  Neurological: Negative for dizziness, syncope, weakness and numbness  Psychiatric/Behavioral: Negative for agitation, behavioral problems and confusion  The patient is not nervous/anxious           OBJECTIVE  Vitals:    12/14/22 0955   BP: 150/90   Pulse: 70   SpO2: 97%       Physical Exam  Constitutional: awake, alert and oriented, in no acute distress, no obvious deformities, walks with a rolling walker  Head: Normocephalic, without obvious abnormality, atraumatic  Eyes: conjunctivae clear and moist  Sclera anicteric  No xanthelasmas  Pupils equal bilaterally  Extraocular motions are full  Ear nose mouth and throat: ears are symmetrical bilaterally, hearing appears to be equal bilaterally, no nasal discharge or epistaxis, oropharynx is clear with moist mucous membranes  Neck:  Trachea is midline, neck is supple, no thyromegaly or significant lymphadenopathy, there is full range of motion  Lungs: clear to auscultation bilaterally, no wheezes, no rales, no rhonchi, no accessory muscle use, breathing is nonlabored  Heart: regular rate and rhythm, S1, S2 normal, no murmur, no click, rub or gallop, no lower extremity edema  Abdomen: obese, soft, non-tender; bowel sounds normal; no masses,  no organomegaly  Psychiatric:  Patient is oriented to time, place, person, mood/affect is negative for depression, anxiety, agitation, appears to have appropriate insight  Skin: Skin is warm, dry, intact  No obvious rashes or lesions on exposed extremities  Nail beds are pink with no cyanosis or clubbing  EKG:  No results found for this visit on 12/14/22  IMPRESSION:  HTN - difficult to control  Morbid obesity  DM  Multiple sclerosis  Ambulatory dysfunction  Daily headaches    DISCUSSION/RECOMMENDATIONS:  Her BP has been very elevated  She is out of her metoprolol  She has noticed an increase in palpitations since being out of metoprolol - she will  her new Rx today  Her rhythm monitor was negative for sustained arrhythmias  Will increase the hctz to 50mg  Refilled metoprolol rx today  Check BMP 2 weeks    Migel Hurt DO, [facility]  --------------------------------------------------------------------------------  TREADMILL STRESS  No results found for this or any previous visit  ----------------------------------------------------------------------------------------------  NUCLEAR STRESS TEST: No results found for this or any previous visit  Results for orders placed during the hospital encounter of 20    NM Myocardial Perfusion Spect (Pharmacological Induced Stress and/or Rest)    Prash[address]  [facility], [address] [facility]  [phone]    Stress Gated SPECT Myocardial Perfusion Imaging after Regadenoson    Patient: Maryam Wayne  MR number: [id number]  Account number: [de-identified]  : 1960  Age: 63 years  Gender: Female  Status: Outpatient  Location: Stress lab  Height: 67 in  Weight: 282 lb  BP: 138/ 96 mmHg    Allergies: ZOLPIDEM TARTRATE, MEPERIDINE, INSULIN, INSULIN GLARGINE, LATEX, OXYCODONE-ACETAMINOPHEN, ZOLPIDEM    Diagnosis: R07 9 - Chest pain, unspecified    Interpreting Physician:  Teodora Dubin, MD  Referring Physician:  Maryam Sanders DO  Primary Physician:  Tierra Powell  Technician:  Lavonne Armstrong  RN:  Cliff Leal RN  Group:  [facility]  Cardiology Fellow: Mic Vasquez MD  Report Prepared by[de-identified]  Cliff Leal RN    INDICATIONS: Detection of coronary artery disease  HISTORY: The patient is a 63year old  female  Chest pain status: recent onset chest pain  Other symptoms: palpitations  Coronary artery disease risk factors: hypertension, family history of premature coronary artery disease, and  diabetes mellitus  Cardiovascular history: stroke  Co-morbidity: obesity  Medications: a beta blocker, an ACE inhibitor/ARB, aspirin, a lipid lowering agent, and diabetic medications  PHYSICAL EXAM: Baseline physical exam screening: normal and no wheezes audible  REST ECG: Normal baseline ECG  PROCEDURE: The study was performed in the the Stress lab  A regadenoson infusion pharmacologic stress test was performed   Gated SPECT myocardial perfusion imaging was performed during stress  Systolic blood pressure was 138 mmHg, at the  start of the study  Diastolic blood pressure was 96 mmHg, at the start of the study  The heart rate was 73 bpm, at the start of the study  IV double checked  Regadenoson protocol:  HR bpm SBP mmHg DBP mmHg Symptoms  Baseline 73 138 96 none  Immediate 101 154 96 nausea  1 min 90 144 94 subsiding  2 min 89 130 90 none  No medications or fluids given  STRESS SUMMARY: Duration of pharmacologic stress was 3 min and 0 sec  Maximal heart rate during stress was 101 bpm  The heart rate response to stress was normal  There was normal resting blood pressure with an appropriate response to  stress  The rate-pressure product for the peak heart rate and blood pressure was 16629  There was no chest pain during stress  The stress test was terminated due to protocol completion  Pre oxygen saturation: 99 %  Peak oxygen saturation:  100 %  Arrhythmia during stress: isolated atrial premature beats  The stress ECG was non-diagnostic  ISOTOPE ADMINISTRATION:  Resting isotope administration Stress isotope administration  Agent Tetrofosmin Tetrofosmin  Dose 15 19 mCi 49 mCi  Date 05/19/2020 05/19/2020  Injection time 09:10 10:30  Imaging time 10:00 11:20  Injection-image interval 50 min 50 min    The radiopharmaceutical was injected at the peak effect of pharmacologic stress  MYOCARDIAL PERFUSION IMAGING:  The image quality was poor  Rotating projection images reveal moderate breast attenuation  Left ventricular size was normal  The TID ratio was 0 73  PERFUSION DEFECTS:  -  There is significant breast attentuation, as well as left armpit fat fold attenuation as well as significant subdiaphragmatic activity attenuation  This limits study accuracy  No evidence of scar or ischemia  GATED SPECT:  The calculated left ventricular ejection fraction was 62 %  Left ventricular ejection fraction was within normal limits by visual estimate   There was no left ventricular regional abnormality  SUMMARY:  -  Stress results: There was no chest pain during stress  -  ECG conclusions: The stress ECG was non-diagnostic   -  Perfusion imaging: There is significant breast attentuation, as well as left armpit fat fold attenuation as well as significant subdiaphragmatic activity attenuation  This limits study accuracy  No evidence of scar or ischemia   -  Gated SPECT: The calculated left ventricular ejection fraction was 62 %  Left ventricular ejection fraction was within normal limits by visual estimate  There was no left ventricular regional abnormality  IMPRESSIONS: Probable normal study after pharmacologic stress without reproduction of symptoms  Perfusion imaging significantly limited by attenuation artifact as mentioned above  Left ventricular systolic function was normal     Prepared and signed by    Bambi Dailey MD  Signed 2020 17:05:54      --------------------------------------------------------------------------------  CATH:  No results found for this or any previous visit     --------------------------------------------------------------------------------  ECHO:   Results for orders placed during the hospital encounter of 20    Echo complete with contrast if indicated    [address]  [address]  [facility], [address]  [phone]    Transthoracic Echocardiogram  2D, M-mode, Doppler, and Color Doppler    Study date:  11-Mar-2020    Patient: Agnes Hughes  MR number: [id number]  Account number: [de-identified]  : 1960  Age: 63 years  Gender: Female  Status: Outpatient  Location: Bedside  Height: 67 in  Weight: 279 4 lb  BP: 196/ 86 mmHg    Indications: Transient cerebral ischemic attack (TIA)      Diagnoses: G45 9 - Transient cerebral ischemic attack, unspecified    Sonographer:  Katie Young RDCS  Referring Physician:  Bill Lira MD  Group:  [facility]  RN:  Bruce Corbin Haydee Max RN BSN  Interpreting Physician:  JACKIE Kramer     SUMMARY    PROCEDURE INFORMATION:  Image quality was adequate  Intravenous contrast ( agitated saline (9cc nss/ 1cc air) injected x3, once at rest, once with cough, once with Valsalva and abdominal compression) was administered to evaluate intracardiac shunting  LEFT VENTRICLE:  Systolic function was normal by visual assessment  Ejection fraction was estimated to be 60 %  There were no regional wall motion abnormalities  Wall thickness was mildly increased  There was very mild assymetrical hypertrophy  The septum measures 1 2cm  Doppler parameters were consistent with abnormal left ventricular relaxation (grade 1 diastolic dysfunction)  ATRIAL SEPTUM:  No defect or patent foramen ovale was identified  MITRAL VALVE:  There was mild regurgitation  AORTIC VALVE:  The valve was trileaflet  Leaflets exhibited normal thickness, moderate calcification, normal cuspal separation, and good mobility  The left coronary cusp demonstrated moderate calcification  There was no evidence for stenosis  PULMONIC VALVE:  There was mild regurgitation  AORTA:  The root exhibited mild dilatation to 3 5cm  SUMMARY MEASUREMENTS  2D measurements:  Unspecified Anatomy:   LAESV Index (A-L) was 17 5 ml/m2  LAESVInd MOD BP was 16 9 ml/m2  RAAs A4C was 12 4 cm2  RWT was 0 4    CW measurements:  Unspecified Anatomy:   PRend PG was 17 6 mmHg  PRend Vmax was 2 1 m/s  MM measurements:  Unspecified Anatomy:   TAPSE was 1 8 cm  PW measurements:  Unspecified Anatomy:   E' Avg was 0 1 m/s  E' Lat was 0 1 m/s  E' Sept was 0 1 m/s  E/E' Avg was 9 8   E/E' Lat was 9 8   E/E' Sept was 9 7   MV A Bacilio was 0 9 m/s  MV Dec Kendall was 3 2 m/s2  MV DecT was 211 9 ms   MV E Bacilio was 0 7  m/s  MV E/A Ratio was 0 8   HISTORY: PRIOR HISTORY: Diabetes mellitus type 2, hypertension, syncope, anxiety  PROCEDURE: The procedure was performed at the bedside   This was a routine study  The transthoracic approach was used  The study included complete 2D imaging, M-mode, complete spectral Doppler, and color Doppler  The heart rate was 89 bpm,  at the start of the study  Images were obtained from the parasternal, apical, subcostal, and suprasternal notch acoustic windows  Intravenous contrast ( agitated saline (9cc nss/ 1cc air) injected x3, once at rest, once with cough, once  with Valsalva and abdominal compression) was administered to evaluate intracardiac shunting  Image quality was adequate  LEFT VENTRICLE: Size was normal  Systolic function was normal by visual assessment  Ejection fraction was estimated to be 60 %  There were no regional wall motion abnormalities  Wall thickness was mildly increased  There was very mild  assymetrical hypertrophy  The septum measures 1 2cm  DOPPLER: Doppler parameters were consistent with abnormal left ventricular relaxation (grade 1 diastolic dysfunction)  RIGHT VENTRICLE: The size was normal  Systolic function was normal  Wall thickness was normal     LEFT ATRIUM: Size was normal     ATRIAL SEPTUM: No defect or patent foramen ovale was identified  RIGHT ATRIUM: Size was normal     MITRAL VALVE: Valve structure was normal  There was normal leaflet separation  DOPPLER: The transmitral velocity was within the normal range  There was no evidence for stenosis  There was mild regurgitation  AORTIC VALVE: The valve was trileaflet  Leaflets exhibited normal thickness, moderate calcification, normal cuspal separation, and good mobility  The left coronary cusp demonstrated moderate calcification  DOPPLER: Transaortic velocity was  within the normal range  There was no evidence for stenosis  There was no regurgitation  TRICUSPID VALVE: Not well visualized  There was normal leaflet separation  DOPPLER: The transtricuspid velocity was within the normal range  There was no evidence for stenosis   There was no significant regurgitation  PULMONIC VALVE: Leaflets exhibited normal thickness, no calcification, and normal cuspal separation  DOPPLER: The transpulmonic velocity was within the normal range  There was no evidence for stenosis  There was mild regurgitation  PERICARDIUM: There was no pericardial effusion  The pericardium was normal in appearance  AORTA: The root exhibited mild dilatation to 3 5cm  SYSTEM MEASUREMENT TABLES    2D  %FS: 31 7 %  Ao Diam: 3 5 cm  EDV(Teich): 97 1 ml  EF(Teich): 59 7 %  ESV(Teich): 39 1 ml  HR_4Ch_Q: 76 9 BPM  IVSd: 1 2 cm  LA Diam: 3 8 cm  LAAs A2C: 12 8 cm2  LAAs A4C: 16 3 cm2  LAESV A-L A2C: 32 2 ml  LAESV A-L A4C: 48 3 ml  LAESV Index (A-L): 17 5 ml/m2  LAESV MOD A2C: 30 8 ml  LAESV MOD A4C: 46 9 ml  LAESV(A-L): 40 9 ml  LAESV(MOD BP): 39 4 ml  LAESVInd MOD BP: 16 9 ml/m2  LALs A2C: 4 3 cm  LALs A4C: 4 7 cm  LVCO_4Ch_Q: 3 3 L/min  LVEDV MOD A4C: 88 8 ml  LVEF MOD A4C: 65 1 %  LVEF_4Ch_Q: 60 6 %  LVESV MOD A4C: 31 ml  LVIDd: 4 6 cm  LVIDs: 3 1 cm  LVLd A4C: 8 8 cm  LVLd_4Ch_Q: 8 7 cm  LVLs A4C: 7 2 cm  LVLs_4Ch_Q: 7 3 cm  LVPWd: 1 cm  LVSV_4Ch_Q: 42 3 ml  LVVED_4Ch_Q: 69 8 ml  LVVES_4Ch_Q: 27 5 ml  RAAs A4C: 12 4 cm2  RAESV A-L: 30 4 ml  RAESV MOD: 29 7 ml  RALs: 4 3 cm  RVIDd: 2 7 cm  RWT: 0 4  SV MOD A4C: 57 8 ml  SV(Teich): 58 ml    CW  PRend P 6 mmHg  PRend Vmax: 2 1 m/s    MM  TAPSE: 1 8 cm    PW  E' Av 1 m/s  E' Lat: 0 1 m/s  E' Sept: 0 1 m/s  E/E' Av 8  E/E' Lat: 9 8  E/E' Sept: 9 7  MV A Bacilio: 0 9 m/s  MV Dec Hughes: 3 2 m/s2  MV DecT: 211 9 ms  MV E Bacilio: 0 7 m/s  MV E/A Ratio: 0 8    [facility]    Prepared and electronically signed by    JACKIE Saldaña    Signed 11-Mar-2020 15:09:28    No results found for this or any previous visit     --------------------------------------------------------------------------------  HOLTER  Results for orders placed during the hospital encounter of 17    Holter monitor - 24 hour    Narrative  PT NAME: Luis Alberto Wilson  : 1960  AGE: 55 y o  GENDER: female  MRN: [id number]   PROCEDURE: Holter monitor - 24 hour        INDICATIONS: Palpitations      FINDINGS:    Holter monitoring revealed predominant sinus rhythm with an average heart rate of 90 BPM, a minimum heart rate of 66 BPM, and a maximum heart rate of 124 BPM     There were no ventricular ectopic beats, or any evidence of ventricular tachycardia  There were about 47 supraventricular ectopic beats, all occurring as single PAC's with no evidence of supraventricular tachycardia, atrial fibrillation, or atrial flutter  There was no evidence of significant bradyarrhythmia or advanced heart block  The longest R-R interval was 1 1 seconds  The patient's symptom diary reported 3 incidences of "shirt L side jumping" which correlated with sinus rhythm between 78 to 105 BPM     No results found for this or any previous visit     --------------------------------------------------------------------------------  CAROTIDS  No results found for this or any previous visit      --------------------------------------------------------------------------------  Diagnoses and all orders for this visit:    Benign essential hypertension  -     Basic metabolic panel; Future    Type 2 diabetes mellitus with hyperglycemia, without long-term current use of insulin (HCC)    Acute right hemiparesis (HCC)    MS (multiple sclerosis) ([facility] [address] )    Morbid obesity with BMI of 45 0-49 9, adult ([facility])    Class 3 severe obesity due to excess calories with serious comorbidity and body mass index (BMI) of 45 0 to 49 9 in adult [facility])    Hyperlipidemia, unspecified hyperlipidemia type    Other chest pain  -     metoprolol succinate (TOPROL-XL) 50 mg 24 hr tablet; Take 1 tablet (50 mg total) by mouth daily    Palpitations  -     metoprolol succinate (TOPROL-XL) 50 mg 24 hr tablet;  Take 1 tablet (50 mg total) by mouth daily    Essential hypertension  - hydrochlorothiazide (HYDRODIURIL) 50 mg tablet; Take 1 tablet (50 mg total) by mouth daily     ======================================================    Past Medical History:   Diagnosis Date   • Diabetes mellitus ([facility] )    • External hemorrhoids     last assessed 16, resolved 16   • Hernia, ventral     last assessed 12/14/15, resolved 16   • Hypertension    • Incarcerated incisional hernia     last assessed 12/21/15, resolved 16   • Insomnia     last assessed 16, resolved 10/9/17   • Lyme disease    • Morbid obesity with BMI of 45 0-49 9, adult ([facility] ) 5/3/2017   • MS (multiple sclerosis) ([facility] )    • Stroke (cerebrum) ([place] [address] ) 2020   • Type 2 diabetes mellitus with hyperglycemia, without long-term current use of insulin ([place] [address] )      Past Surgical History:   Procedure Laterality Date   • ABCESS DRAINAGE     •  SECTION      x3   • COLONOSCOPY N/A 2017    Procedure: COLONOSCOPY with biopsy;  Surgeon: Nathalie Walter DO;  Location: [facility]; Service: Complete   • HYSTERECTOMY     • IR BIOPSY ABDOMEN  2021   • IR LUMBAR PUNCTURE  3/13/2020   • US GUIDED THYROID BIOPSY  2021         Medications  Current Outpatient Medications   Medication Sig Dispense Refill   • Alcohol Swabs PADS by Does not apply route 4 (four) times a day *Latex Free* 120 each 3   • aspirin 81 mg chewable tablet Chew 1 tablet (81 mg total) daily 90 tablet 1   • atorvastatin (LIPITOR) 40 mg tablet TAKE 1 TABLET BY MOUTH ONCE DAILY IN THE EVENING 90 tablet 0   • Blood Glucose Monitoring Suppl (OneTouch Verio) w/Device KIT Use in the morning 1 kit 0   • Blood Pressure KIT by Does not apply route daily 1 each 0   • cinacalcet (SENSIPAR) 60 MG tablet Take 1 tablet by mouth once daily 30 tablet 0   • diazepam (VALIUM) 5 mg tablet Take 1 tablet 30 minutes prior to MRI  May take 1 tablet just immediately before MRI if needed (Patient taking differently: Take 1 tablet 30 minutes prior to MRI    May take 1 tablet just immediately before MRI if needed) 2 tablet 0   • Diclofenac Sodium (VOLTAREN) 1 % Apply 2 g topically 4 (four) times a day 150 g 1   • DULoxetine (CYMBALTA) 30 mg delayed release capsule Take 1 capsule (30 mg total) by mouth in the morning   30 capsule 1   • glucose blood (OneTouch Verio) test strip Use 1 each in the morning 100 each 1   • hydrALAZINE (APRESOLINE) 10 mg tablet Take 1 tablet (10 mg total) by mouth 3 (three) times a day 90 tablet 0   • hydrochlorothiazide (HYDRODIURIL) 50 mg tablet Take 1 tablet (50 mg total) by mouth daily 90 tablet 3   • Incontinence Supply Disposable (Prevail Women Underwear XL) MISC Use every 6 (six) hours as needed (incontinence) 120 each 11   • Insulin Pen Needle (BD Pen Needle Izzy U/F) 32G X 4 MM MISC Use in the morning 100 each 1   • Interferon Beta-1b (Betaseron) 0 3 MG KIT Inject 1 ml (0 25 mg) subcutaneously every other day 14 kit 11   • Lancets (OneTouch Delica Plus [id number]) MISC Test BG up to 1x daily as directed 100 each 1   • lisinopril (ZESTRIL) 40 mg tablet Take 1 tablet by mouth once daily 90 tablet 0   • metFORMIN (GLUCOPHAGE-XR) 500 mg 24 hr tablet TAKE 2 TABLETS BY MOUTH TWICE DAILY WITH MEALS 360 tablet 0   • metoprolol succinate (TOPROL-XL) 50 mg 24 hr tablet Take 1 tablet (50 mg total) by mouth daily 90 tablet 3   • cholecalciferol (VITAMIN D3) 1,000 units tablet Take 1,000 Units by mouth daily (Patient not taking: Reported on 12/14/2022)     • diphenhydrAMINE (BENADRYL) 25 mg tablet Take 0 5 tablets (12 5 mg total) by mouth every 6 (six) hours (Patient not taking: Reported on 12/14/2022) 20 tablet 0   • divalproex sodium (Depakote ER) 250 mg 24 hr tablet Take 2 tabs for 3 days then 1 tab for 2 days (Patient not taking: Reported on 12/14/2022) 8 tablet 4   • methocarbamol (ROBAXIN) 500 mg tablet Take 1 tablet (500 mg total) by mouth 2 (two) times a day (Patient not taking: Reported on 12/14/2022) 20 tablet 0   • metoclopramide (Reglan) 10 mg tablet Take 1 tablet (10 mg total) by mouth every 6 (six) hours (Patient not taking: Reported on 12/14/2022) 30 tablet 0   • naproxen (Naprosyn) 500 mg tablet Take 1 tablet (500 mg total) by mouth 2 (two) times a day with meals (Patient not taking: Reported on 12/14/2022) 30 tablet 0   • topiramate (TOPAMAX) 25 mg tablet Take 1 tablet (25 mg total) by mouth daily for 7 days, THEN 2 tablets (50 mg total) daily for 7 days, THEN 2 tablets (50 mg total) 2 (two) times a day   (Patient not taking: Reported on 12/14/2022) 60 tablet 0   • vitamin B-12 (VITAMIN B-12) 500 mcg tablet Take 2 tablets (1,000 mcg total) by mouth daily (Patient not taking: Reported on 12/14/2022) 60 tablet 5     Current Facility-Administered Medications   Medication Dose Route Frequency Provider Last Rate Last Admin   • prochlorperazine (COMPAZINE) injection 5 mg  5 mg Intramuscular Q4H PRN Nash Yang MD   5 mg at 11/11/22 1040        Allergies   Allergen Reactions   • Sorbitan Diarrhea, Vomiting and Abdominal Pain   • Victoza [Liraglutide] Nausea Only   • Ambien [Zolpidem Tartrate]    • Demerol [Meperidine]    • Insulin    • Insulin Glargine      Annotation - [id number]: memory loss   • Latex    • Oxycodone Hives and Rash   • Oxycodone-Acetaminophen Vomiting   • Zolpidem Hallucinations and Irritability       Social History     Socioeconomic History   • Marital status: /Civil Union     Spouse name: Not on file   • Number of children: Not on file   • Years of education: Not on file   • Highest education level: Not on file   Occupational History     Employer: [employer]   Tobacco Use   • Smoking status: Never   • Smokeless tobacco: Never   Vaping Use   • Vaping Use: Never used   Substance and Sexual Activity   • Alcohol use: Not Currently     Comment: Social   • Drug use: Yes     Types: Marijuana     Comment: medical marijuana   • Sexual activity: Not Currently     Partners: Male     Birth control/protection: None   Other Topics Concern   • Not on file   Social History Narrative   • Not on file     Social Determinants of Health     Financial Resource Strain: Not on file   Food Insecurity: Not on file   Transportation Needs: Not on file   Physical Activity: Not on file   Stress: Not on file   Social Connections: Not on file   Intimate Partner Violence: Not on file   Housing Stability: Not on file        Family History   Problem Relation Age of Onset   • Glaucoma Mother    • Diabetes Father    • Hypertension Father    • Colon cancer Father    • Alzheimer's disease Father    • Cervical cancer Sister 61   • Breast cancer Sister 50   • Breast cancer Sister 45   • Breast cancer Maternal Grandmother    • Breast cancer Maternal Aunt    • Breast cancer Maternal Aunt    • Breast cancer Maternal Aunt    • Coronary artery disease Family    • Diabetes Family    • Hypertension Family        Lab Results   Component Value Date    WBC 5 45 11/11/2022    HGB 11 8 11/11/2022    HCT 36 5 11/11/2022    MCV 88 11/11/2022     11/11/2022      Lab Results   Component Value Date    SODIUM 140 11/11/2022    K 3 9 11/11/2022     11/11/2022    CO2 30 11/11/2022    BUN 18 11/11/2022    CREATININE 0 67 11/11/2022    GLUC 210 (H) 11/11/2022    CALCIUM 10 7 (H) 11/11/2022      Lab Results   Component Value Date    HGBA1C 8 6 (H) 10/31/2022      No results found for: CHOL  Lab Results   Component Value Date    HDL 42 (L) 09/01/2022    HDL 42 08/10/2021    HDL 44 04/28/2021     Lab Results   Component Value Date    LDLCALC 56 09/01/2022    LDLCALC 59 08/10/2021    LDLCALC 52 04/28/2021     Lab Results   Component Value Date    TRIG 186 (H) 09/01/2022    TRIG 113 08/10/2021    TRIG 60 04/28/2021     No results found for: CHOLHDL   Lab Results   Component Value Date    INR 0 92 05/16/2020    INR 0 96 03/10/2020    INR 0 98 03/09/2020    PROTIME 12 5 05/16/2020    PROTIME 12 9 03/10/2020    PROTIME 13 1 03/09/2020          Patient Active Problem List    Diagnosis Date Noted   • Acute right hemiparesis (HCC) 04/02/2020   • Numbness and tingling of right arm and leg 03/09/2020   • Ambulatory dysfunction 03/09/2020   • Falls frequently 02/09/2020   • Morbid obesity with BMI of 45 0-49 9, adult ([place] [address] ) 05/03/2017   • Ischemic colitis ([place] [address] ) 05/03/2017   • Unintentional weight loss 05/03/2017   • Hypercalcemia 05/03/2017   • Hyperparathyroidism ([place] [address] ) 05/03/2017   • GI bleed 04/27/2017   • Hernia, ventral    • Type 2 diabetes mellitus with hyperglycemia, without long-term current use of insulin ([place] [address] )    • Depression with anxiety 12/08/2016   • Vitamin D deficiency 12/14/2015   • Benign essential hypertension 06/06/2012   • Chronic post-traumatic headache, not intractable 11/23/2022   • New daily persistent headache 11/11/2022   • Medication overuse headache 11/11/2022   • Visual disturbances 11/11/2022   • Nausea & vomiting 09/01/2022   • Bloating 09/01/2022   • Positive for macroalbuminuria 07/15/2022   • Other chronic pain 07/15/2022   • Hip pain, right 06/30/2022   • Respiratory infection 04/12/2022   • Obstructive sleep apnea syndrome    • Hyperlipidemia 04/05/2021   • B12 deficiency 09/09/2020   • Thyroid nodule 09/01/2020   • Chronic daily headache 08/31/2020   • Class 3 severe obesity due to excess calories with serious comorbidity and body mass index (BMI) of 45 0 to 49 9 in adult [facility]) 08/31/2020   • MS (multiple sclerosis) ([place] [address] ) 07/07/2020   • Cognitive decline 07/07/2020   • Left sided numbness 07/07/2020   • Right hemiparesis ([place] [address] ) 07/07/2020   • Neuropathic pain, leg, bilateral 06/05/2020       Portions of the record may have been created with voice recognition software  Occasional wrong word or "sound a like" substitutions may have occurred due to the inherent limitations of voice recognition software  Read the chart carefully and recognize, using context, where substitutions have occurred      Malcolm Anderson DO, [facility]  12/14/2022 10:29 AM

## 2022-12-15 ENCOUNTER — APPOINTMENT (OUTPATIENT)
Dept: PHYSICAL THERAPY | Facility: CLINIC | Age: 62
End: 2022-12-15

## 2022-12-19 ENCOUNTER — APPOINTMENT (OUTPATIENT)
Dept: PHYSICAL THERAPY | Facility: CLINIC | Age: 62
End: 2022-12-19

## 2022-12-22 ENCOUNTER — APPOINTMENT (OUTPATIENT)
Dept: PHYSICAL THERAPY | Facility: CLINIC | Age: 62
End: 2022-12-22

## 2022-12-26 DIAGNOSIS — E83.52 HYPERCALCEMIA: ICD-10-CM

## 2022-12-27 ENCOUNTER — APPOINTMENT (OUTPATIENT)
Dept: PHYSICAL THERAPY | Facility: CLINIC | Age: 62
End: 2022-12-27

## 2022-12-27 RX ORDER — CINACALCET 60 MG/1
TABLET, FILM COATED ORAL
Qty: 30 TABLET | Refills: 0 | Status: SHIPPED | OUTPATIENT
Start: 2022-12-27

## 2022-12-30 ENCOUNTER — APPOINTMENT (OUTPATIENT)
Dept: PHYSICAL THERAPY | Facility: CLINIC | Age: 62
End: 2022-12-30

## 2023-01-02 DIAGNOSIS — E11.65 TYPE 2 DIABETES MELLITUS WITH HYPERGLYCEMIA, WITHOUT LONG-TERM CURRENT USE OF INSULIN (HCC): ICD-10-CM

## 2023-01-03 RX ORDER — METFORMIN HYDROCHLORIDE 500 MG/1
TABLET, EXTENDED RELEASE ORAL
Qty: 360 TABLET | Refills: 0 | Status: SHIPPED | OUTPATIENT
Start: 2023-01-03

## 2023-01-30 DIAGNOSIS — E83.52 HYPERCALCEMIA: ICD-10-CM

## 2023-01-31 ENCOUNTER — TELEPHONE (OUTPATIENT)
Dept: NEUROLOGY | Facility: CLINIC | Age: 63
End: 2023-01-31

## 2023-01-31 RX ORDER — CINACALCET 60 MG/1
TABLET, FILM COATED ORAL
Qty: 30 TABLET | Refills: 0 | Status: SHIPPED | OUTPATIENT
Start: 2023-01-31

## 2023-01-31 NOTE — TELEPHONE ENCOUNTER
LMOM to remind pt of upcoming appt on 02/14/23  left the office number for any questions or concerns

## 2023-02-14 ENCOUNTER — TELEPHONE (OUTPATIENT)
Dept: OBGYN CLINIC | Facility: HOSPITAL | Age: 63
End: 2023-02-14

## 2023-02-14 ENCOUNTER — OFFICE VISIT (OUTPATIENT)
Dept: NEUROLOGY | Facility: CLINIC | Age: 63
End: 2023-02-14

## 2023-02-14 VITALS
TEMPERATURE: 97.3 F | WEIGHT: 292 LBS | RESPIRATION RATE: 18 BRPM | SYSTOLIC BLOOD PRESSURE: 196 MMHG | DIASTOLIC BLOOD PRESSURE: 93 MMHG | HEART RATE: 69 BPM | BODY MASS INDEX: 48.65 KG/M2 | HEIGHT: 65 IN

## 2023-02-14 DIAGNOSIS — G35 MS (MULTIPLE SCLEROSIS) (HCC): Primary | ICD-10-CM

## 2023-02-14 DIAGNOSIS — I10 BENIGN ESSENTIAL HYPERTENSION: ICD-10-CM

## 2023-02-14 DIAGNOSIS — M25.551 HIP PAIN, RIGHT: ICD-10-CM

## 2023-02-14 RX ORDER — DIAZEPAM 5 MG/1
TABLET ORAL
Qty: 2 TABLET | Refills: 0 | Status: SHIPPED | OUTPATIENT
Start: 2023-02-14

## 2023-02-14 NOTE — TELEPHONE ENCOUNTER
Caller: patient    Doctor: Sanket Abreu    Reason for call: patient needs a medication refill on the Voltaren cream  Patient uses the [facility] [address]  Is there anything else the patient can be prescribed for the pain? Tylenol is not working      Call back#: [phone]

## 2023-02-14 NOTE — PROGRESS NOTES
Patient ID: To Hernandez is a 65 y o  female  Assessment/Plan:    MS (multiple sclerosis) ([facility] [address] )  MS remains stable at this time  She continues on Betaseron  As discussed at prior visits, her ongoing complaints and main issues are non-neurologic and related to her medical issues  She does not use her CPAP, HTN and DM remain uncontrolled (reports urinary frequency, likely due to uncontrolled DM), right hip pain from bursitis which limits her ambulation  I have again encouraged her to follow up with her PCP and other specialists (cardiology, endocrinology) in order to optimally manage her medical issues  Will update MRI brain, last completed March 2021 and stable  She had updated MRI c-spine and t-spine in August 2022, and these were also stable with no evidence of disease progression  Again reminded patient that Betaseron will not "fix" her medical issues, or even her already accumulated neurologic symptoms/deficits from prior areas of demyelination, but it is to help prevent further areas of demyelination and new neurologic symptoms/deficits  It seems to be working well for her in that respect  She needs updated labs, which were ordered today (CBC, hepatic function panel)  She also has an outstanding BMP that was ordered by her cardiologist in December (was supposed to get done in 2 weeks), and I reminded her to get this done  Her neurologic exam is stable  Benign essential hypertension  Patient with persistently elevated BP  She was actually sent from our office to the ED at her last visit in November 2022 due to significantly elevated BP with SBP >200, accompanied by a headache  I advised she call PCP and cardiology today to discuss elevated BP  She is overdue for appts with both cardiology and her PCP  She has not had any headaches since November  ED precautions were discussed  Patient will follow up in 3-4 months or sooner if needed       Diagnoses and all orders for this visit: MS ([facility] sclerosis) ([facility] )  -     MRI brain MS wo contrast; Future  -     CBC and differential; Future  -     Hepatic function panel; Future  -     diazepam (VALIUM) 5 mg tablet; Take 1 tablet 30 minutes prior to MRI  May take 1 tablet just immediately before MRI if needed    Benign essential hypertension           Subjective:    HPI    Patient is a 66 yo female who presents to the [address] [place] [address] Drive for follow up regarding her MS  Patient with PMH of HTN, DM type 2, HLD, hyperparathyroidism  She was last seen in November 2022  Patient was initially seen in April 2020 following a hospitalization  Patient developed acute onset of right-sided numbness and weakness on March 9, 2020 in the morning before leaving for work  She was assessed by neurology while in the hospital, initial concern was for stroke  MRI C-spine WO contrast 3/10/2020: There is focal intramedullary T2 hyperintense signal epicentered at the C4 vertebral level  This is more pronounced in the right and central hemicord  The remaining mid to lower cervical cord is more difficult to assess due to the image degradation  There may be additional right hemicord signal abnormality at C5 and C6  MRI c-spine W contrast 3/11/2020: Postcontrast imaging demonstrates enhancement associated with multiple previously described foci of T2 prolongation within the cervical and upper thoracic CORD  MRI brain WO contrast 3/10/2020: There is no discrete mass, mass effect or midline shift  There is no intracranial hemorrhage  There is no evidence of acute infarction and diffusion imaging is unremarkable  Multiple foci of T2 and FLAIR hyperintensity are present within the supratentorial white matter, most pronounced in the anterolateral left frontal lobe, anterior right temporal lobe and right cerebellum/middle cerebellar peduncle     MRI brain W contrast 3/11/2020: Redemonstration of multiple supratentorial and infratentorial scattered areas of white matter FLAIR signal hyperintensity, as described above  Many of the lesions demonstrate a perpendicular configuration of the ventricles and lesions are seen involving the callosal septal interface  Imaging appearance is most suggestive of demyelinating disease/multiple sclerosis with areas of active demyelination as correlated with dedicated MRI of the cervical spine  Concern for MS vs neoplasm vs sarcoid  CT chest abdomen pelvis was unremarkable for any solid tumor, although did demonstrate some calcified lymph nodes and granulomata in the lung fields  Patient had completed CSF analysis was for increased cells: CSF protein 80, CSF white blood cell 37, lymphocyte predominant, JCV and ACE negative, meningitis panel negative, MS panel high IgG index, 3 OCB, flow cytometry hypercellular  Serum NMO/MOG negative, ANCA/DNA/Sjogren's negative, ESR 32, CRP 9 1  She was given IV steroids and oral steroid taper  Patient had repeat imaging in June 2020 and there were several new, enhancing demyelinating lesions in the brain, as well as improvement in the c-spine noted  At timing of visit on 7/7/2020, diagnosis of MS was made  Betaseron was initiated, with first injection 7/24/2020  At first, she was having flu-like symptoms with the titration, but patient was advised to pre-med with NSAIDs/Tylenol and warm showers, and this improved her symptoms  She continued to have issues with the Betaseron  She called in mid October reporting ongoing issues, so Betaseron was held for 2-3 weeks  in November 2020, she was doing better with the Betaseron  Updated imaging was done without contrast due to patient declined contrast (she tells me she was there for "too long" and was tired and wanted to go home)  MRI brain 3/5/2021:  Mild plaque burden, at least 2 of the previously seen enhancing lesions are less conspicuous on FLAIR imaging, indicative of at least partial treatment response    MRI c-spine 3/5/2021: a few, scattered cord lesions redemonstrated  The previously seen enhancing lesion at T1 demonstrates a small amount of myelomalacia  No progressive cord signal abnormality on noncontrast imaging  In March 2022, patient reported feeling generally worse, especially difficulty with stamina and endurance with ambulation  She denied new, focal symptoms, just a decline in general  She also admitted to not using her CPAP, feeling tired  MRI c-spine and t-spine were ordered due to worsening ambulatory dysfunction  She did not proceed with imaging for several months  MRI c-spine and t-spine updated 8/23/22 and both stable  Today, patient reports she “continues to decline”, however when asked about the symptoms she is having, they are essentially all non-neurologic  She continues to c/o ongoing right hip pain, says she was diagnosed with bursitis but nothing is helping  She does her exercises 2x/day and nothing helps  She is urinating frequently, and her DM is uncontrolled (most recent A1C 8 6)  Her BP remains quite elevated  In fact, when she was here in November 2022, she was sent to the ED from the office due to elevated BP accompanied by headache  CTH in the ED unremarkable  She has not had any headaches since November  She has seen her PCP and cardiology regarding uncontrolled HTN, but has not seen either in several months, cancelled a cardiology appt 2 weeks ago (says she was in too much pain that day)  She was supposed to get a repeat BMP done for cardiology but has not done that  She denies vision changes, speech/swallowing difficulty, bowel/bladder changes besides urinary frequency, and denies falls  She uses her rolling walker to ambulate      The following portions of the patient's history were reviewed and updated as appropriate: current medications, past family history, past medical history, past social history, past surgical history and problem list          Objective:    Blood pressure (!) 196/93, pulse 69, temperature (!) 97 3 °F (36 3 °C), temperature source Tympanic, resp  rate 18, height 5' 5" (1 651 m), weight 132 kg (292 lb)    Physical Exam  Constitutional:       Appearance: Normal appearance  HENT:      Head: Normocephalic and atraumatic  Eyes:      Extraocular Movements: EOM normal       Pupils: Pupils are equal, round, and reactive to light  Neurological:      Mental Status: She is alert  Deep Tendon Reflexes:      Reflex Scores:       Bicep reflexes are 1+ on the right side and 1+ on the left side  Brachioradialis reflexes are 1+ on the right side and 1+ on the left side  Patellar reflexes are 1+ on the right side and 1+ on the left side  Achilles reflexes are 0 on the right side and 0 on the left side  Psychiatric:         Mood and Affect: Mood normal          Speech: Speech normal          Behavior: Behavior normal          Neurological Exam  Mental Status  Alert  Oriented to person, place, time and situation  Speech is normal  Language is fluent with no aphasia  Attention and concentration are normal     Cranial Nerves  CN II: Visual fields full to confrontation  CN III, IV, VI: Extraocular movements intact bilaterally  Pupils equal round and reactive to light bilaterally  CN V: Facial sensation is normal   CN VII: Full and symmetric facial movement  CN VIII: Hearing is normal   CN IX, X: Palate elevates symmetrically  CN XI: Shoulder shrug strength is normal   CN XII: Tongue midline without atrophy or fasciculations  Motor   Normal muscle tone  Strength is 5/5 in all four extremities except as noted  Right hip testing limited by pain  She would not let me apply force to test her right hip flexion, citing pain  Sensory  Light touch is normal in upper and lower extremities       Reflexes                                            Right                      Left  Brachioradialis                    1+                         1+  Biceps 1+                         1+  Patellar                                1+                         1+  Achilles                                0                         0    Coordination  Right: Finger-to-nose normal Left: Finger-to-nose normal     Gait    Wide based gait, using rolling walker   ROS:    Review of Systems   Constitutional: Negative for chills and fever  HENT: Negative for ear pain and sore throat  Eyes: Negative for pain and visual disturbance  Respiratory: Negative for cough and shortness of breath  Cardiovascular: Negative for chest pain and palpitations  Gastrointestinal: Negative for abdominal pain and vomiting  Genitourinary: Positive for frequency  Negative for dysuria and hematuria  Musculoskeletal: Negative for arthralgias and back pain  Skin: Negative for color change and rash  Neurological: Positive for tremors, weakness (bilateral legs) and numbness (bilateral arms and legs)  Negative for seizures and syncope  Psychiatric/Behavioral: Negative for sleep disturbance  All other systems reviewed and are negative      I personally reviewed and updated the ROS as appropriate

## 2023-02-14 NOTE — ASSESSMENT & PLAN NOTE
Patient with persistently elevated BP  She was actually sent from our office to the ED at her last visit in November 2022 due to significantly elevated BP with SBP >200, accompanied by a headache  I advised she call PCP and cardiology today to discuss elevated BP  She is overdue for appts with both cardiology and her PCP  She has not had any headaches since November  ED precautions were discussed

## 2023-02-14 NOTE — PATIENT INSTRUCTIONS
Continue Betaseron  Update MRI brain at this time    Please call your PCP and cardiologist for an appt to be seen and let them know that your blood pressure remains quite elevated  Your cardiologist also entered lab work for you to do, so that should be done    I am also going to order some additional blood work     Follow up in 3-4 months or sooner if needed

## 2023-02-14 NOTE — TELEPHONE ENCOUNTER
Voltaren cream refilled today  She is unable to take NSAIDs due to her GI issues  Can offer cortisone injection into the hip joint if interested

## 2023-02-16 ENCOUNTER — RA CDI HCC (OUTPATIENT)
Dept: OTHER | Facility: HOSPITAL | Age: 63
End: 2023-02-16

## 2023-02-16 NOTE — PROGRESS NOTES
[facility]  coding opportunities       Chart reviewed, no opportunity found:   [address]        Patients Insurance     Medicare Insurance: [employer]

## 2023-02-22 ENCOUNTER — OFFICE VISIT (OUTPATIENT)
Dept: FAMILY MEDICINE CLINIC | Facility: CLINIC | Age: 63
End: 2023-02-22

## 2023-02-22 ENCOUNTER — APPOINTMENT (OUTPATIENT)
Dept: LAB | Facility: CLINIC | Age: 63
End: 2023-02-22

## 2023-02-22 ENCOUNTER — OFFICE VISIT (OUTPATIENT)
Dept: PODIATRY | Facility: CLINIC | Age: 63
End: 2023-02-22

## 2023-02-22 VITALS
HEART RATE: 66 BPM | BODY MASS INDEX: 47.65 KG/M2 | WEIGHT: 286 LBS | HEIGHT: 65 IN | TEMPERATURE: 97.4 F | DIASTOLIC BLOOD PRESSURE: 90 MMHG | OXYGEN SATURATION: 97 % | SYSTOLIC BLOOD PRESSURE: 140 MMHG

## 2023-02-22 VITALS — HEIGHT: 65 IN | BODY MASS INDEX: 48.59 KG/M2

## 2023-02-22 DIAGNOSIS — E66.01 MORBID OBESITY WITH BMI OF 45.0-49.9, ADULT (HCC): ICD-10-CM

## 2023-02-22 DIAGNOSIS — G35 MS (MULTIPLE SCLEROSIS) (HCC): ICD-10-CM

## 2023-02-22 DIAGNOSIS — K55.9 ISCHEMIC COLITIS (HCC): ICD-10-CM

## 2023-02-22 DIAGNOSIS — E11.65 TYPE 2 DIABETES MELLITUS WITH HYPERGLYCEMIA, WITHOUT LONG-TERM CURRENT USE OF INSULIN (HCC): ICD-10-CM

## 2023-02-22 DIAGNOSIS — I10 BENIGN ESSENTIAL HYPERTENSION: ICD-10-CM

## 2023-02-22 DIAGNOSIS — M25.551 HIP PAIN, RIGHT: ICD-10-CM

## 2023-02-22 DIAGNOSIS — R11.2 NAUSEA AND VOMITING, UNSPECIFIED VOMITING TYPE: Primary | ICD-10-CM

## 2023-02-22 DIAGNOSIS — E11.42 DIABETIC POLYNEUROPATHY ASSOCIATED WITH TYPE 2 DIABETES MELLITUS (HCC): ICD-10-CM

## 2023-02-22 DIAGNOSIS — R11.2 NAUSEA AND VOMITING, UNSPECIFIED VOMITING TYPE: ICD-10-CM

## 2023-02-22 DIAGNOSIS — E21.3 HYPERPARATHYROIDISM (HCC): ICD-10-CM

## 2023-02-22 DIAGNOSIS — R00.2 PALPITATIONS: ICD-10-CM

## 2023-02-22 DIAGNOSIS — E11.42 DIABETIC POLYNEUROPATHY ASSOCIATED WITH TYPE 2 DIABETES MELLITUS (HCC): Primary | ICD-10-CM

## 2023-02-22 DIAGNOSIS — E78.5 HYPERLIPIDEMIA, UNSPECIFIED HYPERLIPIDEMIA TYPE: ICD-10-CM

## 2023-02-22 LAB
ALBUMIN SERPL BCP-MCNC: 3.6 G/DL (ref 3.5–5)
ALP SERPL-CCNC: 105 U/L (ref 46–116)
ALT SERPL W P-5'-P-CCNC: 24 U/L (ref 12–78)
ANION GAP SERPL CALCULATED.3IONS-SCNC: 3 MMOL/L (ref 4–13)
AST SERPL W P-5'-P-CCNC: 15 U/L (ref 5–45)
BASOPHILS # BLD AUTO: 0.02 THOUSANDS/ÂΜL (ref 0–0.1)
BASOPHILS NFR BLD AUTO: 0 % (ref 0–1)
BILIRUB DIRECT SERPL-MCNC: 0.22 MG/DL (ref 0–0.2)
BILIRUB SERPL-MCNC: 1.08 MG/DL (ref 0.2–1)
BUN SERPL-MCNC: 16 MG/DL (ref 5–25)
CALCIUM SERPL-MCNC: 10.4 MG/DL (ref 8.3–10.1)
CHLORIDE SERPL-SCNC: 105 MMOL/L (ref 96–108)
CO2 SERPL-SCNC: 27 MMOL/L (ref 21–32)
CREAT SERPL-MCNC: 0.81 MG/DL (ref 0.6–1.3)
EOSINOPHIL # BLD AUTO: 0.1 THOUSAND/ÂΜL (ref 0–0.61)
EOSINOPHIL NFR BLD AUTO: 1 % (ref 0–6)
ERYTHROCYTE [DISTWIDTH] IN BLOOD BY AUTOMATED COUNT: 13.9 % (ref 11.6–15.1)
GFR SERPL CREATININE-BSD FRML MDRD: 78 ML/MIN/1.73SQ M
GLUCOSE P FAST SERPL-MCNC: 275 MG/DL (ref 65–99)
HCT VFR BLD AUTO: 37.3 % (ref 34.8–46.1)
HGB BLD-MCNC: 11.7 G/DL (ref 11.5–15.4)
IMM GRANULOCYTES # BLD AUTO: 0.02 THOUSAND/UL (ref 0–0.2)
IMM GRANULOCYTES NFR BLD AUTO: 0 % (ref 0–2)
LYMPHOCYTES # BLD AUTO: 1.32 THOUSANDS/ÂΜL (ref 0.6–4.47)
LYMPHOCYTES NFR BLD AUTO: 17 % (ref 14–44)
MCH RBC QN AUTO: 27.9 PG (ref 26.8–34.3)
MCHC RBC AUTO-ENTMCNC: 31.4 G/DL (ref 31.4–37.4)
MCV RBC AUTO: 89 FL (ref 82–98)
MONOCYTES # BLD AUTO: 0.47 THOUSAND/ÂΜL (ref 0.17–1.22)
MONOCYTES NFR BLD AUTO: 6 % (ref 4–12)
NEUTROPHILS # BLD AUTO: 5.92 THOUSANDS/ÂΜL (ref 1.85–7.62)
NEUTS SEG NFR BLD AUTO: 76 % (ref 43–75)
NRBC BLD AUTO-RTO: 0 /100 WBCS
PLATELET # BLD AUTO: 312 THOUSANDS/UL (ref 149–390)
PMV BLD AUTO: 10.1 FL (ref 8.9–12.7)
POTASSIUM SERPL-SCNC: 4.2 MMOL/L (ref 3.5–5.3)
PROT SERPL-MCNC: 7.5 G/DL (ref 6.4–8.4)
RBC # BLD AUTO: 4.2 MILLION/UL (ref 3.81–5.12)
SL AMB POCT HEMOGLOBIN AIC: 10.3 (ref ?–6.5)
SODIUM SERPL-SCNC: 135 MMOL/L (ref 135–147)
WBC # BLD AUTO: 7.85 THOUSAND/UL (ref 4.31–10.16)

## 2023-02-22 RX ORDER — ONDANSETRON 4 MG/1
4 TABLET, ORALLY DISINTEGRATING ORAL EVERY 6 HOURS PRN
Qty: 20 TABLET | Refills: 0 | Status: SHIPPED | OUTPATIENT
Start: 2023-02-22 | End: 2023-03-03 | Stop reason: SDUPTHER

## 2023-02-22 NOTE — PROGRESS NOTES
[facility]    ASSESSMENT AND PLAN     1  Nausea and vomiting, unspecified vomiting type  Assessment & Plan:  Reports persistent nausea with intermittent vomiting since Sunday  No other symptoms  Physical assessment unremarkable today  No pain with palpation  She has had no change in bowels - reporting a "regular" bowel movement yesterday  Reports she does have a history of recurrent episodes, with last flare one month ago  GI history reviewed: colitis; GI bleed (2017)  Advised brat diet today and good hydration  Small dose Zofran for PRN use (reports using in the past with good result)  Will check labs - advised she complete today downstairs  Will refer to GI for further evaluation of her recurrent symptoms  ER precautions for acute change/worsening of symptoms prior to specialist evaluation  Orders:  -     Comprehensive metabolic panel; Future  -     CBC and differential; Future  -     Ambulatory Referral to Gastroenterology; Future  -     ondansetron (ZOFRAN-ODT) 4 mg disintegrating tablet; Take 1 tablet (4 mg total) by mouth every 6 (six) hours as needed for nausea or vomiting    2  Type 2 diabetes mellitus with hyperglycemia, without long-term current use of insulin ([facility])  Assessment & Plan:    Lab Results   Component Value Date    HGBA1C 10 3 (A) 02/22/2023   Historically poor control, but with A1c now worsening  Reports inconsistent with finger stick - but reports averaging 180-220 when she does check  Reports compliance with Metformin  Does have Endo appt 3/2  Reviewed healthy, diabetic diet  Note has been nauseous for the last few days, but reports she has been having  intermittent/recurrent flares  Discussed possible correlation: gastroparesis  Referral has been placed to GI for evaluation of her symptoms  Advised to also discuss with Endo at upcoming appt  Orders:  -     POCT hemoglobin A1c  -     Comprehensive metabolic panel; Future    3   Benign essential hypertension  - Comprehensive metabolic panel; Future  -     CBC and differential; Future    4  Hyperlipidemia, unspecified hyperlipidemia type  Assessment & Plan:  Reports compliance and tolerance with Atorvastatin 40  Last panel September: 135/186/42/56  Recheck with next labs  5  Ischemic colitis ([place] [address] )  Comments:  History of  Referrla to GI today with recurrent Nausea/vomitting symptoms    6  Morbid obesity with BMI of 45 0-49 9, adult [facility])  Comments:  Healthy lifestyle reviewed  Pt shows poor motivation for change    7  Hyperparathyroidism ([place] [address] )  Comments:  Managed by Endo    8  MS (multiple sclerosis) ([place] [address] )  Assessment & Plan:  Managed by Neurology  Recent office visit note appreciated      9  Hip pain, right  Assessment & Plan:  Continue PT  Ortho appt scheduled 3/7  Xray October: Mild to moderate osteoarthritic degenerative changes of the right hip joint      10  Palpitations  Comments:  Denies today  Compliant with Toprol xl 50  Over due for cardiology follow up - appt scheduled 3/21         SUBJECTIVE       Patient ID: Maximiliano Gupta is a 65 y o  female  Chief Complaint   Patient presents with   • Multiple Concerns     Neuro asked her to come multiple issues   • Vomiting     Anytime she eats or drinks since Sunday        HISTORY OF PRESENT ILLNESS    Routine check up        The following portions of the patient's history were reviewed and updated as appropriate: allergies, current medications, past family history, past medical history, past social history, past surgical history, and problem list     REVIEW OF SYSTEMS  Review of Systems   Constitutional: Positive for appetite change  Negative for activity change and fever  Fatigue: chronic  Respiratory: Negative  Cardiovascular: Negative  Gastrointestinal: Positive for nausea and vomiting  Negative for abdominal distention, abdominal pain, blood in stool, constipation and diarrhea  Genitourinary: Negative  Neurological: Negative  Psychiatric/Behavioral: Negative  OBJECTIVE      VITAL SIGNS  /90 (BP Location: Left arm, Patient Position: Sitting, Cuff Size: Large)   Pulse 66   Temp (!) 97 4 °F (36 3 °C) (Tympanic)   Ht 5' 5" (1 651 m)   Wt 130 kg (286 lb)   LMP  (LMP Unknown)   SpO2 97%   BMI 47 59 kg/m²   BP noted - pt not feeling well today  Recheck once acute GI resolved  CURRENT MEDICATIONS    Current Outpatient Medications:   •  Alcohol Swabs PADS, by Does not apply route 4 (four) times a day *Latex Free*, Disp: 120 each, Rfl: 3  •  aspirin 81 mg chewable tablet, Chew 1 tablet (81 mg total) daily, Disp: 90 tablet, Rfl: 1  •  atorvastatin (LIPITOR) 40 mg tablet, TAKE 1 TABLET BY MOUTH ONCE DAILY IN THE EVENING, Disp: 90 tablet, Rfl: 0  •  Blood Glucose Monitoring Suppl (OneTouch Verio) w/Device KIT, Use in the morning, Disp: 1 kit, Rfl: 0  •  Blood Pressure KIT, by Does not apply route daily, Disp: 1 each, Rfl: 0  •  cholecalciferol (VITAMIN D3) 1,000 units tablet, Take 1,000 Units by mouth daily, Disp: , Rfl:   •  cinacalcet (SENSIPAR) 60 MG tablet, Take 1 tablet by mouth once daily, Disp: 30 tablet, Rfl: 0  •  diazepam (VALIUM) 5 mg tablet, Take 1 tablet 30 minutes prior to MRI    May take 1 tablet just immediately before MRI if needed, Disp: 2 tablet, Rfl: 0  •  Diclofenac Sodium (VOLTAREN) 1 %, Apply 2 g topically 4 (four) times a day, Disp: 150 g, Rfl: 1  •  DULoxetine (CYMBALTA) 30 mg delayed release capsule, Take 1 capsule (30 mg total) by mouth in the morning , Disp: 30 capsule, Rfl: 1  •  glucose blood (OneTouch Verio) test strip, Use 1 each in the morning, Disp: 100 each, Rfl: 1  •  hydrochlorothiazide (HYDRODIURIL) 50 mg tablet, Take 1 tablet (50 mg total) by mouth daily, Disp: 90 tablet, Rfl: 3  •  Incontinence Supply Disposable (Prevail Women Underwear XL) MISC, Use every 6 (six) hours as needed (incontinence), Disp: 120 each, Rfl: 11  •  Insulin Pen Needle (BD Pen Needle Izzy U/F) 32G X 4 MM MISC, Use in the morning, Disp: 100 each, Rfl: 1  •  Interferon Beta-1b (Betaseron) 0 3 MG KIT, Inject 1 ml (0 25 mg) subcutaneously every other day, Disp: 14 kit, Rfl: 11  •  Lancets (OneTouch Delica Plus SZHRBK27V) MISC, Test BG up to 1x daily as directed, Disp: 100 each, Rfl: 1  •  lisinopril (ZESTRIL) 40 mg tablet, Take 1 tablet by mouth once daily, Disp: 90 tablet, Rfl: 0  •  metFORMIN (GLUCOPHAGE-XR) 500 mg 24 hr tablet, TAKE 2 TABLETS BY MOUTH TWICE DAILY WITH MEALS, Disp: 360 tablet, Rfl: 0  •  metoprolol succinate (TOPROL-XL) 50 mg 24 hr tablet, Take 1 tablet (50 mg total) by mouth daily, Disp: 90 tablet, Rfl: 3  •  naproxen (Naprosyn) 500 mg tablet, Take 1 tablet (500 mg total) by mouth 2 (two) times a day with meals, Disp: 30 tablet, Rfl: 0  •  ondansetron (ZOFRAN-ODT) 4 mg disintegrating tablet, Take 1 tablet (4 mg total) by mouth every 6 (six) hours as needed for nausea or vomiting, Disp: 20 tablet, Rfl: 0  •  vitamin B-12 (VITAMIN B-12) 500 mcg tablet, Take 2 tablets (1,000 mcg total) by mouth daily, Disp: 60 tablet, Rfl: 5  •  diphenhydrAMINE (BENADRYL) 25 mg tablet, Take 0 5 tablets (12 5 mg total) by mouth every 6 (six) hours (Patient not taking: Reported on 2/14/2023), Disp: 20 tablet, Rfl: 0  •  hydrALAZINE (APRESOLINE) 10 mg tablet, Take 1 tablet (10 mg total) by mouth 3 (three) times a day (Patient not taking: Reported on 2/14/2023), Disp: 90 tablet, Rfl: 0  •  methocarbamol (ROBAXIN) 500 mg tablet, Take 1 tablet (500 mg total) by mouth 2 (two) times a day (Patient not taking: Reported on 2/14/2023), Disp: 20 tablet, Rfl: 0  •  metoclopramide (Reglan) 10 mg tablet, Take 1 tablet (10 mg total) by mouth every 6 (six) hours (Patient not taking: Reported on 2/14/2023), Disp: 30 tablet, Rfl: 0    Current Facility-Administered Medications:   •  prochlorperazine (COMPAZINE) injection 5 mg, 5 mg, Intramuscular, Q4H PRN, Carolina Sanders MD, 5 mg at 11/11/22 1040      PHYSICAL EXAMINATION   Physical Exam  Vitals and nursing note reviewed  Constitutional:       General: She is not in acute distress  Appearance: Normal appearance  She is not ill-appearing  HENT:      Head: Normocephalic  Cardiovascular:      Rate and Rhythm: Normal rate and regular rhythm  Pulmonary:      Effort: Pulmonary effort is normal  No respiratory distress  Breath sounds: Normal breath sounds and air entry  Abdominal:      General: Bowel sounds are normal       Palpations: Abdomen is soft  Tenderness: There is no abdominal tenderness  There is no guarding or rebound  Skin:     General: Skin is warm and dry  Neurological:      General: No focal deficit present  Mental Status: She is alert and oriented to person, place, and time     Psychiatric:         Attention and Perception: Attention normal          Mood and Affect: Mood normal          Behavior: Behavior normal

## 2023-02-22 NOTE — ASSESSMENT & PLAN NOTE
Reports persistent nausea with intermittent vomiting since Sunday  No other symptoms  Physical assessment unremarkable today  No pain with palpation  She has had no change in bowels - reporting a "regular" bowel movement yesterday  Reports she does have a history of recurrent episodes, with last flare one month ago  GI history reviewed: colitis; GI bleed (2017)  Advised brat diet today and good hydration  Small dose Zofran for PRN use (reports using in the past with good result)  Will check labs - advised she complete today downstairs  Will refer to GI for further evaluation of her recurrent symptoms  ER precautions for acute change/worsening of symptoms prior to specialist evaluation

## 2023-02-22 NOTE — PROGRESS NOTES
Patient presents for palliative diabetic foot care  No acute pedal disorder noted  Patient has severe neuropathy in feet and legs secondary to combination of diabetes and prior stroke  Pedal pulses are within normal limits  Treatment consisted of trimming of elongated toenails  Patient is rescheduled in 3 months

## 2023-02-23 ENCOUNTER — TELEPHONE (OUTPATIENT)
Dept: GASTROENTEROLOGY | Facility: CLINIC | Age: 63
End: 2023-02-23

## 2023-02-23 NOTE — TELEPHONE ENCOUNTER
Patients GI provider:  Dr Jolene Thomas    Number to return call: [phone]    Reason for call: Pt calling stating she has been vomiting and has nausea      Scheduled procedure/appointment date if applicable: Apt 9/42/81

## 2023-02-23 NOTE — ASSESSMENT & PLAN NOTE
Continue PT  Ortho appt scheduled 3/7    Xray October: Mild to moderate osteoarthritic degenerative changes of the right hip joint

## 2023-02-23 NOTE — TELEPHONE ENCOUNTER
Last OV 9/1/2022 Dr Alissa Silver  Next OV 5/24/2023    Hx nausea and vomiting, ischemic colitis, cyclical vomiting syndrome  Pt reports persistent nausea and intermittent vomiting started Sunday  Denies fever, denies abdominal pain  Pt able to eat cheerios and fluids today without an episode of vomiting  Last bowel movement today, formed  Denies lightheadedness/dizziness  Pt saw family medicine yesterday for these complaints, Zofran ordered- pt was unaware of the order and pt will  prescription today  Recommended  Helix/Brat diet     Zofran PRN  Increase hydration  Reviewed s/s for ED evaluation     Clerical- could you call patient to schedule EGD and Colonoscopy that was ordered at last OV

## 2023-02-23 NOTE — ASSESSMENT & PLAN NOTE
Lab Results   Component Value Date    HGBA1C 10 3 (A) 02/22/2023   Historically poor control, but with A1c now worsening  Reports inconsistent with finger stick - but reports averaging 180-220 when she does check  Reports compliance with Metformin  Does have Endo appt 3/2  Reviewed healthy, diabetic diet  Note has been nauseous for the last few days, but reports she has been having  intermittent/recurrent flares  Discussed possible correlation: gastroparesis  Referral has been placed to GI for evaluation of her symptoms  Advised to also discuss with Endo at upcoming appt

## 2023-02-23 NOTE — ASSESSMENT & PLAN NOTE
Reports compliance and tolerance with Atorvastatin 40  Last panel September: 135/186/42/56  Recheck with next labs

## 2023-03-03 DIAGNOSIS — R11.2 NAUSEA AND VOMITING, UNSPECIFIED VOMITING TYPE: ICD-10-CM

## 2023-03-03 RX ORDER — ONDANSETRON 4 MG/1
4 TABLET, ORALLY DISINTEGRATING ORAL EVERY 6 HOURS PRN
Qty: 20 TABLET | Refills: 0 | Status: SHIPPED | OUTPATIENT
Start: 2023-03-03

## 2023-03-03 NOTE — TELEPHONE ENCOUNTER
After speaking with Catalino regarding the medication refill, Chas Adame stated that pt needed to be seen by her PCP to be evaluated   Called pt back and left message to call the office to schedule with her PCP

## 2023-03-03 NOTE — TELEPHONE ENCOUNTER
Pt called stating that she was still throwing up and losing weight  Pt stated gastro could not get her in until May  I called gastro and was able to get her in 3/30/23 at [address] [place] [address] location - patient declined the appt and requested a refill on her Zofran

## 2023-03-07 ENCOUNTER — OFFICE VISIT (OUTPATIENT)
Dept: OBGYN CLINIC | Facility: MEDICAL CENTER | Age: 63
End: 2023-03-07

## 2023-03-07 ENCOUNTER — HOSPITAL ENCOUNTER (OUTPATIENT)
Facility: MEDICAL CENTER | Age: 63
Discharge: HOME | End: 2023-03-07

## 2023-03-07 VITALS
HEIGHT: 65 IN | BODY MASS INDEX: 47.32 KG/M2 | SYSTOLIC BLOOD PRESSURE: 153 MMHG | DIASTOLIC BLOOD PRESSURE: 86 MMHG | HEART RATE: 71 BPM | WEIGHT: 284 LBS

## 2023-03-07 DIAGNOSIS — G35 MS (MULTIPLE SCLEROSIS) (HCC): ICD-10-CM

## 2023-03-07 DIAGNOSIS — M16.11 PRIMARY OSTEOARTHRITIS OF RIGHT HIP: Primary | ICD-10-CM

## 2023-03-07 NOTE — PROGRESS NOTES
Hip New Office Note    Assessment:     1  Primary osteoarthritis of right hip        Plan:  Findings today are consistent with osteoarthritis of the right hip  Imaging and prognosis was reviewed with the patient today  Patient has completed conservative treatments in the form of physical therapy and anti-inflammatories with continued pain  A referral to spine and pain was place today for a intra-articular cortisone injection to the right hip  Patient should continue with home exercise plan and anti-inflammatories  She can follow up in 2-3 months following cortisone injection for repeat evaluation  Problem List Items Addressed This Visit        Other    Hip pain, right - Primary      Subjective:     Patient ID: Curtis Cummings is a 65 y o  female  Chief Complaint:  HPI:  65year old female patient presents for a follow up of right hip pain- osteoarthritis  Since last visit she states that her symptoms have remained the same  She attended physical therapy for 2 months which she notes helped with over all strength of her right leg, but did not help with her pain  Pain is located posterior hip/buttock hip without radiating pain  She denies any low back pain  She has been taking Advil and using Voltaren gel with no relief  Patient ambulates with rollaider walker  Patient would be interested in IA cortisone injection to the hip       Allergy:  Allergies   Allergen Reactions   • Sorbitan Diarrhea, Vomiting and Abdominal Pain   • Victoza [Liraglutide] Nausea Only   • Ambien [Zolpidem Tartrate]    • Demerol [Meperidine]    • Insulin    • Insulin Glargine      Annotation - [id number]: memory loss   • Latex    • Oxycodone Hives and Rash   • Oxycodone-Acetaminophen Vomiting   • Zolpidem Hallucinations and Irritability     Medications:  all current active meds have been reviewed  Past Medical History:  Past Medical History:   Diagnosis Date   • Diabetes mellitus ([facility] )    • External hemorrhoids     last assessed 4/7/16, resolved 16   • Hernia, ventral     last assessed 12/14/15, resolved 16   • Hypertension    • Incarcerated incisional hernia     last assessed 12/21/15, resolved 16   • Insomnia     last assessed 16, resolved 10/9/17   • Lyme disease    • Morbid obesity with BMI of 45 0-49 9, adult ([facility] ) 5/3/2017   • MS (multiple sclerosis) ([place] [address] )    • Stroke (cerebrum) ([facility] ) 2020   • Type 2 diabetes mellitus with hyperglycemia, without long-term current use of insulin ([place] [address] )      Past Surgical History:  Past Surgical History:   Procedure Laterality Date   • ABCESS DRAINAGE     •  SECTION      x3   • COLONOSCOPY N/A 2017    Procedure: COLONOSCOPY with biopsy;  Surgeon: Carlee Dupont DO;  Location: [facility];   Service: Complete   • HYSTERECTOMY     • IR BIOPSY ABDOMEN  2021   • IR LUMBAR PUNCTURE  3/13/2020   • US GUIDED THYROID BIOPSY  2021     Family History:  Family History   Problem Relation Age of Onset   • Glaucoma Mother    • Diabetes Father    • Hypertension Father    • Colon cancer Father    • Alzheimer's disease Father    • Cervical cancer Sister 61   • Breast cancer Sister 50   • Breast cancer Sister 45   • Breast cancer Maternal Grandmother    • Breast cancer Maternal Aunt    • Breast cancer Maternal Aunt    • Breast cancer Maternal Aunt    • Coronary artery disease Family    • Diabetes Family    • Hypertension Family      Social History:  Social History     Substance and Sexual Activity   Alcohol Use Not Currently    Comment: Social     Social History     Substance and Sexual Activity   Drug Use Yes   • Types: Marijuana    Comment: medical marijuana     Social History     Tobacco Use   Smoking Status Never   Smokeless Tobacco Never           ROS:  General: Per HPI  Skin: Negative, except if noted below  HEENT: Negative  Respiratory: Negative  Cardiovascular: Negative  Gastrointestinal: Negative  Urinary: Negative  Vascular: Negative  Musculoskeletal: Positive per HPI Neurologic: Positive per HPI  Endocrine: Negative    Objective:  BP Readings from Last 1 Encounters:   03/07/23 153/86      Wt Readings from Last 1 Encounters:   03/07/23 129 kg (284 lb)        Respiratory:   non-labored respirations    Lymphatics:  no palpable lymph nodes    Gait and Station:   ambulates with rollaider    Neurologic:   Alert and oriented times 3  Patient with normal sensation except as noted below  Deep tendon reflexes 2+ except as noted in MSK exam    Bilateral Lower Extremity:    Right Hip     Inspection: skin intact     Range of Motion: Pain at end range of motion     +log roll    - SLR    Motor: 5/5 IP/Q/HS/TA/GS    Pulses: 2+ DP / 2+ PT    SILT DP/SP/S/S/TN    Imaging:  My interpretation XR AP pelvis/ right hip: mild-moderate joint space narrowing, subchondral sclerosis, subchondral cysts, osteophyte formation  No fracture or dislocation  BMI:   Estimated body mass index is 47 26 kg/m² as calculated from the following:    Height as of this encounter: 5' 5" (1 651 m)  Weight as of this encounter: 129 kg (284 lb)  BSA:   Estimated body surface area is 2 3 meters squared as calculated from the following:    Height as of this encounter: 5' 5" (1 651 m)  Weight as of this encounter: 129 kg (284 lb)             Scribe Attestation    I,:  Get Fitting am acting as a scribe while in the presence of the attending physician :       I,:  Ivy [place]ows, DO personally performed the services described in this documentation    as scribed in my presence :

## 2023-03-08 DIAGNOSIS — E11.65 TYPE 2 DIABETES MELLITUS WITH HYPERGLYCEMIA, WITHOUT LONG-TERM CURRENT USE OF INSULIN (HCC): ICD-10-CM

## 2023-03-08 DIAGNOSIS — I10 BENIGN ESSENTIAL HYPERTENSION: ICD-10-CM

## 2023-03-08 RX ORDER — LISINOPRIL 40 MG/1
40 TABLET ORAL DAILY
Qty: 90 TABLET | Refills: 0 | Status: SHIPPED | OUTPATIENT
Start: 2023-03-08

## 2023-03-09 ENCOUNTER — TELEPHONE (OUTPATIENT)
Dept: NEUROLOGY | Facility: CLINIC | Age: 63
End: 2023-03-09

## 2023-03-09 ENCOUNTER — TELEPHONE (OUTPATIENT)
Dept: ENDOCRINOLOGY | Facility: CLINIC | Age: 63
End: 2023-03-09

## 2023-03-09 DIAGNOSIS — G35 MS (MULTIPLE SCLEROSIS) (HCC): Primary | ICD-10-CM

## 2023-03-09 RX ORDER — METFORMIN HYDROCHLORIDE 500 MG/1
TABLET, EXTENDED RELEASE ORAL
Qty: 360 TABLET | Refills: 0 | Status: SHIPPED | OUTPATIENT
Start: 2023-03-09

## 2023-03-09 NOTE — TELEPHONE ENCOUNTER
Spoke w/pt  Advised her of note/recommendations below  Pt verbalized understanding  She is agreeable to MRI w/contrast     Newt Manjit - please place MRI order  Thanks!

## 2023-03-09 NOTE — TELEPHONE ENCOUNTER
Patient had MRI done for surveillance on 3/7/23, therefore I ordered WO contrast   MRI is demonstrating a new lesion on the left side of her brain, measuring 1 5cm, which is a pretty good size  Would she be agreeable to going back to MRI for an exam with contrast to see if there is active demyelination? I am less suspicious for this, as lesion is not positive on DWI, but still could be enhancement if this is a fairly new lesion  If no enhancement, would be unsure when this developed, as this was compared to an exam from 2 year ago  She recently had labs done and kidney function is good

## 2023-03-10 ENCOUNTER — TELEPHONE (OUTPATIENT)
Dept: NEUROLOGY | Facility: CLINIC | Age: 63
End: 2023-03-10

## 2023-03-10 DIAGNOSIS — G35 MS (MULTIPLE SCLEROSIS) (HCC): ICD-10-CM

## 2023-03-10 RX ORDER — DIAZEPAM 5 MG/1
TABLET ORAL
Qty: 2 TABLET | Refills: 0 | Status: SHIPPED | OUTPATIENT
Start: 2023-03-10

## 2023-03-10 NOTE — TELEPHONE ENCOUNTER
VM Transcribed    Corey medina regarding medication I will need to have the M R I done on the 23rd at 6 AM   ___________________________________    Mavis Owusu [phone]    Phone call to pt  MRI scheduled for 3-23-23  Pt of Miriam Meng  Pt is requesting Rx for valium 5mg # 2  Pt voiced understands and all questions answered  Lavonne Briones, please send Rx to [facility] if agreeable  Thank you!

## 2023-03-11 DIAGNOSIS — E78.5 HYPERLIPIDEMIA, UNSPECIFIED HYPERLIPIDEMIA TYPE: ICD-10-CM

## 2023-03-13 RX ORDER — ATORVASTATIN CALCIUM 40 MG/1
TABLET, FILM COATED ORAL
Qty: 90 TABLET | Refills: 0 | Status: SHIPPED | OUTPATIENT
Start: 2023-03-13

## 2023-03-23 ENCOUNTER — HOSPITAL ENCOUNTER (OUTPATIENT)
Facility: MEDICAL CENTER | Age: 63
Discharge: HOME | End: 2023-03-23

## 2023-03-23 ENCOUNTER — HOSPITAL ENCOUNTER (OUTPATIENT)
Dept: RADIOLOGY | Facility: MEDICAL CENTER | Age: 63
End: 2023-03-23

## 2023-03-23 VITALS
HEART RATE: 70 BPM | RESPIRATION RATE: 20 BRPM | DIASTOLIC BLOOD PRESSURE: 92 MMHG | OXYGEN SATURATION: 95 % | TEMPERATURE: 98.4 F | SYSTOLIC BLOOD PRESSURE: 152 MMHG

## 2023-03-23 DIAGNOSIS — G35 MS (MULTIPLE SCLEROSIS) (HCC): ICD-10-CM

## 2023-03-23 DIAGNOSIS — M16.11 PRIMARY OSTEOARTHRITIS OF RIGHT HIP: ICD-10-CM

## 2023-03-23 RX ORDER — LIDOCAINE HYDROCHLORIDE 10 MG/ML
2 INJECTION, SOLUTION EPIDURAL; INFILTRATION; INTRACAUDAL; PERINEURAL ONCE
Status: COMPLETED | OUTPATIENT
Start: 2023-03-23 | End: 2023-03-23

## 2023-03-23 RX ORDER — BUPIVACAINE HCL/PF 2.5 MG/ML
3 VIAL (ML) INJECTION ONCE
Status: COMPLETED | OUTPATIENT
Start: 2023-03-23 | End: 2023-03-23

## 2023-03-23 RX ORDER — METHYLPREDNISOLONE ACETATE 40 MG/ML
40 INJECTION, SUSPENSION INTRA-ARTICULAR; INTRALESIONAL; INTRAMUSCULAR; PARENTERAL; SOFT TISSUE ONCE
Status: COMPLETED | OUTPATIENT
Start: 2023-03-23 | End: 2023-03-23

## 2023-03-23 RX ADMIN — Medication 3 ML: at 09:12

## 2023-03-23 RX ADMIN — IOHEXOL 1 ML: 300 INJECTION, SOLUTION INTRAVENOUS at 09:11

## 2023-03-23 RX ADMIN — LIDOCAINE HYDROCHLORIDE 2 ML: 10 INJECTION, SOLUTION EPIDURAL; INFILTRATION; INTRACAUDAL; PERINEURAL at 09:10

## 2023-03-23 RX ADMIN — GADOBUTROL 12 ML: 604.72 INJECTION INTRAVENOUS at 12:10

## 2023-03-23 RX ADMIN — METHYLPREDNISOLONE ACETATE 40 MG: 40 INJECTION, SUSPENSION INTRA-ARTICULAR; INTRALESIONAL; INTRAMUSCULAR; PARENTERAL; SOFT TISSUE at 09:12

## 2023-03-23 NOTE — H&P
History of Present Illness: The patient is a 65 y o  female who presents with complaints of right hip pain    Past Medical History:   Diagnosis Date   • Diabetes mellitus ([facility] )    • External hemorrhoids     last assessed 16, resolved 16   • Hernia, ventral     last assessed 12/14/15, resolved 16   • Hypertension    • Incarcerated incisional hernia     last assessed 12/21/15, resolved 16   • Insomnia     last assessed 16, resolved 10/9/17   • Lyme disease    • Morbid obesity with BMI of 45 0-49 9, adult ([facility] ) 5/3/2017   • MS (multiple sclerosis) ([facility] )    • Stroke (cerebrum) ([facility] ) 2020   • Type 2 diabetes mellitus with hyperglycemia, without long-term current use of insulin ([place] [id number] )        Past Surgical History:   Procedure Laterality Date   • ABCESS DRAINAGE     •  SECTION      x3   • COLONOSCOPY N/A 2017    Procedure: COLONOSCOPY with biopsy;  Surgeon: Paige Del Cid DO;  Location: [facility]; Service: Complete   • HYSTERECTOMY     • IR BIOPSY ABDOMEN  2021   • IR LUMBAR PUNCTURE  3/13/2020   • US GUIDED THYROID BIOPSY  2021         Current Outpatient Medications:   •  Alcohol Swabs PADS, by Does not apply route 4 (four) times a day *Latex Free*, Disp: 120 each, Rfl: 3  •  aspirin 81 mg chewable tablet, Chew 1 tablet (81 mg total) daily, Disp: 90 tablet, Rfl: 1  •  atorvastatin (LIPITOR) 40 mg tablet, TAKE 1 TABLET BY MOUTH ONCE DAILY IN THE EVENING, Disp: 90 tablet, Rfl: 0  •  Blood Glucose Monitoring Suppl (OneTouch Verio) w/Device KIT, Use in the morning, Disp: 1 kit, Rfl: 0  •  Blood Pressure KIT, by Does not apply route daily, Disp: 1 each, Rfl: 0  •  cholecalciferol (VITAMIN D3) 1,000 units tablet, Take 1,000 Units by mouth daily, Disp: , Rfl:   •  cinacalcet (SENSIPAR) 60 MG tablet, Take 1 tablet by mouth once daily, Disp: 30 tablet, Rfl: 0  •  diazepam (VALIUM) 5 mg tablet, Take 1 tablet 30 minutes prior to MRI    May take 1 tablet just immediately before MRI if needed, Disp: 2 tablet, Rfl: 0  •  Diclofenac Sodium (VOLTAREN) 1 %, Apply 2 g topically 4 (four) times a day, Disp: 150 g, Rfl: 1  •  diphenhydrAMINE (BENADRYL) 25 mg tablet, Take 0 5 tablets (12 5 mg total) by mouth every 6 (six) hours (Patient not taking: Reported on 2/14/2023), Disp: 20 tablet, Rfl: 0  •  DULoxetine (CYMBALTA) 30 mg delayed release capsule, Take 1 capsule (30 mg total) by mouth in the morning , Disp: 30 capsule, Rfl: 1  •  glucose blood (OneTouch Verio) test strip, Use 1 each in the morning, Disp: 100 each, Rfl: 1  •  hydrALAZINE (APRESOLINE) 10 mg tablet, Take 1 tablet (10 mg total) by mouth 3 (three) times a day (Patient not taking: Reported on 2/14/2023), Disp: 90 tablet, Rfl: 0  •  hydrochlorothiazide (HYDRODIURIL) 50 mg tablet, Take 1 tablet (50 mg total) by mouth daily, Disp: 90 tablet, Rfl: 3  •  Incontinence Supply Disposable (Prevail Women Underwear XL) MISC, Use every 6 (six) hours as needed (incontinence), Disp: 120 each, Rfl: 11  •  Insulin Pen Needle (BD Pen Needle Izzy U/F) 32G X 4 MM MISC, Use in the morning, Disp: 100 each, Rfl: 1  •  Interferon Beta-1b (Betaseron) 0 3 MG KIT, Inject 1 ml (0 25 mg) subcutaneously every other day, Disp: 14 kit, Rfl: 11  •  Lancets (OneTouch Delica Plus [id number]) MISC, Test BG up to 1x daily as directed, Disp: 100 each, Rfl: 1  •  lisinopril (ZESTRIL) 40 mg tablet, Take 1 tablet (40 mg total) by mouth daily, Disp: 90 tablet, Rfl: 0  •  metFORMIN (GLUCOPHAGE-XR) 500 mg 24 hr tablet, TAKE 2 TABLETS BY MOUTH TWICE DAILY WITH MEALS, Disp: 360 tablet, Rfl: 0  •  methocarbamol (ROBAXIN) 500 mg tablet, Take 1 tablet (500 mg total) by mouth 2 (two) times a day (Patient not taking: Reported on 2/14/2023), Disp: 20 tablet, Rfl: 0  •  metoclopramide (Reglan) 10 mg tablet, Take 1 tablet (10 mg total) by mouth every 6 (six) hours (Patient not taking: Reported on 2/14/2023), Disp: 30 tablet, Rfl: 0  •  metoprolol succinate (TOPROL-XL) 50 mg 24 hr tablet, Take 1 tablet (50 mg total) by mouth daily, Disp: 90 tablet, Rfl: 3  •  naproxen (Naprosyn) 500 mg tablet, Take 1 tablet (500 mg total) by mouth 2 (two) times a day with meals, Disp: 30 tablet, Rfl: 0  •  ondansetron (ZOFRAN-ODT) 4 mg disintegrating tablet, Take 1 tablet (4 mg total) by mouth every 6 (six) hours as needed for nausea or vomiting, Disp: 20 tablet, Rfl: 0  •  vitamin B-12 (VITAMIN B-12) 500 mcg tablet, Take 2 tablets (1,000 mcg total) by mouth daily, Disp: 60 tablet, Rfl: 5    Current Facility-Administered Medications:   •  prochlorperazine (COMPAZINE) injection 5 mg, 5 mg, Intramuscular, Q4H PRN, Anne Luis MD, 5 mg at 11/11/22 1040    Allergies   Allergen Reactions   • Sorbitan Diarrhea, Vomiting and Abdominal Pain   • Victoza [Liraglutide] Nausea Only   • Ambien [Zolpidem Tartrate]    • Demerol [Meperidine]    • Insulin    • Insulin Glargine      Annotation - [id number]: memory loss   • Latex    • Oxycodone Hives and Rash   • Oxycodone-Acetaminophen Vomiting   • Zolpidem Hallucinations and Irritability       Physical Exam:   Vitals:    03/23/23 0848   BP: 163/95   Pulse: 64   Resp: 20   Temp: 98 4 °F (36 9 °C)   SpO2: 98%     General: Awake, Alert, Oriented x 3, Mood and affect appropriate  Respiratory: Respirations even and unlabored  Cardiovascular: Peripheral pulses intact; no edema  Musculoskeletal Exam: right hip pain with ROM    ASA Score: 3    Patient/Chart Verification  Patient ID Verified: Verbal  Consents Confirmed: Procedural, To be obtained in the Pre-Procedure area  H&P( within 30 days) Verified: To be obtained in the Pre-Procedure area  Allergies Reviewed: Yes  Anticoag/NSAID held?: NA  Currently on antibiotics?: No  Pregnancy denied?: NA    Assessment:   1   Primary osteoarthritis of right hip        Plan: intra-articular cortisone injection to the right hip w/ fluoroscopy

## 2023-03-23 NOTE — DISCHARGE INSTRUCTIONS
Steroid Joint Injection   WHAT YOU NEED TO KNOW:   A steroid joint injection is a procedure to inject steroid medicine into a joint  Steroid medicine decreases pain and inflammation  The injection may also contain an anesthetic (numbing medicine) to decrease pain  It may be done to treat conditions such as arthritis, gout, or carpal tunnel syndrome  The injections may be given in your knee, ankle, shoulder, elbow, wrist, ankle or sacroiliac joint  Do not apply heat to any area that is numb  If you have discomfort or soreness at the injection site, you may apply ice today, 20 minutes on and 20 minutes off  Tomorrow you may use ice or warm, moist heat  Do not apply ice or heat directly to the skin  You may have an increase or change in the discomfort for 36-48 hours after your treatment  Apply ice and continue with any pain medicine you have been prescribed  Do not do anything strenuous today  You may shower, but no tub baths or hot tubs today  You may resume your normal activities tomorrow, but do not “overdo it”  Resume normal activities slowly when you are feeling better  If you experience redness, drainage or swelling at the injection site, or if you develop a fever above 100 degrees, please call [facility] at [phone] or go to the Emergency Room  Continue to take all routine medicines prescribed by your primary care physician unless otherwise instructed by our staff  Most blood thinners should be started again according to your regularly scheduled dosing  If you have any questions, please give our office a call  As no general anesthesia was used in today's procedure, you should not experience any side effects related to anesthesia  If you are diabetic, the steroids used in today's injection may temporarily increase your blood sugar levels after the first few days after your injection   Please keep a close eye on your sugars and alert the doctor who manages your diabetes if your sugars are significantly high from your baseline or you are symptomatic  If you have a problem specifically related to your procedure, please call our office at [phone]  Problems not related to your procedure should be directed to your primary care physician

## 2023-03-25 ENCOUNTER — HOSPITAL ENCOUNTER (EMERGENCY)
Facility: HOSPITAL | Age: 63
Discharge: HOME | End: 2023-03-25
Attending: EMERGENCY MEDICINE | Admitting: EMERGENCY MEDICINE

## 2023-03-25 VITALS
DIASTOLIC BLOOD PRESSURE: 61 MMHG | SYSTOLIC BLOOD PRESSURE: 118 MMHG | TEMPERATURE: 98.3 F | OXYGEN SATURATION: 96 % | HEART RATE: 70 BPM | RESPIRATION RATE: 18 BRPM

## 2023-03-25 DIAGNOSIS — K52.9 GASTROENTERITIS: Primary | ICD-10-CM

## 2023-03-25 LAB
ALBUMIN SERPL BCP-MCNC: 3.8 G/DL (ref 3.5–5)
ALP SERPL-CCNC: 88 U/L (ref 34–104)
ALT SERPL W P-5'-P-CCNC: 14 U/L (ref 7–52)
ANION GAP SERPL CALCULATED.3IONS-SCNC: 12 MMOL/L (ref 4–13)
AST SERPL W P-5'-P-CCNC: 10 U/L (ref 13–39)
BASOPHILS # BLD AUTO: 0.04 THOUSANDS/ÂΜL (ref 0–0.1)
BASOPHILS NFR BLD AUTO: 0 % (ref 0–1)
BILIRUB SERPL-MCNC: 0.72 MG/DL (ref 0.2–1)
BUN SERPL-MCNC: 24 MG/DL (ref 5–25)
CALCIUM SERPL-MCNC: 10.3 MG/DL (ref 8.4–10.2)
CHLORIDE SERPL-SCNC: 102 MMOL/L (ref 96–108)
CO2 SERPL-SCNC: 21 MMOL/L (ref 21–32)
CREAT SERPL-MCNC: 0.73 MG/DL (ref 0.6–1.3)
EOSINOPHIL # BLD AUTO: 0.23 THOUSAND/ÂΜL (ref 0–0.61)
EOSINOPHIL NFR BLD AUTO: 2 % (ref 0–6)
ERYTHROCYTE [DISTWIDTH] IN BLOOD BY AUTOMATED COUNT: 13.4 % (ref 11.6–15.1)
FLUAV RNA RESP QL NAA+PROBE: NEGATIVE
FLUBV RNA RESP QL NAA+PROBE: NEGATIVE
GFR SERPL CREATININE-BSD FRML MDRD: 89 ML/MIN/1.73SQ M
GLUCOSE SERPL-MCNC: 306 MG/DL (ref 65–140)
HCT VFR BLD AUTO: 36.6 % (ref 34.8–46.1)
HGB BLD-MCNC: 12 G/DL (ref 11.5–15.4)
IMM GRANULOCYTES # BLD AUTO: 0.06 THOUSAND/UL (ref 0–0.2)
IMM GRANULOCYTES NFR BLD AUTO: 0 % (ref 0–2)
LIPASE SERPL-CCNC: 25 U/L (ref 11–82)
LYMPHOCYTES # BLD AUTO: 0.72 THOUSANDS/ÂΜL (ref 0.6–4.47)
LYMPHOCYTES NFR BLD AUTO: 5 % (ref 14–44)
MAGNESIUM SERPL-MCNC: 1.4 MG/DL (ref 1.9–2.7)
MCH RBC QN AUTO: 28.6 PG (ref 26.8–34.3)
MCHC RBC AUTO-ENTMCNC: 32.8 G/DL (ref 31.4–37.4)
MCV RBC AUTO: 87 FL (ref 82–98)
MONOCYTES # BLD AUTO: 1.3 THOUSAND/ÂΜL (ref 0.17–1.22)
MONOCYTES NFR BLD AUTO: 8 % (ref 4–12)
NEUTROPHILS # BLD AUTO: 13.29 THOUSANDS/ÂΜL (ref 1.85–7.62)
NEUTS SEG NFR BLD AUTO: 85 % (ref 43–75)
NRBC BLD AUTO-RTO: 0 /100 WBCS
PLATELET # BLD AUTO: 299 THOUSANDS/UL (ref 149–390)
PMV BLD AUTO: 9.6 FL (ref 8.9–12.7)
POTASSIUM SERPL-SCNC: 3.5 MMOL/L (ref 3.5–5.3)
PROT SERPL-MCNC: 6.6 G/DL (ref 6.4–8.4)
RBC # BLD AUTO: 4.2 MILLION/UL (ref 3.81–5.12)
RSV RNA RESP QL NAA+PROBE: NEGATIVE
SARS-COV-2 RNA RESP QL NAA+PROBE: NEGATIVE
SODIUM SERPL-SCNC: 135 MMOL/L (ref 135–147)
WBC # BLD AUTO: 15.64 THOUSAND/UL (ref 4.31–10.16)

## 2023-03-25 RX ORDER — KETOROLAC TROMETHAMINE 30 MG/ML
15 INJECTION, SOLUTION INTRAMUSCULAR; INTRAVENOUS ONCE
Status: COMPLETED | OUTPATIENT
Start: 2023-03-25 | End: 2023-03-25

## 2023-03-25 RX ORDER — ACETAMINOPHEN 325 MG/1
975 TABLET ORAL ONCE
Status: COMPLETED | OUTPATIENT
Start: 2023-03-25 | End: 2023-03-25

## 2023-03-25 RX ORDER — FENTANYL CITRATE 50 UG/ML
2 INJECTION, SOLUTION INTRAMUSCULAR; INTRAVENOUS ONCE
Status: COMPLETED | OUTPATIENT
Start: 2023-03-25 | End: 2023-03-25

## 2023-03-25 RX ORDER — ONDANSETRON 2 MG/ML
1 INJECTION INTRAMUSCULAR; INTRAVENOUS ONCE
Status: COMPLETED | OUTPATIENT
Start: 2023-03-25 | End: 2023-03-25

## 2023-03-25 RX ADMIN — ACETAMINOPHEN 325MG 975 MG: 325 TABLET ORAL at 04:04

## 2023-03-25 RX ADMIN — KETOROLAC TROMETHAMINE 15 MG: 30 INJECTION, SOLUTION INTRAMUSCULAR; INTRAVENOUS at 04:05

## 2023-03-25 RX ADMIN — SODIUM CHLORIDE 1000 ML: 0.9 INJECTION, SOLUTION INTRAVENOUS at 04:01

## 2023-03-25 NOTE — ED NOTES
Pt successfully ambulated to the bathroom with the assistance of a walker, steady gait        Bimal [facility]  98/03/53 1420

## 2023-03-25 NOTE — DISCHARGE INSTRUCTIONS
Follow up with your PCP    Return for worsening nausea, vomiting, or for any abdominal pain, weakness, chest pain, trouble breathing, or any other new/concerning symptoms

## 2023-03-27 ENCOUNTER — TELEPHONE (OUTPATIENT)
Dept: NEUROLOGY | Facility: CLINIC | Age: 63
End: 2023-03-27

## 2023-03-27 NOTE — TELEPHONE ENCOUNTER
Noted.  Patient was in the ED on 3/25 for gastroenteritis, nothing neurologic.  Will await final read on MRI

## 2023-03-27 NOTE — TELEPHONE ENCOUNTER
Received VM transcription:    To get the test results from last Thursday, Joannevictor m Hu.  --------------------------------------------------------    Spoke with pt and advised her that the MRI results are still pending.   Pt informs that she ended up in the hospital again, gave her lost of fluids and she feels better today.  Advised pt that she will receive call once MRI is resulted and reviewed by Barak Mcmanus  [phone], unable to leave message as machine is broken.

## 2023-03-27 NOTE — ED PROVIDER NOTES
History  Chief Complaint   Patient presents with   • Vomiting     Pt reports n/v/d for the past 2 days, she's also reporting right hip pain  Denies CP and SOB  Denies HA  The patient is a 50-year-old female with history of MS, stroke, hypertension, diabetes mellitus who presents to the ED for evaluation of 2-day history of nausea, nonbloody vomiting, nonbloody diarrhea  She states that she has had multiple episodes of vomiting/diarrhea over the past several months  She reports feeling weakness secondary to these symptoms  She also reports chronic right hip pain  She had a Depo-Medrol injection done on 3/23 for her hip pain with some improvement initially, but she feels as though this improvement has worn off  She otherwise denies abdominal pain, chest pain, dyspnea, dysuria, hematuria, fever, chills, rash  Prior to Admission Medications   Prescriptions Last Dose Informant Patient Reported? Taking? Alcohol Swabs PADS   No No   Sig: by Does not apply route 4 (four) times a day *Latex Free*   Blood Glucose Monitoring Suppl (OneTouch Verio) w/Device KIT   No No   Sig: Use in the morning   Blood Pressure KIT   No No   Sig: by Does not apply route daily   DULoxetine (CYMBALTA) 30 mg delayed release capsule   No No   Sig: Take 1 capsule (30 mg total) by mouth in the morning     Diclofenac Sodium (VOLTAREN) 1 %   No No   Sig: Apply 2 g topically 4 (four) times a day   Incontinence Supply Disposable (Prevail Women Underwear XL) MISC   No No   Sig: Use every 6 (six) hours as needed (incontinence)   Insulin Pen Needle (BD Pen Needle Izzy U/F) 32G X 4 MM MISC   No No   Sig: Use in the morning   Interferon Beta-1b (Betaseron) 0 3 MG KIT   No No   Sig: Inject 1 ml (0 25 mg) subcutaneously every other day   Lancets (OneTouch Delica Plus [id number]) MISC   No No   Sig: Test BG up to 1x daily as directed   aspirin 81 mg chewable tablet   No No   Sig: Chew 1 tablet (81 mg total) daily   atorvastatin (LIPITOR) 40 mg tablet   No No   Sig: TAKE 1 TABLET BY MOUTH ONCE DAILY IN THE EVENING   cholecalciferol (VITAMIN D3) 1,000 units tablet   Yes No   Sig: Take 1,000 Units by mouth daily   cinacalcet (SENSIPAR) 60 MG tablet   No No   Sig: Take 1 tablet by mouth once daily   diazepam (VALIUM) 5 mg tablet   No No   Sig: Take 1 tablet 30 minutes prior to MRI    May take 1 tablet just immediately before MRI if needed   diphenhydrAMINE (BENADRYL) 25 mg tablet   No No   Sig: Take 0 5 tablets (12 5 mg total) by mouth every 6 (six) hours   Patient not taking: Reported on 2/14/2023   glucose blood (OneTouch Verio) test strip   No No   Sig: Use 1 each in the morning   hydrALAZINE (APRESOLINE) 10 mg tablet   No No   Sig: Take 1 tablet (10 mg total) by mouth 3 (three) times a day   Patient not taking: Reported on 2/14/2023   hydrochlorothiazide (HYDRODIURIL) 50 mg tablet   No No   Sig: Take 1 tablet (50 mg total) by mouth daily   lisinopril (ZESTRIL) 40 mg tablet   No No   Sig: Take 1 tablet (40 mg total) by mouth daily   metFORMIN (GLUCOPHAGE-XR) 500 mg 24 hr tablet   No No   Sig: TAKE 2 TABLETS BY MOUTH TWICE DAILY WITH MEALS   methocarbamol (ROBAXIN) 500 mg tablet   No No   Sig: Take 1 tablet (500 mg total) by mouth 2 (two) times a day   Patient not taking: Reported on 2/14/2023   metoclopramide (Reglan) 10 mg tablet   No No   Sig: Take 1 tablet (10 mg total) by mouth every 6 (six) hours   Patient not taking: Reported on 2/14/2023   metoprolol succinate (TOPROL-XL) 50 mg 24 hr tablet   No No   Sig: Take 1 tablet (50 mg total) by mouth daily   naproxen (Naprosyn) 500 mg tablet   No No   Sig: Take 1 tablet (500 mg total) by mouth 2 (two) times a day with meals   ondansetron (ZOFRAN-ODT) 4 mg disintegrating tablet   No No   Sig: Take 1 tablet (4 mg total) by mouth every 6 (six) hours as needed for nausea or vomiting   vitamin B-12 (VITAMIN B-12) 500 mcg tablet   No No   Sig: Take 2 tablets (1,000 mcg total) by mouth daily Facility-Administered Medications Last Administration Doses Remaining   prochlorperazine (COMPAZINE) injection 5 mg 2022 10:40 AM           Past Medical History:   Diagnosis Date   • Diabetes mellitus ([facility] )    • External hemorrhoids     last assessed 16, resolved 16   • Hernia, ventral     last assessed 12/14/15, resolved 16   • Hypertension    • Incarcerated incisional hernia     last assessed 12/21/15, resolved 16   • Insomnia     last assessed 16, resolved 10/9/17   • Lyme disease    • Morbid obesity with BMI of 45 0-49 9, adult ([place] [address] ) 5/3/2017   • MS (multiple sclerosis) ([place] [address] )    • Stroke (cerebrum) ([place] [address] ) 2020   • Type 2 diabetes mellitus with hyperglycemia, without long-term current use of insulin ([place] [address] )        Past Surgical History:   Procedure Laterality Date   • ABCESS DRAINAGE     •  SECTION      x3   • COLONOSCOPY N/A 2017    Procedure: COLONOSCOPY with biopsy;  Surgeon: Lattie Ahumada, DO;  Location: [facility]; Service: Complete   • HYSTERECTOMY     • IR BIOPSY ABDOMEN  2021   • IR LUMBAR PUNCTURE  3/13/2020   • US GUIDED THYROID BIOPSY  2021       Family History   Problem Relation Age of Onset   • Glaucoma Mother    • Diabetes Father    • Hypertension Father    • Colon cancer Father    • Alzheimer's disease Father    • Cervical cancer Sister 61   • Breast cancer Sister 50   • Breast cancer Sister 45   • Breast cancer Maternal Grandmother    • Breast cancer Maternal Aunt    • Breast cancer Maternal Aunt    • Breast cancer Maternal Aunt    • Coronary artery disease Family    • Diabetes Family    • Hypertension Family      I have reviewed and agree with the history as documented      E-Cigarette/Vaping   • E-Cigarette Use Never User      E-Cigarette/Vaping Substances     Social History     Tobacco Use   • Smoking status: Never   • Smokeless tobacco: Never   Vaping Use   • Vaping Use: Never used   Substance Use Topics   • Alcohol use: Not Currently Comment: Social   • Drug use: Yes     Types: Marijuana     Comment: medical marijuana       Review of Systems   Constitutional: Negative for chills and fever  HENT: Negative for congestion and rhinorrhea  Respiratory: Negative for cough and shortness of breath  Cardiovascular: Negative for chest pain and leg swelling  Gastrointestinal: Positive for diarrhea, nausea and vomiting  Negative for abdominal pain, blood in stool and constipation  Genitourinary: Negative for dysuria and flank pain  Musculoskeletal: Positive for arthralgias  Negative for myalgias  Skin: Negative for rash and wound  Neurological: Negative for dizziness, weakness, numbness and headaches  Psychiatric/Behavioral: Negative for behavioral problems  Physical Exam  Physical Exam  Vitals and nursing note reviewed  Constitutional:       General: She is not in acute distress  Appearance: She is well-developed  She is not ill-appearing or toxic-appearing  HENT:      Head: Normocephalic and atraumatic  Eyes:      Conjunctiva/sclera: Conjunctivae normal    Cardiovascular:      Rate and Rhythm: Normal rate and regular rhythm  Heart sounds: No murmur heard  Pulmonary:      Effort: Pulmonary effort is normal  No respiratory distress  Breath sounds: Normal breath sounds  No wheezing, rhonchi or rales  Abdominal:      Palpations: Abdomen is soft  Tenderness: There is no abdominal tenderness  There is no guarding or rebound  Musculoskeletal:         General: Tenderness present  No swelling  Cervical back: Neck supple  Right hip: Tenderness present  No deformity or crepitus  Right upper leg: No tenderness or bony tenderness  Legs:       Comments: No overlying skin changes to right hip including warmth, erythema   Skin:     General: Skin is warm and dry  Capillary Refill: Capillary refill takes less than 2 seconds  Neurological:      Mental Status: She is alert  Psychiatric:         Mood and Affect: Mood normal          Vital Signs  ED Triage Vitals   Temperature Pulse Respirations Blood Pressure SpO2   03/25/23 0315 03/25/23 0322 03/25/23 0322 03/25/23 0322 03/25/23 0322   98 3 °F (36 8 °C) 87 18 140/60 94 %      Temp Source Heart Rate Source Patient Position - Orthostatic VS BP Location FiO2 (%)   03/25/23 0315 03/25/23 0322 03/25/23 0322 03/25/23 0322 --   Oral Monitor Lying Right arm       Pain Score       03/25/23 0404       7           Vitals:    03/25/23 0330 03/25/23 0430 03/25/23 0500 03/25/23 0600   BP: 130/60 126/58 133/63 118/61   Pulse: 81 81 78 70   Patient Position - Orthostatic VS: Lying Lying Lying Sitting         Visual Acuity      ED Medications  Medications   fentanyl citrate (PF) (FOR EMS ONLY) 100 mcg/2 mL injection 200 mcg (0 mcg Does not apply Given to EMS 3/25/23 0316)   ondansetron (FOR EMS ONLY) (ZOFRAN) 4 mg/2 mL injection 4 mg (0 mg Does not apply Given to EMS 3/25/23 0317)   sodium chloride 0 9 % bolus 1,000 mL (0 mL Intravenous Stopped 3/25/23 0406)   acetaminophen (TYLENOL) tablet 975 mg (975 mg Oral Given 3/25/23 0404)   ketorolac (TORADOL) injection 15 mg (15 mg Intravenous Given 3/25/23 0405)       Diagnostic Studies  Results Reviewed     Procedure Component Value Units Date/Time    FLU/RSV/COVID - if FLU/RSV clinically relevant [[id number]]  (Normal) Collected: 03/25/23 0401    Lab Status: Final result Specimen: Nares from Nose Updated: 03/25/23 0444     SARS-CoV-2 Negative     INFLUENZA A PCR Negative     INFLUENZA B PCR Negative     RSV PCR Negative    Narrative:      FOR PEDIATRIC PATIENTS - copy/paste COVID Guidelines URL to browser: [email or web address]    SARS-CoV-2 assay is a Nucleic Acid Amplification assay intended for the  qualitative detection of nucleic acid from SARS-CoV-2 in nasopharyngeal  swabs   Results are for the presumptive identification of SARS-CoV-2 RNA     Positive results are indicative of infection with SARS-CoV-2, the virus  causing COVID-19, but do not rule out bacterial infection or co-infection  with other viruses  Laboratories within the [place] and its  territories are required to report all positive results to the appropriate  public health authorities  Negative results do not preclude SARS-CoV-2  infection and should not be used as the sole basis for treatment or other  patient management decisions  Negative results must be combined with  clinical observations, patient history, and epidemiological information  This test has not been FDA cleared or approved  This test has been authorized by FDA under an Emergency Use Authorization  (EUA)  This test is only authorized for the duration of time the  declaration that circumstances exist justifying the authorization of the  emergency use of an in vitro diagnostic tests for detection of SARS-CoV-2  virus and/or diagnosis of COVID-19 infection under section 564(b)(1) of  the Act, 21 U  S C  587FRL-2(A)(4), unless the authorization is terminated  or revoked sooner  The test has been validated but independent review by FDA  and CLIA is pending  Test performed using [employer] GeneXpert: This RT-PCR assay targets N2,  a region unique to SARS-CoV-2  A conserved region in the E-gene was chosen  for pan-Sarbecovirus detection which includes SARS-CoV-2  According to CMS-2020-01-R, this platform meets the definition of high-throughput technology      Comprehensive metabolic panel [[id number]]  (Abnormal) Collected: 03/25/23 0401    Lab Status: Final result Specimen: Blood from Arm, Right Updated: 03/25/23 0421     Sodium 135 mmol/L      Potassium 3 5 mmol/L      Chloride 102 mmol/L      CO2 21 mmol/L      ANION GAP 12 mmol/L      BUN 24 mg/dL      Creatinine 0 73 mg/dL      Glucose 306 mg/dL      Calcium 10 3 mg/dL      AST 10 U/L      ALT 14 U/L      Alkaline Phosphatase 88 U/L      Total Protein 6 6 g/dL Albumin 3 8 g/dL      Total Bilirubin 0 72 mg/dL      eGFR 89 ml/min/1 73sq m     Narrative:      National Kidney Disease Foundation guidelines for Chronic Kidney Disease (CKD):   •  Stage 1 with normal or high GFR (GFR > 90 mL/min/1 73 square meters)  •  Stage 2 Mild CKD (GFR = 60-89 mL/min/1 73 square meters)  •  Stage 3A Moderate CKD (GFR = 45-59 mL/min/1 73 square meters)  •  Stage 3B Moderate CKD (GFR = 30-44 mL/min/1 73 square meters)  •  Stage 4 Severe CKD (GFR = 15-29 mL/min/1 73 square meters)  •  Stage 5 End Stage CKD (GFR <15 mL/min/1 73 square meters)  Note: GFR calculation is accurate only with a steady state creatinine    Lipase [[id number]]  (Normal) Collected: 03/25/23 0401    Lab Status: Final result Specimen: Blood from Arm, Right Updated: 03/25/23 0421     Lipase 25 u/L     Magnesium [[id number]]  (Abnormal) Collected: 03/25/23 0401    Lab Status: Final result Specimen: Blood from Arm, Right Updated: 03/25/23 0421     Magnesium 1 4 mg/dL     CBC and differential [[id number]]  (Abnormal) Collected: 03/25/23 0401    Lab Status: Final result Specimen: Blood from Arm, Right Updated: 03/25/23 0406     WBC 15 64 Thousand/uL      RBC 4 20 Million/uL      Hemoglobin 12 0 g/dL      Hematocrit 36 6 %      MCV 87 fL      MCH 28 6 pg      MCHC 32 8 g/dL      RDW 13 4 %      MPV 9 6 fL      Platelets 083 Thousands/uL      nRBC 0 /100 WBCs      Neutrophils Relative 85 %      Immat GRANS % 0 %      Lymphocytes Relative 5 %      Monocytes Relative 8 %      Eosinophils Relative 2 %      Basophils Relative 0 %      Neutrophils Absolute 13 29 Thousands/µL      Immature Grans Absolute 0 06 Thousand/uL      Lymphocytes Absolute 0 72 Thousands/µL      Monocytes Absolute 1 30 Thousand/µL      Eosinophils Absolute 0 23 Thousand/µL      Basophils Absolute 0 04 Thousands/µL                  No orders to display              Procedures  Procedures         ED Course  ED Course as of 03/27/23 0643   Sat Mar 25, 2023   0401 WBC(!): 15 64   0423 Anion Gap: 12         SBIRT 22yo+    Flowsheet Row Most Recent Value   SBIRT (23 yo +)    In order to provide better care to our patients, we are screening all of our patients for alcohol and drug use  Would it be okay to ask you these screening questions? Yes Filed at: 03/25/2023 0321   Initial Alcohol Screen: US AUDIT-C     1  How often do you have a drink containing alcohol? 0 Filed at: 03/25/2023 0321   2  How many drinks containing alcohol do you have on a typical day you are drinking? 0 Filed at: 03/25/2023 0321   3a  Male UNDER 65: How often do you have five or more drinks on one occasion? 0 Filed at: 03/25/2023 0321   3b  FEMALE Any Age, or MALE 65+: How often do you have 4 or more drinks on one occassion? 0 Filed at: 03/25/2023 0321   Audit-C Score 0 Filed at: 03/25/2023 0321   GOVIND: How many times in the past year have you    Used an illegal drug or used a prescription medication for non-medical reasons? Never Filed at: 03/25/2023 0321        Medical Decision Making  The patient is a 69-year-old female who presents to the ED for evaluation of 2-day history of nausea, nonbloody vomiting, nonbloody diarrhea  On exam, the patient is in no acute distress  VSS  HRRR  Lungs CTA  Abdomen soft and nontender  No CVA TTP  Patient with TTP to right hip with no overlying skin changes  Will obtain CBC, CMP, Mg, Lipase, COVID/Flu/RSV, and treat symptomatically  Ddx gastroenteritis, dehydration, DKA, colitis, viral syndrome, electrolyte abnormality  Patient with hyperglycemia, however no anion gap  Suspect is related to vomiting  Leukocytosis noted, also suspect 2/2 GI symptoms  Given patient's abdomen is soft and nontender, imaging was not obtained  Mg mildly low  On repeat exam, patient reports significant improvement in symptoms  She was able to ambulate to bathroom with walker (baseline) with steady gait   She reports feeling safe with plan for discharge, would like to go home  Will d/c    At the time of discharge, the patient is in no acute distress  I discussed with the patient the diagnosis, treatment plan, follow-up, return precautions, and discharge instructions; they were given the opportunity to ask questions and verbalized understanding  Gastroenteritis: acute illness or injury  Amount and/or Complexity of Data Reviewed  External Data Reviewed: labs, radiology, ECG and notes  Labs: ordered  Decision-making details documented in ED Course  Risk  OTC drugs  Prescription drug management  Disposition  Final diagnoses:   Gastroenteritis     Time reflects when diagnosis was documented in both MDM as applicable and the Disposition within this note     Time User Action Codes Description Comment    3/25/2023  5:51 AM Nany Nayely Add [K52 9] Gastroenteritis       ED Disposition     ED Disposition   Discharge    Condition   Stable    Date/Time   Sat Mar 25, 2023 [id number] Irma Bryan discharge to home/self care                 Follow-up Information     Follow up With Specialties Details Why Contact Info    Sophia Duanejeremy, [address] [place] [id number] [address]  [address]  [phone]            Discharge Medication List as of 3/25/2023  5:51 AM      CONTINUE these medications which have NOT CHANGED    Details   Alcohol Swabs PADS by Does not apply route 4 (four) times a day *Latex Free*, Starting Thu 5/21/2020, Normal      aspirin 81 mg chewable tablet Chew 1 tablet (81 mg total) daily, Starting Mon 3/28/2022, Normal      atorvastatin (LIPITOR) 40 mg tablet TAKE 1 TABLET BY MOUTH ONCE DAILY IN THE EVENING, Normal      Blood Glucose Monitoring Suppl (OneTouch Verio) w/Device KIT Use in the morning, Starting Fri 11/4/2022, Normal      Blood Pressure KIT by Does not apply route daily, Starting Thu 5/21/2020, Normal      cholecalciferol (VITAMIN D3) 1,000 units tablet Take 1,000 Units by mouth daily, Historical Med      cinacalcet (SENSIPAR) 60 MG tablet Take 1 tablet by mouth once daily, Normal      diazepam (VALIUM) 5 mg tablet Take 1 tablet 30 minutes prior to MRI    May take 1 tablet just immediately before MRI if needed, Normal      Diclofenac Sodium (VOLTAREN) 1 % Apply 2 g topically 4 (four) times a day, Starting Tue 2/14/2023, Normal      diphenhydrAMINE (BENADRYL) 25 mg tablet Take 0 5 tablets (12 5 mg total) by mouth every 6 (six) hours, Starting Fri 11/11/2022, Normal      DULoxetine (CYMBALTA) 30 mg delayed release capsule Take 1 capsule (30 mg total) by mouth in the morning , Starting Fri 5/13/2022, Normal      glucose blood (OneTouch Verio) test strip Use 1 each in the morning, Starting Fri 11/4/2022, Normal      hydrALAZINE (APRESOLINE) 10 mg tablet Take 1 tablet (10 mg total) by mouth 3 (three) times a day, Starting Thu 11/17/2022, Normal      hydrochlorothiazide (HYDRODIURIL) 50 mg tablet Take 1 tablet (50 mg total) by mouth daily, Starting Wed 12/14/2022, Normal      Incontinence Supply Disposable (Prevail Women Underwear XL) MISC Use every 6 (six) hours as needed (incontinence), Starting Thu 1/13/2022, Print      Insulin Pen Needle (BD Pen Needle Izzy U/F) 32G X 4 MM MISC Use in the morning, Starting Mon 3/28/2022, Normal      Interferon Beta-1b (Betaseron) 0 3 MG KIT Inject 1 ml (0 25 mg) subcutaneously every other day, Normal      Lancets (OneTouch Delica Plus [id number]) MISC Test BG up to 1x daily as directed, Normal      lisinopril (ZESTRIL) 40 mg tablet Take 1 tablet (40 mg total) by mouth daily, Starting Wed 3/8/2023, Normal      metFORMIN (GLUCOPHAGE-XR) 500 mg 24 hr tablet TAKE 2 TABLETS BY MOUTH TWICE DAILY WITH MEALS, Normal      methocarbamol (ROBAXIN) 500 mg tablet Take 1 tablet (500 mg total) by mouth 2 (two) times a day, Starting Thu 10/13/2022, Normal      metoclopramide (Reglan) 10 mg tablet Take 1 tablet (10 mg total) by mouth every 6 (six) hours, Starting Fri 11/11/2022, Normal      metoprolol succinate (TOPROL-XL) 50 mg 24 hr tablet Take 1 tablet (50 mg total) by mouth daily, Starting Wed 12/14/2022, Normal      naproxen (Naprosyn) 500 mg tablet Take 1 tablet (500 mg total) by mouth 2 (two) times a day with meals, Starting Thu 10/13/2022, Normal      ondansetron (ZOFRAN-ODT) 4 mg disintegrating tablet Take 1 tablet (4 mg total) by mouth every 6 (six) hours as needed for nausea or vomiting, Starting Fri 3/3/2023, Normal      vitamin B-12 (VITAMIN B-12) 500 mcg tablet Take 2 tablets (1,000 mcg total) by mouth daily, Starting Wed 6/30/2021, Normal             No discharge procedures on file      PDMP Review       Value Time User    PDMP Reviewed  Yes 10/11/2020  9:13 AM Roc Orozco MD          ED Provider  Electronically Signed by           Denise Myers PA-C  03/27/23 9844

## 2023-03-28 NOTE — TELEPHONE ENCOUNTER
Can let patient know her brain MRI did not show any abnormal enhancement (MRI earlier this month showed a new lesion, but no contrast was given.  This was f/u imaging with contrast.  Lesion is not active).  Follow up at next scheduled visit

## 2023-04-27 ENCOUNTER — TELEPHONE (OUTPATIENT)
Dept: PAIN MEDICINE | Facility: MEDICAL CENTER | Age: 63
End: 2023-04-27

## 2023-04-27 NOTE — TELEPHONE ENCOUNTER
Caller: Makenzie Hu   Doctor: Dr Rivers     Reason for call: Patient calling stating 7-8/10 pain level patient states medication or procedure has not worked. Patient would like to know next plan of care.    Call back#: [phone]

## 2023-04-27 NOTE — TELEPHONE ENCOUNTER
RN s/w pt regarding previous. RN advised that pt will be a patient of [facility] but not until seen in consult on 5/25 with ELSIE.  Pt has had a hip injection with [facility] in the past as a direct referral from Dr Nelson.    RN advised calling her PCP or referring physician to help with current pain until seen in consult by ELSIE  Pt appreciative and aware we look forward to her being our patient.    --please see previous--  Agree with same?

## 2023-05-02 NOTE — ASSESSMENT & PLAN NOTE
Differentials discussed  Get x-ray today:  Likely will note degenerative changes  Recommend PT  Medrol pack given for symptom management  OTC Voltaren gel  If persistent,  recommend MRI:  Consider labral injury    Possible ortho referral
Opt out

## 2023-05-05 ENCOUNTER — OFFICE VISIT (OUTPATIENT)
Dept: FAMILY MEDICINE CLINIC | Facility: CLINIC | Age: 63
End: 2023-05-05

## 2023-05-05 VITALS
BODY MASS INDEX: 49.09 KG/M2 | TEMPERATURE: 97.4 F | WEIGHT: 293 LBS | RESPIRATION RATE: 18 BRPM | SYSTOLIC BLOOD PRESSURE: 158 MMHG | OXYGEN SATURATION: 98 % | DIASTOLIC BLOOD PRESSURE: 94 MMHG | HEART RATE: 63 BPM

## 2023-05-05 DIAGNOSIS — I10 BENIGN ESSENTIAL HYPERTENSION: ICD-10-CM

## 2023-05-05 DIAGNOSIS — M54.16 LUMBAR RADICULOPATHY: Primary | ICD-10-CM

## 2023-05-05 DIAGNOSIS — M25.551 HIP PAIN, RIGHT: ICD-10-CM

## 2023-05-05 RX ORDER — GABAPENTIN 300 MG/1
CAPSULE ORAL
Qty: 90 CAPSULE | Refills: 0 | Status: SHIPPED | OUTPATIENT
Start: 2023-05-05 | End: 2023-06-10

## 2023-05-05 NOTE — ASSESSMENT & PLAN NOTE
Advised to trial back on gabapentin for night time when pain is most bothersome; pt does not want to try for during the day due to fatigue she felt she experienced with it in past; reviewed medication and potential SE; f/u with pain management as recommended/scheduled;  f/u guidance given

## 2023-05-05 NOTE — PROGRESS NOTES
Assessment/Plan:     1  Lumbar radiculopathy  Assessment & Plan:  Advised to trial back on gabapentin for night time when pain is most bothersome; pt does not want to try for during the day due to fatigue she felt she experienced with it in past; reviewed medication and potential SE; f/u with pain management as recommended/scheduled;  f/u guidance given      2  Hip pain, right  -     gabapentin (NEURONTIN) 300 mg capsule; Take 1 capsule (300 mg total) by mouth daily at bedtime for 3 days, THEN 1 capsule (300 mg total) 2 (two) times a day for 3 days, THEN 1 capsule (300 mg total) 3 (three) times a day  3  Benign essential hypertension  Assessment & Plan:  Suspect elevated today secondary to pain; address pain c/w current tx          Subjective:      Patient ID: Mayte Plata is a 65 y o  female  Patient is here with concerns of on going right hip pain  Chronic and seems to be worsening  Was advised to f/u with PCP until able to be seen by pain management  Has a lot of pain when she lies down and tries to get up  Seems pain is manageable during the day but worse at night  This pain started last year  Pain is not better  She is having trouble sleeping due to pain  No new injury  Seems to be worsening  Did have injections but did not help  Tried therapy as well without relief  Tried topical cream and oral NSAIDS  Had been on gabapentin in the past but weaned herself off due to fatigue  Pt is frustrated as she has not had any relief  The following portions of the patient's history were reviewed and updated as appropriate: allergies, current medications, past family history, past medical history, past social history, past surgical history, and problem list     Review of Systems   Constitutional: Negative for chills and fever  Musculoskeletal: Positive for arthralgias and back pain           Objective:      /94 (BP Location: Left arm, Patient Position: Sitting, Cuff Size: Large)   Pulse 63   Temp (!) 97 4 °F (36 3 °C) (Temporal)   Resp 18   Wt 134 kg (295 lb)   LMP  (LMP Unknown)   SpO2 98%   BMI 49 09 kg/m²          Physical Exam  Vitals reviewed  Constitutional:       General: She is not in acute distress  Appearance: Normal appearance  She is obese  She is not ill-appearing, toxic-appearing or diaphoretic  HENT:      Head: Normocephalic and atraumatic  Eyes:      General: No scleral icterus  Right eye: No discharge  Left eye: No discharge  Conjunctiva/sclera: Conjunctivae normal    Cardiovascular:      Rate and Rhythm: Normal rate and regular rhythm  Pulses: Normal pulses  Heart sounds: Normal heart sounds  No murmur heard  No gallop  Pulmonary:      Effort: Pulmonary effort is normal  No respiratory distress  Breath sounds: Normal breath sounds  No stridor  No wheezing, rhonchi or rales  Musculoskeletal:      Lumbar back: Decreased range of motion  Right lower leg: No edema  Left lower leg: No edema  Comments: TTP over buttock region on right  LE strength intact B/L   Neurological:      General: No focal deficit present  Mental Status: She is alert and oriented to person, place, and time  Psychiatric:         Mood and Affect: Mood normal          Behavior: Behavior normal          Thought Content:  Thought content normal          Judgment: Judgment normal

## 2023-05-09 NOTE — PATIENT INSTRUCTIONS
Continue Betaseron  Can get my labs done with the other labs you need from endocrinology  Will update MRI cervical spine and MRI thoracic spine  Referrals to PT and OT to work on balance, strengthening, endurance and stamina  Continue to follow with PCP and endocrinology    Make sure you are watching your diet  Follow up in 4 months or sooner if needed Her/She

## 2023-05-14 DIAGNOSIS — E83.52 HYPERCALCEMIA: ICD-10-CM

## 2023-05-15 RX ORDER — CINACALCET 60 MG/1
TABLET, FILM COATED ORAL
Qty: 30 TABLET | Refills: 0 | Status: SHIPPED | OUTPATIENT
Start: 2023-05-15

## 2023-05-15 NOTE — TELEPHONE ENCOUNTER
Requested medication(s) are due for refill today: Yes  Patient has already received a courtesy refill: No  Other reason request has been forwarded to provider:   Off-Protocol Failed 05/14/2023 02:03 PM   Protocol Details  Medication not assigned to a protocol, review manually      Valid encounter within last 12 months

## 2023-05-24 ENCOUNTER — OFFICE VISIT (OUTPATIENT)
Dept: PODIATRY | Facility: CLINIC | Age: 63
End: 2023-05-24

## 2023-05-24 ENCOUNTER — OFFICE VISIT (OUTPATIENT)
Dept: FAMILY MEDICINE CLINIC | Facility: CLINIC | Age: 63
End: 2023-05-24
Payer: COMMERCIAL

## 2023-05-24 ENCOUNTER — APPOINTMENT (OUTPATIENT)
Dept: LAB | Facility: CLINIC | Age: 63
End: 2023-05-24

## 2023-05-24 VITALS
HEIGHT: 65 IN | HEART RATE: 73 BPM | DIASTOLIC BLOOD PRESSURE: 72 MMHG | WEIGHT: 293 LBS | SYSTOLIC BLOOD PRESSURE: 122 MMHG | TEMPERATURE: 97.9 F | BODY MASS INDEX: 48.82 KG/M2 | OXYGEN SATURATION: 96 %

## 2023-05-24 VITALS
HEIGHT: 65 IN | WEIGHT: 293 LBS | SYSTOLIC BLOOD PRESSURE: 163 MMHG | DIASTOLIC BLOOD PRESSURE: 93 MMHG | HEART RATE: 73 BPM | BODY MASS INDEX: 48.82 KG/M2

## 2023-05-24 DIAGNOSIS — M25.551 HIP PAIN, RIGHT: ICD-10-CM

## 2023-05-24 DIAGNOSIS — E11.65 TYPE 2 DIABETES MELLITUS WITH HYPERGLYCEMIA, WITHOUT LONG-TERM CURRENT USE OF INSULIN (HCC): ICD-10-CM

## 2023-05-24 DIAGNOSIS — E78.5 HYPERLIPIDEMIA, UNSPECIFIED HYPERLIPIDEMIA TYPE: ICD-10-CM

## 2023-05-24 DIAGNOSIS — R26.89 BALANCE PROBLEM: ICD-10-CM

## 2023-05-24 DIAGNOSIS — Z00.00 MEDICARE ANNUAL WELLNESS VISIT, SUBSEQUENT: ICD-10-CM

## 2023-05-24 DIAGNOSIS — G35 MS (MULTIPLE SCLEROSIS) (HCC): ICD-10-CM

## 2023-05-24 DIAGNOSIS — E21.3 HYPERPARATHYROIDISM (HCC): ICD-10-CM

## 2023-05-24 DIAGNOSIS — M54.16 LUMBAR RADICULOPATHY: ICD-10-CM

## 2023-05-24 DIAGNOSIS — F41.8 DEPRESSION WITH ANXIETY: ICD-10-CM

## 2023-05-24 DIAGNOSIS — Z59.82 INABILITY TO ACQUIRE TRANSPORTATION: ICD-10-CM

## 2023-05-24 DIAGNOSIS — Z59.9 FINANCIAL DIFFICULTIES: ICD-10-CM

## 2023-05-24 DIAGNOSIS — I10 BENIGN ESSENTIAL HYPERTENSION: Primary | ICD-10-CM

## 2023-05-24 DIAGNOSIS — E83.52 HYPERCALCEMIA: ICD-10-CM

## 2023-05-24 DIAGNOSIS — E11.42 DIABETIC POLYNEUROPATHY ASSOCIATED WITH TYPE 2 DIABETES MELLITUS (HCC): Primary | ICD-10-CM

## 2023-05-24 DIAGNOSIS — E66.01 MORBID OBESITY (HCC): ICD-10-CM

## 2023-05-24 PROCEDURE — 99214 OFFICE O/P EST MOD 30 MIN: CPT | Performed by: NURSE PRACTITIONER

## 2023-05-24 PROCEDURE — G0439 PPPS, SUBSEQ VISIT: HCPCS | Performed by: NURSE PRACTITIONER

## 2023-05-24 RX ORDER — CINACALCET 60 MG/1
60 TABLET, FILM COATED ORAL DAILY
Qty: 30 TABLET | Refills: 0 | Status: CANCELLED | OUTPATIENT
Start: 2023-05-24

## 2023-05-24 RX ORDER — DICLOFENAC SODIUM 75 MG/1
75 TABLET, DELAYED RELEASE ORAL 2 TIMES DAILY
Qty: 60 TABLET | Refills: 1 | Status: CANCELLED | OUTPATIENT
Start: 2023-05-24

## 2023-05-24 RX ORDER — METFORMIN HYDROCHLORIDE 500 MG/1
1000 TABLET, EXTENDED RELEASE ORAL 2 TIMES DAILY WITH MEALS
Qty: 360 TABLET | Refills: 0 | Status: CANCELLED | OUTPATIENT
Start: 2023-05-24

## 2023-05-24 RX ORDER — LISINOPRIL 40 MG/1
40 TABLET ORAL DAILY
Qty: 90 TABLET | Refills: 0 | Status: SHIPPED | OUTPATIENT
Start: 2023-05-24

## 2023-05-24 RX ORDER — ATORVASTATIN CALCIUM 40 MG/1
40 TABLET, FILM COATED ORAL EVERY EVENING
Qty: 90 TABLET | Refills: 0 | Status: SHIPPED | OUTPATIENT
Start: 2023-05-24

## 2023-05-24 SDOH — ECONOMIC STABILITY - TRANSPORTATION SECURITY: TRANSPORTATION INSECURITY: Z59.82

## 2023-05-24 SDOH — ECONOMIC STABILITY - INCOME SECURITY: PROBLEM RELATED TO HOUSING AND ECONOMIC CIRCUMSTANCES, UNSPECIFIED: Z59.9

## 2023-05-24 NOTE — PROGRESS NOTES
[facility]    ASSESSMENT AND PLAN     1  Benign essential hypertension  Assessment & Plan:  Currently stable: 122/72  Reports compliance with Medications:   Hydralazine, HCTZ, Lisinopril, Toprol       Orders:  -     lisinopril (ZESTRIL) 40 mg tablet; Take 1 tablet (40 mg total) by mouth daily    2  Hyperlipidemia, unspecified hyperlipidemia type  Assessment & Plan:    Reports compliance and tolerance with Atorvastatin 40  Last panel September: 135/186/42/56  Recheck with next labs  Orders:  -     atorvastatin (LIPITOR) 40 mg tablet; Take 1 tablet (40 mg total) by mouth every evening    3  Type 2 diabetes mellitus with hyperglycemia, without long-term current use of insulin [facility])  Assessment & Plan:    Lab Results   Component Value Date    HGBA1C 10 3 (A) 02/22/2023   Following with Endo - over due for f/u  Encourage compliance  Poor control at this time  Advise medication compliance - strict diet and increase exercise      4  Hypercalcemia    5  Lumbar radiculopathy  Assessment & Plan:  Current Gabapentin,Cymbalta  Appt with pain management tomorrow      6  Hip pain, right    7  Financial difficulties  Comments:  Unable to drive d/t health conditions  Difficulty getting to medical appts  Referral to  for evaluation and guidance for resources  Orders:  -     Ambulatory referral to social work care management program; Future; Expected date: 05/24/2023    8  Inability to acquire transportation  -     Ambulatory referral to social work care management program; Future; Expected date: 05/24/2023    9  Hyperparathyroidism [facility])  Assessment & Plan:  Managed by Endo  Considering surgery  Current Sensipar      10  Morbid obesity ([facility] [address] )    11  MS (multiple sclerosis) ([facility] [address] )  Assessment & Plan:  Managed by Neuro  Notes worsening balance - referral for PT    Orders:  -     Ambulatory Referral to Physical Therapy; Future    12  Depression with anxiety  Assessment & Plan:  Frustrated with health and chronic pain  Reports depression, no SI/HI  Compliant with Cymbalta  Would benefit from therapy  Referral to psychiatry - possible alternate medication for better control  Pt agreeable  Orders:  -     Ambulatory Referral to Psychiatry; Future    13  Balance problem  -     Ambulatory Referral to Physical Therapy; Future    14  Medicare annual wellness visit, subsequent    BMI Counseling: Body mass index is 49 09 kg/m²  The BMI is above normal  Nutrition recommendations include consuming healthier snacks, moderation in carbohydrate intake and reducing intake of saturated and trans fat  Exercise recommendations include moderate physical activity 150 minutes/week  Rationale for BMI follow-up plan is due to patient being overweight or obese  Healthy lifestyle counseling  Encourage exercise as tolerated  Chair exercise  Referral to PT    Depression Screening and Follow-up Plan: Patient assessed for underlying major depression  Brief counseling provided and recommend additional follow-up/re-evaluation next office visit  Referral to psychiatry           SUBJECTIVE       Patient ID: Marissa Meraz is a 65 y o  female  Chief Complaint   Patient presents with   • Medicare Wellness Visit   • Hip Pain     Pt complains of hip pain due to pinched nerve on right hip  Gave her medication for pain and states its not working  HISTORY OF PRESENT ILLNESS    HPI      The following portions of the patient's history were reviewed and updated as appropriate: allergies, current medications, past family history, past medical history, past social history, past surgical history, and problem list     REVIEW OF SYSTEMS  Review of Systems   Constitutional: Positive for activity change and fatigue  HENT: Negative  Respiratory: Negative  Cardiovascular: Negative  Gastrointestinal: Negative  Musculoskeletal: Positive for arthralgias and myalgias  Skin: Negative      Neurological:        Balance   Psychiatric/Behavioral: Positive for "sleep disturbance  Negative for self-injury and suicidal ideas  The patient is nervous/anxious          OBJECTIVE      VITAL SIGNS  /72 (BP Location: Left arm, Patient Position: Sitting, Cuff Size: Adult)   Pulse 73   Temp 97 9 °F (36 6 °C)   Ht 5' 5\" (1 651 m)   Wt 134 kg (295 lb)   LMP  (LMP Unknown)   SpO2 96%   BMI 49 09 kg/m²     CURRENT MEDICATIONS    Current Outpatient Medications:   •  Alcohol Swabs PADS, by Does not apply route 4 (four) times a day *Latex Free* (Patient not taking: Reported on 5/25/2023), Disp: 120 each, Rfl: 3  •  aspirin 81 mg chewable tablet, Chew 1 tablet (81 mg total) daily, Disp: 90 tablet, Rfl: 1  •  atorvastatin (LIPITOR) 40 mg tablet, Take 1 tablet (40 mg total) by mouth every evening, Disp: 90 tablet, Rfl: 0  •  Blood Glucose Monitoring Suppl (OneTouch Verio) w/Device KIT, Use in the morning (Patient not taking: Reported on 6/7/2023), Disp: 1 kit, Rfl: 0  •  Blood Pressure KIT, by Does not apply route daily (Patient not taking: Reported on 5/25/2023), Disp: 1 each, Rfl: 0  •  cholecalciferol (VITAMIN D3) 1,000 units tablet, Take 1,000 Units by mouth daily, Disp: , Rfl:   •  cinacalcet (SENSIPAR) 60 MG tablet, Take 1 tablet by mouth once daily, Disp: 30 tablet, Rfl: 0  •  diclofenac (VOLTAREN) 75 mg EC tablet, Take 1 tablet (75 mg total) by mouth 2 (two) times a day (Patient not taking: Reported on 6/7/2023), Disp: 60 tablet, Rfl: 1  •  Diclofenac Sodium (VOLTAREN) 1 %, Apply 2 g topically 4 (four) times a day (Patient not taking: Reported on 6/7/2023), Disp: 150 g, Rfl: 1  •  DULoxetine (CYMBALTA) 30 mg delayed release capsule, Take 1 capsule (30 mg total) by mouth in the morning , Disp: 30 capsule, Rfl: 1  •  glucose blood (OneTouch Verio) test strip, Use 1 each in the morning (Patient not taking: Reported on 6/7/2023), Disp: 100 each, Rfl: 1  •  hydrochlorothiazide (HYDRODIURIL) 50 mg tablet, Take 1 tablet (50 mg total) by mouth daily, Disp: 90 tablet, Rfl: 3  •  " Incontinence Supply Disposable (Prevail Women Underwear XL) MISC, Use every 6 (six) hours as needed (incontinence), Disp: 120 each, Rfl: 11  •  insulin degludec Zelda Browner FlexTouch) 100 units/mL injection pen, Inject 15 Units under the skin daily, Disp: 15 mL, Rfl: 2  •  Insulin Pen Needle (BD Pen Needle Izzy U/F) 32G X 4 MM MISC, Use in the morning, Disp: 100 each, Rfl: 1  •  Interferon Beta-1b (Betaseron) 0 3 MG KIT, Inject 1 ml (0 25 mg) subcutaneously every other day, Disp: 14 kit, Rfl: 11  •  Lancets (OneTouch Delica Plus [id number]) MISC, Test BG up to 1x daily as directed (Patient not taking: Reported on 5/25/2023), Disp: 100 each, Rfl: 1  •  lisinopril (ZESTRIL) 40 mg tablet, Take 1 tablet (40 mg total) by mouth daily, Disp: 90 tablet, Rfl: 0  •  metFORMIN (GLUCOPHAGE-XR) 500 mg 24 hr tablet, TAKE 2 TABLETS BY MOUTH TWICE DAILY WITH MEALS, Disp: 360 tablet, Rfl: 0  •  metoprolol succinate (TOPROL-XL) 50 mg 24 hr tablet, Take 1 tablet (50 mg total) by mouth daily, Disp: 90 tablet, Rfl: 3  •  ondansetron (ZOFRAN-ODT) 4 mg disintegrating tablet, Take 1 tablet (4 mg total) by mouth every 6 (six) hours as needed for nausea or vomiting, Disp: 20 tablet, Rfl: 0  •  vitamin B-12 (VITAMIN B-12) 500 mcg tablet, Take 2 tablets (1,000 mcg total) by mouth daily, Disp: 60 tablet, Rfl: 5  •  diazepam (VALIUM) 5 mg tablet, Take 1 tablet (5 mg total) by mouth 60 minutes pre-procedure, Disp: 2 tablet, Rfl: 0  •  diphenhydrAMINE (BENADRYL) 25 mg tablet, Take 0 5 tablets (12 5 mg total) by mouth every 6 (six) hours (Patient not taking: Reported on 6/7/2023), Disp: 20 tablet, Rfl: 0  •  hydrALAZINE (APRESOLINE) 10 mg tablet, Take 1 tablet (10 mg total) by mouth 3 (three) times a day, Disp: 90 tablet, Rfl: 0  •  naloxone (NARCAN) 4 mg/0 1 mL nasal spray, Administer 1 spray into a nostril   If no response after 2-3 minutes, give another dose in the other nostril using a new spray , Disp: 1 each, Rfl: 1  •  pregabalin (LYRICA) 75 mg capsule, Take 1 capsule (75 mg total) by mouth daily at bedtime, Disp: 30 capsule, Rfl: 2  •  traMADol (Ultram) 50 mg tablet, Take 1 tablet (50 mg total) by mouth every 6 (six) hours as needed for moderate pain, Disp: 10 tablet, Rfl: 0    Current Facility-Administered Medications:   •  prochlorperazine (COMPAZINE) injection 5 mg, 5 mg, Intramuscular, Q4H PRN, Oren Hurt MD, 5 mg at 11/11/22 1040      PHYSICAL EXAMINATION   Physical Exam  Vitals and nursing note reviewed  Constitutional:       Appearance: Normal appearance  HENT:      Head: Normocephalic  Right Ear: Tympanic membrane and ear canal normal       Left Ear: Tympanic membrane and ear canal normal    Neck:      Vascular: No carotid bruit  Cardiovascular:      Rate and Rhythm: Normal rate and regular rhythm  Pulmonary:      Effort: Pulmonary effort is normal  No respiratory distress  Breath sounds: Normal breath sounds and air entry  Abdominal:      General: Bowel sounds are normal       Tenderness: There is no abdominal tenderness  Lymphadenopathy:      Cervical: No cervical adenopathy  Skin:     General: Skin is warm and dry  Neurological:      General: No focal deficit present  Mental Status: She is alert and oriented to person, place, and time  Psychiatric:         Attention and Perception: Attention normal          Mood and Affect: Mood normal          Speech: Speech normal          Behavior: Behavior normal          Thought Content:  Thought content normal          Judgment: Judgment normal

## 2023-05-24 NOTE — PROGRESS NOTES
Assessment and Plan:     Problem List Items Addressed This Visit        Endocrine    Type 2 diabetes mellitus with hyperglycemia, without long-term current use of insulin (HCC)    Hyperparathyroidism ([address] )       Cardiovascular and Mediastinum    Benign essential hypertension    Relevant Medications    lisinopril (ZESTRIL) 40 mg tablet       Nervous and Auditory    MS (multiple sclerosis) (HCC)    Relevant Orders    Ambulatory Referral to Physical Therapy    Lumbar radiculopathy       Other    Hypercalcemia    Depression with anxiety    Relevant Orders    Ambulatory Referral to Psychiatry    Hyperlipidemia    Relevant Medications    atorvastatin (LIPITOR) 40 mg tablet    Hip pain, right   Other Visit Diagnoses     Medicare annual wellness visit, subsequent    -  Primary    Financial difficulties        Relevant Orders    Ambulatory referral to social work care management program    Inability to acquire transportation        Relevant Orders    Ambulatory referral to social work care management program    Morbid obesity [facility])        Balance problem        Relevant Orders    Ambulatory Referral to Physical Therapy           Preventive health issues were discussed with patient, and age appropriate screening tests were ordered as noted in patient's After Visit Summary  Personalized health advice and appropriate referrals for health education or preventive services given if needed, as noted in patient's After Visit Summary       History of Present Illness:     Patient presents for a Medicare Wellness Visit    HPI   Patient Care Team:  Delaney Baxter DO as PCP - General (Family Medicine)  Naomy Pang DO as PCP - PCP-[employer]-Medicaid (RTE)  Naomy Pang DO as PCP - [address] [place], [place] [id number] [facility] (RTE)  Debra Barnes MD as PCP - Endocrinology (Endocrinology)  MD Denise Wilde DO as Endoscopist  Kathy Thapa MD (Podiatry)  [facility] as Diabetes Educator (Nutrition)  Violeta Ireland MD (Nephrology)  Georgiana Joyce as Nurse Practitioner (Endocrinology)  David Mendez MD (Oncology)  Danya Lerma DO (Cardiology)     Review of Systems:     Review of Systems     Problem List:     Patient Active Problem List   Diagnosis   • Hernia, ventral   • Type 2 diabetes mellitus with hyperglycemia, without long-term current use of insulin ([facility] )   • GI bleed   • Morbid obesity with BMI of 45 0-49 9, adult ([facility] )   • Ischemic colitis ([facility] )   • Unintentional weight loss   • Hypercalcemia   • Hyperparathyroidism ([facility] )   • Vitamin D deficiency   • Depression with anxiety   • Benign essential hypertension   • Falls frequently   • Numbness and tingling of right arm and leg   • Ambulatory dysfunction   • Acute right hemiparesis (HCC)   • Neuropathic pain, leg, bilateral   • MS (multiple sclerosis) (HCC)   • Cognitive decline   • Left sided numbness   • Right hemiparesis (HCC)   • Chronic daily headache   • Class 3 severe obesity due to excess calories with serious comorbidity and body mass index (BMI) of 45 0 to 49 9 in adult [facility])   • Thyroid nodule   • B12 deficiency   • Hyperlipidemia   • Obstructive sleep apnea syndrome   • Respiratory infection   • Hip pain, right   • Positive for macroalbuminuria   • Other chronic pain   • Nausea & vomiting   • Bloating   • New daily persistent headache   • Medication overuse headache   • Visual disturbances   • Chronic post-traumatic headache, not intractable   • Primary osteoarthritis of right hip   • Lumbar radiculopathy      Past Medical and Surgical History:     Past Medical History:   Diagnosis Date   • Diabetes mellitus ([facility] )    • External hemorrhoids     last assessed 4/7/16, resolved 7/21/16   • Hernia, ventral     last assessed 12/14/15, resolved 7/21/16   • Hypertension    • Incarcerated incisional hernia     last assessed 12/21/15, resolved 7/21/16   • Insomnia     last assessed 12/8/16, resolved 10/9/17   • Lyme disease    • Morbid obesity with BMI of 45 0-49 9, adult ([facility] ) 5/3/2017   • MS (multiple sclerosis) ([facility] )    • Stroke (cerebrum) ([facility] ) 2020   • Type 2 diabetes mellitus with hyperglycemia, without long-term current use of insulin ([place] [address] )      Past Surgical History:   Procedure Laterality Date   • ABCESS DRAINAGE     •  SECTION      x3   • COLONOSCOPY N/A 2017    Procedure: COLONOSCOPY with biopsy;  Surgeon: Leonel Olivares DO;  Location: [facility];   Service: Complete   • HYSTERECTOMY     • IR BIOPSY ABDOMEN  2021   • IR LUMBAR PUNCTURE  3/13/2020   • US GUIDED THYROID BIOPSY  2021      Family History:     Family History   Problem Relation Age of Onset   • Glaucoma Mother    • Diabetes Father    • Hypertension Father    • Colon cancer Father    • Alzheimer's disease Father    • Cervical cancer Sister 61   • Breast cancer Sister 50   • Breast cancer Sister 45   • Breast cancer Maternal Grandmother    • Breast cancer Maternal Aunt    • Breast cancer Maternal Aunt    • Breast cancer Maternal Aunt    • Coronary artery disease Family    • Diabetes Family    • Hypertension Family       Social History:     Social History     Socioeconomic History   • Marital status: /Civil Union     Spouse name: None   • Number of children: None   • Years of education: None   • Highest education level: None   Occupational History     Employer: [employer]   Tobacco Use   • Smoking status: Never   • Smokeless tobacco: Never   Vaping Use   • Vaping Use: Never used   Substance and Sexual Activity   • Alcohol use: Not Currently     Comment: Social   • Drug use: Yes     Types: Marijuana     Comment: medical marijuana   • Sexual activity: Not Currently     Partners: Male     Birth control/protection: None   Other Topics Concern   • None   Social History Narrative   • None     Social Determinants of Health     Financial Resource Strain: High Risk (2023)    Overall Financial Resource Strain (CARDIA)    • Difficulty of Paying Living Expenses: Hard Food Insecurity: Unknown (4/12/2021)    Hunger Vital Sign    • Worried About Running Out of Food in the Last Year: Never true    • Ran Out of Food in the Last Year: Not on file   Transportation Needs: Unmet Transportation Needs (5/24/2023)    PRAPARE - Transportation    • Lack of Transportation (Medical):  Yes    • Lack of Transportation (Non-Medical): Yes   Physical Activity: Inactive (7/30/2020)    Exercise Vital Sign    • Days of Exercise per Week: 0 days    • Minutes of Exercise per Session: 0 min   Stress: Stress Concern Present (7/30/2020)    [address]    • Feeling of Stress : Rather much   Social Connections: Unknown (7/30/2020)    Social Connection and Isolation Panel [NHANES]    • Frequency of Communication with Friends and Family: More than three times a week    • Frequency of Social Gatherings with Friends and Family: Not on file    • Attends Episcopal Services: Not on file    • Active Member of Clubs or Organizations: Not on file    • Attends Club or Organization Meetings: Not on file    • Marital Status:    Intimate Partner Violence: Not At Risk (7/30/2020)    Humiliation, Afraid, Rape, and Kick questionnaire    • Fear of Current or Ex-Partner: No    • Emotionally Abused: No    • Physically Abused: No    • Sexually Abused: No   Housing Stability: Not on file      Medications and Allergies:     Current Outpatient Medications   Medication Sig Dispense Refill   • Alcohol Swabs PADS by Does not apply route 4 (four) times a day *Latex Free* (Patient not taking: Reported on 5/25/2023) 120 each 3   • aspirin 81 mg chewable tablet Chew 1 tablet (81 mg total) daily 90 tablet 1   • atorvastatin (LIPITOR) 40 mg tablet Take 1 tablet (40 mg total) by mouth every evening 90 tablet 0   • Blood Glucose Monitoring Suppl (OneTouch Verio) w/Device KIT Use in the morning (Patient not taking: Reported on 6/7/2023) 1 kit 0   • Blood Pressure KIT by Does not apply route daily (Patient not taking: Reported on 5/25/2023) 1 each 0   • cholecalciferol (VITAMIN D3) 1,000 units tablet Take 1,000 Units by mouth daily     • cinacalcet (SENSIPAR) 60 MG tablet Take 1 tablet by mouth once daily 30 tablet 0   • diclofenac (VOLTAREN) 75 mg EC tablet Take 1 tablet (75 mg total) by mouth 2 (two) times a day (Patient not taking: Reported on 6/7/2023) 60 tablet 1   • Diclofenac Sodium (VOLTAREN) 1 % Apply 2 g topically 4 (four) times a day (Patient not taking: Reported on 6/7/2023) 150 g 1   • DULoxetine (CYMBALTA) 30 mg delayed release capsule Take 1 capsule (30 mg total) by mouth in the morning   30 capsule 1   • glucose blood (OneTouch Verio) test strip Use 1 each in the morning (Patient not taking: Reported on 6/7/2023) 100 each 1   • hydrochlorothiazide (HYDRODIURIL) 50 mg tablet Take 1 tablet (50 mg total) by mouth daily 90 tablet 3   • Incontinence Supply Disposable (Prevail Women Underwear XL) MISC Use every 6 (six) hours as needed (incontinence) 120 each 11   • insulin degludec Cheyanne Shannan FlexTouch) 100 units/mL injection pen Inject 15 Units under the skin daily 15 mL 2   • Insulin Pen Needle (BD Pen Needle Izzy U/F) 32G X 4 MM MISC Use in the morning 100 each 1   • Interferon Beta-1b (Betaseron) 0 3 MG KIT Inject 1 ml (0 25 mg) subcutaneously every other day 14 kit 11   • Lancets (OneTouch Delica Plus [id number]) MISC Test BG up to 1x daily as directed (Patient not taking: Reported on 5/25/2023) 100 each 1   • lisinopril (ZESTRIL) 40 mg tablet Take 1 tablet (40 mg total) by mouth daily 90 tablet 0   • metFORMIN (GLUCOPHAGE-XR) 500 mg 24 hr tablet TAKE 2 TABLETS BY MOUTH TWICE DAILY WITH MEALS 360 tablet 0   • metoprolol succinate (TOPROL-XL) 50 mg 24 hr tablet Take 1 tablet (50 mg total) by mouth daily 90 tablet 3   • ondansetron (ZOFRAN-ODT) 4 mg disintegrating tablet Take 1 tablet (4 mg total) by mouth every 6 (six) hours as needed for nausea or vomiting 20 tablet 0   • vitamin B-12 (VITAMIN B-12) 500 mcg tablet Take 2 tablets (1,000 mcg total) by mouth daily 60 tablet 5   • diazepam (VALIUM) 5 mg tablet Take 1 tablet (5 mg total) by mouth 60 minutes pre-procedure 2 tablet 0   • diphenhydrAMINE (BENADRYL) 25 mg tablet Take 0 5 tablets (12 5 mg total) by mouth every 6 (six) hours (Patient not taking: Reported on 6/7/2023) 20 tablet 0   • hydrALAZINE (APRESOLINE) 10 mg tablet Take 1 tablet (10 mg total) by mouth 3 (three) times a day 90 tablet 0   • naloxone (NARCAN) 4 mg/0 1 mL nasal spray Administer 1 spray into a nostril  If no response after 2-3 minutes, give another dose in the other nostril using a new spray   1 each 1   • pregabalin (LYRICA) 75 mg capsule Take 1 capsule (75 mg total) by mouth daily at bedtime 30 capsule 2   • traMADol (Ultram) 50 mg tablet Take 1 tablet (50 mg total) by mouth every 6 (six) hours as needed for moderate pain 10 tablet 0     Current Facility-Administered Medications   Medication Dose Route Frequency Provider Last Rate Last Admin   • prochlorperazine (COMPAZINE) injection 5 mg  5 mg Intramuscular Q4H PRN Ottoniel Oneal MD   5 mg at 11/11/22 1040     Allergies   Allergen Reactions   • Sorbitan Diarrhea, Vomiting and Abdominal Pain   • Victoza [Liraglutide] Nausea Only   • Ambien [Zolpidem Tartrate]    • Demerol [Meperidine]    • Insulin Glargine Other (See Comments)     Annotation - [id number]: memory loss   • Latex    • Oxycodone Hives, Rash and Vomiting      Immunizations:     Immunization History   Administered Date(s) Administered   • COVID-19 MODERNA VACC 0 5 ML IM 04/22/2021, 05/21/2021   • Influenza Quadrivalent, 6-35 Months IM 10/01/2015, 11/17/2016, 08/28/2017   • Influenza, injectable, quadrivalent, preservative free 0 5 mL 12/05/2018   • Influenza, recombinant, quadrivalent,injectable, preservative free 09/27/2019, 10/06/2020, 09/13/2021, 11/21/2022   • MMR 10/09/2017   • Tdap 12/14/2015   • Tuberculin Skin Test-PPD Intradermal 12/07/2012, 12/05/2014, 12/14/2015, 03/22/2017, 08/28/2017, 09/28/2019      Health Maintenance:         Topic Date Due   • Breast Cancer Screening: Mammogram  09/07/2023   • Colorectal Cancer Screening  04/28/2027   • HIV Screening  Completed   • Hepatitis C Screening  Completed         Topic Date Due   • Pneumococcal Vaccine: Pediatrics (0 to 5 Years) and At-Risk Patients (6 to 59 Years) (1 - PCV) Never done   • COVID-19 Vaccine (3 - Gladystine Gurjit series) 07/16/2021      Medicare Screening Tests and Risk Assessments:     Pippa Franco is here for her Subsequent Wellness visit  Last Medicare Wellness visit information reviewed, patient interviewed, no change since last AWV  Health Risk Assessment:   Patient rates overall health as poor  Patient feels that their physical health rating is much worse  Patient is dissatisfied with their life  Eyesight was rated as same  Hearing was rated as same  Patient feels that their emotional and mental health rating is slightly worse  Patients states they are always angry  Patient states they are always unusually tired/fatigued  Pain experienced in the last 7 days has been a lot  Patient's pain rating has been 7/10  Patient states that she has experienced no weight loss or gain in last 6 months  Depression Screening:   PHQ-9 Score: 24      Fall Risk Screening: In the past year, patient has experienced: history of falling in past year    Number of falls: 2 or more  Injured during fall?: Yes    Feels unsteady when standing or walking?: Yes    Worried about falling?: Yes      Urinary Incontinence Screening:   Patient has leaked urine accidently in the last six months  Home Safety:  Patient has trouble with stairs inside or outside of their home  Patient has working smoke alarms and has working carbon monoxide detector  Home safety hazards include: uneven floors  Nutrition:   Current diet is Other (please comment)   Sugar free    Medications:   Patient is currently taking over-the-counter supplements  OTC medications include: see medication list  Patient is able to manage medications  Activities of Daily Living (ADLs)/Instrumental Activities of Daily Living (IADLs):   Walk and transfer into and out of bed and chair?: Yes  Dress and groom yourself?: Yes    Bathe or shower yourself?: Yes    Feed yourself? Yes  Do your laundry/housekeeping?: No  Manage your money, pay your bills and track your expenses?: Yes  Make your own meals?: Yes    Do your own shopping?: Yes    Previous Hospitalizations:   Any hospitalizations or ED visits within the last 12 months?: Yes    How many hospitalizations have you had in the last year?: 1-2    Advance Care Planning:   Living will: Yes    Durable POA for healthcare: Yes    Advanced directive: Yes    Five wishes given: No      PREVENTIVE SCREENINGS      Cardiovascular Screening:    General: History Lipid Disorder and Screening Current      Diabetes Screening:     General: History Diabetes and Risks and Benefits Discussed    Due for: Blood Glucose      Colorectal Cancer Screening:     General: Screening Current      Breast Cancer Screening:     General: Screening Current      Cervical Cancer Screening:    General: Screening Current      Osteoporosis Screening:      Due for: Bone Density Ultrasound      Abdominal Aortic Aneurysm (AAA) Screening:        General: Screening Not Indicated      Lung Cancer Screening:     General: Screening Not Indicated      Hepatitis C Screening:    General: Screening Current    Screening, Brief Intervention, and Referral to Treatment (SBIRT)    Screening  Typical number of drinks in a day: 0  Typical number of drinks in a week: 0  Interpretation: Low risk drinking behavior      AUDIT-C Screenin) How often did you have a drink containing alcohol in the past year? never  2) How many drinks did you have on a typical day when you were drinking in the past year? 0  3) How often did you have 6 or more drinks on one occasion "in the past year? never    AUDIT-C Score: 0  Interpretation: Score 0-2 (female): Negative screen for alcohol misuse    Single Item Drug Screening:  How often have you used an illegal drug (including marijuana) or a prescription medication for non-medical reasons in the past year? never    Single Item Drug Screen Score: 0  Interpretation: Negative screen for possible drug use disorder    Other Counseling Topics:   Car/seat belt/driving safety, skin self-exam, sunscreen and regular weightbearing exercise and calcium and vitamin D intake            Physical Exam:     /72 (BP Location: Left arm, Patient Position: Sitting, Cuff Size: Adult)   Pulse 73   Temp 97 9 °F (36 6 °C)   Ht 5' 5\" (1 651 m)   Wt 134 kg (295 lb)   LMP  (LMP Unknown)   SpO2 96%   BMI 49 09 kg/m²          DEANA Kee  "

## 2023-05-24 NOTE — PROGRESS NOTES
Patient presents for palliative diabetic foot care  Patient ambulating with a walker  She has very poor balance due to neuropathy  She states that she is falling frequently  Patient's feet are insensate  She also has numbness in her legs and arms  Recommended referral to physical therapy for gait training  All elongated toenails were trimmed  Reappoint 3 months

## 2023-05-25 ENCOUNTER — OFFICE VISIT (OUTPATIENT)
Dept: PAIN MEDICINE | Facility: MEDICAL CENTER | Age: 63
End: 2023-05-25

## 2023-05-25 VITALS
HEIGHT: 65 IN | SYSTOLIC BLOOD PRESSURE: 151 MMHG | HEART RATE: 65 BPM | WEIGHT: 293 LBS | BODY MASS INDEX: 48.82 KG/M2 | DIASTOLIC BLOOD PRESSURE: 77 MMHG

## 2023-05-25 DIAGNOSIS — F41.9 ANXIETY DUE TO INVASIVE PROCEDURE: ICD-10-CM

## 2023-05-25 DIAGNOSIS — M54.16 LUMBAR RADICULOPATHY: Primary | ICD-10-CM

## 2023-05-25 RX ORDER — PREGABALIN 75 MG/1
75 CAPSULE ORAL
Qty: 30 CAPSULE | Refills: 2 | Status: SHIPPED | OUTPATIENT
Start: 2023-05-25 | End: 2023-06-24

## 2023-05-25 RX ORDER — DIAZEPAM 5 MG/1
5 TABLET ORAL
Qty: 2 TABLET | Refills: 0 | Status: SHIPPED | OUTPATIENT
Start: 2023-05-25

## 2023-05-25 NOTE — PROGRESS NOTES
Assessment  1  Lumbar radiculopathy        Plan  1  At this time we will initiate Lyrica 75 mg nightly  2  An MRI of the lumbar spine is warranted given the patient's continued chronic pain without resolution from physical therapy which made the pain worse  She is also complaining of nighttime pain that is waking her from sleep  I think an MRI is certainly warranted at this point and may reveal nerve root compression on the right  3   Once the imaging is available for review we will determine further treatment options which may include epidural steroid injection  My impressions and treatment recommendations were discussed in detail with the patient who verbalized understanding and had no further questions  Discharge instructions were provided  I personally saw and examined the patient and I agree with the above discussed plan of care  Orders Placed This Encounter   Procedures   • MRI lumbar spine wo contrast     Standing Status:   Future     Standing Expiration Date:   5/25/2027     Scheduling Instructions: There is no preparation for this test  Please leave your jewelry and valuables at home, wedding rings are the exception  All patients will be required to change into a hospital gown and pants  Street clothes are not permitted in the MRI  Magnetic nail polish must be removed prior to arrival for your test  Please bring your insurance cards, a form of photo ID and a list of your medications with you  Arrive 15 minutes prior to your appointment time in order to register  Please bring any prior CT or MRI studies of this area that were not performed at a [facility]  To schedule this appointment, please contact Central Scheduling at [phone]  Prior to your appointment, please make sure you complete the MRI Screening Form when you e-Check in for your appointment  This will be available starting 7 days before your appointment in [address]   You may receive an e-mail with an activation code if you do not have a [employer] account  If you do not have access to a device, we will complete your screening at your appointment  Order Specific Question:   What is the patient's sedation requirement? Answer:   No Sedation     Order Specific Question:   Does the patient have metallic implants? Answer:   No     Order Specific Question:   Does this procedure require the 3T MRI at [facility] or [place]?     Answer:   No     Order Specific Question:   Release to patient through [employer]     Answer:   Immediate     Order Specific Question:   Is order priority selected as STAT? Answer:   No     Order Specific Question:   Reason for Exam (FREE TEXT)     Answer:   radiating right leg pain     New Medications Ordered This Visit   Medications   • pregabalin (LYRICA) 75 mg capsule     Sig: Take 1 capsule (75 mg total) by mouth daily at bedtime     Dispense:  30 capsule     Refill:  2       History of Present Illness    Aba Russell is a 65 y o  female seen in consultation at the request of Dr Kenroy Asher regarding chronic radiating right leg pain  The patient's been experiencing symptoms over the past year without any inciting events or trauma  She describes severe pain at this point which is constant and bothersome in the morning evening and nighttime  She is describing pain that is shooting numbness sharp pressure-like throbbing dull and aching in quality  She does describe lower extremity weakness and ambulates with a walker or utilizes a wheelchair for longer distances  Aggravating factors include lying down bending walking and exercise  Alleviating factors are unknown  No diagnostic imaging of the lumbar spine is available for review at this time  She did have a hip joint injection which provided no durable benefit, no relief from physical therapy which made pain worse exercise is also worsening pain and no relief from heat or ice application      Social history negative for tobacco marijuana and alcohol use  Currently using diclofenac acetaminophen and gabapentin without benefit  I have personally reviewed and/or updated the patient's past medical history, past surgical history, family history, social history, current medications, allergies, and vital signs today  Review of Systems   Constitutional: Positive for unexpected weight change  Negative for fever  HENT: Negative for trouble swallowing  Eyes: Negative for visual disturbance  Respiratory: Negative for shortness of breath and wheezing  Cardiovascular: Positive for leg swelling  Negative for chest pain and palpitations  Gastrointestinal: Positive for nausea and vomiting  Negative for constipation and diarrhea  Endocrine: Positive for polyuria  Negative for cold intolerance, heat intolerance and polydipsia  Genitourinary: Negative for difficulty urinating and frequency  Musculoskeletal: Negative for arthralgias, gait problem, joint swelling and myalgias  Skin: Negative for rash  Neurological: Positive for numbness  Negative for dizziness, seizures, syncope, weakness and headaches  Hematological: Does not bruise/bleed easily  Psychiatric/Behavioral: Positive for decreased concentration and dysphoric mood  The patient is nervous/anxious  All other systems reviewed and are negative        Patient Active Problem List   Diagnosis   • Hernia, ventral   • Type 2 diabetes mellitus with hyperglycemia, without long-term current use of insulin ([facility] [address] )   • GI bleed   • Morbid obesity with BMI of 45 0-49 9, adult ([facility])   • Ischemic colitis ([address] )   • Unintentional weight loss   • Hypercalcemia   • Hyperparathyroidism ([address] )   • Vitamin D deficiency   • Depression with anxiety   • Benign essential hypertension   • Falls frequently   • Numbness and tingling of right arm and leg   • Ambulatory dysfunction   • Acute right hemiparesis ([facility])   • Neuropathic pain, leg, bilateral   • MS (multiple sclerosis) ([address] )   • Cognitive decline   • Left sided numbness   • Right hemiparesis ([facility])   • Chronic daily headache   • Class 3 severe obesity due to excess calories with serious comorbidity and body mass index (BMI) of 45 0 to 49 9 in adult [facility])   • Thyroid nodule   • B12 deficiency   • Hyperlipidemia   • Obstructive sleep apnea syndrome   • Respiratory infection   • Hip pain, right   • Positive for macroalbuminuria   • Other chronic pain   • Nausea & vomiting   • Bloating   • New daily persistent headache   • Medication overuse headache   • Visual disturbances   • Chronic post-traumatic headache, not intractable   • Primary osteoarthritis of right hip   • Lumbar radiculopathy       Past Medical History:   Diagnosis Date   • Diabetes mellitus ([facility] [address] )    • External hemorrhoids     last assessed 16, resolved 16   • Hernia, ventral     last assessed 12/14/15, resolved 16   • Hypertension    • Incarcerated incisional hernia     last assessed 12/21/15, resolved 16   • Insomnia     last assessed 16, resolved 10/9/17   • Lyme disease    • Morbid obesity with BMI of 45 0-49 9, adult ([facility] [address] ) 5/3/2017   • MS (multiple sclerosis) ([facility] [address] )    • Stroke (cerebrum) ([facility] [address] ) 2020   • Type 2 diabetes mellitus with hyperglycemia, without long-term current use of insulin ([facility] [address] )        Past Surgical History:   Procedure Laterality Date   • ABCESS DRAINAGE     •  SECTION      x3   • COLONOSCOPY N/A 2017    Procedure: COLONOSCOPY with biopsy;  Surgeon: Ana Hall DO;  Location: [facility];   Service: Complete   • HYSTERECTOMY     • IR BIOPSY ABDOMEN  2021   • IR LUMBAR PUNCTURE  3/13/2020   • US GUIDED THYROID BIOPSY  2021       Family History   Problem Relation Age of Onset   • Glaucoma Mother    • Diabetes Father    • Hypertension Father    • Colon cancer Father    • Alzheimer's disease Father    • Cervical cancer Sister 61   • Breast cancer Sister 50   • Breast cancer Sister 45   • Breast cancer Maternal Grandmother    • Breast cancer Maternal Aunt    • Breast cancer Maternal Aunt    • Breast cancer Maternal Aunt    • Coronary artery disease Family    • Diabetes Family    • Hypertension Family        Social History     Occupational History     Employer: [employer]   Tobacco Use   • Smoking status: Never   • Smokeless tobacco: Never   Vaping Use   • Vaping Use: Never used   Substance and Sexual Activity   • Alcohol use: Not Currently     Comment: Social   • Drug use: Yes     Types: Marijuana     Comment: medical marijuana   • Sexual activity: Not Currently     Partners: Male     Birth control/protection: None       Current Outpatient Medications on File Prior to Visit   Medication Sig   • aspirin 81 mg chewable tablet Chew 1 tablet (81 mg total) daily   • atorvastatin (LIPITOR) 40 mg tablet Take 1 tablet (40 mg total) by mouth every evening   • cholecalciferol (VITAMIN D3) 1,000 units tablet Take 1,000 Units by mouth daily   • cinacalcet (SENSIPAR) 60 MG tablet Take 1 tablet by mouth once daily   • diclofenac (VOLTAREN) 75 mg EC tablet Take 1 tablet (75 mg total) by mouth 2 (two) times a day   • Diclofenac Sodium (VOLTAREN) 1 % Apply 2 g topically 4 (four) times a day   • hydrochlorothiazide (HYDRODIURIL) 50 mg tablet Take 1 tablet (50 mg total) by mouth daily   • Interferon Beta-1b (Betaseron) 0 3 MG KIT Inject 1 ml (0 25 mg) subcutaneously every other day   • lisinopril (ZESTRIL) 40 mg tablet Take 1 tablet (40 mg total) by mouth daily   • metFORMIN (GLUCOPHAGE-XR) 500 mg 24 hr tablet TAKE 2 TABLETS BY MOUTH TWICE DAILY WITH MEALS   • metoprolol succinate (TOPROL-XL) 50 mg 24 hr tablet Take 1 tablet (50 mg total) by mouth daily   • ondansetron (ZOFRAN-ODT) 4 mg disintegrating tablet Take 1 tablet (4 mg total) by mouth every 6 (six) hours as needed for nausea or vomiting   • vitamin B-12 (VITAMIN B-12) 500 mcg tablet Take 2 tablets (1,000 mcg total) by mouth daily   • [DISCONTINUED] gabapentin (NEURONTIN) 300 mg capsule Take 1 capsule (300 mg total) by mouth daily at bedtime for 3 days, THEN 1 capsule (300 mg total) 2 (two) times a day for 3 days, THEN 1 capsule (300 mg total) 3 (three) times a day  • Alcohol Swabs PADS by Does not apply route 4 (four) times a day *Latex Free* (Patient not taking: Reported on 5/25/2023)   • Blood Glucose Monitoring Suppl (OneTouch Verio) w/Device KIT Use in the morning (Patient not taking: Reported on 5/25/2023)   • Blood Pressure KIT by Does not apply route daily (Patient not taking: Reported on 5/25/2023)   • diphenhydrAMINE (BENADRYL) 25 mg tablet Take 0 5 tablets (12 5 mg total) by mouth every 6 (six) hours (Patient not taking: Reported on 2/14/2023)   • DULoxetine (CYMBALTA) 30 mg delayed release capsule Take 1 capsule (30 mg total) by mouth in the morning   (Patient not taking: Reported on 5/25/2023)   • glucose blood (OneTouch Verio) test strip Use 1 each in the morning (Patient not taking: Reported on 5/25/2023)   • hydrALAZINE (APRESOLINE) 10 mg tablet Take 1 tablet (10 mg total) by mouth 3 (three) times a day (Patient not taking: Reported on 2/14/2023)   • Incontinence Supply Disposable (Prevail Women Underwear XL) MISC Use every 6 (six) hours as needed (incontinence) (Patient not taking: Reported on 5/25/2023)   • insulin degludec Blanka Billet FlexTouch) 100 units/mL injection pen Inject 15 Units under the skin daily (Patient not taking: Reported on 5/25/2023)   • Insulin Pen Needle (BD Pen Needle Izzy U/F) 32G X 4 MM MISC Use in the morning (Patient not taking: Reported on 5/25/2023)   • Lancets (OneTouch Delica Plus [id number]) MISC Test BG up to 1x daily as directed (Patient not taking: Reported on 5/25/2023)     Current Facility-Administered Medications on File Prior to Visit   Medication   • prochlorperazine (COMPAZINE) injection 5 mg       Allergies   Allergen Reactions   • Sorbitan Diarrhea, Vomiting and Abdominal Pain   • Victoza [Liraglutide] Nausea Only   • Ambien [Zolpidem "Tartrate]    • Demerol [Meperidine]    • Insulin Glargine Other (See Comments)     Annotation - [id number]: memory loss   • Latex    • Oxycodone Hives, Rash and Vomiting       Physical Exam    /77   Pulse 65   Ht 5' 5\" (1 651 m)   Wt 133 kg (293 lb)   LMP  (LMP Unknown)   BMI 48 76 kg/m²     Constitutional: normal, well developed, well nourished, alert, in no distress and non-toxic and no overt pain behavior    Eyes: anicteric  HEENT: grossly intact  Neck:  symmetric, trachea midline and no masses   Pulmonary:even and unlabored  Cardiovascular:No edema or pitting edema present  Psychiatric:Mood and affect appropriate  Neurologic:Cranial Nerves II-XII grossly intact  Musculoskeletal:normal, no significant tenderness to palpation on examination, she does describe radiating pain as well as numbness throughout the right lower extremity in an L4 or L5 distribution    Imaging  "

## 2023-05-25 NOTE — PATIENT INSTRUCTIONS
Lumbar Disc Herniation   AMBULATORY CARE:   Lumbar disc herniation  occurs when a disc in your lumbar spine (lower back) bulges out  Lumbar discs are spongy cushions between the vertebrae (bones) in your spine  The herniated disc may press on your nerves or spinal cord  Common signs and symptoms include the following:  Mild lumbar disc herniation may not cause any signs or symptoms  You may have any of the following if the herniated disc presses against your nerves or spinal cord:  Pain in your lower back, buttocks, groin, or legs    Burning, stabbing, or tingling pain that shoots down one or both of your legs    Numbness or weakness in one leg    Trouble walking or moving your feet or toes    Seek care immediately if:   You cannot control when you urinate or have a bowel movement  You are unable to move one or both of your legs  You lose feeling in your groin or buttocks  Contact your healthcare provider if:   You have numbness in one or both of your legs  You have low back pain while resting  You have trouble moving one or both of your legs  You begin leaking urine or bowel movement, and it is not normal for you  Your pain gets worse, even after you take medicine  You have questions or concerns about your condition or care  Treatment:  Your healthcare provider may have you rest in bed for a few days  It is best to rest on your side with your knees bent  Put a cushion between your knees to help decrease the pressure on your spine and nerves  You may also need any of following:  NSAIDs , such as ibuprofen, help decrease swelling, pain, and fever  NSAIDs can cause stomach bleeding or kidney problems in certain people  If you take blood thinner medicine, always ask your healthcare provider if NSAIDs are safe for you  Always read the medicine label and follow directions  Prescription pain medicine  may be given  Ask how to take this medicine safely      Muscle relaxers  decrease pain and muscle spasms  A steroid injection  may be given to reduce inflammation  Steroid medicine is injected into the epidural space  The epidural space is between your spinal cord and vertebrae  You may be given pain medicine along with the steroids  Physical therapy  may be recommended by your healthcare provider  A physical therapist teaches you exercises to help improve movement and strength, and to decrease pain  A physical therapist can teach you safe ways to bend, lift, sit, and stand to help relieve back pain  Surgery  may be needed to fix your herniated disc if other treatments have failed  Surgery may be done to remove your herniated disc and make your spine stronger  Surgery may also be done to decrease pressure on your nerves and spinal cord  Manage pain from a lumbar disc herniation:   Apply heat  on your lower back for 20 to 30 minutes every 2 hours for as many days as directed  Heat helps decrease pain and muscle spasms  Do low-stress exercise and activity  Exercises that do not stress your back muscles may help decrease your pain  Examples of low-stress exercises are walking, swimming, and biking  Avoid heavy lifting while your back is healing  Try not to sit for long periods of time  Talk to your healthcare provider before you start any new exercise program     Follow up with your doctor as directed:  Write down your questions so you remember to ask them during your visits  © [facility] 2022 Information is for End User's use only and may not be sold, redistributed or otherwise used for commercial purposes  The above information is an  only  It is not intended as medical advice for individual conditions or treatments  Talk to your doctor, nurse or pharmacist before following any medical regimen to see if it is safe and effective for you

## 2023-05-30 ENCOUNTER — PATIENT OUTREACH (OUTPATIENT)
Dept: FAMILY MEDICINE CLINIC | Facility: CLINIC | Age: 63
End: 2023-05-30

## 2023-05-30 ENCOUNTER — TELEPHONE (OUTPATIENT)
Dept: PSYCHIATRY | Facility: CLINIC | Age: 63
End: 2023-05-30

## 2023-05-30 NOTE — PROGRESS NOTES
EMMA PATTERSON received a SDOH referral from patient's PCP  EMMA PATTERSON reviewed patient's chart and called patient ([phone])  Patient did not answer, EMMA PATTERSON left a message including [employer] contact information and requested a call back  EMMA [employer] will attempt to outreach again at a later date

## 2023-05-30 NOTE — TELEPHONE ENCOUNTER
Writer returned the pts voicemail that was left  Writer informed the pt of previous encounters message  Writer is mailing out the additional resource packet to the pt

## 2023-05-30 NOTE — TELEPHONE ENCOUNTER
Contacted patient in regards to Routine Referral in attempts to verify patient's needs of services and  Notify them that their INSURANCE is set for [facility] which unfortunately for Medicaid is a [facility] facility does not services   If information is correct offer EXTRA RESOURCE OPTIONS or if information is incorrect to suggest patient CONTACT INSURANCE TO UPDATE INFORMATION  Then contact office back to proceed with adding to wait list   LVM for patient to contact intake dept  in regards to referral

## 2023-05-31 ENCOUNTER — PATIENT OUTREACH (OUTPATIENT)
Dept: FAMILY MEDICINE CLINIC | Facility: CLINIC | Age: 63
End: 2023-05-31

## 2023-05-31 ENCOUNTER — TELEPHONE (OUTPATIENT)
Dept: NEUROLOGY | Facility: CLINIC | Age: 63
End: 2023-05-31

## 2023-05-31 NOTE — PROGRESS NOTES
EMMA PATTERSON received a call/ voicemail from patient ([phone])  EMMA PATTERSON attempted to call patient back ([phone])  Patient did not answer, EMMA PATTERSON left a message and requested a call back  EMMA PATTERSON will attempt to outreach one additional time at a later date

## 2023-05-31 NOTE — TELEPHONE ENCOUNTER
Called and spoke to patient  Patient confirmed upcoming apt with Gauri Meng PA-C on 6/14/23 @ 7:30am in the [facility] office

## 2023-06-07 ENCOUNTER — TELEPHONE (OUTPATIENT)
Dept: HEMATOLOGY ONCOLOGY | Facility: CLINIC | Age: 63
End: 2023-06-07

## 2023-06-07 ENCOUNTER — CONSULT (OUTPATIENT)
Dept: SURGICAL ONCOLOGY | Facility: CLINIC | Age: 63
End: 2023-06-07
Payer: COMMERCIAL

## 2023-06-07 VITALS
BODY MASS INDEX: 48.82 KG/M2 | RESPIRATION RATE: 16 BRPM | DIASTOLIC BLOOD PRESSURE: 78 MMHG | HEART RATE: 78 BPM | OXYGEN SATURATION: 96 % | HEIGHT: 65 IN | SYSTOLIC BLOOD PRESSURE: 140 MMHG | TEMPERATURE: 97.5 F | WEIGHT: 293 LBS

## 2023-06-07 DIAGNOSIS — E21.3 HYPERPARATHYROIDISM (HCC): Primary | ICD-10-CM

## 2023-06-07 DIAGNOSIS — E83.52 HYPERCALCEMIA: ICD-10-CM

## 2023-06-07 PROCEDURE — 99204 OFFICE O/P NEW MOD 45 MIN: CPT | Performed by: SURGERY

## 2023-06-07 RX ORDER — NALOXONE HYDROCHLORIDE 4 MG/.1ML
SPRAY NASAL
Qty: 1 EACH | Refills: 1 | Status: SHIPPED | OUTPATIENT
Start: 2023-06-07 | End: 2024-06-06

## 2023-06-07 RX ORDER — TRAMADOL HYDROCHLORIDE 50 MG/1
50 TABLET ORAL EVERY 6 HOURS PRN
Qty: 10 TABLET | Refills: 0 | Status: SHIPPED | OUTPATIENT
Start: 2023-06-07

## 2023-06-07 NOTE — PROGRESS NOTES
Surgical Oncology Consult       [facility] SURGICAL ONCOLOGY [facility] [address]    Marissa Meraz  1960  [id number]  [facility] SURGICAL ONCOLOGY [place]  [place] [address]    Chief Complaint   Patient presents with   • Consult       Assessment/Plan:    No problem-specific Assessment & Plan notes found for this encounter  Diagnoses and all orders for this visit:    Hyperparathyroidism [facility])    Hypercalcemia  -     Ambulatory referral to Surgical Oncology        Advance Care Planning/Advance Directives:  Discussed disease status, cancer treatment plans and/or cancer treatment goals with the patient  Oncology History    No history exists  History of Present Illness: Patient is a 44-year-old woman who is being worked up for hypercalcemia  PTH chills were found to be elevated  She is therefore work-up for primary hyperparathyroidism  She had a sestamibi scan and ultrasound done 2 years ago which were suggestive of a left-sided process  She has no history of kidney stones  She had ongoing muscle aches, pains, weakness, which she attributes to her MS  As per daughter, her mood, memory, and concentration have been worsening as of late  No GI complaints  No personal or family history of any malignancies  Review of Systems   Constitutional: Positive for fatigue  HENT: Negative  Eyes: Negative  Respiratory: Negative  Cardiovascular: Negative  Gastrointestinal: Negative for abdominal pain, constipation and diarrhea  Endocrine: Negative  Genitourinary: Negative  Musculoskeletal: Positive for arthralgias, back pain, gait problem, joint swelling, myalgias and neck stiffness  Allergic/Immunologic: Negative  Neurological: Positive for weakness  Hematological: Negative      Psychiatric/Behavioral: Positive for decreased concentration and sleep disturbance  Negative for confusion  The patient is not nervous/anxious  All other systems reviewed and are negative          Patient Active Problem List   Diagnosis   • Hernia, ventral   • Type 2 diabetes mellitus with hyperglycemia, without long-term current use of insulin ([facility] )   • GI bleed   • Morbid obesity with BMI of 45 0-49 9, adult ([facility])   • Ischemic colitis ([facility] )   • Unintentional weight loss   • Hypercalcemia   • Hyperparathyroidism ([facility] )   • Vitamin D deficiency   • Depression with anxiety   • Benign essential hypertension   • Falls frequently   • Numbness and tingling of right arm and leg   • Ambulatory dysfunction   • Acute right hemiparesis ([facility])   • Neuropathic pain, leg, bilateral   • MS (multiple sclerosis) ([facility])   • Cognitive decline   • Left sided numbness   • Right hemiparesis ([facility])   • Chronic daily headache   • Class 3 severe obesity due to excess calories with serious comorbidity and body mass index (BMI) of 45 0 to 49 9 in adult [facility])   • Thyroid nodule   • B12 deficiency   • Hyperlipidemia   • Obstructive sleep apnea syndrome   • Respiratory infection   • Hip pain, right   • Positive for macroalbuminuria   • Other chronic pain   • Nausea & vomiting   • Bloating   • New daily persistent headache   • Medication overuse headache   • Visual disturbances   • Chronic post-traumatic headache, not intractable   • Primary osteoarthritis of right hip   • Lumbar radiculopathy     Past Medical History:   Diagnosis Date   • Diabetes mellitus ([facility] )    • External hemorrhoids     last assessed 4/7/16, resolved 7/21/16   • Hernia, ventral     last assessed 12/14/15, resolved 7/21/16   • Hypertension    • Incarcerated incisional hernia     last assessed 12/21/15, resolved 7/21/16   • Insomnia     last assessed 12/8/16, resolved 10/9/17   • Lyme disease    • Morbid obesity with BMI of 45 0-49 9, adult ([facility] ) 5/3/2017   • MS (multiple sclerosis) ([facility] )    • Stroke (cerebrum) ([facility] ) 03/09/2020   • Type 2 diabetes mellitus with hyperglycemia, without long-term current use of insulin ([facility] )      Past Surgical History:   Procedure Laterality Date   • ABCESS DRAINAGE     •  SECTION      x3   • COLONOSCOPY N/A 2017    Procedure: COLONOSCOPY with biopsy;  Surgeon: Corine Gonzalez DO;  Location: [facility];   Service: Complete   • HYSTERECTOMY     • IR BIOPSY ABDOMEN  2021   • IR LUMBAR PUNCTURE  3/13/2020   • US GUIDED THYROID BIOPSY  2021     Family History   Problem Relation Age of Onset   • Glaucoma Mother    • Diabetes Father    • Hypertension Father    • Colon cancer Father    • Alzheimer's disease Father    • Cervical cancer Sister 61   • Breast cancer Sister 50   • Breast cancer Sister 45   • Breast cancer Maternal Grandmother    • Breast cancer Maternal Aunt    • Breast cancer Maternal Aunt    • Breast cancer Maternal Aunt    • Coronary artery disease Family    • Diabetes Family    • Hypertension Family      Social History     Socioeconomic History   • Marital status: /Civil Union     Spouse name: Not on file   • Number of children: Not on file   • Years of education: Not on file   • Highest education level: Not on file   Occupational History     Employer: [employer]   Tobacco Use   • Smoking status: Never   • Smokeless tobacco: Never   Vaping Use   • Vaping Use: Never used   Substance and Sexual Activity   • Alcohol use: Not Currently     Comment: Social   • Drug use: Yes     Types: Marijuana     Comment: medical marijuana   • Sexual activity: Not Currently     Partners: Male     Birth control/protection: None   Other Topics Concern   • Not on file   Social History Narrative   • Not on file     Social Determinants of Health     Financial Resource Strain: High Risk (2023)    Overall Financial Resource Strain (CARDIA)    • Difficulty of Paying Living Expenses: Hard   Food Insecurity: Unknown (2021)    Hunger Vital Sign    • Worried About Running Out of Food in the Last Year: Never true    • Ran Out of Food in the Last Year: Not on file   Transportation Needs: Unmet Transportation Needs (5/24/2023)    PRAPARE - Transportation    • Lack of Transportation (Medical):  Yes    • Lack of Transportation (Non-Medical): Yes   Physical Activity: Inactive (7/30/2020)    Exercise Vital Sign    • Days of Exercise per Week: 0 days    • Minutes of Exercise per Session: 0 min   Stress: Stress Concern Present (7/30/2020)    [address]    • Feeling of Stress : Rather much   Social Connections: Unknown (7/30/2020)    Social Connection and Isolation Panel [NHANES]    • Frequency of Communication with Friends and Family: More than three times a week    • Frequency of Social Gatherings with Friends and Family: Not on file    • Attends Druze Services: Not on file    • Active Member of Clubs or Organizations: Not on file    • Attends Club or Organization Meetings: Not on file    • Marital Status:    Intimate Partner Violence: Not At Risk (7/30/2020)    Humiliation, Afraid, Rape, and Kick questionnaire    • Fear of Current or Ex-Partner: No    • Emotionally Abused: No    • Physically Abused: No    • Sexually Abused: No   Housing Stability: Not on file       Current Outpatient Medications:   •  aspirin 81 mg chewable tablet, Chew 1 tablet (81 mg total) daily, Disp: 90 tablet, Rfl: 1  •  atorvastatin (LIPITOR) 40 mg tablet, Take 1 tablet (40 mg total) by mouth every evening, Disp: 90 tablet, Rfl: 0  •  cholecalciferol (VITAMIN D3) 1,000 units tablet, Take 1,000 Units by mouth daily, Disp: , Rfl:   •  cinacalcet (SENSIPAR) 60 MG tablet, Take 1 tablet by mouth once daily, Disp: 30 tablet, Rfl: 0  •  diazepam (VALIUM) 5 mg tablet, Take 1 tablet (5 mg total) by mouth 60 minutes pre-procedure, Disp: 2 tablet, Rfl: 0  •  DULoxetine (CYMBALTA) 30 mg delayed release capsule, Take 1 capsule (30 mg total) by mouth in the morning , Disp: 30 capsule, Rfl: 1  •  hydrALAZINE (APRESOLINE) 10 mg tablet, Take 1 tablet (10 mg total) by mouth 3 (three) times a day, Disp: 90 tablet, Rfl: 0  •  hydrochlorothiazide (HYDRODIURIL) 50 mg tablet, Take 1 tablet (50 mg total) by mouth daily, Disp: 90 tablet, Rfl: 3  •  Incontinence Supply Disposable (Prevail Women Underwear XL) MISC, Use every 6 (six) hours as needed (incontinence), Disp: 120 each, Rfl: 11  •  insulin degludec Leodis  FlexTouch) 100 units/mL injection pen, Inject 15 Units under the skin daily, Disp: 15 mL, Rfl: 2  •  Insulin Pen Needle (BD Pen Needle Izzy U/F) 32G X 4 MM MISC, Use in the morning, Disp: 100 each, Rfl: 1  •  Interferon Beta-1b (Betaseron) 0 3 MG KIT, Inject 1 ml (0 25 mg) subcutaneously every other day, Disp: 14 kit, Rfl: 11  •  lisinopril (ZESTRIL) 40 mg tablet, Take 1 tablet (40 mg total) by mouth daily, Disp: 90 tablet, Rfl: 0  •  metFORMIN (GLUCOPHAGE-XR) 500 mg 24 hr tablet, TAKE 2 TABLETS BY MOUTH TWICE DAILY WITH MEALS, Disp: 360 tablet, Rfl: 0  •  metoprolol succinate (TOPROL-XL) 50 mg 24 hr tablet, Take 1 tablet (50 mg total) by mouth daily, Disp: 90 tablet, Rfl: 3  •  ondansetron (ZOFRAN-ODT) 4 mg disintegrating tablet, Take 1 tablet (4 mg total) by mouth every 6 (six) hours as needed for nausea or vomiting, Disp: 20 tablet, Rfl: 0  •  pregabalin (LYRICA) 75 mg capsule, Take 1 capsule (75 mg total) by mouth daily at bedtime, Disp: 30 capsule, Rfl: 2  •  vitamin B-12 (VITAMIN B-12) 500 mcg tablet, Take 2 tablets (1,000 mcg total) by mouth daily, Disp: 60 tablet, Rfl: 5  •  Alcohol Swabs PADS, by Does not apply route 4 (four) times a day *Latex Free* (Patient not taking: Reported on 5/25/2023), Disp: 120 each, Rfl: 3  •  Blood Glucose Monitoring Suppl (OneTouch Verio) w/Device KIT, Use in the morning (Patient not taking: Reported on 6/7/2023), Disp: 1 kit, Rfl: 0  •  Blood Pressure KIT, by Does not apply route daily (Patient not taking: Reported on 5/25/2023), Disp: 1 each, Rfl: 0  • diclofenac (VOLTAREN) 75 mg EC tablet, Take 1 tablet (75 mg total) by mouth 2 (two) times a day (Patient not taking: Reported on 6/7/2023), Disp: 60 tablet, Rfl: 1  •  Diclofenac Sodium (VOLTAREN) 1 %, Apply 2 g topically 4 (four) times a day (Patient not taking: Reported on 6/7/2023), Disp: 150 g, Rfl: 1  •  diphenhydrAMINE (BENADRYL) 25 mg tablet, Take 0 5 tablets (12 5 mg total) by mouth every 6 (six) hours (Patient not taking: Reported on 6/7/2023), Disp: 20 tablet, Rfl: 0  •  glucose blood (OneTouch Verio) test strip, Use 1 each in the morning (Patient not taking: Reported on 6/7/2023), Disp: 100 each, Rfl: 1  •  Lancets (OneTouch Delica Plus [id number]) MISC, Test BG up to 1x daily as directed (Patient not taking: Reported on 5/25/2023), Disp: 100 each, Rfl: 1    Current Facility-Administered Medications:   •  prochlorperazine (COMPAZINE) injection 5 mg, 5 mg, Intramuscular, Q4H PRN, Maria Teresa Steen MD, 5 mg at 11/11/22 1040  Allergies   Allergen Reactions   • Sorbitan Diarrhea, Vomiting and Abdominal Pain   • Victoza [Liraglutide] Nausea Only   • Ambien [Zolpidem Tartrate]    • Demerol [Meperidine]    • Insulin Glargine Other (See Comments)     Annotation - [id number]: memory loss   • Latex    • Oxycodone Hives, Rash and Vomiting     Vitals:    06/07/23 1429   BP: 140/78   Pulse: 78   Resp: 16   Temp: 97 5 °F (36 4 °C)   SpO2: 96%       Physical Exam  Vitals reviewed  Constitutional:       Appearance: Normal appearance  HENT:      Head: Normocephalic and atraumatic  Right Ear: External ear normal       Left Ear: External ear normal    Eyes:      Pupils: Pupils are equal, round, and reactive to light  Cardiovascular:      Rate and Rhythm: Normal rate and regular rhythm  Heart sounds: Normal heart sounds  Pulmonary:      Effort: Pulmonary effort is normal       Breath sounds: Normal breath sounds  Abdominal:      General: Abdomen is flat  There is no distension        Palpations: Abdomen is soft  There is no mass  Tenderness: There is no abdominal tenderness  There is no guarding or rebound  Hernia: No hernia is present  Musculoskeletal:         General: Normal range of motion  Cervical back: Normal range of motion and neck supple  Skin:     General: Skin is warm and dry  Neurological:      General: No focal deficit present  Mental Status: She is alert and oriented to person, place, and time  Psychiatric:         Mood and Affect: Mood normal          Behavior: Behavior normal          Thought Content: Thought content normal          Pathology:  none    Labs:  Lab Results   Component Value Date    CALCIUM 10 9 (H) 05/24/2023    PHOS 3 0 10/31/2022    PTH 77 6 05/24/2023         Imaging  PARATHYROID SCAN     INDICATION:  E83 52: Hypercalcemia     COMPARISON:  Thyroid ultrasound 10/6/2020, left thyroid nodule biopsy 1/29/2021     TECHNIQUE:   Following the intravenous administration of 27 5 mCi Tc-99m Cardiolite, anterior and bilateral anterior oblique projection images of the neck and mediastinum were obtained at approximately 10 minutes post injection followed at 2 hours post   injection by static anterior and bilateral oblique projections as well as SPECT images in coronal, sagittal and axial projections       FINDINGS: Immediate planar images demonstrate asymmetric nodular activity in the mid left thyroid  This appears to be on the posterior left thyroid lobe on the oblique view  Delayed planar and SPECT images demonstrate very mild residual activity at the   level of the left thyroid  This may correspond with the nodule seen on prior ultrasound      IMPRESSION:     1  Subtle persistent nodular activity at the level of the mid left thyroid  This appears to correspond with nodule seen on prior thyroid ultrasound, and may represent either a parathyroid adenoma versus thyroid nodule    Consider repeat tissue sampling   if clinically warranted       THYROID ULTRASOUND     INDICATION:    E04 1: Nontoxic single thyroid nodule      COMPARISON:  Parathyroid scan 8/24/2021, ultrasound 10/6/2020     TECHNIQUE:   Ultrasound of the thyroid was performed with a high frequency linear transducer in transverse and sagittal planes including volumetric imaging sweeps as well as traditional still imaging technique      FINDINGS:  Normal homogeneous smooth echotexture      Right lobe: 5 8 x 1 7 x 1 6 cm  Volume 7 5 mL  Left lobe:  5 0 x 2 3 x 1 7 cm  Volume 9 4 mL  Isthmus: 0 2  cm      Nodule #1  Image 9  RIGHT lower pole nodule measuring 0 8 x 0 7 cm  Given differences in measuring technique, no significant change from prior  Prior measurement 0 7 x 0 6 cm    COMPOSITION:  2 points, could not be determined do to calcification  ECHOGENICITY:  1 point, echogenicity cannot be determined  SHAPE:  0 points, wider-than-tall  MARGIN: 0 points, cannot be determined  ECHOGENIC FOCI:  1 point, macrocalcifications  TI-RADS Classification: TR 4 (4-6 points), FNA if > 1 5 cm  Follow if > 1cm      Nodule #2  Image 31  LEFT midgland posterior nodule measuring 1 9 x 1 5 x 1 7 cm  Stable to minimally increased size  Prior measurement 1 9 x 1 3 x 1 7 cm  COMPOSITION:  2 points, solid or almost completely solid   ECHOGENICITY:  2 points, hypoechoic  SHAPE:  0 points, wider-than-tall  MARGIN: 0 points, smooth  ECHOGENIC FOCI:  0 points, none or large comet-tail artifacts  TI-RADS Classification: TR 4 (4-6 points), FNA if > 1 5 cm  Follow if > 1cm  Prior nondiagnostic biopsy      No new thyroid nodules      Inferior to the left thyroid is a hypoechoic well-circumscribed nodule measuring 8 x 6 x 9 mm, possibly a small node though an echogenic hilum is not visualized  This was not clearly visualized on the prior exam      IMPRESSION:  1  Minimal change in left mid posterior nodule  No new thyroid nodules    2  Subcentimeter hypoechoic nodular focus inferior to the left thyroid attention on follow-up recommended            Workstation performed: [id number]     I reviewed the above laboratory and imaging data  Discussion/Summary: Primary hyperparathyroidism, with finding suggestive of a left-sided target  She is amenable to a candidate for middle invasive left parathyroidectomy, possible for the expiration, with intraoperative PTH monitoring  Rationale for this along risk benefits of surgical infection, bleeding, need for possible additional surgery, nerve injury, discussed but all questions answered and consent signed at this visit

## 2023-06-07 NOTE — TELEPHONE ENCOUNTER
Patient Call    Who are you speaking with? Pharmacy    If it is not the patient, are they listed on an active communication consent form? N/A   What is the reason for this call? Fredi asked what the Tramadol was for  Advised Fredi that pt has a consultation with Dr Ninfa Childress and prescribed for moderate pain   Does this require a call back? N/A   If a call back is required, please list best call back number n/a   If a call back is required, advise that a message will be forwarded to their care team and someone will return their call as soon as possible  Did you relay this information to the patient?  N/A

## 2023-06-08 ENCOUNTER — HOSPITAL ENCOUNTER (OUTPATIENT)
Facility: MEDICAL CENTER | Age: 63
Discharge: HOME | End: 2023-06-08
Payer: COMMERCIAL

## 2023-06-08 DIAGNOSIS — M54.16 LUMBAR RADICULOPATHY: ICD-10-CM

## 2023-06-08 PROCEDURE — 72148 MRI LUMBAR SPINE W/O DYE: CPT

## 2023-06-08 PROCEDURE — G1004 CDSM NDSC: HCPCS

## 2023-06-12 ENCOUNTER — TELEPHONE (OUTPATIENT)
Dept: OBGYN CLINIC | Facility: MEDICAL CENTER | Age: 63
End: 2023-06-12

## 2023-06-12 NOTE — ASSESSMENT & PLAN NOTE
Currently stable: 122/72  Reports compliance with Medications:   Hydralazine, HCTZ, Lisinopril, Toprol

## 2023-06-12 NOTE — TELEPHONE ENCOUNTER
Caller: Patient    Doctor: Ena Jon    Reason for call:     Patient calling spine / pain    Call back#: n/a

## 2023-06-12 NOTE — ASSESSMENT & PLAN NOTE
Lab Results   Component Value Date    HGBA1C 10 3 (A) 02/22/2023   Following with Endo - over due for f/u  Encourage compliance  Poor control at this time    Advise medication compliance - strict diet and increase exercise

## 2023-06-12 NOTE — ASSESSMENT & PLAN NOTE
Frustrated with health and chronic pain  Reports depression, no SI/HI  Compliant with Cymbalta  Would benefit from therapy  Referral to psychiatry - possible alternate medication for better control  Pt agreeable

## 2023-06-13 ENCOUNTER — TELEPHONE (OUTPATIENT)
Dept: HEMATOLOGY ONCOLOGY | Facility: CLINIC | Age: 63
End: 2023-06-13

## 2023-06-13 NOTE — TELEPHONE ENCOUNTER
Patient Call    Who are you speaking with? Patient    If it is not the patient, are they listed on an active communication consent form? N/A   What is the reason for this call? Patient is upset and would like to speak to somebody from Dr Garth Sloan office to discuss why her surgery is scheduled so far out  The patient states she will go elsewhere if she doesn't receive a satisfactory answer  Does this require a call back? Yes   If a call back is required, please list [facility] call back number [phone]   If a call back is required, advise that a message will be forwarded to their care team and someone will return their call as soon as possible  Did you relay this information to the patient?  Yes

## 2023-06-13 NOTE — TELEPHONE ENCOUNTER
Patient called back and states she is seeing her endocrinologist tomorrow and plans to talk to them about being referred to another doctor  I asked her to please let us know if she decides to have surgery somewhere else, and she agreed

## 2023-06-14 ENCOUNTER — OFFICE VISIT (OUTPATIENT)
Dept: ENDOCRINOLOGY | Facility: CLINIC | Age: 63
End: 2023-06-14
Payer: COMMERCIAL

## 2023-06-14 ENCOUNTER — TELEPHONE (OUTPATIENT)
Dept: PAIN MEDICINE | Facility: CLINIC | Age: 63
End: 2023-06-14

## 2023-06-14 ENCOUNTER — OFFICE VISIT (OUTPATIENT)
Dept: NEUROLOGY | Facility: CLINIC | Age: 63
End: 2023-06-14
Payer: COMMERCIAL

## 2023-06-14 VITALS
HEIGHT: 65 IN | RESPIRATION RATE: 18 BRPM | HEART RATE: 67 BPM | WEIGHT: 292 LBS | BODY MASS INDEX: 48.65 KG/M2 | TEMPERATURE: 96.6 F | SYSTOLIC BLOOD PRESSURE: 169 MMHG | DIASTOLIC BLOOD PRESSURE: 78 MMHG | OXYGEN SATURATION: 98 %

## 2023-06-14 VITALS
HEIGHT: 65 IN | BODY MASS INDEX: 48.65 KG/M2 | SYSTOLIC BLOOD PRESSURE: 132 MMHG | DIASTOLIC BLOOD PRESSURE: 90 MMHG | HEART RATE: 65 BPM | WEIGHT: 292 LBS

## 2023-06-14 DIAGNOSIS — E11.65 TYPE 2 DIABETES MELLITUS WITH HYPERGLYCEMIA, WITHOUT LONG-TERM CURRENT USE OF INSULIN (HCC): ICD-10-CM

## 2023-06-14 DIAGNOSIS — I10 BENIGN ESSENTIAL HYPERTENSION: ICD-10-CM

## 2023-06-14 DIAGNOSIS — M54.16 LUMBAR RADICULOPATHY: ICD-10-CM

## 2023-06-14 DIAGNOSIS — E04.1 THYROID NODULE: ICD-10-CM

## 2023-06-14 DIAGNOSIS — E66.01 CLASS 3 SEVERE OBESITY DUE TO EXCESS CALORIES WITH SERIOUS COMORBIDITY AND BODY MASS INDEX (BMI) OF 45.0 TO 49.9 IN ADULT (HCC): ICD-10-CM

## 2023-06-14 DIAGNOSIS — G35 MS (MULTIPLE SCLEROSIS) (HCC): Primary | ICD-10-CM

## 2023-06-14 DIAGNOSIS — E55.9 VITAMIN D DEFICIENCY: ICD-10-CM

## 2023-06-14 DIAGNOSIS — Z79.4 TYPE 2 DIABETES MELLITUS WITH HYPERGLYCEMIA, WITH LONG-TERM CURRENT USE OF INSULIN (HCC): Primary | ICD-10-CM

## 2023-06-14 DIAGNOSIS — G62.9 PERIPHERAL NEUROPATHY: ICD-10-CM

## 2023-06-14 DIAGNOSIS — E11.65 TYPE 2 DIABETES MELLITUS WITH HYPERGLYCEMIA, WITH LONG-TERM CURRENT USE OF INSULIN (HCC): Primary | ICD-10-CM

## 2023-06-14 DIAGNOSIS — R26.2 AMBULATORY DYSFUNCTION: Chronic | ICD-10-CM

## 2023-06-14 DIAGNOSIS — E83.52 HYPERCALCEMIA: ICD-10-CM

## 2023-06-14 DIAGNOSIS — E21.3 HYPERPARATHYROIDISM (HCC): ICD-10-CM

## 2023-06-14 PROBLEM — R20.0 LEFT SIDED NUMBNESS: Status: RESOLVED | Noted: 2020-07-07 | Resolved: 2023-06-14

## 2023-06-14 PROBLEM — J98.8 RESPIRATORY INFECTION: Status: RESOLVED | Noted: 2022-04-12 | Resolved: 2023-06-14

## 2023-06-14 PROBLEM — G81.91 ACUTE RIGHT HEMIPARESIS (HCC): Status: RESOLVED | Noted: 2020-04-02 | Resolved: 2023-06-14

## 2023-06-14 PROBLEM — G57.93 NEUROPATHIC PAIN, LEG, BILATERAL: Status: RESOLVED | Noted: 2020-06-05 | Resolved: 2023-06-14

## 2023-06-14 PROCEDURE — 99214 OFFICE O/P EST MOD 30 MIN: CPT | Performed by: PHYSICIAN ASSISTANT

## 2023-06-14 PROCEDURE — 99214 OFFICE O/P EST MOD 30 MIN: CPT | Performed by: INTERNAL MEDICINE

## 2023-06-14 PROCEDURE — 95251 CONT GLUC MNTR ANALYSIS I&R: CPT | Performed by: INTERNAL MEDICINE

## 2023-06-14 RX ORDER — ACETAMINOPHEN 160 MG
2000 TABLET,DISINTEGRATING ORAL DAILY
Qty: 30 CAPSULE | Refills: 4 | Status: SHIPPED | OUTPATIENT
Start: 2023-06-14

## 2023-06-14 RX ORDER — INSULIN DEGLUDEC INJECTION 100 U/ML
25 INJECTION, SOLUTION SUBCUTANEOUS DAILY
Qty: 15 ML | Refills: 2 | Status: SHIPPED | OUTPATIENT
Start: 2023-06-14

## 2023-06-14 RX ORDER — CINACALCET 60 MG/1
60 TABLET, FILM COATED ORAL DAILY
Qty: 30 TABLET | Refills: 6 | Status: SHIPPED | OUTPATIENT
Start: 2023-06-14

## 2023-06-14 NOTE — TELEPHONE ENCOUNTER
----- Message from Nathalia De La O DO sent at 6/13/2023  4:45 PM EDT -----  Severe central canal narrowing at L4-5 likely accounting for her symptoms  Would like to offer her an L5-S1 LESI if she is interested can schedule, other option would be to schedule a consultation with one of our spine surgeons

## 2023-06-14 NOTE — ASSESSMENT & PLAN NOTE
Lab Results   Component Value Date    HGBA1C 10 3 (A) 02/22/2023   Diabetes is poorly controlled-counseled on importance of improving glycemic control prior to any elective surgery  For now increase Tresiba to 25 units daily, continue metformin  Referred for medical nutrition therapy    She will upload her sensor in the next month after seeing the nutritionist   If needed she may be started on Premeal insulin

## 2023-06-14 NOTE — PATIENT INSTRUCTIONS
Continue Betaseron  Will update MRI brain in Sept/Oct 2023 as a short interval follow up  Would like to do with contrast  Follow up in 4 months after the MRI is done  Continue to follow with pain management    Would agree with considering an injection regarding MRI lumbar spine results

## 2023-06-14 NOTE — ASSESSMENT & PLAN NOTE
She remains clinically stable from a MS standpoint at this time  She continues on Betaseron  She did have updated MRI brain wo contrast for surveillance on 3/5/23  Compared to 2 years prior, there was a new 1 5cm left periventricular white matter lesion  She was then re-scanned with contrast on 3/23/23 and this lesion was not enhancing  Discussed development of new lesion in a 2 year period  She is clinically stable and tolerates Betaseron well at this time  We discussed updating MRI brain in a shorter interval (6-8 months) to ensure stability  If any disease progression on that imaging, then would need to consider change in DMT  She had cord imaging in August 2022 which was stable with no disease progression  She continues to have many non-neurologic complaints and issues which frustrate her  We discussed continuing to follow with her PCP and other specialists  She had updated CBC and LFTs in March  Her WBCs were elevated at the time  she has an order to repeat CBC  Her LFTs are normal   She has low vit D, but will defer management of this to endocrinology given her other medical issues including hyperparathyroidism  Her neurologic exam is stable

## 2023-06-14 NOTE — PROGRESS NOTES
Rosalina Aleman 65 y o  female MRN: [id number]    Encounter: [id number]      Assessment/Plan     Problem List Items Addressed This Visit        Endocrine    Hyperparathyroidism [facility])     Continue Sensipar-she will be undergoing surgical intervention in December         Relevant Orders    PTH, intact Lab Collect Lab Collect    Phosphorus Lab Collect    Thyroid nodule     Repeat thyroid ultrasound to monitor for any changes in the size or characteristics of the thyroid nodule         Relevant Orders    TSH, 3rd generation Lab Collect    T4, free Lab Collect    US thyroid    Type 2 diabetes mellitus with hyperglycemia, with long-term current use of insulin ([facility]) - Primary       Lab Results   Component Value Date    HGBA1C 10 3 (A) 02/22/2023   Diabetes is poorly controlled-counseled on importance of improving glycemic control prior to any elective surgery  For now increase Tresiba to 25 units daily, continue metformin  Referred for medical nutrition therapy    She will upload her sensor in the next month after seeing the nutritionist   If needed she may be started on Premeal insulin         Relevant Medications    insulin degludec Hugo Liborio FlexTouch) 100 units/mL injection pen    Other Relevant Orders    Ambulatory referral to Diabetic Education    Comprehensive metabolic panel Lab Collect    HEMOGLOBIN A1C W/ EAG ESTIMATION Lab Collect       Cardiovascular and Mediastinum    Benign essential hypertension    Relevant Orders    Ambulatory referral to Diabetic Education       Other    Class 3 severe obesity due to excess calories with serious comorbidity and body mass index (BMI) of 45 0 to 49 9 in adult [facility])    Relevant Orders    Ambulatory referral to Diabetic Education    Hypercalcemia    Relevant Medications    cinacalcet (SENSIPAR) 60 MG tablet    Vitamin D deficiency     Increase vitamin D3 2000 iu daily          Relevant Medications    Cholecalciferol (Vitamin D3) 50 MCG (2000 UT) capsule   Other Visit Diagnoses Type 2 diabetes mellitus with hyperglycemia, without long-term current use of insulin (HCC)        Relevant Medications    insulin degludec Richard Aver FlexTouch) 100 units/mL injection pen          CC: Diabetes    History of Present Illness     HPI:  78-year-old female with type 2 diabetes on insulin therapy, hyperparathyroidism, hypertension, vitamin D deficiency and obesity seen in follow-up  She is accompanied by her son who is helping with history    She will be undergoing minimally invasive left parathyroidectomy in December-for now is on Sensipar    Current regimen - metformin , tresiba 15 units morning     Nada Pickler   Device used dexcom  Home use       Indication     Type 2 Diabetes    More than 72 hours of data was reviewed  Report to be scanned to chart       Date Range:  - 2023     Analysis of data:   Average Glucose: 238 mg /dl   SD : 53 mg/dl   Time in Target Range: 12%  Time Above Range: 88%  Time Below Range: 0%    Interpretation of data:  Sugars consistently high     C/o polyuria , incontinent  No blurry vision   + numbness and tingling in feet        Review of Systems    Historical Information   Past Medical History:   Diagnosis Date   • Diabetes mellitus ([place] [address] )    • External hemorrhoids     last assessed 16, resolved 16   • Hernia, ventral     last assessed 12/14/15, resolved 16   • Hypertension    • Incarcerated incisional hernia     last assessed 12/21/15, resolved 16   • Insomnia     last assessed 16, resolved 10/9/17   • Lyme disease    • Morbid obesity with BMI of 45 0-49 9, adult ([place] [address] ) 5/3/2017   • MS (multiple sclerosis) ([place] [address] )    • Stroke (cerebrum) ([place] [address] ) 2020   • Type 2 diabetes mellitus with hyperglycemia, without long-term current use of insulin ([place] [address] )      Past Surgical History:   Procedure Laterality Date   • ABCESS DRAINAGE     •  SECTION      x3   • COLONOSCOPY N/A 2017    Procedure: COLONOSCOPY with biopsy;  Surgeon: Cece Peña DO;  Location: [facility];   Service: Complete   • HYSTERECTOMY     • IR BIOPSY ABDOMEN  8/25/2021   • IR LUMBAR PUNCTURE  3/13/2020   • US GUIDED THYROID BIOPSY  1/29/2021     Social History   Social History     Substance and Sexual Activity   Alcohol Use Not Currently    Comment: Social     Social History     Substance and Sexual Activity   Drug Use Yes   • Types: Marijuana    Comment: medical marijuana     Social History     Tobacco Use   Smoking Status Never   Smokeless Tobacco Never     Family History:   Family History   Problem Relation Age of Onset   • Glaucoma Mother    • Diabetes Father    • Hypertension Father    • Colon cancer Father    • Alzheimer's disease Father    • Cervical cancer Sister 61   • Breast cancer Sister 50   • Breast cancer Sister 45   • Breast cancer Maternal Aunt    • Breast cancer Maternal Aunt    • Breast cancer Maternal Aunt    • Breast cancer Maternal Grandmother    • Coronary artery disease Family    • Diabetes Family    • Hypertension Family        Meds/Allergies   Current Outpatient Medications   Medication Sig Dispense Refill   • aspirin 81 mg chewable tablet Chew 1 tablet (81 mg total) daily 90 tablet 1   • atorvastatin (LIPITOR) 40 mg tablet Take 1 tablet (40 mg total) by mouth every evening 90 tablet 0   • Cholecalciferol (Vitamin D3) 50 MCG (2000 UT) capsule Take 1 capsule (2,000 Units total) by mouth daily 30 capsule 4   • cinacalcet (SENSIPAR) 60 MG tablet Take 1 tablet (60 mg total) by mouth daily 30 tablet 6   • diazepam (VALIUM) 5 mg tablet Take 1 tablet (5 mg total) by mouth 60 minutes pre-procedure 2 tablet 0   • Incontinence Supply Disposable (Prevail Women Underwear XL) MISC Use every 6 (six) hours as needed (incontinence) 120 each 11   • insulin degludec [place] Dates FlexTouch) 100 units/mL injection pen Inject 25 Units under the skin daily 15 mL 2   • Insulin Pen Needle (BD Pen Needle Izzy U/F) 32G X 4 MM MISC Use in the morning 100 each 1   • Interferon Beta-1b (Betaseron) 0 3 MG KIT Inject 1 ml (0 25 mg) subcutaneously every other day 14 kit 11   • lisinopril (ZESTRIL) 40 mg tablet Take 1 tablet (40 mg total) by mouth daily 90 tablet 0   • metFORMIN (GLUCOPHAGE-XR) 500 mg 24 hr tablet TAKE 2 TABLETS BY MOUTH TWICE DAILY WITH MEALS 360 tablet 0   • metoprolol succinate (TOPROL-XL) 50 mg 24 hr tablet Take 1 tablet (50 mg total) by mouth daily 90 tablet 3   • naloxone (NARCAN) 4 mg/0 1 mL nasal spray Administer 1 spray into a nostril  If no response after 2-3 minutes, give another dose in the other nostril using a new spray  1 each 1   • ondansetron (ZOFRAN-ODT) 4 mg disintegrating tablet Take 1 tablet (4 mg total) by mouth every 6 (six) hours as needed for nausea or vomiting 20 tablet 0   • pregabalin (LYRICA) 75 mg capsule Take 1 capsule (75 mg total) by mouth daily at bedtime 30 capsule 2   • vitamin B-12 (VITAMIN B-12) 500 mcg tablet Take 2 tablets (1,000 mcg total) by mouth daily 60 tablet 5   • Alcohol Swabs PADS by Does not apply route 4 (four) times a day *Latex Free* (Patient not taking: Reported on 6/14/2023) 120 each 3   • Blood Glucose Monitoring Suppl (OneTouch Verio) w/Device KIT Use in the morning (Patient not taking: Reported on 6/14/2023) 1 kit 0   • Blood Pressure KIT by Does not apply route daily (Patient not taking: Reported on 5/25/2023) 1 each 0   • diclofenac (VOLTAREN) 75 mg EC tablet Take 1 tablet (75 mg total) by mouth 2 (two) times a day (Patient not taking: Reported on 6/7/2023) 60 tablet 1   • Diclofenac Sodium (VOLTAREN) 1 % Apply 2 g topically 4 (four) times a day (Patient not taking: Reported on 6/7/2023) 150 g 1   • diphenhydrAMINE (BENADRYL) 25 mg tablet Take 0 5 tablets (12 5 mg total) by mouth every 6 (six) hours (Patient not taking: Reported on 6/7/2023) 20 tablet 0   • DULoxetine (CYMBALTA) 30 mg delayed release capsule Take 1 capsule (30 mg total) by mouth in the morning   (Patient not taking: Reported on 6/14/2023) 30 "capsule 1   • glucose blood (OneTouch Verio) test strip Use 1 each in the morning (Patient not taking: Reported on 6/7/2023) 100 each 1   • hydrALAZINE (APRESOLINE) 10 mg tablet Take 1 tablet (10 mg total) by mouth 3 (three) times a day (Patient not taking: Reported on 6/14/2023) 90 tablet 0   • hydrochlorothiazide (HYDRODIURIL) 50 mg tablet Take 1 tablet (50 mg total) by mouth daily (Patient not taking: Reported on 6/14/2023) 90 tablet 3   • Lancets (OneTouch Delica Plus [id number]) MISC Test BG up to 1x daily as directed (Patient not taking: Reported on 5/25/2023) 100 each 1   • traMADol (Ultram) 50 mg tablet Take 1 tablet (50 mg total) by mouth every 6 (six) hours as needed for moderate pain (Patient not taking: Reported on 6/14/2023) 10 tablet 0     Current Facility-Administered Medications   Medication Dose Route Frequency Provider Last Rate Last Admin   • prochlorperazine (COMPAZINE) injection 5 mg  5 mg Intramuscular Q4H PRN Amanda Cr MD   5 mg at 11/11/22 1040     Allergies   Allergen Reactions   • Sorbitan Diarrhea, Vomiting and Abdominal Pain   • Victoza [Liraglutide] Nausea Only   • Ambien [Zolpidem Tartrate]    • Demerol [Meperidine]    • Insulin Glargine Other (See Comments)     Annotation - [id number]: memory loss   • Latex    • Oxycodone Hives, Rash and Vomiting       Objective   Vitals: Blood pressure 132/90, pulse 65, height 5' 5\" (1 651 m), weight 132 kg (292 lb), not currently breastfeeding  Physical Exam  Vitals reviewed  Constitutional:       General: She is not in acute distress  Appearance: Normal appearance  She is obese  She is not ill-appearing, toxic-appearing or diaphoretic  HENT:      Head: Normocephalic and atraumatic  Eyes:      General: No scleral icterus  Extraocular Movements: Extraocular movements intact  Cardiovascular:      Rate and Rhythm: Normal rate and regular rhythm  Heart sounds: Normal heart sounds  No murmur heard    Pulmonary:      " Effort: Pulmonary effort is normal  No respiratory distress  Breath sounds: Normal breath sounds  No wheezing or rales  Abdominal:      General: There is no distension  Palpations: Abdomen is soft  Tenderness: There is no abdominal tenderness  Musculoskeletal:      Cervical back: Neck supple  Right lower leg: No edema  Left lower leg: No edema  Lymphadenopathy:      Cervical: No cervical adenopathy  Skin:     General: Skin is warm and dry  Neurological:      General: No focal deficit present  Mental Status: She is alert and oriented to person, place, and time  Gait: Gait abnormal    Psychiatric:         Mood and Affect: Mood normal          Behavior: Behavior normal          Thought Content: Thought content normal          Judgment: Judgment normal          The history was obtained from the review of the chart, patient and family      Lab Results:   Lab Results   Component Value Date/Time    Albumin 3 6 05/24/2023 10:27 AM    Albumin 3 8 03/25/2023 04:01 AM    Albumin 3 6 02/22/2023 11:12 AM    ALT 14 03/25/2023 04:01 AM    ALT 24 02/22/2023 11:12 AM    ALT 24 11/11/2022 12:01 PM    AST 10 (L) 03/25/2023 04:01 AM    AST 15 02/22/2023 11:12 AM    AST 8 10/31/2022 09:29 AM    BUN 24 03/25/2023 04:01 AM    BUN 16 02/22/2023 11:12 AM    BUN 18 11/11/2022 12:01 PM    Chloride 102 03/25/2023 04:01 AM    Chloride 105 02/22/2023 11:12 AM    Chloride 103 11/11/2022 12:01 PM    CO2 21 03/25/2023 04:01 AM    CO2 27 02/22/2023 11:12 AM    CO2 30 11/11/2022 12:01 PM    Creatinine 0 73 03/25/2023 04:01 AM    Creatinine 0 81 02/22/2023 11:12 AM    Creatinine 0 67 11/11/2022 12:01 PM    Hematocrit 36 6 03/25/2023 04:01 AM    Hematocrit 37 3 02/22/2023 11:12 AM    Hematocrit 36 5 11/11/2022 12:01 PM    HDL, Direct 42 (L) 09/01/2022 12:09 PM    Hemoglobin 12 0 03/25/2023 04:01 AM    Hemoglobin 11 7 02/22/2023 11:12 AM    Hemoglobin 11 8 11/11/2022 12:01 PM    Hemoglobin A1C 10 3 (A) "02/22/2023 09:21 AM    Hemoglobin A1C 8 6 (H) 10/31/2022 09:29 AM    Hemoglobin A1C 8 2 (H) 06/29/2022 10:33 AM    Potassium 3 5 03/25/2023 04:01 AM    Potassium 4 2 02/22/2023 11:12 AM    Potassium 3 9 11/11/2022 12:01 PM    MCV 87 03/25/2023 04:01 AM    MCV 89 02/22/2023 11:12 AM    MCV 88 11/11/2022 12:01 PM    Platelets 104 28/23/0488 04:01 AM    Platelets 332 12/85/7689 11:12 AM    Platelets 076 49/44/7856 12:01 PM    Total Protein 6 6 03/25/2023 04:01 AM    Total Protein 7 5 02/22/2023 11:12 AM    Total Protein 7 8 11/11/2022 12:01 PM    Triglycerides 186 (H) 09/01/2022 12:09 PM    WBC 15 64 (H) 03/25/2023 04:01 AM    WBC 7 85 02/22/2023 11:12 AM    WBC 5 45 11/11/2022 12:01 PM           Portions of the record may have been created with voice recognition software  Occasional wrong word or \"sound a like\" substitutions may have occurred due to the inherent limitations of voice recognition software  Read the chart carefully and recognize, using context, where substitutions have occurred    "

## 2023-06-14 NOTE — ASSESSMENT & PLAN NOTE
Neuropathy multifactorial, likely from both CNS and PNS  She has very uncontrolled DM  We discussed the importance of controlling her DM to prevent progression of peripheral neuropathy  Proper foot care and hygiene discussed  She sees podiatry  We discussed neuropathy likely contributes to her imbalance

## 2023-06-14 NOTE — PROGRESS NOTES
Patient ID: Chantel White is a 65 y o  female  Assessment/Plan:    MS (multiple sclerosis) ([facility] [address] )  She remains clinically stable from a MS standpoint at this time  She continues on Betaseron  She did have updated MRI brain wo contrast for surveillance on 3/5/23  Compared to 2 years prior, there was a new 1 5cm left periventricular white matter lesion  She was then re-scanned with contrast on 3/23/23 and this lesion was not enhancing  Discussed development of new lesion in a 2 year period  She is clinically stable and tolerates Betaseron well at this time  We discussed updating MRI brain in a shorter interval (6-8 months) to ensure stability  If any disease progression on that imaging, then would need to consider change in DMT  She had cord imaging in August 2022 which was stable with no disease progression  She continues to have many non-neurologic complaints and issues which frustrate her  We discussed continuing to follow with her PCP and other specialists  She had updated CBC and LFTs in March  Her WBCs were elevated at the time  she has an order to repeat CBC  Her LFTs are normal   She has low vit D, but will defer management of this to endocrinology given her other medical issues including hyperparathyroidism  Her neurologic exam is stable  Peripheral neuropathy  Neuropathy multifactorial, likely from both CNS and PNS  She has very uncontrolled DM  We discussed the importance of controlling her DM to prevent progression of peripheral neuropathy  Proper foot care and hygiene discussed  She sees podiatry  We discussed neuropathy likely contributes to her imbalance  Lumbar radiculopathy  Patient with ongoing R hip and R leg pain  Has followed with ortho who referred to pain management  Recent MRI lumbar spine with severe central canal narrowing at L4-5  Pain management is offering L5-S1 TERRANCE vs referral to spine surgery    Suggested she continue to follow with pain management  Ambulatory dysfunction  Multifactorial from her MS, lumbar spine issues, peripheral neuropathy, ortho issues, deconditioning  Encouraged her to continue to use her walker to prevent falls  Diagnoses and all orders for this visit:    MS (multiple sclerosis) ([facility] [address] )  -     MRI brain MS wo and w contrast; Future  -     BUN; Future  -     Creatinine, serum; Future    Lumbar radiculopathy    Ambulatory dysfunction    Peripheral neuropathy           Subjective:    HPI    Patient is a 66 yo female who presents to the [address] [place] [address] Drive for follow up regarding her MS  Patient with PMH of HTN, DM type 2, HLD, hyperparathyroidism  She was last seen in February 2023  Patient was initially seen in April 2020 following a hospitalization  Patient developed acute onset of right-sided numbness and weakness on March 9, 2020 in the morning before leaving for work  She was assessed by neurology while in the hospital, initial concern was for stroke  MRI C-spine WO contrast 3/10/2020: There is focal intramedullary T2 hyperintense signal epicentered at the C4 vertebral level  This is more pronounced in the right and central hemicord  The remaining mid to lower cervical cord is more difficult to assess due to the image degradation  There may be additional right hemicord signal abnormality at C5 and C6  MRI c-spine W contrast 3/11/2020: Postcontrast imaging demonstrates enhancement associated with multiple previously described foci of T2 prolongation within the cervical and upper thoracic CORD  MRI brain WO contrast 3/10/2020: There is no discrete mass, mass effect or midline shift  There is no intracranial hemorrhage  There is no evidence of acute infarction and diffusion imaging is unremarkable   Multiple foci of T2 and FLAIR hyperintensity are present within the supratentorial white matter, most pronounced in the anterolateral left frontal lobe, anterior right temporal lobe and right cerebellum/middle "cerebellar peduncle  MRI brain W contrast 3/11/2020: Redemonstration of multiple supratentorial and infratentorial scattered areas of white matter FLAIR signal hyperintensity, as described above  Many of the lesions demonstrate a perpendicular configuration of the ventricles and lesions are seen involving the callosal septal interface  Imaging appearance is most suggestive of demyelinating disease/multiple sclerosis with areas of active demyelination as correlated with dedicated MRI of the cervical spine  Concern for MS vs neoplasm vs sarcoid  CT chest abdomen pelvis was unremarkable for any solid tumor, although did demonstrate some calcified lymph nodes and granulomata in the lung fields  Patient had completed CSF analysis was for increased cells: CSF protein 80, CSF white blood cell 37, lymphocyte predominant, JCV and ACE negative, meningitis panel negative, MS panel high IgG index, 3 OCB, flow cytometry hypercellular  Serum NMO/MOG negative, ANCA/DNA/Sjogren's negative, ESR 32, CRP 9 1  She was given IV steroids and oral steroid taper  Patient had repeat imaging in June 2020 and there were several new, enhancing demyelinating lesions in the brain, as well as improvement in the c-spine noted  At timing of visit on 7/7/2020, diagnosis of MS was made  Betaseron was initiated, with first injection 7/24/2020  At first, she was having flu-like symptoms with the titration, but patient was advised to pre-med with NSAIDs/Tylenol and warm showers, and this improved her symptoms  She continued to have issues with the Betaseron  She called in mid October reporting ongoing issues, so Betaseron was held for 2-3 weeks  In November 2020, she was doing better with the Betaseron       Updated imaging was done without contrast due to patient declined contrast (she tells me she was there for \"too long\" and was tired and wanted to go home)  MRI brain 3/5/2021:  Mild plaque burden, at least 2 of the previously seen " "enhancing lesions are less conspicuous on FLAIR imaging, indicative of at least partial treatment response  MRI c-spine 3/5/2021: a few, scattered cord lesions redemonstrated  The previously seen enhancing lesion at T1 demonstrates a small amount of myelomalacia  No progressive cord signal abnormality on noncontrast imaging  In March 2022, patient reported feeling generally worse, especially difficulty with stamina and endurance with ambulation  She denied new, focal symptoms, just a decline in general  She also admitted to not using her CPAP, feeling tired  MRI c-spine and t-spine were ordered due to worsening ambulatory dysfunction  She did not proceed with imaging for several months  MRI c-spine and t-spine updated 8/23/22 and both stable  At timing of Feb 2023 visit, patient reported she “continues to decline”, however when asked about the symptoms she is having, they were essentially all non-neurologic  MRI brain was ordered for surveillance, initially without contrast   MRI brain wo contrast 3/7/23 demonstrated a new 1 5 cm left periventricular white matter demyelinating lesion  She was asked to get another MRI with contrast and this was completed 3/23/2023  This lesion was not enhancing  Initial MRI was compared to 3/5/2021 imaging  Today, patient reports \"nothing is getting better and continues to get worse\"  When asked specifically what symptoms she is referring to, she is referring to her pain  She has right hip and leg pain, and also reports ongoing ambulatory dysfunction, needing to use her walker all the time or she will fall  She had been following with ortho for her hip, tried PT and an injection in the hip that did not work, so was referred to pain management  She recently had a MRI lumbar spine which demonstrated severe central canal narrowing at L4-5  Pain management is offering L5-S1 TERRANCE vs referral to spine surgery    She also recently saw a surgeon for hyperparathyroidism " "and plan is for parathyroidectomy, but not scheduled until December and she is upset about this and wondering why the surgery would be so far out  She notes frustration with ongoing pain issues and other medical issues and seeing so many doctors  She was recently referred to psych by her PCP for depression  Patient denies SI  She has no new neurologic/MS complaints  She continues on Betaseron  Patient denies vision changes, vertigo  No changes in bowel or bladder (continues with frequent urination), speech or swallowing  The following portions of the patient's history were reviewed and updated as appropriate: current medications, past family history, past medical history, past social history, past surgical history and problem list        Objective:    Blood pressure 169/78, pulse 67, temperature (!) 96 6 °F (35 9 °C), temperature source Temporal, resp  rate 18, height 5' 5\" (1 651 m), weight 132 kg (292 lb), SpO2 98 %    Physical Exam  Constitutional:       Appearance: Normal appearance  HENT:      Head: Normocephalic and atraumatic  Eyes:      Extraocular Movements: EOM normal       Pupils: Pupils are equal, round, and reactive to light  Neurological:      Mental Status: She is alert  Motor: Motor strength is normal      Deep Tendon Reflexes:      Reflex Scores:       Bicep reflexes are 1+ on the right side and 1+ on the left side  Brachioradialis reflexes are 1+ on the right side and 1+ on the left side  Patellar reflexes are 1+ on the right side and 1+ on the left side  Achilles reflexes are 0 on the right side and 0 on the left side  Psychiatric:         Speech: Speech normal          Behavior: Behavior normal       Comments: Seemed somewhat depressed and down today         Neurological Exam  Mental Status  Alert  Oriented to person, place, time and situation  Speech is normal  Language is fluent with no aphasia   Attention and concentration are normal     Cranial " Nerves  CN II: Visual fields full to confrontation  CN III, IV, VI: Extraocular movements intact bilaterally  Pupils equal round and reactive to light bilaterally  CN V: Facial sensation is normal   CN VII: Full and symmetric facial movement  CN VIII: Hearing is normal   CN IX, X: Palate elevates symmetrically  CN XI: Shoulder shrug strength is normal   CN XII: Tongue midline without atrophy or fasciculations  Motor   Normal muscle tone  No abnormal involuntary movements  Strength is 5/5 throughout all four extremities  Give way weakness RLE, but with encouragement, able to provide full strength   Sensory  Vibration abnormality: Decreased in the lower extremities bilaterally   Reflexes                                            Right                      Left  Brachioradialis                    1+                         1+  Biceps                                 1+                         1+  Patellar                                1+                         1+  Achilles                                0                         0    Coordination  Right: Finger-to-nose normal Left: Finger-to-nose normal     Gait   Unable to rise from chair without using arms  Slow, wide based, using rolling walker   ROS:    Review of Systems   Constitutional: Positive for fatigue  Negative for chills and fever  HENT: Negative for ear pain and sore throat  Eyes: Negative for pain and visual disturbance  Respiratory: Negative for cough and shortness of breath  Cardiovascular: Negative for chest pain and palpitations  Gastrointestinal: Positive for constipation, nausea and vomiting  Negative for abdominal pain  Genitourinary: Positive for frequency and urgency  Negative for dysuria and hematuria  Musculoskeletal: Positive for gait problem (uses a walker)  Negative for arthralgias and back pain  Skin: Negative for color change and rash     Neurological: Positive for weakness and numbness (arms, fingers, legs, feet,and toes)  Negative for seizures and syncope  Hematological: Bruises/bleeds easily  Psychiatric/Behavioral: Positive for confusion and sleep disturbance  Depression, anxiety,and mood swings   All other systems reviewed and are negative      I personally reviewed and updated the ROS as appropriate

## 2023-06-14 NOTE — ASSESSMENT & PLAN NOTE
Multifactorial from her MS, lumbar spine issues, peripheral neuropathy, ortho issues, deconditioning  Encouraged her to continue to use her walker to prevent falls

## 2023-06-14 NOTE — ASSESSMENT & PLAN NOTE
Patient with ongoing R hip and R leg pain  Has followed with ortho who referred to pain management  Recent MRI lumbar spine with severe central canal narrowing at L4-5  Pain management is offering L5-S1 TERRANCE vs referral to spine surgery  Suggested she continue to follow with pain management

## 2023-06-16 NOTE — TELEPHONE ENCOUNTER
S/W pt and advised of the same, pt does not like pain and would like nurse to discuss options with her son Sylvia Salgadonani @ [phone]- per pt her son is her POA and pt gave nurse permission to speak with him  Nurse called Sylvia Guerrero and advised of the same, Sylvia Guerrero would like to think about options over the weekend and to call our office once they come to a decision  Sylvia Guerrero appreciative of call

## 2023-06-19 ENCOUNTER — PATIENT OUTREACH (OUTPATIENT)
Dept: FAMILY MEDICINE CLINIC | Facility: CLINIC | Age: 63
End: 2023-06-19

## 2023-06-19 NOTE — LETTER
[id number]   [facility] [address]  [phone]    Re: Christiano Katelynn to Reach   6/19/2023       Dear Annetta Hernandez am a [address] [facility] [address] [facility] Work  and wanted to be certain you had information to contact me should you desire assistance with or have questions about non-medical aspects of your care such as [but not limited to] medical insurance, housing, transportation, material needs, or emergency needs  If I do not have an answer I will assist you in finding the appropriate agency or individual who can help  Please feel free to contact me at Dept: [phone]  Thank You      Sincerely,         Zeinab Zhong MSW

## 2023-06-19 NOTE — PROGRESS NOTES
EMMA PATTERSON received a SDOH referral from patient's PCP  EMMA PATTERSON reviewed patient's chart and called patient ([phone]) a third time  Patient did not answer, EMMA PATTERSON left a message including EMMA PATTERSON contact information and requested a call back  EMMA PATTERSON will send unable to reach letter via [employer] and close   Please re consult EMMA PATTERSON as needed

## 2023-06-20 DIAGNOSIS — E11.65 TYPE 2 DIABETES MELLITUS WITH HYPERGLYCEMIA, WITHOUT LONG-TERM CURRENT USE OF INSULIN (HCC): ICD-10-CM

## 2023-06-20 RX ORDER — METFORMIN HYDROCHLORIDE 500 MG/1
TABLET, EXTENDED RELEASE ORAL
Qty: 360 TABLET | Refills: 0 | Status: SHIPPED | OUTPATIENT
Start: 2023-06-20

## 2023-06-23 ENCOUNTER — TELEPHONE (OUTPATIENT)
Dept: SURGICAL ONCOLOGY | Facility: CLINIC | Age: 63
End: 2023-06-23

## 2023-06-27 ENCOUNTER — TELEPHONE (OUTPATIENT)
Dept: SURGICAL ONCOLOGY | Facility: CLINIC | Age: 63
End: 2023-06-27

## 2023-06-27 ENCOUNTER — PATIENT MESSAGE (OUTPATIENT)
Dept: SURGICAL ONCOLOGY | Facility: CLINIC | Age: 63
End: 2023-06-27

## 2023-06-27 ENCOUNTER — TELEPHONE (OUTPATIENT)
Dept: HEMATOLOGY ONCOLOGY | Facility: CLINIC | Age: 63
End: 2023-06-27

## 2023-06-27 NOTE — TELEPHONE ENCOUNTER
Lynne Montana will have a Left Parathyroidectomy on Oct 24 with Dr Williams Powell  Medical clearance is needed  Clearance must be within 60 days of surgery  Please schedule appointment for after Aug 25  Clearance form will be faxed to the office 60 days prior to surgery date  I have fax # [phone]  Thank you in advance!

## 2023-06-27 NOTE — TELEPHONE ENCOUNTER
Spoke with Mila Hoffman (son, POA) to move surgery up to Oct 24 in [facility]  Pre Op 10/13  Post Op 11/10  Medical and Cardiac clearance appointments needed within 60 days of surgery  Mila Neliadrian is aware of pre admission testing (labs, chest Xray, EKG)  I clarified preferred contact numbers with Mila Hoffman  He stated that Joanne's phone had been damaged/out of commission, but he recently replaced it  Can reach out to preferred number ending in [phone], and his number ending in [phone] if unable to reach her  I also let him know that Sae Whitlock can add Cate Rumkarina to her communication consent / contacts so that she to can be contacted

## 2023-06-27 NOTE — TELEPHONE ENCOUNTER
Gifty Sommer will have a Left Parathyroidectomy on Oct 24 with Dr Livia Malin  Cardiac clearance is needed  Clearance must be within 60 days of surgery  Please schedule appointment for after Aug 25  Clearance form will be faxed to the office 60 days prior to surgery date  I have a fax # of [phone], and [phone]  Thank you in advance!

## 2023-06-27 NOTE — TELEPHONE ENCOUNTER
Patient Call    Who are you speaking with? Child    If it is not the patient, are they listed on an active communication consent form? N/A   What is the reason for this call? daughter Maribell Espino, she would like to speak with you regarding moving up patients surgery date with Dr Samantha García   Does this require a call back? Yes   If a call back is required, please list best call back number [phone]- daughter   If a call back is required, advise that a message will be forwarded to their care team and someone will return their call as soon as possible  Did you relay this information to the patient?  patients cristopher Squires [facility] - [phone](DFS)

## 2023-06-28 ENCOUNTER — HOSPITAL ENCOUNTER (OUTPATIENT)
Dept: ULTRASOUND IMAGING | Facility: MEDICAL CENTER | Age: 63
Discharge: HOME | End: 2023-06-28
Payer: COMMERCIAL

## 2023-06-28 DIAGNOSIS — E04.1 THYROID NODULE: ICD-10-CM

## 2023-06-28 PROCEDURE — 76536 US EXAM OF HEAD AND NECK: CPT

## 2023-06-29 ENCOUNTER — TELEPHONE (OUTPATIENT)
Dept: CARDIOLOGY CLINIC | Facility: CLINIC | Age: 63
End: 2023-06-29

## 2023-07-07 NOTE — RESULT ENCOUNTER NOTE
Please let her know that the thyroid ultrasound shows the thyroid nodules appear unchanged in size. I will leave this for Dr. Lily Dudley review when she returns next week.

## 2023-07-11 DIAGNOSIS — E11.65 TYPE 2 DIABETES MELLITUS WITH HYPERGLYCEMIA, WITHOUT LONG-TERM CURRENT USE OF INSULIN (HCC): Primary | ICD-10-CM

## 2023-07-12 LAB
DME PARACHUTE DELIVERY DATE REQUESTED: NORMAL
DME PARACHUTE ITEM DESCRIPTION: NORMAL
DME PARACHUTE ORDER STATUS: NORMAL
DME PARACHUTE SUPPLIER NAME: NORMAL
DME PARACHUTE SUPPLIER PHONE: NORMAL

## 2023-07-12 RX ORDER — PROCHLORPERAZINE 25 MG/1
SUPPOSITORY RECTAL
Qty: 1 EACH | Refills: 0 | Status: SHIPPED | OUTPATIENT
Start: 2023-07-12

## 2023-07-12 RX ORDER — PROCHLORPERAZINE 25 MG/1
SUPPOSITORY RECTAL
Qty: 9 EACH | Refills: 0 | Status: SHIPPED | OUTPATIENT
Start: 2023-07-12

## 2023-08-09 DIAGNOSIS — M54.16 LUMBAR RADICULOPATHY: ICD-10-CM

## 2023-08-09 RX ORDER — PREGABALIN 75 MG/1
75 CAPSULE ORAL
Qty: 30 CAPSULE | Refills: 0 | Status: CANCELLED | OUTPATIENT
Start: 2023-08-09 | End: 2023-09-08

## 2023-08-09 RX ORDER — PREGABALIN 75 MG/1
75 CAPSULE ORAL 2 TIMES DAILY
Qty: 60 CAPSULE | Refills: 2 | Status: SHIPPED | OUTPATIENT
Start: 2023-08-09 | End: 2023-09-08

## 2023-08-09 NOTE — TELEPHONE ENCOUNTER
Patient needs a new script:     Patient states she was advised to increase to 2 capsules daily, but script is still for 1 daily.

## 2023-08-09 NOTE — TELEPHONE ENCOUNTER
Medication Pregabalin 75mg    Quantity   [address] [facility]     PCP/Speciality  Spine and pain    Enough for 3 days -NO    Patient stated that she was advised to increase to 2 capsules daily, but prescription still states 1 capsule daily.

## 2023-08-09 NOTE — TELEPHONE ENCOUNTER
Pt seen per Remberto Maldonado on 5/25 and ordered lyrica 75 mg at HS on 6/2 increased to 75mg BID. (Pt has never been seen per [facility])  Pt needs refill of same due to change in script sent on 5/24. Pt does not have side effects and this dose is helping her. Pt has been out of the medication for 3 days. Please refill same?

## 2023-08-10 NOTE — TELEPHONE ENCOUNTER
RN attempted to reach pt regarding previous. Left c/b number office hours and location.   If pt calls back her new script was sent to her pharmacy for Lyrica BID

## 2023-08-23 ENCOUNTER — TELEPHONE (OUTPATIENT)
Dept: CARDIOLOGY CLINIC | Facility: CLINIC | Age: 63
End: 2023-08-23

## 2023-09-08 ENCOUNTER — TELEPHONE (OUTPATIENT)
Dept: ENDOCRINOLOGY | Facility: CLINIC | Age: 63
End: 2023-09-08

## 2023-09-09 DIAGNOSIS — I10 BENIGN ESSENTIAL HYPERTENSION: ICD-10-CM

## 2023-09-09 NOTE — TELEPHONE ENCOUNTER
Medication Refill Request     Name Lisinopril   Dose/Frequency 40mg daily  Quantity 90  Verified pharmacy   [x]  Verified ordering Provider   [x]  Does patient have enough for the next 3 days?  Yes [x] No []

## 2023-09-11 RX ORDER — LISINOPRIL 40 MG/1
40 TABLET ORAL DAILY
Qty: 90 TABLET | Refills: 0 | Status: SHIPPED | OUTPATIENT
Start: 2023-09-11

## 2023-09-13 ENCOUNTER — PREP FOR PROCEDURE (OUTPATIENT)
Dept: SURGICAL ONCOLOGY | Facility: CLINIC | Age: 63
End: 2023-09-13

## 2023-09-27 ENCOUNTER — LAB (OUTPATIENT)
Dept: LAB | Facility: CLINIC | Age: 63
End: 2023-09-27
Payer: COMMERCIAL

## 2023-09-27 DIAGNOSIS — E21.3 HYPERPARATHYROIDISM (HCC): ICD-10-CM

## 2023-09-27 DIAGNOSIS — E04.1 THYROID NODULE: ICD-10-CM

## 2023-09-27 DIAGNOSIS — E11.65 TYPE 2 DIABETES MELLITUS WITH HYPERGLYCEMIA, WITH LONG-TERM CURRENT USE OF INSULIN (HCC): ICD-10-CM

## 2023-09-27 DIAGNOSIS — Z79.4 TYPE 2 DIABETES MELLITUS WITH HYPERGLYCEMIA, WITH LONG-TERM CURRENT USE OF INSULIN (HCC): ICD-10-CM

## 2023-09-27 LAB
ALBUMIN SERPL BCP-MCNC: 3.8 G/DL (ref 3.5–5)
ALP SERPL-CCNC: 90 U/L (ref 34–104)
ALT SERPL W P-5'-P-CCNC: 14 U/L (ref 7–52)
ANION GAP SERPL CALCULATED.3IONS-SCNC: 6 MMOL/L
AST SERPL W P-5'-P-CCNC: 11 U/L (ref 13–39)
BILIRUB SERPL-MCNC: 0.67 MG/DL (ref 0.2–1)
BUN SERPL-MCNC: 25 MG/DL (ref 5–25)
CALCIUM SERPL-MCNC: 10.5 MG/DL (ref 8.4–10.2)
CHLORIDE SERPL-SCNC: 105 MMOL/L (ref 96–108)
CO2 SERPL-SCNC: 28 MMOL/L (ref 21–32)
CREAT SERPL-MCNC: 0.76 MG/DL (ref 0.6–1.3)
EST. AVERAGE GLUCOSE BLD GHB EST-MCNC: 177 MG/DL
GFR SERPL CREATININE-BSD FRML MDRD: 84 ML/MIN/1.73SQ M
GLUCOSE P FAST SERPL-MCNC: 124 MG/DL (ref 65–99)
HBA1C MFR BLD: 7.8 %
PHOSPHATE SERPL-MCNC: 2.8 MG/DL (ref 2.3–4.1)
POTASSIUM SERPL-SCNC: 4 MMOL/L (ref 3.5–5.3)
PROT SERPL-MCNC: 7 G/DL (ref 6.4–8.4)
PTH-INTACT SERPL-MCNC: 68.7 PG/ML (ref 12–88)
SODIUM SERPL-SCNC: 139 MMOL/L (ref 135–147)
T4 FREE SERPL-MCNC: 0.84 NG/DL (ref 0.61–1.12)
TSH SERPL DL<=0.05 MIU/L-ACNC: 2.73 UIU/ML (ref 0.45–4.5)

## 2023-09-27 PROCEDURE — 83970 ASSAY OF PARATHORMONE: CPT

## 2023-09-27 PROCEDURE — 84100 ASSAY OF PHOSPHORUS: CPT

## 2023-09-27 PROCEDURE — 80053 COMPREHEN METABOLIC PANEL: CPT

## 2023-09-27 PROCEDURE — 84439 ASSAY OF FREE THYROXINE: CPT

## 2023-09-27 PROCEDURE — 36415 COLL VENOUS BLD VENIPUNCTURE: CPT

## 2023-09-27 PROCEDURE — 83036 HEMOGLOBIN GLYCOSYLATED A1C: CPT

## 2023-09-27 PROCEDURE — 84443 ASSAY THYROID STIM HORMONE: CPT

## 2023-09-29 ENCOUNTER — OFFICE VISIT (OUTPATIENT)
Dept: ENDOCRINOLOGY | Facility: CLINIC | Age: 63
End: 2023-09-29
Payer: COMMERCIAL

## 2023-09-29 VITALS
WEIGHT: 290 LBS | SYSTOLIC BLOOD PRESSURE: 122 MMHG | OXYGEN SATURATION: 97 % | HEIGHT: 65 IN | DIASTOLIC BLOOD PRESSURE: 78 MMHG | BODY MASS INDEX: 48.32 KG/M2 | HEART RATE: 72 BPM

## 2023-09-29 DIAGNOSIS — E78.5 HYPERLIPIDEMIA, UNSPECIFIED HYPERLIPIDEMIA TYPE: ICD-10-CM

## 2023-09-29 DIAGNOSIS — E21.3 HYPERPARATHYROIDISM (HCC): ICD-10-CM

## 2023-09-29 DIAGNOSIS — E11.65 TYPE 2 DIABETES MELLITUS WITH HYPERGLYCEMIA, WITH LONG-TERM CURRENT USE OF INSULIN (HCC): Primary | ICD-10-CM

## 2023-09-29 DIAGNOSIS — I10 BENIGN ESSENTIAL HYPERTENSION: ICD-10-CM

## 2023-09-29 DIAGNOSIS — E11.65 TYPE 2 DIABETES MELLITUS WITH HYPERGLYCEMIA, WITHOUT LONG-TERM CURRENT USE OF INSULIN (HCC): ICD-10-CM

## 2023-09-29 DIAGNOSIS — Z79.4 TYPE 2 DIABETES MELLITUS WITH HYPERGLYCEMIA, WITH LONG-TERM CURRENT USE OF INSULIN (HCC): Primary | ICD-10-CM

## 2023-09-29 LAB
DME PARACHUTE DELIVERY DATE REQUESTED: NORMAL
DME PARACHUTE ITEM DESCRIPTION: NORMAL
DME PARACHUTE ITEM DESCRIPTION: NORMAL
DME PARACHUTE ORDER STATUS: NORMAL
DME PARACHUTE SUPPLIER NAME: NORMAL
DME PARACHUTE SUPPLIER PHONE: NORMAL

## 2023-09-29 PROCEDURE — 99214 OFFICE O/P EST MOD 30 MIN: CPT

## 2023-09-29 PROCEDURE — 95249 CONT GLUC MNTR PT PROV EQP: CPT

## 2023-09-29 RX ORDER — INSULIN DEGLUDEC INJECTION 100 U/ML
20 INJECTION, SOLUTION SUBCUTANEOUS DAILY
Qty: 15 ML | Refills: 2 | Status: SHIPPED | OUTPATIENT
Start: 2023-09-29

## 2023-09-29 NOTE — PROGRESS NOTES
Established Patient Progress Note    CC: Follow up for type 2 diabetes mellitus    Impression & Plan:    Problem List Items Addressed This Visit        Endocrine    Type 2 diabetes mellitus with hyperglycemia, with long-term current use of insulin ([address]) - Primary     Remains poorly controlled however CGM report shows she is 71% in range past 2 weeks  - Increase Tresiba to 20 from 15 and check hgba1c in 3 months  - Rx to upgrade to [address] sent    Lab Results   Component Value Date    HGBA1C 7.8 (H) 09/27/2023            Relevant Medications    insulin degludec [place] FlexTouch) 100 units/mL injection pen    Other Relevant Orders    HEMOGLOBIN A1C W/ EAG ESTIMATION Lab Collect    Hyperparathyroidism ([address])     Scheduled for surgery in Oct with Dr. Pete Levin. Request patient be seen in office with lab work prior 1-2 weeks following surgery. Will check CMP and PTH. Orders given     - continue with sensipar until then         Relevant Orders    Comprehensive metabolic panel Lab Collect    PTH, intact- Lab Collect       Cardiovascular and Mediastinum    Benign essential hypertension     Stable in office  - continue current regimen            Other    Hyperlipidemia     Stable  - continue current regimen        Other Visit Diagnoses     Type 2 diabetes mellitus with hyperglycemia, without long-term current use of insulin (HCC)        Relevant Medications    insulin degludec [place] FlexTouch) 100 units/mL injection pen          Orders Placed This Encounter   Procedures   • HEMOGLOBIN A1C W/ EAG ESTIMATION Lab Collect     Standing Status:   Future     Standing Expiration Date:   9/29/2024   • Comprehensive metabolic panel Lab Collect     This is a patient instruction: Patient fasting for 8 hours or longer recommended.      Standing Status:   Future     Standing Expiration Date:   9/29/2024   • PTH, intact- Lab Collect     Standing Status:   Future     Standing Expiration Date:   9/29/2024       History of Present Illness: Juan Miguel Millan is a 59 y.o. female with a history of type 2 diabetes, primary hyperparathyroidism, hyperlipidemia, hypertension, obesity, SELENE, thyroid nodules. Complications: macroalbuminuria. Last A1C was 7.8. Home glucose monitoring: CGM    Hypoglycemia: yes  Recent hospitalizations or illnesses: no   Problems with current regimen: yes- patient reports she "bottomed out" when she increased her Tresiba dose from 15 units to 25 units, thus went back to 15 units. She would like to upgrade to the [facility]. She has no additional complaints today. CGM Interpretation  Juan Miguel Millan   Device used Dexcom  Home use   Indication: Type 2 Diabetes  More than 72 hours of data was reviewed. Report to be scanned to chart.    Date Range: Sept 16 to Sept 29, 2023  Analysis of data:   Average Glucose: 166  SD : 40  Time in Target Range: 71%  Time Above Range: high: 23%; very high: 5%  Time Below Range: <1% low  Interpretation of data: hyperglycemia after breakfast and lunch    Exercise: limited     Current regimen:   Tresiba 25 units daily - but is doing 15 units  Metformin 1,000 mg BID    Last Eye Exam: overdue, needs to schedule  Last Foot Exam: UTD, 1/15/23    ACE/ARB: lisinopril  Has hyperlipidemia: Taking atorvastatin       Patient Active Problem List   Diagnosis   • Hernia, ventral   • Type 2 diabetes mellitus with hyperglycemia, with long-term current use of insulin ([facility])   • GI bleed   • Morbid obesity with BMI of 45.0-49.9, adult ([facility])   • Ischemic colitis ([address])   • Unintentional weight loss   • Hypercalcemia   • Hyperparathyroidism ([address])   • Vitamin D deficiency   • Depression with anxiety   • Benign essential hypertension   • Falls frequently   • Numbness and tingling of right arm and leg   • Ambulatory dysfunction   • MS (multiple sclerosis) ([facility])   • Cognitive decline   • Right hemiparesis ([facility])   • Chronic daily headache   • Class 3 severe obesity due to excess calories with serious comorbidity and body mass index (BMI) of 45.0 to 49.9 in adult [facility])   • Thyroid nodule   • B12 deficiency   • Hyperlipidemia   • Obstructive sleep apnea syndrome   • Hip pain, right   • Positive for macroalbuminuria   • Other chronic pain   • Nausea & vomiting   • Bloating   • New daily persistent headache   • Medication overuse headache   • Visual disturbances   • Chronic post-traumatic headache, not intractable   • Primary osteoarthritis of right hip   • Lumbar radiculopathy   • Peripheral neuropathy      Past Medical History:   Diagnosis Date   • Diabetes mellitus ([address])    • External hemorrhoids     last assessed 16, resolved 16   • Hernia, ventral     last assessed 12/14/15, resolved 16   • Hypertension    • Incarcerated incisional hernia     last assessed 12/21/15, resolved 16   • Insomnia     last assessed 16, resolved 10/9/17   • Lyme disease    • Morbid obesity with BMI of 45.0-49.9, adult ([address]) 5/3/2017   • MS (multiple sclerosis) ([address])    • Stroke (cerebrum) ([address]) 2020   • Type 2 diabetes mellitus with hyperglycemia, without long-term current use of insulin ([address])       Past Surgical History:   Procedure Laterality Date   • ABCESS DRAINAGE     •  SECTION      x3   • COLONOSCOPY N/A 2017    Procedure: COLONOSCOPY with biopsy;  Surgeon: Fatoumata Abdul DO;  Location: [facility];   Service: Complete   • HYSTERECTOMY     • IR BIOPSY ABDOMEN  2021   • IR LUMBAR PUNCTURE  3/13/2020   • US GUIDED THYROID BIOPSY  2021      Family History   Problem Relation Age of Onset   • Glaucoma Mother    • Diabetes Father    • Hypertension Father    • Colon cancer Father    • Alzheimer's disease Father    • Cervical cancer Sister 61   • Breast cancer Sister 50   • Breast cancer Sister 45   • Breast cancer Maternal Aunt    • Breast cancer Maternal Aunt    • Breast cancer Maternal Aunt    • Breast cancer Maternal Grandmother    • Coronary artery disease Family    • Diabetes Family    • Hypertension Family      Social History     Tobacco Use   • Smoking status: Never   • Smokeless tobacco: Never   Substance Use Topics   • Alcohol use: Not Currently     Comment: Social     Allergies   Allergen Reactions   • Sorbitan Diarrhea, Vomiting and Abdominal Pain   • Victoza [Liraglutide] Nausea Only   • Ambien [Zolpidem Tartrate]    • Demerol [Meperidine]    • Insulin Glargine Other (See Comments)     Annotation - [id number]: memory loss   • Latex    • Oxycodone Hives, Rash and Vomiting         Current Outpatient Medications:   •  aspirin 81 mg chewable tablet, Chew 1 tablet (81 mg total) daily, Disp: 90 tablet, Rfl: 1  •  atorvastatin (LIPITOR) 40 mg tablet, Take 1 tablet (40 mg total) by mouth every evening, Disp: 90 tablet, Rfl: 0  •  Cholecalciferol (Vitamin D3) 50 MCG (2000 UT) capsule, Take 1 capsule (2,000 Units total) by mouth daily, Disp: 30 capsule, Rfl: 4  •  cinacalcet (SENSIPAR) 60 MG tablet, Take 1 tablet (60 mg total) by mouth daily, Disp: 30 tablet, Rfl: 6  •  Continuous Blood Gluc Sensor (Dexcom G6 Sensor) MISC, USE AS DIRECTED, Disp: 9 each, Rfl: 0  •  Continuous Blood Gluc Transmit (Dexcom G6 Transmitter) MISC, Change Transmitter Every 90 days as directed, Disp: 1 each, Rfl: 0  •  diazepam (VALIUM) 5 mg tablet, Take 1 tablet (5 mg total) by mouth 60 minutes pre-procedure, Disp: 2 tablet, Rfl: 0  •  Incontinence Supply Disposable (Prevail Women Underwear XL) MISC, Use every 6 (six) hours as needed (incontinence), Disp: 120 each, Rfl: 11  •  insulin degludec [place] FlexTouch) 100 units/mL injection pen, Inject 20 Units under the skin daily, Disp: 15 mL, Rfl: 2  •  Insulin Pen Needle (BD Pen Needle Izzy U/F) 32G X 4 MM MISC, Use in the morning, Disp: 100 each, Rfl: 1  •  Interferon Beta-1b (Betaseron) 0.3 MG KIT, Inject 1 ml (0.25 mg) subcutaneously every other day, Disp: 14 kit, Rfl: 11  •  lisinopril (ZESTRIL) 40 mg tablet, Take 1 tablet (40 mg total) by mouth daily, Disp: 90 tablet, Rfl: 0  • metFORMIN (GLUCOPHAGE-XR) 500 mg 24 hr tablet, TAKE 2 TABLETS BY MOUTH TWICE DAILY WITH MEALS, Disp: 360 tablet, Rfl: 0  •  metoprolol succinate (TOPROL-XL) 50 mg 24 hr tablet, Take 1 tablet (50 mg total) by mouth daily, Disp: 90 tablet, Rfl: 3  •  naloxone (NARCAN) 4 mg/0.1 mL nasal spray, Administer 1 spray into a nostril. If no response after 2-3 minutes, give another dose in the other nostril using a new spray., Disp: 1 each, Rfl: 1  •  ondansetron (ZOFRAN-ODT) 4 mg disintegrating tablet, Take 1 tablet (4 mg total) by mouth every 6 (six) hours as needed for nausea or vomiting, Disp: 20 tablet, Rfl: 0  •  pregabalin (LYRICA) 75 mg capsule, Take 1 capsule (75 mg total) by mouth 2 (two) times a day, Disp: 60 capsule, Rfl: 2  •  vitamin B-12 (VITAMIN B-12) 500 mcg tablet, Take 2 tablets (1,000 mcg total) by mouth daily, Disp: 60 tablet, Rfl: 5  •  Alcohol Swabs PADS, by Does not apply route 4 (four) times a day *Latex Free* (Patient not taking: Reported on 6/14/2023), Disp: 120 each, Rfl: 3  •  Blood Glucose Monitoring Suppl (OneTouch Verio) w/Device KIT, Use in the morning (Patient not taking: Reported on 6/14/2023), Disp: 1 kit, Rfl: 0  •  Blood Pressure KIT, by Does not apply route daily (Patient not taking: Reported on 5/25/2023), Disp: 1 each, Rfl: 0  •  diclofenac (VOLTAREN) 75 mg EC tablet, Take 1 tablet (75 mg total) by mouth 2 (two) times a day (Patient not taking: Reported on 6/7/2023), Disp: 60 tablet, Rfl: 1  •  Diclofenac Sodium (VOLTAREN) 1 %, Apply 2 g topically 4 (four) times a day (Patient not taking: Reported on 6/7/2023), Disp: 150 g, Rfl: 1  •  diphenhydrAMINE (BENADRYL) 25 mg tablet, Take 0.5 tablets (12.5 mg total) by mouth every 6 (six) hours (Patient not taking: Reported on 6/7/2023), Disp: 20 tablet, Rfl: 0  •  DULoxetine (CYMBALTA) 30 mg delayed release capsule, Take 1 capsule (30 mg total) by mouth in the morning.  (Patient not taking: Reported on 6/14/2023), Disp: 30 capsule, Rfl: 1  • glucose blood (OneTouch Verio) test strip, Use 1 each in the morning (Patient not taking: Reported on 6/7/2023), Disp: 100 each, Rfl: 1  •  hydrALAZINE (APRESOLINE) 10 mg tablet, Take 1 tablet (10 mg total) by mouth 3 (three) times a day (Patient not taking: Reported on 6/14/2023), Disp: 90 tablet, Rfl: 0  •  hydrochlorothiazide (HYDRODIURIL) 50 mg tablet, Take 1 tablet (50 mg total) by mouth daily (Patient not taking: Reported on 6/14/2023), Disp: 90 tablet, Rfl: 3  •  Lancets (OneTouch Delica Plus CAPULH39D) MISC, Test BG up to 1x daily as directed (Patient not taking: Reported on 5/25/2023), Disp: 100 each, Rfl: 1  •  traMADol (Ultram) 50 mg tablet, Take 1 tablet (50 mg total) by mouth every 6 (six) hours as needed for moderate pain (Patient not taking: Reported on 6/14/2023), Disp: 10 tablet, Rfl: 0    Current Facility-Administered Medications:   •  prochlorperazine (COMPAZINE) injection 5 mg, 5 mg, Intramuscular, Q4H PRN, Jeffy Louie MD, 5 mg at 11/11/22 1040    Review of Systems   Constitutional: Negative for chills and fever. HENT: Negative for ear pain and sore throat. Eyes: Negative for pain and visual disturbance. Respiratory: Negative for cough and shortness of breath. Cardiovascular: Negative for chest pain and palpitations. Gastrointestinal: Negative for abdominal pain and vomiting. Genitourinary: Negative for dysuria and hematuria. Musculoskeletal: Negative for arthralgias and back pain. Skin: Negative for color change and rash. Neurological: Negative for seizures and syncope. All other systems reviewed and are negative. Physical Exam:  Body mass index is 48.26 kg/m².   /78 (BP Location: Right arm, Patient Position: Sitting, Cuff Size: Adult)   Pulse 72   Ht 5' 5" (1.651 m)   Wt 132 kg (290 lb)   LMP  (LMP Unknown)   SpO2 97%   BMI 48.26 kg/m²    Wt Readings from Last 3 Encounters:   09/29/23 132 kg (290 lb)   06/14/23 132 kg (292 lb)   06/14/23 132 kg (292 lb)       Physical Exam  Vitals reviewed. Constitutional:       Appearance: Normal appearance. HENT:      Head: Normocephalic and atraumatic. Cardiovascular:      Rate and Rhythm: Normal rate. Heart sounds: Normal heart sounds. Pulmonary:      Effort: Pulmonary effort is normal.      Breath sounds: Normal breath sounds. Neurological:      Mental Status: She is alert and oriented to person, place, and time. Psychiatric:         Mood and Affect: Mood normal.         Behavior: Behavior normal.       Diabetic Foot Exam    Labs:   Lab Results   Component Value Date    HGBA1C 7.8 (H) 09/27/2023    HGBA1C 10.3 (A) 02/22/2023    HGBA1C 8.6 (H) 10/31/2022     Lab Results   Component Value Date    CREATININE 0.76 09/27/2023    CREATININE 0.73 03/25/2023    CREATININE 0.81 02/22/2023    BUN 25 09/27/2023    K 4.0 09/27/2023     09/27/2023    CO2 28 09/27/2023     eGFR   Date Value Ref Range Status   09/27/2023 84 ml/min/1.73sq m Final   09/26/2016 >60.0 ml/min/1.73sq m Final     Lab Results   Component Value Date    HDL 42 (L) 09/01/2022    TRIG 186 (H) 09/01/2022     Lab Results   Component Value Date    ALT 14 09/27/2023    AST 11 (L) 09/27/2023    ALKPHOS 90 09/27/2023     Lab Results   Component Value Date    [id number] 2.730 09/27/2023    BUK1XJEEFFWZ 2.030 10/31/2022    [id number] 2.210 06/29/2022     Lab Results   Component Value Date    FREET4 0.84 09/27/2023         There are no Patient Instructions on file for this visit. Discussed with the patient and all questioned fully answered. She will call me if any problems arise.

## 2023-09-29 NOTE — ASSESSMENT & PLAN NOTE
Remains poorly controlled however CGM report shows she is 71% in range past 2 weeks  - Increase Tresiba to 20 from 15 and check hgba1c in 3 months  - Rx to upgrade to [address] sent    Lab Results   Component Value Date    HGBA1C 7.8 (H) 09/27/2023

## 2023-09-29 NOTE — ASSESSMENT & PLAN NOTE
Scheduled for surgery in Oct with Dr. Angie Burns. Request patient be seen in office with lab work prior 1-2 weeks following surgery. Will check CMP and PTH.  Orders given     - continue with sensipar until then

## 2023-09-30 DIAGNOSIS — E78.5 HYPERLIPIDEMIA, UNSPECIFIED HYPERLIPIDEMIA TYPE: ICD-10-CM

## 2023-10-02 RX ORDER — ATORVASTATIN CALCIUM 40 MG/1
40 TABLET, FILM COATED ORAL EVERY EVENING
Qty: 90 TABLET | Refills: 0 | Status: SHIPPED | OUTPATIENT
Start: 2023-10-02

## 2023-10-03 LAB
DME PARACHUTE DELIVERY DATE ACTUAL: NORMAL
DME PARACHUTE DELIVERY DATE EXPECTED: NORMAL
DME PARACHUTE DELIVERY DATE REQUESTED: NORMAL
DME PARACHUTE ITEM DESCRIPTION: NORMAL
DME PARACHUTE ITEM DESCRIPTION: NORMAL
DME PARACHUTE ORDER STATUS: NORMAL
DME PARACHUTE SUPPLIER NAME: NORMAL
DME PARACHUTE SUPPLIER PHONE: NORMAL

## 2023-10-05 ENCOUNTER — HOSPITAL ENCOUNTER (OUTPATIENT)
Facility: MEDICAL CENTER | Age: 63
Discharge: HOME | End: 2023-10-05
Payer: COMMERCIAL

## 2023-10-05 DIAGNOSIS — G35 MS (MULTIPLE SCLEROSIS) (HCC): ICD-10-CM

## 2023-10-05 PROCEDURE — G1004 CDSM NDSC: HCPCS

## 2023-10-05 PROCEDURE — 70551 MRI BRAIN STEM W/O DYE: CPT

## 2023-10-06 ENCOUNTER — OFFICE VISIT (OUTPATIENT)
Dept: CARDIOLOGY CLINIC | Facility: CLINIC | Age: 63
End: 2023-10-06
Payer: COMMERCIAL

## 2023-10-06 VITALS
HEART RATE: 62 BPM | BODY MASS INDEX: 47.98 KG/M2 | HEIGHT: 65 IN | DIASTOLIC BLOOD PRESSURE: 80 MMHG | SYSTOLIC BLOOD PRESSURE: 140 MMHG | WEIGHT: 288 LBS

## 2023-10-06 DIAGNOSIS — I10 BENIGN ESSENTIAL HYPERTENSION: ICD-10-CM

## 2023-10-06 DIAGNOSIS — E66.01 CLASS 3 SEVERE OBESITY DUE TO EXCESS CALORIES WITH SERIOUS COMORBIDITY AND BODY MASS INDEX (BMI) OF 45.0 TO 49.9 IN ADULT (HCC): ICD-10-CM

## 2023-10-06 DIAGNOSIS — E66.01 MORBID OBESITY WITH BMI OF 45.0-49.9, ADULT (HCC): Chronic | ICD-10-CM

## 2023-10-06 DIAGNOSIS — E11.65 TYPE 2 DIABETES MELLITUS WITH HYPERGLYCEMIA, WITH LONG-TERM CURRENT USE OF INSULIN (HCC): ICD-10-CM

## 2023-10-06 DIAGNOSIS — Z79.4 TYPE 2 DIABETES MELLITUS WITH HYPERGLYCEMIA, WITH LONG-TERM CURRENT USE OF INSULIN (HCC): ICD-10-CM

## 2023-10-06 DIAGNOSIS — Z01.810 PRE-OPERATIVE CARDIOVASCULAR EXAMINATION: Primary | ICD-10-CM

## 2023-10-06 DIAGNOSIS — E21.3 HYPERPARATHYROIDISM (HCC): ICD-10-CM

## 2023-10-06 PROCEDURE — 93000 ELECTROCARDIOGRAM COMPLETE: CPT

## 2023-10-06 PROCEDURE — 99214 OFFICE O/P EST MOD 30 MIN: CPT

## 2023-10-06 NOTE — PROGRESS NOTES
Cardiology   MD Maynor Barrios MD, Rajeev Conti DO, ELEUTERIO Ibrahim MD Verneta Risen, DO, Asha Serrano DO, [address] [facility]  -------------------------------------------------------------------  [facility]  [address] [address] [address]  Phone: [phone]  Fax: [phone]  10/06/23  Marjan Lara  YOB: 1960   MRN: [id number]      Referring Physician: Jacky Barlow DO  [address]  [address]     HPI: Marjan Lara is a 59 y.o. female with:   HTN - difficult to control  Morbid obesity  DM type II  Multiple sclerosis  Ambulatory dysfunction  Daily headaches  Hypercalcemia  Thyroid nodule  Hyperparathyroidism-will require left parathyroidectomy    She presents today for preoperative cardiac risk assessment prior to parathyroid surgery. In the past her blood pressure was difficult to control but more recently has been under very good control. She denies any cardiac complaints today. She is not on full dose anticoagulation for any reason. She denies any chest pain or shortness of breath    Review of Systems   Constitutional:  Negative for chills and fever. HENT:  Negative for facial swelling and sore throat. Eyes:  Negative for visual disturbance. Respiratory:  Negative for cough, chest tightness, shortness of breath and wheezing. Cardiovascular:  Negative for chest pain, palpitations and leg swelling. Gastrointestinal:  Negative for abdominal pain, blood in stool, constipation, diarrhea, nausea and vomiting. Endocrine: Negative for cold intolerance and heat intolerance. Genitourinary:  Negative for decreased urine volume, difficulty urinating, dysuria and hematuria. Musculoskeletal:  Negative for arthralgias, back pain and myalgias. Skin:  Negative for rash. Neurological:  Negative for dizziness, syncope, weakness and numbness.    Psychiatric/Behavioral:  Negative for agitation, behavioral problems and confusion. The patient is not nervous/anxious. OBJECTIVE  Vitals:    10/06/23 1145   BP: 140/80   Pulse: 62       Physical Exam  Constitutional: awake, alert and oriented, in no acute distress, no obvious deformities, obese female  Head: Normocephalic, without obvious abnormality, atraumatic  Eyes: conjunctivae clear and moist. Sclera anicteric. No xanthelasmas. Pupils equal bilaterally. Extraocular motions are full. Ear nose mouth and throat: ears are symmetrical bilaterally, hearing appears to be equal bilaterally, no nasal discharge or epistaxis, oropharynx is clear with moist mucous membranes  Neck: Trachea is midline, neck is supple, no thyromegaly or significant lymphadenopathy, there is full range of motion. Lungs: clear to auscultation bilaterally, no wheezes, no rales, no rhonchi, no accessory muscle use, breathing is nonlabored  Heart: regular rate and rhythm, S1, S2 normal, no murmur, no click, no rub and no gallop, no lower extremity edema  Abdomen: Obese, soft, non-tender; bowel sounds normal; no masses, no organomegaly  Psychiatric: Patient is oriented to time, place, person, mood/affect is negative for depression, anxiety, agitation, appears to have appropriate insight  Skin: Skin is warm, dry, intact. No obvious rashes or lesions on exposed extremities. Nail beds are pink with no cyanosis or clubbing.       EKG:  Results for orders placed or performed in visit on 10/06/23   POCT ECG    Impression    Sinus rhythm with LVH poor anterior progression likely lead placement        IMPRESSION:  Preoperative cardiac risk assessment  HTN  Morbid obesity  DM  Multiple sclerosis  Ambulatory dysfunction  Daily headaches  Hypercalcemia  Thyroid nodule  Hyperparathyroidism-will require left parathyroidectomy    DISCUSSION/RECOMMENDATIONS:  She presents today for evaluation with cardiology for preoperative risk assessment  Her blood pressures have been under good control  She denies any specific cardiac complaints but does note some panic attacks when she needs an MRI. Her electrocardiogram today shows sinus rhythm with some voltage criteria for LVH but no other significant abnormality. Heart rate is 62 bpm today. She is going to need a left parathyroidectomy due to hyperparathyroidism which is causing hypercalcemia. From a heart standpoint she had an echo in  that shows EF 60% with mild LVH and with out significant valvular heart disease  Considering her comorbidities as above she is likely low to possibly moderate risk, but not a prohibitive risk for surgery. May proceed as planned without further testing at this time    Dawna Paz DO, [facility]  --------------------------------------------------------------------------------  TREADMILL STRESS  No results found for this or any previous visit.     ----------------------------------------------------------------------------------------------  NUCLEAR STRESS TEST: No results found for this or any previous visit.     Results for orders placed during the hospital encounter of 20    NM Myocardial Perfusion Spect (Pharmacological Induced Stress and/or Rest)    [address]  [phone]    Stress Gated SPECT Myocardial Perfusion Imaging after Regadenoson    Patient: Christina Dixon  MR number: [id number]  Account number: [de-identified]  : 1960  Age: 63 years  Gender: Female  Status: Outpatient  Location: Stress lab  Height: 67 in  Weight: 282 lb  BP: 138/ 96 mmHg    Allergies: ZOLPIDEM TARTRATE, MEPERIDINE, INSULIN, INSULIN GLARGINE, LATEX, OXYCODONE-ACETAMINOPHEN, ZOLPIDEM    Diagnosis: R07.9 - Chest pain, unspecified    Interpreting Physician:  Red Black MD  Referring Physician:  Barrett Bermudez DO  Primary Physician:  Adamaris Sal  Technician:  Robyn Balderrama  RN:  Jessica Sepulveda RN  Group:  [facility]  Cardiology Fellow: Nanette William MD  Report Prepared by[de-identified]  Romana Pressman, RN    INDICATIONS: Detection of coronary artery disease. HISTORY: The patient is a 63year old  female. Chest pain status: recent onset chest pain. Other symptoms: palpitations. Coronary artery disease risk factors: hypertension, family history of premature coronary artery disease, and  diabetes mellitus. Cardiovascular history: stroke. Co-morbidity: obesity. Medications: a beta blocker, an ACE inhibitor/ARB, aspirin, a lipid lowering agent, and diabetic medications. PHYSICAL EXAM: Baseline physical exam screening: normal and no wheezes audible. REST ECG: Normal baseline ECG. PROCEDURE: The study was performed in the the Stress lab. A regadenoson infusion pharmacologic stress test was performed. Gated SPECT myocardial perfusion imaging was performed during stress. Systolic blood pressure was 138 mmHg, at the  start of the study. Diastolic blood pressure was 96 mmHg, at the start of the study. The heart rate was 73 bpm, at the start of the study. IV double checked. Regadenoson protocol:  HR bpm SBP mmHg DBP mmHg Symptoms  Baseline 73 138 96 none  Immediate 101 154 96 nausea  1 min 90 144 94 subsiding  2 min 89 130 90 none  No medications or fluids given. STRESS SUMMARY: Duration of pharmacologic stress was 3 min and 0 sec. Maximal heart rate during stress was 101 bpm. The heart rate response to stress was normal. There was normal resting blood pressure with an appropriate response to  stress. The rate-pressure product for the peak heart rate and blood pressure was 51446. There was no chest pain during stress. The stress test was terminated due to protocol completion. Pre oxygen saturation: 99 %. Peak oxygen saturation:  100 %. Arrhythmia during stress: isolated atrial premature beats. The stress ECG was non-diagnostic.     ISOTOPE ADMINISTRATION:  Resting isotope administration Stress isotope administration  Agent Tetrofosmin Tetrofosmin  Dose 15.19 mCi 49 mCi  Date 05/19/2020 05/19/2020  Injection time 09:10 10:30  Imaging time 10:00 11:20  Injection-image interval 50 min 50 min    The radiopharmaceutical was injected at the peak effect of pharmacologic stress. MYOCARDIAL PERFUSION IMAGING:  The image quality was poor. Rotating projection images reveal moderate breast attenuation. Left ventricular size was normal. The TID ratio was 0.73. PERFUSION DEFECTS:  -  There is significant breast attentuation, as well as left armpit fat fold attenuation as well as significant subdiaphragmatic activity attenuation. This limits study accuracy. No evidence of scar or ischemia. GATED SPECT:  The calculated left ventricular ejection fraction was 62 %. Left ventricular ejection fraction was within normal limits by visual estimate. There was no left ventricular regional abnormality. SUMMARY:  -  Stress results: There was no chest pain during stress. -  ECG conclusions: The stress ECG was non-diagnostic.  -  Perfusion imaging: There is significant breast attentuation, as well as left armpit fat fold attenuation as well as significant subdiaphragmatic activity attenuation. This limits study accuracy. No evidence of scar or ischemia.  -  Gated SPECT: The calculated left ventricular ejection fraction was 62 %. Left ventricular ejection fraction was within normal limits by visual estimate. There was no left ventricular regional abnormality. IMPRESSIONS: Probable normal study after pharmacologic stress without reproduction of symptoms. Perfusion imaging significantly limited by attenuation artifact as mentioned above.   Left ventricular systolic function was normal.    Prepared and signed by    Luis Mott MD  Signed 05/19/2020 17:05:54      --------------------------------------------------------------------------------  CATH:  No results found for this or any previous visit.    --------------------------------------------------------------------------------  ECHO:   Results for orders placed during the hospital encounter of 20    Echo complete with contrast if indicated    Narrative  [address] [place] [address]. [address]  [phone]    Transthoracic Echocardiogram  2D, M-mode, Doppler, and Color Doppler    Study date:  11-Mar-2020    Patient: Juanita De Leon  MR number: [id number]  Account number: [de-identified]  : 1960  Age: 63 years  Gender: Female  Status: Outpatient  Location: Bedside  Height: 67 in  Weight: 279.4 lb  BP: 196/ 86 mmHg    Indications: Transient cerebral ischemic attack (TIA). Diagnoses: G45.9 - Transient cerebral ischemic attack, unspecified    Sonographer:  Drake Aragon RDCS  Referring Physician:  eJsusita Oliva MD  Group:  [facility]  RN:  Janett France RN BSN  Interpreting Physician:  Sourav Mcguire D.O.    SUMMARY    PROCEDURE INFORMATION:  Image quality was adequate. Intravenous contrast ( agitated saline (9cc nss/ 1cc air) injected x3, once at rest, once with cough, once with Valsalva and abdominal compression) was administered to evaluate intracardiac shunting. LEFT VENTRICLE:  Systolic function was normal by visual assessment. Ejection fraction was estimated to be 60 %. There were no regional wall motion abnormalities. Wall thickness was mildly increased. There was very mild assymetrical hypertrophy. The septum measures 1.2cm. Doppler parameters were consistent with abnormal left ventricular relaxation (grade 1 diastolic dysfunction). ATRIAL SEPTUM:  No defect or patent foramen ovale was identified. MITRAL VALVE:  There was mild regurgitation. AORTIC VALVE:  The valve was trileaflet. Leaflets exhibited normal thickness, moderate calcification, normal cuspal separation, and good mobility.   The left coronary cusp demonstrated moderate calcification. There was no evidence for stenosis. PULMONIC VALVE:  There was mild regurgitation. AORTA:  The root exhibited mild dilatation to 3.5cm. SUMMARY MEASUREMENTS  2D measurements:  Unspecified Anatomy:   LAESV Index (A-L) was 17.5 ml/m2. LAESVInd MOD BP was 16.9 ml/m2. RAAs A4C was 12.4 cm2. RWT was 0.4 .  CW measurements:  Unspecified Anatomy:   PRend PG was 17.6 mmHg. PRend Vmax was 2.1 m/s. MM measurements:  Unspecified Anatomy:   TAPSE was 1.8 cm. PW measurements:  Unspecified Anatomy:   E' Avg was 0.1 m/s. E' Lat was 0.1 m/s. E' Sept was 0.1 m/s. E/E' Avg was 9.8 . E/E' Lat was 9.8 . E/E' Sept was 9.7 . MV A [place] was 0.9 m/s. MV Dec [place] was 3.2 m/s2. MV DecT was 211.9 ms.  MV E [place] was 0.7  m/s. MV E/A Ratio was 0.8 . HISTORY: PRIOR HISTORY: Diabetes mellitus type 2, hypertension, syncope, anxiety. PROCEDURE: The procedure was performed at the bedside. This was a routine study. The transthoracic approach was used. The study included complete 2D imaging, M-mode, complete spectral Doppler, and color Doppler. The heart rate was 89 bpm,  at the start of the study. Images were obtained from the parasternal, apical, subcostal, and suprasternal notch acoustic windows. Intravenous contrast ( agitated saline (9cc nss/ 1cc air) injected x3, once at rest, once with cough, once  with Valsalva and abdominal compression) was administered to evaluate intracardiac shunting. Image quality was adequate. LEFT VENTRICLE: Size was normal. Systolic function was normal by visual assessment. Ejection fraction was estimated to be 60 %. There were no regional wall motion abnormalities. Wall thickness was mildly increased. There was very mild  assymetrical hypertrophy. The septum measures 1.2cm. DOPPLER: Doppler parameters were consistent with abnormal left ventricular relaxation (grade 1 diastolic dysfunction).     RIGHT VENTRICLE: The size was normal. Systolic function was normal. Wall thickness was normal.    LEFT ATRIUM: Size was normal.    ATRIAL SEPTUM: No defect or patent foramen ovale was identified. RIGHT ATRIUM: Size was normal.    MITRAL VALVE: Valve structure was normal. There was normal leaflet separation. DOPPLER: The transmitral velocity was within the normal range. There was no evidence for stenosis. There was mild regurgitation. AORTIC VALVE: The valve was trileaflet. Leaflets exhibited normal thickness, moderate calcification, normal cuspal separation, and good mobility. The left coronary cusp demonstrated moderate calcification. DOPPLER: Transaortic velocity was  within the normal range. There was no evidence for stenosis. There was no regurgitation. TRICUSPID VALVE: Not well visualized. There was normal leaflet separation. DOPPLER: The transtricuspid velocity was within the normal range. There was no evidence for stenosis. There was no significant regurgitation. PULMONIC VALVE: Leaflets exhibited normal thickness, no calcification, and normal cuspal separation. DOPPLER: The transpulmonic velocity was within the normal range. There was no evidence for stenosis. There was mild regurgitation. PERICARDIUM: There was no pericardial effusion. The pericardium was normal in appearance. AORTA: The root exhibited mild dilatation to 3.5cm.     SYSTEM MEASUREMENT TABLES    2D  %FS: 31.7 %  Ao Diam: 3.5 cm  EDV(Teich): 97.1 ml  EF(Teich): 59.7 %  ESV(Teich): 39.1 ml  HR_4Ch_Q: 76.9 BPM  IVSd: 1.2 cm  LA Diam: 3.8 cm  LAAs A2C: 12.8 cm2  LAAs A4C: 16.3 cm2  LAESV A-L A2C: 32.2 ml  LAESV A-L A4C: 48.3 ml  LAESV Index (A-L): 17.5 ml/m2  LAESV MOD A2C: 30.8 ml  LAESV MOD A4C: 46.9 ml  LAESV(A-L): 40.9 ml  LAESV(MOD BP): 39.4 ml  LAESVInd MOD BP: 16.9 ml/m2  LALs A2C: 4.3 cm  LALs A4C: 4.7 cm  LVCO_4Ch_Q: 3.3 L/min  LVEDV MOD A4C: 88.8 ml  LVEF MOD A4C: 65.1 %  LVEF_4Ch_Q: 60.6 %  LVESV MOD A4C: 31 ml  LVIDd: 4.6 cm  LVIDs: 3.1 cm  LVLd A4C: 8.8 cm  LVLd_4Ch_Q: 8.7 cm  LVLs A4C: 7.2 cm  LVLs_4Ch_Q: 7.3 cm  LVPWd: 1 cm  LVSV_4Ch_Q: 42.3 ml  LVVED_4Ch_Q: 69.8 ml  LVVES_4Ch_Q: 27.5 ml  RAAs A4C: 12.4 cm2  RAESV A-L: 30.4 ml  RAESV MOD: 29.7 ml  RALs: 4.3 cm  RVIDd: 2.7 cm  RWT: 0.4  SV MOD A4C: 57.8 ml  SV(Teich): 58 ml    CW  PRend P.6 mmHg  PRend Vmax: 2.1 m/s    MM  TAPSE: 1.8 cm    PW  E' Av.1 m/s  E' Lat: 0.1 m/s  E' Sept: 0.1 m/s  E/E' Av.8  E/E' Lat: 9.8  E/E' Sept: 9.7  MV A [place]: 0.9 m/s  MV Dec [place]: 3.2 m/s2  MV DecT: 211.9 ms  MV E [place]: 0.7 m/s  MV E/A Ratio: 0.8    [facility]    Prepared and electronically signed by    Damian Johnson D.O. Signed 11-Mar-2020 15:09:28    No results found for this or any previous visit.    --------------------------------------------------------------------------------  Viridiana Corral  Results for orders placed during the hospital encounter of 17    Holter monitor - 24 hour    Narrative  PT NAME: Yumiko Payan  : 1960  AGE: 55 y.o. GENDER: female  MRN: [id number]   PROCEDURE: Holter monitor - 24 hour        INDICATIONS: Palpitations      FINDINGS:    Holter monitoring revealed predominant sinus rhythm with an average heart rate of 90 BPM, a minimum heart rate of 66 BPM, and a maximum heart rate of 124 BPM.    There were no ventricular ectopic beats, or any evidence of ventricular tachycardia. There were about 47 supraventricular ectopic beats, all occurring as single PAC's with no evidence of supraventricular tachycardia, atrial fibrillation, or atrial flutter. There was no evidence of significant bradyarrhythmia or advanced heart block. The longest R-R interval was 1.1 seconds.     The patient's symptom diary reported 3 incidences of "shirt L side jumping" which correlated with sinus rhythm between 78 to 105 BPM.    No results found for this or any previous visit.    --------------------------------------------------------------------------------  CAROTIDS  No results found for this or any previous visit.     --------------------------------------------------------------------------------  Diagnoses and all orders for this visit:    Pre-operative cardiovascular examination  -     POCT ECG    Morbid obesity with BMI of 45.0-49.9, adult ([address])    Benign essential hypertension    Hyperparathyroidism ([address])    Type 2 diabetes mellitus with hyperglycemia, with long-term current use of insulin ([facility])    Class 3 severe obesity due to excess calories with serious comorbidity and body mass index (BMI) of 45.0 to 49.9 in adult [facility])       ======================================================    Past Medical History:   Diagnosis Date   • Diabetes mellitus ([address])    • External hemorrhoids     last assessed 16, resolved 16   • Hernia, ventral     last assessed 12/14/15, resolved 16   • Hypertension    • Incarcerated incisional hernia     last assessed 12/21/15, resolved 16   • Insomnia     last assessed 16, resolved 10/9/17   • Lyme disease    • Morbid obesity with BMI of 45.0-49.9, adult ([address]) 5/3/2017   • MS (multiple sclerosis) ([address])    • Stroke (cerebrum) ([address]) 2020   • Type 2 diabetes mellitus with hyperglycemia, without long-term current use of insulin ([address])      Past Surgical History:   Procedure Laterality Date   • ABCESS DRAINAGE     •  SECTION      x3   • COLONOSCOPY N/A 2017    Procedure: COLONOSCOPY with biopsy;  Surgeon: Bill Crump DO;  Location: [facility];   Service: Complete   • HYSTERECTOMY     • IR BIOPSY ABDOMEN  2021   • IR LUMBAR PUNCTURE  3/13/2020   • US GUIDED THYROID BIOPSY  2021         Medications  Current Outpatient Medications   Medication Sig Dispense Refill   • Alcohol Swabs PADS by Does not apply route 4 (four) times a day *Latex Free* 120 each 3   • aspirin 81 mg chewable tablet Chew 1 tablet (81 mg total) daily 90 tablet 1   • atorvastatin (LIPITOR) 40 mg tablet TAKE 1 TABLET BY MOUTH ONCE DAILY IN THE EVENING 90 tablet 0   • Blood Glucose Monitoring Suppl (OneTouch Verio) w/Device KIT Use in the morning 1 kit 0   • Blood Pressure KIT by Does not apply route daily 1 each 0   • Cholecalciferol (Vitamin D3) 50 MCG (2000 UT) capsule Take 1 capsule (2,000 Units total) by mouth daily 30 capsule 4   • cinacalcet (SENSIPAR) 60 MG tablet Take 1 tablet (60 mg total) by mouth daily 30 tablet 6   • Continuous Blood Gluc Sensor (Dexcom G6 Sensor) MISC USE AS DIRECTED 9 each 0   • Continuous Blood Gluc Transmit (Dexcom G6 Transmitter) MISC Change Transmitter Every 90 days as directed 1 each 0   • diazepam (VALIUM) 5 mg tablet Take 1 tablet (5 mg total) by mouth 60 minutes pre-procedure 2 tablet 0   • diphenhydrAMINE (BENADRYL) 25 mg tablet Take 0.5 tablets (12.5 mg total) by mouth every 6 (six) hours 20 tablet 0   • glucose blood (OneTouch Verio) test strip Use 1 each in the morning 100 each 1   • hydrochlorothiazide (HYDRODIURIL) 50 mg tablet Take 1 tablet (50 mg total) by mouth daily 90 tablet 3   • Incontinence Supply Disposable (Prevail Women Underwear XL) MISC Use every 6 (six) hours as needed (incontinence) 120 each 11   • insulin degludec (Tresiba FlexTouch) 100 units/mL injection pen Inject 20 Units under the skin daily 15 mL 2   • Insulin Pen Needle (BD Pen Needle Izzy U/F) 32G X 4 MM MISC Use in the morning 100 each 1   • Interferon Beta-1b (Betaseron) 0.3 MG KIT Inject 1 ml (0.25 mg) subcutaneously every other day 14 kit 11   • Lancets (OneTouch Delica Plus [id number]) MISC Test BG up to 1x daily as directed 100 each 1   • lisinopril (ZESTRIL) 40 mg tablet Take 1 tablet (40 mg total) by mouth daily 90 tablet 0   • metFORMIN (GLUCOPHAGE-XR) 500 mg 24 hr tablet TAKE 2 TABLETS BY MOUTH TWICE DAILY WITH MEALS 360 tablet 0   • metoprolol succinate (TOPROL-XL) 50 mg 24 hr tablet Take 1 tablet (50 mg total) by mouth daily 90 tablet 3   • naloxone (NARCAN) 4 mg/0.1 mL nasal spray Administer 1 spray into a nostril.  If no response after 2-3 minutes, give another dose in the other nostril using a new spray. 1 each 1   • ondansetron (ZOFRAN-ODT) 4 mg disintegrating tablet Take 1 tablet (4 mg total) by mouth every 6 (six) hours as needed for nausea or vomiting 20 tablet 0   • vitamin B-12 (VITAMIN B-12) 500 mcg tablet Take 2 tablets (1,000 mcg total) by mouth daily 60 tablet 5   • diclofenac (VOLTAREN) 75 mg EC tablet Take 1 tablet (75 mg total) by mouth 2 (two) times a day (Patient not taking: Reported on 6/7/2023) 60 tablet 1   • Diclofenac Sodium (VOLTAREN) 1 % Apply 2 g topically 4 (four) times a day (Patient not taking: Reported on 6/7/2023) 150 g 1   • DULoxetine (CYMBALTA) 30 mg delayed release capsule Take 1 capsule (30 mg total) by mouth in the morning.  (Patient not taking: Reported on 6/14/2023) 30 capsule 1   • hydrALAZINE (APRESOLINE) 10 mg tablet Take 1 tablet (10 mg total) by mouth 3 (three) times a day (Patient not taking: Reported on 6/14/2023) 90 tablet 0   • pregabalin (LYRICA) 75 mg capsule Take 1 capsule (75 mg total) by mouth 2 (two) times a day (Patient not taking: Reported on 10/6/2023) 60 capsule 2   • traMADol (Ultram) 50 mg tablet Take 1 tablet (50 mg total) by mouth every 6 (six) hours as needed for moderate pain (Patient not taking: Reported on 6/14/2023) 10 tablet 0     Current Facility-Administered Medications   Medication Dose Route Frequency Provider Last Rate Last Admin   • prochlorperazine (COMPAZINE) injection 5 mg  5 mg Intramuscular Q4H PRN Eloisa Almazan MD   5 mg at 11/11/22 1040        Allergies   Allergen Reactions   • Sorbitan Diarrhea, Vomiting and Abdominal Pain   • Victoza [Liraglutide] Nausea Only   • Ambien [Zolpidem Tartrate]    • Demerol [Meperidine]    • Insulin Glargine Other (See Comments)     Annotation - [id number]: memory loss   • Latex    • Oxycodone Hives, Rash and Vomiting       Social History     Socioeconomic History   • Marital status: /Civil Union     Spouse name: Not on file   • Number of children: Not on file   • Years of education: Not on file   • Highest education level: Not on file   Occupational History     Employer: [employer]   Tobacco Use   • Smoking status: Never   • Smokeless tobacco: Never   Vaping Use   • Vaping Use: Never used   Substance and Sexual Activity   • Alcohol use: Not Currently     Comment: Social   • Drug use: Yes     Types: Marijuana     Comment: medical marijuana   • Sexual activity: Not Currently     Partners: Male     Birth control/protection: None   Other Topics Concern   • Not on file   Social History Narrative   • Not on file     Social Determinants of Health     Financial Resource Strain: High Risk (5/24/2023)    Overall Financial Resource Strain (CARDIA)    • Difficulty of Paying Living Expenses: Hard   Food Insecurity: Unknown (4/12/2021)    Hunger Vital Sign    • Worried About Running Out of Food in the Last Year: Never true    • Ran Out of Food in the Last Year: Not on file   Transportation Needs: Unmet Transportation Needs (5/24/2023)    PRAPARE - Transportation    • Lack of Transportation (Medical):  Yes    • Lack of Transportation (Non-Medical): Yes   Physical Activity: Inactive (7/30/2020)    Exercise Vital Sign    • Days of Exercise per Week: 0 days    • Minutes of Exercise per Session: 0 min   Stress: Stress Concern Present (7/30/2020)    [facility]    • Feeling of Stress : Rather much   Social Connections: Unknown (7/30/2020)    Social Connection and Isolation Panel [NHANES]    • Frequency of Communication with Friends and Family: More than three times a week    • Frequency of Social Gatherings with Friends and Family: Not on file    • Attends Yazidism Services: Not on file    • Active Member of Clubs or Organizations: Not on file    • Attends Club or Organization Meetings: Not on file    • Marital Status:    Intimate Partner Violence: Not At Risk (7/30/2020)    Humiliation, Afraid, Rape, and Kick questionnaire    • Fear of Current or Ex-Partner: No    • Emotionally Abused: No    • Physically Abused: No    • Sexually Abused: No   Housing Stability: Not on file        Family History   Problem Relation Age of Onset   • Glaucoma Mother    • Diabetes Father    • Hypertension Father    • Colon cancer Father    • Alzheimer's disease Father    • Cervical cancer Sister 61   • Breast cancer Sister 50   • Breast cancer Sister 45   • Breast cancer Maternal Aunt    • Breast cancer Maternal Aunt    • Breast cancer Maternal Aunt    • Breast cancer Maternal Grandmother    • Coronary artery disease Family    • Diabetes Family    • Hypertension Family        Lab Results   Component Value Date    WBC 15.64 (H) 03/25/2023    HGB 12.0 03/25/2023    HCT 36.6 03/25/2023    MCV 87 03/25/2023     03/25/2023      Lab Results   Component Value Date    SODIUM 139 09/27/2023    K 4.0 09/27/2023     09/27/2023    CO2 28 09/27/2023    BUN 25 09/27/2023    CREATININE 0.76 09/27/2023    GLUC 306 (H) 03/25/2023    CALCIUM 10.5 (H) 09/27/2023      Lab Results   Component Value Date    HGBA1C 7.8 (H) 09/27/2023      No results found for: "CHOL"  Lab Results   Component Value Date    HDL 42 (L) 09/01/2022    HDL 42 08/10/2021    HDL 44 04/28/2021     Lab Results   Component Value Date    LDLCALC 56 09/01/2022    LDLCALC 59 08/10/2021    LDLCALC 52 04/28/2021     Lab Results   Component Value Date    TRIG 186 (H) 09/01/2022    TRIG 113 08/10/2021    TRIG 60 04/28/2021     No results found for: "CHOLHDL"   Lab Results   Component Value Date    INR 0.92 05/16/2020    INR 0.96 03/10/2020    INR 0.98 03/09/2020    PROTIME 12.5 05/16/2020    PROTIME 12.9 03/10/2020    PROTIME 13.1 03/09/2020          Patient Active Problem List    Diagnosis Date Noted   • Numbness and tingling of right arm and leg 03/09/2020   • Ambulatory dysfunction 03/09/2020   • Falls frequently 02/09/2020   • Morbid obesity with BMI of 45.0-49.9, adult ([address]) 05/03/2017   • Ischemic colitis ([address]) 05/03/2017   • Unintentional weight loss 05/03/2017   • Hypercalcemia 05/03/2017   • Hyperparathyroidism ([address]) 05/03/2017   • GI bleed 04/27/2017   • Hernia, ventral    • Type 2 diabetes mellitus with hyperglycemia, with long-term current use of insulin ([address])    • Depression with anxiety 12/08/2016   • Vitamin D deficiency 12/14/2015   • Benign essential hypertension 06/06/2012   • Peripheral neuropathy 06/14/2023   • Lumbar radiculopathy 05/05/2023   • Primary osteoarthritis of right hip    • Chronic post-traumatic headache, not intractable 11/23/2022   • New daily persistent headache 11/11/2022   • Medication overuse headache 11/11/2022   • Visual disturbances 11/11/2022   • Nausea & vomiting 09/01/2022   • Bloating 09/01/2022   • Positive for macroalbuminuria 07/15/2022   • Other chronic pain 07/15/2022   • Hip pain, right 06/30/2022   • Obstructive sleep apnea syndrome    • Hyperlipidemia 04/05/2021   • B12 deficiency 09/09/2020   • Thyroid nodule 09/01/2020   • Chronic daily headache 08/31/2020   • Class 3 severe obesity due to excess calories with serious comorbidity and body mass index (BMI) of 45.0 to 49.9 in adult [facility]) 08/31/2020   • MS (multiple sclerosis) ([address]) 07/07/2020   • Cognitive decline 07/07/2020   • Right hemiparesis ([address]) 07/07/2020       Portions of the record may have been created with voice recognition software. Occasional wrong word or "sound a like" substitutions may have occurred due to the inherent limitations of voice recognition software. Read the chart carefully and recognize, using context, where substitutions have occurred.     Luis Grewal DO, [id number] [facility]  10/6/2023 12:03 PM

## 2023-10-10 ENCOUNTER — OFFICE VISIT (OUTPATIENT)
Dept: FAMILY MEDICINE CLINIC | Facility: CLINIC | Age: 63
End: 2023-10-10
Payer: COMMERCIAL

## 2023-10-10 VITALS
HEIGHT: 65 IN | TEMPERATURE: 97.8 F | OXYGEN SATURATION: 97 % | DIASTOLIC BLOOD PRESSURE: 88 MMHG | BODY MASS INDEX: 48.72 KG/M2 | WEIGHT: 292.4 LBS | SYSTOLIC BLOOD PRESSURE: 140 MMHG | HEART RATE: 62 BPM

## 2023-10-10 DIAGNOSIS — E83.52 HYPERCALCEMIA: ICD-10-CM

## 2023-10-10 DIAGNOSIS — Z01.810 PREOP CARDIOVASCULAR EXAM: Primary | ICD-10-CM

## 2023-10-10 DIAGNOSIS — E21.3 HYPERPARATHYROIDISM (HCC): ICD-10-CM

## 2023-10-10 PROCEDURE — 99214 OFFICE O/P EST MOD 30 MIN: CPT | Performed by: FAMILY MEDICINE

## 2023-10-10 NOTE — PROGRESS NOTES
Assessment/Plan:   1. Preop cardiovascular exam/Hyperparathyroidism (HCC)/Hypercalcemia  Patient appears well today. Her functional capacity has been limited secondary to her previous CVA/hemiparesis as well as her MS. She does follow with cardiology regularly. She did have cardiac clearance completed last week. Reviewed her previous blood testing. Her A1c was stable at 7.8 for this surgical procedure. This type of procedure is considered intermediate risk. She is cleared with intermediate perioperative medical risk. No further testing needed today. There are no diagnoses linked to this encounter. Subjective:       Chief Complaint   Patient presents with   • Pre-op Exam     Parathyroid removal 10/24/23      Patient ID: Wes Wells is a 59 y.o. female. Wes Wells  59 y.o.  female    SURGEON:Dr. Jamie Glass: LEFT MINIMALLY INVASIVE PARATHYROIDECTOMY, POSSIBLE 4 GLAND EXPLORATION (Left: Neck)       MONITORING INTRAOPERATIVE PTH (PARATHYROID HORMONE) (Neck)    DATE OF SURGERY: 10/24/23    PRIOR ANESTHESIA:yes    COMPLICATION: no    BLEEDING PROBLEM: no    PERTINENT PMH: yes Type 2 DM, HTN, MS    EXERCISE CAPACITY:   CAN WALK 4 BLOCKS AND OR CLIMB 2 FLIGHTS: No,m due to [address]: Yes      TOBACCO: no     ETOH: no     ILLEGAL DRUGS: no        Review of Systems   Constitutional: Negative for activity change, chills, fatigue and fever. HENT: Negative for congestion, ear pain, sinus pressure and sore throat. Eyes: Negative for redness, itching and visual disturbance. Respiratory: Negative for cough and shortness of breath. Cardiovascular: Negative for chest pain and palpitations. Gastrointestinal: Negative for abdominal pain, diarrhea and nausea. Endocrine: Negative for cold intolerance and heat intolerance. Genitourinary: Negative for dysuria, flank pain and frequency.    Musculoskeletal: Negative for arthralgias, back pain, gait problem and myalgias. Skin: Negative for color change. Allergic/Immunologic: Negative for environmental allergies. Neurological: Negative for dizziness, numbness and headaches. Psychiatric/Behavioral: Negative for behavioral problems and sleep disturbance. The following portions of the patient's history were reviewed and updated as appropriate : past family history, past medical history, past social history and past surgical history.     Current Outpatient Medications:   •  Alcohol Swabs PADS, by Does not apply route 4 (four) times a day *Latex Free*, Disp: 120 each, Rfl: 3  •  aspirin 81 mg chewable tablet, Chew 1 tablet (81 mg total) daily, Disp: 90 tablet, Rfl: 1  •  atorvastatin (LIPITOR) 40 mg tablet, TAKE 1 TABLET BY MOUTH ONCE DAILY IN THE EVENING, Disp: 90 tablet, Rfl: 0  •  Blood Glucose Monitoring Suppl (OneTouch Verio) w/Device KIT, Use in the morning, Disp: 1 kit, Rfl: 0  •  Blood Pressure KIT, by Does not apply route daily, Disp: 1 each, Rfl: 0  •  Cholecalciferol (Vitamin D3) 50 MCG (2000 UT) capsule, Take 1 capsule (2,000 Units total) by mouth daily, Disp: 30 capsule, Rfl: 4  •  cinacalcet (SENSIPAR) 60 MG tablet, Take 1 tablet (60 mg total) by mouth daily, Disp: 30 tablet, Rfl: 6  •  Continuous Blood Gluc Sensor (Dexcom G6 Sensor) MISC, USE AS DIRECTED, Disp: 9 each, Rfl: 0  •  Continuous Blood Gluc Transmit (Dexcom G6 Transmitter) MISC, Change Transmitter Every 90 days as directed, Disp: 1 each, Rfl: 0  •  diazepam (VALIUM) 5 mg tablet, Take 1 tablet (5 mg total) by mouth 60 minutes pre-procedure, Disp: 2 tablet, Rfl: 0  •  diclofenac (VOLTAREN) 75 mg EC tablet, Take 1 tablet (75 mg total) by mouth 2 (two) times a day, Disp: 60 tablet, Rfl: 1  •  diphenhydrAMINE (BENADRYL) 25 mg tablet, Take 0.5 tablets (12.5 mg total) by mouth every 6 (six) hours, Disp: 20 tablet, Rfl: 0  •  DULoxetine (CYMBALTA) 30 mg delayed release capsule, Take 1 capsule (30 mg total) by mouth in the morning., Disp: 30 capsule, Rfl: 1  •  glucose blood (OneTouch Verio) test strip, Use 1 each in the morning, Disp: 100 each, Rfl: 1  •  hydrALAZINE (APRESOLINE) 10 mg tablet, Take 1 tablet (10 mg total) by mouth 3 (three) times a day, Disp: 90 tablet, Rfl: 0  •  hydrochlorothiazide (HYDRODIURIL) 50 mg tablet, Take 1 tablet (50 mg total) by mouth daily, Disp: 90 tablet, Rfl: 3  •  Incontinence Supply Disposable (Prevail Women Underwear XL) MISC, Use every 6 (six) hours as needed (incontinence), Disp: 120 each, Rfl: 11  •  insulin degludec [employer] Civil FlexTouch) 100 units/mL injection pen, Inject 20 Units under the skin daily, Disp: 15 mL, Rfl: 2  •  Insulin Pen Needle (BD Pen Needle Izzy U/F) 32G X 4 MM MISC, Use in the morning, Disp: 100 each, Rfl: 1  •  Lancets (OneTouch Delica Plus [id number]) MISC, Test BG up to 1x daily as directed, Disp: 100 each, Rfl: 1  •  lisinopril (ZESTRIL) 40 mg tablet, Take 1 tablet (40 mg total) by mouth daily, Disp: 90 tablet, Rfl: 0  •  metFORMIN (GLUCOPHAGE-XR) 500 mg 24 hr tablet, TAKE 2 TABLETS BY MOUTH TWICE DAILY WITH MEALS, Disp: 360 tablet, Rfl: 0  •  metoprolol succinate (TOPROL-XL) 50 mg 24 hr tablet, Take 1 tablet (50 mg total) by mouth daily, Disp: 90 tablet, Rfl: 3  •  pregabalin (LYRICA) 75 mg capsule, Take 1 capsule (75 mg total) by mouth 2 (two) times a day, Disp: 60 capsule, Rfl: 2  •  vitamin B-12 (VITAMIN B-12) 500 mcg tablet, Take 2 tablets (1,000 mcg total) by mouth daily, Disp: 60 tablet, Rfl: 5  •  Diclofenac Sodium (VOLTAREN) 1 %, Apply 2 g topically 4 (four) times a day (Patient not taking: Reported on 6/7/2023), Disp: 150 g, Rfl: 1  •  Interferon Beta-1b (Betaseron) 0.3 MG KIT, Inject 1 ml (0.25 mg) subcutaneously every other day, Disp: 14 kit, Rfl: 11  •  naloxone (NARCAN) 4 mg/0.1 mL nasal spray, Administer 1 spray into a nostril. If no response after 2-3 minutes, give another dose in the other nostril using a new spray.  (Patient not taking: Reported on 10/10/2023), Disp: 1 each, Rfl: 1  •  ondansetron (ZOFRAN-ODT) 4 mg disintegrating tablet, Take 1 tablet (4 mg total) by mouth every 6 (six) hours as needed for nausea or vomiting (Patient not taking: Reported on 10/10/2023), Disp: 20 tablet, Rfl: 0  •  traMADol (Ultram) 50 mg tablet, Take 1 tablet (50 mg total) by mouth every 6 (six) hours as needed for moderate pain (Patient not taking: Reported on 6/14/2023), Disp: 10 tablet, Rfl: 0    Current Facility-Administered Medications:   •  prochlorperazine (COMPAZINE) injection 5 mg, 5 mg, Intramuscular, Q4H PRN, David Schmitt MD, 5 mg at 11/11/22 1040         Objective:         Vitals:    10/10/23 0935   BP: 140/88   BP Location: Left arm   Patient Position: Sitting   Cuff Size: Large   Pulse: 62   Temp: 97.8 °F (36.6 °C)   SpO2: 97%   Weight: 133 kg (292 lb 6.4 oz)   Height: 5' 5" (1.651 m)     Physical Exam  Vitals reviewed. Constitutional:       Appearance: She is well-developed. HENT:      Head: Normocephalic and atraumatic. Nose: Nose normal.      Mouth/Throat:      Pharynx: No oropharyngeal exudate. Eyes:      General: No scleral icterus. Right eye: No discharge. Left eye: No discharge. Pupils: Pupils are equal, round, and reactive to light. Neck:      Trachea: No tracheal deviation. Cardiovascular:      Rate and Rhythm: Normal rate and regular rhythm. Pulses:           Dorsalis pedis pulses are 2+ on the right side and 2+ on the left side. Posterior tibial pulses are 2+ on the right side and 2+ on the left side. Heart sounds: Normal heart sounds. No murmur heard. No friction rub. No gallop. Pulmonary:      Effort: Pulmonary effort is normal. No respiratory distress. Breath sounds: Normal breath sounds. No wheezing or rales. Abdominal:      General: Bowel sounds are normal. There is no distension. Palpations: Abdomen is soft. Tenderness: There is no abdominal tenderness.  There is no guarding or rebound. Musculoskeletal:         General: Normal range of motion. Cervical back: Normal range of motion and neck supple. Lymphadenopathy:      Head:      Right side of head: No submental or submandibular adenopathy. Left side of head: No submental or submandibular adenopathy. Cervical: No cervical adenopathy. Right cervical: No superficial, deep or posterior cervical adenopathy. Left cervical: No superficial, deep or posterior cervical adenopathy. Skin:     General: Skin is warm and dry. Findings: No erythema. Neurological:      Mental Status: She is alert and oriented to person, place, and time. Cranial Nerves: No cranial nerve deficit. Sensory: No sensory deficit. Psychiatric:         Mood and Affect: Mood is not anxious or depressed. Speech: Speech normal.         Behavior: Behavior normal.         Thought Content:  Thought content normal.         Judgment: Judgment normal.

## 2023-10-11 ENCOUNTER — OFFICE VISIT (OUTPATIENT)
Dept: PODIATRY | Facility: CLINIC | Age: 63
End: 2023-10-11
Payer: COMMERCIAL

## 2023-10-11 VITALS
HEIGHT: 65 IN | DIASTOLIC BLOOD PRESSURE: 80 MMHG | HEART RATE: 62 BPM | BODY MASS INDEX: 48.66 KG/M2 | RESPIRATION RATE: 18 BRPM | SYSTOLIC BLOOD PRESSURE: 140 MMHG

## 2023-10-11 DIAGNOSIS — E11.65 TYPE 2 DIABETES MELLITUS WITH HYPERGLYCEMIA, WITHOUT LONG-TERM CURRENT USE OF INSULIN (HCC): Primary | ICD-10-CM

## 2023-10-11 PROCEDURE — 99212 OFFICE O/P EST SF 10 MIN: CPT | Performed by: PODIATRIST

## 2023-10-11 NOTE — PROGRESS NOTES
Patient presents for palliative foot care. All toenails are elongated as they have not been addressed for the past 5 months. Patient notes that she is having parathyroid removal in the very near future. She desires nail care. On exam, pedal pulses are within normal limits. All toenails are elongated. Treatment consisted of nail trimming.

## 2023-10-13 ENCOUNTER — OFFICE VISIT (OUTPATIENT)
Dept: SURGICAL ONCOLOGY | Facility: CLINIC | Age: 63
End: 2023-10-13
Payer: COMMERCIAL

## 2023-10-13 ENCOUNTER — TELEPHONE (OUTPATIENT)
Dept: FAMILY MEDICINE CLINIC | Facility: CLINIC | Age: 63
End: 2023-10-13

## 2023-10-13 VITALS
TEMPERATURE: 97.2 F | HEIGHT: 65 IN | WEIGHT: 292 LBS | SYSTOLIC BLOOD PRESSURE: 142 MMHG | HEART RATE: 65 BPM | DIASTOLIC BLOOD PRESSURE: 80 MMHG | RESPIRATION RATE: 18 BRPM | BODY MASS INDEX: 48.65 KG/M2 | OXYGEN SATURATION: 98 %

## 2023-10-13 DIAGNOSIS — Z01.818 VISIT FOR PRE-OPERATIVE EXAMINATION: Primary | ICD-10-CM

## 2023-10-13 DIAGNOSIS — E83.52 HYPERCALCEMIA: ICD-10-CM

## 2023-10-13 DIAGNOSIS — E21.3 HYPERPARATHYROIDISM (HCC): ICD-10-CM

## 2023-10-13 DIAGNOSIS — G35 MULTIPLE SCLEROSIS (HCC): Primary | ICD-10-CM

## 2023-10-13 PROCEDURE — 99213 OFFICE O/P EST LOW 20 MIN: CPT

## 2023-10-13 NOTE — TELEPHONE ENCOUNTER
Steve Stovall from [facility] requesting Doctor to Doctor order for patient.   B: [phone]  F: [phone]

## 2023-10-13 NOTE — H&P (VIEW-ONLY)
Surgical Oncology Follow Up       [address] [facility] [place]  [facility] [address]    Sylvester Silver  1960  [id number]  [facility]COL[facility] [place] [id number]    Chief Complaint   Patient presents with    Follow-up       Assessment/Plan:  1. Visit for pre-operative examination  - needs chest xray    2. Hyperparathyroidism ([address])    3. Hypercalcemia       Discussion/Summary: Patient is a 59-year-old female presenting today for a preoperative visit for parathyroidectomy on 10/24/2023 with Dr. Cristo Durand. She initially presented in June of this year to the office with hyperparathyroidism and hypercalcemia. She had a sestamibi scan as well as an ultrasound done 2 years ago which were suggestive of a left-sided adenoma. Patient had an EKG performed which was within normal limits. She is still pending chest x-ray, I informed her that she must get this done prior to her surgery. She was cleared by cardiology for surgery. CBC was within normal limits. CMP showed hypercalcemia as expected. There were no concerns on her clinical exam.  Patient had no questions prior to surgery. She was instructed to call with any questions or concerns prior to that time. All questions were answered today. History of Present Illness:     Oncology History    No history exists.        -Interval History: Patient is a 59-year-old female presenting today for a preoperative visit for parathyroidectomy on 10/24/2023 with Dr. Cristo Durand. Patient had an EKG performed which was within normal limits. She is still pending chest x-ray, I informed her that she must get this done prior to her surgery. She was cleared by cardiology for surgery. CMP showed hypercalcemia as expected. Patient had flat affect. She was not very forthcoming with any changes regarding her mood, memory or concentration.  She notes fatigue. Review of Systems:  Review of Systems   Constitutional:  Positive for fatigue. Negative for activity change, appetite change and unexpected weight change. Respiratory:  Negative for cough and shortness of breath. Cardiovascular:  Negative for chest pain. Gastrointestinal:  Negative for abdominal pain, diarrhea, nausea and vomiting. Endocrine: Negative for heat intolerance. Musculoskeletal:  Positive for gait problem (walker). Negative for arthralgias, back pain and myalgias. Skin:  Negative for rash. Neurological:  Negative for weakness and headaches. Hematological:  Negative for adenopathy.        Patient Active Problem List   Diagnosis    Hernia, ventral    Type 2 diabetes mellitus with hyperglycemia, with long-term current use of insulin ([facility])    GI bleed    Morbid obesity with BMI of 45.0-49.9, adult ([address])    Ischemic colitis ([address])    Unintentional weight loss    Hypercalcemia    Hyperparathyroidism ([facility])    Vitamin D deficiency    Depression with anxiety    Benign essential hypertension    Falls frequently    Numbness and tingling of right arm and leg    Ambulatory dysfunction    MS (multiple sclerosis) ([facility])    Cognitive decline    Right hemiparesis ([facility])    Chronic daily headache    Class 3 severe obesity due to excess calories with serious comorbidity and body mass index (BMI) of 45.0 to 49.9 in adult [facility])    Thyroid nodule    B12 deficiency    Hyperlipidemia    Obstructive sleep apnea syndrome    Hip pain, right    Positive for macroalbuminuria    Other chronic pain    Nausea & vomiting    Bloating    New daily persistent headache    Medication overuse headache    Visual disturbances    Chronic post-traumatic headache, not intractable    Primary osteoarthritis of right hip    Lumbar radiculopathy    Peripheral neuropathy     Past Medical History:   Diagnosis Date    Diabetes mellitus ([address])     External hemorrhoids     last assessed 4/7/16, resolved 7/21/16    Hernia, ventral last assessed 12/14/15, resolved 16    Hypertension     Incarcerated incisional hernia     last assessed 12/21/15, resolved 16    Insomnia     last assessed 16, resolved 10/9/17    Lyme disease     Morbid obesity with BMI of 45.0-49.9, adult ([address]) 5/3/2017    MS (multiple sclerosis) ([address])     Stroke (cerebrum) ([address]) 2020    Type 2 diabetes mellitus with hyperglycemia, without long-term current use of insulin [facility])      Past Surgical History:   Procedure Laterality Date    ABCESS DRAINAGE       SECTION      x3    COLONOSCOPY N/A 2017    Procedure: COLONOSCOPY with biopsy;  Surgeon: Huyen Naranjo DO;  Location: [facility];   Service: Complete    HYSTERECTOMY      IR BIOPSY ABDOMEN  2021    IR LUMBAR PUNCTURE  3/13/2020    US GUIDED THYROID BIOPSY  2021     Family History   Problem Relation Age of Onset    Glaucoma Mother     Diabetes Father     Hypertension Father     Colon cancer Father     Alzheimer's disease Father     Cervical cancer Sister 61    Breast cancer Sister 50    Breast cancer Sister 45    Breast cancer Maternal Aunt     Breast cancer Maternal Aunt     Breast cancer Maternal Aunt     Breast cancer Maternal Grandmother     Coronary artery disease Family     Diabetes Family     Hypertension Family      Social History     Socioeconomic History    Marital status: /Civil Union     Spouse name: Not on file    Number of children: Not on file    Years of education: Not on file    Highest education level: Not on file   Occupational History     Employer: [employer]   Tobacco Use    Smoking status: Never    Smokeless tobacco: Never   Vaping Use    Vaping Use: Never used   Substance and Sexual Activity    Alcohol use: Not Currently     Comment: Social    Drug use: Yes     Types: Marijuana     Comment: medical marijuana    Sexual activity: Not Currently     Partners: Male     Birth control/protection: None   Other Topics Concern    Not on file   Social History Narrative    Not on file     Social Determinants of Health     Financial Resource Strain: High Risk (5/24/2023)    Overall Financial Resource Strain (CARDIA)     Difficulty of Paying Living Expenses: Hard   Food Insecurity: Unknown (4/12/2021)    Hunger Vital Sign     Worried About Running Out of Food in the Last Year: Never true     Ran Out of Food in the Last Year: Not on file   Transportation Needs: Unmet Transportation Needs (5/24/2023)    PRAPARE - Transportation     Lack of Transportation (Medical): Yes     Lack of Transportation (Non-Medical): Yes   Physical Activity: Inactive (7/30/2020)    Exercise Vital Sign     Days of Exercise per Week: 0 days     Minutes of Exercise per Session: 0 min   Stress: Stress Concern Present (7/30/2020)    [facility]     Feeling of Stress : Rather much   Social Connections: Unknown (7/30/2020)    Social Connection and Isolation Panel [NHANES]     Frequency of Communication with Friends and Family: More than three times a week     Frequency of Social Gatherings with Friends and Family: Not on file     Attends [employer] Services: Not on file     Active Member of [address] or Organizations: Not on file     Attends Club or Organization Meetings: Not on file     Marital Status:    Intimate Partner Violence: Not At Risk (7/30/2020)    Humiliation, Afraid, Rape, and Kick questionnaire     Fear of Current or Ex-Partner: No     Emotionally Abused: No     Physically Abused: No     Sexually Abused: No   Housing Stability: Not on file       Current Outpatient Medications:     Alcohol Swabs PADS, by Does not apply route 4 (four) times a day *Latex Free*, Disp: 120 each, Rfl: 3    aspirin 81 mg chewable tablet, Chew 1 tablet (81 mg total) daily, Disp: 90 tablet, Rfl: 1    atorvastatin (LIPITOR) 40 mg tablet, TAKE 1 TABLET BY MOUTH ONCE DAILY IN THE EVENING, Disp: 90 tablet, Rfl: 0    Blood Glucose Monitoring Suppl (OneTouch Verio) w/Device KIT, Use in the morning, Disp: 1 kit, Rfl: 0    Blood Pressure KIT, by Does not apply route daily, Disp: 1 each, Rfl: 0    Cholecalciferol (Vitamin D3) 50 MCG (2000 UT) capsule, Take 1 capsule (2,000 Units total) by mouth daily, Disp: 30 capsule, Rfl: 4    cinacalcet (SENSIPAR) 60 MG tablet, Take 1 tablet (60 mg total) by mouth daily, Disp: 30 tablet, Rfl: 6    Continuous Blood Gluc Sensor (Dexcom G6 Sensor) MISC, USE AS DIRECTED, Disp: 9 each, Rfl: 0    Continuous Blood Gluc Transmit (Dexcom G6 Transmitter) MISC, Change Transmitter Every 90 days as directed, Disp: 1 each, Rfl: 0    diazepam (VALIUM) 5 mg tablet, Take 1 tablet (5 mg total) by mouth 60 minutes pre-procedure, Disp: 2 tablet, Rfl: 0    diclofenac (VOLTAREN) 75 mg EC tablet, Take 1 tablet (75 mg total) by mouth 2 (two) times a day, Disp: 60 tablet, Rfl: 1    diphenhydrAMINE (BENADRYL) 25 mg tablet, Take 0.5 tablets (12.5 mg total) by mouth every 6 (six) hours, Disp: 20 tablet, Rfl: 0    DULoxetine (CYMBALTA) 30 mg delayed release capsule, Take 1 capsule (30 mg total) by mouth in the morning., Disp: 30 capsule, Rfl: 1    glucose blood (OneTouch Verio) test strip, Use 1 each in the morning, Disp: 100 each, Rfl: 1    hydrALAZINE (APRESOLINE) 10 mg tablet, Take 1 tablet (10 mg total) by mouth 3 (three) times a day, Disp: 90 tablet, Rfl: 0    hydrochlorothiazide (HYDRODIURIL) 50 mg tablet, Take 1 tablet (50 mg total) by mouth daily, Disp: 90 tablet, Rfl: 3    Incontinence Supply Disposable (Prevail Women Underwear XL) MISC, Use every 6 (six) hours as needed (incontinence), Disp: 120 each, Rfl: 11    insulin degludec (Tresiba FlexTouch) 100 units/mL injection pen, Inject 20 Units under the skin daily, Disp: 15 mL, Rfl: 2    Insulin Pen Needle (BD Pen Needle Izzy U/F) 32G X 4 MM MISC, Use in the morning, Disp: 100 each, Rfl: 1    Interferon Beta-1b (Betaseron) 0.3 MG KIT, Inject 1 ml (0.25 mg) subcutaneously every other day, Disp: 14 kit, Rfl: 11    Lancets (OneTouch Delica Plus [id number]) MISC, Test BG up to 1x daily as directed, Disp: 100 each, Rfl: 1    lisinopril (ZESTRIL) 40 mg tablet, Take 1 tablet (40 mg total) by mouth daily, Disp: 90 tablet, Rfl: 0    metFORMIN (GLUCOPHAGE-XR) 500 mg 24 hr tablet, TAKE 2 TABLETS BY MOUTH TWICE DAILY WITH MEALS, Disp: 360 tablet, Rfl: 0    metoprolol succinate (TOPROL-XL) 50 mg 24 hr tablet, Take 1 tablet (50 mg total) by mouth daily, Disp: 90 tablet, Rfl: 3    vitamin B-12 (VITAMIN B-12) 500 mcg tablet, Take 2 tablets (1,000 mcg total) by mouth daily, Disp: 60 tablet, Rfl: 5    Diclofenac Sodium (VOLTAREN) 1 %, Apply 2 g topically 4 (four) times a day (Patient not taking: Reported on 10/13/2023), Disp: 150 g, Rfl: 1    naloxone (NARCAN) 4 mg/0.1 mL nasal spray, Administer 1 spray into a nostril. If no response after 2-3 minutes, give another dose in the other nostril using a new spray.  (Patient not taking: Reported on 10/10/2023), Disp: 1 each, Rfl: 1    ondansetron (ZOFRAN-ODT) 4 mg disintegrating tablet, Take 1 tablet (4 mg total) by mouth every 6 (six) hours as needed for nausea or vomiting (Patient not taking: Reported on 10/10/2023), Disp: 20 tablet, Rfl: 0    pregabalin (LYRICA) 75 mg capsule, Take 1 capsule (75 mg total) by mouth 2 (two) times a day, Disp: 60 capsule, Rfl: 2    traMADol (Ultram) 50 mg tablet, Take 1 tablet (50 mg total) by mouth every 6 (six) hours as needed for moderate pain (Patient not taking: Reported on 6/14/2023), Disp: 10 tablet, Rfl: 0    Current Facility-Administered Medications:     prochlorperazine (COMPAZINE) injection 5 mg, 5 mg, Intramuscular, Q4H PRN, Arthkirsty Fulton MD, 5 mg at 11/11/22 1040  Allergies   Allergen Reactions    Sorbitan Diarrhea, Vomiting and Abdominal Pain    Victoza [Liraglutide] Nausea Only    Ambien [Zolpidem Tartrate]     Demerol [Meperidine]     Insulin Glargine Other (See Comments)     Annotation - [id number]: memory loss    Latex     Oxycodone Hives, Rash and Vomiting     Vitals:    10/13/23 0803   BP: 142/80   Pulse: 65   Resp: 18   Temp: (!) 97.2 °F (36.2 °C)   SpO2: 98%       Physical Exam  Constitutional:       General: She is not in acute distress. Appearance: Normal appearance. She is obese. Cardiovascular:      Rate and Rhythm: Normal rate and regular rhythm. Pulses: Normal pulses. Heart sounds: Normal heart sounds. Pulmonary:      Effort: Pulmonary effort is normal.      Breath sounds: Normal breath sounds. Comments: Diminished lung sounds  Chest:      Chest wall: No mass. Breasts:     Right: No swelling, bleeding, inverted nipple, mass, nipple discharge, skin change or tenderness. Left: No swelling, bleeding, inverted nipple, mass, nipple discharge, skin change or tenderness. Abdominal:      General: Abdomen is flat. Palpations: Abdomen is soft. Lymphadenopathy:      Upper Body:      Right upper body: No supraclavicular, axillary or pectoral adenopathy. Left upper body: No supraclavicular, axillary or pectoral adenopathy. Skin:     General: Skin is warm. Neurological:      General: No focal deficit present. Mental Status: She is alert and oriented to person, place, and time. Psychiatric:         Mood and Affect: Mood normal.         Behavior: Behavior normal.           Results:    Imaging  MRI brain MS wo contrast    Result Date: 10/10/2023  Narrative: MRI BRAIN WITHOUT CONTRAST INDICATION:  G35: Multiple sclerosis. Demyelinating disease. COMPARISON: 3/23/2023 TECHNIQUE:  Multiplanar, multisequence imaging of the brain was performed. IMAGE QUALITY:  Diagnostic. FINDINGS: BRAIN PARENCHYMA: There are multiple scattered areas of periventricular and juxtacortical white matter FLAIR signal hyperintensity consistent with the clinical history demyelinating disease. There is a focus of demyelinating disease within the right middle cerebellar peduncle, stable. No definite new lesions are identified. A few of the lesions demonstrate corresponding low T1 signal. Exam ordered without contrast. There is no discrete mass, mass effect or midline shift. There is no intracranial hemorrhage. There is no evidence of acute infarction. VENTRICLES:  Normal for the patient's age. SELLA AND PITUITARY GLAND:  Normal. ORBITS:  Normal. PARANASAL SINUSES:  Normal. VASCULATURE:  Evaluation of the major intracranial vasculature demonstrates appropriate flow voids. CALVARIUM AND SKULL BASE:  Normal. EXTRACRANIAL SOFT TISSUES:  Normal.     Impression: No significant interval change. Stable scattered supratentorial and infratentorial demyelinating disease. No definite new lesions identified. Workstation performed: [id number]       I reviewed the above imaging data. Advance Care Planning/Advance Directives:  Discussed disease status, cancer treatment plans and/or cancer treatment goals with the patient.

## 2023-10-16 ENCOUNTER — TELEPHONE (OUTPATIENT)
Dept: NEUROLOGY | Facility: CLINIC | Age: 63
End: 2023-10-16

## 2023-10-17 ENCOUNTER — TELEPHONE (OUTPATIENT)
Dept: SURGICAL ONCOLOGY | Facility: CLINIC | Age: 63
End: 2023-10-17

## 2023-10-17 NOTE — TELEPHONE ENCOUNTER
LM for patient asking for update and status of surgery next week, 10/24. Offered that we could switch her with someone a month or so out since her  is hospitalized at this time. Teams number given to call back.

## 2023-10-18 ENCOUNTER — OFFICE VISIT (OUTPATIENT)
Dept: NEUROLOGY | Facility: CLINIC | Age: 63
End: 2023-10-18
Payer: COMMERCIAL

## 2023-10-18 VITALS
WEIGHT: 292.2 LBS | HEIGHT: 65 IN | HEART RATE: 63 BPM | BODY MASS INDEX: 48.68 KG/M2 | TEMPERATURE: 97.2 F | DIASTOLIC BLOOD PRESSURE: 80 MMHG | SYSTOLIC BLOOD PRESSURE: 177 MMHG

## 2023-10-18 DIAGNOSIS — R26.2 AMBULATORY DYSFUNCTION: Chronic | ICD-10-CM

## 2023-10-18 DIAGNOSIS — G35 MS (MULTIPLE SCLEROSIS) (HCC): Primary | ICD-10-CM

## 2023-10-18 DIAGNOSIS — G62.9 PERIPHERAL POLYNEUROPATHY: ICD-10-CM

## 2023-10-18 PROCEDURE — 99214 OFFICE O/P EST MOD 30 MIN: CPT | Performed by: PHYSICIAN ASSISTANT

## 2023-10-18 NOTE — TELEPHONE ENCOUNTER
LM letting patient know her new sx date is 11/7. I asked her to call back to my teams number to review everything and reschedule post op appt.

## 2023-10-18 NOTE — TELEPHONE ENCOUNTER
Spoke to patient. Her  is still in the hospital and she agreed that rescheduling her surgery on 10/24 would probably be the best thing to do. I let her know that I would reach out with her new surgery date. She verbalized understanding.

## 2023-10-18 NOTE — PATIENT INSTRUCTIONS
Continue Betaseron  Repeat labs before next visit  Continue to follow with PCP and other specialists as indicated  Continue to use your walker to prevent falls  I will send notification to [facility] regarding your license and our recommendation that you not drive.   They will then send a letter to you regarding an ID card  Follow up in 4 months with Dr. Vashti Meckel

## 2023-10-18 NOTE — PROGRESS NOTES
Patient ID: Cecilia Campbell is a 59 y.o. female. Assessment/Plan:    MS (multiple sclerosis) ([address])  She remains clinically stable from a MS standpoint at this time. She continues on Betaseron. She did have updated MRI brain wo contrast for surveillance on 3/5/23. Compared to 2 years prior, there was a new 1.5cm left periventricular white matter lesion. She was then re-scanned with contrast on 3/23/23 and this lesion was not enhancing. MRI brain recently updated 10/5/2023 was stable. Would continue Betaseron. Labs have remained stable. Deferring vitamin D replacement/management to endocrinology in setting of hyperparathyroidism. She is scheduled for parathyroidectomy soon. Her son is with her today and patient asked about returning to driving. Our office has never formally restricted her driving privileges, but she has not been driving since early 7742 when new medical issues started. Her son voiced concern over her driving due to cognitive issues. Apparently she was even having issues prior to MS diagnosis, such as getting lost while driving. Discussed with patient that if she desires driving, I would absolutely advise FTD eval and on the road testing. However, after much discussion today, she agrees driving is not feasible at this time. She does not even have her own vehicle and has been managing without driving for 3.5 years now. She agreed to me sending in a reporting form to [facility] stating patient should not be driving. Her biggest concern is an ID card. Discussed that if [facility] revokes her license, she would get an ID card in its place. Her neurologic exam is stable. Peripheral neuropathy  Neuropathy is multifactorial, likely from both CNS and PNS causes. She has history of uncontrolled DM. We discussed the importance of controlling her DM to prevent progression of peripheral neuropathy. Proper foot care and hygiene discussed. She sees podiatry.   We discussed neuropathy likely contributes to her imbalance. She should continue to use her walker to prevent falls. Ambulatory dysfunction  Multifactorial from her MS, lumbar spine issues, peripheral neuropathy, ortho issues, deconditioning. Encouraged her to continue to use her walker to prevent falls. Follow up in 4 months or sooner if needed. Diagnoses and all orders for this visit:    MS (multiple sclerosis) ([address])  -     CBC and differential; Future  -     Comprehensive metabolic panel; Future    Peripheral polyneuropathy    Ambulatory dysfunction           Subjective:    HPI    Patient is a 64 yo female who presents to the [facility] for follow up regarding her MS. Patient with PMH of HTN, DM type 2, HLD, hyperparathyroidism. She was last seen in February 2023. Patient was initially seen in April 2020 following a hospitalization. Patient developed acute onset of right-sided numbness and weakness on March 9, 2020 in the morning before leaving for work. She was assessed by neurology while in the hospital, initial concern was for stroke. MRI C-spine WO contrast 3/10/2020: There is focal intramedullary T2 hyperintense signal epicentered at the C4 vertebral level. This is more pronounced in the right and central hemicord. The remaining mid to lower cervical cord is more difficult to assess due to the image degradation. There may be additional right hemicord signal abnormality at C5 and C6. MRI c-spine W contrast 3/11/2020: Postcontrast imaging demonstrates enhancement associated with multiple previously described foci of T2 prolongation within the cervical and upper thoracic CORD. MRI brain WO contrast 3/10/2020: There is no discrete mass, mass effect or midline shift. There is no intracranial hemorrhage. There is no evidence of acute infarction and diffusion imaging is unremarkable.  Multiple foci of T2 and FLAIR hyperintensity are present within the supratentorial white matter, most pronounced in the anterolateral left frontal lobe, anterior right temporal lobe and right cerebellum/middle cerebellar peduncle. MRI brain W contrast 3/11/2020: Redemonstration of multiple supratentorial and infratentorial scattered areas of white matter FLAIR signal hyperintensity, as described above. Many of the lesions demonstrate a perpendicular configuration of the ventricles and lesions are seen involving the callosal septal interface. Imaging appearance is most suggestive of demyelinating disease/multiple sclerosis with areas of active demyelination as correlated with dedicated MRI of the cervical spine. Concern for MS vs neoplasm vs sarcoid. CT chest abdomen pelvis was unremarkable for any solid tumor, although did demonstrate some calcified lymph nodes and granulomata in the lung fields. Patient had completed CSF analysis was for increased cells: CSF protein 80, CSF white blood cell 37, lymphocyte predominant, JCV and ACE negative, meningitis panel negative, MS panel high IgG index, 3 OCB, flow cytometry hypercellular. Serum NMO/MOG negative, ANCA/DNA/Sjogren's negative, ESR 32, CRP 9.1. She was given IV steroids and oral steroid taper. Patient had repeat imaging in June 2020 and there were several new, enhancing demyelinating lesions in the brain, as well as improvement in the c-spine noted. At timing of visit on 7/7/2020, diagnosis of MS was made. Betaseron was initiated, with first injection 7/24/2020. At first, she was having flu-like symptoms with the titration, but patient was advised to pre-med with NSAIDs/Tylenol and warm showers, and this improved her symptoms. She continued to have issues with the Betaseron. She called in mid October reporting ongoing issues, so Betaseron was held for 2-3 weeks. In November 2020, she was doing better with the Betaseron.      Updated imaging was done without contrast due to patient declined contrast (she tells me she was there for "too long" and was tired and wanted to go home)  MRI brain 3/5/2021:  Mild plaque burden, at least 2 of the previously seen enhancing lesions are less conspicuous on FLAIR imaging, indicative of at least partial treatment response. MRI c-spine 3/5/2021: a few, scattered cord lesions redemonstrated. The previously seen enhancing lesion at T1 demonstrates a small amount of myelomalacia. No progressive cord signal abnormality on noncontrast imaging. In March 2022, patient reported feeling generally worse, especially difficulty with stamina and endurance with ambulation. She denied new, focal symptoms, just a decline in general. She also admitted to not using her CPAP, feeling tired. MRI c-spine and t-spine were ordered due to worsening ambulatory dysfunction. She did not proceed with imaging for several months. MRI c-spine and t-spine updated 8/23/22 and both stable. At timing of Feb 2023 visit, patient reported she “continues to decline”, however when asked about the symptoms she was having, they were essentially all non-neurologic. MRI brain was ordered for surveillance, initially without contrast. MRI brain wo contrast 3/7/23 demonstrated a new 1.5 cm left periventricular white matter demyelinating lesion. She was asked to get another MRI with contrast and this was completed 3/23/2023. This lesion was not enhancing. Initial MRI was compared to 3/5/2021 imaging. At timing of June 2023 visit she reported a lot of issues with pain. She was having right hip and leg pain, and also reported ongoing ambulatory dysfunction, needing to use her walker all the time or she will fall. She had been following with ortho for her hip, tried PT and an injection in the hip that did not work, so was referred to pain management. She had a MRI lumbar spine which demonstrated severe central canal narrowing at L4-5. Pain management was offering L5-S1 TERRANCE vs referral to spine surgery.  She had also recently seen a surgeon for hyperparathyroidism and plan was for parathyroidectomy. Today, she presents with her son. She has no new neurologic/MS complaints. She continues on Betaseron. She reports her  is in the hospital and just had surgery, was very focused on this and giving me details of his illness. Her son reports that patient has not been driving for several years. No one formally revoked her license or reported concerns, but since her initial MS presentation as well as multiple other medical issues, she has not drive. Patient herself has desire to drive, but son and apparently the rest of their family has great concerns. Even prior to 2020 when she stopped driving, she apparently had issues with getting lost and one time had to pullover on a highway and call someone to come get her. She says her license needs to be renewed, but she does not drive, gave up her car. MRI brain updated 10/5/23 was stable. No evidence of disease progression. The following portions of the patient's history were reviewed and updated as appropriate: current medications, past family history, past medical history, past social history, past surgical history, and problem list.       Objective:    Blood pressure (!) 177/80, pulse 63, temperature (!) 97.2 °F (36.2 °C), temperature source Temporal, height 5' 5" (1.651 m), weight 133 kg (292 lb 3.2 oz), not currently breastfeeding. Physical Exam  Constitutional:       Appearance: Normal appearance. Eyes:      Extraocular Movements: EOM normal.      Pupils: Pupils are equal, round, and reactive to light. Neurological:      Mental Status: She is alert. Deep Tendon Reflexes:      Reflex Scores:       Bicep reflexes are 1+ on the right side and 1+ on the left side. Brachioradialis reflexes are 1+ on the right side and 1+ on the left side. Patellar reflexes are 1+ on the right side and 1+ on the left side. Achilles reflexes are 0 on the right side and 0 on the left side.   Psychiatric:         Mood and Affect: Mood normal.         Speech: Speech normal.         Behavior: Behavior normal.         Neurological Exam  Mental Status  Alert. Oriented to person, place, time and situation. Speech is normal. Language is fluent with no aphasia. Attention and concentration are normal.    Cranial Nerves  CN II: Visual fields full to confrontation. CN III, IV, VI: Extraocular movements intact bilaterally. Pupils equal round and reactive to light bilaterally. CN V: Facial sensation is normal.  CN VII: Full and symmetric facial movement. CN VIII: Hearing is normal.  CN IX, X: Palate elevates symmetrically  CN XI: Shoulder shrug strength is normal.  CN XII: Tongue midline without atrophy or fasciculations. Motor   Normal muscle tone. Strength is 5/5 in all four extremities except as noted. Right HF 4/5, very poor effort . Sensory  Light touch is normal in upper and lower extremities. Reflexes                                            Right                      Left  Brachioradialis                    1+                         1+  Biceps                                 1+                         1+  Patellar                                1+                         1+  Achilles                                0                         0    Coordination  Right: Finger-to-nose normal.Left: Finger-to-nose normal.    Gait    Slow gait, using rolling walker . ROS:    Review of Systems   Constitutional:  Negative for appetite change, fatigue and fever. HENT: Negative. Negative for hearing loss, tinnitus, trouble swallowing and voice change. Eyes: Negative. Negative for photophobia, pain and visual disturbance. Respiratory: Negative. Negative for shortness of breath. Cardiovascular: Negative. Negative for palpitations. Gastrointestinal: Negative. Negative for nausea and vomiting. Endocrine: Negative. Negative for cold intolerance. Genitourinary: Negative.   Negative for dysuria, frequency and urgency. Musculoskeletal:  Negative for back pain, gait problem, myalgias and neck pain. Skin: Negative. Negative for rash. Allergic/Immunologic: Negative. Neurological: Negative. Negative for dizziness, tremors, seizures, syncope, facial asymmetry, speech difficulty, weakness, light-headedness, numbness and headaches. Hematological: Negative. Does not bruise/bleed easily. Psychiatric/Behavioral: Negative. Negative for confusion, hallucinations and sleep disturbance.       I personally reviewed and updated the ROS as appropriate

## 2023-10-19 ENCOUNTER — TELEPHONE (OUTPATIENT)
Dept: SURGICAL ONCOLOGY | Facility: CLINIC | Age: 63
End: 2023-10-19

## 2023-10-19 NOTE — TELEPHONE ENCOUNTER
Spoke to patient and made her aware of new surgery date, 11/7 and post op 11/27. I reminded her about chest x-ray and labs that still need done. Patient verbalized understanding and states she will go for the testing.

## 2023-10-23 NOTE — ASSESSMENT & PLAN NOTE
Neuropathy is multifactorial, likely from both CNS and PNS causes. She has history of uncontrolled DM. We discussed the importance of controlling her DM to prevent progression of peripheral neuropathy. Proper foot care and hygiene discussed. She sees podiatry. We discussed neuropathy likely contributes to her imbalance. She should continue to use her walker to prevent falls.

## 2023-10-23 NOTE — ASSESSMENT & PLAN NOTE
She remains clinically stable from a MS standpoint at this time. She continues on Betaseron. She did have updated MRI brain wo contrast for surveillance on 3/5/23. Compared to 2 years prior, there was a new 1.5cm left periventricular white matter lesion. She was then re-scanned with contrast on 3/23/23 and this lesion was not enhancing. MRI brain recently updated 10/5/2023 was stable. Would continue Betaseron. Labs have remained stable. Deferring vitamin D replacement/management to endocrinology in setting of hyperparathyroidism. She is scheduled for parathyroidectomy soon. Her son is with her today and patient asked about returning to driving. Our office has never formally restricted her driving privileges, but she has not been driving since early 3111 when new medical issues started. Her son voiced concern over her driving due to cognitive issues. Apparently she was even having issues prior to MS diagnosis, such as getting lost while driving. Discussed with patient that if she desires driving, I would absolutely advise FTD eval and on the road testing. However, after much discussion today, she agrees driving is not feasible at this time. She does not even have her own vehicle and has been managing without driving for 3.5 years now. She agreed to me sending in a reporting form to [facility] stating patient should not be driving. Her biggest concern is an ID card. Discussed that if [facility] revokes her license, she would get an ID card in its place. Her neurologic exam is stable.

## 2023-10-25 DIAGNOSIS — E55.9 VITAMIN D DEFICIENCY: ICD-10-CM

## 2023-10-25 RX ORDER — ACETAMINOPHEN 160 MG
2000 TABLET,DISINTEGRATING ORAL DAILY
Qty: 30 CAPSULE | Refills: 0 | Status: SHIPPED | OUTPATIENT
Start: 2023-10-25

## 2023-10-27 DIAGNOSIS — E11.65 TYPE 2 DIABETES MELLITUS WITH HYPERGLYCEMIA, WITHOUT LONG-TERM CURRENT USE OF INSULIN (HCC): ICD-10-CM

## 2023-10-27 RX ORDER — METFORMIN HYDROCHLORIDE 500 MG/1
TABLET, EXTENDED RELEASE ORAL
Qty: 360 TABLET | Refills: 0 | Status: SHIPPED | OUTPATIENT
Start: 2023-10-27

## 2023-10-27 RX ORDER — PEN NEEDLE, DIABETIC 32GX 5/32"
NEEDLE, DISPOSABLE MISCELLANEOUS
Qty: 100 EACH | Refills: 0 | Status: SHIPPED | OUTPATIENT
Start: 2023-10-27

## 2023-10-27 NOTE — PRE-PROCEDURE INSTRUCTIONS
Pre-Surgery Instructions:   Medication Instructions    aspirin 81 mg chewable tablet Instructions provided by MD. Pt aware to hold 7 days prior to surgery    atorvastatin (LIPITOR) 40 mg tablet Take night before surgery    Cholecalciferol (Vitamin D3) 50 MCG (2000 UT) capsule Stop taking 7 days prior to surgery. cinacalcet (SENSIPAR) 60 MG tablet Take night before surgery    diphenhydrAMINE (BENADRYL) 25 mg tablet Uses PRN- OK to take day of surgery    DULoxetine (CYMBALTA) 30 mg delayed release capsule Take night before surgery    hydrochlorothiazide (HYDRODIURIL) 50 mg tablet Hold day of surgery. insulin degludec Anola Severin FlexTouch) 100 units/mL injection pen Hold day of surgery. Interferon Beta-1b (Betaseron) 0.3 MG KIT Patient not scheduled to take day of surgery    lisinopril (ZESTRIL) 40 mg tablet Hold day of surgery. metFORMIN (GLUCOPHAGE-XR) 500 mg 24 hr tablet Hold day of surgery. metoprolol succinate (TOPROL-XL) 50 mg 24 hr tablet Take night before surgery    pregabalin (LYRICA) 75 mg capsule Take night before surgery      Medication instructions for day surgery reviewed. Please use only a sip of water to take your instructed medications. Avoid all over the counter vitamins, supplements and NSAIDS for one week prior to surgery per anesthesia guidelines. Tylenol is ok to take as needed. You will receive a call one business day prior to surgery with an arrival time and hospital directions. If your surgery is scheduled on a Monday, the hospital will be calling you on the Friday prior to your surgery. If you have not heard from anyone by 8pm, please call the hospital supervisor through the hospital  at [phone]. Marcy Islas [phone]). Do not eat or drink anything after midnight the night before your surgery, including candy, mints, lifesavers, or chewing gum. Do not drink alcohol 24hrs before your surgery. Try not to smoke at least 24hrs before your surgery.        Follow the pre surgery showering instructions as listed in the [facility] Surgical Experience Booklet” or otherwise provided by your surgeon's office. Do not use a blade to shave the surgical area 1 week before surgery. It is okay to use a clean electric clippers up to 24 hours before surgery. Do not apply any lotions, creams, including makeup, cologne, deodorant, or perfumes after showering on the day of your surgery. Do not use dry shampoo, hair spray, hair gel, or any type of hair products. No contact lenses, eye make-up, or artificial eyelashes. Remove nail polish, including gel polish, and any artificial, gel, or acrylic nails if possible. Remove all jewelry including rings and body piercing jewelry. Wear causal clothing that is easy to take on and off. Consider your type of surgery. Keep any valuables, jewelry, piercings at home. Please bring any specially ordered equipment (sling, braces) if indicated. Arrange for a responsible person to drive you to and from the hospital on the day of your surgery. Visitor Guidelines discussed. Call the surgeon's office with any new illnesses, exposures, or additional questions prior to surgery. Please reference your [facility] Surgical Experience Booklet” for additional information to prepare for your upcoming surgery.

## 2023-10-31 ENCOUNTER — APPOINTMENT (OUTPATIENT)
Dept: RADIOLOGY | Facility: CLINIC | Age: 63
End: 2023-10-31
Payer: COMMERCIAL

## 2023-10-31 DIAGNOSIS — E21.3 HYPERPARATHYROIDISM (HCC): ICD-10-CM

## 2023-10-31 PROCEDURE — 71046 X-RAY EXAM CHEST 2 VIEWS: CPT

## 2023-11-04 ENCOUNTER — APPOINTMENT (OUTPATIENT)
Dept: LAB | Facility: CLINIC | Age: 63
End: 2023-11-04
Payer: COMMERCIAL

## 2023-11-04 DIAGNOSIS — G35 MS (MULTIPLE SCLEROSIS) (HCC): ICD-10-CM

## 2023-11-04 DIAGNOSIS — E11.65 TYPE 2 DIABETES MELLITUS WITH HYPERGLYCEMIA, WITH LONG-TERM CURRENT USE OF INSULIN (HCC): ICD-10-CM

## 2023-11-04 DIAGNOSIS — Z79.4 TYPE 2 DIABETES MELLITUS WITH HYPERGLYCEMIA, WITH LONG-TERM CURRENT USE OF INSULIN (HCC): ICD-10-CM

## 2023-11-04 PROCEDURE — 83036 HEMOGLOBIN GLYCOSYLATED A1C: CPT

## 2023-11-05 LAB
EST. AVERAGE GLUCOSE BLD GHB EST-MCNC: 180 MG/DL
HBA1C MFR BLD: 7.9 %

## 2023-11-06 ENCOUNTER — ANESTHESIA EVENT (OUTPATIENT)
Dept: PERIOP | Facility: HOSPITAL | Age: 63
End: 2023-11-06
Payer: COMMERCIAL

## 2023-11-07 ENCOUNTER — HOSPITAL ENCOUNTER (OUTPATIENT)
Facility: HOSPITAL | Age: 63
Setting detail: OUTPATIENT SURGERY
Discharge: HOME | End: 2023-11-07
Attending: SURGERY | Admitting: SURGERY
Payer: COMMERCIAL

## 2023-11-07 ENCOUNTER — TELEPHONE (OUTPATIENT)
Dept: FAMILY MEDICINE CLINIC | Facility: CLINIC | Age: 63
End: 2023-11-07

## 2023-11-07 ENCOUNTER — ANESTHESIA (OUTPATIENT)
Dept: PERIOP | Facility: HOSPITAL | Age: 63
End: 2023-11-07
Payer: COMMERCIAL

## 2023-11-07 VITALS
TEMPERATURE: 97.1 F | DIASTOLIC BLOOD PRESSURE: 83 MMHG | HEART RATE: 66 BPM | RESPIRATION RATE: 16 BRPM | OXYGEN SATURATION: 93 % | SYSTOLIC BLOOD PRESSURE: 141 MMHG

## 2023-11-07 DIAGNOSIS — E83.52 HYPERCALCEMIA: ICD-10-CM

## 2023-11-07 DIAGNOSIS — E21.3 HYPERPARATHYROIDISM (HCC): ICD-10-CM

## 2023-11-07 LAB
CALCIUM SERPL-MCNC: 9.6 MG/DL (ref 8.4–10.2)
GLUCOSE SERPL-MCNC: 120 MG/DL (ref 65–140)
GLUCOSE SERPL-MCNC: 154 MG/DL (ref 65–140)
PTH-INTACT P EXCISION SERPL-MCNC: 38.5 PG/ML (ref 12–88)
PTH-INTACT P EXCISION SERPL-MCNC: 39.5 PG/ML (ref 12–88)
PTH-INTACT P EXCISION SERPL-MCNC: 39.9 PG/ML (ref 12–88)
PTH-INTACT P EXCISION SERPL-MCNC: 75.6 PG/ML (ref 12–88)

## 2023-11-07 PROCEDURE — 88331 PATH CONSLTJ SURG 1 BLK 1SPC: CPT | Performed by: SURGERY

## 2023-11-07 PROCEDURE — NC001 PR NO CHARGE: Performed by: SURGERY

## 2023-11-07 PROCEDURE — 83970 ASSAY OF PARATHORMONE: CPT | Performed by: NURSE ANESTHETIST, CERTIFIED REGISTERED

## 2023-11-07 PROCEDURE — 88331 PATH CONSLTJ SURG 1 BLK 1SPC: CPT | Performed by: PATHOLOGY

## 2023-11-07 PROCEDURE — 83970 ASSAY OF PARATHORMONE: CPT | Performed by: SURGERY

## 2023-11-07 PROCEDURE — 88305 TISSUE EXAM BY PATHOLOGIST: CPT | Performed by: PATHOLOGY

## 2023-11-07 PROCEDURE — 82948 REAGENT STRIP/BLOOD GLUCOSE: CPT

## 2023-11-07 PROCEDURE — 82310 ASSAY OF CALCIUM: CPT

## 2023-11-07 PROCEDURE — 60500 EXPLORE PARATHYROID GLANDS: CPT | Performed by: SURGERY

## 2023-11-07 RX ORDER — PROPOFOL 10 MG/ML
INJECTION, EMULSION INTRAVENOUS AS NEEDED
Status: DISCONTINUED | OUTPATIENT
Start: 2023-11-07 | End: 2023-11-07

## 2023-11-07 RX ORDER — FENTANYL CITRATE/PF 50 MCG/ML
25 SYRINGE (ML) INJECTION
Status: DISCONTINUED | OUTPATIENT
Start: 2023-11-07 | End: 2023-11-07 | Stop reason: HOSPADM

## 2023-11-07 RX ORDER — MIDAZOLAM HYDROCHLORIDE 2 MG/2ML
INJECTION, SOLUTION INTRAMUSCULAR; INTRAVENOUS AS NEEDED
Status: DISCONTINUED | OUTPATIENT
Start: 2023-11-07 | End: 2023-11-07

## 2023-11-07 RX ORDER — HYDROMORPHONE HCL/PF 1 MG/ML
SYRINGE (ML) INJECTION AS NEEDED
Status: DISCONTINUED | OUTPATIENT
Start: 2023-11-07 | End: 2023-11-07

## 2023-11-07 RX ORDER — BUPIVACAINE HYDROCHLORIDE 2.5 MG/ML
INJECTION, SOLUTION EPIDURAL; INFILTRATION; INTRACAUDAL AS NEEDED
Status: DISCONTINUED | OUTPATIENT
Start: 2023-11-07 | End: 2023-11-07 | Stop reason: HOSPADM

## 2023-11-07 RX ORDER — FENTANYL CITRATE 50 UG/ML
INJECTION, SOLUTION INTRAMUSCULAR; INTRAVENOUS AS NEEDED
Status: DISCONTINUED | OUTPATIENT
Start: 2023-11-07 | End: 2023-11-07

## 2023-11-07 RX ORDER — KETOROLAC TROMETHAMINE 30 MG/ML
INJECTION, SOLUTION INTRAMUSCULAR; INTRAVENOUS AS NEEDED
Status: DISCONTINUED | OUTPATIENT
Start: 2023-11-07 | End: 2023-11-07

## 2023-11-07 RX ORDER — ONDANSETRON 2 MG/ML
4 INJECTION INTRAMUSCULAR; INTRAVENOUS EVERY 6 HOURS PRN
Status: DISCONTINUED | OUTPATIENT
Start: 2023-11-07 | End: 2023-11-07 | Stop reason: HOSPADM

## 2023-11-07 RX ORDER — SODIUM CHLORIDE 9 MG/ML
INJECTION, SOLUTION INTRAVENOUS CONTINUOUS PRN
Status: DISCONTINUED | OUTPATIENT
Start: 2023-11-07 | End: 2023-11-07

## 2023-11-07 RX ORDER — DEXAMETHASONE SODIUM PHOSPHATE 10 MG/ML
INJECTION, SOLUTION INTRAMUSCULAR; INTRAVENOUS AS NEEDED
Status: DISCONTINUED | OUTPATIENT
Start: 2023-11-07 | End: 2023-11-07

## 2023-11-07 RX ORDER — IBUPROFEN 600 MG/1
600 TABLET ORAL EVERY 6 HOURS PRN
Status: DISCONTINUED | OUTPATIENT
Start: 2023-11-07 | End: 2023-11-07 | Stop reason: HOSPADM

## 2023-11-07 RX ORDER — SODIUM CHLORIDE, SODIUM LACTATE, POTASSIUM CHLORIDE, CALCIUM CHLORIDE 600; 310; 30; 20 MG/100ML; MG/100ML; MG/100ML; MG/100ML
INJECTION, SOLUTION INTRAVENOUS CONTINUOUS PRN
Status: DISCONTINUED | OUTPATIENT
Start: 2023-11-07 | End: 2023-11-07

## 2023-11-07 RX ORDER — ONDANSETRON 2 MG/ML
INJECTION INTRAMUSCULAR; INTRAVENOUS AS NEEDED
Status: DISCONTINUED | OUTPATIENT
Start: 2023-11-07 | End: 2023-11-07

## 2023-11-07 RX ORDER — LIDOCAINE HYDROCHLORIDE 10 MG/ML
INJECTION, SOLUTION EPIDURAL; INFILTRATION; INTRACAUDAL; PERINEURAL AS NEEDED
Status: DISCONTINUED | OUTPATIENT
Start: 2023-11-07 | End: 2023-11-07

## 2023-11-07 RX ORDER — EPHEDRINE SULFATE 50 MG/ML
INJECTION INTRAVENOUS AS NEEDED
Status: DISCONTINUED | OUTPATIENT
Start: 2023-11-07 | End: 2023-11-07

## 2023-11-07 RX ORDER — ONDANSETRON 2 MG/ML
4 INJECTION INTRAMUSCULAR; INTRAVENOUS ONCE AS NEEDED
Status: DISCONTINUED | OUTPATIENT
Start: 2023-11-07 | End: 2023-11-07 | Stop reason: HOSPADM

## 2023-11-07 RX ORDER — ROCURONIUM BROMIDE 10 MG/ML
INJECTION, SOLUTION INTRAVENOUS AS NEEDED
Status: DISCONTINUED | OUTPATIENT
Start: 2023-11-07 | End: 2023-11-07

## 2023-11-07 RX ORDER — ACETAMINOPHEN 325 MG/1
650 TABLET ORAL EVERY 4 HOURS PRN
Status: DISCONTINUED | OUTPATIENT
Start: 2023-11-07 | End: 2023-11-07 | Stop reason: HOSPADM

## 2023-11-07 RX ORDER — HYDROMORPHONE HCL/PF 1 MG/ML
0.5 SYRINGE (ML) INJECTION
Status: DISCONTINUED | OUTPATIENT
Start: 2023-11-07 | End: 2023-11-07 | Stop reason: HOSPADM

## 2023-11-07 RX ADMIN — FENTANYL CITRATE 50 MCG: 50 INJECTION, SOLUTION INTRAMUSCULAR; INTRAVENOUS at 09:07

## 2023-11-07 RX ADMIN — FENTANYL CITRATE 50 MCG: 50 INJECTION, SOLUTION INTRAMUSCULAR; INTRAVENOUS at 07:50

## 2023-11-07 RX ADMIN — PROPOFOL 200 MG: 10 INJECTION, EMULSION INTRAVENOUS at 07:50

## 2023-11-07 RX ADMIN — MIDAZOLAM 2 MG: 1 INJECTION INTRAMUSCULAR; INTRAVENOUS at 07:40

## 2023-11-07 RX ADMIN — LIDOCAINE HYDROCHLORIDE 50 MG: 10 INJECTION, SOLUTION EPIDURAL; INFILTRATION; INTRACAUDAL; PERINEURAL at 07:50

## 2023-11-07 RX ADMIN — ROCURONIUM BROMIDE 50 MG: 10 INJECTION, SOLUTION INTRAVENOUS at 07:50

## 2023-11-07 RX ADMIN — EPHEDRINE SULFATE 5 MG: 50 INJECTION INTRAVENOUS at 08:45

## 2023-11-07 RX ADMIN — SODIUM CHLORIDE: 0.9 INJECTION, SOLUTION INTRAVENOUS at 08:05

## 2023-11-07 RX ADMIN — FENTANYL CITRATE 50 MCG: 50 INJECTION, SOLUTION INTRAMUSCULAR; INTRAVENOUS at 08:24

## 2023-11-07 RX ADMIN — SODIUM CHLORIDE, SODIUM LACTATE, POTASSIUM CHLORIDE, AND CALCIUM CHLORIDE: .6; .31; .03; .02 INJECTION, SOLUTION INTRAVENOUS at 07:10

## 2023-11-07 RX ADMIN — PHENYLEPHRINE HYDROCHLORIDE 20 MCG/MIN: 10 INJECTION INTRAVENOUS at 08:07

## 2023-11-07 RX ADMIN — DEXAMETHASONE SODIUM PHOSPHATE 10 MG: 10 INJECTION, SOLUTION INTRAMUSCULAR; INTRAVENOUS at 07:50

## 2023-11-07 RX ADMIN — FENTANYL CITRATE 25 MCG: 50 INJECTION INTRAMUSCULAR; INTRAVENOUS at 10:20

## 2023-11-07 RX ADMIN — ONDANSETRON 4 MG: 2 INJECTION INTRAMUSCULAR; INTRAVENOUS at 09:27

## 2023-11-07 RX ADMIN — HYDROMORPHONE HYDROCHLORIDE 0.5 MG: 1 INJECTION, SOLUTION INTRAMUSCULAR; INTRAVENOUS; SUBCUTANEOUS at 08:26

## 2023-11-07 RX ADMIN — SUGAMMADEX 200 MG: 100 INJECTION, SOLUTION INTRAVENOUS at 09:23

## 2023-11-07 RX ADMIN — EPHEDRINE SULFATE 10 MG: 50 INJECTION INTRAVENOUS at 09:11

## 2023-11-07 RX ADMIN — KETOROLAC TROMETHAMINE 15 MG: 30 INJECTION, SOLUTION INTRAMUSCULAR; INTRAVENOUS at 09:22

## 2023-11-07 NOTE — OP NOTE
IV established. Blood sent to lab.  EKG at bedside.    OPERATIVE REPORT  PATIENT NAME: Juan Miguel Millan    :  1960  MRN: [id number]  Pt Location: [facility] OR ROOM 05    SURGERY DATE: 2023    Surgeon(s) and Role:     * Binta Cage MD - Primary     * Aurea Pulliam MD - Assisting    Preop Diagnosis:  Hyperparathyroidism ([address]) [E21.3]  Hypercalcemia [E83.52]    Post-Op Diagnosis Codes:     * Hyperparathyroidism ([address]) [E21.3]     * Hypercalcemia [E83.52]    Procedure(s):  Left - LEFT MINIMALLY INVASIVE PARATHYROIDECTOMY  MONITORING INTRAOPERATIVE PTH (PARATHYROID HORMONE)    Specimen(s):  ID Type Source Tests Collected by Time Destination   1 : Left parathyroid Tissue Parathyroid TISSUE EXAM Binta Cage MD 2023 [id number]        Estimated Blood Loss:   Minimal    Drains:  * No LDAs found *    Anesthesia Type:   General    Operative Indications:  Hyperparathyroidism ([address]) [E21.3]  Hypercalcemia [E83.52]      Operative Findings:  260 MG LEFT INFERIOR PARATHYROID ADENOMA  Component      Latest Ref Rng 2023  7:38 AM 2023  8:42 AM 2023  8:48 AM 2023  8:53 AM   PTH INTRAOP      12.0 - 88.0 pg/mL 75.6  39.5  39.9  79.2        Complications:   None    Procedure and Technique:  The patient was brought to the OR and identified by proper time-out. Following this she was intubated by the anesthesia team, then was prepped and draped with the neck exposed in the extended position. Local anesthesia was given, then a 4 cm incision was made sharply 2 finger breaths above the sternal notch. Cautery was used to dissect through the dermis subcutaneous tissue. Wheatlanner retractor was placed. The platysma was then transected with cautery. Strap muscles were then divided the midline. This exposed the surface of the thyroid. We dissected the strap muscles off the anterolateral aspect of the right thyroid lobe. This enabled us to dissect between the thyroid and the strap muscles.  The thyroid gland was then retracted medially and anteriorly which exposed what appeared to be a large parathyroid gland along the inferior pole of the thyroid gland. This was dissected out from surrounding tissue in hemostatic fashion with cautery. Vascular pedicle was visualized. The pedicle was clipped. PTH levels were drawn at the start of the case, 0 min, 5 min, and 10 min after the case. Levels decreased appropriately to normal range upon 10 min post resection of the gland. We also found a normal appearing left superior gland. We then irrigated the field and closed using 3-0 Vicryl to close the strap muscles the midline followed by 3-0 Vicryl to close the platysma, followed by 4-0 Vicryl close the dermis, followed by 5-0 Monocryl to close skin in subcuticular fashion. Benzoin and Steri-Strips were then applied in the usual fashion. The patient was awakened transferred to the recovery room in stable condition. I was present for the entire procedure.     Patient Disposition:  PACU         SIGNATURE: Dann Russell MD  DATE: November 7, 2023  TIME: 9:43 AM

## 2023-11-07 NOTE — INTERVAL H&P NOTE
H&P reviewed. After examining the patient I find no changes in the patients condition since the H&P had been written.     Vitals:    11/07/23 0636   BP: (!) 150/107   Pulse: 63   Temp: 97.8 °F (36.6 °C)   SpO2: 98%     Extremities no clubbing cyanosis or edema

## 2023-11-07 NOTE — ANESTHESIA PROCEDURE NOTES
Arterial Line Insertion    Performed by: Manav Miles CRNA  Authorized by: Ashlyn Parker DO  Consent: Verbal consent obtained. Written consent obtained. Risks and benefits: risks, benefits and alternatives were discussed  Consent given by: patient  Patient understanding: patient states understanding of the procedure being performed  Patient consent: the patient's understanding of the procedure matches consent given  Procedure consent: procedure consent matches procedure scheduled  Relevant documents: relevant documents present and verified  Time out: Immediately prior to procedure a "time out" was called to verify the correct patient, procedure, equipment, support staff and site/side marked as required. Preparation: Patient was prepped and draped in the usual sterile fashion.   Indications: hemodynamic monitoring  Orientation:  Right  Location: radial artery  Sedation:  Patient sedated: GA.    Procedure Details:  Chance's test normal: yes  Needle gauge: 20  Seldinger technique: Seldinger technique used  Number of attempts: 1    Post-procedure:  Post-procedure: dressing applied  Waveform: good waveform  Patient tolerance: Patient tolerated the procedure well with no immediate complications

## 2023-11-07 NOTE — ANESTHESIA PREPROCEDURE EVALUATION
Procedure:  LEFT MINIMALLY INVASIVE PARATHYROIDECTOMY, POSSIBLE 4 GLAND EXPLORATION (Left: Neck)  MONITORING INTRAOPERATIVE PTH (PARATHYROID HORMONE) (Neck)    Last metoprolol 11/6. No medications today. MS - sx mostly peripheral neuropathy and coordination issues, walks with walker. Denies vision issues besides needing glasses. SELENE - has CPAP, does not use    Relevant Problems   CARDIO   (+) Benign essential hypertension   (+) Hyperlipidemia      ENDO   (+) Hyperparathyroidism (HCC)   (+) Type 2 diabetes mellitus with hyperglycemia, with long-term current use of insulin (HCC)      GI/HEPATIC   (+) GI bleed      MUSCULOSKELETAL   (+) Primary osteoarthritis of right hip      NEURO/PSYCH   (+) Chronic daily headache   (+) Chronic post-traumatic headache, not intractable   (+) Depression with anxiety   (+) Medication overuse headache   (+) New daily persistent headache   (+) Numbness and tingling of right arm and leg   (+) Other chronic pain   (+) Visual disturbances      PULMONARY   (+) Obstructive sleep apnea syndrome      Nervous and Auditory   (+) MS (multiple sclerosis) ([address])      Other   (+) Morbid obesity with BMI of 45.0-49.9, adult ([address])      TTE (3/2020): LVEF 60%, no RWMA, G1DD. Mild MR, AV with moderate calcification, no stenosis. NM Stress Test (2020):  Probable normal study after pharmacologic stress without reproduction of symptoms. Perfusion imaging significantly limited by attenuation artifact as mentioned above. Left ventricular systolic function was normal.    Physical Exam    Airway    Mallampati score: III  TM Distance: >3 FB  Neck ROM: full     Dental        Cardiovascular  Rhythm: regular, Rate: normal, Cardiovascular exam normal    Pulmonary  Pulmonary exam normal Breath sounds clear to auscultation    Other Findings        Anesthesia Plan  ASA Score- 3     Anesthesia Type- general with ASA Monitors. Additional Monitors: arterial line. Airway Plan: ETT.            Plan Factors-Exercise tolerance (METS): >4 METS. Chart reviewed. EKG reviewed. Imaging results reviewed. Existing labs reviewed. Patient summary reviewed. Patient is not a current smoker. Obstructive sleep apnea risk education given perioperatively. Induction- intravenous. Postoperative Plan- Plan for postoperative opioid use. Planned trial extubation    Informed Consent- Anesthetic plan and risks discussed with patient. I personally reviewed this patient with the CRNA. Discussed and agreed on the Anesthesia Plan with the CRNA. Chino Toure

## 2023-11-07 NOTE — TELEPHONE ENCOUNTER
Pt had surgery today 11/7 and she was wondering if she needs to follow up with her PCP. She did not say anything about her surgeon saying she needs to, she just didn't know if we wanted to see her at all.

## 2023-11-07 NOTE — ANESTHESIA POSTPROCEDURE EVALUATION
Post-Op Assessment Note    CV Status:  Stable  Pain Score: 0    Pain management: adequate     Mental Status:  Alert and awake   Hydration Status:  Euvolemic   PONV Controlled:  Controlled   Airway Patency:  Patent      Post Op Vitals Reviewed: Yes      Staff: Anesthesiologist, CRNA         No notable events documented.     BP  156/93   Temp   98.6   Pulse  76   Resp   18   SpO2   95

## 2023-11-07 NOTE — INTERVAL H&P NOTE
H&P reviewed. After examining the patient I find no changes in the patients condition since the H&P had been written.     Vitals:    11/07/23 0636   BP: (!) 150/107   Pulse: 63   Temp: 97.8 °F (36.6 °C)   SpO2: 98%

## 2023-11-13 PROCEDURE — 88305 TISSUE EXAM BY PATHOLOGIST: CPT | Performed by: PATHOLOGY

## 2023-11-16 DIAGNOSIS — E55.9 VITAMIN D DEFICIENCY: ICD-10-CM

## 2023-11-16 RX ORDER — ACETAMINOPHEN 160 MG
2000 TABLET,DISINTEGRATING ORAL DAILY
Qty: 30 CAPSULE | Refills: 2 | Status: SHIPPED | OUTPATIENT
Start: 2023-11-16

## 2023-11-24 DIAGNOSIS — E11.65 TYPE 2 DIABETES MELLITUS WITH HYPERGLYCEMIA, WITHOUT LONG-TERM CURRENT USE OF INSULIN (HCC): ICD-10-CM

## 2023-11-24 RX ORDER — INSULIN DEGLUDEC INJECTION 100 U/ML
INJECTION, SOLUTION SUBCUTANEOUS
Qty: 15 ML | Refills: 0 | Status: SHIPPED | OUTPATIENT
Start: 2023-11-24

## 2023-11-28 PROBLEM — Z90.89 STATUS POST PARATHYROIDECTOMY: Status: ACTIVE | Noted: 2023-11-28

## 2023-11-28 PROBLEM — Z98.890 STATUS POST PARATHYROIDECTOMY: Status: ACTIVE | Noted: 2023-11-28

## 2023-11-28 PROBLEM — E89.2 STATUS POST PARATHYROIDECTOMY (HCC): Status: ACTIVE | Noted: 2023-11-28

## 2023-11-29 ENCOUNTER — OFFICE VISIT (OUTPATIENT)
Dept: SURGICAL ONCOLOGY | Facility: CLINIC | Age: 63
End: 2023-11-29

## 2023-11-29 VITALS
SYSTOLIC BLOOD PRESSURE: 142 MMHG | BODY MASS INDEX: 48.82 KG/M2 | HEIGHT: 65 IN | DIASTOLIC BLOOD PRESSURE: 82 MMHG | RESPIRATION RATE: 16 BRPM | WEIGHT: 293 LBS | OXYGEN SATURATION: 95 % | TEMPERATURE: 97.6 F | HEART RATE: 74 BPM

## 2023-11-29 DIAGNOSIS — E89.2 STATUS POST PARATHYROIDECTOMY (HCC): Primary | ICD-10-CM

## 2023-11-29 PROCEDURE — 99024 POSTOP FOLLOW-UP VISIT: CPT | Performed by: SURGERY

## 2023-11-29 NOTE — PROGRESS NOTES
Surgical Oncology Follow Up       [address] [facility] [place]  [facility] [id number]    [place] Bolus  1960  [id number]  [facility]  [facility] [place] [id number]    Chief Complaint   Patient presents with    Post-op     Patient being seen for post op. Left MIP 11/7/2023. Assessment/Plan:    No problem-specific Assessment & Plan notes found for this encounter. Diagnoses and all orders for this visit:    Status post parathyroidectomy ([address])  -     PTH, intact; Future  -     Calcium; Future  -     Vitamin D Panel; Future      Advance Care Planning/Advance Directives:  Discussed disease status, cancer treatment plans and/or cancer treatment goals with the patient. Oncology History    No history exists. History of Present Illness: 61-year-old woman here for postop check status post middle invasive left parathyroidectomy.  -Interval History: No major complaints after surgery. Review of Systems:  Review of Systems   Constitutional:  Positive for fatigue. HENT: Negative. Eyes: Negative. Respiratory: Negative. Cardiovascular: Negative. Gastrointestinal: Negative. Endocrine: Negative. Genitourinary: Negative. Musculoskeletal:  Positive for arthralgias and myalgias. Skin: Negative. Allergic/Immunologic: Negative. Neurological: Negative. Hematological: Negative. Psychiatric/Behavioral: Negative. All other systems reviewed and are negative.       Patient Active Problem List   Diagnosis    Hernia, ventral    Type 2 diabetes mellitus with hyperglycemia, with long-term current use of insulin (HCC)    GI bleed    Morbid obesity with BMI of 45.0-49.9, adult ([address])    Ischemic colitis ([address])    Unintentional weight loss    Hypercalcemia    Hyperparathyroidism (HCC)    Vitamin D deficiency    Depression with anxiety    Benign essential hypertension    Falls frequently    Numbness and tingling of right arm and leg    Ambulatory dysfunction    MS (multiple sclerosis) (HCC)    Cognitive decline    Right hemiparesis (HCC)    Chronic daily headache    Class 3 severe obesity due to excess calories with serious comorbidity and body mass index (BMI) of 45.0 to 49.9 in adult [facility])    Thyroid nodule    B12 deficiency    Hyperlipidemia    Obstructive sleep apnea syndrome    Hip pain, right    Positive for macroalbuminuria    Other chronic pain    Nausea & vomiting    Bloating    New daily persistent headache    Medication overuse headache    Visual disturbances    Chronic post-traumatic headache, not intractable    Primary osteoarthritis of right hip    Lumbar radiculopathy    Peripheral neuropathy    Status post parathyroidectomy [facility])     Past Medical History:   Diagnosis Date    Anxiety     CPAP (continuous positive airway pressure) dependence     does not use    Diabetes mellitus ([address])     External hemorrhoids     last assessed 16, resolved 16    Hernia, ventral     last assessed 12/14/15, resolved 16    History of transfusion     Hypertension     Incarcerated incisional hernia     last assessed 12/21/15, resolved 16    Insomnia     last assessed 16, resolved 10/9/17    Lyme disease     Morbid obesity with BMI of 45.0-49.9, adult ([address]) 2017    MS (multiple sclerosis) ([address])     Sleep apnea     Stroke (cerebrum) ([address]) 2020    Type 2 diabetes mellitus with hyperglycemia, without long-term current use of insulin ([address])      Past Surgical History:   Procedure Laterality Date    ABCESS DRAINAGE       SECTION      x3    COLONOSCOPY N/A 2017    Procedure: COLONOSCOPY with biopsy;  Surgeon: Wojciech Campa DO;  Location: [facility];   Service: Complete    HYSTERECTOMY      IR BIOPSY ABDOMEN  2021    IR LUMBAR PUNCTURE  3/13/2020    PA PARATHYROIDECTOMY/EXPLORATION PARATHYROIDS Left 2023    Procedure: LEFT MINIMALLY INVASIVE PARATHYROIDECTOMY;  Surgeon: Marcelo Hogan MD;  Location: [facility];  Service: Surgical Oncology    US GUIDED THYROID BIOPSY  1/29/2021     Family History   Problem Relation Age of Onset    Glaucoma Mother     Diabetes Father     Hypertension Father     Colon cancer Father     Alzheimer's disease Father     Cervical cancer Sister 61    Breast cancer Sister 50    Breast cancer Sister 45    Breast cancer Maternal Aunt     Breast cancer Maternal Aunt     Breast cancer Maternal Aunt     Breast cancer Maternal Grandmother     Coronary artery disease Family     Diabetes Family     Hypertension Family      Social History     Socioeconomic History    Marital status: /Civil Union     Spouse name: Not on file    Number of children: Not on file    Years of education: Not on file    Highest education level: Not on file   Occupational History     Employer: [employer]   Tobacco Use    Smoking status: Never    Smokeless tobacco: Never   Vaping Use    Vaping Use: Never used   Substance and Sexual Activity    Alcohol use: Not Currently     Comment: Social    Drug use: Yes     Types: Marijuana     Comment: medical marijuana    Sexual activity: Not Currently     Partners: Male     Birth control/protection: None   Other Topics Concern    Not on file   Social History Narrative    Not on file     Social Determinants of Health     Financial Resource Strain: High Risk (5/24/2023)    Overall Financial Resource Strain (CARDIA)     Difficulty of Paying Living Expenses: Hard   Food Insecurity: Unknown (4/12/2021)    Hunger Vital Sign     Worried About Running Out of Food in the Last Year: Never true     Ran Out of Food in the Last Year: Not on file   Transportation Needs: Unmet Transportation Needs (5/24/2023)    PRAPARE - Transportation     Lack of Transportation (Medical): Yes     Lack of Transportation (Non-Medical): Yes   Physical Activity: Inactive (7/30/2020)    Exercise Vital Sign     Days of Exercise per Week: 0 days     Minutes of Exercise per Session: 0 min   Stress: Stress Concern Present (7/30/2020)    [facility]     Feeling of Stress : Rather much   Social Connections: Unknown (7/30/2020)    Social Connection and Isolation Panel [NHANES]     Frequency of Communication with Friends and Family: More than three times a week     Frequency of Social Gatherings with Friends and Family: Not on file     Attends Sabianist Services: Not on file     Active Member of [address] or Organizations: Not on file     Attends Club or Organization Meetings: Not on file     Marital Status:    Intimate Partner Violence: Not At Risk (7/30/2020)    Humiliation, Afraid, Rape, and Kick questionnaire     Fear of Current or Ex-Partner: No     Emotionally Abused: No     Physically Abused: No     Sexually Abused: No   Housing Stability: Not on file       Current Outpatient Medications:     Alcohol Swabs PADS, by Does not apply route 4 (four) times a day *Latex Free*, Disp: 120 each, Rfl: 3    aspirin 81 mg chewable tablet, Chew 1 tablet (81 mg total) daily, Disp: 90 tablet, Rfl: 1    atorvastatin (LIPITOR) 40 mg tablet, TAKE 1 TABLET BY MOUTH ONCE DAILY IN THE EVENING, Disp: 90 tablet, Rfl: 0    BD Pen Needle Izzy 2nd Gen 32G X 4 MM MISC, INJECT   SUBCUTANEOUSLY IN THE MORNING, Disp: 100 each, Rfl: 0    Blood Glucose Monitoring Suppl (OneTouch Verio) w/Device KIT, Use in the morning, Disp: 1 kit, Rfl: 0    Blood Pressure KIT, by Does not apply route daily, Disp: 1 each, Rfl: 0    Cholecalciferol (Vitamin D3) 50 MCG (2000 UT) capsule, Take 1 capsule by mouth once daily, Disp: 30 capsule, Rfl: 2    cinacalcet (SENSIPAR) 60 MG tablet, Take 1 tablet (60 mg total) by mouth daily, Disp: 30 tablet, Rfl: 6    Continuous Blood Gluc Sensor (Dexcom G6 Sensor) MISC, USE AS DIRECTED, Disp: 9 each, Rfl: 0    Continuous Blood Gluc Transmit (Dexcom G6 Transmitter) MISC, Change Transmitter Every 90 days as directed, Disp: 1 each, Rfl: 0    diazepam (VALIUM) 5 mg tablet, Take 1 tablet (5 mg total) by mouth 60 minutes pre-procedure, Disp: 2 tablet, Rfl: 0    diphenhydrAMINE (BENADRYL) 25 mg tablet, Take 0.5 tablets (12.5 mg total) by mouth every 6 (six) hours, Disp: 20 tablet, Rfl: 0    DULoxetine (CYMBALTA) 30 mg delayed release capsule, Take 1 capsule (30 mg total) by mouth in the morning., Disp: 30 capsule, Rfl: 1    glucose blood (OneTouch Verio) test strip, Use 1 each in the morning, Disp: 100 each, Rfl: 1    hydrALAZINE (APRESOLINE) 10 mg tablet, Take 1 tablet (10 mg total) by mouth 3 (three) times a day, Disp: 90 tablet, Rfl: 0    hydrochlorothiazide (HYDRODIURIL) 50 mg tablet, Take 1 tablet (50 mg total) by mouth daily, Disp: 90 tablet, Rfl: 3    Incontinence Supply Disposable (Prevail Women Underwear XL) MISC, Use every 6 (six) hours as needed (incontinence), Disp: 120 each, Rfl: 11    Interferon Beta-1b (Betaseron) 0.3 MG KIT, Inject 1 ml (0.25 mg) subcutaneously every other day, Disp: 14 kit, Rfl: 11    Lancets (OneTouch Delica Plus [id number]) MISC, Test BG up to 1x daily as directed, Disp: 100 each, Rfl: 1    lisinopril (ZESTRIL) 40 mg tablet, Take 1 tablet (40 mg total) by mouth daily, Disp: 90 tablet, Rfl: 0    metFORMIN (GLUCOPHAGE-XR) 500 mg 24 hr tablet, TAKE 2 TABLETS BY MOUTH TWICE DAILY WITH MEALS, Disp: 360 tablet, Rfl: 0    metoprolol succinate (TOPROL-XL) 50 mg 24 hr tablet, Take 1 tablet (50 mg total) by mouth daily, Disp: 90 tablet, Rfl: 3    pregabalin (LYRICA) 75 mg capsule, Take 1 capsule (75 mg total) by mouth 2 (two) times a day, Disp: 60 capsule, Rfl: 2    Tresiba FlexTouch 100 units/mL injection pen, INJECT 25 UNITS SUBCUTANEOUSLY ONCE DAILY, Disp: 15 mL, Rfl: 0    vitamin B-12 (VITAMIN B-12) 500 mcg tablet, Take 2 tablets (1,000 mcg total) by mouth daily, Disp: 60 tablet, Rfl: 5    naloxone (NARCAN) 4 mg/0.1 mL nasal spray, Administer 1 spray into a nostril.  If no response after 2-3 minutes, give another dose in the other nostril using a new spray. (Patient not taking: Reported on 11/29/2023), Disp: 1 each, Rfl: 1    ondansetron (ZOFRAN-ODT) 4 mg disintegrating tablet, Take 1 tablet (4 mg total) by mouth every 6 (six) hours as needed for nausea or vomiting (Patient not taking: Reported on 10/18/2023), Disp: 20 tablet, Rfl: 0    traMADol (Ultram) 50 mg tablet, Take 1 tablet (50 mg total) by mouth every 6 (six) hours as needed for moderate pain (Patient not taking: Reported on 6/14/2023), Disp: 10 tablet, Rfl: 0    Current Facility-Administered Medications:     prochlorperazine (COMPAZINE) injection 5 mg, 5 mg, Intramuscular, Q4H PRN, Viviana Garcia MD, 5 mg at 11/11/22 1040  Allergies   Allergen Reactions    Sorbitan Diarrhea, Vomiting and Abdominal Pain    Victoza [Liraglutide] Nausea Only    Ambien [Zolpidem Tartrate]     Demerol [Meperidine]     Insulin Glargine Other (See Comments)     Annotation - [id number]: memory loss    Latex     Oxycodone Hives, Rash and Vomiting     Vitals:    11/29/23 1358   BP: 142/82   Pulse: 74   Resp: 16   Temp: 97.6 °F (36.4 °C)   SpO2: 95%       Physical Exam  Neck:      Comments: Incision c/d/i  Musculoskeletal:      Cervical back: Normal range of motion and neck supple.            Results:  Labs:  Lab Results   Component Value Date    CALCIUM 9.6 11/07/2023    PHOS 2.8 09/27/2023     Lab Results   Component Value Date    PTH 38.5 11/07/2023    CALCIUM 9.6 11/07/2023    PHOS 2.8 09/27/2023       Case Report   Surgical Pathology Report                         Case: [id number]                                   Authorizing Provider:  Binta Cage MD        Collected:           11/07/2023 3009               Ordering Location:     [facility]      Received:            11/07/2023 [address] Operating Room                                                       Pathologist:           Diana Meadows RAJWINDER Suarez DO                                                     Specimen:    Parathyroid, Left parathyroid                                                              Final Diagnosis   A. Parathyroid, Left, Parathyroidectomy:  - Parathyroid adenoma, 0.26 grams. Electronically signed by Domenic Polk DO on 11/13/2023 at  9:25 AM         Comments: This is an appended report. These results have been appended to a previously preliminary verified report. Imaging  XR chest pa & lateral    Result Date: 11/2/2023  Narrative: CHEST INDICATION:   E21.3: Hyperparathyroidism, unspecified. COMPARISON:  None EXAM PERFORMED/VIEWS:  XR CHEST PA & LATERAL FINDINGS: Cardiomediastinal silhouette appears unremarkable. The lungs are clear. Calcified right paratracheal and right hilar nodes. No pneumothorax or pleural effusion. Osseous structures appear within normal limits for patient age. Impression: No acute cardiopulmonary disease. Workstation performed: [id number]     I reviewed the above laboratory and imaging data. Discussion/Summary: 71-year-old woman status post left middle invasive parathyroidectomy. Follow-up in 6 months with blood work to assess for durability of operation. Copy of path reports were given to her for her records.

## 2023-11-29 NOTE — LETTER
November 29, 2023     Vicente Gonzalez DO  [address] [address] [facility]    Patient: Daya Jackman   YOB: 1960   Date of Visit: 11/29/2023       Dear Dr. Niru Manzo:    Thank you for referring Ayo Busch to me for evaluation. Below are my notes for this consultation. If you have questions, please do not hesitate to call me. I look forward to following your patient along with you. Sincerely,        Richmond Cherry MD        CC: James Triplett, MD Renee Potter MD Rondal Font Chaput, MD Nilda Lopez, [address] [place] [address]  [address] [place] [address]    Richmond Cherry MD  11/29/2023  2:13 PM  Sign when Signing Visit     Surgical Oncology Follow Up       [address] [id number]    Daya Jackman  1960  [id number]  [facility]  [facility] [place] [address]    Chief Complaint   Patient presents with   • Post-op     Patient being seen for post op. Left MIP 11/7/2023. Assessment/Plan:    No problem-specific Assessment & Plan notes found for this encounter. Diagnoses and all orders for this visit:    Status post parathyroidectomy ([address] [facility])  -     PTH, intact; Future  -     Calcium; Future  -     Vitamin D Panel; Future      Advance Care Planning/Advance Directives:  Discussed disease status, cancer treatment plans and/or cancer treatment goals with the patient. Oncology History    No history exists. History of Present Illness: 44-year-old woman here for postop check status post middle invasive left parathyroidectomy.  -Interval History: No major complaints after surgery. Review of Systems:  Review of Systems   Constitutional:  Positive for fatigue. HENT: Negative. Eyes: Negative. Respiratory: Negative.      Cardiovascular: Negative. Gastrointestinal: Negative. Endocrine: Negative. Genitourinary: Negative. Musculoskeletal:  Positive for arthralgias and myalgias. Skin: Negative. Allergic/Immunologic: Negative. Neurological: Negative. Hematological: Negative. Psychiatric/Behavioral: Negative. All other systems reviewed and are negative.       Patient Active Problem List   Diagnosis   • Hernia, ventral   • Type 2 diabetes mellitus with hyperglycemia, with long-term current use of insulin ([address])   • GI bleed   • Morbid obesity with BMI of 45.0-49.9, adult ([address])   • Ischemic colitis ([address])   • Unintentional weight loss   • Hypercalcemia   • Hyperparathyroidism ([address])   • Vitamin D deficiency   • Depression with anxiety   • Benign essential hypertension   • Falls frequently   • Numbness and tingling of right arm and leg   • Ambulatory dysfunction   • MS (multiple sclerosis) (HCC)   • Cognitive decline   • Right hemiparesis (HCC)   • Chronic daily headache   • Class 3 severe obesity due to excess calories with serious comorbidity and body mass index (BMI) of 45.0 to 49.9 in adult [facility])   • Thyroid nodule   • B12 deficiency   • Hyperlipidemia   • Obstructive sleep apnea syndrome   • Hip pain, right   • Positive for macroalbuminuria   • Other chronic pain   • Nausea & vomiting   • Bloating   • New daily persistent headache   • Medication overuse headache   • Visual disturbances   • Chronic post-traumatic headache, not intractable   • Primary osteoarthritis of right hip   • Lumbar radiculopathy   • Peripheral neuropathy   • Status post parathyroidectomy [facility])     Past Medical History:   Diagnosis Date   • Anxiety    • CPAP (continuous positive airway pressure) dependence     does not use   • Diabetes mellitus ([address])    • External hemorrhoids     last assessed 4/7/16, resolved 7/21/16   • Hernia, ventral     last assessed 12/14/15, resolved 7/21/16   • History of transfusion    • Hypertension    • Incarcerated incisional hernia     last assessed 12/21/15, resolved 16   • Insomnia     last assessed 16, resolved 10/9/17   • Lyme disease    • Morbid obesity with BMI of 45.0-49.9, adult ([address]) 2017   • MS (multiple sclerosis) ([address])    • Sleep apnea    • Stroke (cerebrum) ([address]) 2020   • Type 2 diabetes mellitus with hyperglycemia, without long-term current use of insulin ([address])      Past Surgical History:   Procedure Laterality Date   • ABCESS DRAINAGE     •  SECTION      x3   • COLONOSCOPY N/A 2017    Procedure: COLONOSCOPY with biopsy;  Surgeon: Luma Walker DO;  Location: [facility];   Service: Complete   • HYSTERECTOMY     • IR BIOPSY ABDOMEN  2021   • IR LUMBAR PUNCTURE  3/13/2020   • UT PARATHYROIDECTOMY/EXPLORATION PARATHYROIDS Left 2023    Procedure: LEFT MINIMALLY INVASIVE PARATHYROIDECTOMY;  Surgeon: Binta Cage MD;  Location: [facility];  Service: Surgical Oncology   • US GUIDED THYROID BIOPSY  2021     Family History   Problem Relation Age of Onset   • Glaucoma Mother    • Diabetes Father    • Hypertension Father    • Colon cancer Father    • Alzheimer's disease Father    • Cervical cancer Sister 61   • Breast cancer Sister 50   • Breast cancer Sister 45   • Breast cancer Maternal Aunt    • Breast cancer Maternal Aunt    • Breast cancer Maternal Aunt    • Breast cancer Maternal Grandmother    • Coronary artery disease Family    • Diabetes Family    • Hypertension Family      Social History     Socioeconomic History   • Marital status: /Civil Union     Spouse name: Not on file   • Number of children: Not on file   • Years of education: Not on file   • Highest education level: Not on file   Occupational History     Employer: [employer]   Tobacco Use   • Smoking status: Never   • Smokeless tobacco: Never   Vaping Use   • Vaping Use: Never used   Substance and Sexual Activity   • Alcohol use: Not Currently     Comment: Social   • Drug use: Yes     Types: Marijuana Comment: medical marijuana   • Sexual activity: Not Currently     Partners: Male     Birth control/protection: None   Other Topics Concern   • Not on file   Social History Narrative   • Not on file     Social Determinants of Health     Financial Resource Strain: High Risk (5/24/2023)    Overall Financial Resource Strain (CARDIA)    • Difficulty of Paying Living Expenses: Hard   Food Insecurity: Unknown (4/12/2021)    Hunger Vital Sign    • Worried About Running Out of Food in the Last Year: Never true    • Ran Out of Food in the Last Year: Not on file   Transportation Needs: Unmet Transportation Needs (5/24/2023)    PRAPARE - Transportation    • Lack of Transportation (Medical):  Yes    • Lack of Transportation (Non-Medical): Yes   Physical Activity: Inactive (7/30/2020)    Exercise Vital Sign    • Days of Exercise per Week: 0 days    • Minutes of Exercise per Session: 0 min   Stress: Stress Concern Present (7/30/2020)    [facility]    • Feeling of Stress : Rather much   Social Connections: Unknown (7/30/2020)    Social Connection and Isolation Panel [NHANES]    • Frequency of Communication with Friends and Family: More than three times a week    • Frequency of Social Gatherings with Friends and Family: Not on file    • Attends Restorationist Services: Not on file    • Active Member of Clubs or Organizations: Not on file    • Attends Club or Organization Meetings: Not on file    • Marital Status:    Intimate Partner Violence: Not At Risk (7/30/2020)    Humiliation, Afraid, Rape, and Kick questionnaire    • Fear of Current or Ex-Partner: No    • Emotionally Abused: No    • Physically Abused: No    • Sexually Abused: No   Housing Stability: Not on file       Current Outpatient Medications:   •  Alcohol Swabs PADS, by Does not apply route 4 (four) times a day *Latex Free*, Disp: 120 each, Rfl: 3  •  aspirin 81 mg chewable tablet, Chew 1 tablet (81 mg total) daily, Disp: 90 tablet, Rfl: 1  •  atorvastatin (LIPITOR) 40 mg tablet, TAKE 1 TABLET BY MOUTH ONCE DAILY IN THE EVENING, Disp: 90 tablet, Rfl: 0  •  BD Pen Needle Izzy 2nd Gen 32G X 4 MM MISC, INJECT   SUBCUTANEOUSLY IN THE MORNING, Disp: 100 each, Rfl: 0  •  Blood Glucose Monitoring Suppl (OneTouch Verio) w/Device KIT, Use in the morning, Disp: 1 kit, Rfl: 0  •  Blood Pressure KIT, by Does not apply route daily, Disp: 1 each, Rfl: 0  •  Cholecalciferol (Vitamin D3) 50 MCG (2000 UT) capsule, Take 1 capsule by mouth once daily, Disp: 30 capsule, Rfl: 2  •  cinacalcet (SENSIPAR) 60 MG tablet, Take 1 tablet (60 mg total) by mouth daily, Disp: 30 tablet, Rfl: 6  •  Continuous Blood Gluc Sensor (Dexcom G6 Sensor) MISC, USE AS DIRECTED, Disp: 9 each, Rfl: 0  •  Continuous Blood Gluc Transmit (Dexcom G6 Transmitter) MISC, Change Transmitter Every 90 days as directed, Disp: 1 each, Rfl: 0  •  diazepam (VALIUM) 5 mg tablet, Take 1 tablet (5 mg total) by mouth 60 minutes pre-procedure, Disp: 2 tablet, Rfl: 0  •  diphenhydrAMINE (BENADRYL) 25 mg tablet, Take 0.5 tablets (12.5 mg total) by mouth every 6 (six) hours, Disp: 20 tablet, Rfl: 0  •  DULoxetine (CYMBALTA) 30 mg delayed release capsule, Take 1 capsule (30 mg total) by mouth in the morning., Disp: 30 capsule, Rfl: 1  •  glucose blood (OneTouch Verio) test strip, Use 1 each in the morning, Disp: 100 each, Rfl: 1  •  hydrALAZINE (APRESOLINE) 10 mg tablet, Take 1 tablet (10 mg total) by mouth 3 (three) times a day, Disp: 90 tablet, Rfl: 0  •  hydrochlorothiazide (HYDRODIURIL) 50 mg tablet, Take 1 tablet (50 mg total) by mouth daily, Disp: 90 tablet, Rfl: 3  •  Incontinence Supply Disposable (Prevail Women Underwear XL) MISC, Use every 6 (six) hours as needed (incontinence), Disp: 120 each, Rfl: 11  •  Interferon Beta-1b (Betaseron) 0.3 MG KIT, Inject 1 ml (0.25 mg) subcutaneously every other day, Disp: 14 kit, Rfl: 11  •  Lancets (OneTouch Delica Plus ZGATHL25L) MISC, Test BG up to 1x daily as directed, Disp: 100 each, Rfl: 1  •  lisinopril (ZESTRIL) 40 mg tablet, Take 1 tablet (40 mg total) by mouth daily, Disp: 90 tablet, Rfl: 0  •  metFORMIN (GLUCOPHAGE-XR) 500 mg 24 hr tablet, TAKE 2 TABLETS BY MOUTH TWICE DAILY WITH MEALS, Disp: 360 tablet, Rfl: 0  •  metoprolol succinate (TOPROL-XL) 50 mg 24 hr tablet, Take 1 tablet (50 mg total) by mouth daily, Disp: 90 tablet, Rfl: 3  •  pregabalin (LYRICA) 75 mg capsule, Take 1 capsule (75 mg total) by mouth 2 (two) times a day, Disp: 60 capsule, Rfl: 2  •  Tresiba FlexTouch 100 units/mL injection pen, INJECT 25 UNITS SUBCUTANEOUSLY ONCE DAILY, Disp: 15 mL, Rfl: 0  •  vitamin B-12 (VITAMIN B-12) 500 mcg tablet, Take 2 tablets (1,000 mcg total) by mouth daily, Disp: 60 tablet, Rfl: 5  •  naloxone (NARCAN) 4 mg/0.1 mL nasal spray, Administer 1 spray into a nostril. If no response after 2-3 minutes, give another dose in the other nostril using a new spray.  (Patient not taking: Reported on 11/29/2023), Disp: 1 each, Rfl: 1  •  ondansetron (ZOFRAN-ODT) 4 mg disintegrating tablet, Take 1 tablet (4 mg total) by mouth every 6 (six) hours as needed for nausea or vomiting (Patient not taking: Reported on 10/18/2023), Disp: 20 tablet, Rfl: 0  •  traMADol (Ultram) 50 mg tablet, Take 1 tablet (50 mg total) by mouth every 6 (six) hours as needed for moderate pain (Patient not taking: Reported on 6/14/2023), Disp: 10 tablet, Rfl: 0    Current Facility-Administered Medications:   •  prochlorperazine (COMPAZINE) injection 5 mg, 5 mg, Intramuscular, Q4H PRN, Alo Helton MD, 5 mg at 11/11/22 1040  Allergies   Allergen Reactions   • Sorbitan Diarrhea, Vomiting and Abdominal Pain   • Victoza [Liraglutide] Nausea Only   • Ambien [Zolpidem Tartrate]    • Demerol [Meperidine]    • Insulin Glargine Other (See Comments)     Annotation - [id number]: memory loss   • Latex    • Oxycodone Hives, Rash and Vomiting     Vitals:    11/29/23 1358   BP: 142/82   Pulse: 74   Resp: 16   Temp: 97.6 °F (36.4 °C)   SpO2: 95%       Physical Exam  Neck:      Comments: Incision c/d/i  Musculoskeletal:      Cervical back: Normal range of motion and neck supple. Results:  Labs:  Lab Results   Component Value Date    CALCIUM 9.6 11/07/2023    PHOS 2.8 09/27/2023     Lab Results   Component Value Date    PTH 38.5 11/07/2023    CALCIUM 9.6 11/07/2023    PHOS 2.8 09/27/2023       Case Report   Surgical Pathology Report                         Case: [id number]                                   Authorizing Provider:  Hilton Stevens MD        Collected:           11/07/2023 2213               Ordering Location:     [facility]      Received:            11/07/2023 [address] Operating Room                                                       Pathologist:           Umu Garza DO                                                     Specimen:    Parathyroid, Left parathyroid                                                              Final Diagnosis   A. Parathyroid, Left, Parathyroidectomy:  - Parathyroid adenoma, 0.26 grams. Electronically signed by Umu Garza DO on 11/13/2023 at  9:25 AM         Comments: This is an appended report. These results have been appended to a previously preliminary verified report. Imaging  XR chest pa & lateral    Result Date: 11/2/2023  Narrative: CHEST INDICATION:   E21.3: Hyperparathyroidism, unspecified. COMPARISON:  None EXAM PERFORMED/VIEWS:  XR CHEST PA & LATERAL FINDINGS: Cardiomediastinal silhouette appears unremarkable. The lungs are clear. Calcified right paratracheal and right hilar nodes. No pneumothorax or pleural effusion. Osseous structures appear within normal limits for patient age. Impression: No acute cardiopulmonary disease.  Workstation performed: [id number]     I reviewed the above laboratory and imaging data.    Discussion/Summary: 67-year-old woman status post left middle invasive parathyroidectomy. Follow-up in 6 months with blood work to assess for durability of operation. Copy of path reports were given to her for her records.

## 2023-12-03 ENCOUNTER — NURSE TRIAGE (OUTPATIENT)
Dept: OTHER | Facility: OTHER | Age: 63
End: 2023-12-03

## 2023-12-03 ENCOUNTER — HOSPITAL ENCOUNTER (EMERGENCY)
Facility: HOSPITAL | Age: 63
Discharge: HOME | End: 2023-12-03
Attending: EMERGENCY MEDICINE
Payer: COMMERCIAL

## 2023-12-03 VITALS
SYSTOLIC BLOOD PRESSURE: 190 MMHG | HEART RATE: 79 BPM | OXYGEN SATURATION: 94 % | RESPIRATION RATE: 18 BRPM | DIASTOLIC BLOOD PRESSURE: 80 MMHG | TEMPERATURE: 97.7 F

## 2023-12-03 DIAGNOSIS — T78.3XXA ANGIOEDEMA, INITIAL ENCOUNTER: Primary | ICD-10-CM

## 2023-12-03 PROCEDURE — 96365 THER/PROPH/DIAG IV INF INIT: CPT

## 2023-12-03 PROCEDURE — 96375 TX/PRO/DX INJ NEW DRUG ADDON: CPT

## 2023-12-03 PROCEDURE — 99285 EMERGENCY DEPT VISIT HI MDM: CPT | Performed by: EMERGENCY MEDICINE

## 2023-12-03 PROCEDURE — 96366 THER/PROPH/DIAG IV INF ADDON: CPT

## 2023-12-03 PROCEDURE — 99284 EMERGENCY DEPT VISIT MOD MDM: CPT

## 2023-12-03 PROCEDURE — 96372 THER/PROPH/DIAG INJ SC/IM: CPT

## 2023-12-03 RX ORDER — FAMOTIDINE 20 MG/1
40 TABLET, FILM COATED ORAL ONCE
Status: COMPLETED | OUTPATIENT
Start: 2023-12-03 | End: 2023-12-03

## 2023-12-03 RX ORDER — METHYLPREDNISOLONE SODIUM SUCCINATE 125 MG/2ML
125 INJECTION, POWDER, LYOPHILIZED, FOR SOLUTION INTRAMUSCULAR; INTRAVENOUS ONCE
Status: COMPLETED | OUTPATIENT
Start: 2023-12-03 | End: 2023-12-03

## 2023-12-03 RX ORDER — EPINEPHRINE 0.1 MG/ML
0.3 INJECTION INTRAVENOUS ONCE
Status: DISCONTINUED | OUTPATIENT
Start: 2023-12-03 | End: 2023-12-03

## 2023-12-03 RX ORDER — EPINEPHRINE 1 MG/ML
0.3 INJECTION, SOLUTION, CONCENTRATE INTRAVENOUS ONCE
Status: COMPLETED | OUTPATIENT
Start: 2023-12-03 | End: 2023-12-03

## 2023-12-03 RX ADMIN — FAMOTIDINE 40 MG: 20 TABLET ORAL at 14:03

## 2023-12-03 RX ADMIN — METHYLPREDNISOLONE SODIUM SUCCINATE 125 MG: 125 INJECTION, POWDER, FOR SOLUTION INTRAMUSCULAR; INTRAVENOUS at 13:58

## 2023-12-03 RX ADMIN — TRANEXAMIC ACID 1000 MG: 1 INJECTION, SOLUTION INTRAVENOUS at 14:09

## 2023-12-03 RX ADMIN — EPINEPHRINE 0.3 MG: 1 INJECTION, SOLUTION, CONCENTRATE INTRAVENOUS at 13:55

## 2023-12-03 NOTE — TELEPHONE ENCOUNTER
Regarding: swollen lips  ----- Message from Brianna Feldre sent at 12/3/2023 11:32 AM EST -----  " My lips are swollen, I had blueberries with milk and a piece of Israeli toast. I fell asleep and didn't realize anything was wrong,when I wanted to drink it kept coming out of my mouth. "

## 2023-12-03 NOTE — DISCHARGE INSTRUCTIONS
Stop taking the lisinopril. Return to ER for any difficulty breathing or swelling in your lips, tongue, or throat.

## 2023-12-03 NOTE — ED PROVIDER NOTES
History  Chief Complaint   Patient presents with    Facial Swelling     Pt came in via EMS from home. Pt reports waking up from a nap and noticed swelling to left side of face. Pt has swelling to left upper and lower lip and cheek. Pt denies difficulty swallowing and SOB. Airway is patent. 81-year-old female with MS presents with new onset left-sided upper and lower lip swelling. Symptoms first noted this morning at 11 AM when she woke up. Patient is present with her granddaughter who was with her this morning when she woke up. Both patient and granddaughter agree that symptoms have not worsened since onset. Patient denies dysphagia, tongue swelling, throat swelling, dyspnea, abdominal pain, nausea, itching, or rashes. No history of similar symptoms. Denies history of allergic reactions. No family history of angioedema. No new medications. Patient takes lisinopril daily. Prior to Admission Medications   Prescriptions Last Dose Informant Patient Reported? Taking? Alcohol Swabs PADS  Self No No   Sig: by Does not apply route 4 (four) times a day *Latex Free*   BD Pen Needle Izzy 2nd Gen 32G X 4 MM MISC  Self No No   Sig: INJECT   SUBCUTANEOUSLY IN THE MORNING   Blood Glucose Monitoring Suppl (OneTouch Verio) w/Device KIT  Self No No   Sig: Use in the morning   Blood Pressure KIT  Self No No   Sig: by Does not apply route daily   Cholecalciferol (Vitamin D3) 50 MCG (2000 UT) capsule  Self No No   Sig: Take 1 capsule by mouth once daily   Continuous Blood Gluc Sensor (Dexcom G6 Sensor) MISC  Self No No   Sig: USE AS DIRECTED   Continuous Blood Gluc Transmit (Dexcom G6 Transmitter) MISC  Self No No   Sig: Change Transmitter Every 90 days as directed   DULoxetine (CYMBALTA) 30 mg delayed release capsule  Self No No   Sig: Take 1 capsule (30 mg total) by mouth in the morning.    Incontinence Supply Disposable (Prevail Women Underwear XL) MISC  Self No No   Sig: Use every 6 (six) hours as needed (incontinence)   Interferon Beta-1b (Betaseron) 0.3 MG KIT  Self No No   Sig: Inject 1 ml (0.25 mg) subcutaneously every other day   Lancets (OneTouch Delica Plus [id number]) MISC  Self No No   Sig: Test BG up to 1x daily as directed   Tresiba FlexTouch 100 units/mL injection pen  Self No No   Sig: INJECT 25 UNITS SUBCUTANEOUSLY ONCE DAILY   aspirin 81 mg chewable tablet  Self No No   Sig: Chew 1 tablet (81 mg total) daily   atorvastatin (LIPITOR) 40 mg tablet  Self No No   Sig: TAKE 1 TABLET BY MOUTH ONCE DAILY IN THE EVENING   cinacalcet (SENSIPAR) 60 MG tablet  Self No No   Sig: Take 1 tablet (60 mg total) by mouth daily   diazepam (VALIUM) 5 mg tablet  Self No No   Sig: Take 1 tablet (5 mg total) by mouth 60 minutes pre-procedure   diphenhydrAMINE (BENADRYL) 25 mg tablet  Self No No   Sig: Take 0.5 tablets (12.5 mg total) by mouth every 6 (six) hours   glucose blood (OneTouch Verio) test strip  Self No No   Sig: Use 1 each in the morning   hydrALAZINE (APRESOLINE) 10 mg tablet  Self No No   Sig: Take 1 tablet (10 mg total) by mouth 3 (three) times a day   hydrochlorothiazide (HYDRODIURIL) 50 mg tablet  Self No No   Sig: Take 1 tablet (50 mg total) by mouth daily   lisinopril (ZESTRIL) 40 mg tablet  Self No No   Sig: Take 1 tablet (40 mg total) by mouth daily   metFORMIN (GLUCOPHAGE-XR) 500 mg 24 hr tablet  Self No No   Sig: TAKE 2 TABLETS BY MOUTH TWICE DAILY WITH MEALS   metoprolol succinate (TOPROL-XL) 50 mg 24 hr tablet  Self No No   Sig: Take 1 tablet (50 mg total) by mouth daily   naloxone (NARCAN) 4 mg/0.1 mL nasal spray  Self No No   Sig: Administer 1 spray into a nostril. If no response after 2-3 minutes, give another dose in the other nostril using a new spray.    Patient not taking: Reported on 11/29/2023   ondansetron (ZOFRAN-ODT) 4 mg disintegrating tablet  Self No No   Sig: Take 1 tablet (4 mg total) by mouth every 6 (six) hours as needed for nausea or vomiting   Patient not taking: Reported on 10/18/2023   pregabalin (LYRICA) 75 mg capsule  Self No No   Sig: Take 1 capsule (75 mg total) by mouth 2 (two) times a day   traMADol (Ultram) 50 mg tablet  Self No No   Sig: Take 1 tablet (50 mg total) by mouth every 6 (six) hours as needed for moderate pain   Patient not taking: Reported on 2023   vitamin B-12 (VITAMIN B-12) 500 mcg tablet  Self No No   Sig: Take 2 tablets (1,000 mcg total) by mouth daily      Facility-Administered Medications Last Administration Doses Remaining   prochlorperazine (COMPAZINE) injection 5 mg 2022 10:40 AM           Past Medical History:   Diagnosis Date    Anxiety     CPAP (continuous positive airway pressure) dependence     does not use    Diabetes mellitus (HCC)     External hemorrhoids     last assessed 16, resolved 16    Hernia, ventral     last assessed 12/14/15, resolved 16    History of transfusion     Hypertension     Incarcerated incisional hernia     last assessed 12/21/15, resolved 16    Insomnia     last assessed 16, resolved 10/9/17    Lyme disease     Morbid obesity with BMI of 45.0-49.9, adult ([address]) 2017    MS (multiple sclerosis) ([address])     Sleep apnea     Stroke (cerebrum) ([address]) 2020    Type 2 diabetes mellitus with hyperglycemia, without long-term current use of insulin ([address])        Past Surgical History:   Procedure Laterality Date    ABCESS DRAINAGE       SECTION      x3    COLONOSCOPY N/A 2017    Procedure: COLONOSCOPY with biopsy;  Surgeon: Manav Myrick DO;  Location: [facility];   Service: Complete    HYSTERECTOMY      IR BIOPSY ABDOMEN  2021    IR LUMBAR PUNCTURE  3/13/2020    MO PARATHYROIDECTOMY/EXPLORATION PARATHYROIDS Left 2023    Procedure: LEFT MINIMALLY INVASIVE PARATHYROIDECTOMY;  Surgeon: Charles Gonzales MD;  Location: [facility];  Service: Surgical Oncology    US GUIDED THYROID BIOPSY  2021       Family History   Problem Relation Age of Onset    Glaucoma Mother Diabetes Father     Hypertension Father     Colon cancer Father     Alzheimer's disease Father     Cervical cancer Sister 61    Breast cancer Sister 50    Breast cancer Sister 45    Breast cancer Maternal Aunt     Breast cancer Maternal Aunt     Breast cancer Maternal Aunt     Breast cancer Maternal Grandmother     Coronary artery disease Family     Diabetes Family     Hypertension Family      I have reviewed and agree with the history as documented. E-Cigarette/Vaping    E-Cigarette Use Never User      E-Cigarette/Vaping Substances    Nicotine No     THC No     CBD No     Flavoring No     Other No     Unknown No      Social History     Tobacco Use    Smoking status: Never    Smokeless tobacco: Never   Vaping Use    Vaping Use: Never used   Substance Use Topics    Alcohol use: Not Currently     Comment: Social    Drug use: Not Currently     Types: Marijuana     Comment: medical marijuana        Review of Systems   Constitutional:  Negative for chills and fever. HENT:  Negative for ear pain and sore throat. Eyes:  Negative for pain and visual disturbance. Respiratory:  Negative for cough and shortness of breath. Cardiovascular:  Negative for chest pain and palpitations. Gastrointestinal:  Negative for abdominal pain and vomiting. Genitourinary:  Negative for dysuria and hematuria. Musculoskeletal:  Negative for arthralgias and back pain. Skin:  Negative for color change and rash. Neurological:  Negative for seizures and syncope. All other systems reviewed and are negative.       Physical Exam  ED Triage Vitals   Temperature Pulse Respirations Blood Pressure SpO2   12/03/23 1257 12/03/23 1254 12/03/23 1254 12/03/23 1254 12/03/23 1254   97.7 °F (36.5 °C) 78 18 (!) 186/86 96 %      Temp Source Heart Rate Source Patient Position - Orthostatic VS BP Location FiO2 (%)   12/03/23 1257 12/03/23 1254 12/03/23 1254 12/03/23 1254 --   Oral Monitor Lying Right arm       Pain Score       -- Orthostatic Vital Signs  Vitals:    12/03/23 1254 12/03/23 1430 12/03/23 1639   BP: (!) 186/86 166/92 (!) 190/80   Pulse: 78 63 79   Patient Position - Orthostatic VS: Lying Lying Sitting       Physical Exam  Vitals and nursing note reviewed. Constitutional:       General: She is not in acute distress. Appearance: She is not ill-appearing, toxic-appearing or diaphoretic. HENT:      Head: Normocephalic and atraumatic. Right Ear: External ear normal.      Left Ear: External ear normal.      Nose: Nose normal.      Mouth/Throat:      Mouth: Mucous membranes are moist.      Pharynx: Oropharynx is clear. Comments: Localized edema of left upper and lower lip. No tongue swelling. Normal phonation. No stridor. No intraoral erythema. Eyes:      General:         Right eye: No discharge. Left eye: No discharge. Conjunctiva/sclera: Conjunctivae normal.   Cardiovascular:      Rate and Rhythm: Normal rate and regular rhythm. Pulses: Normal pulses. Pulmonary:      Effort: Pulmonary effort is normal. No respiratory distress. Breath sounds: Normal breath sounds. No stridor. No wheezing, rhonchi or rales. Abdominal:      General: Abdomen is flat. Bowel sounds are normal. There is no distension. Palpations: Abdomen is soft. Tenderness: There is no abdominal tenderness. There is no guarding. Musculoskeletal:         General: Normal range of motion. Cervical back: Normal range of motion and neck supple. No rigidity or tenderness. Skin:     General: Skin is warm and dry. Capillary Refill: Capillary refill takes less than 2 seconds. Coloration: Skin is not pale. Neurological:      Mental Status: She is alert and oriented to person, place, and time.    Psychiatric:         Mood and Affect: Mood normal.         Behavior: Behavior normal.         ED Medications  Medications   famotidine (PEPCID) tablet 40 mg (40 mg Oral Given 12/3/23 1403) methylPREDNISolone sodium succinate (Solu-MEDROL) injection 125 mg (125 mg Intravenous Given 12/3/23 1358)   tranexamic Acid 1,000 mg in sodium chloride 0.9 % 500 mL IVPB (0 mg Intravenous Stopped 12/3/23 1639)   EPINEPHrine PF (ADRENALIN) 1 mg/mL injection 0.3 mg (0.3 mg Intramuscular Given 12/3/23 1355)       Diagnostic Studies  Results Reviewed       None                   No orders to display         Procedures  Procedures      ED Course                             SBIRT 20yo+      Flowsheet Row Most Recent Value   Initial Alcohol Screen: US AUDIT-C     1. How often do you have a drink containing alcohol? 0 Filed at: 12/03/2023 1256   2. How many drinks containing alcohol do you have on a typical day you are drinking? 0 Filed at: 12/03/2023 1256   3b. FEMALE Any Age, or MALE 65+: How often do you have 4 or more drinks on one occassion? 0 Filed at: 12/03/2023 1256   Audit-C Score 0 Filed at: 12/03/2023 1256   GOVIND: How many times in the past year have you. .. Used an illegal drug or used a prescription medication for non-medical reasons? Never Filed at: 12/03/2023 1256                  Medical Decision Making  52-year-old female with presentation suggestive of ACE inhibitor induced angioedema. No family history of similar symptoms to suggest hereditary angioedema. No findings on physical exam to suggest anaphylaxis. Differential also includes acute allergic reaction. Plan: Epinephrine IM, Pepcid, steroids, TXA, close observation in the ED    After approximately 3 and half hours in the department patient's symptoms are nearly resolved. Patient feels well enough to go home. Structured patient to stop taking her lisinopril and call her PCP today to report her ED visit. Risk  Prescription drug management.           Disposition  Final diagnoses:   Angioedema, initial encounter     Time reflects when diagnosis was documented in both MDM as applicable and the Disposition within this note       Time User Action Codes Description Comment    12/3/2023  4:29 PM Pastor Morrissey Add [R22.0] Swelling of both lips     12/3/2023  4:29 PM Pastor Morrissey Remove [R22.0] Swelling of both lips     12/3/2023  4:29 PM Silvia Marlon. 3XXA] Angioedema, initial encounter           ED Disposition       ED Disposition   Discharge    Condition   Stable    Date/Time   Sun Dec 3, 2023 [address] discharge to home/self care.                    Follow-up Information       Follow up With Specialties Details Why Contact Slade Morales DO Family Medicine Call   [address]  [facility]  [address] [place] [address]  [phone]              Discharge Medication List as of 12/3/2023  4:30 PM        CONTINUE these medications which have NOT CHANGED    Details   Alcohol Swabs PADS by Does not apply route 4 (four) times a day *Latex Free*, Starting Thu 5/21/2020, Normal      aspirin 81 mg chewable tablet Chew 1 tablet (81 mg total) daily, Starting Mon 3/28/2022, Normal      atorvastatin (LIPITOR) 40 mg tablet TAKE 1 TABLET BY MOUTH ONCE DAILY IN THE EVENING, Starting Mon 10/2/2023, Normal      BD Pen Needle Izzy 2nd Gen 32G X 4 MM MISC INJECT   SUBCUTANEOUSLY IN THE MORNING, Normal      Blood Glucose Monitoring Suppl (OneTouch Verio) w/Device KIT Use in the morning, Starting Fri 11/4/2022, Normal      Blood Pressure KIT by Does not apply route daily, Starting Thu 5/21/2020, Normal      Cholecalciferol (Vitamin D3) 50 MCG (2000 UT) capsule Take 1 capsule by mouth once daily, Starting Thu 11/16/2023, Normal      cinacalcet (SENSIPAR) 60 MG tablet Take 1 tablet (60 mg total) by mouth daily, Starting Wed 6/14/2023, Normal      Continuous Blood Gluc Sensor (Dexcom G6 Sensor) MISC USE AS DIRECTED, Normal      Continuous Blood Gluc Transmit (Dexcom G6 Transmitter) MISC Change Transmitter Every 90 days as directed, Normal      diazepam (VALIUM) 5 mg tablet Take 1 tablet (5 mg total) by mouth 60 minutes pre-procedure, Starting Thu 5/25/2023, Normal      diphenhydrAMINE (BENADRYL) 25 mg tablet Take 0.5 tablets (12.5 mg total) by mouth every 6 (six) hours, Starting Fri 11/11/2022, Normal      DULoxetine (CYMBALTA) 30 mg delayed release capsule Take 1 capsule (30 mg total) by mouth in the morning., Starting Fri 5/13/2022, Normal      glucose blood (OneTouch Verio) test strip Use 1 each in the morning, Starting Fri 11/4/2022, Normal      hydrALAZINE (APRESOLINE) 10 mg tablet Take 1 tablet (10 mg total) by mouth 3 (three) times a day, Starting Thu 11/17/2022, Normal      hydrochlorothiazide (HYDRODIURIL) 50 mg tablet Take 1 tablet (50 mg total) by mouth daily, Starting Wed 12/14/2022, Normal      Incontinence Supply Disposable (Prevail Women Underwear XL) MISC Use every 6 (six) hours as needed (incontinence), Starting Thu 1/13/2022, Print      Interferon Beta-1b (Betaseron) 0.3 MG KIT Inject 1 ml (0.25 mg) subcutaneously every other day, Normal      Lancets (OneTouch Delica Plus [id number]) MISC Test BG up to 1x daily as directed, Normal      lisinopril (ZESTRIL) 40 mg tablet Take 1 tablet (40 mg total) by mouth daily, Starting Mon 9/11/2023, Normal      metFORMIN (GLUCOPHAGE-XR) 500 mg 24 hr tablet TAKE 2 TABLETS BY MOUTH TWICE DAILY WITH MEALS, Normal      metoprolol succinate (TOPROL-XL) 50 mg 24 hr tablet Take 1 tablet (50 mg total) by mouth daily, Starting Wed 12/14/2022, Normal      naloxone (NARCAN) 4 mg/0.1 mL nasal spray Administer 1 spray into a nostril.  If no response after 2-3 minutes, give another dose in the other nostril using a new spray., Normal      ondansetron (ZOFRAN-ODT) 4 mg disintegrating tablet Take 1 tablet (4 mg total) by mouth every 6 (six) hours as needed for nausea or vomiting, Starting Fri 3/3/2023, Normal      pregabalin (LYRICA) 75 mg capsule Take 1 capsule (75 mg total) by mouth 2 (two) times a day, Starting Wed 8/9/2023, Until Wed 11/29/2023, Normal      traMADol (Ultram) 50 mg tablet Take 1 tablet (50 mg total) by mouth every 6 (six) hours as needed for moderate pain, Starting Wed 6/7/2023, Normal      Tresiba FlexTouch 100 units/mL injection pen INJECT 25 UNITS SUBCUTANEOUSLY ONCE DAILY, Normal      vitamin B-12 (VITAMIN B-12) 500 mcg tablet Take 2 tablets (1,000 mcg total) by mouth daily, Starting Wed 6/30/2021, Normal           No discharge procedures on file. PDMP Review         Value Time User    PDMP Reviewed  Yes 6/7/2023  3:14 PM Rochelle Gandara MD             ED Provider  Attending physically available and evaluated Jacob Harden. I managed the patient along with the ED Attending.     Electronically Signed by           Amy Conde MD  12/03/23 3734

## 2023-12-03 NOTE — ED ATTENDING ATTESTATION
[facility]3/2023  [facility] and [facility]. I have discussed the patient with the resident/non-physician practitioner and agree with the resident's/non-physician practitioner's findings, Plan of Care, and MDM as documented in the resident's/non-physician practitioner's note, except where noted. All available labs and Radiology studies were reviewed. I was present for key portions of any procedure(s) performed by the resident/non-physician practitioner and I was immediately available to provide assistance. At this point I agree with the current assessment done in the Emergency Department. I have conducted an independent evaluation of this patient a history and physical is as follows:      A 61-year-old female with past medical history of diabetes, hypertension, CVA and multiple sclerosis; presents with left-sided upper and lower lip swelling. Patient noted the swelling upon waking up around 11 am this morning. She believes the swelling has gotten slightly better since waking up. She denies tongue swelling. She also denies fever, chills, chest pain, shortness of breath, abdominal pain, nausea, vomiting, diarrhea, peripheral edema and rashes. She denies history of similar symptoms. She is on lisinopril, denies recent dose changes. Physical Exam  General Appearance: alert and oriented, nad, non toxic appearing  Skin:  Warm, dry, intact  HEENT: atraumatic, normocephalic. Swelling noted to left upper and lower lips. No tongue or oropharynx involvement, normal voice, tolerating secretions appropriately.   Neck: Supple, trachea midline  Cardiac: RRR; no murmurs, rub, gallops  Pulmonary: lungs CTAB; no wheezes, rales, rhonchi  Gastrointestinal: abdomen soft, nontender, nondistended; no guarding or rebound tenderness; good bowel sounds, no mass or bruits  Extremities:  no pedal edema, 2+ pulses; no calf tenderness, no clubbing, no cyanosis  Neuro:  no focal motor or sensory deficits, CN 2-12 grossly intact  Psych:  Normal mood and affect, normal judgement and insight    Assessment and Plan:  Angioedema, isolated swelling to the left side of the upper and lower lips. Likely secondary to her ACE inhibitor. Will treat with IM epi, Pepcid, steroids and TXA. Will continue to monitor in the ED. ED Course         Critical Care Time  CriticalCare Time    Date/Time: 12/3/2023 3:44 PM    Performed by: Italo Johns DO  Authorized by: Italo Johns DO    Critical care provider statement:     Critical care time (minutes):  30    Critical care time was exclusive of:  Separately billable procedures and treating other patients and teaching time    Critical care was necessary to treat or prevent imminent or life-threatening deterioration of the following conditions: angioedema.     Critical care was time spent personally by me on the following activities:  Obtaining history from patient or surrogate, development of treatment plan with patient or surrogate, examination of patient, evaluation of patient's response to treatment, re-evaluation of patient's condition, ordering and review of radiographic studies and ordering and performing treatments and interventions (IM epi, TXA infusion)    I assumed direction of critical care for this patient from another provider in my specialty: no

## 2023-12-03 NOTE — TELEPHONE ENCOUNTER
Reason for Disposition  • Sounds like a life-threatening emergency to the triager    Answer Assessment - Initial Assessment Questions  Patient stated cannot speak now. Cannot get words out. Speech was slowed and slightly slurred. Advised to call 911 to go to ED. Patient placed son on phone and advised the same. He hung up and called for 911.     Protocols used: Lip Swelling-ADULT-

## 2023-12-04 ENCOUNTER — TELEPHONE (OUTPATIENT)
Dept: FAMILY MEDICINE CLINIC | Facility: CLINIC | Age: 63
End: 2023-12-04

## 2023-12-04 NOTE — TELEPHONE ENCOUNTER
Pt was seen in the ER was taken off of lisinopril because of reaction is asking for a call to talk about what else she could take instead of that. She has an appt on Wednesday.     Please advise [phone]

## 2023-12-05 ENCOUNTER — RA CDI HCC (OUTPATIENT)
Dept: OTHER | Facility: HOSPITAL | Age: 63
End: 2023-12-05

## 2023-12-05 NOTE — PROGRESS NOTES
[address] [facility] coding opportunities       Chart reviewed, no opportunity found: CHART REVIEWED, NO OPPORTUNITY FOUND        Patients Insurance     Medicare Insurance: [facility], [place]

## 2023-12-06 ENCOUNTER — OFFICE VISIT (OUTPATIENT)
Dept: FAMILY MEDICINE CLINIC | Facility: CLINIC | Age: 63
End: 2023-12-06
Payer: COMMERCIAL

## 2023-12-06 VITALS
SYSTOLIC BLOOD PRESSURE: 152 MMHG | OXYGEN SATURATION: 98 % | RESPIRATION RATE: 20 BRPM | BODY MASS INDEX: 49.59 KG/M2 | TEMPERATURE: 97.3 F | DIASTOLIC BLOOD PRESSURE: 80 MMHG | WEIGHT: 293 LBS | HEART RATE: 75 BPM

## 2023-12-06 DIAGNOSIS — I10 BENIGN ESSENTIAL HYPERTENSION: Primary | ICD-10-CM

## 2023-12-06 PROCEDURE — 99213 OFFICE O/P EST LOW 20 MIN: CPT

## 2023-12-06 RX ORDER — AMLODIPINE BESYLATE 5 MG/1
5 TABLET ORAL DAILY
Qty: 30 TABLET | Refills: 0 | Status: SHIPPED | OUTPATIENT
Start: 2023-12-06

## 2023-12-06 NOTE — ASSESSMENT & PLAN NOTE
Patient presents today to discuss replacements for the lisinopril after experiencing left-sided facial angioedema, thought to be caused by the lisinopril. She has been on lisinopril for 10 years, but recently had part of her thyroid removed, so this is a potential cause of the side effect. Angioedema has resolved since her ER visit. Blood pressure that I obtained today was 150/82, which is elevated from her previous baseline. Patient is agreeable today to start amlodipine 5 mg. She was counseled that the common side effect of amlodipine is ankle swelling however this is more common with the 10 mg dose. She will let us know if she experiences this. Patient also counseled on the warning signs of her blood pressure running too low including lightheadedness and dizziness. Patient is also aware that if her blood pressure becomes too high and she is to develop headaches, chest pain, shortness of breath-these are reasons to go to the ER for evaluation. Patient also counseled to begin taking her blood pressure at home with her cuff, either once a day or once every couple of days. She will bring in readings at a follow-up visit in 2 weeks. Patient can follow-up with either myself or Dr. Niru Manzo.  If patient is still getting high blood pressure readings, we may consider increasing her dose to 10 mg, or switching her to a different medication.

## 2023-12-06 NOTE — PROGRESS NOTES
Name: Bina Pastrana      : 1960      MRN: [id number]  Encounter Provider: Isidro Ceja PA-C  Encounter Date: 2023   Encounter department: [address] [place] [address]. Benign essential hypertension  Assessment & Plan:  Patient presents today to discuss replacements for the lisinopril after experiencing left-sided facial angioedema, thought to be caused by the lisinopril. She has been on lisinopril for 10 years, but recently had part of her thyroid removed, so this is a potential cause of the side effect. Angioedema has resolved since her ER visit. Blood pressure that I obtained today was 150/82, which is elevated from her previous baseline. Patient is agreeable today to start amlodipine 5 mg. She was counseled that the common side effect of amlodipine is ankle swelling however this is more common with the 10 mg dose. She will let us know if she experiences this. Patient also counseled on the warning signs of her blood pressure running too low including lightheadedness and dizziness. Patient is also aware that if her blood pressure becomes too high and she is to develop headaches, chest pain, shortness of breath-these are reasons to go to the ER for evaluation. Patient also counseled to begin taking her blood pressure at home with her cuff, either once a day or once every couple of days. She will bring in readings at a follow-up visit in 2 weeks. Patient can follow-up with either myself or Dr. Severino Baum.  If patient is still getting high blood pressure readings, we may consider increasing her dose to 10 mg, or switching her to a different medication. Orders:  -     amLODIPine (NORVASC) 5 mg tablet; Take 1 tablet (5 mg total) by mouth daily           Subjective      Patient presents today for an ER follow-up. Patient was in the ER on 11/3 with left-sided facial angioedema. In the ER her blood pressure was 190/80.   Blood pressure was previously controlled with lisinopril 40, metoprolol 50, and hydrochlorothiazide 50. She has not taken the hydralazine for many months. ER suggested that this was a reaction from her lisinopril, which she has been taking for 10 years. However, patient did just have part of her thyroid removed, so reaction could have been as a result from that. The ER gave her an injection of epinephrine as well as steroids via IV which helped with her symptoms. Patient has not taken the lisinopril since. Patient has not been checking her blood pressure at home, but states she does have a cuff. She denies headaches, chest pain, shortness of breath, lightheadedness, and dizziness. She presents today to discuss replacements for the lisinopril. Review of Systems   Constitutional:  Negative for chills, fatigue and fever. HENT:  Negative for congestion, ear pain, facial swelling, hearing loss, sinus pressure and sore throat. Respiratory:  Negative for cough, choking and shortness of breath. Cardiovascular:  Negative for chest pain, palpitations and leg swelling. Gastrointestinal:  Negative for abdominal pain. Genitourinary:  Negative for difficulty urinating and dysuria. Musculoskeletal:  Negative for arthralgias and myalgias. Neurological:  Negative for dizziness, tremors, syncope, speech difficulty, light-headedness, numbness and headaches. Psychiatric/Behavioral:  Negative for behavioral problems. The patient is not nervous/anxious.         Current Outpatient Medications on File Prior to Visit   Medication Sig    Alcohol Swabs PADS by Does not apply route 4 (four) times a day *Latex Free*    aspirin 81 mg chewable tablet Chew 1 tablet (81 mg total) daily    atorvastatin (LIPITOR) 40 mg tablet TAKE 1 TABLET BY MOUTH ONCE DAILY IN THE EVENING    BD Pen Needle Izzy 2nd Gen 32G X 4 MM MISC INJECT   SUBCUTANEOUSLY IN THE MORNING    Blood Glucose Monitoring Suppl (OneTouch Verio) w/Device KIT Use in the morning    Blood Pressure KIT by Does not apply route daily    Cholecalciferol (Vitamin D3) 50 MCG (2000 UT) capsule Take 1 capsule by mouth once daily    cinacalcet (SENSIPAR) 60 MG tablet Take 1 tablet (60 mg total) by mouth daily    Continuous Blood Gluc Sensor (Dexcom G6 Sensor) MISC USE AS DIRECTED    Continuous Blood Gluc Transmit (Dexcom G6 Transmitter) MISC Change Transmitter Every 90 days as directed    diazepam (VALIUM) 5 mg tablet Take 1 tablet (5 mg total) by mouth 60 minutes pre-procedure    diphenhydrAMINE (BENADRYL) 25 mg tablet Take 0.5 tablets (12.5 mg total) by mouth every 6 (six) hours    DULoxetine (CYMBALTA) 30 mg delayed release capsule Take 1 capsule (30 mg total) by mouth in the morning. glucose blood (OneTouch Verio) test strip Use 1 each in the morning    hydrALAZINE (APRESOLINE) 10 mg tablet Take 1 tablet (10 mg total) by mouth 3 (three) times a day    hydrochlorothiazide (HYDRODIURIL) 50 mg tablet Take 1 tablet (50 mg total) by mouth daily    Incontinence Supply Disposable (Prevail Women Underwear XL) MISC Use every 6 (six) hours as needed (incontinence)    Interferon Beta-1b (Betaseron) 0.3 MG KIT Inject 1 ml (0.25 mg) subcutaneously every other day    Lancets (OneTouch Delica Plus [id number]) MISC Test BG up to 1x daily as directed    metFORMIN (GLUCOPHAGE-XR) 500 mg 24 hr tablet TAKE 2 TABLETS BY MOUTH TWICE DAILY WITH MEALS    metoprolol succinate (TOPROL-XL) 50 mg 24 hr tablet Take 1 tablet (50 mg total) by mouth daily    Tresiba FlexTouch 100 units/mL injection pen INJECT 25 UNITS SUBCUTANEOUSLY ONCE DAILY    vitamin B-12 (VITAMIN B-12) 500 mcg tablet Take 2 tablets (1,000 mcg total) by mouth daily    lisinopril (ZESTRIL) 40 mg tablet Take 1 tablet (40 mg total) by mouth daily (Patient not taking: Reported on 12/6/2023)    naloxone (NARCAN) 4 mg/0.1 mL nasal spray Administer 1 spray into a nostril. If no response after 2-3 minutes, give another dose in the other nostril using a new spray.  (Patient not taking: Reported on 11/29/2023)    ondansetron (ZOFRAN-ODT) 4 mg disintegrating tablet Take 1 tablet (4 mg total) by mouth every 6 (six) hours as needed for nausea or vomiting (Patient not taking: Reported on 10/18/2023)    pregabalin (LYRICA) 75 mg capsule Take 1 capsule (75 mg total) by mouth 2 (two) times a day    traMADol (Ultram) 50 mg tablet Take 1 tablet (50 mg total) by mouth every 6 (six) hours as needed for moderate pain (Patient not taking: Reported on 6/14/2023)       Objective     /80   Pulse 75   Temp (!) 97.3 °F (36.3 °C) (Temporal)   Resp 20   Wt 135 kg (298 lb)   LMP  (LMP Unknown)   SpO2 98%   BMI 49.59 kg/m²     Physical Exam  Vitals and nursing note reviewed. Constitutional:       General: She is not in acute distress. Appearance: Normal appearance. She is not ill-appearing. HENT:      Head: Normocephalic and atraumatic. Comments: No angioedema visualized on today's exam.  Eyes:      Extraocular Movements: Extraocular movements intact. Pupils: Pupils are equal, round, and reactive to light. Cardiovascular:      Rate and Rhythm: Normal rate and regular rhythm. Pulses: Normal pulses. Heart sounds: Normal heart sounds. No murmur heard. Pulmonary:      Effort: Pulmonary effort is normal. No respiratory distress. Breath sounds: Normal breath sounds. No wheezing. Musculoskeletal:      Right lower leg: No edema. Left lower leg: No edema. Skin:     General: Skin is warm and dry. Capillary Refill: Capillary refill takes less than 2 seconds. Neurological:      General: No focal deficit present. Mental Status: She is alert and oriented to person, place, and time. Cranial Nerves: No cranial nerve deficit.       Coordination: Coordination normal.      Gait: Gait normal.   Psychiatric:         Mood and Affect: Mood normal.         Behavior: Behavior normal.         Judgment: Judgment normal.       Chica Du PA-C

## 2023-12-30 DIAGNOSIS — I10 BENIGN ESSENTIAL HYPERTENSION: ICD-10-CM

## 2024-01-02 RX ORDER — AMLODIPINE BESYLATE 5 MG/1
5 TABLET ORAL DAILY
Qty: 30 TABLET | Refills: 3 | Status: SHIPPED | OUTPATIENT
Start: 2024-01-02

## 2024-01-03 ENCOUNTER — TELEPHONE (OUTPATIENT)
Dept: FAMILY MEDICINE CLINIC | Facility: CLINIC | Age: 64
End: 2024-01-03

## 2024-01-03 NOTE — TELEPHONE ENCOUNTER
Message from patient:    My name is Joanne Hu. My YOB: 1960. The left side of my face is swelling. Again, [phone]. Thank you.    Spoke with patient stating that the left side of her face is swelling. Patient believed the Lisinopril was the cause so at last appointment it was stopped and she was started on Norvasc. Patient denied having any other symptoms. Per Dr. Cuenca, pt must come in for evaluation. Patient is scheduled with LEYDA Kitchen on 1/4/24 at 3:20 pm. Advised pt f symptom becomes worse or she develops any new symptoms to report to the ED. Patient understood.

## 2024-01-04 ENCOUNTER — APPOINTMENT (EMERGENCY)
Dept: CT IMAGING | Facility: HOSPITAL | Age: 64
End: 2024-01-04
Payer: COMMERCIAL

## 2024-01-04 ENCOUNTER — HOSPITAL ENCOUNTER (EMERGENCY)
Facility: HOSPITAL | Age: 64
Discharge: HOME | End: 2024-01-04
Attending: EMERGENCY MEDICINE
Payer: COMMERCIAL

## 2024-01-04 ENCOUNTER — OFFICE VISIT (OUTPATIENT)
Dept: FAMILY MEDICINE CLINIC | Facility: CLINIC | Age: 64
End: 2024-01-04
Payer: COMMERCIAL

## 2024-01-04 VITALS
DIASTOLIC BLOOD PRESSURE: 88 MMHG | OXYGEN SATURATION: 98 % | WEIGHT: 293 LBS | SYSTOLIC BLOOD PRESSURE: 150 MMHG | BODY MASS INDEX: 48.82 KG/M2 | TEMPERATURE: 97.6 F | HEART RATE: 75 BPM | HEIGHT: 65 IN

## 2024-01-04 VITALS
DIASTOLIC BLOOD PRESSURE: 71 MMHG | WEIGHT: 293 LBS | TEMPERATURE: 99.5 F | HEART RATE: 70 BPM | OXYGEN SATURATION: 97 % | SYSTOLIC BLOOD PRESSURE: 156 MMHG | RESPIRATION RATE: 18 BRPM | BODY MASS INDEX: 50.26 KG/M2

## 2024-01-04 DIAGNOSIS — R20.2 NUMBNESS AND TINGLING: ICD-10-CM

## 2024-01-04 DIAGNOSIS — M79.89 SWELLING OF LEFT EXTREMITY: ICD-10-CM

## 2024-01-04 DIAGNOSIS — R20.0 NUMBNESS ON LEFT SIDE: Primary | ICD-10-CM

## 2024-01-04 DIAGNOSIS — R20.0 NUMBNESS AND TINGLING: ICD-10-CM

## 2024-01-04 DIAGNOSIS — R20.2 PARESTHESIAS: Primary | ICD-10-CM

## 2024-01-04 LAB
ALBUMIN SERPL BCP-MCNC: 4 G/DL (ref 3.5–5)
ALP SERPL-CCNC: 110 U/L (ref 34–104)
ALT SERPL W P-5'-P-CCNC: 13 U/L (ref 7–52)
ANION GAP SERPL CALCULATED.3IONS-SCNC: 10 MMOL/L
AST SERPL W P-5'-P-CCNC: 11 U/L (ref 13–39)
ATRIAL RATE: 73 BPM
BASOPHILS # BLD AUTO: 0.02 THOUSANDS/ÂΜL (ref 0–0.1)
BASOPHILS NFR BLD AUTO: 0 % (ref 0–1)
BILIRUB SERPL-MCNC: 0.67 MG/DL (ref 0.2–1)
BUN SERPL-MCNC: 12 MG/DL (ref 5–25)
CALCIUM SERPL-MCNC: 8.5 MG/DL (ref 8.4–10.2)
CARDIAC TROPONIN I PNL SERPL HS: <2 NG/L
CHLORIDE SERPL-SCNC: 102 MMOL/L (ref 96–108)
CO2 SERPL-SCNC: 24 MMOL/L (ref 21–32)
CREAT SERPL-MCNC: 0.7 MG/DL (ref 0.6–1.3)
EOSINOPHIL # BLD AUTO: 0.33 THOUSAND/ÂΜL (ref 0–0.61)
EOSINOPHIL NFR BLD AUTO: 3 % (ref 0–6)
ERYTHROCYTE [DISTWIDTH] IN BLOOD BY AUTOMATED COUNT: 13.3 % (ref 11.6–15.1)
GFR SERPL CREATININE-BSD FRML MDRD: 93 ML/MIN/1.73SQ M
GLUCOSE SERPL-MCNC: 185 MG/DL (ref 65–140)
GLUCOSE SERPL-MCNC: 204 MG/DL (ref 65–140)
HCT VFR BLD AUTO: 35.2 % (ref 34.8–46.1)
HGB BLD-MCNC: 11.5 G/DL (ref 11.5–15.4)
IMM GRANULOCYTES # BLD AUTO: 0.02 THOUSAND/UL (ref 0–0.2)
IMM GRANULOCYTES NFR BLD AUTO: 0 % (ref 0–2)
LYMPHOCYTES # BLD AUTO: 1.84 THOUSANDS/ÂΜL (ref 0.6–4.47)
LYMPHOCYTES NFR BLD AUTO: 17 % (ref 14–44)
MCH RBC QN AUTO: 28.3 PG (ref 26.8–34.3)
MCHC RBC AUTO-ENTMCNC: 32.7 G/DL (ref 31.4–37.4)
MCV RBC AUTO: 87 FL (ref 82–98)
MONOCYTES # BLD AUTO: 0.5 THOUSAND/ÂΜL (ref 0.17–1.22)
MONOCYTES NFR BLD AUTO: 5 % (ref 4–12)
NEUTROPHILS # BLD AUTO: 8.13 THOUSANDS/ÂΜL (ref 1.85–7.62)
NEUTS SEG NFR BLD AUTO: 75 % (ref 43–75)
NRBC BLD AUTO-RTO: 0 /100 WBCS
P AXIS: 52 DEGREES
PLATELET # BLD AUTO: 315 THOUSANDS/UL (ref 149–390)
PMV BLD AUTO: 9.6 FL (ref 8.9–12.7)
POTASSIUM SERPL-SCNC: 3.9 MMOL/L (ref 3.5–5.3)
PR INTERVAL: 156 MS
PROT SERPL-MCNC: 7.2 G/DL (ref 6.4–8.4)
QRS AXIS: 23 DEGREES
QRSD INTERVAL: 74 MS
QT INTERVAL: 424 MS
QTC INTERVAL: 467 MS
RBC # BLD AUTO: 4.06 MILLION/UL (ref 3.81–5.12)
SODIUM SERPL-SCNC: 136 MMOL/L (ref 135–147)
T WAVE AXIS: 69 DEGREES
VENTRICULAR RATE: 73 BPM
WBC # BLD AUTO: 10.84 THOUSAND/UL (ref 4.31–10.16)

## 2024-01-04 PROCEDURE — 99215 OFFICE O/P EST HI 40 MIN: CPT

## 2024-01-04 PROCEDURE — 85025 COMPLETE CBC W/AUTO DIFF WBC: CPT | Performed by: EMERGENCY MEDICINE

## 2024-01-04 PROCEDURE — 82948 REAGENT STRIP/BLOOD GLUCOSE: CPT

## 2024-01-04 PROCEDURE — 70498 CT ANGIOGRAPHY NECK: CPT

## 2024-01-04 PROCEDURE — 99285 EMERGENCY DEPT VISIT HI MDM: CPT | Performed by: EMERGENCY MEDICINE

## 2024-01-04 PROCEDURE — 70496 CT ANGIOGRAPHY HEAD: CPT

## 2024-01-04 PROCEDURE — 36415 COLL VENOUS BLD VENIPUNCTURE: CPT | Performed by: EMERGENCY MEDICINE

## 2024-01-04 PROCEDURE — 93005 ELECTROCARDIOGRAM TRACING: CPT

## 2024-01-04 PROCEDURE — 99285 EMERGENCY DEPT VISIT HI MDM: CPT

## 2024-01-04 PROCEDURE — 80053 COMPREHEN METABOLIC PANEL: CPT | Performed by: EMERGENCY MEDICINE

## 2024-01-04 PROCEDURE — 84484 ASSAY OF TROPONIN QUANT: CPT | Performed by: EMERGENCY MEDICINE

## 2024-01-04 RX ORDER — SODIUM FLUORIDE 1.1 G/100G
CREAM ORAL
COMMUNITY
Start: 2023-12-29

## 2024-01-04 RX ADMIN — IOHEXOL 85 ML: 350 INJECTION, SOLUTION INTRAVENOUS at 18:12

## 2024-01-04 NOTE — ED PROVIDER NOTES
Pt Name: Joanne Hu  MRN: [id number]  Birthdate 1960  Age/Sex: 63 y.o. female  Date of evaluation: 2024  PCP: Erika Cuenca DO    CHIEF COMPLAINT    Chief Complaint   Patient presents with    Numbness     Pt reports left sided numbness of face, arm, and leg. Pt sent from PCP for rule out stroke. Had similar symptoms a couple of weeks ago. Hx of stroke.         HPI    Joanne presents to the Emergency Department complaining of left sided numbness and weakness.  She feels that it is affecting her left face, left upper extremity and left lower extremity.  She also believes that there is a component of swelling as well.  She had similar swelling of the left side of her mouth a few weeks ago.  It was attributed to lisinopril which was discontinued and it seemed to have resolved with benadryl.  When she was feeling like it was coming back today, she took benadryl.  She does have a hx of prior stroke but it was the right side that had been affected by stroke in .        HPI      Past Medical and Surgical History    Past Medical History:   Diagnosis Date    Anxiety     CPAP (continuous positive airway pressure) dependence     does not use    Diabetes mellitus (Formerly [facility])     External hemorrhoids     last assessed 16, resolved 16    Hernia, ventral     last assessed 12/14/15, resolved 16    History of transfusion     Hypertension     Incarcerated incisional hernia     last assessed 12/21/15, resolved 16    Insomnia     last assessed 16, resolved 10/9/17    Lyme disease     Morbid obesity with BMI of 45.0-49.9, adult (Formerly [facility]) 2017    MS (multiple sclerosis) (Formerly [facility])     Sleep apnea     Stroke (cerebrum) (Formerly [facility]) 2020    Type 2 diabetes mellitus with hyperglycemia, without long-term current use of insulin (Formerly [facility])        Past Surgical History:   Procedure Laterality Date    ABCESS DRAINAGE       SECTION      x3    COLONOSCOPY N/A 2017    Procedure: COLONOSCOPY with biopsy;   Surgeon: Dia Mason DO;  Location: [facility];  Service: Complete    HYSTERECTOMY      IR BIOPSY ABDOMEN  8/25/2021    IR LUMBAR PUNCTURE  3/13/2020    AK PARATHYROIDECTOMY/EXPLORATION PARATHYROIDS Left 11/7/2023    Procedure: LEFT MINIMALLY INVASIVE PARATHYROIDECTOMY;  Surgeon: Barrett Rose MD;  Location: [facility];  Service: Surgical Oncology    US GUIDED THYROID BIOPSY  1/29/2021       Family History   Problem Relation Age of Onset    Glaucoma Mother     Diabetes Father     Hypertension Father     Colon cancer Father     Alzheimer's disease Father     Cervical cancer Sister 59    Breast cancer Sister 48    Breast cancer Sister 38    Breast cancer Maternal Aunt     Breast cancer Maternal Aunt     Breast cancer Maternal Aunt     Breast cancer Maternal Grandmother     Coronary artery disease Family     Diabetes Family     Hypertension Family        Social History     Tobacco Use    Smoking status: Never    Smokeless tobacco: Never   Vaping Use    Vaping status: Never Used   Substance Use Topics    Alcohol use: Not Currently     Comment: Social    Drug use: Not Currently     Types: Marijuana     Comment: medical marijuana         .    Allergies    Allergies   Allergen Reactions    Lisinopril Angioedema    Sorbitan Diarrhea, Vomiting and Abdominal Pain    Victoza [Liraglutide] Nausea Only    Ambien [Zolpidem Tartrate]     Demerol [Meperidine]     Insulin Glargine Other (See Comments)     Annotation - 03Jan2018: memory loss    Latex     Oxycodone Hives, Rash and Vomiting       Home Medications    Prior to Admission medications    Medication Sig Start Date End Date Taking? Authorizing Provider   Alcohol Swabs PADS by Does not apply route 4 (four) times a day *Latex Free* 5/21/20   DEANA Parra   amLODIPine (NORVASC) 5 mg tablet Take 1 tablet by mouth once daily 1/2/24   Erika Cuenca DO   aspirin 81 mg chewable tablet Chew 1 tablet (81 mg total) daily 3/28/22   Erika Cuenca DO   atorvastatin  (LIPITOR) 40 mg tablet TAKE 1 TABLET BY MOUTH ONCE DAILY IN THE EVENING 10/2/23   Erika Cuenca DO   BD Pen Needle Izzy 2nd Gen 32G X 4 MM MISC INJECT   SUBCUTANEOUSLY IN THE MORNING 10/27/23   DEANA Hsu   Blood Glucose Monitoring Suppl (OneTouch Verio) w/Device KIT Use in the morning 11/4/22   Mabel Tong MD   Blood Pressure KIT by Does not apply route daily 5/21/20   DEANA Parra   Cholecalciferol (Vitamin D3) 50 MCG (2000 UT) capsule Take 1 capsule by mouth once daily 11/16/23   Mabel Tong MD   cinacalcet (SENSIPAR) 60 MG tablet Take 1 tablet (60 mg total) by mouth daily 6/14/23   Mabel Tong MD   Continuous Blood Gluc Sensor (Dexcom G6 Sensor) MISC USE AS DIRECTED  Patient not taking: Reported on 1/4/2024 7/12/23   DEANA Hsu   Continuous Blood Gluc Transmit (Dexcom G6 Transmitter) MISC Change Transmitter Every 90 days as directed  Patient not taking: Reported on 1/4/2024 7/12/23   DEANA Hsu   Denta 5000 Plus 1.1 % CREA USE AT NIGHT TIME, SPIT, DO NOT RINSE, EAT OR DRINK FOR 30 MINUTES AFTER IT S USE 12/29/23   Historical Provider, MD   diazepam (VALIUM) 5 mg tablet Take 1 tablet (5 mg total) by mouth 60 minutes pre-procedure  Patient not taking: Reported on 1/4/2024 5/25/23   Lito Rivers DO   diphenhydrAMINE (BENADRYL) 25 mg tablet Take 0.5 tablets (12.5 mg total) by mouth every 6 (six) hours 11/11/22   Karolina Siegel PA-C   DULoxetine (CYMBALTA) 30 mg delayed release capsule Take 1 capsule (30 mg total) by mouth in the morning. 5/13/22   Erika Cuenca DO   glucose blood (OneTouch Verio) test strip Use 1 each in the morning  Patient not taking: Reported on 1/4/2024 11/4/22   Mabel Tong MD   hydrALAZINE (APRESOLINE) 10 mg tablet Take 1 tablet (10 mg total) by mouth 3 (three) times a day 11/17/22   Sejal Rose DO   hydrochlorothiazide (HYDRODIURIL) 50 mg tablet Take 1 tablet (50 mg total) by mouth daily  Patient not taking: Reported on  1/4/2024 12/14/22   Mariano Alicia DO   Incontinence Supply Disposable (Prevail Women Underwear XL) MISC Use every 6 (six) hours as needed (incontinence) 1/13/22   Erika Cuenca DO   Interferon Beta-1b (Betaseron) 0.3 MG KIT Inject 1 ml (0.25 mg) subcutaneously every other day 11/3/22   Patience Valentin PA-C   Lancets (OneTouch Delica Plus [id number]) MISC Test BG up to 1x daily as directed  Patient not taking: Reported on 1/4/2024 11/4/22   Mabel Tong MD   lisinopril (ZESTRIL) 40 mg tablet Take 1 tablet (40 mg total) by mouth daily  Patient not taking: Reported on 1/4/2024 9/11/23   Erika Cuenca DO   metFORMIN (GLUCOPHAGE-XR) 500 mg 24 hr tablet TAKE 2 TABLETS BY MOUTH TWICE DAILY WITH MEALS 10/27/23   Mabel Tong MD   metoprolol succinate (TOPROL-XL) 50 mg 24 hr tablet Take 1 tablet (50 mg total) by mouth daily 12/14/22   Mariano Alicia DO   naloxone (NARCAN) 4 mg/0.1 mL nasal spray Administer 1 spray into a nostril. If no response after 2-3 minutes, give another dose in the other nostril using a new spray. 6/7/23 6/6/24  Barrett Rose MD   ondansetron (ZOFRAN-ODT) 4 mg disintegrating tablet Take 1 tablet (4 mg total) by mouth every 6 (six) hours as needed for nausea or vomiting 3/3/23   DEANA Alcazar   pregabalin (LYRICA) 75 mg capsule Take 1 capsule (75 mg total) by mouth 2 (two) times a day 8/9/23 1/4/24  Lito Rivers DO   traMADol (Ultram) 50 mg tablet Take 1 tablet (50 mg total) by mouth every 6 (six) hours as needed for moderate pain 6/7/23   Barrett Rose MD   Tresiba FlexTouch 100 units/mL injection pen INJECT 25 UNITS SUBCUTANEOUSLY ONCE DAILY 11/24/23   Mabel Tong MD   vitamin B-12 (VITAMIN B-12) 500 mcg tablet Take 2 tablets (1,000 mcg total) by mouth daily 6/30/21   DEANA Parra           Review of Systems    Review of Systems   Constitutional:  Negative for chills and fever.   HENT:  Negative for ear pain and sore throat.    Eyes:  Negative for pain  and visual disturbance.   Respiratory:  Negative for cough and shortness of breath.    Cardiovascular:  Negative for chest pain and palpitations.   Gastrointestinal:  Negative for abdominal pain and vomiting.   Genitourinary:  Negative for dysuria and hematuria.   Musculoskeletal:  Positive for gait problem. Negative for arthralgias and back pain.   Skin:  Negative for color change and rash.   Neurological:  Positive for weakness and numbness. Negative for seizures and syncope.   All other systems reviewed and are negative.      Physical Exam      ED Triage Vitals   Temperature Pulse Respirations Blood Pressure SpO2   01/04/24 1656 01/04/24 1649 01/04/24 1649 01/04/24 1649 01/04/24 1649   99.5 °F (37.5 °C) 68 18 (!) 179/88 98 %      Temp Source Heart Rate Source Patient Position - Orthostatic VS BP Location FiO2 (%)   01/04/24 1656 01/04/24 1649 01/04/24 1649 01/04/24 1649 --   Oral Monitor Lying Right arm       Pain Score       --                      Physical Exam  Vitals and nursing note reviewed.   Constitutional:       General: She is not in acute distress.     Appearance: She is well-developed.   HENT:      Head: Normocephalic and atraumatic.   Eyes:      Conjunctiva/sclera: Conjunctivae normal.   Cardiovascular:      Rate and Rhythm: Normal rate and regular rhythm.      Heart sounds: No murmur heard.  Pulmonary:      Effort: Pulmonary effort is normal. No respiratory distress.      Breath sounds: Normal breath sounds.   Abdominal:      Palpations: Abdomen is soft.      Tenderness: There is no abdominal tenderness.   Musculoskeletal:         General: No swelling.      Cervical back: Neck supple.   Skin:     General: Skin is warm and dry.      Capillary Refill: Capillary refill takes less than 2 seconds.   Neurological:      Mental Status: She is alert.   Psychiatric:         Mood and Affect: Mood normal.       Assessment and Plan    Joanne Hu is a 63 y.o. female who presents with left sided weakness,  numbness and reported swelling that is hard to appreciate. Physical examination remarkable for weakness in LLE compared with right. Differential diagnosis (not completely inclusive) includes TIA/ CVA. Plan will be to perform diagnostic testing and treat symptomatically.      MDM      Diagnostic Results    EKG:  normal EKG, normal sinus rhythm, unchanged from previous tracings    EKG INTERPRETATION  EKG Interpretation    Rate: 73 BPM  Rhythm: sinus  Axis: normal  Intervals: Normal, no blocks, QTc 467ms  T waves: nonspecific  ST segments: nonspecific    Impression: non-diagnositic EKG. EKG for comparison: reviewed  EKG interpreted by me.   Interpretation by Yanelis Sung,   EKG reviewed and interpreted independently.    Labs:    Results for orders placed or performed during the hospital encounter of 01/04/24   CBC and differential   Result Value Ref Range    WBC 10.84 (H) 4.31 - 10.16 Thousand/uL    RBC 4.06 3.81 - 5.12 Million/uL    Hemoglobin 11.5 11.5 - 15.4 g/dL    Hematocrit 35.2 34.8 - 46.1 %    MCV 87 82 - 98 fL    MCH 28.3 26.8 - 34.3 pg    MCHC 32.7 31.4 - 37.4 g/dL    RDW 13.3 11.6 - 15.1 %    MPV 9.6 8.9 - 12.7 fL    Platelets 315 149 - 390 Thousands/uL    nRBC 0 /100 WBCs    Neutrophils Relative 75 43 - 75 %    Immat GRANS % 0 0 - 2 %    Lymphocytes Relative 17 14 - 44 %    Monocytes Relative 5 4 - 12 %    Eosinophils Relative 3 0 - 6 %    Basophils Relative 0 0 - 1 %    Neutrophils Absolute 8.13 (H) 1.85 - 7.62 Thousands/µL    Immature Grans Absolute 0.02 0.00 - 0.20 Thousand/uL    Lymphocytes Absolute 1.84 0.60 - 4.47 Thousands/µL    Monocytes Absolute 0.50 0.17 - 1.22 Thousand/µL    Eosinophils Absolute 0.33 0.00 - 0.61 Thousand/µL    Basophils Absolute 0.02 0.00 - 0.10 Thousands/µL   Comprehensive metabolic panel   Result Value Ref Range    Sodium 136 135 - 147 mmol/L    Potassium 3.9 3.5 - 5.3 mmol/L    Chloride 102 96 - 108 mmol/L    CO2 24 21 - 32 mmol/L    ANION GAP 10 mmol/L    BUN 12  "5 - 25 mg/dL    Creatinine 0.70 0.60 - 1.30 mg/dL    Glucose 204 (H) 65 - 140 mg/dL    Calcium 8.5 8.4 - 10.2 mg/dL    AST 11 (L) 13 - 39 U/L    ALT 13 7 - 52 U/L    Alkaline Phosphatase 110 (H) 34 - 104 U/L    Total Protein 7.2 6.4 - 8.4 g/dL    Albumin 4.0 3.5 - 5.0 g/dL    Total Bilirubin 0.67 0.20 - 1.00 mg/dL    eGFR 93 ml/min/1.73sq m   HS Troponin 0hr (reflex protocol)   Result Value Ref Range    hs TnI 0hr <2 \"Refer to ACS Flowchart\"- see link ng/L   ECG 12 lead   Result Value Ref Range    Ventricular Rate 73 BPM    Atrial Rate 73 BPM    WI Interval 156 ms    QRSD Interval 74 ms    QT Interval 424 ms    QTC Interval 467 ms    P Axis 52 degrees    QRS Axis 23 degrees    T Wave Axis 69 degrees   Fingerstick Glucose (POCT)   Result Value Ref Range    POC Glucose 185 (H) 65 - 140 mg/dl       All labs reviewed and utilized in the medical decision making process    Radiology:    CTA head and neck with and without contrast   Final Result         1. No evidence of acute infarct, intracranial hemorrhage or mass.   2. No hemodynamically significant stenosis, dissection or occlusion of the carotid or vertebral arteries or major vessels of the [place].                  Workstation performed: [employer]             All radiology studies independently viewed by me and interpreted by the radiologist.    Procedure    Procedures      ED Course of Care and Re-Assessments    All of her symptoms have resolved.  She was able to ambulate without a problem and wanted to go home.  I spoke with her son at the bedside.  She DC her MS meds a month ago on her own and now she seems to have episodes where she complains of feeling off.       Medications   iohexol (OMNIPAQUE) 350 MG/ML injection (MULTI-DOSE) 85 mL (85 mL Intravenous Given 1/4/24 1812)           FINAL IMPRESSION    Final diagnoses:   Paresthesias   Numbness and tingling         DISPOSITION/PLAN      Time reflects when diagnosis was documented in both MDM as " applicable and the Disposition within this note       Time User Action Codes Description Comment    1/4/2024  8:19 PM Yanelis Sung Add [R20.2] Paresthesias     1/4/2024  8:19 PM Yanelis Sung Add [R20.0,  R20.2] Numbness and tingling           ED Disposition       ED Disposition   Discharge    Condition   Stable    Date/Time   Thu Jan 4, 2024  8:19 PM    Comment   Joanne Hu discharge to home/self care.                   Follow-up Information       Follow up With Specialties Details Why Contact Info    Erika Cuenca DO Family Medicine Schedule an appointment as soon as possible for a visit   [address]  [address]  [facility] [address]  [phone]                PATIENT REFERRED TO:    Erika Cuenca DO  [address]  [address]  [facility] [address]  [phone]    Schedule an appointment as soon as possible for a visit         DISCHARGE MEDICATIONS:    Discharge Medication List as of 1/4/2024  8:19 PM        CONTINUE these medications which have NOT CHANGED    Details   Alcohol Swabs PADS by Does not apply route 4 (four) times a day *Latex Free*, Starting Thu 5/21/2020, Normal      amLODIPine (NORVASC) 5 mg tablet Take 1 tablet by mouth once daily, Starting Tue 1/2/2024, Normal      aspirin 81 mg chewable tablet Chew 1 tablet (81 mg total) daily, Starting Mon 3/28/2022, Normal      atorvastatin (LIPITOR) 40 mg tablet TAKE 1 TABLET BY MOUTH ONCE DAILY IN THE EVENING, Starting Mon 10/2/2023, Normal      BD Pen Needle Izzy 2nd Gen 32G X 4 MM MISC INJECT   SUBCUTANEOUSLY IN THE MORNING, Normal      Blood Glucose Monitoring Suppl (OneTouch Verio) w/Device KIT Use in the morning, Starting Fri 11/4/2022, Normal      Blood Pressure KIT by Does not apply route daily, Starting Thu 5/21/2020, Normal      Cholecalciferol (Vitamin D3) 50 MCG (2000 UT) capsule Take 1 capsule by mouth once daily, Starting Thu 11/16/2023, Normal      cinacalcet (SENSIPAR) 60 MG tablet Take 1 tablet (60 mg total) by  mouth daily, Starting Wed 6/14/2023, Normal      Continuous Blood Gluc Sensor (Dexcom G6 Sensor) MISC USE AS DIRECTED, Normal      Continuous Blood Gluc Transmit (Dexcom G6 Transmitter) MISC Change Transmitter Every 90 days as directed, Normal      Denta 5000 Plus 1.1 % CREA USE AT NIGHT TIME, SPIT, DO NOT RINSE, EAT OR DRINK FOR 30 MINUTES AFTER IT S USE, Historical Med      diazepam (VALIUM) 5 mg tablet Take 1 tablet (5 mg total) by mouth 60 minutes pre-procedure, Starting Thu 5/25/2023, Normal      diphenhydrAMINE (BENADRYL) 25 mg tablet Take 0.5 tablets (12.5 mg total) by mouth every 6 (six) hours, Starting Fri 11/11/2022, Normal      DULoxetine (CYMBALTA) 30 mg delayed release capsule Take 1 capsule (30 mg total) by mouth in the morning., Starting Fri 5/13/2022, Normal      glucose blood (OneTouch Verio) test strip Use 1 each in the morning, Starting Fri 11/4/2022, Normal      hydrALAZINE (APRESOLINE) 10 mg tablet Take 1 tablet (10 mg total) by mouth 3 (three) times a day, Starting Thu 11/17/2022, Normal      hydrochlorothiazide (HYDRODIURIL) 50 mg tablet Take 1 tablet (50 mg total) by mouth daily, Starting Wed 12/14/2022, Normal      Incontinence Supply Disposable (Prevail Women Underwear XL) MISC Use every 6 (six) hours as needed (incontinence), Starting Thu 1/13/2022, Print      Interferon Beta-1b (Betaseron) 0.3 MG KIT Inject 1 ml (0.25 mg) subcutaneously every other day, Normal      Lancets (OneTouch Delica Plus [id number]) MISC Test BG up to 1x daily as directed, Normal      lisinopril (ZESTRIL) 40 mg tablet Take 1 tablet (40 mg total) by mouth daily, Starting Mon 9/11/2023, Normal      metFORMIN (GLUCOPHAGE-XR) 500 mg 24 hr tablet TAKE 2 TABLETS BY MOUTH TWICE DAILY WITH MEALS, Normal      metoprolol succinate (TOPROL-XL) 50 mg 24 hr tablet Take 1 tablet (50 mg total) by mouth daily, Starting Wed 12/14/2022, Normal      naloxone (NARCAN) 4 mg/0.1 mL nasal spray Administer 1 spray into a nostril. If no  response after 2-3 minutes, give another dose in the other nostril using a new spray., Normal      ondansetron (ZOFRAN-ODT) 4 mg disintegrating tablet Take 1 tablet (4 mg total) by mouth every 6 (six) hours as needed for nausea or vomiting, Starting Fri 3/3/2023, Normal      pregabalin (LYRICA) 75 mg capsule Take 1 capsule (75 mg total) by mouth 2 (two) times a day, Starting Wed 8/9/2023, Until u 1/4/2024, Normal      traMADol (Ultram) 50 mg tablet Take 1 tablet (50 mg total) by mouth every 6 (six) hours as needed for moderate pain, Starting Wed 6/7/2023, Normal      Tresiba FlexTouch 100 units/mL injection pen INJECT 25 UNITS SUBCUTANEOUSLY ONCE DAILY, Normal      vitamin B-12 (VITAMIN B-12) 500 mcg tablet Take 2 tablets (1,000 mcg total) by mouth daily, Starting Wed 6/30/2021, Normal             No discharge procedures on file.         Yanelis Sung, DO Yanelis Sung,   01/04/24 [id number]

## 2024-01-04 NOTE — PROGRESS NOTES
Name: Joanne Hu      : 1960      MRN: [id number]  Encounter Provider: Fidelina Vora PA-C  Encounter Date: 2024   Encounter department: [facility]    Assessment & Plan     1. Numbness on left side  Assessment & Plan:  Patient presents today with unilateral swelling on her left side along with left sided numbness and pruritus. Patient has taken Benadryl three times with some improvement. Patient was recently taken off of lisinopril, as this happened a few weeks ago and the ER thought it was an angioedema reaction. The reaction has now happened again with no new medications have been started that would have caused it to recur. Patient cannot remember using any new lotions, detergents, or other substances to suggest an allergic etiology.    Patient has a history of MS and stroke, so I discussed with her that I cannot rule out a stroke due to the left sided numbness she is having today. I recommended she go to the ER for evaluation, as she needs to have a CT scan with neuro consult. Patient agreeable and states her son will drive her to the ER, she would not like an ambulance.     Patients vital signs are stable with no neurological findings on exam today to suggest stroke. However, due to unilateral numbness and weakness, she needs to be evaluated in the ER. She did stop using her Betaseron a week before the first episode of angioedema, so her symptoms could be related to that.     She does not have follow up with neurology until February, I recommended she get in with them sooner.     She is agreeable to ER today and will have her son drive her after leaving the clinic today.     Orders:  -     Transfer to other facility    2. Swelling of left extremity  Assessment & Plan:  As above.    Orders:  -     Transfer to other facility           Subjective      Patient presents today with unilateral swelling and numbness that started yesterday. She states she woke up with unilateral  left sided whole body swelling and numbness. She endorses itchiness of the left side. Of note, this occurred a few weeks ago and she went to the ER, who did not obtain any scans of the brain. The ER at that time suggested the swelling was from lisinopril, which she had been on for many years. Patient was properly discontinued off of lisinopril and was started on amlodipine. She noted diarrhea that started yesterday as well.     Patient denies any new detergents, lotions, foods, or other substances that would cause an allergic reaction. Patient does not have any rashes at the moment. She has been taking Benadryl for her symptoms.     She does have history of a stroke in 2020 and was diagnosed with MS at the time. Patient states her symptoms from her stroke were aphasia and weakness. Patient was on Betaseron for her MS and stopped using it one week prior to when her symptoms first occurred in December. Denies aphasia. States her left leg is weak.                 Review of Systems   Constitutional:  Negative for activity change, chills, fatigue and fever.   HENT:  Negative for trouble swallowing and voice change.    Eyes:  Negative for visual disturbance.   Respiratory:  Negative for apnea, cough, choking, chest tightness, shortness of breath, wheezing and stridor.    Cardiovascular:  Positive for leg swelling (left leg). Negative for chest pain and palpitations.   Gastrointestinal:  Positive for diarrhea.   Skin:  Negative for color change and rash.   Neurological:  Positive for weakness (left side of body) and numbness (left side of body). Negative for dizziness, tremors, seizures, syncope, facial asymmetry, speech difficulty, light-headedness and headaches.   Psychiatric/Behavioral:  Negative for behavioral problems, confusion and decreased concentration. The patient is not nervous/anxious.        Current Outpatient Medications on File Prior to Visit   Medication Sig    Alcohol Swabs PADS by Does not apply route 4  (four) times a day *Latex Free*    amLODIPine (NORVASC) 5 mg tablet Take 1 tablet by mouth once daily    aspirin 81 mg chewable tablet Chew 1 tablet (81 mg total) daily    atorvastatin (LIPITOR) 40 mg tablet TAKE 1 TABLET BY MOUTH ONCE DAILY IN THE EVENING    BD Pen Needle Izzy 2nd Gen 32G X 4 MM MISC INJECT   SUBCUTANEOUSLY IN THE MORNING    Blood Glucose Monitoring Suppl (OneTouch Verio) w/Device KIT Use in the morning    Blood Pressure KIT by Does not apply route daily    Cholecalciferol (Vitamin D3) 50 MCG (2000 UT) capsule Take 1 capsule by mouth once daily    cinacalcet (SENSIPAR) 60 MG tablet Take 1 tablet (60 mg total) by mouth daily    Denta 5000 Plus 1.1 % CREA USE AT NIGHT TIME, SPIT, DO NOT RINSE, EAT OR DRINK FOR 30 MINUTES AFTER IT S USE    diphenhydrAMINE (BENADRYL) 25 mg tablet Take 0.5 tablets (12.5 mg total) by mouth every 6 (six) hours    DULoxetine (CYMBALTA) 30 mg delayed release capsule Take 1 capsule (30 mg total) by mouth in the morning.    hydrALAZINE (APRESOLINE) 10 mg tablet Take 1 tablet (10 mg total) by mouth 3 (three) times a day    Incontinence Supply Disposable (Prevail Women Underwear XL) MISC Use every 6 (six) hours as needed (incontinence)    Interferon Beta-1b (Betaseron) 0.3 MG KIT Inject 1 ml (0.25 mg) subcutaneously every other day    metFORMIN (GLUCOPHAGE-XR) 500 mg 24 hr tablet TAKE 2 TABLETS BY MOUTH TWICE DAILY WITH MEALS    metoprolol succinate (TOPROL-XL) 50 mg 24 hr tablet Take 1 tablet (50 mg total) by mouth daily    naloxone (NARCAN) 4 mg/0.1 mL nasal spray Administer 1 spray into a nostril. If no response after 2-3 minutes, give another dose in the other nostril using a new spray.    ondansetron (ZOFRAN-ODT) 4 mg disintegrating tablet Take 1 tablet (4 mg total) by mouth every 6 (six) hours as needed for nausea or vomiting    pregabalin (LYRICA) 75 mg capsule Take 1 capsule (75 mg total) by mouth 2 (two) times a day    traMADol (Ultram) 50 mg tablet Take 1 tablet (50  "mg total) by mouth every 6 (six) hours as needed for moderate pain    Tresiba FlexTouch 100 units/mL injection pen INJECT 25 UNITS SUBCUTANEOUSLY ONCE DAILY    vitamin B-12 (VITAMIN B-12) 500 mcg tablet Take 2 tablets (1,000 mcg total) by mouth daily    Continuous Blood Gluc Sensor (Dexcom G6 Sensor) MISC USE AS DIRECTED (Patient not taking: Reported on 1/4/2024)    Continuous Blood Gluc Transmit (Dexcom G6 Transmitter) MISC Change Transmitter Every 90 days as directed (Patient not taking: Reported on 1/4/2024)    diazepam (VALIUM) 5 mg tablet Take 1 tablet (5 mg total) by mouth 60 minutes pre-procedure (Patient not taking: Reported on 1/4/2024)    glucose blood (OneTouch Verio) test strip Use 1 each in the morning (Patient not taking: Reported on 1/4/2024)    hydrochlorothiazide (HYDRODIURIL) 50 mg tablet Take 1 tablet (50 mg total) by mouth daily (Patient not taking: Reported on 1/4/2024)    Lancets (OneTouch Delica Plus Wzoztw19A) MISC Test BG up to 1x daily as directed (Patient not taking: Reported on 1/4/2024)    lisinopril (ZESTRIL) 40 mg tablet Take 1 tablet (40 mg total) by mouth daily (Patient not taking: Reported on 1/4/2024)       Objective     /88 (BP Location: Left arm, Patient Position: Sitting, Cuff Size: Adult)   Pulse 75   Temp 97.6 °F (36.4 °C)   Ht 5' 5\" (1.651 m)   Wt 135 kg (298 lb 6.4 oz)   LMP  (LMP Unknown)   SpO2 98%   BMI 49.66 kg/m²     Physical Exam  Vitals and nursing note reviewed.   Constitutional:       General: She is not in acute distress.     Appearance: Normal appearance. She is not ill-appearing.   HENT:      Head: Normocephalic and atraumatic.        Right Ear: Tympanic membrane normal.      Left Ear: Tympanic membrane normal.      Nose: No congestion.      Mouth/Throat:      Mouth: Mucous membranes are moist.      Pharynx: Oropharynx is clear. No oropharyngeal exudate or posterior oropharyngeal erythema.   Eyes:      General: No visual field " deficit.  Cardiovascular:      Rate and Rhythm: Normal rate and regular rhythm.      Heart sounds: No murmur heard.  Pulmonary:      Effort: Pulmonary effort is normal. No respiratory distress.      Breath sounds: No stridor. No wheezing, rhonchi or rales.   Musculoskeletal:      Right hand: Normal.      Left hand: Swelling (diffusely swollen, no rash) present.      Right lower leg: No edema.      Left lower leg: No edema.   Skin:     General: Skin is warm and dry.      Capillary Refill: Capillary refill takes less than 2 seconds.   Neurological:      General: No focal deficit present.      Mental Status: She is alert and oriented to person, place, and time. Mental status is at baseline.      Cranial Nerves: Cranial nerves 2-12 are intact. No cranial nerve deficit or facial asymmetry.      Sensory: Sensation is intact.      Motor: No tremor or pronator drift.      Gait: Gait is intact.      Comments: Normal neuro exam today in clinic. No exam findings suggestive of stroke.    Psychiatric:         Mood and Affect: Mood normal.         Behavior: Behavior normal.         Judgment: Judgment normal.       Fidelina Vora PA-C

## 2024-01-04 NOTE — ASSESSMENT & PLAN NOTE
Patient presents today with unilateral swelling on her left side along with left sided numbness and pruritus. Patient has taken Benadryl three times with some improvement. Patient was recently taken off of lisinopril, as this happened a few weeks ago and the ER thought it was an angioedema reaction. The reaction has now happened again with no new medications have been started that would have caused it to recur. Patient cannot remember using any new lotions, detergents, or other substances to suggest an allergic etiology.    Patient has a history of MS and stroke, so I discussed with her that I cannot rule out a stroke due to the left sided numbness she is having today. I recommended she go to the ER for evaluation, as she needs to have a CT scan with neuro consult. Patient agreeable and states her son will drive her to the ER, she would not like an ambulance.     Patients vital signs are stable with no neurological findings on exam today to suggest stroke. However, due to unilateral numbness and weakness, she needs to be evaluated in the ER. She did stop using her Betaseron a week before the first episode of angioedema, so her symptoms could be related to that.     She does not have follow up with neurology until February, I recommended she get in with them sooner.     She is agreeable to ER today and will have her son drive her after leaving the clinic today.

## 2024-01-09 ENCOUNTER — TELEPHONE (OUTPATIENT)
Dept: ENDOCRINOLOGY | Facility: CLINIC | Age: 64
End: 2024-01-09

## 2024-01-09 NOTE — TELEPHONE ENCOUNTER
Marga from [employer]  called in [phone] will be sending over a new  request for the [employer] supplies gave fax #

## 2024-01-19 DIAGNOSIS — E11.65 TYPE 2 DIABETES MELLITUS WITH HYPERGLYCEMIA, WITHOUT LONG-TERM CURRENT USE OF INSULIN (HCC): ICD-10-CM

## 2024-01-20 RX ORDER — PEN NEEDLE, DIABETIC 32GX 5/32"
NEEDLE, DISPOSABLE MISCELLANEOUS
Qty: 100 EACH | Refills: 0 | Status: SHIPPED | OUTPATIENT
Start: 2024-01-20

## 2024-01-22 ENCOUNTER — TELEPHONE (OUTPATIENT)
Dept: ENDOCRINOLOGY | Facility: CLINIC | Age: 64
End: 2024-01-22

## 2024-01-26 DIAGNOSIS — E83.52 HYPERCALCEMIA: ICD-10-CM

## 2024-01-26 DIAGNOSIS — E11.65 TYPE 2 DIABETES MELLITUS WITH HYPERGLYCEMIA, WITHOUT LONG-TERM CURRENT USE OF INSULIN (HCC): ICD-10-CM

## 2024-01-26 RX ORDER — METFORMIN HYDROCHLORIDE 500 MG/1
1000 TABLET, EXTENDED RELEASE ORAL 2 TIMES DAILY WITH MEALS
Qty: 360 TABLET | Refills: 0 | Status: SHIPPED | OUTPATIENT
Start: 2024-01-26

## 2024-01-26 RX ORDER — METFORMIN HYDROCHLORIDE 500 MG/1
TABLET, EXTENDED RELEASE ORAL
Qty: 360 TABLET | Refills: 0 | OUTPATIENT
Start: 2024-01-26

## 2024-01-26 RX ORDER — CINACALCET 60 MG/1
60 TABLET, FILM COATED ORAL DAILY
Qty: 30 TABLET | Refills: 0 | OUTPATIENT
Start: 2024-01-26

## 2024-01-29 DIAGNOSIS — G35 MS (MULTIPLE SCLEROSIS) (HCC): ICD-10-CM

## 2024-01-29 RX ORDER — INTERFERON BETA-1B 0.3 MG
KIT SUBCUTANEOUS
Qty: 14 EACH | Refills: 0 | Status: SHIPPED | OUTPATIENT
Start: 2024-01-29

## 2024-01-30 NOTE — TELEPHONE ENCOUNTER
1/29/24, 11:09  I am in need of my betaseron refill prescription for [facility]. Thank you.    Chart reviewed  Betaseron was sent to [facility] yesterday.    Called [phone]. No answer. Cont ringing. No option to leave a message

## 2024-02-09 DIAGNOSIS — E55.9 VITAMIN D DEFICIENCY: ICD-10-CM

## 2024-02-09 RX ORDER — MULTIVIT-MIN/IRON/FOLIC ACID/K 18-600-40
1 CAPSULE ORAL DAILY
Qty: 30 CAPSULE | Refills: 0 | Status: SHIPPED | OUTPATIENT
Start: 2024-02-09

## 2024-02-21 ENCOUNTER — TELEPHONE (OUTPATIENT)
Dept: ENDOCRINOLOGY | Facility: CLINIC | Age: 64
End: 2024-02-21

## 2024-02-23 ENCOUNTER — OFFICE VISIT (OUTPATIENT)
Dept: NEUROLOGY | Facility: CLINIC | Age: 64
End: 2024-02-23
Payer: COMMERCIAL

## 2024-02-23 ENCOUNTER — TELEPHONE (OUTPATIENT)
Dept: NEUROLOGY | Facility: CLINIC | Age: 64
End: 2024-02-23

## 2024-02-23 VITALS
HEIGHT: 65 IN | BODY MASS INDEX: 48.82 KG/M2 | SYSTOLIC BLOOD PRESSURE: 134 MMHG | WEIGHT: 293 LBS | TEMPERATURE: 97.2 F | OXYGEN SATURATION: 99 % | HEART RATE: 79 BPM | DIASTOLIC BLOOD PRESSURE: 86 MMHG

## 2024-02-23 DIAGNOSIS — G35 MS (MULTIPLE SCLEROSIS) (HCC): Primary | ICD-10-CM

## 2024-02-23 DIAGNOSIS — G81.91 RIGHT HEMIPARESIS (HCC): ICD-10-CM

## 2024-02-23 DIAGNOSIS — G62.9 PERIPHERAL POLYNEUROPATHY: ICD-10-CM

## 2024-02-23 DIAGNOSIS — R26.2 AMBULATORY DYSFUNCTION: Chronic | ICD-10-CM

## 2024-02-23 DIAGNOSIS — E21.3 HYPERPARATHYROIDISM (HCC): ICD-10-CM

## 2024-02-23 DIAGNOSIS — E53.8 B12 DEFICIENCY: ICD-10-CM

## 2024-02-23 DIAGNOSIS — G45.9 TIA (TRANSIENT ISCHEMIC ATTACK): ICD-10-CM

## 2024-02-23 PROCEDURE — 99215 OFFICE O/P EST HI 40 MIN: CPT | Performed by: PSYCHIATRY & NEUROLOGY

## 2024-02-23 NOTE — PROGRESS NOTES
Patient ID: Joanne Hu is a 63 y.o. female.    Assessment/Plan:           Problem List Items Addressed This Visit          Endocrine    Hyperparathyroidism (HCC)       Cardiovascular and Mediastinum    TIA (transient ischemic attack)       Nervous and Auditory    MS (multiple sclerosis) (HCC) - Primary    Right hemiparesis (HCC)    Peripheral neuropathy       Other    Ambulatory dysfunction (Chronic)    B12 deficiency        Mrs. Hu has presented to [facility] multiple sclerosis center for follow-up on multiple sclerosis and related questions.      Brain MRI completed in October 2023 suggest no disease progression with stable multiple sclerosis been noted.    Patient recently describes several instances when she developed profound left-sided facial, left upper left lower extremity numbness which lasted up to 24 hours.  Patient had stroke alert called in November 2024 and I agree with primary team that the concern for stroke/TIA is very high.  Patient is to continue following with cardiology and should continue taking aspirin 81 mg daily.    Patient will be advised to transition her disease modifying regimen from Betaseron to Vumerity as patient has nausea and vomiting every single time she injected but the symptom send September 2023.  Patient had self discontinued regimen since early January 2024.    After discussing risk and benefits as well as mechanism of action of Vumerity, patient will be advised to review additional data provided during this office visit and reach out with questions.  Meanwhile, we will be submitting the regimen for prior authorization and hopefully patient will get assistance program.    Patient is to continue ambulating with a walker.    Patient has LDL 56 mg/dL in September 2022.  Patient has been off statins since admission.  CT was personally reviewed and patient has no significant intracranial vascular atherosclerosis.  Patient has establish care with cardiology.    Patient has  increased urgency with worsening bladder dysfunction in the setting of severe spinal stenosis at L4-L5 levels with MRIs completed in June 2023.    Patient is to continue vitamin B12 supplements and vitamin D as initially advised.  Patient is vaccinated against shingles.    Patient is to follow-up with [facility] within 2-3 months.      Subjective: Multiple sclerosis and ambulatory dysfunction.    HPI    Mrs. Hu has presented to  [facility] for follow up on MS and related issues.      Patient has been intermittently taking Betaseron, patient has been off disease modifying regimen since early January 2024.  Patient states that she also had injection reaction and side effects of the regimen as patient had discontinued but the serum previous here for up to 3 months.  Patient brought concern that she is no longer comparable to continue Betaseron going forward.    Patient believes her MS is getting worse as she has worsening urgency concerning for neurogenic bladder.  Patient feels more disbalance even if she is using walker for ambulation.  Patient stated she slows down with her ambulation.    Patient had adjustment of her medical regimen from lisinopril to Norvasc with concern for development of left-sided sensory dysfunction being due to lisinopril.    Patient had CT angiogram head and neck on January 4, 2024 for left-sided weakness, which angiogram was unremarkable: No evidence of acute infarct, intracranial hemorrhage or mass. No hemodynamically significant stenosis, dissection or occlusion of the carotid or vertebral arteries or major vessels of the [place].    Patient main complaint was severe headache since September 7, 2022 when patient had suffered head injury. CT head and CT facial bones were done and normal; patient takes up to 8 tablets of Tylenol 350 mg or 2800 mg/ day. CTA head and neck done 9/2020 - no aneurysm or advanced stenosis described;     At this point  consideration was hypertensive urgency requiring patient aeration in the hospital, our office called 911.  Patient  was notified and he left home.        MRI brain and C-spine completed in March 2021 and mild burden of demyelination reported.  Patient has multifocal demyelination in her cervical and upper thoracic region, which contributes to patient ambulatory dysfunction, patient ambulates with a walker due to lower extremity weakness. Depakote 250 mg taper will be offered for abortive regimen of her headaches, patient is to decrease use of Tylenol;     MRI brain 10/2023: No significant interval change. Stable scattered supratentorial and infratentorial demyelinating disease. No definite new lesions identified.     MRI L-spine 6/2023: L4-L5: There is a central disc protrusion. There is facet arthrosis with ligamentum flavum thickening. There is severe mass effect on the thecal sac. There is mild right foraminal narrowing. There is no significant left foraminal narrowing.     The following portions of the patient's history were reviewed and updated as appropriate: She  has a past medical history of Anxiety, CPAP (continuous positive airway pressure) dependence, Diabetes mellitus (Formerly [facility]), External hemorrhoids, Hernia, ventral, History of transfusion, Hypertension, Incarcerated incisional hernia, Insomnia, Lyme disease, Morbid obesity with BMI of 45.0-49.9, adult (Formerly [facility]) (05/03/2017), MS (multiple sclerosis) (Formerly [facility]), Sleep apnea, Stroke (cerebrum) (Formerly [facility]) (03/09/2020), and Type 2 diabetes mellitus with hyperglycemia, without long-term current use of insulin (Formerly [facility]).  She   Patient Active Problem List    Diagnosis Date Noted    TIA (transient ischemic attack) 02/23/2024    Numbness on left side 01/04/2024    Swelling of left extremity 01/04/2024    Status post parathyroidectomy (Formerly [facility]) 11/28/2023    Peripheral neuropathy 06/14/2023    Lumbar radiculopathy 05/05/2023    Primary osteoarthritis of right hip     Chronic  post-traumatic headache, not intractable 2022    New daily persistent headache 2022    Medication overuse headache 2022    Visual disturbances 2022    Nausea & vomiting 2022    Bloating 2022    Positive for macroalbuminuria 07/15/2022    Other chronic pain 07/15/2022    Hip pain, right 2022    Obstructive sleep apnea syndrome     Hyperlipidemia 2021    B12 deficiency 2020    Thyroid nodule 2020    Chronic daily headache 2020    Class 3 severe obesity due to excess calories with serious comorbidity and body mass index (BMI) of 45.0 to 49.9 in adult ([facility]) 2020    MS (multiple sclerosis) ([facility]) 2020    Cognitive decline 2020    Right hemiparesis ([facility]) 2020    Numbness and tingling of right arm and leg 2020    Ambulatory dysfunction 2020    Falls frequently 2020    Morbid obesity with BMI of 45.0-49.9, adult ([facility]) 2017    Ischemic colitis ([facility]) 2017    Unintentional weight loss 2017    Hypercalcemia 2017    Hyperparathyroidism ([facility]) 2017    GI bleed 2017    Hernia, ventral     Type 2 diabetes mellitus with hyperglycemia, with long-term current use of insulin ([facility])     Depression with anxiety 2016    Vitamin D deficiency 2015    Benign essential hypertension 2012     She  has a past surgical history that includes  section; Hysterectomy; Abscess drainage; Colonoscopy (N/A, 2017); IR lumbar puncture (3/13/2020); US guided thyroid biopsy (2021); IR biopsy abdomen (2021); and pr parathyroidectomy/exploration parathyroids (Left, 2023).  Her family history includes Alzheimer's disease in her father; Breast cancer in her maternal aunt, maternal aunt, maternal aunt, and maternal grandmother; Breast cancer (age of onset: 38) in her sister; Breast cancer (age of onset: 48) in her sister; Cervical cancer (age of onset: 59) in her sister; Colon  cancer in her father; Coronary artery disease in her family; Diabetes in her family and father; Glaucoma in her mother; Hypertension in her family and father.  She  reports that she has never smoked. She has never used smokeless tobacco. She reports that she does not currently use alcohol. She reports that she does not currently use drugs after having used the following drugs: Marijuana.  Current Outpatient Medications   Medication Sig Dispense Refill    Alcohol Swabs PADS by Does not apply route 4 (four) times a day *Latex Free* 120 each 3    amLODIPine (NORVASC) 5 mg tablet Take 1 tablet by mouth once daily 30 tablet 3    aspirin 81 mg chewable tablet Chew 1 tablet (81 mg total) daily 90 tablet 1    Blood Glucose Monitoring Suppl (OneTouch Verio) w/Device KIT Use in the morning 1 kit 0    Blood Pressure KIT by Does not apply route daily 1 each 0    diphenhydrAMINE (BENADRYL) 25 mg tablet Take 0.5 tablets (12.5 mg total) by mouth every 6 (six) hours 20 tablet 0    Incontinence Supply Disposable (Prevail Women Underwear XL) MISC Use every 6 (six) hours as needed (incontinence) 120 each 11    metFORMIN (GLUCOPHAGE-XR) 500 mg 24 hr tablet Take 2 tablets (1,000 mg total) by mouth 2 (two) times a day with meals 360 tablet 0    metoprolol succinate (TOPROL-XL) 50 mg 24 hr tablet Take 1 tablet (50 mg total) by mouth daily 90 tablet 3    naloxone (NARCAN) 4 mg/0.1 mL nasal spray Administer 1 spray into a nostril. If no response after 2-3 minutes, give another dose in the other nostril using a new spray. 1 each 1    ondansetron (ZOFRAN-ODT) 4 mg disintegrating tablet Take 1 tablet (4 mg total) by mouth every 6 (six) hours as needed for nausea or vomiting 20 tablet 0    Tresiba FlexTouch 100 units/mL injection pen INJECT 25 UNITS SUBCUTANEOUSLY ONCE DAILY 15 mL 0    vitamin B-12 (VITAMIN B-12) 500 mcg tablet Take 2 tablets (1,000 mcg total) by mouth daily 60 tablet 5    Vitamin D, Cholecalciferol, 50 MCG (2000 UT) CAPS Take  1 capsule by mouth once daily 30 capsule 0    atorvastatin (LIPITOR) 40 mg tablet TAKE 1 TABLET BY MOUTH ONCE DAILY IN THE EVENING (Patient not taking: Reported on 2/23/2024) 90 tablet 0    BD Pen Needle Izzy 2nd Gen 32G X 4 MM MISC USE 1  ONCE DAILY IN THE MORNING (Patient not taking: Reported on 2/23/2024) 100 each 0    Continuous Blood Gluc Sensor (Dexcom G6 Sensor) MISC USE AS DIRECTED (Patient not taking: Reported on 1/4/2024) 9 each 0    Continuous Blood Gluc Transmit (Dexcom G6 Transmitter) MISC Change Transmitter Every 90 days as directed (Patient not taking: Reported on 1/4/2024) 1 each 0    Denta 5000 Plus 1.1 % CREA USE AT NIGHT TIME, SPIT, DO NOT RINSE, EAT OR DRINK FOR 30 MINUTES AFTER IT S USE (Patient not taking: Reported on 2/23/2024)      diazepam (VALIUM) 5 mg tablet Take 1 tablet (5 mg total) by mouth 60 minutes pre-procedure (Patient not taking: Reported on 1/4/2024) 2 tablet 0    DULoxetine (CYMBALTA) 30 mg delayed release capsule Take 1 capsule (30 mg total) by mouth in the morning. (Patient not taking: Reported on 2/23/2024) 30 capsule 1    glucose blood (OneTouch Verio) test strip Use 1 each in the morning (Patient not taking: Reported on 1/4/2024) 100 each 1    hydrALAZINE (APRESOLINE) 10 mg tablet Take 1 tablet (10 mg total) by mouth 3 (three) times a day (Patient not taking: Reported on 2/23/2024) 90 tablet 0    hydrochlorothiazide (HYDRODIURIL) 50 mg tablet Take 1 tablet (50 mg total) by mouth daily (Patient not taking: Reported on 1/4/2024) 90 tablet 3    Lancets (OneTouch Delica Plus [id number]) MISC Test BG up to 1x daily as directed (Patient not taking: Reported on 1/4/2024) 100 each 1    pregabalin (LYRICA) 75 mg capsule Take 1 capsule (75 mg total) by mouth 2 (two) times a day 60 capsule 2    traMADol (Ultram) 50 mg tablet Take 1 tablet (50 mg total) by mouth every 6 (six) hours as needed for moderate pain (Patient not taking: Reported on 2/23/2024) 10 tablet 0     Current  Facility-Administered Medications   Medication Dose Route Frequency Provider Last Rate Last Admin    prochlorperazine (COMPAZINE) injection 5 mg  5 mg Intramuscular Q4H PRN Devorah Edmonds MD   5 mg at 11/11/22 1040     Current Outpatient Medications on File Prior to Visit   Medication Sig    Alcohol Swabs PADS by Does not apply route 4 (four) times a day *Latex Free*    amLODIPine (NORVASC) 5 mg tablet Take 1 tablet by mouth once daily    aspirin 81 mg chewable tablet Chew 1 tablet (81 mg total) daily    Blood Glucose Monitoring Suppl (OneTouch Verio) w/Device KIT Use in the morning    Blood Pressure KIT by Does not apply route daily    diphenhydrAMINE (BENADRYL) 25 mg tablet Take 0.5 tablets (12.5 mg total) by mouth every 6 (six) hours    Incontinence Supply Disposable (Prevail Women Underwear XL) MISC Use every 6 (six) hours as needed (incontinence)    metFORMIN (GLUCOPHAGE-XR) 500 mg 24 hr tablet Take 2 tablets (1,000 mg total) by mouth 2 (two) times a day with meals    metoprolol succinate (TOPROL-XL) 50 mg 24 hr tablet Take 1 tablet (50 mg total) by mouth daily    naloxone (NARCAN) 4 mg/0.1 mL nasal spray Administer 1 spray into a nostril. If no response after 2-3 minutes, give another dose in the other nostril using a new spray.    ondansetron (ZOFRAN-ODT) 4 mg disintegrating tablet Take 1 tablet (4 mg total) by mouth every 6 (six) hours as needed for nausea or vomiting    Tresiba FlexTouch 100 units/mL injection pen INJECT 25 UNITS SUBCUTANEOUSLY ONCE DAILY    vitamin B-12 (VITAMIN B-12) 500 mcg tablet Take 2 tablets (1,000 mcg total) by mouth daily    Vitamin D, Cholecalciferol, 50 MCG (2000 UT) CAPS Take 1 capsule by mouth once daily    atorvastatin (LIPITOR) 40 mg tablet TAKE 1 TABLET BY MOUTH ONCE DAILY IN THE EVENING (Patient not taking: Reported on 2/23/2024)    BD Pen Needle Izzy 2nd Gen 32G X 4 MM MISC USE 1  ONCE DAILY IN THE MORNING (Patient not taking: Reported on 2/23/2024)    Continuous  "Blood Gluc Sensor (Dexcom G6 Sensor) MISC USE AS DIRECTED (Patient not taking: Reported on 1/4/2024)    Continuous Blood Gluc Transmit (Dexcom G6 Transmitter) MISC Change Transmitter Every 90 days as directed (Patient not taking: Reported on 1/4/2024)    Denta 5000 Plus 1.1 % CREA USE AT NIGHT TIME, SPIT, DO NOT RINSE, EAT OR DRINK FOR 30 MINUTES AFTER IT S USE (Patient not taking: Reported on 2/23/2024)    diazepam (VALIUM) 5 mg tablet Take 1 tablet (5 mg total) by mouth 60 minutes pre-procedure (Patient not taking: Reported on 1/4/2024)    DULoxetine (CYMBALTA) 30 mg delayed release capsule Take 1 capsule (30 mg total) by mouth in the morning. (Patient not taking: Reported on 2/23/2024)    glucose blood (OneTouch Verio) test strip Use 1 each in the morning (Patient not taking: Reported on 1/4/2024)    hydrALAZINE (APRESOLINE) 10 mg tablet Take 1 tablet (10 mg total) by mouth 3 (three) times a day (Patient not taking: Reported on 2/23/2024)    hydrochlorothiazide (HYDRODIURIL) 50 mg tablet Take 1 tablet (50 mg total) by mouth daily (Patient not taking: Reported on 1/4/2024)    Lancets (OneTouch Delica Plus [id number]) MISC Test BG up to 1x daily as directed (Patient not taking: Reported on 1/4/2024)    pregabalin (LYRICA) 75 mg capsule Take 1 capsule (75 mg total) by mouth 2 (two) times a day    traMADol (Ultram) 50 mg tablet Take 1 tablet (50 mg total) by mouth every 6 (six) hours as needed for moderate pain (Patient not taking: Reported on 2/23/2024)     Current Facility-Administered Medications on File Prior to Visit   Medication    prochlorperazine (COMPAZINE) injection 5 mg     She is allergic to lisinopril, sorbitan, victoza [liraglutide], ambien [zolpidem tartrate], demerol [meperidine], insulin glargine, latex, and oxycodone..         Objective:    Blood pressure 134/86, pulse 79, temperature (!) 97.2 °F (36.2 °C), temperature source Temporal, height 5' 5\" (1.651 m), weight 136 kg (299 lb 9.6 oz), SpO2 99%, " not currently breastfeeding.    Physical Exam    Neurological Exam  CONSTITUTIONAL: NAD, pleasant. NECK: supple, no lymphadenopathy, no thyromegaly, no JVD. CARDIOVASCULAR: RRR, normal S1S2, no murmurs, no rubs. RESP: clear to auscultation bilaterally, no wheezes/rhonchi/rales. ABDOMEN: soft, non tender, non distended. SKIN: no rash or skin lesions. EXTREMITIES: no edema, pulses 2+bilaterally. PSYCH: appropriate mood and affect  NEUROLOGIC COMPREHENSIVE EXAM: Patient is oriented to person, place and time, NAD; appropriate affect. CN II, III, IV, V, VI, VII,VIII,IX,X,XI-XII intact with EOMI, PERRLA, OKN intact, VF grossly intact, fundi poorly visualized secondary to pupillary constriction; symmetric face noted. Motor: 5/5 UE/LE bilateral symmetric, LUE 5-/4 shoulder abduction; RLE 5/5 through; LLE 4-/5 hip flexion and 3/5 dorsiflexion;  Sensory: diminished to light touch and pinprick bilaterally; normal vibration sensation feet bilaterally; Coordination within normal limits on FTN and JIHAN testing; DTR: 2/4 through, no Babinski, no clonus. Tandem gait is abnormal. Romberg: absent.    ROS:    Review of Systems   Constitutional:  Negative for appetite change, fatigue and fever.   HENT: Negative.  Negative for hearing loss, tinnitus, trouble swallowing and voice change.    Eyes: Negative.  Negative for photophobia, pain and visual disturbance.   Respiratory: Negative.  Negative for shortness of breath.    Cardiovascular: Negative.  Negative for palpitations.   Gastrointestinal: Negative.  Negative for nausea and vomiting.   Endocrine: Negative.  Negative for cold intolerance.   Genitourinary: Negative.  Negative for dysuria, frequency and urgency.   Musculoskeletal:  Positive for gait problem ([id number]). Negative for back pain, myalgias, neck pain and neck stiffness.   Skin: Negative.  Negative for rash.   Allergic/Immunologic: Negative.    Neurological:  Negative for dizziness, tremors, seizures, syncope, facial  asymmetry, speech difficulty, weakness, light-headedness, numbness and headaches.        Off balance   Hematological: Negative.  Does not bruise/bleed easily.   Psychiatric/Behavioral: Negative.  Negative for confusion, hallucinations and sleep disturbance.

## 2024-02-23 NOTE — TELEPHONE ENCOUNTER
New DMT: Vumerity 231 mg bid ( 1 caps bid)    Current DMT and instructions on how/when to stop: self discontinued Betaseron    Was start form submitted/consent obtained if needed: no    Are labs needed prior to starting:no    Any special instructions: patient is ready to start Vumerity anytime drug is available in her home. Patient concerned for co-pays.

## 2024-02-27 RX ORDER — DIROXIMEL FUMARATE 231 MG/1
462 CAPSULE ORAL 2 TIMES DAILY
Qty: 120 CAPSULE | Refills: 11 | Status: SHIPPED | OUTPATIENT
Start: 2024-02-27

## 2024-02-27 RX ORDER — DIROXIMEL FUMARATE 231 MG/1
CAPSULE ORAL 2 TIMES DAILY
Qty: 106 CAPSULE | Refills: 0 | Status: SHIPPED | OUTPATIENT
Start: 2024-02-27 | End: 2024-03-28

## 2024-03-20 NOTE — TELEPHONE ENCOUNTER
Called and spoke to patient to confirm their upcoming appointment with Dr. Henson. Informed patient about arriving in the [place] location 15 minutes prior to their appointment to get checked in and going over chart.      Lm to call and schedule

## 2024-03-22 ENCOUNTER — TELEPHONE (OUTPATIENT)
Age: 64
End: 2024-03-22

## 2024-03-22 DIAGNOSIS — E11.65 TYPE 2 DIABETES MELLITUS WITH HYPERGLYCEMIA, WITHOUT LONG-TERM CURRENT USE OF INSULIN (HCC): ICD-10-CM

## 2024-03-22 RX ORDER — INSULIN DEGLUDEC 100 U/ML
25 INJECTION, SOLUTION SUBCUTANEOUS DAILY
Qty: 15 ML | Refills: 2 | Status: SHIPPED | OUTPATIENT
Start: 2024-03-22

## 2024-03-22 NOTE — TELEPHONE ENCOUNTER
Medication: Tresiba    Dose/Frequency: 25 units daily    Quantity: 90 day    Pharmacy: [facility]    Does the patient have enough for 3 days?   [] Yes   [x] No - Send as HP to POD       PATIENT IS SCHEDULED FOR SOONEST APPT. WITH THE ENDO

## 2024-03-22 NOTE — TELEPHONE ENCOUNTER
Dr. Joshua completed the forms, not PCP    Added requested information and initialed/dated as requested    Faxed back at this time

## 2024-03-26 DIAGNOSIS — E78.5 HYPERLIPIDEMIA, UNSPECIFIED HYPERLIPIDEMIA TYPE: ICD-10-CM

## 2024-03-26 DIAGNOSIS — F41.8 DEPRESSION WITH ANXIETY: ICD-10-CM

## 2024-03-26 DIAGNOSIS — Z13.0 SCREENING FOR IRON DEFICIENCY ANEMIA: ICD-10-CM

## 2024-03-26 DIAGNOSIS — E11.65 TYPE 2 DIABETES MELLITUS WITH HYPERGLYCEMIA, WITHOUT LONG-TERM CURRENT USE OF INSULIN (HCC): ICD-10-CM

## 2024-03-26 DIAGNOSIS — I10 BENIGN ESSENTIAL HYPERTENSION: ICD-10-CM

## 2024-03-26 DIAGNOSIS — E78.5 HYPERLIPIDEMIA, UNSPECIFIED HYPERLIPIDEMIA TYPE: Primary | ICD-10-CM

## 2024-03-26 RX ORDER — ATORVASTATIN CALCIUM 40 MG/1
40 TABLET, FILM COATED ORAL EVERY EVENING
Qty: 30 TABLET | Refills: 0 | Status: SHIPPED | OUTPATIENT
Start: 2024-03-26

## 2024-03-29 ENCOUNTER — TELEPHONE (OUTPATIENT)
Dept: BARIATRICS | Facility: CLINIC | Age: 64
End: 2024-03-29

## 2024-04-03 ENCOUNTER — TELEPHONE (OUTPATIENT)
Dept: NEUROLOGY | Facility: CLINIC | Age: 64
End: 2024-04-03

## 2024-04-03 NOTE — TELEPHONE ENCOUNTER
4/1 11:39    Pt left a VM requesting a refills of Vumerity.    Transcribed VM:   My name is Joanne Hu. I need refills on my medication so it can be filled. I only have 4 days left. It's Vumerity capsule 231 mg. Call me back at [phone]. My YOB: 1960.    Chart review shows that pt has multiple refills available. Chart review also shows that med was sent out from [facility] on 3/27/24. Called pt back to see if she had received the medication yet, and she reports that she has not. Pt reports that she has enough medication to get through Thursday.

## 2024-04-04 NOTE — TELEPHONE ENCOUNTER
Called [facility] specialty pharmacy at [phone]. Per automated system, Vumerity was delivered to pt yesterday 4/3/24 via [facility]. Tracking # [id number].     Called pt. She confirmed medication was received.     Pt reports she cannot keep neuro appt on 5/3/24 as she now requires AM appt. Rescheduled pt to 5/7/24 @ 8:15am. No further questions.

## 2024-04-17 ENCOUNTER — NURSE TRIAGE (OUTPATIENT)
Age: 64
End: 2024-04-17

## 2024-04-17 NOTE — TELEPHONE ENCOUNTER
"Patient reporting increased vaginal itching and discharge that is white and thick that has been going on for 3 days now. Advised to use the monistat 5 or 7 day course over the counter.  Advise patient to avoid wearing tight fitting clothing, avoid a diet high in sugar and carbs, avoid irritating lotions and soaps, and change out of damp clothing asap. Advised to wear cotton underwear for now and keep vaginal area open to air as much as possible. Patient verbalized understanding and voiced appreciation for call.       Reason for Disposition   Symptoms of a vaginal yeast infection (i.e., white, thick, cottage-cheese-like, itchy, not bad smelling discharge)    Additional Information   Vaginal discharge is main symptom    Answer Assessment - Initial Assessment Questions  1. SYMPTOM: \"What's the main symptom you're concerned about?\" (e.g., rash, itching, swelling, dryness)      Itching and discharge   2. LOCATION: \"Where is the  itching/discharge located?\" (e.g., inside/outside, left/right)      Outside and inside   3. ONSET: \"When did the  itching/discharge  start?\"      3 days ago   4. PAIN: \"Is there any pain?\" If Yes, ask: \"How bad is it?\" (Scale: 1-10; mild, moderate, severe)    -  MILD (1-3): doesn't interfere with normal activities     -  MODERATE (4-7): interferes with normal activities (e.g., work or school) or awakens from sleep      -  SEVERE (8-10): excruciating pain, unable to do any normal activities      Denies   5. CAUSE: \"What do you think is causing the symptoms?\"      Yeast infection   6. OTHER SYMPTOMS: \"Do you have any other symptoms?\" (e.g., fever, vaginal bleeding, pain with urination)      Denies   7. PREGNANCY: \"Is there any chance you are pregnant?\" \"When was your last menstrual period?\"      Denies    Protocols used: Vulvar Symptoms-ADULT-[place], Vaginal Discharge-ADULT-[place]    "

## 2024-04-21 DIAGNOSIS — E55.9 VITAMIN D DEFICIENCY: ICD-10-CM

## 2024-04-21 DIAGNOSIS — E78.5 HYPERLIPIDEMIA, UNSPECIFIED HYPERLIPIDEMIA TYPE: ICD-10-CM

## 2024-04-22 DIAGNOSIS — I10 BENIGN ESSENTIAL HYPERTENSION: ICD-10-CM

## 2024-04-22 DIAGNOSIS — E11.65 TYPE 2 DIABETES MELLITUS WITH HYPERGLYCEMIA, WITHOUT LONG-TERM CURRENT USE OF INSULIN (HCC): ICD-10-CM

## 2024-04-22 RX ORDER — METFORMIN HYDROCHLORIDE 500 MG/1
1000 TABLET, EXTENDED RELEASE ORAL 2 TIMES DAILY WITH MEALS
Qty: 360 TABLET | Refills: 1 | Status: SHIPPED | OUTPATIENT
Start: 2024-04-22

## 2024-04-22 RX ORDER — ATORVASTATIN CALCIUM 40 MG/1
40 TABLET, FILM COATED ORAL EVERY EVENING
Qty: 30 TABLET | Refills: 2 | Status: SHIPPED | OUTPATIENT
Start: 2024-04-22

## 2024-04-22 RX ORDER — AMLODIPINE BESYLATE 5 MG/1
5 TABLET ORAL DAILY
Qty: 30 TABLET | Refills: 5 | Status: SHIPPED | OUTPATIENT
Start: 2024-04-22

## 2024-04-22 RX ORDER — ACETAMINOPHEN 160 MG
1 TABLET,DISINTEGRATING ORAL DAILY
Qty: 30 CAPSULE | Refills: 5 | Status: SHIPPED | OUTPATIENT
Start: 2024-04-22

## 2024-05-07 ENCOUNTER — TELEPHONE (OUTPATIENT)
Age: 64
End: 2024-05-07

## 2024-05-07 ENCOUNTER — OFFICE VISIT (OUTPATIENT)
Dept: NEUROLOGY | Facility: CLINIC | Age: 64
End: 2024-05-07
Payer: COMMERCIAL

## 2024-05-07 VITALS
BODY MASS INDEX: 47.48 KG/M2 | HEIGHT: 65 IN | SYSTOLIC BLOOD PRESSURE: 137 MMHG | HEART RATE: 80 BPM | TEMPERATURE: 97.1 F | WEIGHT: 285 LBS | OXYGEN SATURATION: 95 % | RESPIRATION RATE: 14 BRPM | DIASTOLIC BLOOD PRESSURE: 80 MMHG

## 2024-05-07 DIAGNOSIS — R26.2 AMBULATORY DYSFUNCTION: Chronic | ICD-10-CM

## 2024-05-07 DIAGNOSIS — G35 MS (MULTIPLE SCLEROSIS) (HCC): Primary | ICD-10-CM

## 2024-05-07 DIAGNOSIS — G62.9 PERIPHERAL POLYNEUROPATHY: ICD-10-CM

## 2024-05-07 PROCEDURE — 99214 OFFICE O/P EST MOD 30 MIN: CPT | Performed by: PHYSICIAN ASSISTANT

## 2024-05-07 NOTE — TELEPHONE ENCOUNTER
Pt called to schedule appt with podiatry. Transferred pt to podiatry in [facility] to schedule.

## 2024-05-07 NOTE — PATIENT INSTRUCTIONS
Continue Vumerity 2 capsules twice a day  Please get labs done this month (looks like you also have to get labs done for your PCP and surgical/endo team).  Make sure you tell the lab you have orders from multiple providers so they draw all of it  Continue with routine exercise as much as tolerated  Follow up in 3-4 months or sooner if needed

## 2024-05-07 NOTE — PROGRESS NOTES
Patient ID: Joanne Hu is a 63 y.o. female.    Assessment/Plan:    MS (multiple sclerosis) (HCC)  Patient remains clinically stable from a MS standpoint.  She had self discontinued Betaseron in December 2023, and when seen in February 2024, was advised to start Vumerity.  Vumerity was started approximately 2 months ago and she is tolerating well overall.    MRI brain 10/2023 was stable.  Will likely update again in the fall.    She will need to get blood work done at this time so we can evaluate her absolute lymphocyte count, as well as liver function testing.  CBC with differential ordered today.  She already has liver function testing ordered through her PCP.  She is due to get all these labs done by the end of the month when she sees PCP and her surgical team (had left parathyroidectomy in November 2023).    She has not had any new symptoms.  Neurologic exam is stable today.    Peripheral neuropathy  Neuropathy is multifactorial, likely from both CNS and PNS causes.  She has history of uncontrolled DM.  We discussed the importance of controlling her DM to prevent progression of peripheral neuropathy.  Proper footcare and hygiene discussed.  She sees podiatry.  We discussed neuropathy likely contributes to her imbalance.  She continues to use a walker to prevent falls.    Ambulatory dysfunction  Multifactorial from her MS, lumbar spine issues, peripheral neuropathy, orthopedic issues, deconditioning.  Encouraged her to continue to use her walker to prevent falls.  Encouraged a routine exercise program as tolerated.    Patient will return in 3-4 months or sooner if needed.       Diagnoses and all orders for this visit:    MS (multiple sclerosis) ([facility])  -     CBC and differential; Future    Peripheral polyneuropathy    Ambulatory dysfunction         Subjective:    HPI    Patient is a 64 yo female who presents to the [facility] for follow up regarding her MS. Patient with PMH of HTN, DM type 2, HLD,  hyperparathyroidism. She was last seen in February 2024.     Patient was initially seen in April 2020 following a hospitalization. Patient developed acute onset of right-sided numbness and weakness on March 9, 2020 in the morning before leaving for work.   She was assessed by neurology while in the hospital, initial concern was for stroke.  MRI C-spine WO contrast 3/10/2020: There is focal intramedullary T2 hyperintense signal epicentered at the C4 vertebral level.  This is more pronounced in the right and central hemicord.  The remaining mid to lower cervical cord is more difficult to assess due to the image degradation.  There may be additional right hemicord signal abnormality at C5 and C6.   MRI c-spine W contrast 3/11/2020: Postcontrast imaging demonstrates enhancement associated with multiple previously described foci of T2 prolongation within the cervical and upper thoracic CORD.  MRI brain WO contrast 3/10/2020: There is no discrete mass, mass effect or midline shift. There is no intracranial hemorrhage.  There is no evidence of acute infarction and diffusion imaging is unremarkable. Multiple foci of T2 and FLAIR hyperintensity are present within the supratentorial white matter, most pronounced in the anterolateral left frontal lobe, anterior right temporal lobe and right cerebellum/middle cerebellar peduncle.   MRI brain W contrast 3/11/2020: Redemonstration of multiple supratentorial and infratentorial scattered areas of white matter FLAIR signal hyperintensity, as described above. Many of the lesions demonstrate a perpendicular configuration of the ventricles and lesions are seen involving the callosal septal interface. Imaging appearance is most suggestive of demyelinating disease/multiple sclerosis with areas of active demyelination as correlated with dedicated MRI of the cervical spine.  Concern for MS vs neoplasm vs sarcoid. CT chest abdomen pelvis was unremarkable for any solid tumor, although did  "demonstrate some calcified lymph nodes and granulomata in the lung fields.  Patient had completed CSF analysis was for increased cells: CSF protein 80, CSF white blood cell 37, lymphocyte predominant, JCV and ACE negative, meningitis panel negative, MS panel high IgG index, 3 OCB, flow cytometry hypercellular. Serum NMO/MOG negative, ANCA/DNA/Sjogren's negative, ESR 32, CRP 9.1.   She was given IV steroids and oral steroid taper.     Patient had repeat imaging in June 2020 and there were several new, enhancing demyelinating lesions in the brain, as well as improvement in the c-spine noted.      At timing of visit on 7/7/2020, diagnosis of MS was made. Betaseron was initiated, with first injection 7/24/2020. At first, she was having flu-like symptoms with the titration, but patient was advised to pre-med with NSAIDs/Tylenol and warm showers, and this improved her symptoms.  She continued to have issues with the Betaseron. She called in mid October reporting ongoing issues, so Betaseron was held for 2-3 weeks.  In November 2020, she was doing better with the Betaseron.     Updated imaging was done without contrast due to patient declined contrast (she tells me she was there for \"too long\" and was tired and wanted to go home)  MRI brain 3/5/2021:  Mild plaque burden, at least 2 of the previously seen enhancing lesions are less conspicuous on FLAIR imaging, indicative of at least partial treatment response.  MRI c-spine 3/5/2021: a few, scattered cord lesions redemonstrated.  The previously seen enhancing lesion at T1 demonstrates a small amount of myelomalacia.  No progressive cord signal abnormality on noncontrast imaging.     In March 2022, patient reported feeling generally worse, especially difficulty with stamina and endurance with ambulation. She denied new, focal symptoms, just a decline in general. She also admitted to not using her CPAP, feeling tired. MRI c-spine and t-spine were ordered due to worsening " ambulatory dysfunction. She did not proceed with imaging for several months. MRI c-spine and t-spine updated 8/23/22 and both stable.     At timing of Feb 2023 visit, patient reported she “continues to decline”, however when asked about the symptoms she was having, they were essentially all non-neurologic. MRI brain was ordered for surveillance, initially without contrast. MRI brain wo contrast 3/7/23 demonstrated a new 1.5 cm left periventricular white matter demyelinating lesion. She was asked to get another MRI with contrast and this was completed 3/23/2023. This lesion was not enhancing. Initial MRI was compared to 3/5/2021 imaging.     At timing of June 2023 visit she reported a lot of issues with pain. She was having right hip and leg pain, and also reported ongoing ambulatory dysfunction, needing to use her walker all the time or she will fall. She had been following with ortho for her hip, tried PT and an injection in the hip that did not work, so was referred to pain management. She had a MRI lumbar spine which demonstrated severe central canal narrowing at L4-5. Pain management was offering L5-S1 TERRANCE vs referral to spine surgery. She had also recently seen a surgeon for hyperparathyroidism and plan was for parathyroidectomy.      Patient presented to her Oct 2023 visit with her son.  She continued on Betaseron. Her son reported that patient had not been driving for several years. No one formally revoked her license or reported concerns, but since her initial MS presentation as well as multiple other medical issues, she had not driven. Patient herself reported having the desire to drive, but son and apparently the rest of their family had great concerns. Even prior to 2020 when she stopped driving, she apparently had issues with getting lost and one time had to pullover on a highway and call someone to come get her. I sent in formal reporting to [facility] indicating patient should cease driving due to  "cognitive reasons (patient agreed to continue not driving and agreed to formal reporting).   MRI brain updated 10/5/23 was stable. No evidence of disease progression.     Patient had ED visit on 1/4/24 for left sided numbness.  She initially presented to her PCP office that day reporting left sided swelling, numbness and pruritus.  Back in Dec 2023, she had an ED visit for left sided lip swelling, suggestive of angioedema.  She was treated and observed in the ED and this improved.  Lisinopril was d/c, as this was felt to be the cause.  On 1/4, she reported repeated episodes of left sided symptoms, reported taking Benadryl with improvement.  She was sent to the ED to r/o stroke given her history.  CTA h/n in ED with no LVO or flow limiting stenosis.  She also reported stopping her Betaseron in December on her own.  ED with no clear diagnosis of symptoms.    She was then seen by Dr. Edmonds on 2/23/24, reported self discontinuing Betaseron in early December due to \"feeling sick\" with injections.  She was advised to start Vumerity as next DMT.    Today, patient reports she has overall been stable since her last visit.  She started Vumerity in early March 2024 and seems to be tolerating this well.  She tells me that she \"gets sick\" about once a week where she has some nausea and decreased appetite and has lost 15 pounds since her last visit.  She has not seen her PCP or brought this up to them yet.  She has outstanding labs for her PCP and surgical team.  She did have a left parathyroidectomy in November 2023.  She denies any new symptoms today.  She has not had any falls, uses her rolling walker.  Denies any vision changes.  She denies any further episodes of left sided numbness.  No speech difficulty or swallowing difficulty.  No bowel or bladder changes.  She says that she always urinates very frequently and has incontinence, but does not want to see a urologist.    The following portions of the patient's history " "were reviewed and updated as appropriate: current medications, past family history, past medical history, past social history, past surgical history, and problem list.       Objective:    Blood pressure 137/80, pulse 80, temperature (!) 97.1 °F (36.2 °C), temperature source Temporal, resp. rate 14, height 5' 5\" (1.651 m), weight 129 kg (285 lb), SpO2 95%    Physical Exam  Constitutional:       Appearance: Normal appearance.   Eyes:      Extraocular Movements: EOM normal.      Pupils: Pupils are equal, round, and reactive to light.   Neurological:      Mental Status: She is alert.      Deep Tendon Reflexes:      Reflex Scores:       Bicep reflexes are 1+ on the right side and 1+ on the left side.       Brachioradialis reflexes are 1+ on the right side and 1+ on the left side.       Patellar reflexes are 1+ on the right side and 1+ on the left side.       Achilles reflexes are 0 on the right side and 0 on the left side.  Psychiatric:         Mood and Affect: Mood normal.         Speech: Speech normal.         Behavior: Behavior normal.         Neurological Exam  Mental Status  Alert. Oriented to person, place, time and situation. Speech is normal. Language is fluent with no aphasia. Attention and concentration are normal.    Cranial Nerves  CN II: Visual fields full to confrontation.  CN III, IV, VI: Extraocular movements intact bilaterally. Pupils equal round and reactive to light bilaterally.  CN V: Facial sensation is normal.  CN VII: Full and symmetric facial movement.  CN VIII: Hearing is normal.  CN IX, X: Palate elevates symmetrically  CN XI: Shoulder shrug strength is normal.  CN XII: Tongue midline without atrophy or fasciculations.    Motor   Normal muscle tone. No abnormal involuntary movements. Strength is 5/5 in all four extremities except as noted.  Right HF 4+/5, poor effort .    Sensory  Light touch is normal in upper and lower extremities.     Reflexes                                            Right  "                     Left  Brachioradialis                    1+                         1+  Biceps                                 1+                         1+  Patellar                                1+                         1+  Achilles                                0                         0    Coordination  Right: Finger-to-nose normal.Left: Finger-to-nose normal.    Gait   Unable to rise from chair without using arms.  Gait is slow, using rolling walker.        ROS:    Review of Systems   Constitutional:  Negative for appetite change, fatigue and fever.   HENT: Negative.  Negative for hearing loss, tinnitus, trouble swallowing and voice change.    Eyes: Negative.  Negative for photophobia, pain and visual disturbance.   Respiratory: Negative.  Negative for shortness of breath.    Cardiovascular: Negative.  Negative for palpitations.   Gastrointestinal:  Positive for nausea and vomiting.   Endocrine: Negative.  Negative for cold intolerance.   Genitourinary:  Positive for frequency and urgency. Negative for dysuria.   Musculoskeletal:  Positive for gait problem. Negative for back pain, myalgias, neck pain and neck stiffness.   Skin: Negative.  Negative for rash.   Allergic/Immunologic: Negative.    Neurological:  Positive for numbness (knees down and elbows down). Negative for dizziness, tremors, seizures, syncope, facial asymmetry, speech difficulty, weakness, light-headedness and headaches.   Hematological: Negative.  Does not bruise/bleed easily.   Psychiatric/Behavioral:  Positive for confusion and sleep disturbance (sleeps a alot). Negative for hallucinations.    All other systems reviewed and are negative.      I personally reviewed and updated the ROS as appropriate

## 2024-05-07 NOTE — ASSESSMENT & PLAN NOTE
Patient remains clinically stable from a MS standpoint.  She had self discontinued Betaseron in December 2023, and when seen in February 2024, was advised to start Vumerity.  Vumerity was started approximately 2 months ago and she is tolerating well overall.    MRI brain 10/2023 was stable.  Will likely update again in the fall.    She will need to get blood work done at this time so we can evaluate her absolute lymphocyte count, as well as liver function testing.  CBC with differential ordered today.  She already has liver function testing ordered through her PCP.  She is due to get all these labs done by the end of the month when she sees PCP and her surgical team (had left parathyroidectomy in November 2023).    She has not had any new symptoms.  Neurologic exam is stable today.

## 2024-05-07 NOTE — ASSESSMENT & PLAN NOTE
Neuropathy is multifactorial, likely from both CNS and PNS causes.  She has history of uncontrolled DM.  We discussed the importance of controlling her DM to prevent progression of peripheral neuropathy.  Proper footcare and hygiene discussed.  She sees podiatry.  We discussed neuropathy likely contributes to her imbalance.  She continues to use a walker to prevent falls.

## 2024-05-07 NOTE — ASSESSMENT & PLAN NOTE
Multifactorial from her MS, lumbar spine issues, peripheral neuropathy, orthopedic issues, deconditioning.  Encouraged her to continue to use her walker to prevent falls.  Encouraged a routine exercise program as tolerated.

## 2024-05-08 DIAGNOSIS — R00.2 PALPITATIONS: ICD-10-CM

## 2024-05-08 DIAGNOSIS — R07.89 OTHER CHEST PAIN: ICD-10-CM

## 2024-05-08 RX ORDER — METOPROLOL SUCCINATE 50 MG/1
50 TABLET, EXTENDED RELEASE ORAL DAILY
Qty: 90 TABLET | Refills: 1 | Status: SHIPPED | OUTPATIENT
Start: 2024-05-08

## 2024-05-10 ENCOUNTER — DOCUMENTATION (OUTPATIENT)
Dept: ENDOCRINOLOGY | Facility: CLINIC | Age: 64
End: 2024-05-10

## 2024-05-10 NOTE — PROGRESS NOTES
Chart note request received from [employer] and it was faxed back making them aware that the patient is due for her next appointment this Jaz

## 2024-05-28 ENCOUNTER — TELEPHONE (OUTPATIENT)
Dept: HEMATOLOGY ONCOLOGY | Facility: CLINIC | Age: 64
End: 2024-05-28

## 2024-05-28 NOTE — TELEPHONE ENCOUNTER
Appointment Change  Cancel, Reschedule, Change to Virtual      Who are you speaking with? Patient   If it is not the patient, is the caller listed on the communication consent form? N/A   Which provider is the appointment scheduled with? DEANA Mortensen   When was the original appointment scheduled?    Please list date and time 5/30/24 @ 8:30am   At which location is the appointment scheduled to take place? [place]   Was the appointment rescheduled?     Was the appointment changed from an in person visit to a virtual visit?    If so, please list the details of the change. No, patient will call back to schedule   What is the reason for the appointment change? Patient has another appointment that can't be canceled       Was STAR transport scheduled? no   Does STAR transport need to be scheduled for the new visit (if applicable) no   Does the patient need an infusion appointment rescheduled? no   Does the patient have an upcoming infusion appointment scheduled? If so, when? no   Is the patient undergoing chemotherapy? no   For appointments cancelled with less than 24 hours:  Was the no-show policy reviewed? na

## 2024-05-29 ENCOUNTER — OFFICE VISIT (OUTPATIENT)
Dept: FAMILY MEDICINE CLINIC | Facility: CLINIC | Age: 64
End: 2024-05-29
Payer: COMMERCIAL

## 2024-05-29 VITALS
SYSTOLIC BLOOD PRESSURE: 120 MMHG | DIASTOLIC BLOOD PRESSURE: 80 MMHG | BODY MASS INDEX: 47.82 KG/M2 | TEMPERATURE: 97.6 F | HEART RATE: 86 BPM | HEIGHT: 65 IN | WEIGHT: 287 LBS | OXYGEN SATURATION: 99 %

## 2024-05-29 DIAGNOSIS — I10 BENIGN ESSENTIAL HYPERTENSION: ICD-10-CM

## 2024-05-29 DIAGNOSIS — G35 MULTIPLE SCLEROSIS (HCC): ICD-10-CM

## 2024-05-29 DIAGNOSIS — K55.9 ISCHEMIC COLITIS (HCC): ICD-10-CM

## 2024-05-29 DIAGNOSIS — Z00.00 MEDICARE ANNUAL WELLNESS VISIT, SUBSEQUENT: Primary | ICD-10-CM

## 2024-05-29 DIAGNOSIS — E11.42 DIABETIC POLYNEUROPATHY ASSOCIATED WITH TYPE 2 DIABETES MELLITUS (HCC): ICD-10-CM

## 2024-05-29 DIAGNOSIS — S06.0X0S CONCUSSION WITHOUT LOSS OF CONSCIOUSNESS, SEQUELA (HCC): ICD-10-CM

## 2024-05-29 DIAGNOSIS — E11.65 TYPE 2 DIABETES MELLITUS WITH HYPERGLYCEMIA, WITHOUT LONG-TERM CURRENT USE OF INSULIN (HCC): ICD-10-CM

## 2024-05-29 DIAGNOSIS — E66.01 MORBID OBESITY WITH BMI OF 45.0-49.9, ADULT (HCC): ICD-10-CM

## 2024-05-29 DIAGNOSIS — E78.5 HYPERLIPIDEMIA, UNSPECIFIED HYPERLIPIDEMIA TYPE: ICD-10-CM

## 2024-05-29 PROCEDURE — G0439 PPPS, SUBSEQ VISIT: HCPCS | Performed by: FAMILY MEDICINE

## 2024-05-29 PROCEDURE — 99214 OFFICE O/P EST MOD 30 MIN: CPT | Performed by: FAMILY MEDICINE

## 2024-05-29 NOTE — PROGRESS NOTES
Ambulatory Visit  Name: Joanne Hu      : 1960      MRN: [id number]  Encounter Provider: Erika Cuenca DO  Encounter Date: 2024   Encounter department: [facility]    Assessment & Plan   1. Type 2 diabetes mellitus with hyperglycemia, without long-term current use of insulin (HCC)  Unclear reason control.  Patient states that she has not completed her fasting blood work and does not want to complete this.  She does follow with endocrinology regularly.  She was advised on the great harm associated with stopping all of her medications.  She must continue to follow-up with endocrinology.  Will attempt to set up home blood draws for her.  - Ambulatory Referral to Complex Care Management Program; Future  - Ambulatory Referral to Social Work Care Management Program; Future    2. Benign essential hypertension  Blood pressure appears very well-controlled today.  She must continue to follow-up with cardiology.  Continue with her current treatment of amlodipine as well as metoprolol.  - Ambulatory Referral to Complex Care Management Program; Future  - Ambulatory Referral to Social Work Care Management Program; Future    3. Hyperlipidemia, unspecified hyperlipidemia type  Unclear risk control.  Continue with a strict low-fat/low-carb diet as well as her current treatment with atorvastatin.  Recheck lipid panel.  - Ambulatory Referral to Complex Care Management Program; Future  - Ambulatory Referral to Social Work Care Management Program; Future    4. Multiple sclerosis (HCC)  Patient has been following up with neurology.  She is highly discouraged on her MS.  She has not noticed any improvement in her symptoms.  She has been taking this medication regularly.  Continue with her regular follow-up.  - Ambulatory Referral to Complex Care Management Program; Future  - Ambulatory Referral to Social Work Care Management Program; Future    5. Medicare annual wellness visit, subsequent  -  Ambulatory Referral to Complex Care Management Program; Future  - Ambulatory Referral to Social Work Care Management Program; Future           Preventive health issues were discussed with patient, and age appropriate screening tests were ordered as noted in patient's After Visit Summary. Personalized health advice and appropriate referrals for health education or preventive services given if needed, as noted in patient's After Visit Summary.    History of Present Illness     HPI   Patient is a 63-year-old female presents today for a follow-up on her chronic conditions.  She has type 2 diabetes, hypertension, dyslipidemia, depression with anxiety.  She has been taking her medications regularly.  She states that today she is feeling very unmotivated and.  She believes that she is taking too many medications and does not want to take them or see her specialist any further.  Patient Care Team:  Erika Cuenca DO as PCP - General (Family Medicine)  Mariano Joshua DO as PCP - PCP-[employer] (RTE)  Mariano Joshua DO as PCP - PCP-[facility] (RTE)  Mabel Tong MD as PCP - Endocrinology (Endocrinology)  MD Dia Ornelas DO as Endoscopist  Hussein Lee MD (Podiatry)  Jennifer Antunez as Diabetes Educator (Nutrition)  Allie Bbo MD (Nephrology)  DEANA Hsu as Nurse Practitioner (Endocrinology)  Barrett Rose MD (Oncology)  Mariano Alicia DO (Cardiology)    Review of Systems   Constitutional:  Negative for activity change, chills, fatigue and fever.   HENT:  Negative for congestion, ear pain, sinus pressure and sore throat.    Eyes:  Negative for redness, itching and visual disturbance.   Respiratory:  Negative for cough and shortness of breath.    Cardiovascular:  Negative for chest pain and palpitations.   Gastrointestinal:  Negative for abdominal pain, diarrhea and nausea.   Endocrine: Negative for cold intolerance and heat intolerance.    Genitourinary:  Negative for dysuria, flank pain and frequency.   Musculoskeletal:  Negative for arthralgias, back pain, gait problem and myalgias.   Skin:  Negative for color change.   Allergic/Immunologic: Negative for environmental allergies.   Neurological:  Negative for dizziness, numbness and headaches.   Psychiatric/Behavioral:  Negative for behavioral problems and sleep disturbance.      Medical History Reviewed by provider this encounter:       Annual Wellness Visit Questionnaire   Joanne is here for her Subsequent Wellness visit. Last Medicare Wellness visit information reviewed, patient interviewed, no change since last AWV.     Health Risk Assessment:   Patient rates overall health as poor. Patient feels that their physical health rating is much worse. Patient is very dissatisfied with their life. Eyesight was rated as same. Hearing was rated as same. Patient feels that their emotional and mental health rating is much worse. Patients states they are always angry. Patient states they are never, rarely unusually tired/fatigued. Pain experienced in the last 7 days has been a lot. Patient's pain rating has been 8/10. Patient states that she has experienced no weight loss or gain in last 6 months.     Depression Screening:   PHQ-9 Score: 0      Fall Risk Screening:   In the past year, patient has experienced: no history of falling in past year      Urinary Incontinence Screening:   Patient has leaked urine accidently in the last six months.     Home Safety:  Patient has trouble with stairs inside or outside of their home. Patient has working smoke alarms and has working carbon monoxide detector. Home safety hazards include: none.     Nutrition:   Current diet is Diabetic and No Added Salt.     Medications:   Patient is currently taking over-the-counter supplements. OTC medications include: see medication list. Patient is not able to manage medications.     Activities of Daily Living (ADLs)/Instrumental  Activities of Daily Living (IADLs):   Walk and transfer into and out of bed and chair?: Yes  Dress and groom yourself?: Yes    Bathe or shower yourself?: Yes    Feed yourself? Yes  Do your laundry/housekeeping?: No  Manage your money, pay your bills and track your expenses?: Yes  Make your own meals?: No    Do your own shopping?: No    Previous Hospitalizations:   Any hospitalizations or ED visits within the last 12 months?: Yes    How many hospitalizations have you had in the last year?: 1-2    Advance Care Planning:   Living will: Yes    Durable POA for healthcare: Yes    Advanced directive: Yes    Advanced directive counseling given: Yes    ACP document given: Yes    Patient declined ACP directive: No    End of Life Decisions reviewed with patient: Yes    Provider agrees with end of life decisions: Yes      Cognitive Screening:   Provider or family/friend/caregiver concerned regarding cognition?: No    PREVENTIVE SCREENINGS      Cardiovascular Screening:    General: Screening Not Indicated, History Lipid Disorder and Risks and Benefits Discussed    Due for: Lipid Panel      Diabetes Screening:     General: Screening Not Indicated, History Diabetes and Risks and Benefits Discussed    Due for: Blood Glucose      Colorectal Cancer Screening:     General: Screening Current      Breast Cancer Screening:     General: Risks and Benefits Discussed and Patient Declines      Cervical Cancer Screening:    General: Screening Current and Risks and Benefits Discussed      Osteoporosis Screening:    General: Risks and Benefits Discussed and Screening Current      Abdominal Aortic Aneurysm (AAA) Screening:        General: Risks and Benefits Discussed and Screening Not Indicated      Lung Cancer Screening:     General: Screening Not Indicated and Risks and Benefits Discussed      Hepatitis C Screening:    General: Screening Current and Risks and Benefits Discussed    Screening, Brief Intervention, and Referral to Treatment  (SBIRT)    Screening  Typical number of drinks in a day: 0  Typical number of drinks in a week: 0  Interpretation: Low risk drinking behavior.    AUDIT-C Screenin) How often did you have a drink containing alcohol in the past year? never  2) How many drinks did you have on a typical day when you were drinking in the past year? 0  3) How often did you have 6 or more drinks on one occasion in the past year? never    AUDIT-C Score: 0  Interpretation: Score 0-2 (female): Negative screen for alcohol misuse    Single Item Drug Screening:  How often have you used an illegal drug (including marijuana) or a prescription medication for non-medical reasons in the past year? never    Single Item Drug Screen Score: 0  Interpretation: Negative screen for possible drug use disorder    Review of Current Opioid Use    Opioid Risk Tool (ORT) Interpretation: Complete Opioid Risk Tool (ORT)    Social Determinants of Health     Financial Resource Strain: High Risk (2023)    Overall Financial Resource Strain (CARDIA)     Difficulty of Paying Living Expenses: Hard   Food Insecurity: Unknown (2021)    Hunger Vital Sign     Worried About Running Out of Food in the Last Year: Never true   Transportation Needs: Unmet Transportation Needs (2023)    PRAPARE - Transportation     Lack of Transportation (Medical): Yes     Lack of Transportation (Non-Medical): Yes     No results found.    Objective     LMP  (LMP Unknown)     Physical Exam  Vitals reviewed.   Constitutional:       General: She is not in acute distress.     Appearance: Normal appearance. She is well-developed.   HENT:      Head: Normocephalic and atraumatic.      Right Ear: Tympanic membrane, ear canal and external ear normal. There is no impacted cerumen.      Left Ear: Tympanic membrane, ear canal and external ear normal. There is no impacted cerumen.      Nose: Nose normal. No congestion or rhinorrhea.      Mouth/Throat:      Mouth: Mucous membranes are moist.       Pharynx: No oropharyngeal exudate or posterior oropharyngeal erythema.   Eyes:      General: No scleral icterus.        Right eye: No discharge.         Left eye: No discharge.      Extraocular Movements: Extraocular movements intact.      Conjunctiva/sclera: Conjunctivae normal.      Pupils: Pupils are equal, round, and reactive to light.   Neck:      Trachea: No tracheal deviation.   Cardiovascular:      Rate and Rhythm: Normal rate and regular rhythm.      Pulses: Normal pulses.           Dorsalis pedis pulses are 2+ on the right side and 2+ on the left side.        Posterior tibial pulses are 2+ on the right side and 2+ on the left side.      Heart sounds: Normal heart sounds. No murmur heard.     No friction rub. No gallop.   Pulmonary:      Effort: Pulmonary effort is normal. No respiratory distress.      Breath sounds: Normal breath sounds. No wheezing, rhonchi or rales.   Abdominal:      General: Bowel sounds are normal. There is no distension.      Palpations: Abdomen is soft.      Tenderness: There is no abdominal tenderness. There is no guarding or rebound.   Musculoskeletal:         General: Normal range of motion.      Cervical back: Normal range of motion and neck supple.      Right lower leg: No edema.      Left lower leg: No edema.   Lymphadenopathy:      Head:      Right side of head: No submental or submandibular adenopathy.      Left side of head: No submental or submandibular adenopathy.      Cervical: No cervical adenopathy.      Right cervical: No superficial, deep or posterior cervical adenopathy.     Left cervical: No superficial, deep or posterior cervical adenopathy.   Skin:     General: Skin is warm and dry.      Findings: No erythema.   Neurological:      General: No focal deficit present.      Mental Status: She is alert and oriented to person, place, and time.      Cranial Nerves: No cranial nerve deficit.      Sensory: Sensation is intact. No sensory deficit.      Motor: Motor  function is intact.   Psychiatric:         Attention and Perception: Attention and perception normal.         Mood and Affect: Mood is not anxious or depressed.         Speech: Speech normal.         Behavior: Behavior normal.         Thought Content: Thought content normal.         Judgment: Judgment normal.     Administrative Statements

## 2024-05-29 NOTE — PATIENT INSTRUCTIONS
Medicare Preventive Visit Patient Instructions  Thank you for completing your Welcome to Medicare Visit or Medicare Annual Wellness Visit today. Your next wellness visit will be due in one year (5/30/2025).  The screening/preventive services that you may require over the next 5-10 years are detailed below. Some tests may not apply to you based off risk factors and/or age. Screening tests ordered at today's visit but not completed yet may show as past due. Also, please note that scanned in results may not display below.  Preventive Screenings:  Service Recommendations Previous Testing/Comments   Colorectal Cancer Screening  * Colonoscopy    * Fecal Occult Blood Test (FOBT)/Fecal Immunochemical Test (FIT)  * Fecal DNA/Cologuard Test  * Flexible Sigmoidoscopy Age: 45-75 years old   Colonoscopy: every 10 years (may be performed more frequently if at higher risk)  OR  FOBT/FIT: every 1 year  OR  Cologuard: every 3 years  OR  Sigmoidoscopy: every 5 years  Screening may be recommended earlier than age 45 if at higher risk for colorectal cancer. Also, an individualized decision between you and your healthcare provider will decide whether screening between the ages of 76-85 would be appropriate. Colonoscopy: 04/28/2017  FOBT/FIT: Not on file  Cologuard: Not on file  Sigmoidoscopy: Not on file    Screening Current     Breast Cancer Screening Age: 40+ years old  Frequency: every 1-2 years  Not required if history of left and right mastectomy Mammogram: 09/07/2022    Screening Current   Cervical Cancer Screening Between the ages of 21-29, pap smear recommended once every 3 years.   Between the ages of 30-65, can perform pap smear with HPV co-testing every 5 years.   Recommendations may differ for women with a history of total hysterectomy, cervical cancer, or abnormal pap smears in past. Pap Smear: 09/16/2022    Screening Current   Hepatitis C Screening Once for adults born between 1945 and 1965  More frequently in patients at  high risk for Hepatitis C Hep C Antibody: 09/06/2019    Screening Current   Diabetes Screening 1-2 times per year if you're at risk for diabetes or have pre-diabetes Fasting glucose: 121 mg/dL (11/4/2023)  A1C: 7.9 % (11/4/2023)  Screening Not Indicated  History Diabetes   Cholesterol Screening Once every 5 years if you don't have a lipid disorder. May order more often based on risk factors. Lipid panel: 09/01/2022    Screening Not Indicated  History Lipid Disorder     Other Preventive Screenings Covered by Medicare:  Abdominal Aortic Aneurysm (AAA) Screening: covered once if your at risk. You're considered to be at risk if you have a family history of AAA.  Lung Cancer Screening: covers low dose CT scan once per year if you meet all of the following conditions: (1) Age 55-77; (2) No signs or symptoms of lung cancer; (3) Current smoker or have quit smoking within the last 15 years; (4) You have a tobacco smoking history of at least 20 pack years (packs per day multiplied by number of years you smoked); (5) You get a written order from a healthcare provider.  Glaucoma Screening: covered annually if you're considered high risk: (1) You have diabetes OR (2) Family history of glaucoma OR (3)  aged 50 and older OR (4)  American aged 65 and older  Osteoporosis Screening: covered every 2 years if you meet one of the following conditions: (1) You're estrogen deficient and at risk for osteoporosis based off medical history and other findings; (2) Have a vertebral abnormality; (3) On glucocorticoid therapy for more than 3 months; (4) Have primary hyperparathyroidism; (5) On osteoporosis medications and need to assess response to drug therapy.   Last bone density test (DXA Scan): 10/06/2020.  HIV Screening: covered annually if you're between the age of 15-65. Also covered annually if you are younger than 15 and older than 65 with risk factors for HIV infection. For pregnant patients, it is covered up to  3 times per pregnancy.    Immunizations:  Immunization Recommendations   Influenza Vaccine Annual influenza vaccination during flu season is recommended for all persons aged >= 6 months who do not have contraindications   Pneumococcal Vaccine   * Pneumococcal conjugate vaccine = PCV13 (Prevnar 13), PCV15 (Vaxneuvance), PCV20 (Prevnar 20)  * Pneumococcal polysaccharide vaccine = PPSV23 (Pneumovax) Adults 19-63 yo with certain risk factors or if 65+ yo  If never received any pneumonia vaccine: recommend Prevnar 20 (PCV20)  Give PCV20 if previously received 1 dose of PCV13 or PPSV23   Hepatitis B Vaccine 3 dose series if at intermediate or high risk (ex: diabetes, end stage renal disease, liver disease)   Respiratory syncytial virus (RSV) Vaccine - COVERED BY MEDICARE PART D  * RSVPreF3 (Arexvy) CDC recommends that adults 60 years of age and older may receive a single dose of RSV vaccine using shared clinical decision-making (SCDM)   Tetanus (Td) Vaccine - COST NOT COVERED BY MEDICARE PART B Following completion of primary series, a booster dose should be given every 10 years to maintain immunity against tetanus. Td may also be given as tetanus wound prophylaxis.   Tdap Vaccine - COST NOT COVERED BY MEDICARE PART B Recommended at least once for all adults. For pregnant patients, recommended with each pregnancy.   Shingles Vaccine (Shingrix) - COST NOT COVERED BY MEDICARE PART B  2 shot series recommended in those 19 years and older who have or will have weakened immune systems or those 50 years and older     Health Maintenance Due:      Topic Date Due   • Breast Cancer Screening: Mammogram  09/07/2023   • Colorectal Cancer Screening  04/28/2027   • HIV Screening  Completed   • Hepatitis C Screening  Completed     Immunizations Due:      Topic Date Due   • Pneumococcal Vaccine: Pediatrics (0 to 5 Years) and At-Risk Patients (6 to 64 Years) (1 of 2 - PCV) Never done   • COVID-19 Vaccine (3 - 2023-24 season) 09/01/2023      Advance Directives   What are advance directives?  Advance directives are legal documents that state your wishes and plans for medical care. These plans are made ahead of time in case you lose your ability to make decisions for yourself. Advance directives can apply to any medical decision, such as the treatments you want, and if you want to donate organs.   What are the types of advance directives?  There are many types of advance directives, and each state has rules about how to use them. You may choose a combination of any of the following:  Living will:  This is a written record of the treatment you want. You can also choose which treatments you do not want, which to limit, and which to stop at a certain time. This includes surgery, medicine, IV fluid, and tube feedings.   Durable power of  for healthcare (DPAHC):  This is a written record that states who you want to make healthcare choices for you when you are unable to make them for yourself. This person, called a proxy, is usually a family member or a friend. You may choose more than 1 proxy.  Do not resuscitate (DNR) order:  A DNR order is used in case your heart stops beating or you stop breathing. It is a request not to have certain forms of treatment, such as CPR. A DNR order may be included in other types of advance directives.  Medical directive:  This covers the care that you want if you are in a coma, near death, or unable to make decisions for yourself. You can list the treatments you want for each condition. Treatment may include pain medicine, surgery, blood transfusions, dialysis, IV or tube feedings, and a ventilator (breathing machine).  Values history:  This document has questions about your views, beliefs, and how you feel and think about life. This information can help others choose the care that you would choose.  Why are advance directives important?  An advance directive helps you control your care. Although spoken wishes may  be used, it is better to have your wishes written down. Spoken wishes can be misunderstood, or not followed. Treatments may be given even if you do not want them. An advance directive may make it easier for your family to make difficult choices about your care.   Urinary Incontinence   Urinary incontinence (UI)  is when you lose control of your bladder. UI develops because your bladder cannot store or empty urine properly. The 3 most common types of UI are stress incontinence, urge incontinence, or both.  Medicines:   May be given to help strengthen your bladder control. Report any side effects of medication to your healthcare provider.  Do pelvic muscle exercises often:  Your pelvic muscles help you stop urinating. Squeeze these muscles tight for 5 seconds, then relax for 5 seconds. Gradually work up to squeezing for 10 seconds. Do 3 sets of 15 repetitions a day, or as directed. This will help strengthen your pelvic muscles and improve bladder control.  Train your bladder:  Go to the bathroom at set times, such as every 2 hours, even if you do not feel the urge to go. You can also try to hold your urine when you feel the urge to go. For example, hold your urine for 5 minutes when you feel the urge to go. As that becomes easier, hold your urine for 10 minutes.   Self-care:   Keep a UI record.  Write down how often you leak urine and how much you leak. Make a note of what you were doing when you leaked urine.  Drink liquids as directed. You may need to limit the amount of liquid you drink to help control your urine leakage. Do not drink any liquid right before you go to bed. Limit or do not have drinks that contain caffeine or alcohol.   Prevent constipation.  Eat a variety of high-fiber foods. Good examples are high-fiber cereals, beans, vegetables, and whole-grain breads. Walking is the best way to trigger your intestines to have a bowel movement.  Exercise regularly and maintain a healthy weight.  Weight loss and  exercise will decrease pressure on your bladder and help you control your leakage.   Use a catheter as directed  to help empty your bladder. A catheter is a tiny, plastic tube that is put into your bladder to drain your urine.   Go to behavior therapy as directed.  Behavior therapy may be used to help you learn to control your urge to urinate.    Weight Management   Why it is important to manage your weight:  Being overweight increases your risk of health conditions such as heart disease, high blood pressure, type 2 diabetes, and certain types of cancer. It can also increase your risk for osteoarthritis, sleep apnea, and other respiratory problems. Aim for a slow, steady weight loss. Even a small amount of weight loss can lower your risk of health problems.  How to lose weight safely:  A safe and healthy way to lose weight is to eat fewer calories and get regular exercise. You can lose up about 1 pound a week by decreasing the number of calories you eat by 500 calories each day.   Healthy meal plan for weight management:  A healthy meal plan includes a variety of foods, contains fewer calories, and helps you stay healthy. A healthy meal plan includes the following:  Eat whole-grain foods more often.  A healthy meal plan should contain fiber. Fiber is the part of grains, fruits, and vegetables that is not broken down by your body. Whole-grain foods are healthy and provide extra fiber in your diet. Some examples of whole-grain foods are whole-wheat breads and pastas, oatmeal, brown rice, and bulgur.  Eat a variety of vegetables every day.  Include dark, leafy greens such as spinach, kale, margaux greens, and mustard greens. Eat yellow and orange vegetables such as carrots, sweet potatoes, and winter squash.   Eat a variety of fruits every day.  Choose fresh or canned fruit (canned in its own juice or light syrup) instead of juice. Fruit juice has very little or no fiber.  Eat low-fat dairy foods.  Drink fat-free  (skim) milk or 1% milk. Eat fat-free yogurt and low-fat cottage cheese. Try low-fat cheeses such as mozzarella and other reduced-fat cheeses.  Choose meat and other protein foods that are low in fat.  Choose beans or other legumes such as split peas or lentils. Choose fish, skinless poultry (chicken or turkey), or lean cuts of red meat (beef or pork). Before you cook meat or poultry, cut off any visible fat.   Use less fat and oil.  Try baking foods instead of frying them. Add less fat, such as margarine, sour cream, regular salad dressing and mayonnaise to foods. Eat fewer high-fat foods. Some examples of high-fat foods include french fries, doughnuts, ice cream, and cakes.  Eat fewer sweets.  Limit foods and drinks that are high in sugar. This includes candy, cookies, regular soda, and sweetened drinks.  Exercise:  Exercise at least 30 minutes per day on most days of the week. Some examples of exercise include walking, biking, dancing, and swimming. You can also fit in more physical activity by taking the stairs instead of the elevator or parking farther away from stores. Ask your healthcare provider about the best exercise plan for you.   Narcotic (Opioid) Safety    Use narcotics safely:  Take prescribed narcotics exactly as directed  Do not give narcotics to others or take narcotics that belong to someone else  Do not mix narcotics without medicines or alcohol  Do not drive or operate heavy machinery after you take the narcotic  Monitor for side effects and notify your healthcare provider if you experienced side effects such as nausea, sleepiness, itching, or trouble thinking clearly.    Manage constipation:    Constipation is the most common side effect of narcotic medicine. Constipation is when you have hard, dry bowel movements, or you go longer than usual between bowel movements. Tell your healthcare provider about all changes in your bowel movements while you are taking narcotics. He or she may recommend  laxative medicine to help you have a bowel movement. He or she may also change the kind of narcotic you are taking, or change when you take it. The following are more ways you can prevent or relieve constipation:    Drink liquids as directed.  You may need to drink extra liquids to help soften and move your bowels. Ask how much liquid to drink each day and which liquids are best for you.  Eat high-fiber foods.  This may help decrease constipation by adding bulk to your bowel movements. High-fiber foods include fruits, vegetables, whole-grain breads and cereals, and beans. Your healthcare provider or dietitian can help you create a high-fiber meal plan. Your provider may also recommend a fiber supplement if you cannot get enough fiber from food.  Exercise regularly.  Regular physical activity can help stimulate your intestines. Walking is a good exercise to prevent or relieve constipation. Ask which exercises are best for you.  Schedule a time each day to have a bowel movement.  This may help train your body to have regular bowel movements. Bend forward while you are on the toilet to help move the bowel movement out. Sit on the toilet for at least 10 minutes, even if you do not have a bowel movement.    Store narcotics safely:   Store narcotics where others cannot easily get them.  Keep them in a locked cabinet or secure area. Do not  keep them in a purse or other bag you carry with you. A person may be looking for something else and find the narcotics.  Make sure narcotics are stored out of the reach of children.  A child can easily overdose on narcotics. Narcotics may look like candy to a small child.    The best way to dispose of narcotics:      The laws vary by country and area. In the [place], the best way is to return the narcotics through a take-back program. This program is offered by the [place] Drug Enforcement Agency (ABRAHAM). The following are options for using the program:  Take the narcotics to a [employer]AM  collection site.  The site is often a law enforcement center. Call your local law enforcement center for scheduled take-back days in your area. You will be given information on where to go if the collection site is in a different location.  Take the narcotics to an approved pharmacy or hospital.  A pharmacy or hospital may be set up as a collection site. You will need to ask if it is a [employer] collection site if you were not directed there. A pharmacy or doctor's office may not be able to take back narcotics unless it is a ABRAHAM site.  Use a mail-back system.  This means you are given containers to put the narcotics into. You will then mail them in the containers.  Use a take-back drop box.  This is a place to leave the narcotics at any time. People and animals will not be able to get into the box. Your local law enforcement agency can tell you where to find a drop box in your area.    Other ways to manage pain:   Ask your healthcare provider about non-narcotic medicines to control pain.  Nonprescription medicines include NSAIDs (such as ibuprofen) and acetaminophen. Prescription medicines include muscle relaxers, antidepressants, and steroids.  Pain may be managed without any medicines.  Some ways to relieve pain include massage, aromatherapy, or meditation. Physical or occupational therapy may also help.    For more information:   Drug Enforcement Administration  [address] [place] [address]  Phone: 2- 144 - 831-9271  Web Address: https://www.deadiversion.[facility].gov/drug_disposal/    [place] Food and Drug Administration  [address] [place] [address]  Phone: 8- 423 - 433-7073  Web Address: http://www.fda.gov     © Copyright [employer] 2018 Information is for End User's use only and may not be sold, redistributed or otherwise used for commercial purposes. All illustrations and images included in CareNotes® are the copyrighted property of A.D.A.M., Inc. or [employer]

## 2024-05-30 ENCOUNTER — PATIENT OUTREACH (OUTPATIENT)
Dept: FAMILY MEDICINE CLINIC | Facility: CLINIC | Age: 64
End: 2024-05-30

## 2024-05-30 NOTE — PROGRESS NOTES
Outpatient Care Management Note:    New direct PCP referral received. Patient had an office visit with Dr Cuenca yesterday. She did not complete her blood work and does not want to complete it. She has a history of diabetes, hypertension, dyslipidemia, depression and anxiety.     Voice mail message left for Makenzie, with my contact information, introducing myself and requesting a call back.

## 2024-06-03 ENCOUNTER — PATIENT OUTREACH (OUTPATIENT)
Dept: FAMILY MEDICINE CLINIC | Facility: CLINIC | Age: 64
End: 2024-06-03

## 2024-06-03 NOTE — PROGRESS NOTES
EMMA PATTERSON received a referral from patient's PCP. EMMA PATTERSON reviewed patient's chart, no reason for referral provided. EMMA PATTERSON called patient ([phone]). Patient did not answer, EMMA PATTERSON left a message including EMMA PATTERSON contact information and requested a call back. EMMA PATTERSON will attempt to outreach again at a later date and time.     UPDATE/ ADDENDUM:     Patient called EMMA PATTERSON back and stated that she has [facility] coming this week to evaluate her for services. She stated she does have anxiety but not interested in seeing a therapist. She did discuss interest in home PT, She stated she will call PCP office to discuss. She is not interested in any additional resources or support from EMMA PATTERSON at this time. EMMA PATTERSON will close. Please re consult EMMA PATTERSON as needed.

## 2024-06-06 ENCOUNTER — TELEPHONE (OUTPATIENT)
Dept: FAMILY MEDICINE CLINIC | Facility: CLINIC | Age: 64
End: 2024-06-06

## 2024-06-06 ENCOUNTER — PATIENT OUTREACH (OUTPATIENT)
Dept: FAMILY MEDICINE CLINIC | Facility: CLINIC | Age: 64
End: 2024-06-06

## 2024-06-06 DIAGNOSIS — G35 MULTIPLE SCLEROSIS (HCC): Primary | ICD-10-CM

## 2024-06-06 DIAGNOSIS — E11.42 DIABETIC POLYNEUROPATHY ASSOCIATED WITH TYPE 2 DIABETES MELLITUS (HCC): ICD-10-CM

## 2024-06-06 DIAGNOSIS — R20.0 NUMBNESS ON LEFT SIDE: ICD-10-CM

## 2024-06-06 NOTE — PROGRESS NOTES
"Outpatient Care Management Note:    Voice mail message left for Makenzie, with my contact information, introducing myself and requesting a call back.  (2nd attempt)     Makenzie called back. She states that she is \"lousy\" today. Her legs don't want to work. She notes that they have gotten progressively worse over time due to her MS.  We discussed physical therapy to increase her strength. Makenzie feels this may help.  Makenzie does not drive, but her daughter works for the school district and is off for the summer. She is willing to try.  CM will forward to Dr Cuenca.     Makenzie did mention that her health insurance was changed. She does not have her new insurance cards, but should get them in the mail in the next 10 days. She will need her new cards to see if therapy will be covered.     Makenzie declined completing a med review. She states that her daughter manages and fills weekly med boxes. She feels she is taking everything correctly.     Makenzie is diabetic. She has a dexcom 7. She states her sugars are running high. Averaging 250-260. She denies eating sugary sweets, but is eating a lot of fruit. CM reinforced that natural sugar is still sugar and will elevate blood sugars. I encouraged her to decrease her intake of fruit.  Makenzie is scheduled to see endocrine on 6/19 and will review sugars with them.    CM encouraged Makenzie to complete her PCP blood work. I explained why getting blood work is important to keeping her healthy. She will consider having her daughter take her to lab!     Makenzie is meeting with [employer] tomorrow. She will discuss meals on wheels to assist with her diet.     Makenzie has my contact information and will call with any questions.   "

## 2024-06-06 NOTE — LETTER
Date: 06/06/24    Dear Joanne Hu,   My name is Cathleen Gomez. I am a registered nurse care manager working with   [facility]  [address]  [facility] [id number] [phone].   I have not been able to reach you and would like to set a time that I can talk with you over the phone.  My work is to help patients that have complex medical conditions get the care they need. This includes patients who may have been in the hospital or emergency room.    I have enclosed info Please call me with any questions you may have. I look forward to meeting with you.  Sincerely,  ***  [phone]  Outpatient Care Manager  Copy:  (primary care physician name and address)

## 2024-06-10 ENCOUNTER — TELEPHONE (OUTPATIENT)
Age: 64
End: 2024-06-10

## 2024-06-10 NOTE — TELEPHONE ENCOUNTER
Pt called stating that she needed something for incontinence supplies faxed over to [facility] at [phone].   Pt stated that she had everything set up with [employer] but they changed her insurance and now she is running out of her incontinence supplies.     Pt was advised to reach out to [facility] and advise them that they need to fax an order to attn: Dr. Cuenca at [phone] with the items that she needs. And her provider will sign off if appropriate.    Pt insisted that [facility] needs information from her provider faxed first. Pt needs faxed the following information faxed to [facility]: How many pull ups she uses per day (46), her demographic information, PCP's name phone# and fax.  Please reach out to pt with questions or concerns. [phone]

## 2024-06-11 ENCOUNTER — TELEPHONE (OUTPATIENT)
Dept: NEUROLOGY | Facility: CLINIC | Age: 64
End: 2024-06-11

## 2024-06-11 NOTE — TELEPHONE ENCOUNTER
"Denisse  6/11/24 10:38 AM:    \" Makenzie Hu, birth date [birth year]. Need to reup my incontinence supply orders.\"    MILKA# [phone]  ---------------------------------------  Per Chart Review: Pt contacted PCP to ask for supplies. Per medication list- Rx for Incontinence supplies was written 1/13/22 with 11 refills by Dr. Erika Cuenca.   "

## 2024-06-11 NOTE — TELEPHONE ENCOUNTER
Spoke with patient she stated she uses 4-6 pull ups a day and would also need doublers (pad that goes inside pull-up).

## 2024-06-11 NOTE — TELEPHONE ENCOUNTER
Pt returned call to inform that the incontinence supplies script was issued earlier by Dr. Mariano Gerber.and now she is seeking Dr. Cuenca help to issue the same. Please do help. Thanks

## 2024-06-12 ENCOUNTER — TELEPHONE (OUTPATIENT)
Age: 64
End: 2024-06-12

## 2024-06-12 DIAGNOSIS — G35 MS (MULTIPLE SCLEROSIS) (HCC): ICD-10-CM

## 2024-06-12 DIAGNOSIS — I63.439 CEREBROVASCULAR ACCIDENT (CVA) DUE TO EMBOLISM OF POSTERIOR CEREBRAL ARTERY, UNSPECIFIED BLOOD VESSEL LATERALITY (HCC): ICD-10-CM

## 2024-06-12 DIAGNOSIS — R32 INCONTINENCE OF URINE IN FEMALE: ICD-10-CM

## 2024-06-12 RX ORDER — DIAPER,BRIEF,ADULT, DISPOSABLE
EACH MISCELLANEOUS EVERY 6 HOURS PRN
Qty: 120 EACH | Refills: 11 | Status: SHIPPED | OUTPATIENT
Start: 2024-06-12

## 2024-06-12 RX ORDER — DIAPER,BRIEF,ADULT, DISPOSABLE
EACH MISCELLANEOUS EVERY 6 HOURS PRN
Qty: 120 EACH | Refills: 11 | Status: SHIPPED | OUTPATIENT
Start: 2024-06-12 | End: 2024-06-12 | Stop reason: SDUPTHER

## 2024-06-12 NOTE — TELEPHONE ENCOUNTER
[facility] called to see if there was an update on women xl incontinace supplies. I was about to view the medication order for todays supply order. Was disconnected, c/b no answer. Please advise further on order. Thank you :)

## 2024-06-12 NOTE — TELEPHONE ENCOUNTER
Teetee from [facility] called stating [facility] has a form that can be completed to order incontinence supplies on their website.   [email or web address]  If the link does not work, go to the [facility] Website, which will bring you to [facility], how to order, incontinence supplies and then the form.     Teetee also stated an alternative to [facility] is [facility] located in [place].

## 2024-06-12 NOTE — TELEPHONE ENCOUNTER
Patient called due to she is out of her incontinence supplies     She needs the order sent to [facility] [phone]  FAX [phone]

## 2024-06-13 ENCOUNTER — TELEPHONE (OUTPATIENT)
Age: 64
End: 2024-06-13

## 2024-06-13 PROBLEM — N39.42 URINARY INCONTINENCE WITHOUT SENSORY AWARENESS: Status: ACTIVE | Noted: 2024-06-13

## 2024-06-13 NOTE — TELEPHONE ENCOUNTER
Patient called and stated she would like an order placed with [facility] for a wheelchair. Can this be done? Please advise.

## 2024-06-13 NOTE — TELEPHONE ENCOUNTER
Patient called and stated she needs to pull ups sent to [facility] with a diagnosis of incontinence. Please advise.

## 2024-06-14 LAB

## 2024-06-14 NOTE — TELEPHONE ENCOUNTER
Patient returned call.  Advised patient that the wheelchair was ordered through [facility].

## 2024-06-17 ENCOUNTER — APPOINTMENT (OUTPATIENT)
Dept: LAB | Facility: CLINIC | Age: 64
End: 2024-06-17
Payer: COMMERCIAL

## 2024-06-17 ENCOUNTER — TELEPHONE (OUTPATIENT)
Dept: FAMILY MEDICINE CLINIC | Facility: CLINIC | Age: 64
End: 2024-06-17

## 2024-06-17 ENCOUNTER — TELEPHONE (OUTPATIENT)
Age: 64
End: 2024-06-17

## 2024-06-17 DIAGNOSIS — F41.8 DEPRESSION WITH ANXIETY: ICD-10-CM

## 2024-06-17 DIAGNOSIS — G35 MS (MULTIPLE SCLEROSIS) (HCC): ICD-10-CM

## 2024-06-17 DIAGNOSIS — Z98.890 STATUS POST PARATHYROIDECTOMY: ICD-10-CM

## 2024-06-17 DIAGNOSIS — E11.65 TYPE 2 DIABETES MELLITUS WITH HYPERGLYCEMIA, WITHOUT LONG-TERM CURRENT USE OF INSULIN (HCC): ICD-10-CM

## 2024-06-17 DIAGNOSIS — Z90.89 STATUS POST PARATHYROIDECTOMY: ICD-10-CM

## 2024-06-17 DIAGNOSIS — Z13.0 SCREENING FOR IRON DEFICIENCY ANEMIA: ICD-10-CM

## 2024-06-17 DIAGNOSIS — E78.5 HYPERLIPIDEMIA, UNSPECIFIED HYPERLIPIDEMIA TYPE: ICD-10-CM

## 2024-06-17 LAB
ALBUMIN SERPL BCP-MCNC: 3.8 G/DL (ref 3.5–5)
ALP SERPL-CCNC: 83 U/L (ref 34–104)
ALT SERPL W P-5'-P-CCNC: 17 U/L (ref 7–52)
ANION GAP SERPL CALCULATED.3IONS-SCNC: 12 MMOL/L (ref 4–13)
AST SERPL W P-5'-P-CCNC: 14 U/L (ref 13–39)
BASOPHILS # BLD AUTO: 0.04 THOUSANDS/ÂΜL (ref 0–0.1)
BASOPHILS NFR BLD AUTO: 1 % (ref 0–1)
BILIRUB SERPL-MCNC: 0.82 MG/DL (ref 0.2–1)
BUN SERPL-MCNC: 15 MG/DL (ref 5–25)
CALCIUM SERPL-MCNC: 9 MG/DL (ref 8.4–10.2)
CHLORIDE SERPL-SCNC: 103 MMOL/L (ref 96–108)
CHOLEST SERPL-MCNC: 137 MG/DL
CO2 SERPL-SCNC: 25 MMOL/L (ref 21–32)
CREAT SERPL-MCNC: 0.72 MG/DL (ref 0.6–1.3)
EOSINOPHIL # BLD AUTO: 0.32 THOUSAND/ÂΜL (ref 0–0.61)
EOSINOPHIL NFR BLD AUTO: 4 % (ref 0–6)
ERYTHROCYTE [DISTWIDTH] IN BLOOD BY AUTOMATED COUNT: 13.3 % (ref 11.6–15.1)
EST. AVERAGE GLUCOSE BLD GHB EST-MCNC: 321 MG/DL
GFR SERPL CREATININE-BSD FRML MDRD: 90 ML/MIN/1.73SQ M
GLUCOSE P FAST SERPL-MCNC: 189 MG/DL (ref 65–99)
HBA1C MFR BLD: 12.8 %
HCT VFR BLD AUTO: 37.5 % (ref 34.8–46.1)
HDLC SERPL-MCNC: 51 MG/DL
HGB BLD-MCNC: 12.2 G/DL (ref 11.5–15.4)
IMM GRANULOCYTES # BLD AUTO: 0.02 THOUSAND/UL (ref 0–0.2)
IMM GRANULOCYTES NFR BLD AUTO: 0 % (ref 0–2)
LDLC SERPL CALC-MCNC: 59 MG/DL (ref 0–100)
LYMPHOCYTES # BLD AUTO: 2.02 THOUSANDS/ÂΜL (ref 0.6–4.47)
LYMPHOCYTES NFR BLD AUTO: 24 % (ref 14–44)
MCH RBC QN AUTO: 28.6 PG (ref 26.8–34.3)
MCHC RBC AUTO-ENTMCNC: 32.5 G/DL (ref 31.4–37.4)
MCV RBC AUTO: 88 FL (ref 82–98)
MONOCYTES # BLD AUTO: 0.57 THOUSAND/ÂΜL (ref 0.17–1.22)
MONOCYTES NFR BLD AUTO: 7 % (ref 4–12)
NEUTROPHILS # BLD AUTO: 5.51 THOUSANDS/ÂΜL (ref 1.85–7.62)
NEUTS SEG NFR BLD AUTO: 64 % (ref 43–75)
NRBC BLD AUTO-RTO: 0 /100 WBCS
PLATELET # BLD AUTO: 271 THOUSANDS/UL (ref 149–390)
PMV BLD AUTO: 9.6 FL (ref 8.9–12.7)
POTASSIUM SERPL-SCNC: 4.1 MMOL/L (ref 3.5–5.3)
PROT SERPL-MCNC: 7 G/DL (ref 6.4–8.4)
PTH-INTACT SERPL-MCNC: 36.3 PG/ML (ref 12–88)
RBC # BLD AUTO: 4.27 MILLION/UL (ref 3.81–5.12)
SODIUM SERPL-SCNC: 140 MMOL/L (ref 135–147)
TRIGL SERPL-MCNC: 135 MG/DL
TSH SERPL DL<=0.05 MIU/L-ACNC: 3.13 UIU/ML (ref 0.45–4.5)
WBC # BLD AUTO: 8.48 THOUSAND/UL (ref 4.31–10.16)

## 2024-06-17 PROCEDURE — 83970 ASSAY OF PARATHORMONE: CPT

## 2024-06-17 PROCEDURE — 82306 VITAMIN D 25 HYDROXY: CPT

## 2024-06-17 PROCEDURE — 85025 COMPLETE CBC W/AUTO DIFF WBC: CPT

## 2024-06-17 PROCEDURE — 83036 HEMOGLOBIN GLYCOSYLATED A1C: CPT

## 2024-06-17 PROCEDURE — 84443 ASSAY THYROID STIM HORMONE: CPT

## 2024-06-17 PROCEDURE — 80053 COMPREHEN METABOLIC PANEL: CPT

## 2024-06-17 PROCEDURE — 36415 COLL VENOUS BLD VENIPUNCTURE: CPT

## 2024-06-17 PROCEDURE — 80061 LIPID PANEL: CPT

## 2024-06-17 NOTE — TELEPHONE ENCOUNTER
Please resend fax for incontinence supplies from 06/13 to [employer] at [phone]. Patient stated that company stated they did not receive it. Patient would like to be notified when it is fax, so she can confirm with the company. Please advise.

## 2024-06-17 NOTE — TELEPHONE ENCOUNTER
Pt's daughter came in stating that incontinence supplies have to be non-brandname and sent to [facility].     Please advise [phone]

## 2024-06-18 NOTE — TELEPHONE ENCOUNTER
[facility] called regarding the form that was sent to [employer].  The form needs the name corrected to Joanne Hu, list the quantity per day and total dispensed, also correct the length of need to 12 months. Additionally, the patient is wearing 2xl pull ups (not XL). Please make these corrections and resend to [employer].

## 2024-06-19 ENCOUNTER — OFFICE VISIT (OUTPATIENT)
Dept: ENDOCRINOLOGY | Facility: CLINIC | Age: 64
End: 2024-06-19
Payer: COMMERCIAL

## 2024-06-19 ENCOUNTER — TELEPHONE (OUTPATIENT)
Dept: ENDOCRINOLOGY | Facility: CLINIC | Age: 64
End: 2024-06-19

## 2024-06-19 ENCOUNTER — TELEPHONE (OUTPATIENT)
Dept: BARIATRICS | Facility: CLINIC | Age: 64
End: 2024-06-19

## 2024-06-19 VITALS
HEIGHT: 65 IN | WEIGHT: 290 LBS | OXYGEN SATURATION: 97 % | HEART RATE: 70 BPM | BODY MASS INDEX: 48.32 KG/M2 | DIASTOLIC BLOOD PRESSURE: 80 MMHG | SYSTOLIC BLOOD PRESSURE: 120 MMHG

## 2024-06-19 DIAGNOSIS — E11.65 TYPE 2 DIABETES MELLITUS WITH HYPERGLYCEMIA, WITH LONG-TERM CURRENT USE OF INSULIN (HCC): Primary | ICD-10-CM

## 2024-06-19 DIAGNOSIS — E21.3 HYPERPARATHYROIDISM (HCC): ICD-10-CM

## 2024-06-19 DIAGNOSIS — I10 BENIGN ESSENTIAL HYPERTENSION: ICD-10-CM

## 2024-06-19 DIAGNOSIS — E04.1 THYROID NODULE: ICD-10-CM

## 2024-06-19 DIAGNOSIS — E78.5 HYPERLIPIDEMIA, UNSPECIFIED HYPERLIPIDEMIA TYPE: ICD-10-CM

## 2024-06-19 DIAGNOSIS — Z79.4 TYPE 2 DIABETES MELLITUS WITH HYPERGLYCEMIA, WITH LONG-TERM CURRENT USE OF INSULIN (HCC): Primary | ICD-10-CM

## 2024-06-19 PROCEDURE — 99214 OFFICE O/P EST MOD 30 MIN: CPT

## 2024-06-19 PROCEDURE — 95251 CONT GLUC MNTR ANALYSIS I&R: CPT

## 2024-06-19 RX ORDER — INSULIN DEGLUDEC 100 U/ML
30 INJECTION, SOLUTION SUBCUTANEOUS DAILY
Qty: 15 ML | Refills: 2 | Status: SHIPPED | OUTPATIENT
Start: 2024-06-19

## 2024-06-19 NOTE — ASSESSMENT & PLAN NOTE
HGA1C and BGs severely uncontrolled. BGs elevated majority of the time on CGM report. She reports she has not changed diet or activity level. She has not had recent steroids or new medications.   Treatment regimen:   - Recommend patient start on bolus insulin before meals, however she declines.   - Discussed the risks of uncontrolled diabetes in detail. She is agreeing to try GLP-1. She did have side effects with Victoza but denies hx of pancreatitis or family/personal hx of medullary thyroid cancer. She will start Ozempic 0.25 mg weekly for 1 month and then increase to 0.5 mg weekly if tolerated.   - Increase Tresiba to 30 units once daily.   - She declines MNT, she will focus on lifestyle modifications.  - Will r/o autoimmune process due to diabetes control worsening in short amount of time.   - Continue CGM to monitor BGs continuously.   Discussed risks/complications associated with uncontrolled diabetes.    Advised to adhere to diabetic diet, and recommended staying active/exercising routinely.  Keep carbohydrates consistent to limit blood glucose fluctuations.  Advised to call if blood sugars less than 70 mg/dl or over 300 mg/dl.   Discussed symptoms and treatment of hypoglycemia.    Recommended routine follow-up with podiatry and ophthalmology.   Ordered blood work to complete prior to next visit.   Lab Results   Component Value Date    HGBA1C 12.8 (H) 06/17/2024

## 2024-06-19 NOTE — PATIENT INSTRUCTIONS
Increase Tresiba to 30 units daily  Continue with Metformin at current dose  Start Ozempic at 0.25 mg weekly for 1 month and then increase to 0.5 mg weekly.

## 2024-06-19 NOTE — ASSESSMENT & PLAN NOTE
Nodule stable on US from June 2023. Will plan to repeat thyroid US in the near future. She denies any compressive symptoms. Thyroid function testing within normal range.

## 2024-06-19 NOTE — PROGRESS NOTES
Established Patient Progress Note      Chief Complaint   Patient presents with    Diabetes Type 2        Impression & Plan:    Problem List Items Addressed This Visit          Cardiovascular and Mediastinum    Benign essential hypertension     BP at goal. Continue current medication regimen.              Endocrine    Type 2 diabetes mellitus with hyperglycemia, with long-term current use of insulin (HCC) - Primary     HGA1C and BGs severely uncontrolled. BGs elevated majority of the time on CGM report. She reports she has not changed diet or activity level. She has not had recent steroids or new medications.   Treatment regimen:   - Recommend patient start on bolus insulin before meals, however she declines.   - Discussed the risks of uncontrolled diabetes in detail. She is agreeing to try GLP-1. She did have side effects with Victoza but denies hx of pancreatitis or family/personal hx of medullary thyroid cancer. She will start Ozempic 0.25 mg weekly for 1 month and then increase to 0.5 mg weekly if tolerated.   - Increase Tresiba to 30 units once daily.   - She declines MNT, she will focus on lifestyle modifications.  - Will r/o autoimmune process due to diabetes control worsening in short amount of time.   - Continue CGM to monitor BGs continuously.   Discussed risks/complications associated with uncontrolled diabetes.    Advised to adhere to diabetic diet, and recommended staying active/exercising routinely.  Keep carbohydrates consistent to limit blood glucose fluctuations.  Advised to call if blood sugars less than 70 mg/dl or over 300 mg/dl.   Discussed symptoms and treatment of hypoglycemia.    Recommended routine follow-up with podiatry and ophthalmology.   Ordered blood work to complete prior to next visit.   Lab Results   Component Value Date    HGBA1C 12.8 (H) 06/17/2024            Relevant Medications    semaglutide, 0.25 or 0.5 mg/dose, (Ozempic, 0.25 or 0.5 MG/DOSE,) 2 mg/3 mL injection pen    Tresiba  LM, per Dr. Terrazas, Plavix will not work for afib, she can switch to Coumadin.     She can switch to Lasix 40 mg daily.    FlexTouch 100 units/mL injection pen    Other Relevant Orders    GAD65, IA-2, and Insulin Autoantibody    Zinc Transporter 8 (Znt8) Antibody    Anti-islet cell antibody    Hemoglobin A1C    Comprehensive metabolic panel    Albumin / creatinine urine ratio    C-peptide    Hyperparathyroidism (HCC)     PTH and calcium levels have normalized post parathyroidectomy. Will continue to monitor.          Relevant Orders    PTH, intact    Thyroid nodule     Nodule stable on US from June 2023. Will plan to repeat thyroid US in the near future. She denies any compressive symptoms. Thyroid function testing within normal range.             Other    Hyperlipidemia     Lipid panel at goal. Continue statin.              History of Present Illness:   Joanne Hu is a 63 y.o. female with primary hyperparathyroidism s/p left middle invasive parathyroidectomy, thyroid nodules, hyperlipidemia, hypertension and type 2 diabetes seen in follow up. Reports complications of macroalbuminuria and neuropathy . Denies recent severe hypoglycemic or severe hyperglycemic episodes. Denies any issues with her current regimen. Last A1C was 12.8. Home glucose monitoring: are performed regularly with CGM.     Joanne Hu   Device used: Dexcom G7   Home use   Indication: Type 2 Diabetes  More than 72 hours of data was reviewed. Report to be scanned to chart.   Date Range: 6/6/24-6/19/24  Analysis of data:   Average Glucose: 313  SD : 70   Time in Target Range: 2%   Time Above Range: 21% high, 77% very high    Time Below Range: 0%   Interpretation of data:  BGs significantly elevated     Current regimen:   Tresiba 25 units daily   Metformin 1,000 mg BID    Last Eye Exam: needs to schedule   Last Foot Exam: UTD, follows with podiatry     Has hypertension: Taking amlodipine, had angioedema with lisinopril  Has hyperlipidemia: Taking atorvastatin       Patient Active Problem List   Diagnosis    Hernia, ventral    Type 2 diabetes mellitus with  hyperglycemia, with long-term current use of insulin ([facility])    GI bleed    Morbid obesity with BMI of 45.0-49.9, adult ([facility])    Ischemic colitis ([facility])    Unintentional weight loss    Hypercalcemia    Hyperparathyroidism ([facility])    Vitamin D deficiency    Depression with anxiety    Benign essential hypertension    Falls frequently    Numbness and tingling of right arm and leg    Ambulatory dysfunction    MS (multiple sclerosis) ([facility])    Cognitive decline    Right hemiparesis ([facility])    Chronic daily headache    Class 3 severe obesity due to excess calories with serious comorbidity and body mass index (BMI) of 45.0 to 49.9 in adult ([facility])    Thyroid nodule    B12 deficiency    Hyperlipidemia    Obstructive sleep apnea syndrome    Hip pain, right    Positive for macroalbuminuria    Other chronic pain    Nausea & vomiting    Bloating    New daily persistent headache    Medication overuse headache    Visual disturbances    Chronic post-traumatic headache, not intractable    Primary osteoarthritis of right hip    Lumbar radiculopathy    Peripheral neuropathy    Status post parathyroidectomy    Numbness on left side    Swelling of left extremity    TIA (transient ischemic attack)    Diabetic polyneuropathy associated with type 2 diabetes mellitus ([facility])    Concussion without loss of consciousness, sequela ([facility])    Urinary incontinence without sensory awareness      Past Medical History:   Diagnosis Date    Anxiety     CPAP (continuous positive airway pressure) dependence     does not use    Diabetes mellitus ([facility])     External hemorrhoids     last assessed 4/7/16, resolved 7/21/16    Hernia, ventral     last assessed 12/14/15, resolved 7/21/16    History of transfusion     Hypertension     Incarcerated incisional hernia     last assessed 12/21/15, resolved 7/21/16    Insomnia     last assessed 12/8/16, resolved 10/9/17    Lyme disease     Morbid obesity with BMI of 45.0-49.9, adult ([facility]) 05/03/2017    MS (multiple sclerosis)  (HCC)     Sleep apnea     Stroke (cerebrum) (HCC) 2020    Type 2 diabetes mellitus with hyperglycemia, without long-term current use of insulin (HCC)       Past Surgical History:   Procedure Laterality Date    ABCESS DRAINAGE       SECTION      x3    COLONOSCOPY N/A 2017    Procedure: COLONOSCOPY with biopsy;  Surgeon: Dia Mason DO;  Location: [facility];  Service: Complete    HYSTERECTOMY      IR BIOPSY ABDOMEN  2021    IR LUMBAR PUNCTURE  3/13/2020    NV PARATHYROIDECTOMY/EXPLORATION PARATHYROIDS Left 2023    Procedure: LEFT MINIMALLY INVASIVE PARATHYROIDECTOMY;  Surgeon: Barrett Rose MD;  Location: [facility];  Service: Surgical Oncology    US GUIDED THYROID BIOPSY  2021      Social History     Tobacco Use    Smoking status: Never    Smokeless tobacco: Never   Substance Use Topics    Alcohol use: Not Currently     Comment: Social     Allergies   Allergen Reactions    Lisinopril Angioedema    Sorbitan Diarrhea, Vomiting and Abdominal Pain    Victoza [Liraglutide] Nausea Only    Ambien [Zolpidem Tartrate]     Demerol [Meperidine]     Insulin Glargine Other (See Comments)      - 2018: memory loss    Latex     Oxycodone Hives, Rash and Vomiting         Current Outpatient Medications:     Alcohol Swabs PADS, by Does not apply route 4 (four) times a day *Latex Free*, Disp: 120 each, Rfl: 3    amLODIPine (NORVASC) 5 mg tablet, Take 1 tablet by mouth once daily, Disp: 30 tablet, Rfl: 5    aspirin 81 mg chewable tablet, Chew 1 tablet (81 mg total) daily, Disp: 90 tablet, Rfl: 1    atorvastatin (LIPITOR) 40 mg tablet, TAKE 1 TABLET BY MOUTH ONCE DAILY IN THE EVENING, Disp: 30 tablet, Rfl: 2    Blood Glucose Monitoring Suppl (OneTouch Verio) w/Device KIT, Use in the morning, Disp: 1 kit, Rfl: 0    Blood Pressure KIT, by Does not apply route daily, Disp: 1 each, Rfl: 0    Cholecalciferol (Vitamin D3) 50 MCG (2000) CAPS, Take 1 capsule by mouth once daily, Disp: 30  capsule, Rfl: 5    diphenhydrAMINE (BENADRYL) 25 mg tablet, Take 0.5 tablets (12.5 mg total) by mouth every 6 (six) hours, Disp: 20 tablet, Rfl: 0    Incontinence Supply Disposable ([employer] Women Underwear XL) MISC, Use every 6 (six) hours as needed (incontinence), Disp: 120 each, Rfl: 11    metFORMIN (GLUCOPHAGE-XR) 500 mg 24 hr tablet, TAKE 2 TABLETS BY MOUTH TWICE DAILY WITH  MEALS, Disp: 360 tablet, Rfl: 1    metoprolol succinate (TOPROL-XL) 50 mg 24 hr tablet, Take 1 tablet by mouth once daily, Disp: 90 tablet, Rfl: 1    pregabalin (LYRICA) 75 mg capsule, Take 1 capsule (75 mg total) by mouth 2 (two) times a day, Disp: 60 capsule, Rfl: 2    semaglutide, 0.25 or 0.5 mg/dose, (Ozempic, 0.25 or 0.5 MG/DOSE,) 2 mg/3 mL injection pen, Inject 0.25 mg weekly for 4 weeks and then increase to 0.5 mg weekly., Disp: 3 mL, Rfl: 2    Tresiba FlexTouch 100 units/mL injection pen, Inject 30 Units under the skin daily, Disp: 15 mL, Rfl: 2    Vumerity 231 MG CPDR, Take 462 mg by mouth 2 (two) times a day, Disp: 120 capsule, Rfl: 11    naloxone (NARCAN) 4 mg/0.1 mL nasal spray, Administer 1 spray into a nostril. If no response after 2-3 minutes, give another dose in the other nostril using a new spray., Disp: 1 each, Rfl: 1    ondansetron (ZOFRAN-ODT) 4 mg disintegrating tablet, Take 1 tablet (4 mg total) by mouth every 6 (six) hours as needed for nausea or vomiting, Disp: 20 tablet, Rfl: 0    Current Facility-Administered Medications:     prochlorperazine (COMPAZINE) injection 5 mg, 5 mg, Intramuscular, Q4H PRN, Devorah Edmonds MD, 5 mg at 11/11/22 1040    Review of Systems   Constitutional:  Negative for activity change, appetite change, chills, diaphoresis, fatigue, fever and unexpected weight change.   Eyes:  Negative for visual disturbance.   Respiratory:  Negative for shortness of breath.    Cardiovascular:  Negative for chest pain and palpitations.   Gastrointestinal:  Negative for abdominal pain, constipation,  "diarrhea, nausea and vomiting.   Endocrine: Negative for polydipsia, polyphagia and polyuria.   Skin:  Negative for wound.   Neurological:  Positive for numbness (hands and feet). Negative for dizziness, tremors, weakness and light-headedness.   All other systems reviewed and are negative.      Physical Exam:  Body mass index is 48.26 kg/m².  /80   Pulse 70   Ht 5' 5\" (1.651 m)   Wt 132 kg (290 lb)   LMP  (LMP Unknown)   SpO2 97%   BMI 48.26 kg/m²    Wt Readings from Last 3 Encounters:   06/19/24 132 kg (290 lb)   05/29/24 130 kg (287 lb)   05/07/24 129 kg (285 lb)       Physical Exam  Vitals reviewed.   Constitutional:       Appearance: Normal appearance. She is obese.   Cardiovascular:      Rate and Rhythm: Normal rate.   Pulmonary:      Effort: Pulmonary effort is normal.   Neurological:      Mental Status: She is alert and oriented to person, place, and time.   Psychiatric:         Mood and Affect: Mood normal.         Thought Content: Thought content normal.       Labs:   Lab Results   Component Value Date    HGBA1C 12.8 (H) 06/17/2024    HGBA1C 7.9 (H) 11/04/2023    HGBA1C 7.8 (H) 09/27/2023     Lab Results   Component Value Date    CREATININE 0.72 06/17/2024    CREATININE 0.70 01/04/2024    CREATININE 0.76 11/04/2023    BUN 15 06/17/2024    K 4.1 06/17/2024     06/17/2024    CO2 25 06/17/2024     eGFR   Date Value Ref Range Status   06/17/2024 90 ml/min/1.73sq m Final   09/26/2016 >60.0 ml/min/1.73sq m Final     Lab Results   Component Value Date    HDL 51 06/17/2024    TRIG 135 06/17/2024     Lab Results   Component Value Date    ALT 17 06/17/2024    AST 14 06/17/2024    ALKPHOS 83 06/17/2024     Lab Results   Component Value Date    FQB9XVAHABGC 3.135 06/17/2024    DPK4MNRNQNVD 2.730 09/27/2023    [id number] 2.030 10/31/2022       Patient Instructions   Increase Tresiba to 30 units daily  Continue with Metformin at current dose  Start Ozempic at 0.25 mg weekly for 1 month and then " increase to 0.5 mg weekly.     Discussed with the patient and all questioned fully answered. She will call me if any problems arise.    DEANA Nicole

## 2024-06-19 NOTE — TELEPHONE ENCOUNTER
Called patient's daughter regarding this matter. She started new MS medication in February and believes it could be contributing to recent elevation in blood sugars. I have reached out to he neurologist regarding this matter and waiting to here back on their input. Will continue our current plan at this time for diabetes management.

## 2024-06-19 NOTE — TELEPHONE ENCOUNTER
Pt contacted to reschedule apt that was canceled via [employer]. Pt is going through medication changes at this time and is not interested in rescheduling. Will call the office back if she changes her mind

## 2024-06-19 NOTE — TELEPHONE ENCOUNTER
Pt was here today to see you and daughter would like to speak to you regarding her sugars. She believes her sugars became elevated since she started taking Vumerity for her MS. Please call daughter regarding this. Thanpuja

## 2024-06-19 NOTE — TELEPHONE ENCOUNTER
Patient returning call. I tried to warm transfer to endo cv clinical, no answer.    I informed patient that I will let nurses know she called and will get a call as soon as the provider is available.

## 2024-06-19 NOTE — TELEPHONE ENCOUNTER
Patient called back to PCP's office [facility] stating she has a missed call from our number. I advised it looks like Endocrinology called her about 30 minutes ago looking to speak with her daughter Italo. I advised she have her daughter call back to Endocrinology to discuss the phone message here. She thanks us for our help!

## 2024-06-20 ENCOUNTER — TELEPHONE (OUTPATIENT)
Age: 64
End: 2024-06-20

## 2024-06-20 ENCOUNTER — PATIENT OUTREACH (OUTPATIENT)
Dept: FAMILY MEDICINE CLINIC | Facility: CLINIC | Age: 64
End: 2024-06-20

## 2024-06-20 NOTE — PROGRESS NOTES
Outpatient Care Management Note:    YESENIA called Makenzie. We discussed her referral to physical therapy. YESENIA gave her the contact information to call and schedule.     Makenzie completed her PCP blood work. Her TpD8X=78.8. She saw endocrine yesterday and they added ozempic and increased her tresiba. Makenzie states she has not gotten her ozempic yet. She did increase the tresiba as directed. This morning her sugar was 265.  For breakfast, she ate creamed chipped beef on home fries. CM reviewed that home fries are carbohydrates that turn to sugar. We reviewed the plate method of meal planning to give her a visual on how much carbohydrates she should have at each meal. Makenzie states that she eats mostly carrots and celery for snacks. She continues to eat a large quantity of fresh fruit. YESENIA again reinforced that fruit is still sugar and she should try to limit her intake.     Makenzie did note that she met with [employer] and is now getting [employer] Diabetic Meals. Hopefully this will help with her sugars.     YESENIA gave Makenzie my contact information. She knows that my phone goes through my computer and I do not get messages after hours or on weekends. She is aware to call her PCP office directly with any immediate questions.      Meal planning with plate method brochure sent to Makenzie.

## 2024-06-20 NOTE — TELEPHONE ENCOUNTER
Rcvd call from patient and her daughter together/they are frustrated b/c their supply company did not receive the  incontinence supply order. Please send orders to [facility]. Instructions below from daughter. The order CANNOT say [employer]. It must be sent under Joanne Hu, NOT Makenzie Hu.       Incontinence supply disposable women's underwear- 2XL   140 quantity.    DX: incontinence   Refills 12    I2. Incontinence liners   160 quantity  DX: incontinence  Refills 12       Please order asap, please follow up with patient or dtr when completed.

## 2024-06-20 NOTE — TELEPHONE ENCOUNTER
Opal from [facility] [employer] called to confirm providers first name, last name and NPI number because she could not read it on form that was faxed over.

## 2024-06-20 NOTE — TELEPHONE ENCOUNTER
Spoke with patient and patients daughter. Explained to them that I did call patient yesterday because I had some questions regarding the form. They did call back but were told no one called them [facility].Patients daughter went over the form with me and I re-faxed it so patient could get her supplies.

## 2024-06-25 LAB
25(OH)D2 SERPL-MCNC: <1 NG/ML
25(OH)D3 SERPL-MCNC: 33 NG/ML
25(OH)D3+25(OH)D2 SERPL-MCNC: 33 NG/ML

## 2024-06-26 ENCOUNTER — APPOINTMENT (OUTPATIENT)
Dept: LAB | Facility: CLINIC | Age: 64
End: 2024-06-26
Payer: COMMERCIAL

## 2024-06-26 ENCOUNTER — OFFICE VISIT (OUTPATIENT)
Dept: PODIATRY | Facility: CLINIC | Age: 64
End: 2024-06-26
Payer: COMMERCIAL

## 2024-06-26 ENCOUNTER — TELEPHONE (OUTPATIENT)
Dept: FAMILY MEDICINE CLINIC | Facility: CLINIC | Age: 64
End: 2024-06-26

## 2024-06-26 VITALS
SYSTOLIC BLOOD PRESSURE: 162 MMHG | WEIGHT: 290 LBS | HEIGHT: 65 IN | DIASTOLIC BLOOD PRESSURE: 87 MMHG | BODY MASS INDEX: 48.32 KG/M2 | HEART RATE: 69 BPM

## 2024-06-26 DIAGNOSIS — E11.42 DIABETIC POLYNEUROPATHY ASSOCIATED WITH TYPE 2 DIABETES MELLITUS (HCC): Primary | ICD-10-CM

## 2024-06-26 DIAGNOSIS — E11.65 TYPE 2 DIABETES MELLITUS WITH HYPERGLYCEMIA, WITH LONG-TERM CURRENT USE OF INSULIN (HCC): ICD-10-CM

## 2024-06-26 DIAGNOSIS — Z79.4 TYPE 2 DIABETES MELLITUS WITH HYPERGLYCEMIA, WITH LONG-TERM CURRENT USE OF INSULIN (HCC): ICD-10-CM

## 2024-06-26 DIAGNOSIS — E11.65 TYPE 2 DIABETES MELLITUS WITH HYPERGLYCEMIA, WITHOUT LONG-TERM CURRENT USE OF INSULIN (HCC): ICD-10-CM

## 2024-06-26 PROCEDURE — 86341 ISLET CELL ANTIBODY: CPT

## 2024-06-26 PROCEDURE — 36415 COLL VENOUS BLD VENIPUNCTURE: CPT

## 2024-06-26 PROCEDURE — 99213 OFFICE O/P EST LOW 20 MIN: CPT | Performed by: PODIATRIST

## 2024-06-26 PROCEDURE — 84681 ASSAY OF C-PEPTIDE: CPT

## 2024-06-26 PROCEDURE — 83519 RIA NONANTIBODY: CPT

## 2024-06-26 PROCEDURE — 86337 INSULIN ANTIBODIES: CPT

## 2024-06-26 NOTE — PROGRESS NOTES
"Ambulatory Visit  Name: Joanne Hu      : 1960      MRN: [id number]  Encounter Provider: Eliazar Romero DPM  Encounter Date: 2024   Encounter department: [facility]    Discussed principles of diabetic footcare.  Patient's daughter is now in charge of her health and they have seen the endocrinologist and they are working to reduce blood sugar.  There is no evidence of infection great toes with dried blood noted.  All elongated toenails were trimmed.  Urged patient to keep her next appointment in 3 months.  Urged patient to refrain from walking barefoot.    Slight bleeding noted eponychium right fifth toe.  This was dressed with bacitracin.    Assessment & Plan   1. Diabetic polyneuropathy associated with type 2 diabetes mellitus (HCC)  2. Type 2 diabetes mellitus with hyperglycemia, without long-term current use of insulin (HCC)      History of Present Illness     Joanne Hu is a 63 y.o. female who presents for yearly diabetic foot exam.  Patient has no feeling in her feet.  She also has MS and no feeling in her arms and legs.  Patient tried trimming toenails herself and has dried blood on each great toenail.  Blood sugar has been difficult for her to control.  Her last visit here was in 2023.      I personally reviewed an A1c dated 2024.  It was 12.8.    I personally reviewed an A1c dated 2023.  It was 7.9.  Review of Systems   Gastrointestinal:         History of GI bleed   Musculoskeletal:  Positive for arthralgias.   Neurological:  Positive for weakness and numbness.           Objective     /87   Pulse 69   Ht 5' 5\" (1.651 m)   Wt 132 kg (290 lb)   LMP  (LMP Unknown)   BMI 48.26 kg/m²     Physical Exam  Cardiovascular:      Pulses: no weak pulses.           Dorsalis pedis pulses are 2+ on the right side and 2+ on the left side.        Posterior tibial pulses are 2+ on the right side and 2+ on the left side.   Feet:      Right foot:      Skin " integrity: No ulcer, skin breakdown, erythema, warmth, callus or dry skin.      Left foot:      Skin integrity: No ulcer, skin breakdown, erythema, warmth, callus or dry skin.         Diabetic Foot Exam    Patient's shoes and socks removed.    Right Foot/Ankle   Right Foot Inspection  Skin Exam: skin normal and skin intact. No dry skin, no warmth, no callus, no erythema, no maceration, no abnormal color, no pre-ulcer, no ulcer and no callus.     Toe Exam: ROM and strength within normal limits.     Sensory   Vibration: absent  Proprioception: absent  Monofilament testing: absent    Vascular  Capillary refills: < 3 seconds  The right DP pulse is 2+. The right PT pulse is 2+.     Right Toe  - Comprehensive Exam  Ecchymosis: none  Arch: normal  Hammertoes: absent  Claw Toes: absent  Swelling: none   Tenderness: none       Left Foot/Ankle  Left Foot Inspection  Skin Exam: skin normal and skin intact. No dry skin, no warmth, no erythema, no maceration, normal color, no pre-ulcer, no ulcer and no callus.     Toe Exam: ROM and strength within normal limits.     Sensory   Vibration: absent  Proprioception: absent  Monofilament testing: absent    Vascular  Capillary refills: < 3 seconds  The left DP pulse is 2+. The left PT pulse is 2+.     Left Toe  - Comprehensive Exam  Ecchymosis: none  Arch: normal  Hammertoes: absent  Claw toes: absent  Swelling: none   Tenderness: none       Assign Risk Category  No deformity present  No loss of protective sensation  No weak pulses  Risk: 0    Administrative Statements

## 2024-06-26 NOTE — TELEPHONE ENCOUNTER
Patient was in today. She would like a script for a new Rolator and she would like it to go to [facility].   Patient knows you are out all week and can wait until Monday.

## 2024-06-27 LAB — C PEPTIDE SERPL-MCNC: 3.2 NG/ML (ref 1.1–4.4)

## 2024-06-28 LAB
DME PARACHUTE DELIVERY DATE EXPECTED: NORMAL
DME PARACHUTE DELIVERY DATE REQUESTED: NORMAL
DME PARACHUTE ITEM DESCRIPTION: NORMAL
DME PARACHUTE ORDER STATUS: NORMAL
DME PARACHUTE SUPPLIER NAME: NORMAL
DME PARACHUTE SUPPLIER PHONE: NORMAL
GAD65 AB SER-ACNC: <5 U/ML (ref 0–5)
PANC ISLET CELL AB TITR SER: NEGATIVE {TITER}

## 2024-07-01 LAB

## 2024-07-02 ENCOUNTER — TELEPHONE (OUTPATIENT)
Dept: ENDOCRINOLOGY | Facility: CLINIC | Age: 64
End: 2024-07-02

## 2024-07-02 LAB
LEFT EYE DIABETIC RETINOPATHY: NORMAL
RIGHT EYE DIABETIC RETINOPATHY: NORMAL

## 2024-07-02 NOTE — TELEPHONE ENCOUNTER
Discontinue carvedilol start Toprol-XL 50 mg p.o. prior to bedtime.   [facility] request for chart notes.    Faxed 7/2/24    To [phone]

## 2024-07-03 ENCOUNTER — PATIENT OUTREACH (OUTPATIENT)
Dept: FAMILY MEDICINE CLINIC | Facility: CLINIC | Age: 64
End: 2024-07-03

## 2024-07-03 NOTE — PROGRESS NOTES
Outpatient Care Management Note:    Voice mail message left for Makenzie, with my contact information, requesting a call back.  I encouraged her to review the education materials I sent on my chart regarding plate method of meal planning.

## 2024-07-06 LAB — INSULIN AB SER-ACNC: <5 UU/ML

## 2024-07-08 LAB — ISLET CELL512 AB SER-ACNC: <7.5 U/ML

## 2024-07-09 LAB — ZNT8 AB SERPL IA-ACNC: <15 U/ML

## 2024-07-10 ENCOUNTER — TELEPHONE (OUTPATIENT)
Dept: FAMILY MEDICINE CLINIC | Facility: CLINIC | Age: 64
End: 2024-07-10

## 2024-07-10 ENCOUNTER — PATIENT OUTREACH (OUTPATIENT)
Dept: FAMILY MEDICINE CLINIC | Facility: CLINIC | Age: 64
End: 2024-07-10

## 2024-07-10 DIAGNOSIS — G47.33 OBSTRUCTIVE SLEEP APNEA SYNDROME: Primary | ICD-10-CM

## 2024-07-10 NOTE — PROGRESS NOTES
Outpatient Care Management Note:    Care manager called Makenzie and she requested that I speak with her daughter regarding physical therapy scheduling. I spoke with Janna and gave her the contact information for [facility]. She will call to schedule.     Janna states that she is now her mother's paid caregiver. She is assisting to manage all of her needs.     Janna states that her mother did start ozempic and increased her tresiba per endocrine. Her blood sugar this morning was 150, but it elevates quickly after eating.  She states it has not been above 300.  We reviewed that Makenzie's last [id number]=22.8.  Janna states that she is not eating unhealthy and she does not sneak snacks or drink sugary drinks. Janna is concerned that her readings have been elevated since changing her one medication to vumerity.  She states that endocrine did speak with neurology and do not feel that this the cause of her elevated sugars. CM suggested she download and send blood sugars to endocrine monthly so they can evaluate sugars and make changes.     Makenzie complained of recent headaches which she states she wakes up with. She says they resolve once she is up.  Janna states that she has not been using her CPAP for years and is wondering if her headaches could be related to her sleep apnea.  CM will forward to PCP. Janna states that her mask was uncomfortable so she stopped using it.  CM did review that there are different types of masks which may work better for her.    CM gave Janna my contact information.She will call with any questions.

## 2024-07-10 NOTE — TELEPHONE ENCOUNTER
Message from Dr. Cuenca:  Please call patient, please advise her that she will need to see sleep medicine to further evaluate and restart treatment with her CPAP.  They will also help her with finding a more tolerable mask.  I will place this referral for her.  Thank you     I lvm for pt with Dr. Cuenca's message

## 2024-07-11 ENCOUNTER — TELEPHONE (OUTPATIENT)
Dept: ENDOCRINOLOGY | Facility: CLINIC | Age: 64
End: 2024-07-11

## 2024-07-11 NOTE — TELEPHONE ENCOUNTER
----- Message from DEANA Nicole sent at 7/9/2024  9:56 AM EDT -----  Antibodies negative for type 1 diabetes. C-peptide normal confirming worsening type 2 diabetes.

## 2024-07-11 NOTE — TELEPHONE ENCOUNTER
Patients daughter calling, relayed above message and she expressed understanding. She did ask if they should send her glucose log in before the next appt in October?  Please advise

## 2024-07-16 DIAGNOSIS — E78.5 HYPERLIPIDEMIA, UNSPECIFIED HYPERLIPIDEMIA TYPE: ICD-10-CM

## 2024-07-16 RX ORDER — ATORVASTATIN CALCIUM 40 MG/1
40 TABLET, FILM COATED ORAL EVERY EVENING
Qty: 30 TABLET | Refills: 0 | Status: SHIPPED | OUTPATIENT
Start: 2024-07-16

## 2024-07-23 ENCOUNTER — TELEPHONE (OUTPATIENT)
Age: 64
End: 2024-07-23

## 2024-07-23 NOTE — TELEPHONE ENCOUNTER
"Pt called, [facility] delivered her wheelchair (35\" wide\"however, it is too large/wide, it does not fit through the door.    [facility] is willing to exchange to a smaller model however, they need a new script/order from the provider.  Please send to [facility] as soon as possible.    Wheel base of chair cannot exceed 28\".  Seat width 18-20\"  (Door jam is less than 29\")    Please contact patient once revised order is sent to [facility].       "

## 2024-07-24 ENCOUNTER — PATIENT OUTREACH (OUTPATIENT)
Dept: CASE MANAGEMENT | Facility: OTHER | Age: 64
End: 2024-07-24

## 2024-07-24 NOTE — PROGRESS NOTES
Outpatient Care Management Note:    Voice mail message left for Makenzie, with my contact information, requesting a call back.

## 2024-07-31 ENCOUNTER — NURSE TRIAGE (OUTPATIENT)
Age: 64
End: 2024-07-31

## 2024-07-31 ENCOUNTER — PATIENT OUTREACH (OUTPATIENT)
Dept: CASE MANAGEMENT | Facility: OTHER | Age: 64
End: 2024-07-31

## 2024-07-31 NOTE — TELEPHONE ENCOUNTER
"Reason for Disposition  • Prescription refill request for ESSENTIAL medicine (i.e., likelihood of harm to patient if not taken) and triager unable to refill per department policy    Answer Assessment - Initial Assessment Questions  1. NAME of MEDICATION: \"What medicine are you calling about?\"      Hydrochlorothiazide   2. QUESTION: \"What is your question?\" (e.g., medication refill, side effect)      Refill needed- but not on list  3. PRESCRIBING HCP: \"Who prescribed it?\" Reason: if prescribed by specialist, call should be referred to that group.      Dr. Alicia   4. SYMPTOMS: \"Do you have any symptoms?\"      No   5. SEVERITY: If symptoms are present, ask \"Are they mild, moderate or severe?\"      N/a    Protocols used: Medication Question Call-ADULT-[place]    "

## 2024-07-31 NOTE — PROGRESS NOTES
"Outpatient Care Management Note:    Care manager called Makenzie and spoke with her and her daughter, Italo.  Italo states that she has not been able to get the PCP office to address her mother's wheelchair. She needs a smaller chair and [facility] is willing to complete the exchange with a new order. CM can see that the office needs a flat measurement from right hip to the left hip.  Italo measured her mother and states it is 18 inches.  She noted that the chair can not exceed 28 inches wide due to door jam. CM will forward to Dr Smith.     Italo states her mothers blood sugars are improving on ozempic. She checked her dexcom while on the phone and it was 179. She is no longer having any sugars in the 300s. She will monitor the sugars closely and will request endocrine complete a download to review if they are trending higher.     We discussed referral to sleep medicine. Makenzie is refusing.  We also discussed the referral to physical therapy. Makenzie states she does not want to go, because she does not want to leave the house. YESENIA explained that her insurance will not cover home therapy unless she is homebound. Makenzie states she feels like people \"stare at her\".  YESENIA explained that there are plenty of people with disabilities at therapy and she may be one of the best there. She did agree to try it.  Her daughter will call to schedule.     Makenzie has my contact information and will call with any questions.   "

## 2024-07-31 NOTE — TELEPHONE ENCOUNTER
Received call from pt's daughter who requested a medication to be refilled for Hydrochlorothiazide 50 mg daily.  Upon review, it is not in pt's active medication list.  Unsure of BP, daughter states pt doesn't check it. Denies any symptoms. Pt is also taking Amlodipine 5 mg daily and Metoprolol succinate 50 mg daily.      Pt is scheduled for her annual visit 10/10 with Dr. Alicia.    Please advise if pt to continue taking HCTZ 50 mg daily, and if so will need a new order to be sent to [facility].

## 2024-08-01 NOTE — TELEPHONE ENCOUNTER
Placed call to daughter. Want to know if patient has been been taking HCTZ. Daughter states she has still been taking it but only has one pill left. Patient does not like taking the HCTZ because it makes her go to the bathroom frequently. Daughter states they will stop the HCTZ and start taking her blood pressures every morning and keep a log which they will then bring to her follow up appointment in October.

## 2024-08-19 ENCOUNTER — OFFICE VISIT (OUTPATIENT)
Dept: NEUROLOGY | Facility: CLINIC | Age: 64
End: 2024-08-19
Payer: COMMERCIAL

## 2024-08-19 VITALS
SYSTOLIC BLOOD PRESSURE: 124 MMHG | HEIGHT: 65 IN | WEIGHT: 285 LBS | BODY MASS INDEX: 47.48 KG/M2 | OXYGEN SATURATION: 97 % | TEMPERATURE: 97.5 F | HEART RATE: 74 BPM | DIASTOLIC BLOOD PRESSURE: 70 MMHG

## 2024-08-19 DIAGNOSIS — G62.9 PERIPHERAL POLYNEUROPATHY: ICD-10-CM

## 2024-08-19 DIAGNOSIS — G35 MS (MULTIPLE SCLEROSIS) (HCC): Primary | ICD-10-CM

## 2024-08-19 DIAGNOSIS — R26.2 AMBULATORY DYSFUNCTION: Chronic | ICD-10-CM

## 2024-08-19 DIAGNOSIS — G47.33 OBSTRUCTIVE SLEEP APNEA SYNDROME: ICD-10-CM

## 2024-08-19 PROBLEM — G44.52 NEW DAILY PERSISTENT HEADACHE: Status: RESOLVED | Noted: 2022-11-11 | Resolved: 2024-08-19

## 2024-08-19 PROBLEM — S06.0X0S CONCUSSION WITHOUT LOSS OF CONSCIOUSNESS, SEQUELA (HCC): Status: RESOLVED | Noted: 2024-05-29 | Resolved: 2024-08-19

## 2024-08-19 PROCEDURE — 99214 OFFICE O/P EST MOD 30 MIN: CPT | Performed by: PHYSICIAN ASSISTANT

## 2024-08-19 RX ORDER — DIAZEPAM 5 MG
TABLET ORAL
Qty: 2 TABLET | Refills: 0 | Status: SHIPPED | OUTPATIENT
Start: 2024-08-19

## 2024-08-19 NOTE — ASSESSMENT & PLAN NOTE
Neuropathy is multifactorial, likely from both CNS and PNS causes.  She has uncontrolled DM.  We discussed the importance of controlling her DM to prevent progression of peripheral neuropathy.  Proper footcare and hygiene discussed.  She sees podiatry.  We discussed neuropathy likely contributes to her imbalance.  She continues to use a walker to prevent falls.

## 2024-08-19 NOTE — ASSESSMENT & PLAN NOTE
"Patient has not had any new MS symptoms.  She continues to feel that all of her symptoms are \"worse\", however it seems she is just not feeling well in general and has many other medical conditions which are not well-controlled and could be contributing to how she is feeling overall.    She had self discontinued Betaseron in December 2023 due to side effects, and when seen in February 2024, was advised to start Vumerity.  She started Vumerity in March 2024 and reported to me last visit that she was doing okay with it.  Today, she reports she is not taking the medication as prescribed (2 caps twice daily), and is mainly taking 1 cap twice daily, or not at all.  She feels that she takes this for 3-4 days in a row it makes her \"sick\".  Unsure if this is really contributing, however since she is unwilling to take this as prescribed, we need to discuss a new DMT.  Given her multiple medical conditions and age, I would suggest glatiramer acetate.  Patient/daughter voiced understanding and start form was signed for glatiramer acetate.    MRI brain 10/2023 was stable.  Will update again this fall as a 1 year follow-up and new baseline with starting another DMT.    I again suggested physical therapy to work on her balance, gait and strengthening, which several other providers have suggested as well.  For now she does not wish to do this.    Her neurologic exam is stable today.  "

## 2024-08-19 NOTE — PROGRESS NOTES
"Patient ID: Joanne Hu is a 63 y.o. female.    Assessment/Plan:    MS (multiple sclerosis) ([facility])  Patient has not had any new MS symptoms.  She continues to feel that all of her symptoms are \"worse\", however it seems she is just not feeling well in general and has many other medical conditions which are not well-controlled and could be contributing to how she is feeling overall.    She had self discontinued Betaseron in December 2023 due to side effects, and when seen in February 2024, was advised to start Vumerity.  She started Vumerity in March 2024 and reported to me last visit that she was doing okay with it.  Today, she reports she is not taking the medication as prescribed (2 caps twice daily), and is mainly taking 1 cap twice daily, or not at all.  She feels that she takes this for 3-4 days in a row it makes her \"sick\".  Unsure if this is really contributing, however since she is unwilling to take this as prescribed, we need to discuss a new DMT.  Given her multiple medical conditions and age, I would suggest glatiramer acetate.  Patient/daughter voiced understanding and start form was signed for glatiramer acetate.    MRI brain 10/2023 was stable.  Will update again this fall as a 1 year follow-up and new baseline with starting another DMT.    I again suggested physical therapy to work on her balance, gait and strengthening, which several other providers have suggested as well.  For now she does not wish to do this.    Her neurologic exam is stable today.    Peripheral neuropathy  Neuropathy is multifactorial, likely from both CNS and PNS causes.  She has uncontrolled DM.  We discussed the importance of controlling her DM to prevent progression of peripheral neuropathy.  Proper footcare and hygiene discussed.  She sees podiatry.  We discussed neuropathy likely contributes to her imbalance.  She continues to use a walker to prevent falls.    Obstructive sleep apnea syndrome  Patient not treating her sleep " apnea currently, not using CPAP.      She reports headaches on awakening in the mornings, which I discussed is likely due to her untreated sleep apnea.  Her PCP also mentioned this to her and referred her back to sleep medicine, however she has not been willing to go back.  I discussed with this with her and her daughter again today.  Again discussed the importance of returning to sleep medicine and treating her sleep apnea appropriately.    Patient will return in 4 months or sooner if needed     Diagnoses and all orders for this visit:    MS (multiple sclerosis) (HCC)  -     MRI brain MS wo contrast; Future  -     diazepam (VALIUM) 5 mg tablet; Take 1 po 30 min prior to MRI and 1 immediately before MRI if needed    Peripheral polyneuropathy    Ambulatory dysfunction    Obstructive sleep apnea syndrome           Subjective:    HPI    Patient is a 62 yo female who presents to the [facility] for follow up regarding her MS. Patient with PMH of HTN, DM type 2, HLD, hyperparathyroidism. She was last seen in February 2024.     Patient was initially seen in April 2020 following a hospitalization. Patient developed acute onset of right-sided numbness and weakness on March 9, 2020 in the morning before leaving for work.   She was assessed by neurology while in the hospital, initial concern was for stroke.  MRI C-spine WO contrast 3/10/2020: There is focal intramedullary T2 hyperintense signal epicentered at the C4 vertebral level.  This is more pronounced in the right and central hemicord.  The remaining mid to lower cervical cord is more difficult to assess due to the image degradation.  There may be additional right hemicord signal abnormality at C5 and C6.   MRI c-spine W contrast 3/11/2020: Postcontrast imaging demonstrates enhancement associated with multiple previously described foci of T2 prolongation within the cervical and upper thoracic CORD.  MRI brain WO contrast 3/10/2020: There is no discrete mass,  mass effect or midline shift. There is no intracranial hemorrhage.  There is no evidence of acute infarction and diffusion imaging is unremarkable. Multiple foci of T2 and FLAIR hyperintensity are present within the supratentorial white matter, most pronounced in the anterolateral left frontal lobe, anterior right temporal lobe and right cerebellum/middle cerebellar peduncle.   MRI brain W contrast 3/11/2020: Redemonstration of multiple supratentorial and infratentorial scattered areas of white matter FLAIR signal hyperintensity, as described above. Many of the lesions demonstrate a perpendicular configuration of the ventricles and lesions are seen involving the callosal septal interface. Imaging appearance is most suggestive of demyelinating disease/multiple sclerosis with areas of active demyelination as correlated with dedicated MRI of the cervical spine.  Concern for MS vs neoplasm vs sarcoid. CT chest abdomen pelvis was unremarkable for any solid tumor, although did demonstrate some calcified lymph nodes and granulomata in the lung fields.  Patient had completed CSF analysis was for increased cells: CSF protein 80, CSF white blood cell 37, lymphocyte predominant, JCV and ACE negative, meningitis panel negative, MS panel high IgG index, 3 OCB, flow cytometry hypercellular. Serum NMO/MOG negative, ANCA/DNA/Sjogren's negative, ESR 32, CRP 9.1.   She was given IV steroids and oral steroid taper.     Patient had repeat imaging in June 2020 and there were several new, enhancing demyelinating lesions in the brain, as well as improvement in the c-spine noted.      At timing of visit on 7/7/2020, diagnosis of MS was made. Betaseron was initiated, with first injection 7/24/2020. At first, she was having flu-like symptoms with the titration, but patient was advised to pre-med with NSAIDs/Tylenol and warm showers, and this improved her symptoms.  She continued to have issues with the Betaseron. She called in mid October  "reporting ongoing issues, so Betaseron was held for 2-3 weeks.  In November 2020, she was doing better with the Betaseron.     Updated imaging was done without contrast due to patient declined contrast (she tells me she was there for \"too long\" and was tired and wanted to go home)  MRI brain 3/5/2021:  Mild plaque burden, at least 2 of the previously seen enhancing lesions are less conspicuous on FLAIR imaging, indicative of at least partial treatment response.  MRI c-spine 3/5/2021: a few, scattered cord lesions redemonstrated.  The previously seen enhancing lesion at T1 demonstrates a small amount of myelomalacia.  No progressive cord signal abnormality on noncontrast imaging.     In March 2022, patient reported feeling generally worse, especially difficulty with stamina and endurance with ambulation. She denied new, focal symptoms, just a decline in general. She also admitted to not using her CPAP, feeling tired. MRI c-spine and t-spine were ordered due to worsening ambulatory dysfunction. She did not proceed with imaging for several months. MRI c-spine and t-spine updated 8/23/22 and both stable.     At timing of Feb 2023 visit, patient reported she “continues to decline”, however when asked about the symptoms she was having, they were essentially all non-neurologic. MRI brain was ordered for surveillance, initially without contrast. MRI brain wo contrast 3/7/23 demonstrated a new 1.5 cm left periventricular white matter demyelinating lesion. She was asked to get another MRI with contrast and this was completed 3/23/2023. This lesion was not enhancing. Initial MRI was compared to 3/5/2021 imaging.     At timing of June 2023 visit she reported a lot of issues with pain. She was having right hip and leg pain, and also reported ongoing ambulatory dysfunction, needing to use her walker all the time or she will fall. She had been following with ortho for her hip, tried PT and an injection in the hip that did not " work, so was referred to pain management. She had a MRI lumbar spine which demonstrated severe central canal narrowing at L4-5. Pain management was offering L5-S1 TERRANCE vs referral to spine surgery. She had also recently seen a surgeon for hyperparathyroidism and plan was for parathyroidectomy.      Patient presented to her Oct 2023 visit with her son. She continued on Betaseron. Her son reported that patient had not been driving for several years. No one formally revoked her license or reported concerns, but since her initial MS presentation as well as multiple other medical issues, she had not driven. Patient herself reported having the desire to drive, but son and apparently the rest of their family had great concerns. Even prior to 2020 when she stopped driving, she apparently had issues with getting lost and one time had to pullover on a highway and call someone to come get her. I sent in formal reporting to [facility] indicating patient should cease driving due to cognitive reasons (patient agreed to continue not driving and agreed to formal reporting).   MRI brain updated 10/5/23 was stable. No evidence of disease progression.      Patient had ED visit on 1/4/24 for left sided numbness. She initially presented to her PCP office that day reporting left sided swelling, numbness and pruritus. Back in Dec 2023, she had an ED visit for left sided lip swelling, suggestive of angioedema. She was treated and observed in the ED and this improved. Lisinopril was d/c, as this was felt to be the cause. On 1/4, she reported repeated episodes of left sided symptoms, reported taking Benadryl with improvement. She was sent to the ED to r/o stroke given her history. CTA h/n in ED with no LVO or flow limiting stenosis. She also reported stopping her Betaseron in December on her own. ED with no clear diagnosis of symptoms.     She was then seen by Dr. Edmonds on 2/23/24, reported self discontinuing Betaseron in early December  "due to \"feeling sick\" with injections. She was advised to start Vumerity as next DMT.     When I saw her in May 2024, she reported she was overall stable, had started Vumerity in March 2024 and was tolerating well overall.      Today, patient presents with her daughter.  Patient's daughter reports she has not been taking Vumerity as prescribed due to it makes her \"sick\".  Patient reports if she takes it for 3-4 days in a row, it causes nausea and vomiting.  She also thinks she feels \"high\" when she takes the medication (every dose).  She mainly takes 1 cap BID, but sometimes skips the medication completely. She is getting headaches/migraines on awakening in the AM.  Sometimes the HA wakes her up.  She has SELENE, is not using her CPAP.  She reported this to her PCP recently who referred her back to sleep medicine.  Per her daughter, she does not want to go.  She has been advised to do PT several times by multiple providers and does not want to go out of the house for PT.  She feels that everyone is \"looking at her\" when she is in the PT office and does not like that.  She wants to do home therapy but is not homebound.  She was very frustrated when her A1c went up to 12.8 recently.  She felt like maybe the Vumerity was causing this, as that is the only thing that has changed.  Her endocrinologist did reach out to me regarding this and I told her I did not feel it was the Vumerity causing elevated blood sugars.  Patient is on a new medication for her diabetes and the sugars are starting to get better now.    The following portions of the patient's history were reviewed and updated as appropriate: current medications, past family history, past medical history, past social history, past surgical history, and problem list.         Objective:    Blood pressure 124/70, pulse 74, temperature 97.5 °F (36.4 °C), temperature source Temporal, height 5' 5\" (1.651 m), weight 129 kg (285 lb), SpO2 97%, not currently " breastfeeding.    Physical Exam  Constitutional:       Appearance: She is obese.   Eyes:      Extraocular Movements: EOM normal.      Pupils: Pupils are equal, round, and reactive to light.   Neurological:      Mental Status: She is alert.      Deep Tendon Reflexes:      Reflex Scores:       Bicep reflexes are 1+ on the right side and 1+ on the left side.       Brachioradialis reflexes are 1+ on the right side and 1+ on the left side.       Patellar reflexes are 1+ on the right side and 1+ on the left side.       Achilles reflexes are 0 on the right side and 0 on the left side.  Psychiatric:         Mood and Affect: Mood normal.         Speech: Speech normal.         Behavior: Behavior normal.         Neurological Exam  Mental Status  Alert. Oriented to person, place, time and situation. Speech is normal. Language is fluent with no aphasia. Attention and concentration are normal.    Cranial Nerves  CN II: Visual fields full to confrontation.  CN III, IV, VI: Extraocular movements intact bilaterally. Pupils equal round and reactive to light bilaterally.  CN V: Facial sensation is normal.  CN VII: Full and symmetric facial movement.  CN VIII: Hearing is normal.  CN IX, X: Palate elevates symmetrically  CN XI: Shoulder shrug strength is normal.  CN XII: Tongue midline without atrophy or fasciculations.    Motor   No abnormal involuntary movements. Strength is 5/5 in all four extremities except as noted.  Right HF 4+/5, poor effort .    Sensory  Light touch is normal in upper and lower extremities.     Reflexes                                            Right                      Left  Brachioradialis                    1+                         1+  Biceps                                 1+                         1+  Patellar                                1+                         1+  Achilles                                0                         0    Coordination  Right: Finger-to-nose normal.Left:  Finger-to-nose normal.    Gait   Unable to rise from chair without using arms.  Gait is slow, using rolling walker.        ROS:    Review of Systems   Constitutional: Negative.  Negative for fatigue and fever.   HENT: Negative.  Negative for hearing loss, tinnitus and trouble swallowing.    Eyes:  Negative for photophobia, pain and visual disturbance.   Respiratory: Negative.  Negative for cough and shortness of breath.    Cardiovascular: Negative.  Negative for chest pain and palpitations.   Gastrointestinal:  Negative for constipation, diarrhea, nausea and vomiting.   Endocrine: Negative.    Genitourinary: Negative.  Negative for difficulty urinating and urgency.   Musculoskeletal:  Positive for gait problem. Negative for back pain and neck pain.   Skin: Negative.  Negative for rash.   Neurological:  Positive for weakness, numbness and headaches. Negative for dizziness, tremors, seizures, syncope and speech difficulty.   Hematological: Negative.    Psychiatric/Behavioral:  Negative for decreased concentration and sleep disturbance. The patient is not nervous/anxious.

## 2024-08-19 NOTE — ASSESSMENT & PLAN NOTE
Patient not treating her sleep apnea currently, not using CPAP.      She reports headaches on awakening in the mornings, which I discussed is likely due to her untreated sleep apnea.  Her PCP also mentioned this to her and referred her back to sleep medicine, however she has not been willing to go back.  I discussed with this with her and her daughter again today.  Again discussed the importance of returning to sleep medicine and treating her sleep apnea appropriately.

## 2024-08-19 NOTE — PATIENT INSTRUCTIONS
Can discontinue Vumerity, will proceed with starting glatiramer acetate   Will update MRI brain without contrast. Can take diazepam 5mg prior to MRI.  Re-start aspirin 81mg daily  Highly suggest calling sleep medicine to discuss treating sleep apnea, as this is likely the cause of morning headaches  Consider PT to work on balance, gait, strengthening  Continue to work with endocrinology on blood sugar management   Return in 4 months or sooner if needed

## 2024-08-20 DIAGNOSIS — E78.5 HYPERLIPIDEMIA, UNSPECIFIED HYPERLIPIDEMIA TYPE: ICD-10-CM

## 2024-08-21 ENCOUNTER — TELEPHONE (OUTPATIENT)
Age: 64
End: 2024-08-21

## 2024-08-21 ENCOUNTER — PATIENT OUTREACH (OUTPATIENT)
Dept: CASE MANAGEMENT | Facility: OTHER | Age: 64
End: 2024-08-21

## 2024-08-21 DIAGNOSIS — E78.5 HYPERLIPIDEMIA, UNSPECIFIED HYPERLIPIDEMIA TYPE: ICD-10-CM

## 2024-08-21 RX ORDER — ATORVASTATIN CALCIUM 40 MG/1
40 TABLET, FILM COATED ORAL EVERY EVENING
Qty: 30 TABLET | Refills: 5 | Status: SHIPPED | OUTPATIENT
Start: 2024-08-21 | End: 2024-08-21 | Stop reason: SDUPTHER

## 2024-08-21 RX ORDER — ATORVASTATIN CALCIUM 40 MG/1
40 TABLET, FILM COATED ORAL EVERY EVENING
Qty: 90 TABLET | Refills: 1 | Status: SHIPPED | OUTPATIENT
Start: 2024-08-21 | End: 2024-11-19

## 2024-08-21 NOTE — TELEPHONE ENCOUNTER
Cece With [facility] called requesting a script be sent over for 90 day supply on the atorvastatin (LIPITOR) 40 mg tablet .    30 day supply was sent over today - Cece advised that that pt's insurance is requesting a 90 day supply.    Please call Cece with questions or concerns:  [phone]

## 2024-08-28 ENCOUNTER — PATIENT OUTREACH (OUTPATIENT)
Dept: CASE MANAGEMENT | Facility: OTHER | Age: 64
End: 2024-08-28

## 2024-08-28 NOTE — PROGRESS NOTES
"Outpatient Care Management Note:    Care manager called Makenzie and her daughter, Italo. Makenzie is complaining of nerve pain in her right leg. She states that it feels like \"shocks\". It is very painful and improves temporarily with ambulation.  Italo states she used to be on gabapentin but does not want to take that. CM recommended she call Dr Cuenca and have the issue evaluated.  Makenzie agreed. Italo will call.     Italo states that Makenzie's blood sugars have all been below 200.  Her BP has been good, but she could not recall the last reading.      Makenzie has not agreed to physical therapy at this time.     Cm will follow up in 2 weeks to see if her issue was addressed.   "

## 2024-09-03 ENCOUNTER — HOSPITAL ENCOUNTER (OUTPATIENT)
Facility: MEDICAL CENTER | Age: 64
Discharge: HOME | End: 2024-09-03
Payer: COMMERCIAL

## 2024-09-03 DIAGNOSIS — G35 MS (MULTIPLE SCLEROSIS) (HCC): ICD-10-CM

## 2024-09-03 PROCEDURE — 70551 MRI BRAIN STEM W/O DYE: CPT

## 2024-09-05 ENCOUNTER — TELEPHONE (OUTPATIENT)
Age: 64
End: 2024-09-05

## 2024-09-05 NOTE — TELEPHONE ENCOUNTER
----- Message from Patience Valentin PA-C sent at 9/4/2024  1:26 PM EDT -----  Please let patient/family know that her brain MRI is stable.  No new MS lesions. Has she heard anything about starting glatiramer acetate yet?

## 2024-09-05 NOTE — TELEPHONE ENCOUNTER
Outbound call placed to patient, no answer. osbaldo YANEZ per communication form.     Reviewed MRI results from provider below. Asked if pt heard anything with regards to her glatiramer acetate injection. Provided office number to call back with any questions with regards to MRI results or if pt had any updates with medication.

## 2024-09-06 ENCOUNTER — TELEPHONE (OUTPATIENT)
Age: 64
End: 2024-09-06

## 2024-09-06 NOTE — TELEPHONE ENCOUNTER
Calling to check we received orders for patient's incontinence supplies. Please advise and return ASAP

## 2024-09-10 ENCOUNTER — APPOINTMENT (OUTPATIENT)
Dept: RADIOLOGY | Facility: CLINIC | Age: 64
End: 2024-09-10
Payer: COMMERCIAL

## 2024-09-10 ENCOUNTER — APPOINTMENT (OUTPATIENT)
Dept: LAB | Facility: CLINIC | Age: 64
End: 2024-09-10
Payer: COMMERCIAL

## 2024-09-10 ENCOUNTER — OFFICE VISIT (OUTPATIENT)
Dept: FAMILY MEDICINE CLINIC | Facility: CLINIC | Age: 64
End: 2024-09-10
Payer: COMMERCIAL

## 2024-09-10 VITALS
HEIGHT: 65 IN | SYSTOLIC BLOOD PRESSURE: 128 MMHG | TEMPERATURE: 98.3 F | WEIGHT: 286.2 LBS | DIASTOLIC BLOOD PRESSURE: 84 MMHG | HEART RATE: 85 BPM | BODY MASS INDEX: 47.68 KG/M2 | OXYGEN SATURATION: 97 %

## 2024-09-10 DIAGNOSIS — M25.531 RIGHT WRIST PAIN: ICD-10-CM

## 2024-09-10 DIAGNOSIS — E11.65 TYPE 2 DIABETES MELLITUS WITH HYPERGLYCEMIA, WITH LONG-TERM CURRENT USE OF INSULIN (HCC): ICD-10-CM

## 2024-09-10 DIAGNOSIS — M25.531 RIGHT WRIST PAIN: Primary | ICD-10-CM

## 2024-09-10 DIAGNOSIS — Z79.4 TYPE 2 DIABETES MELLITUS WITH HYPERGLYCEMIA, WITH LONG-TERM CURRENT USE OF INSULIN (HCC): ICD-10-CM

## 2024-09-10 DIAGNOSIS — E21.3 HYPERPARATHYROIDISM (HCC): ICD-10-CM

## 2024-09-10 LAB
CREAT UR-MCNC: 106.2 MG/DL
MICROALBUMIN UR-MCNC: 2676.7 MG/L
MICROALBUMIN/CREAT 24H UR: 2520 MG/G CREATININE (ref 0–30)

## 2024-09-10 PROCEDURE — 99214 OFFICE O/P EST MOD 30 MIN: CPT | Performed by: FAMILY MEDICINE

## 2024-09-10 PROCEDURE — 82570 ASSAY OF URINE CREATININE: CPT

## 2024-09-10 PROCEDURE — 82043 UR ALBUMIN QUANTITATIVE: CPT

## 2024-09-10 PROCEDURE — 73110 X-RAY EXAM OF WRIST: CPT

## 2024-09-10 RX ORDER — PREDNISONE 10 MG/1
TABLET ORAL
Qty: 21 TABLET | Refills: 0 | Status: SHIPPED | OUTPATIENT
Start: 2024-09-10

## 2024-09-10 NOTE — PROGRESS NOTES
Assessment/Plan:   1. Right wrist pain  Reviewed patient's symptoms today.  reviewed the differential possible causes with her.  At this time, there was no fall or trauma.  Will check x-ray to rule out gross abnormalities.  Start treat with a prednisone taper.  She may apply heat over this area to minimize any discomfort.  Will call her with the results.  - XR wrist 3+ vw right; Future  - predniSONE 10 mg tablet; Take 6 tablets By mouth  In the morning Qd.   Decrease by  By 1 tablet daily until completed.  Dispense: 21 tablet; Refill: 0           Diagnoses and all orders for this visit:    Right wrist pain  -     XR wrist 3+ vw right; Future  -     predniSONE 10 mg tablet; Take 6 tablets By mouth  In the morning Qd.   Decrease by  By 1 tablet daily until completed.          Subjective:       Chief Complaint   Patient presents with    Hand Pain     Right hand pain for 3 days       Patient ID: Joanne Hu is a 64 y.o. female.    Hand Pain   The incident occurred 2 days ago. The incident occurred at home. There was no injury mechanism. The pain is present in the right wrist and right hand. The quality of the pain is described as stabbing. The pain radiates to the right arm and right hand. The pain is at a severity of 10/10. The patient is experiencing no pain. The pain has been Constant since the incident. Pertinent negatives include no chest pain or numbness. She has tried ice for the symptoms. The treatment provided no relief.       Review of Systems   Constitutional:  Negative for activity change, chills, fatigue and fever.   HENT:  Negative for congestion, ear pain, sinus pressure and sore throat.    Eyes:  Negative for redness, itching and visual disturbance.   Respiratory:  Negative for cough and shortness of breath.    Cardiovascular:  Negative for chest pain and palpitations.   Gastrointestinal:  Negative for abdominal pain, diarrhea and nausea.   Endocrine: Negative for cold intolerance and heat  intolerance.   Genitourinary:  Negative for dysuria, flank pain and frequency.   Musculoskeletal:  Negative for arthralgias, back pain, gait problem and myalgias.   Skin:  Negative for color change.   Allergic/Immunologic: Negative for environmental allergies.   Neurological:  Negative for dizziness, numbness and headaches.   Psychiatric/Behavioral:  Negative for behavioral problems and sleep disturbance.        The following portions of the patient's history were reviewed and updated as appropriate : past family history, past medical history, past social history and past surgical history.    Current Outpatient Medications:     Alcohol Swabs PADS, by Does not apply route 4 (four) times a day *Latex Free*, Disp: 120 each, Rfl: 3    amLODIPine (NORVASC) 5 mg tablet, Take 1 tablet by mouth once daily, Disp: 30 tablet, Rfl: 5    aspirin 81 mg chewable tablet, Chew 1 tablet (81 mg total) daily, Disp: 90 tablet, Rfl: 1    atorvastatin (LIPITOR) 40 mg tablet, Take 1 tablet (40 mg total) by mouth every evening, Disp: 90 tablet, Rfl: 1    Blood Glucose Monitoring Suppl (OneTouch Verio) w/Device KIT, Use in the morning, Disp: 1 kit, Rfl: 0    Blood Pressure KIT, by Does not apply route daily, Disp: 1 each, Rfl: 0    Incontinence Supply Disposable (Prevail Women Underwear XL) MISC, Use every 6 (six) hours as needed (incontinence), Disp: 120 each, Rfl: 11    metFORMIN (GLUCOPHAGE-XR) 500 mg 24 hr tablet, TAKE 2 TABLETS BY MOUTH TWICE DAILY WITH  MEALS, Disp: 360 tablet, Rfl: 1    metoprolol succinate (TOPROL-XL) 50 mg 24 hr tablet, Take 1 tablet by mouth once daily, Disp: 90 tablet, Rfl: 1    ondansetron (ZOFRAN-ODT) 4 mg disintegrating tablet, Take 1 tablet (4 mg total) by mouth every 6 (six) hours as needed for nausea or vomiting, Disp: 20 tablet, Rfl: 0    predniSONE 10 mg tablet, Take 6 tablets By mouth  In the morning Qd.   Decrease by  By 1 tablet daily until completed., Disp: 21 tablet, Rfl: 0    semaglutide, 0.25 or 0.5  "mg/dose, (Ozempic, 0.25 or 0.5 MG/DOSE,) 2 mg/3 mL injection pen, Inject 0.25 mg weekly for 4 weeks and then increase to 0.5 mg weekly., Disp: 3 mL, Rfl: 2    Tresiba FlexTouch 100 units/mL injection pen, Inject 30 Units under the skin daily, Disp: 15 mL, Rfl: 2    Cholecalciferol (Vitamin D3) 50 MCG (2000 UT) CAPS, Take 1 capsule by mouth once daily (Patient not taking: Reported on 8/19/2024), Disp: 30 capsule, Rfl: 5    diazepam (VALIUM) 5 mg tablet, Take 1 po 30 min prior to MRI and 1 immediately before MRI if needed (Patient not taking: Reported on 9/10/2024), Disp: 2 tablet, Rfl: 0    diphenhydrAMINE (BENADRYL) 25 mg tablet, Take 0.5 tablets (12.5 mg total) by mouth every 6 (six) hours (Patient not taking: Reported on 8/19/2024), Disp: 20 tablet, Rfl: 0    naloxone (NARCAN) 4 mg/0.1 mL nasal spray, Administer 1 spray into a nostril. If no response after 2-3 minutes, give another dose in the other nostril using a new spray. (Patient not taking: Reported on 9/10/2024), Disp: 1 each, Rfl: 1    pregabalin (LYRICA) 75 mg capsule, Take 1 capsule (75 mg total) by mouth 2 (two) times a day (Patient not taking: Reported on 8/19/2024), Disp: 60 capsule, Rfl: 2         Objective:         Vitals:    09/10/24 1114   BP: 128/84   BP Location: Left arm   Patient Position: Sitting   Cuff Size: Large   Pulse: 85   Temp: 98.3 °F (36.8 °C)   TempSrc: Temporal   SpO2: 97%   Weight: 130 kg (286 lb 3.2 oz)   Height: 5' 5\" (1.651 m)     Physical Exam  Vitals reviewed.   Constitutional:       General: She is not in acute distress.     Appearance: Normal appearance. She is well-developed.   HENT:      Head: Normocephalic and atraumatic.      Right Ear: Tympanic membrane, ear canal and external ear normal. There is no impacted cerumen.      Left Ear: Tympanic membrane, ear canal and external ear normal. There is no impacted cerumen.      Nose: Nose normal. No congestion or rhinorrhea.      Mouth/Throat:      Mouth: Mucous membranes are " moist.      Pharynx: No oropharyngeal exudate or posterior oropharyngeal erythema.   Eyes:      General: No scleral icterus.        Right eye: No discharge.         Left eye: No discharge.      Extraocular Movements: Extraocular movements intact.      Conjunctiva/sclera: Conjunctivae normal.      Pupils: Pupils are equal, round, and reactive to light.   Neck:      Trachea: No tracheal deviation.   Cardiovascular:      Rate and Rhythm: Normal rate and regular rhythm.      Pulses: Normal pulses.           Dorsalis pedis pulses are 2+ on the right side and 2+ on the left side.        Posterior tibial pulses are 2+ on the right side and 2+ on the left side.      Heart sounds: Normal heart sounds. No murmur heard.     No friction rub. No gallop.   Pulmonary:      Effort: Pulmonary effort is normal. No respiratory distress.      Breath sounds: Normal breath sounds. No wheezing, rhonchi or rales.   Abdominal:      General: Bowel sounds are normal. There is no distension.      Palpations: Abdomen is soft.      Tenderness: There is no abdominal tenderness. There is no guarding or rebound.   Musculoskeletal:         General: Normal range of motion.      Cervical back: Normal range of motion and neck supple.      Right lower leg: No edema.      Left lower leg: No edema.   Lymphadenopathy:      Head:      Right side of head: No submental or submandibular adenopathy.      Left side of head: No submental or submandibular adenopathy.      Cervical: No cervical adenopathy.      Right cervical: No superficial, deep or posterior cervical adenopathy.     Left cervical: No superficial, deep or posterior cervical adenopathy.   Skin:     General: Skin is warm and dry.      Findings: No erythema.   Neurological:      General: No focal deficit present.      Mental Status: She is alert and oriented to person, place, and time.      Cranial Nerves: No cranial nerve deficit.      Sensory: Sensation is intact. No sensory deficit.      Motor:  Motor function is intact.   Psychiatric:         Attention and Perception: Attention and perception normal.         Mood and Affect: Mood is not anxious or depressed.         Speech: Speech normal.         Behavior: Behavior normal.         Thought Content: Thought content normal.         Judgment: Judgment normal.

## 2024-09-11 ENCOUNTER — PATIENT OUTREACH (OUTPATIENT)
Dept: CASE MANAGEMENT | Facility: OTHER | Age: 64
End: 2024-09-11

## 2024-09-11 ENCOUNTER — TELEPHONE (OUTPATIENT)
Dept: ENDOCRINOLOGY | Facility: CLINIC | Age: 64
End: 2024-09-11

## 2024-09-11 DIAGNOSIS — Z79.4 TYPE 2 DIABETES MELLITUS WITH DIABETIC MICROALBUMINURIA, WITH LONG-TERM CURRENT USE OF INSULIN (HCC): Primary | ICD-10-CM

## 2024-09-11 DIAGNOSIS — R80.9 TYPE 2 DIABETES MELLITUS WITH DIABETIC MICROALBUMINURIA, WITH LONG-TERM CURRENT USE OF INSULIN (HCC): Primary | ICD-10-CM

## 2024-09-11 DIAGNOSIS — E11.29 TYPE 2 DIABETES MELLITUS WITH DIABETIC MICROALBUMINURIA, WITH LONG-TERM CURRENT USE OF INSULIN (HCC): Primary | ICD-10-CM

## 2024-09-11 NOTE — PROGRESS NOTES
Outpatient Care Management Note:    Care manager called Makenzie and spoke with her daughter, Janna.  Makenzie saw Dr Cuenca yesterday for right wrist pain. She got her xray completed and started steroids. We discussed that the steroids will increase her sugars.  Makenzie has a dexcom, so they can monitor her sugars closely. They will call Dr Cuenca with any concerns.      Makenzie did not follow up about the nerve pain in her legs.  Janna states she had too much pain in her wrist. CM will follow up in 2 weeks.

## 2024-09-11 NOTE — TELEPHONE ENCOUNTER
----- Message from DEANA Nicole sent at 9/11/2024  2:48 PM EDT -----  Protein in urine has worsened. I would recommend she see nephrology. I will place referral.

## 2024-09-12 NOTE — TELEPHONE ENCOUNTER
Patients daughter called- I read her Vanita's note:    ----- Message from DEANA Nicole sent at 9/11/2024  2:48 PM EDT -----  Protein in urine has worsened. I would recommend she see nephrology. I will place referral.            She verbalized understanding. I gave her the number for nephrology, she is going to set up an appointment. No further actions needed.

## 2024-09-14 DIAGNOSIS — E11.65 TYPE 2 DIABETES MELLITUS WITH HYPERGLYCEMIA, WITH LONG-TERM CURRENT USE OF INSULIN (HCC): ICD-10-CM

## 2024-09-14 DIAGNOSIS — Z79.4 TYPE 2 DIABETES MELLITUS WITH HYPERGLYCEMIA, WITH LONG-TERM CURRENT USE OF INSULIN (HCC): ICD-10-CM

## 2024-09-16 RX ORDER — SEMAGLUTIDE 0.68 MG/ML
INJECTION, SOLUTION SUBCUTANEOUS
Qty: 3 ML | Refills: 5 | Status: SHIPPED | OUTPATIENT
Start: 2024-09-16

## 2024-09-17 ENCOUNTER — TELEPHONE (OUTPATIENT)
Age: 64
End: 2024-09-17

## 2024-09-17 DIAGNOSIS — G35 MS (MULTIPLE SCLEROSIS) (HCC): Primary | ICD-10-CM

## 2024-09-17 NOTE — TELEPHONE ENCOUNTER
Pt's dtr called Janna and states that pt was seen last month and was suppose to start glatiramer acetate. They still have not received any call from the pharmacy.     It looks like start form was completed on 8/20/24    Called [facility] at [phone], spoke to Gustavo who confirmed that they received the start form on 8/20/24. Their nurses spoke to pt and pt's dtr on 8/22/24 and went over a different foundation. Getting assistance to get this med. Once they find out who pt's specialty pharmacy, they will call this pharmacy and ask if they dispense this med and if they carry the whisperjet.    Pls f/u     thanks

## 2024-09-24 RX ORDER — GLATIRAMER 40 MG/ML
40 INJECTION, SOLUTION SUBCUTANEOUS 3 TIMES WEEKLY
Qty: 12 ML | Refills: 11 | Status: SHIPPED | OUTPATIENT
Start: 2024-09-25

## 2024-09-24 NOTE — TELEPHONE ENCOUNTER
Pt's dtr Janna called and states that someone called the pt the other day re: script for glatiramer acetate.   She wanted to know if the script was sent and if PA was done?

## 2024-09-24 NOTE — TELEPHONE ENCOUNTER
[facility] is [facility]. Script pended, please review and sign if agreeable.     Attempted PA on CMM Key: [id number] received message that medication is covered, PA is not required.    Called pt's daughter Janna (on communication consent). Advised her of above. She would like her phone # to be included with pt's script so pharmacy can call her if needed. Also provided phone # for [facility].     Will call Janna with update once Rx is sent.

## 2024-09-25 ENCOUNTER — APPOINTMENT (OUTPATIENT)
Dept: LAB | Facility: CLINIC | Age: 64
End: 2024-09-25
Payer: COMMERCIAL

## 2024-09-25 ENCOUNTER — PATIENT OUTREACH (OUTPATIENT)
Dept: CASE MANAGEMENT | Facility: OTHER | Age: 64
End: 2024-09-25

## 2024-09-25 ENCOUNTER — OFFICE VISIT (OUTPATIENT)
Dept: PODIATRY | Facility: CLINIC | Age: 64
End: 2024-09-25
Payer: COMMERCIAL

## 2024-09-25 VITALS — HEIGHT: 65 IN | RESPIRATION RATE: 18 BRPM | BODY MASS INDEX: 47.48 KG/M2 | WEIGHT: 285 LBS

## 2024-09-25 DIAGNOSIS — I87.2 PERIPHERAL VENOUS INSUFFICIENCY: ICD-10-CM

## 2024-09-25 DIAGNOSIS — E11.42 DIABETIC POLYNEUROPATHY ASSOCIATED WITH TYPE 2 DIABETES MELLITUS (HCC): Primary | ICD-10-CM

## 2024-09-25 LAB
ALBUMIN SERPL BCG-MCNC: 3.7 G/DL (ref 3.5–5)
ALP SERPL-CCNC: 104 U/L (ref 34–104)
ALT SERPL W P-5'-P-CCNC: 13 U/L (ref 7–52)
ANION GAP SERPL CALCULATED.3IONS-SCNC: 12 MMOL/L (ref 4–13)
AST SERPL W P-5'-P-CCNC: 10 U/L (ref 13–39)
BILIRUB SERPL-MCNC: 0.77 MG/DL (ref 0.2–1)
BUN SERPL-MCNC: 14 MG/DL (ref 5–25)
CALCIUM SERPL-MCNC: 8.9 MG/DL (ref 8.4–10.2)
CHLORIDE SERPL-SCNC: 102 MMOL/L (ref 96–108)
CO2 SERPL-SCNC: 25 MMOL/L (ref 21–32)
CREAT SERPL-MCNC: 0.74 MG/DL (ref 0.6–1.3)
EST. AVERAGE GLUCOSE BLD GHB EST-MCNC: 163 MG/DL
GFR SERPL CREATININE-BSD FRML MDRD: 86 ML/MIN/1.73SQ M
GLUCOSE SERPL-MCNC: 200 MG/DL (ref 65–140)
HBA1C MFR BLD: 7.3 %
POTASSIUM SERPL-SCNC: 4.5 MMOL/L (ref 3.5–5.3)
PROT SERPL-MCNC: 6.7 G/DL (ref 6.4–8.4)
PTH-INTACT SERPL-MCNC: 34.8 PG/ML (ref 12–88)
SODIUM SERPL-SCNC: 139 MMOL/L (ref 135–147)

## 2024-09-25 PROCEDURE — 83970 ASSAY OF PARATHORMONE: CPT

## 2024-09-25 PROCEDURE — 83036 HEMOGLOBIN GLYCOSYLATED A1C: CPT

## 2024-09-25 PROCEDURE — 99213 OFFICE O/P EST LOW 20 MIN: CPT | Performed by: PODIATRIST

## 2024-09-25 PROCEDURE — 80053 COMPREHEN METABOLIC PANEL: CPT

## 2024-09-25 NOTE — PROGRESS NOTES
Outpatient Care Management Note:    Care manager called Joanne and spoke with her duaghter, Italo. She states that her mom saw podiatry today and had her nails trimmed. He noted increased feet swelling. Joanne is scheduled to see cardiology on 10/10. Italo will call cardiology if her swelling increases or worsens.     YESENIA noted that Joanne's TeM8V=9.3 today.  This is improved from 12.8.  I congratulated Italo on helping her mother to improve her sugars.      Joanne is still not interested in therapy.      YESENIA will close the referral at this time.

## 2024-09-25 NOTE — PROGRESS NOTES
Patient presents with her daughter.  She is here for palliative nail care but it is noted that her feet and legs are swollen.  Patient recalls in the past being on water pills but is not taking them now.  It is also noted that the patient refuses to wear socks of any kind including compression stockings.    On exam, both feet are edematous.  Dorsalis pedis pulse has normal pulse bilateral but PT pulses are absent bilateral.  Nails are elongated and mildly dystrophic.  Patient is insensate on the soles of her feet.  There is no hair growth suggesting onset of PVD.  Patient's last A1c was 12.8.  She had a blood test taken today and hopefully it is decreased.    Treatment: Patient is seeing her cardiologist in 2 weeks.  Recommended discussing diuretics to deal with venous insufficiency.  Trimmed elongated toenails.  Reappoint 3 months.

## 2024-09-27 NOTE — TELEPHONE ENCOUNTER
Called [facility] and spoke with Tonja. She reports glatiramer acetate is ready to schedule for delivery. Pt just needs to call in to schedule.    Called and spoke with Janna, made her aware of above. Nothing further needed.

## 2024-09-30 ENCOUNTER — NURSE TRIAGE (OUTPATIENT)
Age: 64
End: 2024-09-30

## 2024-09-30 DIAGNOSIS — I10 ESSENTIAL HYPERTENSION: Chronic | ICD-10-CM

## 2024-09-30 RX ORDER — HYDROCHLOROTHIAZIDE 25 MG/1
25 TABLET ORAL EVERY OTHER DAY
Qty: 90 TABLET | Refills: 1 | Status: SHIPPED | OUTPATIENT
Start: 2024-09-30 | End: 2024-10-03 | Stop reason: SDUPTHER

## 2024-09-30 NOTE — TELEPHONE ENCOUNTER
"Patient's HCTZ was discontinued on 5/29/24 because patient reported she was not taking it. Patient reports she was not taking it daily, but was taking it every other day. Now that she has been off of the medication, her edema has worsened. She reports bilateral edema extending into her calves with redness and warmth to touch. She is scheduled to be seen in the office Thursday and would like a call back to advise if she can be seen today or tomorrow and if she can go back on her medication. Attempted to warm transfer to the office and was unsuccessful.     Reason for Disposition   MODERATE swelling of both ankles (e.g., swelling extends up to the knees) AND new-onset or worsening    Answer Assessment - Initial Assessment Questions  1. ONSET: \"When did the swelling start?\" (e.g., minutes, hours, days)      2 weeks   2. LOCATION: \"What part of the leg is swollen?\"  \"Are both legs swollen or just one leg?\"      Both   3. SEVERITY: \"How bad is the swelling?\" (e.g., localized; mild, moderate, severe)   - Localized - small area of swelling localized to one leg   - MILD pedal edema - swelling limited to foot and ankle, pitting edema < 1/4 inch (6 mm) deep, rest and elevation eliminate most or all swelling   - MODERATE edema - swelling of lower leg to knee, pitting edema > 1/4 inch (6 mm) deep, rest and elevation only partially reduce swelling   - SEVERE edema - swelling extends above knee, facial or hand swelling present       Mild to moderate  4. REDNESS: \"Does the swelling look red or infected?\"      Red   5. PAIN: \"Is the swelling painful to touch?\" If Yes, ask: \"How painful is it?\"   (Scale 1-10; mild, moderate or severe)      Moderate  6. FEVER: \"Do you have a fever?\" If Yes, ask: \"What is it, how was it measured, and when did it start?\"       Denies   7. CAUSE: \"What do you think is causing the leg swelling?\"      Ran out of HCTZ   8. MEDICAL HISTORY: \"Do you have a history of heart failure, kidney disease, liver " "failure, or cancer?\"      Denies   9. RECURRENT SYMPTOM: \"Have you had leg swelling before?\" If Yes, ask: \"When was the last time?\" \"What happened that time?\"      Yes, hospital   10. OTHER SYMPTOMS: \"Do you have any other symptoms?\" (e.g., chest pain, difficulty breathing)        Stiffness from edema, warm to touch   11. PREGNANCY: \"Is there any chance you are pregnant?\" \"When was your last menstrual period?\"        N/A    Protocols used: Leg Swelling and Edema-ADULT-[place]    "

## 2024-10-03 ENCOUNTER — OFFICE VISIT (OUTPATIENT)
Dept: FAMILY MEDICINE CLINIC | Facility: CLINIC | Age: 64
End: 2024-10-03
Payer: COMMERCIAL

## 2024-10-03 VITALS
HEART RATE: 76 BPM | HEIGHT: 65 IN | TEMPERATURE: 97.5 F | SYSTOLIC BLOOD PRESSURE: 124 MMHG | DIASTOLIC BLOOD PRESSURE: 76 MMHG | WEIGHT: 285.4 LBS | BODY MASS INDEX: 47.55 KG/M2 | OXYGEN SATURATION: 98 %

## 2024-10-03 DIAGNOSIS — Z79.4 TYPE 2 DIABETES MELLITUS WITH HYPERGLYCEMIA, WITH LONG-TERM CURRENT USE OF INSULIN (HCC): ICD-10-CM

## 2024-10-03 DIAGNOSIS — I10 ESSENTIAL HYPERTENSION: Chronic | ICD-10-CM

## 2024-10-03 DIAGNOSIS — R60.0 BILATERAL LOWER EXTREMITY EDEMA: Primary | ICD-10-CM

## 2024-10-03 DIAGNOSIS — E11.65 TYPE 2 DIABETES MELLITUS WITH HYPERGLYCEMIA, WITH LONG-TERM CURRENT USE OF INSULIN (HCC): ICD-10-CM

## 2024-10-03 PROCEDURE — 99214 OFFICE O/P EST MOD 30 MIN: CPT | Performed by: FAMILY MEDICINE

## 2024-10-03 RX ORDER — HYDROCHLOROTHIAZIDE 25 MG/1
25 TABLET ORAL DAILY
Qty: 90 TABLET | Refills: 1 | Status: SHIPPED | OUTPATIENT
Start: 2024-10-03 | End: 2025-04-01

## 2024-10-03 NOTE — ASSESSMENT & PLAN NOTE
Resolved with restarting HCTZ; advised on daily use as it significantly improved; f/u guidance given

## 2024-10-03 NOTE — ASSESSMENT & PLAN NOTE
Advised on diet modifications based on timing of sugar spike; reviewed diet modifications and if persists advised f/u with endocrinology  Lab Results   Component Value Date    HGBA1C 7.3 (H) 09/25/2024

## 2024-10-03 NOTE — PROGRESS NOTES
"Assessment/Plan:     1. Bilateral lower extremity edema  Assessment & Plan:  Resolved with restarting HCTZ; advised on daily use as it significantly improved; f/u guidance given   2. Essential hypertension  Assessment & Plan:  Stable; c/w HCTZ 25 mg daily  Orders:  -     hydroCHLOROthiazide 25 mg tablet; Take 1 tablet (25 mg total) by mouth daily  3. Type 2 diabetes mellitus with hyperglycemia, with long-term current use of insulin (HCC)  Assessment & Plan:  Advised on diet modifications based on timing of sugar spike; reviewed diet modifications and if persists advised f/u with endocrinology  Lab Results   Component Value Date    HGBA1C 7.3 (H) 09/25/2024           Subjective:      Patient ID: Joanne Hu is a 64 y.o. female.    Patient had self d/c'd her HCTZ and started having leg swelling and gained at least 5 lbs. Lets were tight and shiny. Had some redness. Restarted HCTZ and swelling in legs resolved and pain resolved. No pain. No redness. No fevers. Thought HCTZ was causing her frequent urination but off medication it did not make a difference.     Pt states on occasion she has episodes where she feels off, almost high. Usually when her blood sugar spikes. Does not last long. Last happened yesterday after drinking coffee.       The following portions of the patient's history were reviewed and updated as appropriate: allergies, current medications, past family history, past medical history, past social history, past surgical history, and problem list.    Review of Systems   Constitutional:  Negative for chills and fever.   Cardiovascular:  Negative for leg swelling.   Skin:  Negative for rash.         Objective:      /76 (BP Location: Left arm, Patient Position: Sitting, Cuff Size: Large)   Pulse 76   Temp 97.5 °F (36.4 °C) (Temporal)   Ht 5' 5\" (1.651 m)   Wt 129 kg (285 lb 6.4 oz)   LMP  (LMP Unknown)   SpO2 98%   BMI 47.49 kg/m²          Physical Exam  Vitals reviewed.   Constitutional:     "   General: She is not in acute distress.     Appearance: Normal appearance. She is obese. She is not ill-appearing, toxic-appearing or diaphoretic.   HENT:      Head: Normocephalic and atraumatic.   Eyes:      General: No scleral icterus.        Right eye: No discharge.         Left eye: No discharge.      Conjunctiva/sclera: Conjunctivae normal.   Cardiovascular:      Rate and Rhythm: Normal rate and regular rhythm.      Pulses: Normal pulses.      Heart sounds: Normal heart sounds. No murmur heard.     No gallop.   Pulmonary:      Effort: Pulmonary effort is normal. No respiratory distress.      Breath sounds: Normal breath sounds. No stridor. No wheezing, rhonchi or rales.   Musculoskeletal:         General: No tenderness.      Right lower leg: No tenderness. Edema present.      Left lower leg: No tenderness. Edema present.      Comments: No calf tenderness  No erythema  +trace pitting edema B/L   Neurological:      General: No focal deficit present.      Mental Status: She is alert and oriented to person, place, and time.   Psychiatric:         Mood and Affect: Mood normal.         Behavior: Behavior normal.         Thought Content: Thought content normal.         Judgment: Judgment normal.

## 2024-10-07 DIAGNOSIS — I10 BENIGN ESSENTIAL HYPERTENSION: ICD-10-CM

## 2024-10-08 RX ORDER — AMLODIPINE BESYLATE 5 MG/1
5 TABLET ORAL DAILY
Qty: 30 TABLET | Refills: 5 | Status: SHIPPED | OUTPATIENT
Start: 2024-10-08

## 2024-10-10 ENCOUNTER — OFFICE VISIT (OUTPATIENT)
Dept: CARDIOLOGY CLINIC | Facility: CLINIC | Age: 64
End: 2024-10-10
Payer: COMMERCIAL

## 2024-10-10 VITALS
DIASTOLIC BLOOD PRESSURE: 72 MMHG | SYSTOLIC BLOOD PRESSURE: 124 MMHG | WEIGHT: 284 LBS | HEART RATE: 73 BPM | BODY MASS INDEX: 47.26 KG/M2

## 2024-10-10 DIAGNOSIS — E11.42 DIABETIC POLYNEUROPATHY ASSOCIATED WITH TYPE 2 DIABETES MELLITUS (HCC): ICD-10-CM

## 2024-10-10 DIAGNOSIS — I10 ESSENTIAL HYPERTENSION: Primary | ICD-10-CM

## 2024-10-10 DIAGNOSIS — E11.65 TYPE 2 DIABETES MELLITUS WITH HYPERGLYCEMIA, WITH LONG-TERM CURRENT USE OF INSULIN (HCC): ICD-10-CM

## 2024-10-10 DIAGNOSIS — Z79.4 TYPE 2 DIABETES MELLITUS WITH HYPERGLYCEMIA, WITH LONG-TERM CURRENT USE OF INSULIN (HCC): ICD-10-CM

## 2024-10-10 PROCEDURE — 99214 OFFICE O/P EST MOD 30 MIN: CPT

## 2024-10-10 NOTE — PROGRESS NOTES
Cardiology   MD Meng Queen MD, FACC  Lito Dewitt DO, [facility] FACP, FASNC Ather Mansoor, MD Rujul Patel, DO, [facility]  Mariano Alicia DO, [facility] AL[facility]  -------------------------------------------------------------------  [facility]  [address] [place] [address]  Phone: [phone]  Fax: [phone]  10/10/24  Joanne Hu  YOB: 1960   MRN: [id number]      Referring Physician: No referring provider defined for this encounter.     HPI: Joanne Hu is a 64 y.o. female with:   HTN   Morbid obesity  DM type II  Multiple sclerosis  Ambulatory dysfunction  Daily headaches  Hypercalcemia  Thyroid nodule  Hyperparathyroidism-will require left parathyroidectomy    She presents for follow-up.  Has been doing well, denies any acute cardiac complaints today.  Blood pressure has now been under very good control but in the past was difficult to control    Review of Systems   Constitutional:  Negative for chills and fever.   HENT:  Negative for facial swelling and sore throat.    Eyes:  Negative for visual disturbance.   Respiratory:  Negative for cough, chest tightness, shortness of breath and wheezing.    Cardiovascular:  Negative for chest pain, palpitations and leg swelling.   Gastrointestinal:  Negative for abdominal pain, blood in stool, constipation, diarrhea, nausea and vomiting.   Endocrine: Negative for cold intolerance and heat intolerance.   Genitourinary:  Negative for decreased urine volume, difficulty urinating, dysuria and hematuria.   Musculoskeletal:  Negative for arthralgias, back pain and myalgias.   Skin:  Negative for rash.   Neurological:  Negative for dizziness, syncope, weakness and numbness.   Psychiatric/Behavioral:  Negative for agitation, behavioral problems and confusion. The patient is not nervous/anxious.         OBJECTIVE  Vitals:    10/10/24 0953   BP: 124/72   Pulse: 73       Physical Exam  Constitutional: awake,  alert and oriented, in no acute distress, no obvious deformities, obese female  Head: Normocephalic, without obvious abnormality, atraumatic  Eyes: conjunctivae clear and moist. Sclera anicteric. No xanthelasmas. Pupils equal bilaterally. Extraocular motions are full.  Ear nose mouth and throat: ears are symmetrical bilaterally, hearing appears to be equal bilaterally, no nasal discharge or epistaxis, oropharynx is clear with moist mucous membranes  Neck: Trachea is midline, neck is supple, no thyromegaly or significant lymphadenopathy, there is full range of motion.  Lungs: clear to auscultation bilaterally, no wheezes, no rales, no rhonchi, no accessory muscle use, breathing is nonlabored  Heart: Regular rhythm with a Normal heart rate, S1, S2 normal, No Murmur, no click, rub or gallop, No lower extremity edema  Abdomen: Obese, soft, non-tender; bowel sounds normal; no masses, no organomegaly  Psychiatric: Patient is oriented to time, place, person, mood/affect is negative for depression, anxiety, agitation, appears to have appropriate insight  Skin: Skin is warm, dry, intact. No obvious rashes or lesions on exposed extremities. Nail beds are pink with no cyanosis or clubbing.     EKG:  No results found for this visit on 10/10/24.     IMPRESSION:  HTN   Morbid obesity  DM type II  Multiple sclerosis  Ambulatory dysfunction  Daily headaches  Hypercalcemia  Thyroid nodule  Hyperparathyroidism-will require left parathyroidectomy    DISCUSSION/RECOMMENDATIONS:  She presents today for follow-up.  She states she is doing well, denies any acute complaints  Blood pressure is now under good control, in the past she was very difficult to get under control  Will continue with amlodipine 5 hydrochlorothiazide 25 metoprolol 50 mg for blood pressure  Continue with aspirin and Lipitor for prior history of CVA  Continue with semaglutide and metformin for diabetes which is also improved dramatically.  She is also losing weight  with the semaglutide  Overall she is now very stable from a heart standpoint.  At this point can follow as needed    Mariano Alicia DO [facility]AN  --------------------------------------------------------------------------------  TREADMILL STRESS  No results found for this or any previous visit.     ----------------------------------------------------------------------------------------------  NUCLEAR STRESS TEST: No results found for this or any previous visit.    Results for orders placed during the hospital encounter of 20    NM Myocardial Perfusion Spect (Pharmacological Induced Stress and/or Rest)    [address] [address] [phone]    Stress Gated SPECT Myocardial Perfusion Imaging after Regadenoson    Patient: KENNETH AGUAYO  MR number: [id number]  Account number: [id number]  : 1960  Age: 59 years  Gender: Female  Status: Outpatient  Location: Stress lab  Height: 67 in  Weight: 282 lb  BP: 138/ 96 mmHg    Allergies: ZOLPIDEM TARTRATE, MEPERIDINE, INSULIN, INSULIN GLARGINE, LATEX, OXYCODONE-ACETAMINOPHEN, ZOLPIDEM    Diagnosis: R07.9 - Chest pain, unspecified    Interpreting Physician:  Rick Duarte MD  Referring Physician:  Belkys Capps DO  Primary Physician:  JOSE L SONI  Technician:  Luis Guzman  RN:  Randi Bautista RN  Group:  [facility]  Cardiology Fellow:  Meng Wilson MD  Report Prepared by::  Randi Bautista RN    INDICATIONS: Detection of coronary artery disease.    HISTORY: The patient is a 59 year old  female. Chest pain status: recent onset chest pain. Other symptoms: palpitations. Coronary artery disease risk factors: hypertension, family history of premature coronary artery disease, and  diabetes mellitus. Cardiovascular history: stroke. Co-morbidity: obesity. Medications: a beta blocker, an ACE inhibitor/ARB, aspirin, a lipid lowering agent, and diabetic  medications.    PHYSICAL EXAM: Baseline physical exam screening: normal and no wheezes audible.    REST ECG: Normal baseline ECG.    PROCEDURE: The study was performed in the the Stress lab. A regadenoson infusion pharmacologic stress test was performed. Gated SPECT myocardial perfusion imaging was performed during stress. Systolic blood pressure was 138 mmHg, at the  start of the study. Diastolic blood pressure was 96 mmHg, at the start of the study. The heart rate was 73 bpm, at the start of the study. IV double checked.  Regadenoson protocol:  HR bpm SBP mmHg DBP mmHg Symptoms  Baseline 73 138 96 none  Immediate 101 154 96 nausea  1 min 90 144 94 subsiding  2 min 89 130 90 none  No medications or fluids given.    STRESS SUMMARY: Duration of pharmacologic stress was 3 min and 0 sec. Maximal heart rate during stress was 101 bpm. The heart rate response to stress was normal. There was normal resting blood pressure with an appropriate response to  stress. The rate-pressure product for the peak heart rate and blood pressure was 58316. There was no chest pain during stress. The stress test was terminated due to protocol completion. Pre oxygen saturation: 99 %. Peak oxygen saturation:  100 %. Arrhythmia during stress: isolated atrial premature beats. The stress ECG was non-diagnostic.    ISOTOPE ADMINISTRATION:  Resting isotope administration Stress isotope administration  Agent Tetrofosmin Tetrofosmin  Dose 15.19 mCi 49 mCi  Date 05/19/2020 05/19/2020  Injection time 09:10 10:30  Imaging time 10:00 11:20  Injection-image interval 50 min 50 min    The radiopharmaceutical was injected at the peak effect of pharmacologic stress.    MYOCARDIAL PERFUSION IMAGING:  The image quality was poor. Rotating projection images reveal moderate breast attenuation. Left ventricular size was normal. The TID ratio was 0.73.    PERFUSION DEFECTS:  -  There is significant breast attentuation, as well as left armpit fat fold attenuation as  well as significant subdiaphragmatic activity attenuation. This limits study accuracy. No evidence of scar or ischemia.    GATED SPECT:  The calculated left ventricular ejection fraction was 62 %. Left ventricular ejection fraction was within normal limits by visual estimate. There was no left ventricular regional abnormality.    SUMMARY:  -  Stress results: There was no chest pain during stress.  -  ECG conclusions: The stress ECG was non-diagnostic.  -  Perfusion imaging: There is significant breast attentuation, as well as left armpit fat fold attenuation as well as significant subdiaphragmatic activity attenuation. This limits study accuracy. No evidence of scar or ischemia.  -  Gated SPECT: The calculated left ventricular ejection fraction was 62 %. Left ventricular ejection fraction was within normal limits by visual estimate. There was no left ventricular regional abnormality.    IMPRESSIONS: Probable normal study after pharmacologic stress without reproduction of symptoms.  Perfusion imaging significantly limited by attenuation artifact as mentioned above.  Left ventricular systolic function was normal.    Prepared and signed by    Rick Duarte MD  Signed 2020 17:05:54      --------------------------------------------------------------------------------  CATH:  No results found for this or any previous visit.    --------------------------------------------------------------------------------  ECHO:   Results for orders placed during the hospital encounter of 20    Echo complete with contrast if indicated    Narrative  [address] [address] [phone]    Transthoracic Echocardiogram  2D, M-mode, Doppler, and Color Doppler    Study date:  11-Mar-2020    Patient: KENNETH AGUAYO  MR number: [id number]  Account number: [id number]  : 1960  Age: 59 years  Gender: Female  Status: Outpatient  Location: Bedside  Height: 67 in  Weight:  279.4 lb  BP: 196/ 86 mmHg    Indications: Transient cerebral ischemic attack (TIA).    Diagnoses: G45.9 - Transient cerebral ischemic attack, unspecified    Sonographer:  Ramon Carlos RDCS  Referring Physician:  Jareth Beckett MD  Group:  [facility]  RN:  Neela Vo RN BSN  Interpreting Physician:  Mariano Alicia D.O.    SUMMARY    PROCEDURE INFORMATION:  Image quality was adequate.  Intravenous contrast ( agitated saline (9cc nss/ 1cc air) injected x3, once at rest, once with cough, once with Valsalva and abdominal compression) was administered to evaluate intracardiac shunting.    LEFT VENTRICLE:  Systolic function was normal by visual assessment. Ejection fraction was estimated to be 60 %.  There were no regional wall motion abnormalities.  Wall thickness was mildly increased.  There was very mild assymetrical hypertrophy. The septum measures 1.2cm.  Doppler parameters were consistent with abnormal left ventricular relaxation (grade 1 diastolic dysfunction).    ATRIAL SEPTUM:  No defect or patent foramen ovale was identified.    MITRAL VALVE:  There was mild regurgitation.    AORTIC VALVE:  The valve was trileaflet. Leaflets exhibited normal thickness, moderate calcification, normal cuspal separation, and good mobility.  The left coronary cusp demonstrated moderate calcification.  There was no evidence for stenosis.    PULMONIC VALVE:  There was mild regurgitation.    AORTA:  The root exhibited mild dilatation to 3.5cm.    SUMMARY MEASUREMENTS  2D measurements:  Unspecified Anatomy:   LAESV Index (A-L) was 17.5 ml/m2.  LAESVInd MOD BP was 16.9 ml/m2.  RAAs A4C was 12.4 cm2.  RWT was 0.4 .  CW measurements:  Unspecified Anatomy:   PRend PG was 17.6 mmHg.  PRend Vmax was 2.1 m/s.  MM measurements:  Unspecified Anatomy:   TAPSE was 1.8 cm.  PW measurements:  Unspecified Anatomy:   E' Avg was 0.1 m/s.  E' Lat was 0.1 m/s.  E' Sept was 0.1 m/s.  E/E' Avg was 9.8 .  E/E' Lat was 9.8 .   E/E' Sept was 9.7 .  MV A [place]ilio was 0.9 m/s.  MV Dec [place] was 3.2 m/s2.  MV DecT was 211.9 ms.  MV E [place]ilio was 0.7  m/s.  MV E/A Ratio was 0.8 .    HISTORY: PRIOR HISTORY: Diabetes mellitus type 2, hypertension, syncope, anxiety.    PROCEDURE: The procedure was performed at the bedside. This was a routine study. The transthoracic approach was used. The study included complete 2D imaging, M-mode, complete spectral Doppler, and color Doppler. The heart rate was 89 bpm,  at the start of the study. Images were obtained from the parasternal, apical, subcostal, and suprasternal notch acoustic windows. Intravenous contrast ( agitated saline (9cc nss/ 1cc air) injected x3, once at rest, once with cough, once  with Valsalva and abdominal compression) was administered to evaluate intracardiac shunting. Image quality was adequate.    LEFT VENTRICLE: Size was normal. Systolic function was normal by visual assessment. Ejection fraction was estimated to be 60 %. There were no regional wall motion abnormalities. Wall thickness was mildly increased. There was very mild  assymetrical hypertrophy. The septum measures 1.2cm. DOPPLER: Doppler parameters were consistent with abnormal left ventricular relaxation (grade 1 diastolic dysfunction).    RIGHT VENTRICLE: The size was normal. Systolic function was normal. Wall thickness was normal.    LEFT ATRIUM: Size was normal.    ATRIAL SEPTUM: No defect or patent foramen ovale was identified.    RIGHT ATRIUM: Size was normal.    MITRAL VALVE: Valve structure was normal. There was normal leaflet separation. DOPPLER: The transmitral velocity was within the normal range. There was no evidence for stenosis. There was mild regurgitation.    AORTIC VALVE: The valve was trileaflet. Leaflets exhibited normal thickness, moderate calcification, normal cuspal separation, and good mobility. The left coronary cusp demonstrated moderate calcification. DOPPLER: Transaortic velocity was  within the normal  range. There was no evidence for stenosis. There was no regurgitation.    TRICUSPID VALVE: Not well visualized. There was normal leaflet separation. DOPPLER: The transtricuspid velocity was within the normal range. There was no evidence for stenosis. There was no significant regurgitation.    PULMONIC VALVE: Leaflets exhibited normal thickness, no calcification, and normal cuspal separation. DOPPLER: The transpulmonic velocity was within the normal range. There was no evidence for stenosis. There was mild regurgitation.    PERICARDIUM: There was no pericardial effusion. The pericardium was normal in appearance.    AORTA: The root exhibited mild dilatation to 3.5cm.    SYSTEM MEASUREMENT TABLES    2D  %FS: 31.7 %  Ao Diam: 3.5 cm  EDV(Teich): 97.1 ml  EF(Teich): 59.7 %  ESV(Teich): 39.1 ml  HR_4Ch_Q: 76.9 BPM  IVSd: 1.2 cm  LA Diam: 3.8 cm  LAAs A2C: 12.8 cm2  LAAs A4C: 16.3 cm2  LAESV A-L A2C: 32.2 ml  LAESV A-L A4C: 48.3 ml  LAESV Index (A-L): 17.5 ml/m2  LAESV MOD A2C: 30.8 ml  LAESV MOD A4C: 46.9 ml  LAESV(A-L): 40.9 ml  LAESV(MOD BP): 39.4 ml  LAESVInd MOD BP: 16.9 ml/m2  LALs A2C: 4.3 cm  LALs A4C: 4.7 cm  LVCO_4Ch_Q: 3.3 L/min  LVEDV MOD A4C: 88.8 ml  LVEF MOD A4C: 65.1 %  LVEF_4Ch_Q: 60.6 %  LVESV MOD A4C: 31 ml  LVIDd: 4.6 cm  LVIDs: 3.1 cm  LVLd A4C: 8.8 cm  LVLd_4Ch_Q: 8.7 cm  LVLs A4C: 7.2 cm  LVLs_4Ch_Q: 7.3 cm  LVPWd: 1 cm  LVSV_4Ch_Q: 42.3 ml  LVVED_4Ch_Q: 69.8 ml  LVVES_4Ch_Q: 27.5 ml  RAAs A4C: 12.4 cm2  RAESV A-L: 30.4 ml  RAESV MOD: 29.7 ml  RALs: 4.3 cm  RVIDd: 2.7 cm  RWT: 0.4  SV MOD A4C: 57.8 ml  SV(Teich): 58 ml    CW  PRend P.6 mmHg  PRend Vmax: 2.1 m/s    MM  TAPSE: 1.8 cm    PW  E' Av.1 m/s  E' Lat: 0.1 m/s  E' Sept: 0.1 m/s  E/E' Av.8  E/E' Lat: 9.8  E/E' Sept: 9.7  MV A Bacilio: 0.9 m/s  MV Dec [place]: 3.2 m/s2  MV DecT: 211.9 ms  MV E Bacilio: 0.7 m/s  MV E/A Ratio: 0.8    [facility] Accredited Echocardiography Laboratory    Prepared and electronically signed  by    Mariano Alicia D.O.  Signed 11-Mar-2020 15:09:28    No results found for this or any previous visit.    --------------------------------------------------------------------------------  HOLTER  No results found for this or any previous visit.    No results found for this or any previous visit.    --------------------------------------------------------------------------------  CAROTIDS  No results found for this or any previous visit.     --------------------------------------------------------------------------------  Diagnoses and all orders for this visit:    Essential hypertension    Type 2 diabetes mellitus with hyperglycemia, with long-term current use of insulin ([facility])    Diabetic polyneuropathy associated with type 2 diabetes mellitus ([facility])       ======================================================    Past Medical History:   Diagnosis Date    Anxiety     CPAP (continuous positive airway pressure) dependence     does not use    Diabetes mellitus (HCC)     External hemorrhoids     last assessed 16, resolved 16    Hernia, ventral     last assessed 12/14/15, resolved 16    History of transfusion     Hypertension     Incarcerated incisional hernia     last assessed 12/21/15, resolved 16    Insomnia     last assessed 16, resolved 10/9/17    Lyme disease     Morbid obesity with BMI of 45.0-49.9, adult ([facility]) 2017    MS (multiple sclerosis) ([facility])     Sleep apnea     Stroke (cerebrum) ([facility]) 2020    Type 2 diabetes mellitus with hyperglycemia, without long-term current use of insulin ([facility])      Past Surgical History:   Procedure Laterality Date    ABCESS DRAINAGE       SECTION      x3    COLONOSCOPY N/A 2017    Procedure: COLONOSCOPY with biopsy;  Surgeon: Dia Mason DO;  Location: [facility];  Service: Complete    HYSTERECTOMY      IR BIOPSY ABDOMEN  2021    IR LUMBAR PUNCTURE  3/13/2020    [place] PARATHYROIDECTOMY/EXPLORATION PARATHYROIDS Left 2023     Procedure: LEFT MINIMALLY INVASIVE PARATHYROIDECTOMY;  Surgeon: Barrett Rose MD;  Location: [facility];  Service: Surgical Oncology    US GUIDED THYROID BIOPSY  1/29/2021         Medications  Current Outpatient Medications   Medication Sig Dispense Refill    Alcohol Swabs PADS by Does not apply route 4 (four) times a day *Latex Free* 120 each 3    amLODIPine (NORVASC) 5 mg tablet Take 1 tablet by mouth once daily 30 tablet 5    aspirin 81 mg chewable tablet Chew 1 tablet (81 mg total) daily 90 tablet 1    atorvastatin (LIPITOR) 40 mg tablet Take 1 tablet (40 mg total) by mouth every evening 90 tablet 1    Blood Glucose Monitoring Suppl (OneTouch Verio) w/Device KIT Use in the morning 1 kit 0    Blood Pressure KIT by Does not apply route daily 1 each 0    Glatiramer Acetate 40 MG/ML SOSY Inject 1 mL (40 mg total) under the skin 3 (three) times a week 12 mL 11    hydroCHLOROthiazide 25 mg tablet Take 1 tablet (25 mg total) by mouth daily 90 tablet 1    Incontinence Supply Disposable (Prevail Women Underwear XL) MISC Use every 6 (six) hours as needed (incontinence) 120 each 11    metFORMIN (GLUCOPHAGE-XR) 500 mg 24 hr tablet TAKE 2 TABLETS BY MOUTH TWICE DAILY WITH  MEALS 360 tablet 1    metoprolol succinate (TOPROL-XL) 50 mg 24 hr tablet Take 1 tablet by mouth once daily 90 tablet 1    ondansetron (ZOFRAN-ODT) 4 mg disintegrating tablet Take 1 tablet (4 mg total) by mouth every 6 (six) hours as needed for nausea or vomiting 20 tablet 0    semaglutide, 0.25 or 0.5 mg/dose, (Ozempic, 0.25 or 0.5 MG/DOSE,) 2 mg/3 mL injection pen INJECT 0.25 MG WEEKLY FOR 4 WEEKS, THEN INCREASE TO 0.5 MG WEEKLY 3 mL 5    Tresiba FlexTouch 100 units/mL injection pen Inject 30 Units under the skin daily 15 mL 2    Cholecalciferol (Vitamin D3) 50 MCG (2000 UT) CAPS Take 1 capsule by mouth once daily (Patient not taking: Reported on 8/19/2024) 30 capsule 5    diazepam (VALIUM) 5 mg tablet Take 1 po 30 min prior to MRI and 1  immediately before MRI if needed (Patient not taking: Reported on 9/10/2024) 2 tablet 0    diphenhydrAMINE (BENADRYL) 25 mg tablet Take 0.5 tablets (12.5 mg total) by mouth every 6 (six) hours (Patient not taking: Reported on 8/19/2024) 20 tablet 0    naloxone (NARCAN) 4 mg/0.1 mL nasal spray Administer 1 spray into a nostril. If no response after 2-3 minutes, give another dose in the other nostril using a new spray. (Patient not taking: Reported on 9/10/2024) 1 each 1    pregabalin (LYRICA) 75 mg capsule Take 1 capsule (75 mg total) by mouth 2 (two) times a day (Patient not taking: Reported on 8/19/2024) 60 capsule 2     No current facility-administered medications for this visit.        Allergies   Allergen Reactions    Lisinopril Angioedema    Sorbitan Diarrhea, Vomiting and Abdominal Pain    Victoza [Liraglutide] Nausea Only    Ambien [Zolpidem Tartrate]     Demerol [Meperidine]     Insulin Glargine Other (See Comments)     Annotation - 03Jan2018: memory loss    Latex     Oxycodone Hives, Rash and Vomiting       Social History     Socioeconomic History    Marital status: /Civil Union     Spouse name: Not on file    Number of children: Not on file    Years of education: Not on file    Highest education level: Not on file   Occupational History     Employer: [employer]   Tobacco Use    Smoking status: Never    Smokeless tobacco: Never   Vaping Use    Vaping status: Never Used   Substance and Sexual Activity    Alcohol use: Not Currently     Comment: Social    Drug use: Not Currently     Types: Marijuana     Comment: medical marijuana    Sexual activity: Not Currently     Partners: Male     Birth control/protection: None   Other Topics Concern    Not on file   Social History Narrative    Not on file     Social Determinants of Health     Financial Resource Strain: High Risk (5/24/2023)    Overall Financial Resource Strain (CARDIA)     Difficulty of Paying Living Expenses: Hard   Food Insecurity: No Food  Insecurity (5/29/2024)    Hunger Vital Sign     Worried About Running Out of Food in the Last Year: Never true     Ran Out of Food in the Last Year: Never true   Transportation Needs: No Transportation Needs (5/29/2024)    PRAPARE - Transportation     Lack of Transportation (Medical): No     Lack of Transportation (Non-Medical): No   Physical Activity: Inactive (7/30/2020)    Exercise Vital Sign     Days of Exercise per Week: 0 days     Minutes of Exercise per Session: 0 min   Stress: Stress Concern Present (7/30/2020)    [place] [employer] Occupational [employer] - Occupational Stress Questionnaire     Feeling of Stress : Rather much   Social Connections: Unknown (7/30/2020)    Social Connection and Isolation Panel [NHANES]     Frequency of Communication with Friends and Family: More than three times a week     Frequency of Social Gatherings with Friends and Family: Not on file     Attends Hinduism Services: Not on file     Active Member of Clubs or Organizations: Not on file     Attends Club or Organization Meetings: Not on file     Marital Status:    Intimate Partner Violence: Not At Risk (7/30/2020)    Humiliation, Afraid, Rape, and Kick questionnaire     Fear of Current or Ex-Partner: No     Emotionally Abused: No     Physically Abused: No     Sexually Abused: No   Housing Stability: Low Risk  (5/29/2024)    Housing Stability Vital Sign     Unable to Pay for Housing in the Last Year: No     Number of Times Moved in the Last Year: 1     Homeless in the Last Year: No        Family History   Problem Relation Age of Onset    Glaucoma Mother     Diabetes Father     Hypertension Father     Colon cancer Father     Alzheimer's disease Father     Cervical cancer Sister 59    Breast cancer Sister 48    Breast cancer Sister 38    Breast cancer Maternal Aunt     Breast cancer Maternal Aunt     Breast cancer Maternal Aunt     Breast cancer Maternal Grandmother     Coronary artery disease Family     Diabetes Family      "Hypertension Family        Lab Results   Component Value Date    WBC 8.48 06/17/2024    HGB 12.2 06/17/2024    HCT 37.5 06/17/2024    MCV 88 06/17/2024     06/17/2024      Lab Results   Component Value Date    SODIUM 139 09/25/2024    K 4.5 09/25/2024     09/25/2024    CO2 25 09/25/2024    BUN 14 09/25/2024    CREATININE 0.74 09/25/2024    GLUC 200 (H) 09/25/2024    CALCIUM 8.9 09/25/2024      Lab Results   Component Value Date    HGBA1C 7.3 (H) 09/25/2024      No results found for: \"CHOL\"  Lab Results   Component Value Date    HDL 51 06/17/2024    HDL 42 (L) 09/01/2022    HDL 42 08/10/2021     Lab Results   Component Value Date    LDLCALC 59 06/17/2024    LDLCALC 56 09/01/2022    LDLCALC 59 08/10/2021     Lab Results   Component Value Date    TRIG 135 06/17/2024    TRIG 186 (H) 09/01/2022    TRIG 113 08/10/2021     No results found for: \"CHOLHDL\"   Lab Results   Component Value Date    INR 0.92 05/16/2020    INR 0.96 03/10/2020    INR 0.98 03/09/2020    PROTIME 12.5 05/16/2020    PROTIME 12.9 03/10/2020    PROTIME 13.1 03/09/2020          Patient Active Problem List    Diagnosis Date Noted    Numbness and tingling of right arm and leg 03/09/2020    Ambulatory dysfunction 03/09/2020    Falls frequently 02/09/2020    Morbid obesity with BMI of 45.0-49.9, adult ([facility]) 05/03/2017    Ischemic colitis ([facility]) 05/03/2017    Unintentional weight loss 05/03/2017    Hypercalcemia 05/03/2017    Hyperparathyroidism ([facility]) 05/03/2017    GI bleed 04/27/2017    Hernia, ventral     Type 2 diabetes mellitus with hyperglycemia, with long-term current use of insulin ([facility])     Depression with anxiety 12/08/2016    Vitamin D deficiency 12/14/2015    Essential hypertension 06/06/2012    Bilateral lower extremity edema 10/03/2024    Urinary incontinence without sensory awareness 06/13/2024    Diabetic polyneuropathy associated with type 2 diabetes mellitus (HCC) 05/29/2024    TIA (transient ischemic attack) 02/23/2024    " "Numbness on left side 01/04/2024    Swelling of left extremity 01/04/2024    Status post parathyroidectomy 11/28/2023    Peripheral neuropathy 06/14/2023    Lumbar radiculopathy 05/05/2023    Primary osteoarthritis of right hip     Chronic post-traumatic headache, not intractable 11/23/2022    Medication overuse headache 11/11/2022    Visual disturbances 11/11/2022    Nausea & vomiting 09/01/2022    Bloating 09/01/2022    Positive for macroalbuminuria 07/15/2022    Other chronic pain 07/15/2022    Hip pain, right 06/30/2022    Obstructive sleep apnea syndrome     Hyperlipidemia 04/05/2021    B12 deficiency 09/09/2020    Thyroid nodule 09/01/2020    Chronic daily headache 08/31/2020    Class 3 severe obesity due to excess calories with serious comorbidity and body mass index (BMI) of 45.0 to 49.9 in adult ([facility]) 08/31/2020    MS (multiple sclerosis) ([facility]) 07/07/2020    Cognitive decline 07/07/2020    Right hemiparesis ([facility]) 07/07/2020       Portions of the record may have been created with voice recognition software. Occasional wrong word or \"sound a like\" substitutions may have occurred due to the inherent limitations of voice recognition software. Read the chart carefully and recognize, using context, where substitutions have occurred.    Mariano Alicia DO, [facility]  10/10/2024 10:42 AM          "

## 2024-10-15 DIAGNOSIS — E11.65 TYPE 2 DIABETES MELLITUS WITH HYPERGLYCEMIA, WITHOUT LONG-TERM CURRENT USE OF INSULIN (HCC): ICD-10-CM

## 2024-10-16 RX ORDER — METFORMIN HYDROCHLORIDE 500 MG/1
1000 TABLET, EXTENDED RELEASE ORAL 2 TIMES DAILY WITH MEALS
Qty: 360 TABLET | Refills: 1 | Status: SHIPPED | OUTPATIENT
Start: 2024-10-16

## 2024-10-22 ENCOUNTER — TELEPHONE (OUTPATIENT)
Age: 64
End: 2024-10-22

## 2024-10-22 ENCOUNTER — CONSULT (OUTPATIENT)
Dept: NEPHROLOGY | Facility: CLINIC | Age: 64
End: 2024-10-22
Payer: COMMERCIAL

## 2024-10-22 VITALS
HEART RATE: 77 BPM | OXYGEN SATURATION: 97 % | DIASTOLIC BLOOD PRESSURE: 70 MMHG | WEIGHT: 287 LBS | SYSTOLIC BLOOD PRESSURE: 126 MMHG | BODY MASS INDEX: 47.82 KG/M2 | HEIGHT: 65 IN

## 2024-10-22 DIAGNOSIS — N18.9 CHRONIC KIDNEY DISEASE-MINERAL AND BONE DISORDER (CKD-MBD): ICD-10-CM

## 2024-10-22 DIAGNOSIS — R80.9 TYPE 2 DIABETES MELLITUS WITH DIABETIC MICROALBUMINURIA, WITH LONG-TERM CURRENT USE OF INSULIN (HCC): ICD-10-CM

## 2024-10-22 DIAGNOSIS — N18.2 CKD STAGE 2 DUE TO TYPE 2 DIABETES MELLITUS  (HCC): Primary | ICD-10-CM

## 2024-10-22 DIAGNOSIS — E66.813 CLASS 3 SEVERE OBESITY DUE TO EXCESS CALORIES WITH SERIOUS COMORBIDITY AND BODY MASS INDEX (BMI) OF 45.0 TO 49.9 IN ADULT (HCC): ICD-10-CM

## 2024-10-22 DIAGNOSIS — E11.22 CKD STAGE 2 DUE TO TYPE 2 DIABETES MELLITUS  (HCC): Primary | ICD-10-CM

## 2024-10-22 DIAGNOSIS — Z79.4 TYPE 2 DIABETES MELLITUS WITH DIABETIC MICROALBUMINURIA, WITH LONG-TERM CURRENT USE OF INSULIN (HCC): ICD-10-CM

## 2024-10-22 DIAGNOSIS — R80.1 PERSISTENT PROTEINURIA: ICD-10-CM

## 2024-10-22 DIAGNOSIS — E83.9 CHRONIC KIDNEY DISEASE-MINERAL AND BONE DISORDER (CKD-MBD): ICD-10-CM

## 2024-10-22 DIAGNOSIS — M89.9 CHRONIC KIDNEY DISEASE-MINERAL AND BONE DISORDER (CKD-MBD): ICD-10-CM

## 2024-10-22 DIAGNOSIS — I12.9 HYPERTENSIVE KIDNEY DISEASE WITH CHRONIC KIDNEY DISEASE STAGE II: ICD-10-CM

## 2024-10-22 DIAGNOSIS — E66.01 CLASS 3 SEVERE OBESITY DUE TO EXCESS CALORIES WITH SERIOUS COMORBIDITY AND BODY MASS INDEX (BMI) OF 45.0 TO 49.9 IN ADULT (HCC): ICD-10-CM

## 2024-10-22 DIAGNOSIS — E11.29 TYPE 2 DIABETES MELLITUS WITH DIABETIC MICROALBUMINURIA, WITH LONG-TERM CURRENT USE OF INSULIN (HCC): ICD-10-CM

## 2024-10-22 DIAGNOSIS — E78.5 HYPERLIPIDEMIA, UNSPECIFIED HYPERLIPIDEMIA TYPE: ICD-10-CM

## 2024-10-22 DIAGNOSIS — N18.2 HYPERTENSIVE KIDNEY DISEASE WITH CHRONIC KIDNEY DISEASE STAGE II: ICD-10-CM

## 2024-10-22 PROBLEM — E83.52 HYPERCALCEMIA: Status: RESOLVED | Noted: 2017-05-03 | Resolved: 2024-10-22

## 2024-10-22 PROCEDURE — 99204 OFFICE O/P NEW MOD 45 MIN: CPT | Performed by: INTERNAL MEDICINE

## 2024-10-22 NOTE — ASSESSMENT & PLAN NOTE
Encouraged patient to follow a renal diet comprising of moderate potassium, low phosphorus and protein restriction to 0.8gm/kg.  Will check serum albumin with next blood work.   Advised of dietary habits and weight loss  Orders:    Renal function panel; Future    Protein electrophoresis, urine; Future    Protein electrophoresis, serum; Future    Protein / creatinine ratio, urine; Future    Albumin / creatinine urine ratio; Future    Immunoglobulin free LT chains blood; Future    Vitamin D 25 hydroxy; Future    Urinalysis with microscopic; Future    Vitamin D 25 hydroxy; Future    Renal function panel; Future    Phosphorus; Future    Albumin / creatinine urine ratio; Future

## 2024-10-22 NOTE — PROGRESS NOTES
Ambulatory Visit  Name: Joanne Hu      : 1960      MRN: [id number]  Encounter Provider: Allie Bob MD  Encounter Date: 10/22/2024   Encounter department: [facility] [place]  64 y.o. female with history of multiple comorbidities including morbid obesity, hypertension (x20yrs +), h/o CVA () with residual right sided weakness, peripheral neuropathy, SELENE not on CPAP, Multiple sclerosis ([id number]) history of parathyroidectomy and diabetes (x 20yrs+) presents to the office for evaluation and management of proteinuria.   Assessment & Plan  CKD stage 2 due to type 2 diabetes mellitus  (HCC)    Lab Results   Component Value Date    HGBA1C 7.3 (H) 2024   after review of records In [facility] as well as Care everywhere patient has a baseline creatinine of 0.6-0.9mg/dL dating as far back as 2016.   Most recent labs show a Creatinine of 0.74 mg/dL on 2024. Renal function remains stable.  Check blood work after the visit  Likely has underlying CKD due to diabetic kidney disease uncontrolled plus obesity related hyperfiltration plus hypertensive nephrosclerosis plus age-related nephron loss.  Will still rule out paraproteinemia check SPEP UPEP free light chain ratio  No role for renal biopsy at this time.  Imaging:   CT abdomen from 3/12/2020 no hydronephrosis.  Recommend to avoid use of NSAIDs, nephrotoxins. Caution advised with regards to exposure to IV contrast dye.   Discussed with the patient in depth her renal status, including the possible etiologies for CKD.   Advised the patient that when her GFR is close to 20mL/min then will start discussing about RRT(renal replacement therapy) options such as renal transplant, peritoneal dialysis and hemodialysis.   Informed the patient about the various options for Renal Replacement therapy.  Discussed with the patient how we need to work together to delay the progression of CKD with optimal BP control based on their age and  co-morbidities, optimal BS control with HbA1c of <7% and trying to reduce proteinuria by the use of anti-proteinuric agents.   Most recent A1c at 7.3% as of 9/25/2024  Hemoglobin A1c as high as 13.1% in September 2019  Advised patient of tight glycemic control and will delay CKD progression  Currently on: Ozempic, metformin, Tresiba  No evidence of diabetic retinopathy as of eye exam from July 2024  Discussed with patient when and if renal parameters continue deteriorate she may need to be taken off of metformin  Had a discussion with the patient with regards to usage of SGLT2 inhibitors risks and benefits from renal standpoint discussed in depth.  She is following up with endocrinology, advised patient and daughter to talk to endocrinology  CKD education/Kidney smart: Not applicable at this time  Follow-up: Patient is to follow-up in 6 months, with lab work to be performed after the visit and then again in a few days prior to the next visit. Advised patient to call me in 10 days to review the results if they do not hear back from me, as I may have not received the results.  Hypertensive kidney disease with chronic kidney disease stage II  Lab Results   Component Value Date    EGFR 86 09/25/2024    EGFR 90 06/17/2024    EGFR 93 01/04/2024    CREATININE 0.74 09/25/2024    CREATININE 0.72 06/17/2024    CREATININE 0.70 01/04/2024     BP Readings from Last 3 Encounters:   10/22/24 126/70   10/10/24 124/72   10/03/24 124/76   after review of records In [facility] as well as Care everywhere patient has a baseline creatinine of 0.6-0.9mg/dL dating as far back as 2016.   Most recent labs show a Creatinine of 0.74 mg/dL on 9/25/2024. Renal function remains stable.  Check blood work after the visit  Current medications: Toprol-XL 50 mg p.o. daily, Norvasc 5 mg p.o. daily, HCTZ 25 mg p.o. daily  Changes made today: None, blood pressures at goal  Allergic to lisinopril/losartan will not rechallenge  Discussed appropriate techniques  of checking blood pressures and to call with updates if any issues  Consider addition of Jardiance and discontinuation of HCTZ discussed with patient and daughter  Goal BP of < 130/80 based on age and comorbidities  Instructed to follow low sodium (2gm)diet.      Orders:    Renal function panel; Future    Protein electrophoresis, urine; Future    Protein electrophoresis, serum; Future    Protein / creatinine ratio, urine; Future    Albumin / creatinine urine ratio; Future    Immunoglobulin free LT chains blood; Future    Vitamin D 25 hydroxy; Future    Urinalysis with microscopic; Future    Vitamin D 25 hydroxy; Future    Renal function panel; Future    Phosphorus; Future    Albumin / creatinine urine ratio; Future    Chronic kidney disease-mineral and bone disorder (CKD-MBD)  Lab Results   Component Value Date    EGFR 86 09/25/2024    EGFR 90 06/17/2024    EGFR 93 01/04/2024    CREATININE 0.74 09/25/2024    CREATININE 0.72 06/17/2024    CREATININE 0.70 01/04/2024   Has history of parathyroidectomy  Based on patients CKD stage following is the goal of therapy.  Maintain calcium phosphorus product of < 55.  Currently on: No medications  Changes made today: None  most recent ipth 34.8 on 9/25/2024 and vit D 33 as of 6/17/2024  Check  vitamin D prior to next visit and after today's visit    Orders:    Renal function panel; Future    Protein electrophoresis, urine; Future    Protein electrophoresis, serum; Future    Protein / creatinine ratio, urine; Future    Albumin / creatinine urine ratio; Future    Immunoglobulin free LT chains blood; Future    Vitamin D 25 hydroxy; Future    Urinalysis with microscopic; Future    Vitamin D 25 hydroxy; Future    Renal function panel; Future    Phosphorus; Future    Albumin / creatinine urine ratio; Future    Hyperlipidemia, unspecified hyperlipidemia type  Goal LDL<70  Continue on lipitor  Orders:    Renal function panel; Future    Protein electrophoresis, urine; Future    Protein  electrophoresis, serum; Future    Protein / creatinine ratio, urine; Future    Albumin / creatinine urine ratio; Future    Immunoglobulin free LT chains blood; Future    Vitamin D 25 hydroxy; Future    Urinalysis with microscopic; Future    Vitamin D 25 hydroxy; Future    Renal function panel; Future    Phosphorus; Future    Albumin / creatinine urine ratio; Future    Class 3 severe obesity due to excess calories with serious comorbidity and body mass index (BMI) of 45.0 to 49.9 in adult (HCC)  Encouraged patient to follow a renal diet comprising of moderate potassium, low phosphorus and protein restriction to 0.8gm/kg.  Will check serum albumin with next blood work.   Advised of dietary habits and weight loss  Orders:    Renal function panel; Future    Protein electrophoresis, urine; Future    Protein electrophoresis, serum; Future    Protein / creatinine ratio, urine; Future    Albumin / creatinine urine ratio; Future    Immunoglobulin free LT chains blood; Future    Vitamin D 25 hydroxy; Future    Urinalysis with microscopic; Future    Vitamin D 25 hydroxy; Future    Renal function panel; Future    Phosphorus; Future    Albumin / creatinine urine ratio; Future    Persistent proteinuria  likely has underlying proteinuria due to uncontrolled diabetic kidney disease plus obesity related hyperfiltration will still rule out paraproteinemia check SPEP UPEP free light chain ratio.  Patient has had consistently elevated A1c dating as far back as 2016 with as high as 13% in 2019.  No role for renal biopsy at this time  most recent MACR is 2.5 g as of 9/10/2024 recheck after the visit and prior to next visit  For proteinuria reduction continue on atorvastatin  Unable to take lisinopril losartan secondary to allergic reaction severe in the past  Discussed with patient with regards to being on SGLT2 inhibitor, she will be seeing endocrinology soon.  If she is to go on SGLT2 inhibitor recommend checking blood work for renal  function panel every 2 weeks x 2 and discontinuation of HCTZ to avoid prerenal azotemia.  Orders:    Renal function panel; Future    Protein electrophoresis, urine; Future    Protein electrophoresis, serum; Future    Protein / creatinine ratio, urine; Future    Albumin / creatinine urine ratio; Future    Immunoglobulin free LT chains blood; Future    Vitamin D 25 hydroxy; Future    Urinalysis with microscopic; Future    Vitamin D 25 hydroxy; Future    Renal function panel; Future    Phosphorus; Future    Albumin / creatinine urine ratio; Future      History of Present Illness     Jonane Hu is a 64 y.o. female with history of multiple comorbidities including morbid obesity, hypertension (x20yrs +), h/o CVA (2020) with residual right sided weakness, peripheral neuropathy, SELENE not on CPAP, Multiple sclerosis ([id number]) history of parathyroidectomy and diabetes (x 20yrs+) presents to the office for evaluation and management of proteinuria.  Review of record shows baseline creatinine 0.6-0.  9 mg/dL since 2016 most recent labs from 9/25/2024 creatinine 0.74 mg/dL.  Review of record shows A1c as high as 13.1% in September 2019 consistently has been greater than 7% since 2016. Presents with daughter who helps with the visit.  No NSAID use no recent hospitalization no issues with edema currently was off of HCTZ back on it now.  Reports when she started her Glatiramer shots for MS her sugars started to spike again in the 300s.  She discontinued the shots this week has an upcoming appointment with endocrinology.  Never seen a nephrologist before no history of DB needing dialysis no history of kidney stones.  Presents with walker.      History obtained from : patient and daughter  Review of Systems   Constitutional:  Negative for fever.   HENT:  Negative for sore throat.    Respiratory:  Negative for cough and wheezing.    Cardiovascular:  Negative for leg swelling.   Gastrointestinal:  Negative for constipation and  diarrhea.   Genitourinary:  Negative for dysuria and hematuria.   Musculoskeletal:  Negative for back pain.   Neurological:  Negative for dizziness and headaches.   Psychiatric/Behavioral:  Negative for agitation and confusion.    All other systems reviewed and are negative.    Medical History Reviewed by provider this encounter:  Tobacco  Allergies  Meds  Problems  Med Hx  Surg Hx  Fam Hx  Soc   Hx      Past Medical History   Past Medical History:   Diagnosis Date    Anxiety     CPAP (continuous positive airway pressure) dependence     does not use    Diabetes mellitus ([facility])     External hemorrhoids     last assessed 16, resolved 16    Hernia, ventral     last assessed 12/14/15, resolved 16    History of transfusion     Hypertension     Incarcerated incisional hernia     last assessed 12/21/15, resolved 16    Insomnia     last assessed 16, resolved 10/9/17    Lyme disease     Morbid obesity with BMI of 45.0-49.9, adult ([facility]) 2017    MS (multiple sclerosis) ([facility])     Sleep apnea     Stroke (cerebrum) ([facility]) 2020    Type 2 diabetes mellitus with hyperglycemia, without long-term current use of insulin ([facility])      Past Surgical History:   Procedure Laterality Date    ABCESS DRAINAGE       SECTION      x3    COLONOSCOPY N/A 2017    Procedure: COLONOSCOPY with biopsy;  Surgeon: Dia Mason DO;  Location: [facility];  Service: Complete    HYSTERECTOMY      IR BIOPSY ABDOMEN  2021    IR LUMBAR PUNCTURE  3/13/2020    TX PARATHYROIDECTOMY/EXPLORATION PARATHYROIDS Left 2023    Procedure: LEFT MINIMALLY INVASIVE PARATHYROIDECTOMY;  Surgeon: Barrett Rose MD;  Location: [facility];  Service: Surgical Oncology    US GUIDED THYROID BIOPSY  2021     Family History   Problem Relation Age of Onset    Glaucoma Mother     Kidney disease Father     Diabetes Father     Hypertension Father     Colon cancer Father     Alzheimer's disease Father     Cervical cancer  Sister 59    Breast cancer Sister 48    Breast cancer Sister 38    Breast cancer Maternal Aunt     Breast cancer Maternal Aunt     Breast cancer Maternal Aunt     Breast cancer Maternal Grandmother     Coronary artery disease Family     Diabetes Family     Hypertension Family      Current Outpatient Medications on File Prior to Visit   Medication Sig Dispense Refill    Alcohol Swabs PADS by Does not apply route 4 (four) times a day *Latex Free* 120 each 3    amLODIPine (NORVASC) 5 mg tablet Take 1 tablet by mouth once daily 30 tablet 5    aspirin 81 mg chewable tablet Chew 1 tablet (81 mg total) daily 90 tablet 1    atorvastatin (LIPITOR) 40 mg tablet Take 1 tablet (40 mg total) by mouth every evening 90 tablet 1    Blood Glucose Monitoring Suppl (OneTouch Verio) w/Device KIT Use in the morning 1 kit 0    Blood Pressure KIT by Does not apply route daily 1 each 0    hydroCHLOROthiazide 25 mg tablet Take 1 tablet (25 mg total) by mouth daily 90 tablet 1    Incontinence Supply Disposable (Prevail Women Underwear XL) MISC Use every 6 (six) hours as needed (incontinence) 120 each 11    metFORMIN (GLUCOPHAGE-XR) 500 mg 24 hr tablet TAKE 2 TABLETS BY MOUTH TWICE DAILY WITH MEALS 360 tablet 1    metoprolol succinate (TOPROL-XL) 50 mg 24 hr tablet Take 1 tablet by mouth once daily 90 tablet 1    ondansetron (ZOFRAN-ODT) 4 mg disintegrating tablet Take 1 tablet (4 mg total) by mouth every 6 (six) hours as needed for nausea or vomiting 20 tablet 0    semaglutide, 0.25 or 0.5 mg/dose, (Ozempic, 0.25 or 0.5 MG/DOSE,) 2 mg/3 mL injection pen INJECT 0.25 MG WEEKLY FOR 4 WEEKS, THEN INCREASE TO 0.5 MG WEEKLY 3 mL 5    Tresiba FlexTouch 100 units/mL injection pen Inject 30 Units under the skin daily 15 mL 2    diazepam (VALIUM) 5 mg tablet Take 1 po 30 min prior to MRI and 1 immediately before MRI if needed (Patient not taking: Reported on 9/10/2024) 2 tablet 0    diphenhydrAMINE (BENADRYL) 25 mg tablet Take 0.5 tablets (12.5 mg  total) by mouth every 6 (six) hours (Patient not taking: Reported on 8/19/2024) 20 tablet 0    Glatiramer Acetate 40 MG/ML SOSY Inject 1 mL (40 mg total) under the skin 3 (three) times a week (Patient not taking: Reported on 10/22/2024) 12 mL 11    naloxone (NARCAN) 4 mg/0.1 mL nasal spray Administer 1 spray into a nostril. If no response after 2-3 minutes, give another dose in the other nostril using a new spray. (Patient not taking: Reported on 9/10/2024) 1 each 1    [DISCONTINUED] Cholecalciferol (Vitamin D3) 50 MCG (2000 UT) CAPS Take 1 capsule by mouth once daily (Patient not taking: Reported on 8/19/2024) 30 capsule 5    [DISCONTINUED] pregabalin (LYRICA) 75 mg capsule Take 1 capsule (75 mg total) by mouth 2 (two) times a day (Patient not taking: Reported on 8/19/2024) 60 capsule 2     No current facility-administered medications on file prior to visit.     Allergies   Allergen Reactions    Lisinopril Angioedema    Sorbitan Diarrhea, Vomiting and Abdominal Pain    Victoza [Liraglutide] Nausea Only    Ambien [Zolpidem Tartrate]     Demerol [Meperidine]     Insulin Glargine Other (See Comments)     [facility] - [id number]: memory loss    Latex     Oxycodone Hives, Rash and Vomiting      Current Outpatient Medications on File Prior to Visit   Medication Sig Dispense Refill    Alcohol Swabs PADS by Does not apply route 4 (four) times a day *Latex Free* 120 each 3    amLODIPine (NORVASC) 5 mg tablet Take 1 tablet by mouth once daily 30 tablet 5    aspirin 81 mg chewable tablet Chew 1 tablet (81 mg total) daily 90 tablet 1    atorvastatin (LIPITOR) 40 mg tablet Take 1 tablet (40 mg total) by mouth every evening 90 tablet 1    Blood Glucose Monitoring Suppl (OneTouch Verio) w/Device KIT Use in the morning 1 kit 0    Blood Pressure KIT by Does not apply route daily 1 each 0    hydroCHLOROthiazide 25 mg tablet Take 1 tablet (25 mg total) by mouth daily 90 tablet 1    Incontinence Supply Disposable (Prevail Women  Underwear XL) MISC Use every 6 (six) hours as needed (incontinence) 120 each 11    metFORMIN (GLUCOPHAGE-XR) 500 mg 24 hr tablet TAKE 2 TABLETS BY MOUTH TWICE DAILY WITH MEALS 360 tablet 1    metoprolol succinate (TOPROL-XL) 50 mg 24 hr tablet Take 1 tablet by mouth once daily 90 tablet 1    ondansetron (ZOFRAN-ODT) 4 mg disintegrating tablet Take 1 tablet (4 mg total) by mouth every 6 (six) hours as needed for nausea or vomiting 20 tablet 0    semaglutide, 0.25 or 0.5 mg/dose, (Ozempic, 0.25 or 0.5 MG/DOSE,) 2 mg/3 mL injection pen INJECT 0.25 MG WEEKLY FOR 4 WEEKS, THEN INCREASE TO 0.5 MG WEEKLY 3 mL 5    Tresiba FlexTouch 100 units/mL injection pen Inject 30 Units under the skin daily 15 mL 2    diazepam (VALIUM) 5 mg tablet Take 1 po 30 min prior to MRI and 1 immediately before MRI if needed (Patient not taking: Reported on 9/10/2024) 2 tablet 0    diphenhydrAMINE (BENADRYL) 25 mg tablet Take 0.5 tablets (12.5 mg total) by mouth every 6 (six) hours (Patient not taking: Reported on 8/19/2024) 20 tablet 0    Glatiramer Acetate 40 MG/ML SOSY Inject 1 mL (40 mg total) under the skin 3 (three) times a week (Patient not taking: Reported on 10/22/2024) 12 mL 11    naloxone (NARCAN) 4 mg/0.1 mL nasal spray Administer 1 spray into a nostril. If no response after 2-3 minutes, give another dose in the other nostril using a new spray. (Patient not taking: Reported on 9/10/2024) 1 each 1    [DISCONTINUED] Cholecalciferol (Vitamin D3) 50 MCG (2000 UT) CAPS Take 1 capsule by mouth once daily (Patient not taking: Reported on 8/19/2024) 30 capsule 5    [DISCONTINUED] pregabalin (LYRICA) 75 mg capsule Take 1 capsule (75 mg total) by mouth 2 (two) times a day (Patient not taking: Reported on 8/19/2024) 60 capsule 2     No current facility-administered medications on file prior to visit.      Social History     Tobacco Use    Smoking status: Never    Smokeless tobacco: Never   Vaping Use    Vaping status: Never Used   Substance and  "Sexual Activity    Alcohol use: Not Currently     Comment: Social    Drug use: Not Currently     Types: Marijuana     Comment: medical marijuana    Sexual activity: Not Currently     Partners: Male     Birth control/protection: None         Objective     /70 (BP Location: Left arm, Patient Position: Sitting, Cuff Size: Adult)   Pulse 77   Ht 5' 5\" (1.651 m)   Wt 130 kg (287 lb)   LMP  (LMP Unknown)   SpO2 97%   BMI 47.76 kg/m²     Physical Exam  Vitals reviewed.   Constitutional:       General: She is not in acute distress.     Appearance: Normal appearance. She is obese. She is not ill-appearing, toxic-appearing or diaphoretic.   HENT:      Head: Normocephalic and atraumatic.      Mouth/Throat:      Mouth: Mucous membranes are moist.      Pharynx: No oropharyngeal exudate.   Eyes:      General: No scleral icterus.  Cardiovascular:      Rate and Rhythm: Normal rate.   Pulmonary:      Effort: No respiratory distress.      Breath sounds: Normal breath sounds. No stridor. No wheezing.   Abdominal:      General: There is no distension.      Palpations: Abdomen is soft. There is no mass.      Tenderness: There is no right CVA tenderness or left CVA tenderness.   Musculoskeletal:         General: No swelling.      Cervical back: Normal range of motion. No rigidity.   Skin:     Coloration: Skin is not jaundiced.   Neurological:      General: No focal deficit present.      Mental Status: She is alert and oriented to person, place, and time.   Psychiatric:         Mood and Affect: Mood normal.         Behavior: Behavior normal.         "

## 2024-10-22 NOTE — TELEPHONE ENCOUNTER
Ame from [facility] called regarding script for incontinence supplies that was received. She stated it is missing diagnosis code. Please update and send back at [phone]. Thank anu

## 2024-10-22 NOTE — TELEPHONE ENCOUNTER
Spoke with Ame at [facility] to give verbal Dx Codes, but she stated that they have to be on the script. Please re-write script for Incontinence Supplies including Dx Codes and PRINT OUT so we can fax back to [facility].

## 2024-10-22 NOTE — ASSESSMENT & PLAN NOTE
Lab Results   Component Value Date    EGFR 86 09/25/2024    EGFR 90 06/17/2024    EGFR 93 01/04/2024    CREATININE 0.74 09/25/2024    CREATININE 0.72 06/17/2024    CREATININE 0.70 01/04/2024   Has history of parathyroidectomy  Based on patients CKD stage following is the goal of therapy.  Maintain calcium phosphorus product of < 55.  Currently on: No medications  Changes made today: None  most recent ipth 34.8 on 9/25/2024 and vit D 33 as of 6/17/2024  Check  vitamin D prior to next visit and after today's visit    Orders:    Renal function panel; Future    Protein electrophoresis, urine; Future    Protein electrophoresis, serum; Future    Protein / creatinine ratio, urine; Future    Albumin / creatinine urine ratio; Future    Immunoglobulin free LT chains blood; Future    Vitamin D 25 hydroxy; Future    Urinalysis with microscopic; Future    Vitamin D 25 hydroxy; Future    Renal function panel; Future    Phosphorus; Future    Albumin / creatinine urine ratio; Future

## 2024-10-22 NOTE — ASSESSMENT & PLAN NOTE
Lab Results   Component Value Date    EGFR 86 09/25/2024    EGFR 90 06/17/2024    EGFR 93 01/04/2024    CREATININE 0.74 09/25/2024    CREATININE 0.72 06/17/2024    CREATININE 0.70 01/04/2024     BP Readings from Last 3 Encounters:   10/22/24 126/70   10/10/24 124/72   10/03/24 124/76   after review of records In [facility] as well as Care everywhere patient has a baseline creatinine of 0.6-0.9mg/dL dating as far back as 2016.   Most recent labs show a Creatinine of 0.74 mg/dL on 9/25/2024. Renal function remains stable.  Check blood work after the visit  Current medications: Toprol-XL 50 mg p.o. daily, Norvasc 5 mg p.o. daily, HCTZ 25 mg p.o. daily  Changes made today: None, blood pressures at goal  Allergic to lisinopril/losartan will not rechallenge  Discussed appropriate techniques of checking blood pressures and to call with updates if any issues  Consider addition of Jardiance and discontinuation of HCTZ discussed with patient and daughter  Goal BP of < 130/80 based on age and comorbidities  Instructed to follow low sodium (2gm)diet.      Orders:    Renal function panel; Future    Protein electrophoresis, urine; Future    Protein electrophoresis, serum; Future    Protein / creatinine ratio, urine; Future    Albumin / creatinine urine ratio; Future    Immunoglobulin free LT chains blood; Future    Vitamin D 25 hydroxy; Future    Urinalysis with microscopic; Future    Vitamin D 25 hydroxy; Future    Renal function panel; Future    Phosphorus; Future    Albumin / creatinine urine ratio; Future

## 2024-10-22 NOTE — ASSESSMENT & PLAN NOTE
Lab Results   Component Value Date    HGBA1C 7.3 (H) 09/25/2024   after review of records In [facility] as well as Care everywhere patient has a baseline creatinine of 0.6-0.9mg/dL dating as far back as 2016.   Most recent labs show a Creatinine of 0.74 mg/dL on 9/25/2024. Renal function remains stable.  Check blood work after the visit  Likely has underlying CKD due to diabetic kidney disease uncontrolled plus obesity related hyperfiltration plus hypertensive nephrosclerosis plus age-related nephron loss.  Will still rule out paraproteinemia check SPEP UPEP free light chain ratio  No role for renal biopsy at this time.  Imaging:   CT abdomen from 3/12/2020 no hydronephrosis.  Recommend to avoid use of NSAIDs, nephrotoxins. Caution advised with regards to exposure to IV contrast dye.   Discussed with the patient in depth her renal status, including the possible etiologies for CKD.   Advised the patient that when her GFR is close to 20mL/min then will start discussing about RRT(renal replacement therapy) options such as renal transplant, peritoneal dialysis and hemodialysis.   Informed the patient about the various options for Renal Replacement therapy.  Discussed with the patient how we need to work together to delay the progression of CKD with optimal BP control based on their age and co-morbidities, optimal BS control with HbA1c of <7% and trying to reduce proteinuria by the use of anti-proteinuric agents.   Most recent A1c at 7.3% as of 9/25/2024  Hemoglobin A1c as high as 13.1% in September 2019  Advised patient of tight glycemic control and will delay CKD progression  Currently on: Ozempic, metformin, Tresiba  No evidence of diabetic retinopathy as of eye exam from July 2024  Discussed with patient when and if renal parameters continue deteriorate she may need to be taken off of metformin  Had a discussion with the patient with regards to usage of SGLT2 inhibitors risks and benefits from renal standpoint discussed  in depth.  She is following up with endocrinology, advised patient and daughter to talk to endocrinology  CKD education/Kidney smart: Not applicable at this time  Follow-up: Patient is to follow-up in 6 months, with lab work to be performed after the visit and then again in a few days prior to the next visit. Advised patient to call me in 10 days to review the results if they do not hear back from me, as I may have not received the results.

## 2024-10-22 NOTE — PATIENT INSTRUCTIONS
- get blood work after the visit    Please call me in 10 days after having your blood work done to review the results if you do not hear back from me or my office, as I may have not received the results.  please remember to perform blood work prior to the next visit.  Please call if the blood pressure top number is greater than 140 or less than 110 consistently.  Please call if you are gaining more than 2lbs in 2 days for adjustment of water pills.  ~ Please AVOID the following pain medications.  LIST OF NSAIDS (NONSTEROIDAL ANTI-INFLAMMATORY DRUGS) AND VAUGHN-2 INHIBITORS    DIFLUNISAL (DOLOBID)  IBUPROFEN (MOTRIN, ADVIL)  FLURBIPROFEN (ANSAID)  KETOPROFEN (ORUDIS, ORUVAIL)  FENOPROFEN (NALFON)  NABUMETONE (RELAFEN)  PIROXICAM (FELDENE)  NAPROXEN (ALEVE, NAPROSYN, NAPRELAN, ANAPROX)  DICLOFENAC (VOLTAREN, CATAFLAM)  INDOMETHACIN (INDOCIN)  SULINDAC (CLINORIL)  TOLMETIN (TOLETIN)  ETODOLAC (LODINE)  MELOXICAM (MOBIC)  KETOROLAC (TORADOL)  OXAPROZIN (DAYPRO)  CELECOXIB (CELEBREX)    Things to do to reduce your blood pressure include working with all your physician to do the following:  ~ stop smoking if you smoke.  ~ increase cardiovascular exercise like walking and swimming.   ~ modify your diet to decrease fat and salt intake.  ~ reduce your weight if you are overweight or obese.  ~ increase the consumption of fruits, vegetables and whole grains.  ~ decrease alcohol consumption if you consume alcohol.   ~ try to minimize stress in your life with lifestyle modifications.   ~ be compliant with your anti-hypertensive medications.   ~ adjust your medications to help improve your vascular stiffness and decrease risks for heart attacks and strokes.     Exercise to Lose Weight     Exercise and a healthy diet may help you lose weight. Your doctor may suggest specific exercises.     EXERCISE IDEAS AND TIPS     Choose low-cost things you enjoy doing, such as walking, bicycling, or exercising to workout videos.   Take stairs  instead of the elevator.   Walk during your lunch break.   Park your car further away from work or school.   Go to a gym or an exercise class.   Start with 5 to 10 minutes of exercise each day. Build up to 30 minutes of exercise 4 to 6 days a week.   Wear shoes with good support and comfortable clothes.   Stretch before and after working out.   Work out until you breathe harder and your heart beats faster.   Drink extra water when you exercise.   Do not do so much that you hurt yourself, feel dizzy, or get very short of breath.     Exercises that burn about 150 calories:   Running 1 ½ miles in 15 minutes.   Playing volleyball for 45 to 60 minutes.   Washing and waxing a car for 45 to 60 minutes.   Playing touch football for 45 minutes.   Walking 1 ¾ miles in 35 minutes.   Pushing a stroller 1 ½ miles in 30 minutes.   Playing basketball for 30 minutes.   Raking leaves for 30 minutes.   Bicycling 5 miles in 30 minutes.   Walking 2 miles in 30 minutes.   Dancing for 30 minutes.   Shoveling snow for 15 minutes.   Swimming laps for 20 minutes.   Walking up stairs for 15 minutes.   Bicycling 4 miles in 15 minutes.   Gardening for 30 to 45 minutes.   Jumping rope for 15 minutes.   Washing windows or floors for 45 to 60 minutes.

## 2024-10-22 NOTE — ASSESSMENT & PLAN NOTE
Goal LDL<70  Continue on lipitor  Orders:    Renal function panel; Future    Protein electrophoresis, urine; Future    Protein electrophoresis, serum; Future    Protein / creatinine ratio, urine; Future    Albumin / creatinine urine ratio; Future    Immunoglobulin free LT chains blood; Future    Vitamin D 25 hydroxy; Future    Urinalysis with microscopic; Future    Vitamin D 25 hydroxy; Future    Renal function panel; Future    Phosphorus; Future    Albumin / creatinine urine ratio; Future

## 2024-10-22 NOTE — ASSESSMENT & PLAN NOTE
likely has underlying proteinuria due to uncontrolled diabetic kidney disease plus obesity related hyperfiltration will still rule out paraproteinemia check SPEP UPEP free light chain ratio.  Patient has had consistently elevated A1c dating as far back as 2016 with as high as 13% in 2019.  No role for renal biopsy at this time  most recent MACR is 2.5 g as of 9/10/2024 recheck after the visit and prior to next visit  For proteinuria reduction continue on atorvastatin  Unable to take lisinopril losartan secondary to allergic reaction severe in the past  Discussed with patient with regards to being on SGLT2 inhibitor, she will be seeing endocrinology soon.  If she is to go on SGLT2 inhibitor recommend checking blood work for renal function panel every 2 weeks x 2 and discontinuation of HCTZ to avoid prerenal azotemia.  Orders:    Renal function panel; Future    Protein electrophoresis, urine; Future    Protein electrophoresis, serum; Future    Protein / creatinine ratio, urine; Future    Albumin / creatinine urine ratio; Future    Immunoglobulin free LT chains blood; Future    Vitamin D 25 hydroxy; Future    Urinalysis with microscopic; Future    Vitamin D 25 hydroxy; Future    Renal function panel; Future    Phosphorus; Future    Albumin / creatinine urine ratio; Future

## 2024-10-23 NOTE — TELEPHONE ENCOUNTER
Tried to contact Ame, she was unavailable so I spoke to Polly, she is going to re send the form to our direct fax number.

## 2024-10-30 ENCOUNTER — OFFICE VISIT (OUTPATIENT)
Dept: ENDOCRINOLOGY | Facility: CLINIC | Age: 64
End: 2024-10-30
Payer: COMMERCIAL

## 2024-10-30 ENCOUNTER — TELEPHONE (OUTPATIENT)
Age: 64
End: 2024-10-30

## 2024-10-30 VITALS
HEART RATE: 73 BPM | SYSTOLIC BLOOD PRESSURE: 140 MMHG | HEIGHT: 65 IN | DIASTOLIC BLOOD PRESSURE: 90 MMHG | BODY MASS INDEX: 47.75 KG/M2 | OXYGEN SATURATION: 97 % | WEIGHT: 286.6 LBS

## 2024-10-30 DIAGNOSIS — E11.65 TYPE 2 DIABETES MELLITUS WITH HYPERGLYCEMIA, WITH LONG-TERM CURRENT USE OF INSULIN (HCC): Primary | ICD-10-CM

## 2024-10-30 DIAGNOSIS — E78.5 HYPERLIPIDEMIA, UNSPECIFIED HYPERLIPIDEMIA TYPE: ICD-10-CM

## 2024-10-30 DIAGNOSIS — Z79.4 TYPE 2 DIABETES MELLITUS WITH HYPERGLYCEMIA, WITH LONG-TERM CURRENT USE OF INSULIN (HCC): Primary | ICD-10-CM

## 2024-10-30 DIAGNOSIS — I10 ESSENTIAL HYPERTENSION: ICD-10-CM

## 2024-10-30 PROCEDURE — 99214 OFFICE O/P EST MOD 30 MIN: CPT

## 2024-10-30 PROCEDURE — 95251 CONT GLUC MNTR ANALYSIS I&R: CPT

## 2024-10-30 RX ORDER — EMPAGLIFLOZIN 25 MG/1
25 TABLET, FILM COATED ORAL EVERY MORNING
Qty: 90 TABLET | Refills: 1 | Status: SHIPPED | OUTPATIENT
Start: 2024-10-30

## 2024-10-30 NOTE — TELEPHONE ENCOUNTER
Glatiramer acetate would not raise blood sugars.  She also felt this was the case with Vumerity, which should also not raise her blood sugar.  These medications are not similar, and neither should cause elevation of her blood sugar.

## 2024-10-30 NOTE — TELEPHONE ENCOUNTER
Called pt's daughter Janna and advised her of Patience's response.     Pt's daughter says that's the only that has changed in pt's regiment. Has not changed anything else, even meals remain the same.     She says even with Vumerity, blood sugar increased. After starting Glatiramer, pt's blood sugar increased within 24 hours and then gradually got higher and higher.    She says when pt stopped both Vumerity and Glatiramer acetate, pt's blood sugar dropped. Daughter says with d/c of Vumerity, even her A1c came down.

## 2024-10-30 NOTE — PATIENT INSTRUCTIONS
Continue with Ozempic at 0.5 mg weekly  Continue with Tresiba at 30 units daily  Start Jardiance 25 mg once daily in the morning - recheck kidney function in 2 weeks

## 2024-10-30 NOTE — PROGRESS NOTES
Established Patient Progress Note      Chief Complaint   Patient presents with    Diabetes Type 2        Impression & Plan:    Problem List Items Addressed This Visit          Cardiovascular and Mediastinum    Essential hypertension     BP above goal at appointment today. BP is typically in range. Will continue current medication regimen and continue to monitor at this time.             Endocrine    Type 2 diabetes mellitus with hyperglycemia, with long-term current use of insulin (HCC) - Primary     HGA1C has greatly improved. BGs improved once she stopped her MS medication. However, she started new MS medication about 1 month ago (Glatiramer) and BGs started to rise again so she again stopped taking. BGs improving but still remain elevated even after 2 weeks.   Treatment regimen:   - Recommend she discuss with neurologist different MS medication that will not worsen BGs. She has already tried multiple medications so this might be difficult. If she starts MS medication again will have to consider bolus insulin due to elevated BGs.   - Due to worsening proteinuria would recommend starting SGLT2 and she is agreeable. I have ordered Jardiance 25 mg once daily in the morning. Reviewed side effects. She will stay well hydrated and repeat kidney function in 2 weeks.   - Continue with Tresiba, Metformin and Ozempic at current dose at this time.   - Continue with CGM to monitor BGs. Will notify me if BGs start to worsen.     Discussed risks/complications associated with uncontrolled diabetes.    Advised to adhere to diabetic diet, and recommended staying active/exercising routinely.  Keep carbohydrates consistent to limit blood glucose fluctuations.  Advised to call if blood sugars less than 70 mg/dl or over 300 mg/dl.   Discussed symptoms and treatment of hypoglycemia.    Recommended routine follow-up with podiatry and ophthalmology.   Ordered blood work to complete prior to next visit.   Lab Results   Component Value  Date    HGBA1C 7.3 (H) 09/25/2024            Relevant Medications    Jardiance 25 MG TABS    semaglutide, 0.25 or 0.5 mg/dose, (Ozempic, 0.25 or 0.5 MG/DOSE,) 2 mg/3 mL injection pen    Other Relevant Orders    Hemoglobin A1C    Comprehensive metabolic panel    Basic metabolic panel       Other    Hyperlipidemia     Lipid panel at goal. Continue statin.              History of Present Illness:   Joanne Hu is a 64 y.o. female with primary hyperparathyroidism s/p left middle invasive parathyroidectomy, thyroid nodules, hyperlipidemia, hypertension and type 2 diabetes seen in follow up. Reports complications of macroalbuminuria and neuropathy . Denies recent severe hypoglycemic or severe hyperglycemic episodes. Denies any issues with her current regimen. Last A1C was 7.3. Home glucose monitoring: are performed regularly with CGM.     Joanne Hu   Device used: Dexcom G7  Home use   Indication: Type 2 Diabetes  More than 72 hours of data was reviewed. Report to be scanned to chart.   Date Range: 10/17/24-10/30/24  Analysis of data:   Average Glucose: 219  SD : 51   Time in Target Range: 28%   Time Above Range: 47% high, 25% very high    Time Below Range: 0%   Interpretation of data:  BGs high majority of the time, no hypoglycemia.      Current regimen:   Tresiba 30 units daily   Metformin 1,000 mg BID  Ozempic 0.5 mg weekly      Last Eye Exam: needs to schedule   Last Foot Exam: UTD, follows with podiatry      Has hypertension: Taking amlodipine, had angioedema with lisinopril  Has hyperlipidemia: Taking atorvastatin      Patient Active Problem List   Diagnosis    Hernia, ventral    Type 2 diabetes mellitus with hyperglycemia, with long-term current use of insulin (HCC)    GI bleed    Morbid obesity with BMI of 45.0-49.9, adult (HCC)    Ischemic colitis (HCC)    Unintentional weight loss    Hyperparathyroidism (HCC)    Vitamin D deficiency    Depression with anxiety    Essential hypertension    Falls frequently     Numbness and tingling of right arm and leg    Ambulatory dysfunction    MS (multiple sclerosis) ([facility])    Cognitive decline    Right hemiparesis ([facility])    Chronic daily headache    Class 3 severe obesity due to excess calories with serious comorbidity and body mass index (BMI) of 45.0 to 49.9 in adult ([facility])    Thyroid nodule    B12 deficiency    Hyperlipidemia    Obstructive sleep apnea syndrome    Hip pain, right    Other chronic pain    Nausea & vomiting    Bloating    Medication overuse headache    Visual disturbances    Chronic post-traumatic headache, not intractable    Primary osteoarthritis of right hip    Lumbar radiculopathy    Peripheral neuropathy    Status post parathyroidectomy    Numbness on left side    Swelling of left extremity    TIA (transient ischemic attack)    Diabetic polyneuropathy associated with type 2 diabetes mellitus ([facility])    Urinary incontinence without sensory awareness    Bilateral lower extremity edema    Persistent proteinuria    Type 2 diabetes mellitus with diabetic microalbuminuria, with long-term current use of insulin ([facility])    Hypertensive kidney disease with chronic kidney disease stage II    CKD stage 2 due to type 2 diabetes mellitus  ([facility])    Chronic kidney disease-mineral and bone disorder (CKD-MBD)      Past Medical History:   Diagnosis Date    Anxiety     CPAP (continuous positive airway pressure) dependence     does not use    Diabetes mellitus ([facility])     External hemorrhoids     last assessed 4/7/16, resolved 7/21/16    Hernia, ventral     last assessed 12/14/15, resolved 7/21/16    History of transfusion     Hypertension     Incarcerated incisional hernia     last assessed 12/21/15, resolved 7/21/16    Insomnia     last assessed 12/8/16, resolved 10/9/17    Lyme disease     Morbid obesity with BMI of 45.0-49.9, adult ([facility]) 05/03/2017    MS (multiple sclerosis) ([facility])     Sleep apnea     Stroke (cerebrum) ([facility]) 03/09/2020    Type 2 diabetes mellitus with hyperglycemia,  without long-term current use of insulin (HCC)       Past Surgical History:   Procedure Laterality Date    ABCESS DRAINAGE       SECTION      x3    COLONOSCOPY N/A 2017    Procedure: COLONOSCOPY with biopsy;  Surgeon: Dia Mason DO;  Location: [facility];  Service: Complete    HYSTERECTOMY      IR BIOPSY ABDOMEN  2021    IR LUMBAR PUNCTURE  3/13/2020    AL PARATHYROIDECTOMY/EXPLORATION PARATHYROIDS Left 2023    Procedure: LEFT MINIMALLY INVASIVE PARATHYROIDECTOMY;  Surgeon: Barrett Rose MD;  Location: [facility];  Service: Surgical Oncology    US GUIDED THYROID BIOPSY  2021      Social History     Tobacco Use    Smoking status: Never    Smokeless tobacco: Never   Substance Use Topics    Alcohol use: Not Currently     Comment: Social     Allergies   Allergen Reactions    Lisinopril Angioedema    Sorbitan Diarrhea, Vomiting and Abdominal Pain    Victoza [Liraglutide] Nausea Only    Ambien [Zolpidem Tartrate]     Demerol [Meperidine]     Insulin Glargine Other (See Comments)     Annotation - 2018: memory loss    Latex     Oxycodone Hives, Rash and Vomiting         Current Outpatient Medications:     Alcohol Swabs PADS, by Does not apply route 4 (four) times a day *Latex Free*, Disp: 120 each, Rfl: 3    amLODIPine (NORVASC) 5 mg tablet, Take 1 tablet by mouth once daily, Disp: 30 tablet, Rfl: 5    aspirin 81 mg chewable tablet, Chew 1 tablet (81 mg total) daily, Disp: 90 tablet, Rfl: 1    atorvastatin (LIPITOR) 40 mg tablet, Take 1 tablet (40 mg total) by mouth every evening, Disp: 90 tablet, Rfl: 1    Blood Glucose Monitoring Suppl (OneTouch Verio) w/Device KIT, Use in the morning, Disp: 1 kit, Rfl: 0    Blood Pressure KIT, by Does not apply route daily, Disp: 1 each, Rfl: 0    hydroCHLOROthiazide 25 mg tablet, Take 1 tablet (25 mg total) by mouth daily, Disp: 90 tablet, Rfl: 1    Incontinence Supply Disposable (Prevail Women Underwear XL) MISC, Use every 6 (six) hours as  needed (incontinence), Disp: 120 each, Rfl: 11    Jardiance 25 MG TABS, Take 1 tablet (25 mg total) by mouth every morning, Disp: 90 tablet, Rfl: 1    metFORMIN (GLUCOPHAGE-XR) 500 mg 24 hr tablet, TAKE 2 TABLETS BY MOUTH TWICE DAILY WITH MEALS, Disp: 360 tablet, Rfl: 1    metoprolol succinate (TOPROL-XL) 50 mg 24 hr tablet, Take 1 tablet by mouth once daily, Disp: 90 tablet, Rfl: 1    ondansetron (ZOFRAN-ODT) 4 mg disintegrating tablet, Take 1 tablet (4 mg total) by mouth every 6 (six) hours as needed for nausea or vomiting, Disp: 20 tablet, Rfl: 0    semaglutide, 0.25 or 0.5 mg/dose, (Ozempic, 0.25 or 0.5 MG/DOSE,) 2 mg/3 mL injection pen, Inject 0.75 mL (0.5 mg total) under the skin every 7 days, Disp: 3 mL, Rfl: 5    Tresiba FlexTouch 100 units/mL injection pen, Inject 30 Units under the skin daily, Disp: 15 mL, Rfl: 2    diazepam (VALIUM) 5 mg tablet, Take 1 po 30 min prior to MRI and 1 immediately before MRI if needed (Patient not taking: Reported on 9/10/2024), Disp: 2 tablet, Rfl: 0    diphenhydrAMINE (BENADRYL) 25 mg tablet, Take 0.5 tablets (12.5 mg total) by mouth every 6 (six) hours (Patient not taking: Reported on 8/19/2024), Disp: 20 tablet, Rfl: 0    Glatiramer Acetate 40 MG/ML SOSY, Inject 1 mL (40 mg total) under the skin 3 (three) times a week (Patient not taking: Reported on 10/22/2024), Disp: 12 mL, Rfl: 11    naloxone (NARCAN) 4 mg/0.1 mL nasal spray, Administer 1 spray into a nostril. If no response after 2-3 minutes, give another dose in the other nostril using a new spray. (Patient not taking: Reported on 9/10/2024), Disp: 1 each, Rfl: 1    Review of Systems   Constitutional:  Negative for activity change, appetite change, chills, diaphoresis, fatigue, fever and unexpected weight change.   Eyes:  Negative for visual disturbance.   Respiratory:  Negative for shortness of breath.    Cardiovascular:  Negative for chest pain and palpitations.   Gastrointestinal:  Negative for abdominal pain,  "constipation, diarrhea, nausea and vomiting.   Endocrine: Negative for polydipsia, polyphagia and polyuria.   Skin:  Negative for wound.   Neurological:  Positive for numbness (hands and feet). Negative for dizziness, tremors, weakness and light-headedness.   All other systems reviewed and are negative.      Physical Exam:  Body mass index is 47.69 kg/m².  /90   Pulse 73   Ht 5' 5\" (1.651 m)   Wt 130 kg (286 lb 9.6 oz)   LMP  (LMP Unknown)   SpO2 97%   BMI 47.69 kg/m²    Wt Readings from Last 3 Encounters:   10/30/24 130 kg (286 lb 9.6 oz)   10/22/24 130 kg (287 lb)   10/10/24 129 kg (284 lb)       Physical Exam  Vitals reviewed.   Constitutional:       Appearance: Normal appearance. She is obese.   Cardiovascular:      Rate and Rhythm: Normal rate.   Pulmonary:      Effort: Pulmonary effort is normal.   Neurological:      Mental Status: She is alert and oriented to person, place, and time.   Psychiatric:         Mood and Affect: Mood normal.         Thought Content: Thought content normal.         Labs:   Lab Results   Component Value Date    HGBA1C 7.3 (H) 09/25/2024    HGBA1C 12.8 (H) 06/17/2024    HGBA1C 7.9 (H) 11/04/2023     Lab Results   Component Value Date    CREATININE 0.74 09/25/2024    CREATININE 0.72 06/17/2024    CREATININE 0.70 01/04/2024    BUN 14 09/25/2024    K 4.5 09/25/2024     09/25/2024    CO2 25 09/25/2024     eGFR   Date Value Ref Range Status   09/25/2024 86 ml/min/1.73sq m Final   09/26/2016 >60.0 ml/min/1.73sq m Final     Lab Results   Component Value Date    HDL 51 06/17/2024    TRIG 135 06/17/2024     Lab Results   Component Value Date    ALT 13 09/25/2024    AST 10 (L) 09/25/2024    ALKPHOS 104 09/25/2024     Lab Results   Component Value Date    FWM2ZXZWTFHJ 3.135 06/17/2024    SGH3IDVDFTTL 2.730 09/27/2023    QSN0DYQSCHMT 2.030 10/31/2022       Patient Instructions   Continue with Ozempic at 0.5 mg weekly  Continue with Tresiba at 30 units daily  Start Jardiance 25 " mg once daily in the morning - recheck kidney function in 2 weeks     Discussed with the patient and all questioned fully answered. She will call me if any problems arise.    DEANA Nicole

## 2024-10-30 NOTE — TELEPHONE ENCOUNTER
Received a call from pt's daughter, Janna. Okay to speak with Janna per most recent Medical Communication Consent form. Janna stated that pt stopped taking glatiramer acetate because the medication raised pt's blood sugars up over 400. She stopped the medication about a week and a half ago, and administered 6 doses total. Pt saw endocrinology today. Her blood sugars are coming down, but are still a little high. She is starting Jardiance. Janna requested to ask if there is another medication that could be prescribed in place of the glatiramer acetate. # [phone].

## 2024-10-30 NOTE — ASSESSMENT & PLAN NOTE
HGA1C has greatly improved. BGs improved once she stopped her MS medication. However, she started new MS medication about 1 month ago (Glatiramer) and BGs started to rise again so she again stopped taking. BGs improving but still remain elevated even after 2 weeks.   Treatment regimen:   - Recommend she discuss with neurologist different MS medication that will not worsen BGs. She has already tried multiple medications so this might be difficult. If she starts MS medication again will have to consider bolus insulin due to elevated BGs.   - Due to worsening proteinuria would recommend starting SGLT2 and she is agreeable. I have ordered Jardiance 25 mg once daily in the morning. Reviewed side effects. She will stay well hydrated and repeat kidney function in 2 weeks.   - Continue with Tresiba, Metformin and Ozempic at current dose at this time.   - Continue with CGM to monitor BGs. Will notify me if BGs start to worsen.     Discussed risks/complications associated with uncontrolled diabetes.    Advised to adhere to diabetic diet, and recommended staying active/exercising routinely.  Keep carbohydrates consistent to limit blood glucose fluctuations.  Advised to call if blood sugars less than 70 mg/dl or over 300 mg/dl.   Discussed symptoms and treatment of hypoglycemia.    Recommended routine follow-up with podiatry and ophthalmology.   Ordered blood work to complete prior to next visit.   Lab Results   Component Value Date    HGBA1C 7.3 (H) 09/25/2024

## 2024-10-30 NOTE — TELEPHONE ENCOUNTER
I am not sure why that would happen, as again, neither of these meds have any impact on blood sugar.    She would have to be seen to discuss meds.  She has self d/c'd several different DMTs.  If she prefers to remain off med until she sees Dr. FITZPATRICK next, that is fine.

## 2024-10-31 ENCOUNTER — TELEPHONE (OUTPATIENT)
Age: 64
End: 2024-10-31

## 2024-10-31 NOTE — TELEPHONE ENCOUNTER
PA for semaglutide, 0.25 or 0.5 mg/dose, (Ozempic, 0.25 or 0.5 MG/DOSE,) 2 mg/3 mL injection pen SUBMITTED     via      [x][email or web address]-Case ID # [id number]       Office notes sent, clinical questions answered. Awaiting determination    Turnaround time for your insurance to make a decision on your Prior Authorization can take 7-21 business days.

## 2024-10-31 NOTE — TELEPHONE ENCOUNTER
Spoke w/daughter Janna (on consent form). She verbalized understanding. Requested sooner appt - pt currently scheduled for 1/2/25.    Rescheduled pt for 11/12/24 @ 12p w/Dr. MEDINA

## 2024-10-31 NOTE — TELEPHONE ENCOUNTER
PA for Jardiance 25 MG TABS SUBMITTED     via      [x][employer]-Case ID # [id number]      Office notes sent, clinical questions answered. Awaiting determination    Turnaround time for your insurance to make a decision on your Prior Authorization can take 7-21 business days.

## 2024-11-01 DIAGNOSIS — R07.89 OTHER CHEST PAIN: ICD-10-CM

## 2024-11-01 DIAGNOSIS — R00.2 PALPITATIONS: ICD-10-CM

## 2024-11-01 RX ORDER — METOPROLOL SUCCINATE 50 MG/1
50 TABLET, EXTENDED RELEASE ORAL DAILY
Qty: 90 TABLET | Refills: 1 | Status: SHIPPED | OUTPATIENT
Start: 2024-11-01

## 2024-11-12 ENCOUNTER — OFFICE VISIT (OUTPATIENT)
Dept: NEUROLOGY | Facility: CLINIC | Age: 64
End: 2024-11-12
Payer: COMMERCIAL

## 2024-11-12 VITALS
DIASTOLIC BLOOD PRESSURE: 80 MMHG | WEIGHT: 286.4 LBS | TEMPERATURE: 97.4 F | HEART RATE: 80 BPM | OXYGEN SATURATION: 98 % | BODY MASS INDEX: 47.72 KG/M2 | SYSTOLIC BLOOD PRESSURE: 130 MMHG | RESPIRATION RATE: 18 BRPM | HEIGHT: 65 IN

## 2024-11-12 DIAGNOSIS — N31.9 NEUROGENIC BLADDER: ICD-10-CM

## 2024-11-12 DIAGNOSIS — G35 MS (MULTIPLE SCLEROSIS) (HCC): Primary | ICD-10-CM

## 2024-11-12 PROCEDURE — G2211 COMPLEX E/M VISIT ADD ON: HCPCS | Performed by: PSYCHIATRY & NEUROLOGY

## 2024-11-12 PROCEDURE — 99214 OFFICE O/P EST MOD 30 MIN: CPT | Performed by: PSYCHIATRY & NEUROLOGY

## 2024-11-12 NOTE — PROGRESS NOTES
Ambulatory Visit  Name: Joanne Hu      : 1960      MRN: [id number]  Encounter Provider: Devorah Edmonds MD  Encounter Date: 2024   Encounter department: [facility] [place]    Assessment & Plan  MS (multiple sclerosis) ([facility])  Mrs. Hu has presented to [facility] multiple sclerosis center for follow-up on multiple sclerosis and related concerns.    Patient had tried 3 regimens over the last year and a half including Betaseron, Vumerity, and recently glatiramer acetate.  Patient believes her progression of diabetes caused by disease modifying regimens provided.  We discussed with the patient despite she had active MS relapse 4 years ago and she was actually diagnosed with multiple sclerosis in , we may consider holding of glatiramer acetate at this point as patient is currently over 1 with her regimen for diabetes.  We discussed complexity of immune system including immune senescence and related concerns.  Patient would benefit from having brain imaging at least once a year to guide us on activity of her second progressive multiple sclerosis.  Meanwhile, patient is to continue Ozempic, Jardiance, metformin and insulin for diabetes.    We personally reviewed patient brain MRI during this visit, we agreed patient has mild burden of demyelination in her brain parenchyma, multiple T1 weighted black also has been noted in corpus callosum.  Patient has multiple limiting plaque in her spine in addition to spinal stenosis in cervical and lumbar regions.    Patient describes shocklike sensation in her upper and lower extremities with sensory loss due to multiple sclerosis with diabetic peripheral polyneuropathy; events are short-lived and might be multifactorial.  Patient previously was not able to tolerate gabapentin and Lyrica, I may offer nortriptyline or amitriptyline but patient agreed she will not be using medication on a regular basis.    Patient was advised to  proceed with formal evaluation by urology due to longstanding history of neurogenic bladder due to diabetes/multiple sclerosis/polyp .    Patient is to follow with [facility] neurology in [place] within 8-10 months, follow-up scheduled with Patience.  Orders:    Ambulatory Referral to Urology; Future    Neurogenic bladder    Orders:    Ambulatory Referral to Urology; Future        History of Present Illness   HPI    Mrs. Hu has presented to [facility] multiple sclerosis center for follow-up on multiple sclerosis and related questions.        Brain MRI completed in 2023 suggest no disease progression with stable multiple sclerosis been noted.     Today's complaints are: patient is here with daughter (Janna), She's using a push walker. She states that she's no difference, no better but then mentioned having shooting needle/shocking pains in/up her hands and feet (both). She can't pin point when it started exactly but when it happens it stops her in her tracks.  She said she had a headache yesterday but not currently. She also says both arms and legs numb all the time, general weakness. She wants to ask you a few questions about medications.    Patient is currently not on disease modifying regimen as patient believes she developing bouts of hyperglycemia due to disease modifying treatment.  Patient discontinued Vumerity and was recently started glatiramer acetate -patient believes she is having enough medication her regimen and she will not start any disease modifying therapy for MS.  Patient continued describing bouts of slurred speech with lightheadedness.  Patient stated her father  from diabetes.  Patient is currently taking Ozempic, Jardiance, metformin, insulin.  Patient had side effects to Lyrica and gabapentin as she does not like to be sedated and tired-patient daughter suggested toward her mother sleeps for the day without any essential activities with or without medications.      "Patient is to continue ambulating with a walker.     Patient has LDL 56 mg/dL in September 2022.  Patient has been off statins since admission.  CT was personally reviewed and patient has no significant intracranial vascular atherosclerosis.  Patient has establish care with cardiology.     Patient has increased urgency with worsening bladder dysfunction in the setting of severe spinal stenosis at L4-L5 levels with MRIs completed in June 2023.    Review of Systems   Constitutional: Negative.  Negative for chills and fever.   HENT: Negative.  Negative for ear pain and sore throat.    Eyes: Negative.  Negative for pain and visual disturbance.   Respiratory: Negative.  Negative for cough and shortness of breath.    Cardiovascular: Negative.  Negative for chest pain and palpitations.   Gastrointestinal: Negative.  Negative for abdominal pain and vomiting.   Endocrine: Negative.    Genitourinary: Negative.  Negative for dysuria and hematuria.   Musculoskeletal:  Negative for arthralgias and back pain.   Skin: Negative.  Negative for color change and rash.   Allergic/Immunologic: Negative.    Neurological:  Positive for weakness, numbness and headaches. Negative for seizures and syncope.   Hematological: Negative.    Psychiatric/Behavioral: Negative.     All other systems reviewed and are negative.    I have personally reviewed the MA's review of systems and made changes as necessary.      Objective     /80 (BP Location: Right arm, Patient Position: Sitting, Cuff Size: Large)   Pulse 80   Temp (!) 97.4 °F (36.3 °C) (Temporal)   Resp 18   Ht 5' 5\" (1.651 m)   Wt 130 kg (286 lb 6.4 oz)   LMP  (LMP Unknown)   SpO2 98%   BMI 47.66 kg/m²     Physical Exam  Neurologic Exam  CONSTITUTIONAL: NAD, pleasant. NECK: supple, no lymphadenopathy, no thyromegaly, no JVD. CARDIOVASCULAR: RRR, normal S1S2, no murmurs, no rubs. RESP: clear to auscultation bilaterally, no wheezes/rhonchi/rales. ABDOMEN: soft, non tender, non " distended. SKIN: no rash or skin lesions. EXTREMITIES: no edema, pulses 2+bilaterally. PSYCH: appropriate mood and affect  NEUROLOGIC COMPREHENSIVE EXAM: Patient is oriented to person, place and time, NAD; appropriate affect. CN II, III, IV, V, VI, VII,VIII,IX,X,XI-XII intact with EOMI, PERRLA, OKN intact, VF grossly intact, fundi poorly visualized secondary to pupillary constriction; symmetric face noted. Motor: 5/5 UE/LE bilateral symmetric, LUE 5-/4 shoulder abduction; RLE 5/5 through; LLE 4-/5 hip flexion and 3/5 dorsiflexion;  Sensory: diminished to light touch and pinprick bilaterally; normal vibration sensation feet bilaterally; Coordination within normal limits on FTN and JIHAN testing; DTR: 2/4 through, no Babinski, no clonus. Tandem gait is abnormal. Romberg: absent.  Results/Data:  I have reviewed the results and images from the EPIC in detail with the patient.

## 2024-11-13 ENCOUNTER — TELEPHONE (OUTPATIENT)
Dept: FAMILY MEDICINE CLINIC | Facility: CLINIC | Age: 64
End: 2024-11-13

## 2024-11-13 ENCOUNTER — TELEPHONE (OUTPATIENT)
Age: 64
End: 2024-11-13

## 2024-11-13 NOTE — TELEPHONE ENCOUNTER
Spoke to Teetee No from [facility] regarding home modification and recommendation form. Teetee stated that the form for the metal ramp should be faxed to [employer] at [phone] and the Bariatric Shower Chair with Back referral should be faxed to [employer] at [phone]

## 2024-11-13 NOTE — TELEPHONE ENCOUNTER
Teetee w/[facility] called following up on medical necessity forms for pt faxed to the office on 10/21 (to [facility] fax#) .    Spoke w/Rayna in clerical to confirmed that to date the forms have not been received. Suggested having the forms faxed to [phone].    Teetee notified of direct fax# and advised to make the forms ATTN: Dr. Cuenca.

## 2024-11-25 DIAGNOSIS — Z79.4 TYPE 2 DIABETES MELLITUS WITH HYPERGLYCEMIA, WITH LONG-TERM CURRENT USE OF INSULIN (HCC): Primary | ICD-10-CM

## 2024-11-25 DIAGNOSIS — E11.65 TYPE 2 DIABETES MELLITUS WITH HYPERGLYCEMIA, WITH LONG-TERM CURRENT USE OF INSULIN (HCC): Primary | ICD-10-CM

## 2024-11-27 ENCOUNTER — TELEPHONE (OUTPATIENT)
Dept: ENDOCRINOLOGY | Facility: CLINIC | Age: 64
End: 2024-11-27

## 2024-11-27 NOTE — TELEPHONE ENCOUNTER
[facility] request for chart notes    Faxed 11/27/24    To  [phone]

## 2024-12-01 ENCOUNTER — VBI (OUTPATIENT)
Dept: ADMINISTRATIVE | Facility: OTHER | Age: 64
End: 2024-12-01

## 2024-12-01 NOTE — TELEPHONE ENCOUNTER
12/01/24 3:33 PM     Chart reviewed for Hemoglobin A1c was/were submitted to the patient's insurance.     Carmen Higginbotham MA   [facility]

## 2024-12-19 ENCOUNTER — OFFICE VISIT (OUTPATIENT)
Dept: URGENT CARE | Facility: CLINIC | Age: 64
End: 2024-12-19
Payer: COMMERCIAL

## 2024-12-19 ENCOUNTER — NURSE TRIAGE (OUTPATIENT)
Age: 64
End: 2024-12-19

## 2024-12-19 VITALS
OXYGEN SATURATION: 100 % | DIASTOLIC BLOOD PRESSURE: 82 MMHG | TEMPERATURE: 97.2 F | HEART RATE: 78 BPM | SYSTOLIC BLOOD PRESSURE: 132 MMHG | RESPIRATION RATE: 20 BRPM

## 2024-12-19 DIAGNOSIS — L02.91 ABSCESS: Primary | ICD-10-CM

## 2024-12-19 PROCEDURE — 87070 CULTURE OTHR SPECIMN AEROBIC: CPT | Performed by: NURSE PRACTITIONER

## 2024-12-19 PROCEDURE — 99213 OFFICE O/P EST LOW 20 MIN: CPT | Performed by: NURSE PRACTITIONER

## 2024-12-19 PROCEDURE — 87205 SMEAR GRAM STAIN: CPT | Performed by: NURSE PRACTITIONER

## 2024-12-19 RX ORDER — CEPHALEXIN 500 MG/1
500 CAPSULE ORAL EVERY 8 HOURS SCHEDULED
Qty: 21 CAPSULE | Refills: 0 | Status: SHIPPED | OUTPATIENT
Start: 2024-12-19 | End: 2024-12-26

## 2024-12-19 RX ORDER — MUPIROCIN 20 MG/G
OINTMENT TOPICAL 2 TIMES DAILY
Qty: 22 G | Refills: 0 | Status: SHIPPED | OUTPATIENT
Start: 2024-12-19

## 2024-12-19 NOTE — PROGRESS NOTES
[facility]        NAME: Joanne Hu is a 64 y.o. female  : 1960    MRN: [id number]  DATE: 2024  TIME: 10:22 AM    Assessment and Plan   Abscess [L02.91]  1. Abscess  Wound culture and Gram stain    cephalexin (KEFLEX) 500 mg capsule    mupirocin (BACTROBAN) 2 % ointment          Wound culture obtained from site.  Will start course of Keflex along with topical mupirocin.  Prescription sent to pharmacy.  Follow-up with PCP.  Patient and daughter in agreement with plan.  Per daughter if you could call her with results that is the best option as patient does not always answer her home phone and she does not have an answering machine.  Patient Instructions     Follow up with PCP in 3-5 days.  Proceed to  ER if symptoms worsen.    Chief Complaint     Chief Complaint   Patient presents with    Wound Check     Patient has a sore on her left shoulder that she states is draining. She noticed it this morning.         History of Present Illness   Joanne Hu presents to the clinic c/o    Wound Check (Patient has a sore on her left shoulder that she states is draining. She noticed it this morning.)  Patient states at 2 AM woke up to let the dog out and noticed her shirt was sticking to her shoulder.  Her children looked at it and brother recommended she be seen.        Review of Systems   Review of Systems   All other systems reviewed and are negative.        Current Medications     Long-Term Medications   Medication Sig Dispense Refill    mupirocin (BACTROBAN) 2 % ointment Apply topically 2 (two) times a day 22 g 0    amLODIPine (NORVASC) 5 mg tablet Take 1 tablet by mouth once daily 30 tablet 5    aspirin 81 mg chewable tablet Chew 1 tablet (81 mg total) daily 90 tablet 1    atorvastatin (LIPITOR) 40 mg tablet Take 1 tablet (40 mg total) by mouth every evening 90 tablet 1    Blood Glucose Monitoring Suppl (OneTouch Verio) w/Device KIT Use in the morning 1 kit 0    diazepam (VALIUM) 5 mg  tablet Take 1 po 30 min prior to MRI and 1 immediately before MRI if needed (Patient not taking: Reported on 9/10/2024) 2 tablet 0    diphenhydrAMINE (BENADRYL) 25 mg tablet Take 0.5 tablets (12.5 mg total) by mouth every 6 (six) hours (Patient not taking: Reported on 8/19/2024) 20 tablet 0    hydroCHLOROthiazide 25 mg tablet Take 1 tablet (25 mg total) by mouth daily 90 tablet 1    Incontinence Supply Disposable ([employer] Women Underwear XL) MISC Use every 6 (six) hours as needed (incontinence) 120 each 11    Jardiance 25 MG TABS Take 1 tablet (25 mg total) by mouth every morning 90 tablet 1    metFORMIN (GLUCOPHAGE-XR) 500 mg 24 hr tablet TAKE 2 TABLETS BY MOUTH TWICE DAILY WITH MEALS 360 tablet 1    metoprolol succinate (TOPROL-XL) 50 mg 24 hr tablet Take 1 tablet by mouth once daily 90 tablet 1    naloxone (NARCAN) 4 mg/0.1 mL nasal spray Administer 1 spray into a nostril. If no response after 2-3 minutes, give another dose in the other nostril using a new spray. (Patient not taking: Reported on 9/10/2024) 1 each 1    ondansetron (ZOFRAN-ODT) 4 mg disintegrating tablet Take 1 tablet (4 mg total) by mouth every 6 (six) hours as needed for nausea or vomiting 20 tablet 0    Tresiba FlexTouch 100 units/mL injection pen Inject 30 Units under the skin daily 15 mL 2       Current Allergies     Allergies as of 12/19/2024 - Reviewed 12/19/2024   Allergen Reaction Noted    Lisinopril Angioedema 12/06/2023    Sorbitan Diarrhea, Vomiting, and Abdominal Pain 04/05/2021    Victoza [liraglutide] Nausea Only 10/27/2022    Ambien [zolpidem tartrate]  09/26/2016    Demerol [meperidine]  09/26/2016    Insulin glargine Other (See Comments) 01/03/2018    Latex  09/26/2016    Oxycodone Hives, Rash, and Vomiting 08/09/2022            The following portions of the patient's history were reviewed and updated as appropriate: allergies, current medications, past family history, past medical history, past social history, past surgical  history and problem list.    Objective   /82   Pulse 78   Temp (!) 97.2 °F (36.2 °C) (Tympanic)   Resp 20   LMP  (LMP Unknown)   SpO2 100%        Physical Exam     Physical Exam  Vitals and nursing note reviewed.   Constitutional:       Appearance: Normal appearance. She is well-developed.   HENT:      Head: Normocephalic and atraumatic.      Right Ear: Hearing normal.      Left Ear: Hearing normal.      Mouth/Throat:      Lips: Pink.   Eyes:      General: Lids are normal.      Conjunctiva/sclera: Conjunctivae normal.      Pupils: Pupils are equal, round, and reactive to light.   Cardiovascular:      Rate and Rhythm: Normal rate and regular rhythm.      Heart sounds: Normal heart sounds, S1 normal and S2 normal.   Pulmonary:      Effort: Pulmonary effort is normal.      Breath sounds: Normal breath sounds.   Abdominal:      General: Abdomen is flat. Bowel sounds are normal.      Palpations: Abdomen is soft.   Musculoskeletal:         General: Normal range of motion.      Cervical back: Full passive range of motion without pain, normal range of motion and neck supple.   Skin:     General: Skin is warm and dry.          Neurological:      Mental Status: She is alert.   Psychiatric:         Mood and Affect: Mood normal.         Speech: Speech normal.         Behavior: Behavior normal. Behavior is cooperative.         Thought Content: Thought content normal.         Judgment: Judgment normal.

## 2024-12-19 NOTE — PATIENT INSTRUCTIONS
Patient Education     Cellulitis (skin infection) in adults - Discharge instructions   The Basics   Written by the doctors and editors at [facility]   What are discharge instructions? -- Discharge instructions are information about how to take care of yourself after getting medical care for a health problem.  What is cellulitis? -- Cellulitis is a skin infection (figure 1). It can happen when germs get into the skin. Normally, different types of germs live on a person's skin. Most of the time, these germs do not cause any problems. But if a person gets a cut or a break in the skin, the germs can get into the skin and cause an infection.  You need antibiotics to treat cellulitis. Usually, this involves taking antibiotic pills. Just putting antibiotic ointment on the skin does not work. It is important to take all of your antibiotics even if you start to feel better.  How do I care for myself at home? -- Ask the doctor or nurse what you should do when you go home. Make sure that you understand exactly what you need to do to care for yourself. Ask questions if there is anything you do not understand.  You should also:   Prop your painful body part on pillows, keeping it above the level of your heart. This might help lessen pain and swelling.   Keep the infected area clean and dry. Do not squeeze, scratch, or rub it. You can gently wash the area with soap and water or take a shower. Pat the area dry with a clean towel.   Wash your hands before and after you touch the infected area.  When should I call the doctor? -- Call for advice if:   You have a fever of 101°F (38.4°C) or higher, or chills.   The area becomes more red, swollen, or painful, or the redness or swelling spreads up your leg or arm or to a larger area.   The infected area is not better after 3 days of taking antibiotics.  All topics are updated as new evidence becomes available and our peer review process is complete.  This topic retrieved from [facility] on:  Mar 06, 2024.  Topic 645219 Version 1.0  Release: 32.2.4 - C32.64  © 2024 UpToDate, Inc. and/or its affiliates. All rights reserved.  figure 1: Cellulitis     Cellulitis is an infection of the skin. These infections can cause redness, pain, and/or swelling.  Graphic 930358 Version 1.0  Consumer Information Use and Disclaimer   Disclaimer: This generalized information is a limited summary of diagnosis, treatment, and/or medication information. It is not meant to be comprehensive and should be used as a tool to help the user understand and/or assess potential diagnostic and treatment options. It does NOT include all information about conditions, treatments, medications, side effects, or risks that may apply to a specific patient. It is not intended to be medical advice or a substitute for the medical advice, diagnosis, or treatment of a health care provider based on the health care provider's examination and assessment of a patient's specific and unique circumstances. Patients must speak with a health care provider for complete information about their health, medical questions, and treatment options, including any risks or benefits regarding use of medications. This information does not endorse any treatments or medications as safe, effective, or approved for treating a specific patient. UpToDate, Inc. and its affiliates disclaim any warranty or liability relating to this information or the use thereof.The use of this information is governed by the Terms of Use, available at https://www.[employer]ters[employer]chu[employer].com/en/know/clinical-effectiveness-terms. 2024© UpToDate, Inc. and its affiliates and/or licensors. All rights reserved.  Copyright   © 2024 UpToDate, Inc. and/or its affiliates. All rights reserved.

## 2024-12-19 NOTE — TELEPHONE ENCOUNTER
Regarding: open sores that are painful and draining  ----- Message from Octavio CUMMINS sent at 12/19/2024  8:08 AM EST -----  Patients daughter called stating the patient woke up this morning stating her upper left shoulder is painful. When they checked the shoulder, they found open sores that are draining. Patients daughter was calling for advise as to whether or not patient should be seen. No appointments available practice wide until 1/2/25. Please return daughter Janna's phone call at [phone].

## 2024-12-19 NOTE — TELEPHONE ENCOUNTER
"T/C to patient's daughter to triage symptoms.  Patient states that this morning she was having pain to her left shoulder and noticed that she has 3 open sores to area. Sores are draining fluid, painful and surrounding skin is red and warm.    No available appointments in office until 1/2/25.  Instructed daughter that patient should go to nearest urgent care today for evaluation and they are agreeable.  Will take patient to [facility] this morning.        Reason for Disposition   Unexplained sores and 3 or more    Answer Assessment - Initial Assessment Questions  1. APPEARANCE of SORES: \"What do the sores look like?\"      Red, open- open part is about a half inch  2. NUMBER: \"How many sores are there?\"      3  3. SIZE: \"How big is the largest sore?\"      About a half-inch  4. LOCATION: \"Where are the sores located?\"      Upper left shoulder  5. ONSET: \"When did the sores begin?\"      Just noticed them this morning  6. TENDER: \"Does it hurt when you touch it?\"  (Scale 1-10; or mild, moderate, severe)       Yes, moderate  7. CAUSE: \"What do you think is causing the sores?\"      Unsure  8. OTHER SYMPTOMS: \"Do you have any other symptoms?\" (e.g., fever, new weakness)      Draining fluid, erythema around area    Protocols used: Sores-Adult-[place]    "

## 2024-12-21 ENCOUNTER — RESULTS FOLLOW-UP (OUTPATIENT)
Dept: URGENT CARE | Facility: CLINIC | Age: 64
End: 2024-12-21

## 2024-12-21 LAB
BACTERIA WND AEROBE CULT: NORMAL
GRAM STN SPEC: NORMAL

## 2024-12-21 NOTE — TELEPHONE ENCOUNTER
Spoke to patient about wound culture results.  No specific overgrowth of bacteria.  Follow-up with primary care as needed.

## 2025-01-08 ENCOUNTER — RESULTS FOLLOW-UP (OUTPATIENT)
Dept: ENDOCRINOLOGY | Facility: CLINIC | Age: 65
End: 2025-01-08

## 2025-01-08 ENCOUNTER — APPOINTMENT (OUTPATIENT)
Dept: LAB | Facility: CLINIC | Age: 65
End: 2025-01-08
Payer: COMMERCIAL

## 2025-01-08 ENCOUNTER — OFFICE VISIT (OUTPATIENT)
Dept: PODIATRY | Facility: CLINIC | Age: 65
End: 2025-01-08
Payer: COMMERCIAL

## 2025-01-08 VITALS — HEIGHT: 65 IN | BODY MASS INDEX: 46.98 KG/M2 | WEIGHT: 282 LBS

## 2025-01-08 DIAGNOSIS — E11.65 TYPE 2 DIABETES MELLITUS WITH HYPERGLYCEMIA, WITH LONG-TERM CURRENT USE OF INSULIN (HCC): ICD-10-CM

## 2025-01-08 DIAGNOSIS — Z79.4 TYPE 2 DIABETES MELLITUS WITH HYPERGLYCEMIA, WITH LONG-TERM CURRENT USE OF INSULIN (HCC): ICD-10-CM

## 2025-01-08 DIAGNOSIS — E11.42 DIABETIC POLYNEUROPATHY ASSOCIATED WITH TYPE 2 DIABETES MELLITUS (HCC): Primary | ICD-10-CM

## 2025-01-08 LAB
ALBUMIN SERPL BCG-MCNC: 4 G/DL (ref 3.5–5)
ALP SERPL-CCNC: 105 U/L (ref 34–104)
ALT SERPL W P-5'-P-CCNC: 13 U/L (ref 7–52)
ANION GAP SERPL CALCULATED.3IONS-SCNC: 14 MMOL/L (ref 4–13)
AST SERPL W P-5'-P-CCNC: 12 U/L (ref 13–39)
BILIRUB SERPL-MCNC: 1.04 MG/DL (ref 0.2–1)
BUN SERPL-MCNC: 19 MG/DL (ref 5–25)
CALCIUM SERPL-MCNC: 9.3 MG/DL (ref 8.4–10.2)
CHLORIDE SERPL-SCNC: 101 MMOL/L (ref 96–108)
CO2 SERPL-SCNC: 26 MMOL/L (ref 21–32)
CREAT SERPL-MCNC: 0.83 MG/DL (ref 0.6–1.3)
EST. AVERAGE GLUCOSE BLD GHB EST-MCNC: 186 MG/DL
GFR SERPL CREATININE-BSD FRML MDRD: 75 ML/MIN/1.73SQ M
GLUCOSE P FAST SERPL-MCNC: 127 MG/DL (ref 65–99)
HBA1C MFR BLD: 8.1 %
POTASSIUM SERPL-SCNC: 3.6 MMOL/L (ref 3.5–5.3)
PROT SERPL-MCNC: 7.4 G/DL (ref 6.4–8.4)
SODIUM SERPL-SCNC: 141 MMOL/L (ref 135–147)

## 2025-01-08 PROCEDURE — 11719 TRIM NAIL(S) ANY NUMBER: CPT | Performed by: PODIATRIST

## 2025-01-08 PROCEDURE — 36415 COLL VENOUS BLD VENIPUNCTURE: CPT

## 2025-01-08 PROCEDURE — RECHECK: Performed by: PODIATRIST

## 2025-01-08 PROCEDURE — 83036 HEMOGLOBIN GLYCOSYLATED A1C: CPT

## 2025-01-08 PROCEDURE — 80053 COMPREHEN METABOLIC PANEL: CPT

## 2025-01-08 NOTE — PROGRESS NOTES
Established patient with class findings presents for nail care.  Last A1c was 7.8 down from a high of approximately 12.  Vascular exam:  DP  2/4 bilateral; PT  0 4 bilateral ; absent hair growth; feet are dry and scaly  Dermatological exam:  Each toenail is thick and  dystrophic.  Diagnosis:  Diabetes mellitus  Treatment: Trimmed multiple dystrophic toenails.     Nail removal    Date/Time: 1/8/2025 8:45 AM    Performed by: Eliazar Romero DPM  Authorized by: Eliazar Romero DPM    Nails trimmed:     Number of nails trimmed:  10

## 2025-01-09 NOTE — TELEPHONE ENCOUNTER
Patient is calling regarding cancelling an appointment.    Date/Time: 1/9/25 @ 10:15 am    Patient was rescheduled: YES [] NO [x]    Patient requesting call back to reschedule: YES [] NO [x]

## 2025-01-31 ENCOUNTER — TELEPHONE (OUTPATIENT)
Dept: FAMILY MEDICINE CLINIC | Facility: CLINIC | Age: 65
End: 2025-01-31

## 2025-02-07 LAB
DME PARACHUTE DELIVERY DATE ACTUAL: NORMAL
DME PARACHUTE DELIVERY DATE REQUESTED: NORMAL
DME PARACHUTE ITEM DESCRIPTION: NORMAL
DME PARACHUTE ORDER STATUS: NORMAL
DME PARACHUTE SUPPLIER NAME: NORMAL
DME PARACHUTE SUPPLIER PHONE: NORMAL

## 2025-02-26 NOTE — ASSESSMENT & PLAN NOTE
Patient remains overall clinically stable and continues on Betaseron  She had updated MRI brain and C-spine on 03/05/2021  Unfortunately, she declined to have contrast administered due to she had "been there too long and was too tired" and wanted to go home"  Overall imaging was stable with improvement since last imaging (previously seen enhancing lesions appeared less conspicuous), and no new discrete lesions were identified  She had labs done in January 2021 with normal WBCs and LFTs  She is due for repeat labs at the end of this month with some of her other providers, so will update at that time as well  She continues to complain of ongoing neuropathic pain, which is her most bothersome symptom  She gets "shock like pain" throughout her extremities at different times  This lasts only seconds, but happens throughout the day and is bothersome  She is currently on gabapentin 600/600/900mg and Cymbalta 30 mg daily  --will increase Cymbalta to 60 mg daily and continue gabapentin at current dose     Her neurologic exam is stable today  Will see her back in 3-4 months or sooner if needed  She was advised to call the office for any new or worsening symptoms in the meantime 
HPI:  Objective Statement: Patient is a 49-year-old female with no significant past medical history, past surgical history of Shahid-en-Y gastric bypass in 2014, ex lap, drainage of intra-abdominal abscess with removal of mesh ring and gastric pouch in 2014, incisional hernia repair in 2015, abdominal wall mass excision 2016 Presenting with 1 week of lightheadedness and dizziness, 3 months of exertional dyspnea, low hemoglobin outpatient.  Patient states she has had approximately 3 months of exertional dyspnea.  Over the past week patient has had weakness, lightheadedness, dizziness with room spinning.  Patient went to urgent care and was told her hemoglobin 7.3.  Patient last blood test was 1 year ago and hemoglobin was within normal limits at the time.  Denies any abdominal pain, back pain, chest pain,  nausea vomiting, dysuria, hematuria, melena, hematochezia, hematemesis, cough, congestion.  Vital signs stable, no focal neurologic deficits, moving extremities equally.      In the ER     VS - /73, RR17, afebrile, sats 96% RA, HR 86    Labs s/o Hb 7.3 ( microcytic) , Plt 575    CTAP  No bowel obstruction or grossly thickened bowel wall. Appendix within   normal limits. Stable postsurgical changes of gastric bypass. No evidence   for active contrast extravasation in the bowel lumen to suggest an active   GI bleed.    CXR neg     s/p meclizine, reglan 1 L NS bolus     receiving 1 U PRBC     Admitted to symptomatic anemia and GIB r/o  (25 Feb 2025 18:02)      Patient is a 49y old  Female who presents with a chief complaint of Anemia , r/o GIB (26 Feb 2025 13:55)      INTERVAL HPI/OVERNIGHT EVENTS:  T(C): 36.9 (02-26-25 @ 14:41), Max: 37.1 (02-25-25 @ 19:00)  HR: 89 (02-26-25 @ 14:41) (66 - 97)  BP: 137/86 (02-26-25 @ 14:41) (109/66 - 156/100)  RR: 18 (02-26-25 @ 14:41) (16 - 19)  SpO2: 97% (02-26-25 @ 14:41) (96% - 98%)  Wt(kg): --  I&O's Summary      REVIEW OF SYSTEMS: denies fever, chills, SOB, palpitations, chest pain, abdominal pain, nausea, vomitting, diarrhea, constipation, dizziness    MEDICATIONS  (STANDING):  influenza   Vaccine 0.5 milliLiter(s) IntraMuscular once  pantoprazole  Injectable 40 milliGRAM(s) IV Push every 12 hours    MEDICATIONS  (PRN):  acetaminophen     Tablet .. 650 milliGRAM(s) Oral every 6 hours PRN Temp greater or equal to 38C (100.4F), Mild Pain (1 - 3)  aluminum hydroxide/magnesium hydroxide/simethicone Suspension 30 milliLiter(s) Oral every 4 hours PRN Dyspepsia  melatonin 3 milliGRAM(s) Oral at bedtime PRN Insomnia  ondansetron Injectable 4 milliGRAM(s) IV Push every 8 hours PRN Nausea and/or Vomiting      PHYSICAL EXAM:  GENERAL: NAD, well-groomed, well-developed  HEAD:  Atraumatic, Normocephalic  EYES: EOMI, PERRLA, conjunctiva and sclera clear  ENMT: No tonsillar erythema, exudates, or enlargement; Moist mucous membranes, Good dentition, No lesions  NECK: Supple, No JVD, Normal thyroid  NERVOUS SYSTEM:  Alert & Oriented X3, Good concentration; Motor Strength 5/5 B/L upper and lower extremities; DTRs 2+ intact and symmetric  CHEST/LUNG: Clear to percussion bilaterally; No rales, rhonchi, wheezing, or rubs  HEART: Regular rate and rhythm; No murmurs, rubs, or gallops  ABDOMEN: Soft, Nontender, Nondistended; Bowel sounds present  EXTREMITIES:  2+ Peripheral Pulses, No clubbing, cyanosis, or edema  LYMPH: No lymphadenopathy noted  SKIN: No rashes or lesions  LABS:                        7.8    5.22  )-----------( 435      ( 26 Feb 2025 05:44 )             26.2     02-26    142  |  108  |  9   ----------------------------<  98  3.7   |  26  |  0.45[L]    Ca    9.0      26 Feb 2025 05:44  Phos  4.2     02-26  Mg     2.0     02-26    TPro  7.1  /  Alb  3.5  /  TBili  1.2  /  DBili  x   /  AST  23  /  ALT  28  /  AlkPhos  116  02-26    PT/INR - ( 25 Feb 2025 13:20 )   PT: 10.9 sec;   INR: 0.94 ratio         PTT - ( 25 Feb 2025 13:20 )  PTT:26.3 sec  Urinalysis Basic - ( 26 Feb 2025 05:44 )    Color: x / Appearance: x / SG: x / pH: x  Gluc: 98 mg/dL / Ketone: x  / Bili: x / Urobili: x   Blood: x / Protein: x / Nitrite: x   Leuk Esterase: x / RBC: x / WBC x   Sq Epi: x / Non Sq Epi: x / Bacteria: x      CAPILLARY BLOOD GLUCOSE      POCT Blood Glucose.: 105 mg/dL (26 Feb 2025 08:12)        Urinalysis Basic - ( 26 Feb 2025 05:44 )    Color: x / Appearance: x / SG: x / pH: x  Gluc: 98 mg/dL / Ketone: x  / Bili: x / Urobili: x   Blood: x / Protein: x / Nitrite: x   Leuk Esterase: x / RBC: x / WBC x   Sq Epi: x / Non Sq Epi: x / Bacteria: x      
HPI:  · Chief Complaint: The patient is a 49y Female complaining of abnormal lab result.  · HPI Objective Statement: Patient is a 49-year-old female with no significant past medical history, past surgical history of Shahid-en-Y gastric bypass in 2014, ex lap, drainage of intra-abdominal abscess with removal of mesh ring and gastric pouch in 2014, incisional hernia repair in 2015, abdominal wall mass excision 2016 Presenting with 1 week of lightheadedness and dizziness, 3 months of exertional dyspnea, low hemoglobin outpatient.  Patient states she has had approximately 3 months of exertional dyspnea.  Over the past week patient has had weakness, lightheadedness, dizziness with room spinning.  Patient went to urgent care and was told her hemoglobin 7.3.  Patient last blood test was 1 year ago and hemoglobin was within normal limits at the time.  Denies any abdominal pain, back pain, chest pain,  nausea vomiting, dysuria, hematuria, melena, hematochezia, hematemesis, cough, congestion.  Vital signs stable, no focal neurologic deficits, moving extremities equally.        Patient is a 49y old  Female who presents with a chief complaint of Anemia , r/o GIB (25 Feb 2025 18:02)      INTERVAL HPI/OVERNIGHT EVENTS:  T(C): 36.7 (02-25-25 @ 16:35), Max: 36.8 (02-25-25 @ 16:06)  HR: 86 (02-25-25 @ 16:35) (74 - 97)  BP: 146/73 (02-25-25 @ 16:35) (109/66 - 156/100)  RR: 17 (02-25-25 @ 16:35) (16 - 18)  SpO2: 96% (02-25-25 @ 16:35) (96% - 97%)  Wt(kg): --  I&O's Summary      REVIEW OF SYSTEMS: denies fever, chills, SOB, palpitations, chest pain, abdominal pain, nausea, vomitting, diarrhea, constipation, dizziness    MEDICATIONS  (STANDING):  pantoprazole  Injectable 40 milliGRAM(s) IV Push every 12 hours    MEDICATIONS  (PRN):  acetaminophen     Tablet .. 650 milliGRAM(s) Oral every 6 hours PRN Temp greater or equal to 38C (100.4F), Mild Pain (1 - 3)  aluminum hydroxide/magnesium hydroxide/simethicone Suspension 30 milliLiter(s) Oral every 4 hours PRN Dyspepsia  melatonin 3 milliGRAM(s) Oral at bedtime PRN Insomnia  ondansetron Injectable 4 milliGRAM(s) IV Push every 8 hours PRN Nausea and/or Vomiting      PHYSICAL EXAM:  GENERAL: NAD, well-groomed, well-developed  HEAD:  Atraumatic, Normocephalic  EYES: EOMI, PERRLA, conjunctiva and sclera clear  ENMT: No tonsillar erythema, exudates, or enlargement; Moist mucous membranes, Good dentition, No lesions  NECK: Supple, No JVD, Normal thyroid  NERVOUS SYSTEM:  Alert & Oriented X3, Good concentration; Motor Strength 5/5 B/L upper and lower extremities; DTRs 2+ intact and symmetric  CHEST/LUNG: Clear to percussion bilaterally; No rales, rhonchi, wheezing, or rubs  HEART: Regular rate and rhythm; No murmurs, rubs, or gallops  ABDOMEN: Soft, Nontender, Nondistended; Bowel sounds present  EXTREMITIES:  2+ Peripheral Pulses, No clubbing, cyanosis, or edema  LYMPH: No lymphadenopathy noted  SKIN: No rashes or lesions  LABS:                        7.3    6.53  )-----------( 515      ( 25 Feb 2025 13:20 )             26.2     02-25    141  |  106  |  13  ----------------------------<  103[H]  3.8   |  26  |  0.50    Ca    9.1      25 Feb 2025 13:20    TPro  8.2  /  Alb  4.0  /  TBili  1.1  /  DBili  x   /  AST  31  /  ALT  36  /  AlkPhos  133[H]  02-25    PT/INR - ( 25 Feb 2025 13:20 )   PT: 10.9 sec;   INR: 0.94 ratio         PTT - ( 25 Feb 2025 13:20 )  PTT:26.3 sec  Urinalysis Basic - ( 25 Feb 2025 13:20 )    Color: x / Appearance: x / SG: x / pH: x  Gluc: 103 mg/dL / Ketone: x  / Bili: x / Urobili: x   Blood: x / Protein: x / Nitrite: x   Leuk Esterase: x / RBC: x / WBC x   Sq Epi: x / Non Sq Epi: x / Bacteria: x      CAPILLARY BLOOD GLUCOSE            Urinalysis Basic - ( 25 Feb 2025 13:20 )    Color: x / Appearance: x / SG: x / pH: x  Gluc: 103 mg/dL / Ketone: x  / Bili: x / Urobili: x   Blood: x / Protein: x / Nitrite: x   Leuk Esterase: x / RBC: x / WBC x   Sq Epi: x / Non Sq Epi: x / Bacteria: x    
NP Note discussed with  primary attending    Patient is a 49y old  Female who presents with a chief complaint of Anemia , r/o GIB (26 Feb 2025 11:07)      INTERVAL HPI/OVERNIGHT EVENTS: no new complaints    MEDICATIONS  (STANDING):  influenza   Vaccine 0.5 milliLiter(s) IntraMuscular once  pantoprazole  Injectable 40 milliGRAM(s) IV Push every 12 hours    MEDICATIONS  (PRN):  acetaminophen     Tablet .. 650 milliGRAM(s) Oral every 6 hours PRN Temp greater or equal to 38C (100.4F), Mild Pain (1 - 3)  aluminum hydroxide/magnesium hydroxide/simethicone Suspension 30 milliLiter(s) Oral every 4 hours PRN Dyspepsia  melatonin 3 milliGRAM(s) Oral at bedtime PRN Insomnia  ondansetron Injectable 4 milliGRAM(s) IV Push every 8 hours PRN Nausea and/or Vomiting      __________________________________________________  REVIEW OF SYSTEMS:    CONSTITUTIONAL: No fever,   EYES: no acute visual disturbances  NECK: No pain or stiffness  RESPIRATORY: No cough; No shortness of breath  CARDIOVASCULAR: No chest pain, no palpitations  GASTROINTESTINAL: No pain. No nausea or vomiting; No diarrhea   NEUROLOGICAL: No headache or numbness, no tremors  MUSCULOSKELETAL: No joint pain, no muscle pain  GENITOURINARY: no dysuria, no frequency, no hesitancy  PSYCHIATRY: no depression , no anxiety  ALL OTHER  ROS negative        Vital Signs Last 24 Hrs  T(C): 36.9 (26 Feb 2025 05:26), Max: 37.1 (25 Feb 2025 19:00)  T(F): 98.4 (26 Feb 2025 05:26), Max: 98.7 (25 Feb 2025 19:00)  HR: 66 (26 Feb 2025 05:26) (66 - 97)  BP: 124/67 (26 Feb 2025 05:26) (109/66 - 156/100)  BP(mean): 85 (26 Feb 2025 05:26) (85 - 85)  RR: 18 (26 Feb 2025 05:26) (16 - 19)  SpO2: 96% (26 Feb 2025 05:26) (96% - 98%)    Parameters below as of 26 Feb 2025 05:26  Patient On (Oxygen Delivery Method): room air        ________________________________________________  PHYSICAL EXAM:  GENERAL: NAD  HEENT: Normocephalic;  conjunctivae and sclerae clear; moist mucous membranes;   NECK : supple  CHEST/LUNG: Clear to ausculitation bilaterally with good air entry   HEART: S1 S2  regular; no murmurs, gallops or rubs  ABDOMEN: Soft, Nontender, Nondistended; Bowel sounds present  EXTREMITIES: no cyanosis; no edema; no calf tenderness  SKIN: warm and dry; no rash  NERVOUS SYSTEM:  Awake and alert; Oriented  to place, person and time ; no new deficits    _________________________________________________  LABS:                        7.8    5.22  )-----------( 435      ( 26 Feb 2025 05:44 )             26.2     02-26    142  |  108  |  9   ----------------------------<  98  3.7   |  26  |  0.45[L]    Ca    9.0      26 Feb 2025 05:44  Phos  4.2     02-26  Mg     2.0     02-26    TPro  7.1  /  Alb  3.5  /  TBili  1.2  /  DBili  x   /  AST  23  /  ALT  28  /  AlkPhos  116  02-26    PT/INR - ( 25 Feb 2025 13:20 )   PT: 10.9 sec;   INR: 0.94 ratio         PTT - ( 25 Feb 2025 13:20 )  PTT:26.3 sec  Urinalysis Basic - ( 26 Feb 2025 05:44 )    Color: x / Appearance: x / SG: x / pH: x  Gluc: 98 mg/dL / Ketone: x  / Bili: x / Urobili: x   Blood: x / Protein: x / Nitrite: x   Leuk Esterase: x / RBC: x / WBC x   Sq Epi: x / Non Sq Epi: x / Bacteria: x      CAPILLARY BLOOD GLUCOSE      POCT Blood Glucose.: 105 mg/dL (26 Feb 2025 08:12)        RADIOLOGY & ADDITIONAL TESTS:    < from: CT Abdomen and Pelvis w/wo IV Cont (02.25.25 @ 14:57) >  IMPRESSION:  No bowel obstruction or grossly thickened bowel wall. Appendix within   normal limits. Stable postsurgical changes of gastric bypass. No evidence   for active contrast extravasation in the bowel lumen to suggest an active   GI bleed.      < end of copied text >  Imaging Personally Reviewed:  YES    Consultant(s) Notes Reviewed:   YES    Care Discussed with Consultants :     Plan of care was discussed with patient and /or primary care giver; all questions and concerns were addressed and care was aligned with patient's wishes.

## 2025-03-18 ENCOUNTER — TELEPHONE (OUTPATIENT)
Age: 65
End: 2025-03-18

## 2025-03-18 DIAGNOSIS — E11.65 TYPE 2 DIABETES MELLITUS WITH HYPERGLYCEMIA, WITH LONG-TERM CURRENT USE OF INSULIN (HCC): Primary | ICD-10-CM

## 2025-03-18 DIAGNOSIS — Z79.4 TYPE 2 DIABETES MELLITUS WITH HYPERGLYCEMIA, WITH LONG-TERM CURRENT USE OF INSULIN (HCC): Primary | ICD-10-CM

## 2025-03-18 NOTE — TELEPHONE ENCOUNTER
Patient is scheduled for a follow up in April with Dr. Tong and her daughter would like to know if she needs new labs.    It looks like at the time of the next appt it will be 3 months after patient's A1c in jan so she wants to make sure she does not have to repeat any labs.    Please advise.

## 2025-03-31 DIAGNOSIS — I10 BENIGN ESSENTIAL HYPERTENSION: ICD-10-CM

## 2025-04-01 RX ORDER — AMLODIPINE BESYLATE 5 MG/1
5 TABLET ORAL DAILY
Qty: 30 TABLET | Refills: 5 | Status: SHIPPED | OUTPATIENT
Start: 2025-04-01

## 2025-04-09 ENCOUNTER — APPOINTMENT (OUTPATIENT)
Dept: LAB | Facility: CLINIC | Age: 65
End: 2025-04-09
Payer: COMMERCIAL

## 2025-04-09 ENCOUNTER — OFFICE VISIT (OUTPATIENT)
Dept: PODIATRY | Facility: CLINIC | Age: 65
End: 2025-04-09
Payer: COMMERCIAL

## 2025-04-09 VITALS — WEIGHT: 273 LBS | BODY MASS INDEX: 45.48 KG/M2 | RESPIRATION RATE: 18 BRPM | HEIGHT: 65 IN

## 2025-04-09 DIAGNOSIS — N18.2 CKD STAGE 2 DUE TO TYPE 2 DIABETES MELLITUS  (HCC): ICD-10-CM

## 2025-04-09 DIAGNOSIS — R80.1 PERSISTENT PROTEINURIA: ICD-10-CM

## 2025-04-09 DIAGNOSIS — N18.2 HYPERTENSIVE KIDNEY DISEASE WITH CHRONIC KIDNEY DISEASE STAGE II: ICD-10-CM

## 2025-04-09 DIAGNOSIS — E78.5 HYPERLIPIDEMIA, UNSPECIFIED HYPERLIPIDEMIA TYPE: ICD-10-CM

## 2025-04-09 DIAGNOSIS — E11.65 TYPE 2 DIABETES MELLITUS WITH HYPERGLYCEMIA, WITHOUT LONG-TERM CURRENT USE OF INSULIN (HCC): ICD-10-CM

## 2025-04-09 DIAGNOSIS — I12.9 HYPERTENSIVE KIDNEY DISEASE WITH CHRONIC KIDNEY DISEASE STAGE II: ICD-10-CM

## 2025-04-09 DIAGNOSIS — E11.65 TYPE 2 DIABETES MELLITUS WITH HYPERGLYCEMIA, WITH LONG-TERM CURRENT USE OF INSULIN (HCC): ICD-10-CM

## 2025-04-09 DIAGNOSIS — Z79.4 TYPE 2 DIABETES MELLITUS WITH HYPERGLYCEMIA, WITH LONG-TERM CURRENT USE OF INSULIN (HCC): ICD-10-CM

## 2025-04-09 DIAGNOSIS — Z79.4 TYPE 2 DIABETES MELLITUS WITH DIABETIC MICROALBUMINURIA, WITH LONG-TERM CURRENT USE OF INSULIN (HCC): ICD-10-CM

## 2025-04-09 DIAGNOSIS — E11.29 TYPE 2 DIABETES MELLITUS WITH DIABETIC MICROALBUMINURIA, WITH LONG-TERM CURRENT USE OF INSULIN (HCC): ICD-10-CM

## 2025-04-09 DIAGNOSIS — E66.813 CLASS 3 SEVERE OBESITY DUE TO EXCESS CALORIES WITH SERIOUS COMORBIDITY AND BODY MASS INDEX (BMI) OF 45.0 TO 49.9 IN ADULT: ICD-10-CM

## 2025-04-09 DIAGNOSIS — E11.42 DIABETIC POLYNEUROPATHY ASSOCIATED WITH TYPE 2 DIABETES MELLITUS (HCC): Primary | ICD-10-CM

## 2025-04-09 DIAGNOSIS — N18.9 CHRONIC KIDNEY DISEASE-MINERAL AND BONE DISORDER (CKD-MBD): ICD-10-CM

## 2025-04-09 DIAGNOSIS — E11.22 CKD STAGE 2 DUE TO TYPE 2 DIABETES MELLITUS  (HCC): ICD-10-CM

## 2025-04-09 DIAGNOSIS — R80.9 TYPE 2 DIABETES MELLITUS WITH DIABETIC MICROALBUMINURIA, WITH LONG-TERM CURRENT USE OF INSULIN (HCC): ICD-10-CM

## 2025-04-09 DIAGNOSIS — E83.9 CHRONIC KIDNEY DISEASE-MINERAL AND BONE DISORDER (CKD-MBD): ICD-10-CM

## 2025-04-09 DIAGNOSIS — M89.9 CHRONIC KIDNEY DISEASE-MINERAL AND BONE DISORDER (CKD-MBD): ICD-10-CM

## 2025-04-09 LAB
25(OH)D3 SERPL-MCNC: 24.4 NG/ML (ref 30–100)
ALBUMIN SERPL BCG-MCNC: 4.2 G/DL (ref 3.5–5)
ALP SERPL-CCNC: 113 U/L (ref 34–104)
ALT SERPL W P-5'-P-CCNC: 18 U/L (ref 7–52)
ANION GAP SERPL CALCULATED.3IONS-SCNC: 16 MMOL/L (ref 4–13)
AST SERPL W P-5'-P-CCNC: 16 U/L (ref 13–39)
BILIRUB SERPL-MCNC: 0.82 MG/DL (ref 0.2–1)
BUN SERPL-MCNC: 19 MG/DL (ref 5–25)
CALCIUM SERPL-MCNC: 9.9 MG/DL (ref 8.4–10.2)
CHLORIDE SERPL-SCNC: 98 MMOL/L (ref 96–108)
CO2 SERPL-SCNC: 26 MMOL/L (ref 21–32)
CREAT SERPL-MCNC: 0.98 MG/DL (ref 0.6–1.3)
EST. AVERAGE GLUCOSE BLD GHB EST-MCNC: 197 MG/DL
GFR SERPL CREATININE-BSD FRML MDRD: 61 ML/MIN/1.73SQ M
GLUCOSE P FAST SERPL-MCNC: 189 MG/DL (ref 65–99)
HBA1C MFR BLD: 8.5 %
PHOSPHATE SERPL-MCNC: 4.4 MG/DL (ref 2.3–4.1)
POTASSIUM SERPL-SCNC: 3.9 MMOL/L (ref 3.5–5.3)
PROT SERPL-MCNC: 7.6 G/DL (ref 6.4–8.4)
SODIUM SERPL-SCNC: 140 MMOL/L (ref 135–147)

## 2025-04-09 PROCEDURE — 80053 COMPREHEN METABOLIC PANEL: CPT

## 2025-04-09 PROCEDURE — RECHECK: Performed by: PODIATRIST

## 2025-04-09 PROCEDURE — 84100 ASSAY OF PHOSPHORUS: CPT

## 2025-04-09 PROCEDURE — 36415 COLL VENOUS BLD VENIPUNCTURE: CPT

## 2025-04-09 PROCEDURE — G0127 TRIM NAIL(S): HCPCS | Performed by: PODIATRIST

## 2025-04-09 PROCEDURE — 82306 VITAMIN D 25 HYDROXY: CPT

## 2025-04-09 PROCEDURE — 83036 HEMOGLOBIN GLYCOSYLATED A1C: CPT

## 2025-04-09 PROCEDURE — 11719 TRIM NAIL(S) ANY NUMBER: CPT | Performed by: PODIATRIST

## 2025-04-09 RX ORDER — METFORMIN HYDROCHLORIDE 500 MG/1
1000 TABLET, EXTENDED RELEASE ORAL 2 TIMES DAILY WITH MEALS
Qty: 360 TABLET | Refills: 1 | Status: SHIPPED | OUTPATIENT
Start: 2025-04-09

## 2025-04-09 NOTE — PROGRESS NOTES
Established patient with class findings presents for nail care.  Vascular exam:  DP  0/4 bilateral; PT  0 4 bilateral   Dermatological exam:  Each toenail is thick and  dystrophic.  Diagnosis:  Diabetes mellitus  Treatment: Trimmed multiple dystrophic toenails.     Nail removal    Date/Time: 4/9/2025 8:45 AM    Performed by: Eliazar Romero DPM  Authorized by: Eliazar Romero DPM    Nails trimmed:     Number of nails trimmed:  10

## 2025-04-10 ENCOUNTER — RESULTS FOLLOW-UP (OUTPATIENT)
Dept: NEPHROLOGY | Facility: CLINIC | Age: 65
End: 2025-04-10

## 2025-04-11 ENCOUNTER — RESULTS FOLLOW-UP (OUTPATIENT)
Dept: ENDOCRINOLOGY | Facility: CLINIC | Age: 65
End: 2025-04-11

## 2025-04-22 ENCOUNTER — APPOINTMENT (OUTPATIENT)
Dept: LAB | Facility: CLINIC | Age: 65
End: 2025-04-22
Payer: COMMERCIAL

## 2025-04-22 DIAGNOSIS — Z79.4 TYPE 2 DIABETES MELLITUS WITH HYPERGLYCEMIA, WITH LONG-TERM CURRENT USE OF INSULIN (HCC): ICD-10-CM

## 2025-04-22 DIAGNOSIS — E11.65 TYPE 2 DIABETES MELLITUS WITH HYPERGLYCEMIA, WITH LONG-TERM CURRENT USE OF INSULIN (HCC): ICD-10-CM

## 2025-04-22 LAB
CREAT UR-MCNC: 45.2 MG/DL
MICROALBUMIN UR-MCNC: 636.5 MG/L
MICROALBUMIN/CREAT 24H UR: 1408 MG/G CREATININE (ref 0–30)

## 2025-04-22 PROCEDURE — 82043 UR ALBUMIN QUANTITATIVE: CPT

## 2025-04-22 PROCEDURE — 82570 ASSAY OF URINE CREATININE: CPT

## 2025-04-23 ENCOUNTER — TELEPHONE (OUTPATIENT)
Age: 65
End: 2025-04-23

## 2025-04-23 DIAGNOSIS — Z79.4 TYPE 2 DIABETES MELLITUS WITH HYPERGLYCEMIA, WITH LONG-TERM CURRENT USE OF INSULIN (HCC): ICD-10-CM

## 2025-04-23 DIAGNOSIS — E11.65 TYPE 2 DIABETES MELLITUS WITH HYPERGLYCEMIA, WITH LONG-TERM CURRENT USE OF INSULIN (HCC): ICD-10-CM

## 2025-04-23 DIAGNOSIS — R07.89 OTHER CHEST PAIN: ICD-10-CM

## 2025-04-23 DIAGNOSIS — R00.2 PALPITATIONS: ICD-10-CM

## 2025-04-23 RX ORDER — METOPROLOL SUCCINATE 50 MG/1
50 TABLET, EXTENDED RELEASE ORAL DAILY
Qty: 90 TABLET | Refills: 1 | Status: SHIPPED | OUTPATIENT
Start: 2025-04-23

## 2025-04-23 RX ORDER — EMPAGLIFLOZIN 25 MG/1
25 TABLET, FILM COATED ORAL EVERY MORNING
Qty: 90 TABLET | Refills: 1 | Status: SHIPPED | OUTPATIENT
Start: 2025-04-23

## 2025-04-23 NOTE — TELEPHONE ENCOUNTER
"Received call from Yoel with [facility], she states the patient made an appointment with them because her Dexcom sensor fell off and the needle is still stuck in her arm. Yoel states that because they do not handle her dexcom she should be seen by us, Yoel canceled the patients appointment for today.    Called and spoke with Joanne who reports that her dexcom came off and there is blood on the patch but the needle never came out, she denies pain or discomfort, she denies active bleeding. Informed patient that her appointment with [facility] was canceled and that she should report to an urgent care or ED for evaluation. Patient asked \"wont it just swell up and come out on its own\" informed patient that sometimes that can happen, however, we don't want her to get an infection and its better for the needle to be taken out rather than waiting for it to come out on its own. Patient expressed understanding and states she will report to ED.  FYI  "

## 2025-04-23 NOTE — TELEPHONE ENCOUNTER
Contacted Endo derian Calderon, they will be reaching out to pt directly to send her to the ED. Pt's appointment for today is cancelled.

## 2025-04-23 NOTE — TELEPHONE ENCOUNTER
"Patient calls to report that Dexcom 7 sensor was placed today and the sensor failed. The patient removed the sensor but does not think that the \"needle\" came out. Patient states that the sensor needle is subcutaneous in size and is not made of metal but is plastic. The patient was scheduled for a visit with Yumiko at 12 noon. Patient states that she cannot feel the device. She called Dexcom customer support and they told her to contact her PCP. Patient can be reached at [phone].   "

## 2025-04-24 DIAGNOSIS — Z79.4 TYPE 2 DIABETES MELLITUS WITH HYPERGLYCEMIA, WITH LONG-TERM CURRENT USE OF INSULIN (HCC): ICD-10-CM

## 2025-04-24 DIAGNOSIS — E11.65 TYPE 2 DIABETES MELLITUS WITH HYPERGLYCEMIA, WITH LONG-TERM CURRENT USE OF INSULIN (HCC): ICD-10-CM

## 2025-04-24 RX ORDER — SEMAGLUTIDE 0.68 MG/ML
INJECTION, SOLUTION SUBCUTANEOUS
Qty: 3 ML | Refills: 5 | Status: SHIPPED | OUTPATIENT
Start: 2025-04-24

## 2025-04-25 ENCOUNTER — OFFICE VISIT (OUTPATIENT)
Dept: NEPHROLOGY | Facility: CLINIC | Age: 65
End: 2025-04-25
Payer: COMMERCIAL

## 2025-04-25 VITALS
BODY MASS INDEX: 45.15 KG/M2 | DIASTOLIC BLOOD PRESSURE: 78 MMHG | HEIGHT: 65 IN | WEIGHT: 271 LBS | SYSTOLIC BLOOD PRESSURE: 124 MMHG

## 2025-04-25 DIAGNOSIS — E66.813 CLASS 3 SEVERE OBESITY DUE TO EXCESS CALORIES WITH SERIOUS COMORBIDITY AND BODY MASS INDEX (BMI) OF 45.0 TO 49.9 IN ADULT: ICD-10-CM

## 2025-04-25 DIAGNOSIS — E11.22 TYPE 2 DIABETES MELLITUS WITH STAGE 2 CHRONIC KIDNEY DISEASE, WITH LONG-TERM CURRENT USE OF INSULIN (HCC): Primary | ICD-10-CM

## 2025-04-25 DIAGNOSIS — Z79.4 TYPE 2 DIABETES MELLITUS WITH STAGE 2 CHRONIC KIDNEY DISEASE, WITH LONG-TERM CURRENT USE OF INSULIN (HCC): Primary | ICD-10-CM

## 2025-04-25 DIAGNOSIS — N18.2 HYPERTENSIVE KIDNEY DISEASE WITH CHRONIC KIDNEY DISEASE STAGE II: ICD-10-CM

## 2025-04-25 DIAGNOSIS — I12.9 HYPERTENSIVE KIDNEY DISEASE WITH CHRONIC KIDNEY DISEASE STAGE II: ICD-10-CM

## 2025-04-25 DIAGNOSIS — E78.2 MIXED HYPERLIPIDEMIA: ICD-10-CM

## 2025-04-25 DIAGNOSIS — N18.9 CHRONIC KIDNEY DISEASE-MINERAL AND BONE DISORDER (CKD-MBD): ICD-10-CM

## 2025-04-25 DIAGNOSIS — E83.9 CHRONIC KIDNEY DISEASE-MINERAL AND BONE DISORDER (CKD-MBD): ICD-10-CM

## 2025-04-25 DIAGNOSIS — M89.9 CHRONIC KIDNEY DISEASE-MINERAL AND BONE DISORDER (CKD-MBD): ICD-10-CM

## 2025-04-25 DIAGNOSIS — R80.1 PERSISTENT PROTEINURIA: ICD-10-CM

## 2025-04-25 DIAGNOSIS — N18.2 TYPE 2 DIABETES MELLITUS WITH STAGE 2 CHRONIC KIDNEY DISEASE, WITH LONG-TERM CURRENT USE OF INSULIN (HCC): Primary | ICD-10-CM

## 2025-04-25 PROBLEM — E66.01 MORBID OBESITY WITH BMI OF 45.0-49.9, ADULT (HCC): Chronic | Status: RESOLVED | Noted: 2017-05-03 | Resolved: 2025-04-25

## 2025-04-25 PROCEDURE — 99214 OFFICE O/P EST MOD 30 MIN: CPT | Performed by: INTERNAL MEDICINE

## 2025-04-25 NOTE — ASSESSMENT & PLAN NOTE
Lab Results   Component Value Date    HGBA1C 8.5 (H) 04/09/2025   Most recent A1c at 8.5% from April 2025 increased from prior  Hemoglobin A1c as high as 13.1% in September 2019  Advised patient of tight glycemic control as that will help delay CKD progression  Currently on: Ozempic, metformin, Tresiba, Jardiance  Discussed with patient when and if renal parameters continue deteriorate she may need to be taken off of metformin  after review of records In [facility] as well as Care everywhere patient has a baseline creatinine of 0.6-0.9 mg/dL dating as far back as 2016.   Most recent labs show a Creatinine of 0.98 mg/dL on 4/9/25. Renal function remains stable.  Check blood work prior to next visit  Likely has underlying CKD due to diabetic kidney disease uncontrolled plus obesity related hyperfiltration plus hypertensive nephrosclerosis plus age-related nephron loss.  Imaging:   CT abdomen 3/12/2020 no hydronephrosis  Recommend to avoid use of NSAIDs, nephrotoxins. Caution advised with regards to exposure to IV contrast dye.   Discussed with the patient in depth her renal status, including the possible etiologies for CKD.   Advised the patient that when her GFR is close to 20mL/min then will start discussing about RRT(renal replacement therapy) options such as renal transplant, peritoneal dialysis and hemodialysis.   Informed the patient about the various options for Renal Replacement therapy.  Discussed with the patient how we need to work together to delay the progression of CKD with optimal BP control based on their age and co-morbidities, optimal BS control with HbA1c of <7% and trying to reduce proteinuria by the use of anti-proteinuric agents.   CKD education/Kidney smart: Not interested in attending CKD education or ACP  Follow-up: Patient is to follow-up in 6 months, with lab work to be performed a few days prior to the next visit. Advised patient to call me in 10 days to review the results if they do not hear  back from me, as I may have not received the results.  Orders:  •  Renal function panel; Future  •  Vitamin D 25 hydroxy; Future  •  Phosphorus; Future  •  Albumin / creatinine urine ratio; Future

## 2025-04-25 NOTE — ASSESSMENT & PLAN NOTE
Encouraged patient to follow a renal diet comprising of moderate potassium, low phosphorus and protein restriction to 0.8gm/kg.  Dietary handouts provided  Encouraged weight loss.  Will check serum albumin with next blood work.   Orders:  •  Renal function panel; Future  •  Vitamin D 25 hydroxy; Future  •  Phosphorus; Future  •  Albumin / creatinine urine ratio; Future

## 2025-04-25 NOTE — ASSESSMENT & PLAN NOTE
Lab Results   Component Value Date    EGFR 61 04/09/2025    EGFR 75 01/08/2025    EGFR 86 09/25/2024    CREATININE 0.98 04/09/2025    CREATININE 0.83 01/08/2025    CREATININE 0.74 09/25/2024     BP Readings from Last 3 Encounters:   04/25/25 124/78   12/19/24 132/82   11/12/24 130/80   Current medications: Toprol-XL 50 mg p.o. daily, Norvasc 5 mg p.o. daily, HCTZ 50 mg p.o. daily, Jardiance 25 mg p.o. daily  Changes made today: No changes for now volume status blood pressure stable  Has allergic reaction to lisinopril losartan in the past but not real challenge  Goal BP of < 130/80 based on age and comorbidities  Instructed to follow low sodium (2gm)diet.  after review of records In [facility] as well as Care everywhere patient has a baseline creatinine of 0.6-0.9 mg/dL dating as far back as 2016.   Most recent labs show a Creatinine of 0.98 mg/dL on 4/9/25. Renal function remains stable.  Check blood work prior to next visit  Likely has underlying CKD due to diabetic kidney disease uncontrolled plus obesity related hyperfiltration plus hypertensive nephrosclerosis plus age-related nephron loss.  Imaging:   CT abdomen 3/12/2020 no hydronephrosis  Recommend to avoid use of NSAIDs, nephrotoxins. Caution advised with regards to exposure to IV contrast dye.   Discussed with the patient in depth her renal status, including the possible etiologies for CKD.   Advised the patient that when her GFR is close to 20mL/min then will start discussing about RRT(renal replacement therapy) options such as renal transplant, peritoneal dialysis and hemodialysis.   Informed the patient about the various options for Renal Replacement therapy.  Discussed with the patient how we need to work together to delay the progression of CKD with optimal BP control based on their age and co-morbidities, optimal BS control with HbA1c of <7% and trying to reduce proteinuria by the use of anti-proteinuric agents.   CKD education/Kidney smart: not wanting to  do it, does not want to do ACP  Follow-up: Patient is to follow-up in 6 months, with lab work to be performed a few days prior to the next visit. Advised patient to call me in 10 days to review the results if they do not hear back from me, as I may have not received the results.  Orders:  •  Renal function panel; Future  •  Vitamin D 25 hydroxy; Future  •  Phosphorus; Future  •  Albumin / creatinine urine ratio; Future

## 2025-04-25 NOTE — ASSESSMENT & PLAN NOTE
Lab Results   Component Value Date    EGFR 61 04/09/2025    EGFR 75 01/08/2025    EGFR 86 09/25/2024    CREATININE 0.98 04/09/2025    CREATININE 0.83 01/08/2025    CREATININE 0.74 09/25/2024     Based on patients CKD stage following is the goal of therapy.  Maintain calcium phosphorus product of < 55.  Currently on:none  Changes made today: Start vitamin D 2000 units p.o. daily over-the-counter  most recent vit D 24.4 from April 9, 2025  Check iPTH vitamin D prior to next visit

## 2025-04-25 NOTE — PROGRESS NOTES
Name: Joanne Hu      : 1960      MRN: [id number]  Encounter Provider: Allie Bob MD  Encounter Date: 2025   Encounter department: [facility]  :64 y.o. female with history of multiple comorbidities including morbid obesity, hypertension (x20yrs +), h/o CVA () with residual right sided weakness, peripheral neuropathy, SELENE not on CPAP, Multiple sclerosis ([id number]) history of parathyroidectomy and diabetes (x 20yrs+) presents to the office for routine follow-up      Assessment & Plan  Type 2 diabetes mellitus with stage 2 chronic kidney disease, with long-term current use of insulin ([facility])    Lab Results   Component Value Date    HGBA1C 8.5 (H) 2025   Most recent A1c at 8.5% from 2025 increased from prior  Hemoglobin A1c as high as 13.1% in 2019  Advised patient of tight glycemic control as that will help delay CKD progression  Currently on: Ozempic, metformin, Tresiba, Jardiance  Discussed with patient when and if renal parameters continue deteriorate she may need to be taken off of metformin  after review of records In [facility] as well as Care everywhere patient has a baseline creatinine of 0.6-0.9 mg/dL dating as far back as 2016.   Most recent labs show a Creatinine of 0.98 mg/dL on 25. Renal function remains stable.  Check blood work prior to next visit  Likely has underlying CKD due to diabetic kidney disease uncontrolled plus obesity related hyperfiltration plus hypertensive nephrosclerosis plus age-related nephron loss.  Imaging:   CT abdomen 3/12/2020 no hydronephrosis  Recommend to avoid use of NSAIDs, nephrotoxins. Caution advised with regards to exposure to IV contrast dye.   Discussed with the patient in depth her renal status, including the possible etiologies for CKD.   Advised the patient that when her GFR is close to 20mL/min then will start discussing about RRT(renal replacement therapy) options such as renal transplant,  peritoneal dialysis and hemodialysis.   Informed the patient about the various options for Renal Replacement therapy.  Discussed with the patient how we need to work together to delay the progression of CKD with optimal BP control based on their age and co-morbidities, optimal BS control with HbA1c of <7% and trying to reduce proteinuria by the use of anti-proteinuric agents.   CKD education/Kidney smart: Not interested in attending CKD education or ACP  Follow-up: Patient is to follow-up in 6 months, with lab work to be performed a few days prior to the next visit. Advised patient to call me in 10 days to review the results if they do not hear back from me, as I may have not received the results.  Orders:  •  Renal function panel; Future  •  Vitamin D 25 hydroxy; Future  •  Phosphorus; Future  •  Albumin / creatinine urine ratio; Future    Hypertensive kidney disease with chronic kidney disease stage II  Lab Results   Component Value Date    EGFR 61 04/09/2025    EGFR 75 01/08/2025    EGFR 86 09/25/2024    CREATININE 0.98 04/09/2025    CREATININE 0.83 01/08/2025    CREATININE 0.74 09/25/2024     BP Readings from Last 3 Encounters:   04/25/25 124/78   12/19/24 132/82   11/12/24 130/80   Current medications: Toprol-XL 50 mg p.o. daily, Norvasc 5 mg p.o. daily, HCTZ 50 mg p.o. daily, Jardiance 25 mg p.o. daily  Changes made today: No changes for now volume status blood pressure stable  Has allergic reaction to lisinopril losartan in the past but not real challenge  Goal BP of < 130/80 based on age and comorbidities  Instructed to follow low sodium (2gm)diet.  after review of records In [facility] as well as Care everywhere patient has a baseline creatinine of 0.6-0.9 mg/dL dating as far back as 2016.   Most recent labs show a Creatinine of 0.98 mg/dL on 4/9/25. Renal function remains stable.  Check blood work prior to next visit  Likely has underlying CKD due to diabetic kidney disease uncontrolled plus obesity related  hyperfiltration plus hypertensive nephrosclerosis plus age-related nephron loss.  Imaging:   CT abdomen 3/12/2020 no hydronephrosis  Recommend to avoid use of NSAIDs, nephrotoxins. Caution advised with regards to exposure to IV contrast dye.   Discussed with the patient in depth her renal status, including the possible etiologies for CKD.   Advised the patient that when her GFR is close to 20mL/min then will start discussing about RRT(renal replacement therapy) options such as renal transplant, peritoneal dialysis and hemodialysis.   Informed the patient about the various options for Renal Replacement therapy.  Discussed with the patient how we need to work together to delay the progression of CKD with optimal BP control based on their age and co-morbidities, optimal BS control with HbA1c of <7% and trying to reduce proteinuria by the use of anti-proteinuric agents.   CKD education/Kidney smart: not wanting to do it, does not want to do ACP  Follow-up: Patient is to follow-up in 6 months, with lab work to be performed a few days prior to the next visit. Advised patient to call me in 10 days to review the results if they do not hear back from me, as I may have not received the results.  Orders:  •  Renal function panel; Future  •  Vitamin D 25 hydroxy; Future  •  Phosphorus; Future  •  Albumin / creatinine urine ratio; Future    Chronic kidney disease-mineral and bone disorder (CKD-MBD)  Lab Results   Component Value Date    EGFR 61 04/09/2025    EGFR 75 01/08/2025    EGFR 86 09/25/2024    CREATININE 0.98 04/09/2025    CREATININE 0.83 01/08/2025    CREATININE 0.74 09/25/2024     Based on patients CKD stage following is the goal of therapy.  Maintain calcium phosphorus product of < 55.  Currently on:none  Changes made today: Start vitamin D 2000 units p.o. daily over-the-counter  most recent vit D 24.4 from April 9, 2025  Check iPTH vitamin D prior to next visit    Persistent proteinuria  likely has underlying  proteinuria due to poorly controlled diabetic kidney disease plus obesity related hyperfiltration.  Paraproteinemia workup negative.  Consistently elevated A1c dating as far back as 2016 with as high as 13% in 2019  No role for renal biopsy at this time  Unable to be on lisinopril losartan secondary to allergic reaction severe in the past  most recent MACR is 1.4 g as of April 2025 slightly improved from prior  Recheck MACR prior to next visit  For proteinuria reduction continue on lipitor, jardiance, vitamin D to be initiated  Mixed hyperlipidemia  Goal LDL less than 70  Continue on atorvastatin     Class 3 severe obesity due to excess calories with serious comorbidity and body mass index (BMI) of 45.0 to 49.9 in adult    Encouraged patient to follow a renal diet comprising of moderate potassium, low phosphorus and protein restriction to 0.8gm/kg.  Dietary handouts provided  Encouraged weight loss.  Will check serum albumin with next blood work.   Orders:  •  Renal function panel; Future  •  Vitamin D 25 hydroxy; Future  •  Phosphorus; Future  •  Albumin / creatinine urine ratio; Future        History of Present Illness   HPI  Joanne Hu is a 64 y.o. female who presents with her daughter feels well has no complaints is worried about her dog not being well.  Had on her own stopped taking Jardiance HCTZ Ozempic and metformin for a few weeks just restarted about 2 to 3 weeks ago.  Encourage patient compliance with medications has an upcoming appointment with endocrinology.  No NSAID use.  Happy renal parameters are stable.      Review of Systems   Constitutional:  Negative for chills and fever.   HENT:  Negative for congestion.    Respiratory:  Negative for cough, shortness of breath and wheezing.    Cardiovascular:  Negative for leg swelling.   Gastrointestinal:  Negative for constipation.   Genitourinary:  Negative for dysuria and hematuria.   Musculoskeletal:  Negative for back pain.   Neurological:  Negative  "for headaches.   Psychiatric/Behavioral:  Negative for agitation and confusion.    All other systems reviewed and are negative.         Objective   /78 (BP Location: Left arm, Patient Position: Sitting, Cuff Size: Large)   Ht 5' 5\" (1.651 m)   Wt 123 kg (271 lb)   LMP  (LMP Unknown)   BMI 45.10 kg/m²      Physical Exam  Vitals reviewed.   Constitutional:       General: She is not in acute distress.     Appearance: Normal appearance. She is obese. She is not ill-appearing, toxic-appearing or diaphoretic.   HENT:      Head: Normocephalic and atraumatic.      Mouth/Throat:      Mouth: Mucous membranes are moist.      Pharynx: No oropharyngeal exudate.   Eyes:      General: No scleral icterus.  Cardiovascular:      Rate and Rhythm: Normal rate.   Pulmonary:      Effort: No respiratory distress.      Breath sounds: Normal breath sounds. No stridor. No wheezing.   Abdominal:      General: There is no distension.      Palpations: There is no mass.      Tenderness: There is no right CVA tenderness or left CVA tenderness.   Musculoskeletal:         General: No swelling.      Cervical back: Normal range of motion. No rigidity.   Skin:     Coloration: Skin is not jaundiced.   Neurological:      General: No focal deficit present.      Mental Status: She is alert and oriented to person, place, and time. Mental status is at baseline.   Psychiatric:         Mood and Affect: Mood normal.         Behavior: Behavior normal.           "

## 2025-04-25 NOTE — ASSESSMENT & PLAN NOTE
likely has underlying proteinuria due to poorly controlled diabetic kidney disease plus obesity related hyperfiltration.  Paraproteinemia workup negative.  Consistently elevated A1c dating as far back as 2016 with as high as 13% in 2019  No role for renal biopsy at this time  Unable to be on lisinopril losartan secondary to allergic reaction severe in the past  most recent MACR is 1.4 g as of April 2025 slightly improved from prior  Recheck MACR prior to next visit  For proteinuria reduction continue on lipitor, jardiance, vitamin D to be initiated

## 2025-04-25 NOTE — PATIENT INSTRUCTIONS
"  Please call me in 10 days after having your blood work done to review the results if you do not hear back from me or my office, as I may have not received the results.  please remember to perform blood work prior to the next visit.  Please call if the blood pressure top number is greater than 140 or less than 110 consistently.  Please call if you are gaining more than 2lbs in 2 days for adjustment of water pills.  ~ Please AVOID the following pain medications.  LIST OF NSAIDS (NONSTEROIDAL ANTI-INFLAMMATORY DRUGS) AND VAUGHN-2 INHIBITORS    DIFLUNISAL (DOLOBID)  IBUPROFEN (MOTRIN, ADVIL)  FLURBIPROFEN (ANSAID)  KETOPROFEN (ORUDIS, ORUVAIL)  FENOPROFEN (NALFON)  NABUMETONE (RELAFEN)  PIROXICAM (FELDENE)  NAPROXEN (ALEVE, NAPROSYN, NAPRELAN, ANAPROX)  DICLOFENAC (VOLTAREN, CATAFLAM)  INDOMETHACIN (INDOCIN)  SULINDAC (CLINORIL)  TOLMETIN (TOLETIN)  ETODOLAC (LODINE)  MELOXICAM (MOBIC)  KETOROLAC (TORADOL)  OXAPROZIN (DAYPRO)  CELECOXIB (CELEBREX)  Phosphorus diet  Follow a low phosphorus diet.    Avoid these higher phosphorus foods: Choose these lower phosphorus foods:   Milk, pudding or yogurt (from animals and from many soy varieties) Rice milk (unfortified), nondairy creamer (if it doesn't have terms in the ingredients list that contain the letters \"phos\")   Hard cheeses, ricotta or cottage cheese, fat-free cream cheese Regular and low-fat cream cheese   Ice cream or frozen yogurt Sherbet or frozen fruit pops   Soups made with higher phosphorus ingredients (milk, dried peas, beans, lentils) Soups made with lower phosphorus ingredients (broth- or water-based with other lower phosphorus ingredients)   Whole grains, including whole-grain breads, crackers, cereal, rice and pasta Refined grains, including white bread, crackers, cereals, rice and pasta   Quick breads, biscuits, cornbread, muffins, pancakes or waffles Homemade refined (white) dinner rolls, bagels or English muffins   Dried peas (split, black-eyed), beans " "(black, garbanzo, lima, kidney, navy, julien) or lentils Green peas (canned, frozen), green beans or wax beans   Organ meats, walleye, pollock or sardines Lean beef, pork, lamb, poultry or other fish   Nuts and seeds Popcorn   Peanut butter and other nut butters Jam, jelly or honey   Chocolate, including chocolate drinks Carob (chocolate-flavored) candy, hard candy or gumdrops   Katherin and pepper-type sodas, flavored galvan, bottled teas (if a term in the ingredients list contains the letters \"phos\") Lemon-lime soda, ginger ale or root beer, plain water   Things to do to reduce your blood pressure include working with all your physician to do the following:  stop smoking if you smoke.  increase cardiovascular exercise like walking and swimming.   modify your diet to decrease fat and salt intake.  reduce your weight if you are overweight or obese.  increase the consumption of fruits, vegetables and whole grains.  decrease alcohol consumption if you consume alcohol.   try to minimize stress in your life with lifestyle modifications.   be compliant with your anti-hypertensive medications.   adjust your medications to help improve your vascular stiffness and decrease risks for heart attacks and strokes.   Exercise to Lose Weight     Exercise and a healthy diet may help you lose weight. Your doctor may suggest specific exercises.     EXERCISE IDEAS AND TIPS     Choose low-cost things you enjoy doing, such as walking, bicycling, or exercising to workout videos.   Take stairs instead of the elevator.   Walk during your lunch break.   Park your car further away from work or school.   Go to a gym or an exercise class.   Start with 5 to 10 minutes of exercise each day. Build up to 30 minutes of exercise 4 to 6 days a week.   Wear shoes with good support and comfortable clothes.   Stretch before and after working out.   Work out until you breathe harder and your heart beats faster.   Drink extra water when you exercise.   Do not " do so much that you hurt yourself, feel dizzy, or get very short of breath.     Exercises that burn about 150 calories:   Running 1 ½ miles in 15 minutes.   Playing volleyball for 45 to 60 minutes.   Washing and waxing a car for 45 to 60 minutes.   Playing touch football for 45 minutes.   Walking 1 ¾ miles in 35 minutes.   Pushing a stroller 1 ½ miles in 30 minutes.   Playing basketball for 30 minutes.   Raking leaves for 30 minutes.   Bicycling 5 miles in 30 minutes.   Walking 2 miles in 30 minutes.   Dancing for 30 minutes.   Shoveling snow for 15 minutes.   Swimming laps for 20 minutes.   Walking up stairs for 15 minutes.   Bicycling 4 miles in 15 minutes.   Gardening for 30 to 45 minutes.   Jumping rope for 15 minutes.   Washing windows or floors for 45 to 60 minutes.

## 2025-04-29 DIAGNOSIS — E78.5 HYPERLIPIDEMIA, UNSPECIFIED HYPERLIPIDEMIA TYPE: ICD-10-CM

## 2025-04-29 RX ORDER — ATORVASTATIN CALCIUM 40 MG/1
40 TABLET, FILM COATED ORAL EVERY EVENING
Qty: 90 TABLET | Refills: 0 | Status: SHIPPED | OUTPATIENT
Start: 2025-04-29

## 2025-04-30 ENCOUNTER — OFFICE VISIT (OUTPATIENT)
Dept: ENDOCRINOLOGY | Facility: CLINIC | Age: 65
End: 2025-04-30
Payer: COMMERCIAL

## 2025-04-30 VITALS
SYSTOLIC BLOOD PRESSURE: 144 MMHG | DIASTOLIC BLOOD PRESSURE: 90 MMHG | HEIGHT: 65 IN | WEIGHT: 275.8 LBS | OXYGEN SATURATION: 96 % | BODY MASS INDEX: 45.95 KG/M2 | HEART RATE: 84 BPM

## 2025-04-30 DIAGNOSIS — E04.1 THYROID NODULE: ICD-10-CM

## 2025-04-30 DIAGNOSIS — E11.22 TYPE 2 DIABETES MELLITUS WITH STAGE 2 CHRONIC KIDNEY DISEASE, WITH LONG-TERM CURRENT USE OF INSULIN (HCC): Primary | ICD-10-CM

## 2025-04-30 DIAGNOSIS — E21.3 HYPERPARATHYROIDISM (HCC): ICD-10-CM

## 2025-04-30 DIAGNOSIS — Z79.4 TYPE 2 DIABETES MELLITUS WITH STAGE 2 CHRONIC KIDNEY DISEASE, WITH LONG-TERM CURRENT USE OF INSULIN (HCC): Primary | ICD-10-CM

## 2025-04-30 DIAGNOSIS — N18.2 TYPE 2 DIABETES MELLITUS WITH STAGE 2 CHRONIC KIDNEY DISEASE, WITH LONG-TERM CURRENT USE OF INSULIN (HCC): Primary | ICD-10-CM

## 2025-04-30 PROCEDURE — 95251 CONT GLUC MNTR ANALYSIS I&R: CPT | Performed by: PHYSICIAN ASSISTANT

## 2025-04-30 PROCEDURE — 99214 OFFICE O/P EST MOD 30 MIN: CPT | Performed by: PHYSICIAN ASSISTANT

## 2025-04-30 NOTE — ASSESSMENT & PLAN NOTE
Most recent thyroid ultrasound June 2023, stable  Repeat ultrasound routinely  Orders:  •  US thyroid; Future

## 2025-04-30 NOTE — ASSESSMENT & PLAN NOTE
Lab Results   Component Value Date    HGBA1C 8.5 (H) 04/09/2025   Current HbA1c represents poor control. Blood sugars demonstrating persistent hyperglycemia, particularly at breakfast likely due to dietary indiscretion and personal discontinuation of Ozempic -now at low-dose.   Continue current regimen with the following adjustments:  Sample Ozempic provided as patient will be increasing to the 0.5 mg dose in approximately 1 week  Prescription for Ozempic 1 mg pen submitted to pharmacy to begin in 1 month  Advised to adhere to diabetic diet, and recommended staying active/exercising routinely.  Discussed symptoms and treatment of hypoglycemia.    Recommended routine follow-up with Ophthalmology and foot exams.   Ordered blood work to complete prior to next visit.    Orders:  •  semaglutide, 1 mg/dose, (Ozempic, 1 MG/DOSE,) 4 mg/3 mL injection pen; Inject 0.75 mL (1 mg total) under the skin every 7 days Do not start before May 21, 2025.  •  Comprehensive metabolic panel; Future  •  Hemoglobin A1C; Future

## 2025-04-30 NOTE — PROGRESS NOTES
Name: Joanne Hu      : 1960      MRN: [id number]  Encounter Provider: Betahny Burleson PA-C  Encounter Date: 2025   Encounter department: [facility] [place]    Chief Complaint   Patient presents with   • Diabetes Type 2   :  Assessment & Plan  Type 2 diabetes mellitus with stage 2 chronic kidney disease, with long-term current use of insulin (HCC)    Lab Results   Component Value Date    HGBA1C 8.5 (H) 2025   Current HbA1c represents poor control. Blood sugars demonstrating persistent hyperglycemia, particularly at breakfast likely due to dietary indiscretion and personal discontinuation of Ozempic -now at low-dose.   Continue current regimen with the following adjustments:  Sample Ozempic provided as patient will be increasing to the 0.5 mg dose in approximately 1 week  Prescription for Ozempic 1 mg pen submitted to pharmacy to begin in 1 month  Advised to adhere to diabetic diet, and recommended staying active/exercising routinely.  Discussed symptoms and treatment of hypoglycemia.    Recommended routine follow-up with Ophthalmology and foot exams.   Ordered blood work to complete prior to next visit.    Orders:  •  semaglutide, 1 mg/dose, (Ozempic, 1 MG/DOSE,) 4 mg/3 mL injection pen; Inject 0.75 mL (1 mg total) under the skin every 7 days Do not start before May 21, 2025.  •  Comprehensive metabolic panel; Future  •  Hemoglobin A1C; Future    Hyperparathyroidism (HCC)  S/P left middle invasive parathyroidectomy   Calcium and PTH normalized       Thyroid nodule  Most recent thyroid ultrasound 2023, stable  Repeat ultrasound routinely  Orders:  •  US thyroid; Future          History of Present Illness {?Quick Links Encounters * My Last Note * Last Note in Specialty * Snapshot * Since Last Visit * History :02701}  History of Present Illness  Joanne Hu is a 64 y.o. 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     The patient reports persistent hyperglycemia with no episodes of hypoglycemia. There are no adverse effects such as nausea, vomiting, constipation, or diarrhea. Frequent urination has been noted. The current regimen includes Tresiba 30 units daily, metformin 1000 mg twice daily, and Ozempic 0.25 mg weekly. Ozempic and Jardiance were discontinued for a month due to personal preference, but Ozempic has been resumed at 0.25 mg weekly for the past 3 weeks. The patient's diet consists of pre-packaged meals from [employer], supplemented with fruits such as grapefruit, oranges, bananas, grapes, and strawberries.  Patient agreeable to continue Ozempic and titrate as tolerated.    The patient underwent a parathyroidectomy last year. Prior to the surgery, nails were cut every 2 weeks, but they have not been cut in over a month and a half since the surgery.    CGM Interpretation:  Date Range: 4/17 - 4/30  Device used: Dexcom  Home use   Indication: Type 2 Diabetes  Analysis of data:   Average Glucose: 207  GMI: 8.3%  Coefficient of Variation: 24.2%  SD: 50 mg/dL  Time in Target Range: 33%   Time Above Range: 48% high, 19% very high  Time Below Range: 0% low, 0% very low  Interpretation of data: Persistent hyperglycemia with notable elevation between 7 AM and 9 AM as well as occasionally in the evenings after 9 PM  More than 72 hours of data was reviewed. Report to be scanned to chart.     Current regimen:   Tresiba 30 units daily   Metformin 1,000 mg BID  Ozempic 0.25 mg weekly   Jardiance 25mg daily    Last Eye Exam: 07/02/2024  Last Foot Exam: 06/26/2024  Health Maintenance   Topic Date Due   • Diabetic Foot Exam  06/26/2025   • Diabetic Eye Exam  07/02/2026     Pertinent Medical History   Diagnosed with diabetes greater than 20 years ago.  She had a left middle invasive parathyroidectomy  "performed in 2024 due to hyperparathyroidism.        Review of Systems as per HPI         Medical History Reviewed by provider this encounter:     .    Objective {?Quick Links Trend Vitals * Enter New Vitals * Results Review * Timeline (Adult) * Labs * Imaging * Cardiology * Procedures * Lung Cancer Screening * Surgical eConsent :37101}  /90   Pulse 84   Ht 5' 5\" (1.651 m)   Wt 125 kg (275 lb 12.8 oz)   LMP  (LMP Unknown)   SpO2 96%   BMI 45.90 kg/m²      Body mass index is 45.9 kg/m².  Wt Readings from Last 3 Encounters:   04/30/25 125 kg (275 lb 12.8 oz)   04/25/25 123 kg (271 lb)   04/09/25 124 kg (273 lb)     Physical Exam    Physical Exam  Vitals reviewed.   Constitutional:       General: She is not in acute distress.     Appearance: She is well-developed.   HENT:      Head: Normocephalic and atraumatic.   Eyes:      General: No scleral icterus.     Conjunctiva/sclera: Conjunctivae normal.   Pulmonary:      Effort: Pulmonary effort is normal.   Neurological:      Mental Status: She is alert.   Psychiatric:         Attention and Perception: Attention normal.       Results    Labs:   Lab Results   Component Value Date    HGBA1C 8.5 (H) 04/09/2025    HGBA1C 8.1 (H) 01/08/2025    HGBA1C 7.3 (H) 09/25/2024     Lab Results   Component Value Date    CREATININE 0.98 04/09/2025    CREATININE 0.83 01/08/2025    CREATININE 0.74 09/25/2024    BUN 19 04/09/2025    K 3.9 04/09/2025    CL 98 04/09/2025    CO2 26 04/09/2025     eGFR   Date Value Ref Range Status   04/09/2025 61 ml/min/1.73sq m Final   09/26/2016 >60.0 ml/min/1.73sq m Final     Lab Results   Component Value Date    HDL 51 06/17/2024    TRIG 135 06/17/2024     Lab Results   Component Value Date    ALT 18 04/09/2025    AST 16 04/09/2025    ALKPHOS 113 (H) 04/09/2025     Lab Results   Component Value Date    NLH8XLQWUSIO 3.135 06/17/2024    PCV7BLOXJWCJ 2.730 09/27/2023    [id number] 2.030 10/31/2022       There are no Patient Instructions on file " for this visit.    Discussed with the patient and all questioned fully answered. She will call me if any problems arise.

## 2025-05-29 ENCOUNTER — RA CDI HCC (OUTPATIENT)
Dept: OTHER | Facility: HOSPITAL | Age: 65
End: 2025-05-29

## 2025-05-29 PROBLEM — Z86.73 HISTORY OF TIA (TRANSIENT ISCHEMIC ATTACK): Status: ACTIVE | Noted: 2025-05-29

## 2025-06-05 ENCOUNTER — TELEPHONE (OUTPATIENT)
Dept: FAMILY MEDICINE CLINIC | Facility: CLINIC | Age: 65
End: 2025-06-05

## 2025-06-05 DIAGNOSIS — I63.439 CEREBROVASCULAR ACCIDENT (CVA) DUE TO EMBOLISM OF POSTERIOR CEREBRAL ARTERY, UNSPECIFIED BLOOD VESSEL LATERALITY (HCC): ICD-10-CM

## 2025-06-05 DIAGNOSIS — R32 INCONTINENCE OF URINE IN FEMALE: ICD-10-CM

## 2025-06-05 DIAGNOSIS — G35 MS (MULTIPLE SCLEROSIS) (HCC): ICD-10-CM

## 2025-06-05 RX ORDER — DIAPER,BRIEF,ADULT, DISPOSABLE
EACH MISCELLANEOUS EVERY 6 HOURS PRN
Qty: 120 EACH | Refills: 11 | Status: SHIPPED | OUTPATIENT
Start: 2025-06-05

## 2025-06-05 NOTE — TELEPHONE ENCOUNTER
Pt returned call requested to complete the forms and fax over as usual. And get refills for continence supplies. Usage has increased for her. Thanks

## 2025-06-05 NOTE — TELEPHONE ENCOUNTER
Patient called the RX Refill Line. Message is being forwarded to the office.     Patient is requesting to have an order for more pulls up sent to [facility] but said that she lost so much weight that she now needs the 1 XL size, she's also asking to get more at a time, stated she's been using them more often    Phone #- [phone]  Fax #- [phone]    Pt stated that she ran out and needs this order sent asap (someone did go to the store to buy her some to hold her over), asked for a call back once the order has been sent     Please contact patient at

## 2025-06-05 NOTE — TELEPHONE ENCOUNTER
Yes, she no showed on 6/3. Called the pt and she asked if someone could call to fill it out because she no longer drives and the call dropped so I didn't get to answer her.    Please advise [phone]

## 2025-06-06 NOTE — TELEPHONE ENCOUNTER
Patient called to reschedule ANNUAL WELLNESS VISIT and status of request for pull ups. ANNUAL WELLNESS VISIT scheduled for 7/9/25 and advised call was placed to [employer] to get incontinence form so it can be completed.

## 2025-06-10 NOTE — TELEPHONE ENCOUNTER
Did not receive from [employer] - saw that the latest was from [employer]. Called them and they walked me through going to [employer] - hover over how to order and click on order forms and print. Will put it on Dr. Cuenca's desk for completion.

## 2025-06-12 ENCOUNTER — OFFICE VISIT (OUTPATIENT)
Dept: NEUROLOGY | Facility: CLINIC | Age: 65
End: 2025-06-12
Payer: COMMERCIAL

## 2025-06-12 VITALS
WEIGHT: 277.8 LBS | HEIGHT: 65 IN | SYSTOLIC BLOOD PRESSURE: 120 MMHG | BODY MASS INDEX: 46.28 KG/M2 | HEART RATE: 75 BPM | TEMPERATURE: 97.7 F | RESPIRATION RATE: 14 BRPM | DIASTOLIC BLOOD PRESSURE: 72 MMHG | OXYGEN SATURATION: 97 %

## 2025-06-12 DIAGNOSIS — E53.8 B12 DEFICIENCY: ICD-10-CM

## 2025-06-12 DIAGNOSIS — R51.9 CHRONIC DAILY HEADACHE: ICD-10-CM

## 2025-06-12 DIAGNOSIS — G35 MS (MULTIPLE SCLEROSIS) (HCC): Primary | ICD-10-CM

## 2025-06-12 DIAGNOSIS — R41.3 COMPLAINTS OF MEMORY DISTURBANCE: ICD-10-CM

## 2025-06-12 DIAGNOSIS — G47.33 OBSTRUCTIVE SLEEP APNEA SYNDROME: ICD-10-CM

## 2025-06-12 PROCEDURE — 99214 OFFICE O/P EST MOD 30 MIN: CPT | Performed by: PHYSICIAN ASSISTANT

## 2025-06-12 RX ORDER — CHOLECALCIFEROL (VITAMIN D3) 25 MCG
CAPSULE ORAL
COMMUNITY

## 2025-06-12 NOTE — PATIENT INSTRUCTIONS
MRI brain, cervical and thoracic in September 2025  Get blood work before the imaging (I ordered some blood work, as well as endocrinology)  Would suggest returning to sleep medicine to discuss management of sleep apnea  Suggest exercise, going to the pool would be great  Follow up in 6-8 months

## 2025-06-12 NOTE — ASSESSMENT & PLAN NOTE
"Patient with no new MS symptoms.  She continues to report her MS is \"worse\", which she tells me at every visit, but cannot specifically say what is worse.  As discussed at prior visits, many of her complaints are not related to MS, or could have contributions from other medical issues which are not well controlled, contributing to her overall feeling poorly.    She is not currently on DMT.  She had self discontinued Betaseron after several years on drug, then went on Vumerity which she was not taking consistently because she felt it caused side effects.  She had an extremely brief (2 week) trial with glatiramer acetate in Oct 2024.  She felt Vumerity and glatiramer acetate caused elevated blood sugars, however I do not know why those medications would cause that.  Her BS remain uncontrolled even off DMT.    MRI brain 9/2024 was stable.  Will update imaging 9/2025, including her cord imaging.    I again suggested PT, which she declines.  She says she would be interested in swimming, and daughter is going to look into this.    Her neurologic exam is stable  Orders:    MRI brain MS wo and w contrast; Future    MRI cervical spine with and without contrast; Future    MRI thoracic spine with and without contrast; Future    "

## 2025-06-12 NOTE — ASSESSMENT & PLAN NOTE
Placing another referral to sleep medicine today, although patient is resistant to being seen there, as she does not want to use CPAP.  We discussed there alternatives to CPAP now and she should be seen by sleep medicine to discuss.  We discussed the negative health implications of untreated sleep apnea including increasing risk for stroke, heart disease, uncontrolled blood pressure, arrhythmias etc.  Also discussed this is likely contributing to her headaches on awakening, daytime fatigue and cognitive complaints.  Orders:    Ambulatory Referral to Sleep Medicine; Future

## 2025-06-12 NOTE — ASSESSMENT & PLAN NOTE
Patient's daughter reports that she has had some forgetfulness, which we have discussed in the past.  Several years ago I reported her to [facility] (she actually was not driving at the time but I wanted to formally report to [facility]), and she has not been driving for many years.  We discussed that untreated sleep apnea is likely contributing to her cognitive complaints, as well as uncontrolled diabetes.  Will recheck B12 and TSH, as these can also contribute.  Orders:    TSH, 3rd generation with Free T4 reflex; Future

## 2025-06-12 NOTE — ASSESSMENT & PLAN NOTE
Patient again reporting headaches on awakening in the morning, which we have discussed several times in the past.  She has sleep apnea and does not use her CPAP, and I explained that headaches on awakening in the morning are typically due to untreated sleep apnea.  I again encouraged her to return to sleep medicine in order to appropriately treat her SELENE, as below

## 2025-06-12 NOTE — ASSESSMENT & PLAN NOTE
History of B12 deficiency, no level checked recently.  Will recheck B12 level just take this out  Orders:    Vitamin B12; Future

## 2025-06-12 NOTE — PROGRESS NOTES
"Name: Joanne Hu      : 1960      MRN: [id number]  Encounter Provider: Patience Valentin PA-C  Encounter Date: 2025   Encounter department: [facility] [place]  :  Assessment & Plan  MS (multiple sclerosis) (HCC)  Patient with no new MS symptoms.  She continues to report her MS is \"worse\", which she tells me at every visit, but cannot specifically say what is worse.  As discussed at prior visits, many of her complaints are not related to MS, or could have contributions from other medical issues which are not well controlled, contributing to her overall feeling poorly.    She is not currently on DMT.  She had self discontinued Betaseron after several years on drug, then went on Vumerity which she was not taking consistently because she felt it caused side effects.  She had an extremely brief (2 week) trial with glatiramer acetate in Oct 2024.  She felt Vumerity and glatiramer acetate caused elevated blood sugars, however I do not know why those medications would cause that.  Her BS remain uncontrolled even off DMT.    MRI brain 2024 was stable.  Will update imaging 2025, including her cord imaging.    I again suggested PT, which she declines.  She says she would be interested in swimming, and daughter is going to look into this.    Her neurologic exam is stable  Orders:    MRI brain MS wo and w contrast; Future    MRI cervical spine with and without contrast; Future    MRI thoracic spine with and without contrast; Future    Chronic daily headache  Patient again reporting headaches on awakening in the morning, which we have discussed several times in the past.  She has sleep apnea and does not use her CPAP, and I explained that headaches on awakening in the morning are typically due to untreated sleep apnea.  I again encouraged her to return to sleep medicine in order to appropriately treat her SELENE, as below       Obstructive sleep apnea syndrome  Placing another referral to " sleep medicine today, although patient is resistant to being seen there, as she does not want to use CPAP.  We discussed there alternatives to CPAP now and she should be seen by sleep medicine to discuss.  We discussed the negative health implications of untreated sleep apnea including increasing risk for stroke, heart disease, uncontrolled blood pressure, arrhythmias etc.  Also discussed this is likely contributing to her headaches on awakening, daytime fatigue and cognitive complaints.  Orders:    Ambulatory Referral to Sleep Medicine; Future    B12 deficiency  History of B12 deficiency, no level checked recently.  Will recheck B12 level just take this out  Orders:    Vitamin B12; Future    Complaints of memory disturbance  Patient's daughter reports that she has had some forgetfulness, which we have discussed in the past.  Several years ago I reported her to [facility] (she actually was not driving at the time but I wanted to formally report to [facility]), and she has not been driving for many years.  We discussed that untreated sleep apnea is likely contributing to her cognitive complaints, as well as uncontrolled diabetes.  Will recheck B12 and TSH, as these can also contribute.  Orders:    TSH, 3rd generation with Free T4 reflex; Future    Follow-up in 6-8 months or sooner if needed        History of Present Illness   HPI   Patient is a 65 yo female who presents to the [facility] for follow up regarding her MS. Patient with PMH of HTN, DM type 2, HLD, hyperparathyroidism, SELENE. She was last seen in November 2024.     Patient was initially seen in April 2020 following a hospitalization. Patient developed acute onset of right-sided numbness and weakness on March 9, 2020 in the morning before leaving for work.   She was assessed by neurology while in the hospital, initial concern was for stroke.  MRI C-spine WO contrast 3/10/2020: There is focal intramedullary T2 hyperintense signal epicentered at the C4  vertebral level.  This is more pronounced in the right and central hemicord.  The remaining mid to lower cervical cord is more difficult to assess due to the image degradation.  There may be additional right hemicord signal abnormality at C5 and C6.   MRI c-spine W contrast 3/11/2020: Postcontrast imaging demonstrates enhancement associated with multiple previously described foci of T2 prolongation within the cervical and upper thoracic CORD.  MRI brain WO contrast 3/10/2020: There is no discrete mass, mass effect or midline shift. There is no intracranial hemorrhage.  There is no evidence of acute infarction and diffusion imaging is unremarkable. Multiple foci of T2 and FLAIR hyperintensity are present within the supratentorial white matter, most pronounced in the anterolateral left frontal lobe, anterior right temporal lobe and right cerebellum/middle cerebellar peduncle.   MRI brain W contrast 3/11/2020: Redemonstration of multiple supratentorial and infratentorial scattered areas of white matter FLAIR signal hyperintensity, as described above. Many of the lesions demonstrate a perpendicular configuration of the ventricles and lesions are seen involving the callosal septal interface. Imaging appearance is most suggestive of demyelinating disease/multiple sclerosis with areas of active demyelination as correlated with dedicated MRI of the cervical spine.  Concern for MS vs neoplasm vs sarcoid. CT chest abdomen pelvis was unremarkable for any solid tumor, although did demonstrate some calcified lymph nodes and granulomata in the lung fields.  Patient had completed CSF analysis was for increased cells: CSF protein 80, CSF white blood cell 37, lymphocyte predominant, JCV and ACE negative, meningitis panel negative, MS panel high IgG index, 3 OCB, flow cytometry hypercellular. Serum NMO/MOG negative, ANCA/DNA/Sjogren's negative, ESR 32, CRP 9.1.   She was given IV steroids and oral steroid taper.     Patient had  "repeat imaging in June 2020 and there were several new, enhancing demyelinating lesions in the brain, as well as improvement in the c-spine noted.      At timing of visit on 7/7/2020, diagnosis of MS was made. Betaseron was initiated, with first injection 7/24/2020. At first, she was having flu-like symptoms with the titration, but patient was advised to pre-med with NSAIDs/Tylenol and warm showers, and this improved her symptoms.  She continued to have issues with the Betaseron. She called in mid October reporting ongoing issues, so Betaseron was held for 2-3 weeks.  In November 2020, she was doing better with the Betaseron.     Updated imaging was done without contrast due to patient declined contrast (she tells me she was there for \"too long\" and was tired and wanted to go home)  MRI brain 3/5/2021:  Mild plaque burden, at least 2 of the previously seen enhancing lesions are less conspicuous on FLAIR imaging, indicative of at least partial treatment response.  MRI c-spine 3/5/2021: a few, scattered cord lesions redemonstrated.  The previously seen enhancing lesion at T1 demonstrates a small amount of myelomalacia.  No progressive cord signal abnormality on noncontrast imaging.     In March 2022, patient reported feeling generally worse, especially difficulty with stamina and endurance with ambulation. She denied new, focal symptoms, just a decline in general. She also admitted to not using her CPAP, feeling tired. MRI c-spine and t-spine were ordered due to worsening ambulatory dysfunction. She did not proceed with imaging for several months. MRI c-spine and t-spine updated 8/23/22 and both stable.     At timing of Feb 2023 visit, patient reported she “continues to decline”, however when asked about the symptoms she was having, they were essentially all non-neurologic. MRI brain was ordered for surveillance, initially without contrast. MRI brain wo contrast 3/7/23 demonstrated a new 1.5 cm left periventricular " white matter demyelinating lesion. She was asked to get another MRI with contrast and this was completed 3/23/2023. This lesion was not enhancing. Initial MRI was compared to 3/5/2021 imaging.     At timing of June 2023 visit she reported a lot of issues with pain. She was having right hip and leg pain, and also reported ongoing ambulatory dysfunction, needing to use her walker all the time or she will fall. She had been following with ortho for her hip, tried PT and an injection in the hip that did not work, so was referred to pain management. She had a MRI lumbar spine which demonstrated severe central canal narrowing at L4-5. Pain management was offering L5-S1 TERRANCE vs referral to spine surgery. She had also recently seen a surgeon for hyperparathyroidism and plan was for parathyroidectomy.      Patient presented to her Oct 2023 visit with her son. She continued on Betaseron. Her son reported that patient had not been driving for several years. No one formally revoked her license or reported concerns, but since her initial MS presentation as well as multiple other medical issues, she had not driven. Patient herself reported having the desire to drive, but son and apparently the rest of their family had great concerns. Even prior to 2020 when she stopped driving, she apparently had issues with getting lost and one time had to pullover on a highway and call someone to come get her. I sent in formal reporting to [facility] indicating patient should cease driving due to cognitive reasons (patient agreed to continue not driving and agreed to formal reporting).   MRI brain updated 10/5/23 was stable. No evidence of disease progression.      Patient had ED visit on 1/4/24 for left sided numbness. She initially presented to her PCP office that day reporting left sided swelling, numbness and pruritus. Back in Dec 2023, she had an ED visit for left sided lip swelling, suggestive of angioedema. She was treated and observed in  "the ED and this improved. Lisinopril was d/c, as this was felt to be the cause. On 1/4, she reported repeated episodes of left sided symptoms, reported taking Benadryl with improvement. She was sent to the ED to r/o stroke given her history. CTA h/n in ED with no LVO or flow limiting stenosis. She also reported stopping her Betaseron in December on her own. ED with no clear diagnosis of symptoms.     She was then seen by Dr. Edmonds on 2/23/24, reported self discontinuing Betaseron in early December due to \"feeling sick\" with injections. She was advised to start Vumerity as next DMT.     When I saw her in May 2024, she reported she was overall stable, had started Vumerity in March 2024 and was tolerating well overall.     When seen in August 2024, patient reported she was not taking Vumerity regularly because it was making her “sick”.  She reported feeling “high” when she was taking it, and also if taking for a few days in a row, would get n/v.  Her A1C had also recently gone up to 12.8 and patient was adamant that the Vumerity was causing her blood sugars to be out of control.  We discussed changing DMT and she agreed to glatiramer acetate.    She had updated MRI brain 9/3/2024 and this was stable with no evidence of disease progression.    She started the glatiramer acetate in early Oct 2024 and at the end of that month, her daughter called the office reporting her blood sugars were going up again and they felt it was the glatiramer acetate.  She only had 6 doses of the medication total (so only taking x 2 weeks).  We did not feel either glatiramer acetate or the Vumierty would cause this, but patient wanted to stop DMT.    She was seen by Dr. FITZPATRICK in November 2024 and reported she was off DMT, and decision was made to continue without DMT.      Today, patient presents with her daughter. She remains off DMT.  She continues to feel her MS is “worse”, but cannot specifically tell me what is worse.  She typically " "says this at every visit.  She has many of the same complaints we have discussed at prior visits.  She is getting headaches on awakening in the AM. We have discussed this several times previously and discussed that she has SELENE and is not using her CPAP, which is likely causing headaches on awakening.  She naps throughout the day. Patient does not want to use CPAP and does not want to see sleep medicine.  Her daughter says she never wants to leave the house.  She does not like to go anywhere.  When I asked her why, she says it is dangerous to go out of the house because she could fall.  She does not want to do PT, which has been suggested several times by myself and other providers.  Her daughter says she is more forgetful.      Patient repeatedly saying it is a \"Waste of time\" to be here (or see any doctors).    Review of Systems   Constitutional:  Positive for fatigue. Negative for fever.   HENT: Negative.  Negative for hearing loss, tinnitus and trouble swallowing.    Eyes:  Negative for photophobia, pain and visual disturbance.   Respiratory: Negative.  Negative for cough and shortness of breath.    Cardiovascular: Negative.  Negative for chest pain and palpitations.   Gastrointestinal:  Negative for constipation, diarrhea, nausea and vomiting.   Endocrine: Negative.    Genitourinary: Negative.  Negative for difficulty urinating and urgency.   Musculoskeletal:  Positive for back pain, gait problem, myalgias and neck pain.   Skin: Negative.  Negative for rash.   Neurological:  Positive for weakness, numbness and headaches. Negative for dizziness, tremors, seizures, syncope and speech difficulty.   Hematological: Negative.    Psychiatric/Behavioral:  Positive for decreased concentration. Negative for sleep disturbance. The patient is not nervous/anxious.     I have personally reviewed the MA's review of systems and made changes as necessary.    Medications Ordered Prior to Encounter[1]      Objective   /72 " "(BP Location: Right arm, Patient Position: Sitting, Cuff Size: Adult)   Pulse 75   Temp 97.7 °F (36.5 °C) (Temporal)   Resp 14   Ht 5' 5\" (1.651 m)   Wt 126 kg (277 lb 12.8 oz)   LMP  (LMP Unknown)   SpO2 97%   BMI 46.23 kg/m²     Physical Exam  Constitutional:       Appearance: She is obese.     Eyes:      Extraocular Movements: EOM normal.      Pupils: Pupils are equal, round, and reactive to light.       Neurological:      Mental Status: She is alert.      Deep Tendon Reflexes:      Reflex Scores:       Bicep reflexes are 1+ on the right side and 1+ on the left side.       Brachioradialis reflexes are 1+ on the right side and 1+ on the left side.       Patellar reflexes are 1+ on the right side and 1+ on the left side.       Achilles reflexes are 0 on the right side and 0 on the left side.    Psychiatric:         Mood and Affect: Mood normal.         Speech: Speech normal.         Behavior: Behavior normal.       Neurological Exam  Mental Status  Alert. Oriented to person, place, time and situation. Speech is normal. Language is fluent with no aphasia. Attention and concentration are normal.    Cranial Nerves  CN II: Visual fields full to confrontation.  CN III, IV, VI: Extraocular movements intact bilaterally. Pupils equal round and reactive to light bilaterally.  CN V: Facial sensation is normal.  CN VII: Full and symmetric facial movement.  CN VIII: Hearing is normal.  CN IX, X: Palate elevates symmetrically  CN XI: Shoulder shrug strength is normal.  CN XII: Tongue midline without atrophy or fasciculations.    Motor   No abnormal involuntary movements. Strength is 5/5 in all four extremities except as noted.  Right HF 4+/5, poor effort .    Sensory  Light touch is normal in upper and lower extremities.     Reflexes                                            Right                      Left  Brachioradialis                    1+                         1+  Biceps                                 1+       "                   1+  Patellar                                1+                         1+  Achilles                                0                         0    Coordination  Right: Finger-to-nose normal.Left: Finger-to-nose normal.    Gait   Unable to rise from chair without using arms.  Gait is slow, using rolling walker.         [1]   Current Outpatient Medications on File Prior to Visit   Medication Sig Dispense Refill    Alcohol Swabs PADS by Does not apply route 4 (four) times a day *Latex Free* 120 each 3    amLODIPine (NORVASC) 5 mg tablet Take 1 tablet by mouth once daily 30 tablet 5    aspirin 81 mg chewable tablet Chew 1 tablet (81 mg total) daily 90 tablet 1    atorvastatin (LIPITOR) 40 mg tablet TAKE 1 TABLET BY MOUTH ONCE DAILY IN THE EVENING 90 tablet 0    Blood Glucose Monitoring Suppl (OneTouch Verio) w/Device KIT Use in the morning 1 kit 0    Blood Pressure KIT by Does not apply route daily 1 each 0    Cholecalciferol (Vitamin D-3) 25 MCG (1000 UT) CAPS Take by mouth      hydroCHLOROthiazide 25 mg tablet Take 1 tablet (25 mg total) by mouth daily 90 tablet 1    Incontinence Supply Disposable (Prevail Women Underwear XL) MISC Use every 6 (six) hours as needed (incontinence) 120 each 11    Jardiance 25 MG TABS TAKE 1 TABLET BY MOUTH ONCE DAILY IN THE MORNING 90 tablet 1    metFORMIN (GLUCOPHAGE-XR) 500 mg 24 hr tablet TAKE 2 TABLETS BY MOUTH TWICE DAILY WITH MEALS 360 tablet 1    metoprolol succinate (TOPROL-XL) 50 mg 24 hr tablet Take 1 tablet by mouth once daily 90 tablet 1    ondansetron (ZOFRAN-ODT) 4 mg disintegrating tablet Take 1 tablet (4 mg total) by mouth every 6 (six) hours as needed for nausea or vomiting 20 tablet 0    semaglutide, 0.25 or 0.5 mg/dose, (Ozempic, 0.25 or 0.5 MG/DOSE,) 2 mg/3 mL injection pen INJECT 3/4 (THREE-FOURTHS ) ML  ONCE A WEEK 3 mL 5    semaglutide, 1 mg/dose, (Ozempic, 1 MG/DOSE,) 4 mg/3 mL injection pen Inject 0.75 mL (1 mg total) under the skin every 7 days  Do not start before May 21, 2025. 3 mL 3    Tresiba FlexTouch 100 units/mL injection pen Inject 30 Units under the skin daily 15 mL 2    diazepam (VALIUM) 5 mg tablet Take 1 po 30 min prior to MRI and 1 immediately before MRI if needed (Patient not taking: Reported on 9/10/2024) 2 tablet 0    mupirocin (BACTROBAN) 2 % ointment Apply topically 2 (two) times a day (Patient not taking: Reported on 4/25/2025) 22 g 0    naloxone (NARCAN) 4 mg/0.1 mL nasal spray Administer 1 spray into a nostril. If no response after 2-3 minutes, give another dose in the other nostril using a new spray. (Patient not taking: Reported on 4/25/2025) 1 each 1     No current facility-administered medications on file prior to visit.

## 2025-06-30 ENCOUNTER — TELEPHONE (OUTPATIENT)
Age: 65
End: 2025-06-30

## 2025-06-30 NOTE — TELEPHONE ENCOUNTER
New Patient      Insurance: [facility] Complete  Current Insurance? yes    Insurance E-verified?  yes  History:   Reason for appointment/active symptoms?     Stress Incontinence  Has the patient had any previous Urologist(s)?    no  Was the patient seen in the ED? no     Labs/Imaging(Including Out Of Network)? no     Records Requested? no  Records Visible in EPIC? yes     Personal history of cancer? no     Appointment:   Office location preference:  [facility]  ?   Appointment Details:   Date:  7/17/25  Time:  10:00  Location: [facility]   Provider:  Renae Crabtree  Does the appointment need further review?

## 2025-07-02 ENCOUNTER — TELEPHONE (OUTPATIENT)
Dept: ENDOCRINOLOGY | Facility: CLINIC | Age: 65
End: 2025-07-02

## 2025-07-09 ENCOUNTER — OFFICE VISIT (OUTPATIENT)
Dept: PODIATRY | Facility: CLINIC | Age: 65
End: 2025-07-09
Payer: COMMERCIAL

## 2025-07-09 ENCOUNTER — OFFICE VISIT (OUTPATIENT)
Dept: FAMILY MEDICINE CLINIC | Facility: CLINIC | Age: 65
End: 2025-07-09
Payer: COMMERCIAL

## 2025-07-09 VITALS — BODY MASS INDEX: 46.15 KG/M2 | RESPIRATION RATE: 18 BRPM | HEIGHT: 65 IN | WEIGHT: 277 LBS

## 2025-07-09 VITALS
TEMPERATURE: 98.3 F | BODY MASS INDEX: 46.1 KG/M2 | WEIGHT: 277 LBS | DIASTOLIC BLOOD PRESSURE: 78 MMHG | SYSTOLIC BLOOD PRESSURE: 136 MMHG | HEART RATE: 82 BPM | OXYGEN SATURATION: 97 %

## 2025-07-09 DIAGNOSIS — Z79.4 TYPE 2 DIABETES MELLITUS WITH HYPERGLYCEMIA, WITH LONG-TERM CURRENT USE OF INSULIN (HCC): ICD-10-CM

## 2025-07-09 DIAGNOSIS — R32 INCONTINENCE OF URINE IN FEMALE: ICD-10-CM

## 2025-07-09 DIAGNOSIS — E11.65 TYPE 2 DIABETES MELLITUS WITH HYPERGLYCEMIA, WITH LONG-TERM CURRENT USE OF INSULIN (HCC): ICD-10-CM

## 2025-07-09 DIAGNOSIS — I10 BENIGN ESSENTIAL HYPERTENSION: ICD-10-CM

## 2025-07-09 DIAGNOSIS — E11.42 DIABETIC POLYNEUROPATHY ASSOCIATED WITH TYPE 2 DIABETES MELLITUS (HCC): Primary | ICD-10-CM

## 2025-07-09 DIAGNOSIS — G35 MS (MULTIPLE SCLEROSIS) (HCC): ICD-10-CM

## 2025-07-09 DIAGNOSIS — G47.33 OBSTRUCTIVE SLEEP APNEA SYNDROME: ICD-10-CM

## 2025-07-09 DIAGNOSIS — Z00.00 MEDICARE ANNUAL WELLNESS VISIT, SUBSEQUENT: Primary | ICD-10-CM

## 2025-07-09 DIAGNOSIS — E78.5 HYPERLIPIDEMIA, UNSPECIFIED HYPERLIPIDEMIA TYPE: ICD-10-CM

## 2025-07-09 PROBLEM — Z86.73 HISTORY OF TIA (TRANSIENT ISCHEMIC ATTACK): Status: RESOLVED | Noted: 2025-05-29 | Resolved: 2025-07-09

## 2025-07-09 PROCEDURE — G2211 COMPLEX E/M VISIT ADD ON: HCPCS | Performed by: FAMILY MEDICINE

## 2025-07-09 PROCEDURE — 11719 TRIM NAIL(S) ANY NUMBER: CPT | Performed by: PODIATRIST

## 2025-07-09 PROCEDURE — G0439 PPPS, SUBSEQ VISIT: HCPCS | Performed by: FAMILY MEDICINE

## 2025-07-09 PROCEDURE — RECHECK: Performed by: PODIATRIST

## 2025-07-09 PROCEDURE — 99214 OFFICE O/P EST MOD 30 MIN: CPT | Performed by: FAMILY MEDICINE

## 2025-07-09 NOTE — PROGRESS NOTES
Diabetic Foot Exam    Patient's shoes and socks removed.    Right Foot/Ankle   Right Foot Inspection  Skin Exam: skin normal and skin intact. No dry skin, no warmth, no callus, no erythema, no maceration, no abnormal color, no pre-ulcer, no ulcer and no callus.     Toe Exam: ROM and strength within normal limits.     Sensory   Monofilament testing: diminished    Vascular  The right DP pulse is 2+. The right PT pulse is 2+.     Left Foot/Ankle  Left Foot Inspection  Skin Exam: skin normal and skin intact. No dry skin, no warmth, no erythema, no maceration, normal color, no pre-ulcer, no ulcer and no callus.     Toe Exam: ROM and strength within normal limits.     Sensory   Monofilament testing: diminished    Vascular  The left DP pulse is 2+. The left PT pulse is 2+.     Assign Risk Category  No deformity present  Loss of protective sensation  No weak pulses  Risk: 1      Name: Joanne Hu      : 1960      MRN: [id number]  Encounter Provider: Erika Cuenca DO  Encounter Date: 2025   Encounter department: [facility]  :  Assessment & Plan  MS (multiple sclerosis) (Formerly [facility])  Patient following with neurology regularly.  She appears clinically stable today.  She has not had any worsening symptoms.  Patient is resistant to starting any new medications.  Continue with her regular follow-up as well as her MRI of her brain cervical spine and thoracic spine.       Type 2 diabetes mellitus with hyperglycemia, with long-term current use of insulin (Formerly [facility])  A1c appears persistently elevated.  At this time, she is following with endocrinology.  It does not appear that she has been taking her medications as regularly as are prescribed.  Continue with strict diabetic diet and exercise.  Foot exam was completed today.  Continue with regular follow-up with ophthalmology.  Lab Results   Component Value Date    HGBA1C 8.5 (H) 2025            Benign essential hypertension  Blood pressure appears  stable today.  Continue with routine home monitoring as well as current treatment with amlodipine, hydrochlorothiazide as well as metoprolol.       Obstructive sleep apnea syndrome  Patient with a previous history of sleep apnea.  At this time, this may likely be contributing to multiple symptoms that she is developing of fatigue and headaches.  It was recommended that she follow back up with sleep medicine however she does not wish to pursue this at this time.       Hyperlipidemia, unspecified hyperlipidemia type  Recheck lipid panel.  Continue with a strict low-fat/low-cholesterol diet as well as her current treatment with atorvastatin.       Incontinence of urine in female  Patient with persistent problems of incontinence.  She believes that this has been getting worse.  She is scheduled to see urology in the next few days.       Medicare annual wellness visit, subsequent            Preventive health issues were discussed with patient, and age appropriate screening tests were ordered as noted in patient's After Visit Summary. Personalized health advice and appropriate referrals for health education or preventive services given if needed, as noted in patient's After Visit Summary.    History of Present Illness     HPI   Patient is a 64-year-old female presents today for a follow-up on her chronic conditions.  She has MS, type 2 diabetes, pretension, sleep apnea, dyslipidemia, urinary incontinence.  She is been taking her medications regularly.  She denies adverse reactions with her medications.  Patient Care Team:  Erika Cuenca DO as PCP - General (Family Medicine)  Mariano Joshua DO as PCP - PCP-[employer] (RTE)  Mabel Tong MD as PCP - Endocrinology (Endocrinology)  Erika Cuenca DO as PCP - PCP-[facility] (RTE)  MD Dia Ornelas DO as Endoscopist  Hussein Lee MD (Podiatry)  Jennifer Antunez RD as Diabetes Educator (Nutrition)  Allie Bob MD  (Nephrology)  DEANA Hsu as Nurse Practitioner (Endocrinology)  Barrett Rose MD (Oncology)  Mariano Alicia DO (Cardiology)  DEANA Topete as Nurse Practitioner (Endocrinology)  Bethany Burleson PA-C as Physician Assistant (Endocrinology)    Review of Systems   Constitutional:  Negative for activity change, chills, fatigue and fever.   HENT:  Negative for congestion, ear pain, sinus pressure and sore throat.    Eyes:  Negative for redness, itching and visual disturbance.   Respiratory:  Negative for cough and shortness of breath.    Cardiovascular:  Negative for chest pain and palpitations.   Gastrointestinal:  Negative for abdominal pain, diarrhea and nausea.   Endocrine: Negative for cold intolerance and heat intolerance.   Genitourinary:  Negative for dysuria, flank pain and frequency.   Musculoskeletal:  Negative for arthralgias, back pain, gait problem and myalgias.   Skin:  Negative for color change.   Allergic/Immunologic: Negative for environmental allergies.   Neurological:  Negative for dizziness, numbness and headaches.   Psychiatric/Behavioral:  Positive for sleep disturbance. Negative for behavioral problems.      Medical History Reviewed by provider this encounter:  Tobacco  Allergies  Meds  Problems  Med Hx  Surg Hx  Fam Hx       Annual Wellness Visit Questionnaire   Joanne is here for her Subsequent Wellness visit. Last Medicare Wellness visit information reviewed, patient interviewed, no change since last AWV.     Health Risk Assessment:   Patient rates overall health as good. Patient feels that their physical health rating is slightly worse. Patient is satisfied with their life. Eyesight was rated as same. Hearing was rated as same. Patient feels that their emotional and mental health rating is same. Patients states they are never, rarely angry. Patient states they are never, rarely unusually tired/fatigued. Pain experienced in the last 7 days has been some.  Patient's pain rating has been 7/10. Patient states that she has experienced no weight loss or gain in last 6 months.     Depression Screening:   PHQ-9 Score: 0      Fall Risk Screening:   In the past year, patient has experienced: no history of falling in past year      Urinary Incontinence Screening:   Patient has leaked urine accidently in the last six months.     Home Safety:  Patient has trouble with stairs inside or outside of their home. Patient has working smoke alarms and has working carbon monoxide detector. Home safety hazards include: none.     Nutrition:   Current diet is Regular.     Medications:   Patient is currently taking over-the-counter supplements. OTC medications include: see medication list. Patient is able to manage medications.     Activities of Daily Living (ADLs)/Instrumental Activities of Daily Living (IADLs):   Walk and transfer into and out of bed and chair?: No  Dress and groom yourself?: No    Bathe or shower yourself?: No    Feed yourself? Yes  Do your laundry/housekeeping?: No  Manage your money, pay your bills and track your expenses?: No  Make your own meals?: No    Do your own shopping?: No    Previous Hospitalizations:   Any hospitalizations or ED visits within the last 12 months?: No      Advance Care Planning:   Living will: Yes    Durable POA for healthcare: Yes    Advanced directive: Yes    Advanced directive counseling given: Yes    Five wishes given: No    Patient declined ACP directive: No    End of Life Decisions reviewed with patient: Yes    Provider agrees with end of life decisions: Yes      Cognitive Screening:   Provider or family/friend/caregiver concerned regarding cognition?: No    Preventive Screenings      Cardiovascular Screening:    General: Screening Not Indicated, History Lipid Disorder and Risks and Benefits Discussed      Diabetes Screening:     General: Screening Not Indicated, History Diabetes and Risks and Benefits Discussed      Colorectal Cancer  Screening:     General: Screening Current      Breast Cancer Screening:     General: Risks and Benefits Discussed    Due for: Mammogram        Cervical Cancer Screening:    General: Screening Current and Risks and Benefits Discussed      Osteoporosis Screening:    General: Risks and Benefits Discussed and Screening Current      Abdominal Aortic Aneurysm (AAA) Screening:        General: Risks and Benefits Discussed and Screening Not Indicated      Lung Cancer Screening:     General: Screening Not Indicated and Risks and Benefits Discussed      Hepatitis C Screening:    General: Screening Current and Risks and Benefits Discussed    Immunizations:  - Immunizations due: Prevnar 20 and Zoster (Shingrix)    Screening, Brief Intervention, and Referral to Treatment (SBIRT)     Screening  Typical number of drinks in a day: 0  Typical number of drinks in a week: 0  Interpretation: Low risk drinking behavior.    Single Item Drug Screening:  How often have you used an illegal drug (including marijuana) or a prescription medication for non-medical reasons in the past year? never    Single Item Drug Screen Score: 0  Interpretation: Negative screen for possible drug use disorder    Social Drivers of Health     Financial Resource Strain: High Risk (5/24/2023)    Overall Financial Resource Strain (CARDIA)     Difficulty of Paying Living Expenses: Hard   Food Insecurity: No Food Insecurity (7/9/2025)    Nursing - Inadequate Food Risk Classification     Worried About Running Out of Food in the Last Year: Never true     Ran Out of Food in the Last Year: Never true   Transportation Needs: No Transportation Needs (7/9/2025)    PRAPARE - Transportation     Lack of Transportation (Medical): No     Lack of Transportation (Non-Medical): No   Housing Stability: Low Risk  (7/9/2025)    Housing Stability Vital Sign     Unable to Pay for Housing in the Last Year: No     Number of Times Moved in the Last Year: 0     Homeless in the Last Year: No    Utilities: Not At Risk (7/9/2025)    [facility] Utilities     Threatened with loss of utilities: No     No results found.    Objective   /78 (BP Location: Left arm, Patient Position: Sitting, Cuff Size: Large)   Pulse 82   Temp 98.3 °F (36.8 °C) (Temporal)   Wt 126 kg (277 lb)   LMP  (LMP Unknown)   SpO2 97%   BMI 46.10 kg/m²     Physical Exam  Vitals reviewed.   Constitutional:       General: She is not in acute distress.     Appearance: Normal appearance. She is well-developed.   HENT:      Head: Normocephalic and atraumatic.      Right Ear: Tympanic membrane, ear canal and external ear normal. There is no impacted cerumen.      Left Ear: Tympanic membrane, ear canal and external ear normal. There is no impacted cerumen.      Nose: Nose normal. No congestion or rhinorrhea.      Mouth/Throat:      Mouth: Mucous membranes are moist.      Pharynx: No oropharyngeal exudate or posterior oropharyngeal erythema.     Eyes:      General: No scleral icterus.        Right eye: No discharge.         Left eye: No discharge.      Extraocular Movements: Extraocular movements intact.      Conjunctiva/sclera: Conjunctivae normal.      Pupils: Pupils are equal, round, and reactive to light.     Neck:      Trachea: No tracheal deviation.     Cardiovascular:      Rate and Rhythm: Normal rate and regular rhythm.      Pulses: Normal pulses. no weak pulses.           Dorsalis pedis pulses are 2+ on the right side and 2+ on the left side.        Posterior tibial pulses are 2+ on the right side and 2+ on the left side.      Heart sounds: Normal heart sounds. No murmur heard.     No friction rub. No gallop.   Pulmonary:      Effort: Pulmonary effort is normal. No respiratory distress.      Breath sounds: Normal breath sounds. No wheezing, rhonchi or rales.   Abdominal:      General: Bowel sounds are normal. There is no distension.      Palpations: Abdomen is soft.      Tenderness: There is no abdominal tenderness. There is no  guarding or rebound.     Musculoskeletal:         General: Normal range of motion.      Cervical back: Normal range of motion and neck supple.      Right lower leg: No edema.      Left lower leg: No edema.        Feet:    Feet:      Right foot:      Skin integrity: No ulcer, skin breakdown, erythema, warmth, callus or dry skin.      Left foot:      Skin integrity: No ulcer, skin breakdown, erythema, warmth, callus or dry skin.   Lymphadenopathy:      Head:      Right side of head: No submental or submandibular adenopathy.      Left side of head: No submental or submandibular adenopathy.      Cervical: No cervical adenopathy.      Right cervical: No superficial, deep or posterior cervical adenopathy.     Left cervical: No superficial, deep or posterior cervical adenopathy.     Skin:     General: Skin is warm and dry.      Findings: No erythema.     Neurological:      General: No focal deficit present.      Mental Status: She is alert and oriented to person, place, and time.      Cranial Nerves: No cranial nerve deficit.      Sensory: Sensation is intact. No sensory deficit.      Motor: Motor function is intact.     Psychiatric:         Attention and Perception: Attention and perception normal.         Mood and Affect: Mood is not anxious or depressed.         Speech: Speech normal.         Behavior: Behavior normal.         Thought Content: Thought content normal.         Judgment: Judgment normal.

## 2025-07-09 NOTE — ASSESSMENT & PLAN NOTE
Recheck lipid panel.  Continue with a strict low-fat/low-cholesterol diet as well as her current treatment with atorvastatin.

## 2025-07-09 NOTE — ASSESSMENT & PLAN NOTE
A1c appears persistently elevated.  At this time, she is following with endocrinology.  It does not appear that she has been taking her medications as regularly as are prescribed.  Continue with strict diabetic diet and exercise.  Foot exam was completed today.  Continue with regular follow-up with ophthalmology.  Lab Results   Component Value Date    HGBA1C 8.5 (H) 04/09/2025

## 2025-07-09 NOTE — PROGRESS NOTES
"Name: Joanne Hu      : 1960      MRN: [id number]  Encounter Provider: Eliazar Romero DPM  Encounter Date: 2025   Encounter department: [facility] [place]    Treatment consists of nail trimming.  :Nail removal    Date/Time: 2025 8:45 AM    Performed by: Eliazar Romero DPM  Authorized by: Eliazar Romero DPM    Nails trimmed:     Number of nails trimmed:  10      Assessment & Plan  Diabetic polyneuropathy associated with type 2 diabetes mellitus (HCC)  Nail trimming  Lab Results   Component Value Date    HGBA1C 8.5 (H) 2025          Procedures        History of Present Illness   HPI  Joanne Hu is a 64 y.o. female who presents for diabetic palliative footcare.      Review of Systems       Objective   Resp 18   Ht 5' 5\" (1.651 m)   Wt 126 kg (277 lb)   LMP  (LMP Unknown)   BMI 46.10 kg/m²      Physical Exam    Cardiovascular:      Comments: No palpable pedal pulses bilateral    Skin:     Comments: All toenails are elongated.             "

## 2025-07-09 NOTE — ASSESSMENT & PLAN NOTE
Patient with a previous history of sleep apnea.  At this time, this may likely be contributing to multiple symptoms that she is developing of fatigue and headaches.  It was recommended that she follow back up with sleep medicine however she does not wish to pursue this at this time.        details… detailed exam left knee/decreased ROM due to pain/no joint warmth/diminished strength/no joint swelling/no joint erythema

## 2025-07-09 NOTE — PATIENT INSTRUCTIONS
Medicare Preventive Visit Patient Instructions  Thank you for completing your Welcome to Medicare Visit or Medicare Annual Wellness Visit today. Your next wellness visit will be due in one year (7/10/2026).  The screening/preventive services that you may require over the next 5-10 years are detailed below. Some tests may not apply to you based off risk factors and/or age. Screening tests ordered at today's visit but not completed yet may show as past due. Also, please note that scanned in results may not display below.  Preventive Screenings:  Service Recommendations Previous Testing/Comments   Colorectal Cancer Screening  * Colonoscopy    * Fecal Occult Blood Test (FOBT)/Fecal Immunochemical Test (FIT)  * Fecal DNA/Cologuard Test  * Flexible Sigmoidoscopy Age: 45-75 years old   Colonoscopy: every 10 years (may be performed more frequently if at higher risk)  OR  FOBT/FIT: every 1 year  OR  Cologuard: every 3 years  OR  Sigmoidoscopy: every 5 years  Screening may be recommended earlier than age 45 if at higher risk for colorectal cancer. Also, an individualized decision between you and your healthcare provider will decide whether screening between the ages of 76-85 would be appropriate. Colonoscopy: 04/28/2017  FOBT/FIT: Not on file  Cologuard: Not on file  Sigmoidoscopy: Not on file    Screening Current     Breast Cancer Screening Age: 40+ years old  Frequency: every 1-2 years  Not required if history of left and right mastectomy Mammogram: 09/07/2022        Cervical Cancer Screening Between the ages of 21-29, pap smear recommended once every 3 years.   Between the ages of 30-65, can perform pap smear with HPV co-testing every 5 years.   Recommendations may differ for women with a history of total hysterectomy, cervical cancer, or abnormal pap smears in past. Pap Smear: 09/16/2022    Screening Current   Hepatitis C Screening Once for adults born between 1945 and 1965  More frequently in patients at high risk for  Hepatitis C Hep C Antibody: 09/06/2019    Screening Current   Diabetes Screening 1-2 times per year if you're at risk for diabetes or have pre-diabetes Fasting glucose: 189 mg/dL (4/9/2025)  A1C: 8.5 % (4/9/2025)  Screening Not Indicated  History Diabetes   Cholesterol Screening Once every 5 years if you don't have a lipid disorder. May order more often based on risk factors. Lipid panel: 06/17/2024    Screening Not Indicated  History Lipid Disorder     Other Preventive Screenings Covered by Medicare:  Abdominal Aortic Aneurysm (AAA) Screening: covered once if your at risk. You're considered to be at risk if you have a family history of AAA.  Lung Cancer Screening: covers low dose CT scan once per year if you meet all of the following conditions: (1) Age 55-77; (2) No signs or symptoms of lung cancer; (3) Current smoker or have quit smoking within the last 15 years; (4) You have a tobacco smoking history of at least 20 pack years (packs per day multiplied by number of years you smoked); (5) You get a written order from a healthcare provider.  Glaucoma Screening: covered annually if you're considered high risk: (1) You have diabetes OR (2) Family history of glaucoma OR (3)  aged 50 and older OR (4)  American aged 65 and older  Osteoporosis Screening: covered every 2 years if you meet one of the following conditions: (1) You're estrogen deficient and at risk for osteoporosis based off medical history and other findings; (2) Have a vertebral abnormality; (3) On glucocorticoid therapy for more than 3 months; (4) Have primary hyperparathyroidism; (5) On osteoporosis medications and need to assess response to drug therapy.   Last bone density test (DXA Scan): 10/06/2020.  HIV Screening: covered annually if you're between the age of 15-65. Also covered annually if you are younger than 15 and older than 65 with risk factors for HIV infection. For pregnant patients, it is covered up to 3 times per  pregnancy.    Immunizations:  Immunization Recommendations   Influenza Vaccine Annual influenza vaccination during flu season is recommended for all persons aged >= 6 months who do not have contraindications   Pneumococcal Vaccine   * Pneumococcal conjugate vaccine = PCV13 (Prevnar 13), PCV15 (Vaxneuvance), PCV20 (Prevnar 20)  * Pneumococcal polysaccharide vaccine = PPSV23 (Pneumovax) Adults 19-63 yo with certain risk factors or if 65+ yo  If never received any pneumonia vaccine: recommend Prevnar 20 (PCV20)  Give PCV20 if previously received 1 dose of PCV13 or PPSV23   Hepatitis B Vaccine 3 dose series if at intermediate or high risk (ex: diabetes, end stage renal disease, liver disease)   Respiratory syncytial virus (RSV) Vaccine - COVERED BY MEDICARE PART D  * RSVPreF3 (Arexvy) CDC recommends that adults 60 years of age and older may receive a single dose of RSV vaccine using shared clinical decision-making (SCDM)   Tetanus (Td) Vaccine - COST NOT COVERED BY MEDICARE PART B Following completion of primary series, a booster dose should be given every 10 years to maintain immunity against tetanus. Td may also be given as tetanus wound prophylaxis.   Tdap Vaccine - COST NOT COVERED BY MEDICARE PART B Recommended at least once for all adults. For pregnant patients, recommended with each pregnancy.   Shingles Vaccine (Shingrix) - COST NOT COVERED BY MEDICARE PART B  2 shot series recommended in those 19 years and older who have or will have weakened immune systems or those 50 years and older     Health Maintenance Due:      Topic Date Due   • Breast Cancer Screening: Mammogram  09/07/2023   • Colorectal Cancer Screening  04/28/2027   • HIV Screening  Completed   • Hepatitis C Screening  Completed     Immunizations Due:      Topic Date Due   • Pneumococcal Vaccine: 50+ Years (1 of 2 - PCV) Never done   • COVID-19 Vaccine (3 - 2024-25 season) 09/01/2024   • Influenza Vaccine (1) 09/01/2025     Advance Directives   What  are advance directives?  Advance directives are legal documents that state your wishes and plans for medical care. These plans are made ahead of time in case you lose your ability to make decisions for yourself. Advance directives can apply to any medical decision, such as the treatments you want, and if you want to donate organs.   What are the types of advance directives?  There are many types of advance directives, and each state has rules about how to use them. You may choose a combination of any of the following:  Living will:  This is a written record of the treatment you want. You can also choose which treatments you do not want, which to limit, and which to stop at a certain time. This includes surgery, medicine, IV fluid, and tube feedings.   Durable power of  for healthcare (DPAHC):  This is a written record that states who you want to make healthcare choices for you when you are unable to make them for yourself. This person, called a proxy, is usually a family member or a friend. You may choose more than 1 proxy.  Do not resuscitate (DNR) order:  A DNR order is used in case your heart stops beating or you stop breathing. It is a request not to have certain forms of treatment, such as CPR. A DNR order may be included in other types of advance directives.  Medical directive:  This covers the care that you want if you are in a coma, near death, or unable to make decisions for yourself. You can list the treatments you want for each condition. Treatment may include pain medicine, surgery, blood transfusions, dialysis, IV or tube feedings, and a ventilator (breathing machine).  Values history:  This document has questions about your views, beliefs, and how you feel and think about life. This information can help others choose the care that you would choose.  Why are advance directives important?  An advance directive helps you control your care. Although spoken wishes may be used, it is better to have  your wishes written down. Spoken wishes can be misunderstood, or not followed. Treatments may be given even if you do not want them. An advance directive may make it easier for your family to make difficult choices about your care.   Urinary Incontinence   Urinary incontinence (UI)  is when you lose control of your bladder. UI develops because your bladder cannot store or empty urine properly. The 3 most common types of UI are stress incontinence, urge incontinence, or both.  Medicines:   May be given to help strengthen your bladder control. Report any side effects of medication to your healthcare provider.  Do pelvic muscle exercises often:  Your pelvic muscles help you stop urinating. Squeeze these muscles tight for 5 seconds, then relax for 5 seconds. Gradually work up to squeezing for 10 seconds. Do 3 sets of 15 repetitions a day, or as directed. This will help strengthen your pelvic muscles and improve bladder control.  Train your bladder:  Go to the bathroom at set times, such as every 2 hours, even if you do not feel the urge to go. You can also try to hold your urine when you feel the urge to go. For example, hold your urine for 5 minutes when you feel the urge to go. As that becomes easier, hold your urine for 10 minutes.   Self-care:   Keep a UI record.  Write down how often you leak urine and how much you leak. Make a note of what you were doing when you leaked urine.  Drink liquids as directed. You may need to limit the amount of liquid you drink to help control your urine leakage. Do not drink any liquid right before you go to bed. Limit or do not have drinks that contain caffeine or alcohol.   Prevent constipation.  Eat a variety of high-fiber foods. Good examples are high-fiber cereals, beans, vegetables, and whole-grain breads. Walking is the best way to trigger your intestines to have a bowel movement.  Exercise regularly and maintain a healthy weight.  Weight loss and exercise will decrease  pressure on your bladder and help you control your leakage.   Use a catheter as directed  to help empty your bladder. A catheter is a tiny, plastic tube that is put into your bladder to drain your urine.   Go to behavior therapy as directed.  Behavior therapy may be used to help you learn to control your urge to urinate.    Weight Management   Why it is important to manage your weight:  Being overweight increases your risk of health conditions such as heart disease, high blood pressure, type 2 diabetes, and certain types of cancer. It can also increase your risk for osteoarthritis, sleep apnea, and other respiratory problems. Aim for a slow, steady weight loss. Even a small amount of weight loss can lower your risk of health problems.  How to lose weight safely:  A safe and healthy way to lose weight is to eat fewer calories and get regular exercise. You can lose up about 1 pound a week by decreasing the number of calories you eat by 500 calories each day.   Healthy meal plan for weight management:  A healthy meal plan includes a variety of foods, contains fewer calories, and helps you stay healthy. A healthy meal plan includes the following:  Eat whole-grain foods more often.  A healthy meal plan should contain fiber. Fiber is the part of grains, fruits, and vegetables that is not broken down by your body. Whole-grain foods are healthy and provide extra fiber in your diet. Some examples of whole-grain foods are whole-wheat breads and pastas, oatmeal, brown rice, and bulgur.  Eat a variety of vegetables every day.  Include dark, leafy greens such as spinach, kale, margaux greens, and mustard greens. Eat yellow and orange vegetables such as carrots, sweet potatoes, and winter squash.   Eat a variety of fruits every day.  Choose fresh or canned fruit (canned in its own juice or light syrup) instead of juice. Fruit juice has very little or no fiber.  Eat low-fat dairy foods.  Drink fat-free (skim) milk or 1% milk. Eat  fat-free yogurt and low-fat cottage cheese. Try low-fat cheeses such as mozzarella and other reduced-fat cheeses.  Choose meat and other protein foods that are low in fat.  Choose beans or other legumes such as split peas or lentils. Choose fish, skinless poultry (chicken or turkey), or lean cuts of red meat (beef or pork). Before you cook meat or poultry, cut off any visible fat.   Use less fat and oil.  Try baking foods instead of frying them. Add less fat, such as margarine, sour cream, regular salad dressing and mayonnaise to foods. Eat fewer high-fat foods. Some examples of high-fat foods include french fries, doughnuts, ice cream, and cakes.  Eat fewer sweets.  Limit foods and drinks that are high in sugar. This includes candy, cookies, regular soda, and sweetened drinks.  Exercise:  Exercise at least 30 minutes per day on most days of the week. Some examples of exercise include walking, biking, dancing, and swimming. You can also fit in more physical activity by taking the stairs instead of the elevator or parking farther away from stores. Ask your healthcare provider about the best exercise plan for you.    © Copyright [employer] 2018 Information is for End User's use only and may not be sold, redistributed or otherwise used for commercial purposes. All illustrations and images included in CareNotes® are the copyrighted property of [employer] Inc[employer] or [employer]

## 2025-07-09 NOTE — ASSESSMENT & PLAN NOTE
Nail trimming  Lab Results   Component Value Date    HGBA1C 8.5 (H) 04/09/2025          Procedures

## 2025-07-09 NOTE — ASSESSMENT & PLAN NOTE
Patient following with neurology regularly.  She appears clinically stable today.  She has not had any worsening symptoms.  Patient is resistant to starting any new medications.  Continue with her regular follow-up as well as her MRI of her brain cervical spine and thoracic spine.

## 2025-07-15 NOTE — PROGRESS NOTES
7/17/2025      Chief Complaint   Patient presents with   • New Patient Visit   • Urinary Incontinence   • Urinary Frequency       Assessment and Plan    64 y.o. female managed by New patient       1. Mixed incontinence urge and stress  Assessment & Plan:  Medical history of MS and stroke 2020  Reports mixed urinary incontinence  Feels that her urge incontinence is worse  PVR today-31  Unable to leave UA sample  Reviewed potential for bladder instability with history of MS  Reviewed adequate hydration and bladder irritants  Attachment provided   Discussed pelvic floor physical therapy versus OAB medication  Plan to start on Myrbetriq 50 mg  Plan to additionally trial bladder diary  Aware to call if medication is too expensive and not covered  Plan to trial trospium then  Reviewed usage and side effects  Plan to follow-up in 3 months then to reevaluate  Orders:  -     POCT Measure PVR  -     Mirabegron ER (Myrbetriq) 50 MG TB24; Take 1 tablet (50 mg total) by mouth in the morning      History of Present Illness  Joanne Hu is a 64 y.o. female here for evaluation of stress and urge incontinence.  Patient reports has been ongoing since 2020.  She does have medical history of MS and stroke that occurred in 2020.  She reports that her urgent continence is worse.  She goes through about 5-7 pads per day and has incontinence at nighttime additionally.  No burning or blood in the urine.  No history of urinary tract infections.    Denies constipation or diarrhea    No previous surgeries on her bladder or kidneys.    Presents today with her daughter who additionally provides patient history.    She reports that her mother is supposed be going to physical therapy which she does not attend.  She does not feel that she would attend pelvic floor physical therapy weekly.  She reports that she did not want to leave the house today either for today's appointment.    Patient does drink 24-48 ounces of coffee per day.  She mainly  "drinks water and maybe 1 cup of milk.  She does eat a lot of citrus fruits.    Uses a walker for mobility.    High A1c noted at 8.5.  Currently on Ozempic and metformin.        Review of Systems   Constitutional:  Negative for chills and fever.   HENT:  Negative for ear pain and sore throat.    Eyes:  Negative for pain and visual disturbance.   Respiratory:  Negative for cough and shortness of breath.    Cardiovascular:  Negative for chest pain and palpitations.   Gastrointestinal:  Negative for abdominal pain and vomiting.   Genitourinary:  Positive for frequency and urgency. Negative for difficulty urinating, dysuria and hematuria.   Musculoskeletal:  Positive for gait problem. Negative for arthralgias and back pain.   Skin:  Negative for color change and rash.   Neurological:  Negative for seizures and syncope.   All other systems reviewed and are negative.               Vitals  Vitals:    07/17/25 0952   BP: 126/84   BP Location: Left arm   Patient Position: Sitting   Cuff Size: Large   Pulse: 78   SpO2: 97%   Weight: 126 kg (278 lb)   Height: 5' 5\" (1.651 m)       Physical Exam  Constitutional:       Appearance: Normal appearance. She is obese.   HENT:      Head: Normocephalic and atraumatic.   Pulmonary:      Effort: Pulmonary effort is normal.     Musculoskeletal:         General: Normal range of motion.      Cervical back: Normal range of motion.     Neurological:      General: No focal deficit present.      Mental Status: She is alert and oriented to person, place, and time. Mental status is at baseline.      Motor: Weakness (walker) present.     Psychiatric:         Mood and Affect: Mood normal.         Behavior: Behavior normal.         Thought Content: Thought content normal.           Past History  Past Medical History[1]  Social History[1]  Tobacco Use History[1]  Family History[1]    The following portions of the patient's history were reviewed and updated as appropriate: allergies, current " "medications, past medical history, past social history, past surgical history and problem list.    Results  Recent Results (from the past hour)   POCT Measure PVR    Collection Time: 07/17/25  9:59 AM   Result Value Ref Range    POST-VOID RESIDUAL VOLUME, ML POC 24 mL   ]  No results found for: \"PSA\"  Lab Results   Component Value Date    GLUCOSE 258 (H) 09/26/2016    CALCIUM 9.9 04/09/2025    K 3.9 04/09/2025    CO2 26 04/09/2025    CL 98 04/09/2025    BUN 19 04/09/2025    CREATININE 0.98 04/09/2025     Lab Results   Component Value Date    WBC 8.48 06/17/2024    HGB 12.2 06/17/2024    HCT 37.5 06/17/2024    MCV 88 06/17/2024     06/17/2024              [1]  Past Medical History:  Diagnosis Date   • Anxiety    • CPAP (continuous positive airway pressure) dependence     does not use   • Diabetes mellitus ([facility])    • External hemorrhoids     last assessed 4/7/16, resolved 7/21/16   • Hernia, ventral     last assessed 12/14/15, resolved 7/21/16   • History of transfusion    • Hypertension    • Incarcerated incisional hernia     last assessed 12/21/15, resolved 7/21/16   • Insomnia     last assessed 12/8/16, resolved 10/9/17   • Lyme disease    • Morbid obesity with BMI of 45.0-49.9, adult ([facility]) 05/03/2017   • MS (multiple sclerosis) ([facility])    • Sleep apnea    • Stroke (cerebrum) ([facility]) 03/09/2020   • Type 2 diabetes mellitus with hyperglycemia, without long-term current use of insulin ([facility])    [1]  Social History  Socioeconomic History   • Marital status: /Civil Union   Occupational History     Employer: [employer]   Tobacco Use   • Smoking status: Never   • Smokeless tobacco: Never   Vaping Use   • Vaping status: Never Used   Substance and Sexual Activity   • Alcohol use: Not Currently     Comment: Social   • Drug use: Not Currently     Types: Marijuana     Comment: medical marijuana   • Sexual activity: Not Currently     Partners: Male     Birth control/protection: None     Social Drivers of Health "     Financial Resource Strain: High Risk (5/24/2023)    Overall Financial Resource Strain (CARDIA)    • Difficulty of Paying Living Expenses: Hard   Food Insecurity: No Food Insecurity (7/9/2025)    Nursing - Inadequate Food Risk Classification    • Worried About Running Out of Food in the Last Year: Never true    • Ran Out of Food in the Last Year: Never true   Transportation Needs: No Transportation Needs (7/9/2025)    PRAPARE - Transportation    • Lack of Transportation (Medical): No    • Lack of Transportation (Non-Medical): No   Physical Activity: Inactive (7/30/2020)    Exercise Vital Sign    • Days of Exercise per Week: 0 days    • Minutes of Exercise per Session: 0 min   Stress: Stress Concern Present (7/30/2020)    [employer] - Occupational Stress Questionnaire    • Feeling of Stress : Rather much   Social Connections: Unknown (7/30/2020)    Social Connection and Isolation Panel    • Frequency of Communication with Friends and Family: More than three times a week    • Marital Status:    Intimate Partner Violence: Not At Risk (7/30/2020)    Humiliation, Afraid, Rape, and Kick questionnaire    • Fear of Current or Ex-Partner: No    • Emotionally Abused: No    • Physically Abused: No    • Sexually Abused: No   Housing Stability: Low Risk  (7/9/2025)    Housing Stability Vital Sign    • Unable to Pay for Housing in the Last Year: No    • Number of Times Moved in the Last Year: 0    • Homeless in the Last Year: No   [1]  Social History  Tobacco Use   Smoking Status Never   Smokeless Tobacco Never   [1]  Family History  Problem Relation Name Age of Onset   • Glaucoma Mother     • Kidney disease Father Ryan    • Diabetes Father Ryan    • Hypertension Father Ryan    • Colon cancer Father Ryan    • Alzheimer's disease Father Ryan    • Cervical cancer Sister Derek 59   • Breast cancer Sister Derek 48   • Breast cancer Sister Angela 38   • Breast cancer Maternal Aunt     •  Breast cancer Maternal Aunt     • Breast cancer Maternal Aunt     • Breast cancer Maternal Grandmother     • Coronary artery disease Family     • Diabetes Family     • Hypertension Family

## 2025-07-17 ENCOUNTER — OFFICE VISIT (OUTPATIENT)
Dept: UROLOGY | Facility: CLINIC | Age: 65
End: 2025-07-17
Payer: COMMERCIAL

## 2025-07-17 VITALS
SYSTOLIC BLOOD PRESSURE: 126 MMHG | HEIGHT: 65 IN | DIASTOLIC BLOOD PRESSURE: 84 MMHG | WEIGHT: 278 LBS | BODY MASS INDEX: 46.32 KG/M2 | HEART RATE: 78 BPM | OXYGEN SATURATION: 97 %

## 2025-07-17 DIAGNOSIS — N39.46 MIXED INCONTINENCE URGE AND STRESS: Primary | ICD-10-CM

## 2025-07-17 LAB — POST-VOID RESIDUAL VOLUME, ML POC: 24 ML

## 2025-07-17 PROCEDURE — 51798 US URINE CAPACITY MEASURE: CPT

## 2025-07-17 PROCEDURE — 99204 OFFICE O/P NEW MOD 45 MIN: CPT

## 2025-07-17 RX ORDER — MIRABEGRON 50 MG/1
50 TABLET, FILM COATED, EXTENDED RELEASE ORAL DAILY
Qty: 90 TABLET | Refills: 3 | Status: SHIPPED | OUTPATIENT
Start: 2025-07-17

## 2025-07-17 NOTE — ASSESSMENT & PLAN NOTE
Medical history of MS and stroke 2020  Reports mixed urinary incontinence  Feels that her urge incontinence is worse  PVR today-31  Unable to leave UA sample  Reviewed potential for bladder instability with history of MS  Reviewed adequate hydration and bladder irritants  Attachment provided   Discussed pelvic floor physical therapy versus OAB medication  Plan to start on Myrbetriq 50 mg  Plan to additionally trial bladder diary  Aware to call if medication is too expensive and not covered  Plan to trial trospium then  Reviewed usage and side effects  Plan to follow-up in 3 months then to reevaluate

## 2025-07-18 ENCOUNTER — TELEPHONE (OUTPATIENT)
Dept: ENDOCRINOLOGY | Facility: CLINIC | Age: 65
End: 2025-07-18

## 2025-08-06 ENCOUNTER — TELEPHONE (OUTPATIENT)
Dept: NEPHROLOGY | Facility: CLINIC | Age: 65
End: 2025-08-06

## 2025-08-13 ENCOUNTER — TELEPHONE (OUTPATIENT)
Dept: UROLOGY | Facility: CLINIC | Age: 65
End: 2025-08-13

## (undated) DEVICE — GLOVE SRG BIOGEL 7

## (undated) DEVICE — SUT VICRYL 4-0 PS-2 27 IN J426H

## (undated) DEVICE — MINOR PROCEDURE DRAPE: Brand: CONVERTORS

## (undated) DEVICE — PACK UNIVERSAL NECK

## (undated) DEVICE — ELECTRODE BLADE MOD E-Z CLEAN 2.5IN 6.4CM -0012M

## (undated) DEVICE — DRAPE SURGIKIT SADDLE BAG

## (undated) DEVICE — GLOVE INDICATOR PI UNDERGLOVE SZ 7 BLUE

## (undated) DEVICE — 3M™ STERI-STRIP™ COMPOUND BENZOIN TINCTURE 40 BAGS/CARTON 4 CARTONS/CASE C1544: Brand: 3M™ STERI-STRIP™

## (undated) DEVICE — LIGACLIP MCA MULTIPLE CLIP APPLIERS, 20 SMALL CLIPS: Brand: LIGACLIP

## (undated) DEVICE — SURGICEL 4 X 8IN

## (undated) DEVICE — 3M™ STERI-STRIP™ REINFORCED ADHESIVE SKIN CLOSURES, R1547, 1/2 IN X 4 IN (12 MM X 100 MM), 6 STRIPS/ENVELOPE: Brand: 3M™ STERI-STRIP™

## (undated) DEVICE — SUT MONOCRYL 5-0 P-3 18 IN Y493G

## (undated) DEVICE — SUT VICRYL 3-0 SH 27 IN J416H

## (undated) DEVICE — SINGLE-USE BIOPSY FORCEPS: Brand: RADIAL JAW 4